# Patient Record
Sex: FEMALE | Race: WHITE | NOT HISPANIC OR LATINO | Employment: OTHER | URBAN - NONMETROPOLITAN AREA
[De-identification: names, ages, dates, MRNs, and addresses within clinical notes are randomized per-mention and may not be internally consistent; named-entity substitution may affect disease eponyms.]

---

## 2017-02-13 RX ORDER — OMEPRAZOLE 20 MG/1
CAPSULE, DELAYED RELEASE ORAL
Qty: 90 CAPSULE | Refills: 3 | Status: SHIPPED | OUTPATIENT
Start: 2017-02-13 | End: 2018-03-01 | Stop reason: SDUPTHER

## 2017-02-13 RX ORDER — TRIAMTERENE AND HYDROCHLOROTHIAZIDE 75; 50 MG/1; MG/1
TABLET ORAL
Qty: 135 TABLET | Refills: 3 | Status: SHIPPED | OUTPATIENT
Start: 2017-02-13 | End: 2018-05-08 | Stop reason: SDUPTHER

## 2017-03-02 ENCOUNTER — OFFICE VISIT (OUTPATIENT)
Dept: INTERNAL MEDICINE | Facility: CLINIC | Age: 72
End: 2017-03-02

## 2017-03-02 VITALS
WEIGHT: 180.13 LBS | DIASTOLIC BLOOD PRESSURE: 75 MMHG | SYSTOLIC BLOOD PRESSURE: 120 MMHG | TEMPERATURE: 98.2 F | HEIGHT: 63 IN | OXYGEN SATURATION: 91 % | BODY MASS INDEX: 31.92 KG/M2 | HEART RATE: 58 BPM

## 2017-03-02 DIAGNOSIS — I10 ESSENTIAL HYPERTENSION: ICD-10-CM

## 2017-03-02 DIAGNOSIS — K21.9 GASTROESOPHAGEAL REFLUX DISEASE WITHOUT ESOPHAGITIS: ICD-10-CM

## 2017-03-02 DIAGNOSIS — E78.00 PURE HYPERCHOLESTEROLEMIA: Primary | ICD-10-CM

## 2017-03-02 PROCEDURE — 99214 OFFICE O/P EST MOD 30 MIN: CPT | Performed by: INTERNAL MEDICINE

## 2017-03-02 RX ORDER — TRAZODONE HYDROCHLORIDE 50 MG/1
50 TABLET ORAL NIGHTLY
Qty: 30 TABLET | Refills: 5 | Status: SHIPPED | OUTPATIENT
Start: 2017-03-02 | End: 2017-08-07

## 2017-03-02 RX ORDER — DIAZEPAM 5 MG/1
TABLET ORAL
Refills: 0 | COMMUNITY
Start: 2017-02-13 | End: 2017-03-02

## 2017-03-02 NOTE — PROGRESS NOTES
Subjective  Vesta Landry is a 71 y.o. female    HPI coming in for follow-up patient with a history of hypertension and hyperlipidemia history of reflux esophagitis has had bilateral knee pain ongoing for a few months now no new trauma pain especially with excessive standing or walking    The following portions of the patient's history were reviewed and updated as appropriate: allergies, current medications, past family history, past medical history, past social history, past surgical history, and problem list.     Review of Systems   Constitutional: Negative for appetite change, chills, diaphoresis, fatigue, fever and unexpected weight change.   HENT: Positive for congestion and postnasal drip. Negative for ear pain, rhinorrhea, sinus pressure, sore throat and tinnitus.    Eyes: Negative for pain, discharge and visual disturbance.   Respiratory: Negative for apnea, cough, choking, chest tightness, shortness of breath, wheezing and stridor.    Cardiovascular: Positive for leg swelling. Negative for chest pain and palpitations.   Gastrointestinal: Negative for abdominal distention, abdominal pain, anal bleeding, blood in stool, constipation, diarrhea, nausea, rectal pain and vomiting.   Endocrine: Negative for polyuria.   Genitourinary: Negative for dysuria, frequency, hematuria and urgency.   Musculoskeletal: Positive for arthralgias and gait problem.        Bilateral knee pain, left worse than right   Skin: Negative for color change and rash.   Allergic/Immunologic: Negative for environmental allergies.   Neurological: Negative for dizziness, tremors, syncope, weakness, light-headedness, numbness and headaches.   Psychiatric/Behavioral: Negative for dysphoric mood and suicidal ideas.       Vitals:    03/02/17 1112   BP: 120/75   Pulse: 58   Temp: 98.2 °F (36.8 °C)   SpO2: 91%       Objective  Physical Exam   Constitutional: She appears well-developed and well-nourished.   HENT:   Head: Normocephalic and  atraumatic.   Right Ear: External ear normal.   Left Ear: External ear normal.   Nose: Nose normal.   Eyes: Conjunctivae and EOM are normal. Right eye exhibits no discharge. Left eye exhibits no discharge. No scleral icterus.   Neck: Normal range of motion. Neck supple. No JVD present. No tracheal deviation present. No thyromegaly present.   Cardiovascular: Normal rate, regular rhythm, S1 normal, S2 normal and intact distal pulses.  Exam reveals no gallop and no friction rub.    Grade 2 holosystolic murmur, loudest over aortic valve   Pulmonary/Chest: Effort normal and breath sounds normal. No stridor. No respiratory distress. She has no wheezes. She has no rales. She exhibits no tenderness.   Abdominal: Soft. Bowel sounds are normal. She exhibits no distension and no mass. There is no tenderness. There is no rebound and no guarding. No hernia.   Musculoskeletal: She exhibits edema and deformity.   Lymphadenopathy:     She has no cervical adenopathy.   Neurological: She is alert.   Skin: Skin is warm and dry. No rash noted.   Psychiatric: She has a normal mood and affect. Her behavior is normal. Judgment and thought content normal.   Vitals reviewed.      Diagnoses and all orders for this visit:    Pure hypercholesterolemia stable with current meds discuss dietary restrictions and weight loss    Essential hypertension continue with low-salt diet cardiovascular exercise    Gastroesophageal reflux disease without esophagitis continue with reflux precautions and proton pump inhibitors  Continue with weight loss home exercises NSAIDs when necessary for bilateral knee pain

## 2017-03-07 RX ORDER — ATORVASTATIN CALCIUM 10 MG/1
TABLET, FILM COATED ORAL
Qty: 90 TABLET | Refills: 3 | Status: SHIPPED | OUTPATIENT
Start: 2017-03-07 | End: 2018-04-02 | Stop reason: SDUPTHER

## 2017-03-20 RX ORDER — METOPROLOL TARTRATE 50 MG/1
TABLET, FILM COATED ORAL
Qty: 90 TABLET | Refills: 3 | Status: SHIPPED | OUTPATIENT
Start: 2017-03-20 | End: 2018-03-22 | Stop reason: SDUPTHER

## 2017-07-24 ENCOUNTER — HOSPITAL ENCOUNTER (OUTPATIENT)
Dept: GENERAL RADIOLOGY | Facility: HOSPITAL | Age: 72
Discharge: HOME | End: 2017-07-24
Attending: INTERNAL MEDICINE | Admitting: INTERNAL MEDICINE

## 2017-07-24 ENCOUNTER — OFFICE VISIT (OUTPATIENT)
Dept: INTERNAL MEDICINE | Facility: CLINIC | Age: 72
End: 2017-07-24

## 2017-07-24 VITALS
SYSTOLIC BLOOD PRESSURE: 130 MMHG | WEIGHT: 178 LBS | HEIGHT: 63 IN | BODY MASS INDEX: 31.54 KG/M2 | OXYGEN SATURATION: 97 % | DIASTOLIC BLOOD PRESSURE: 90 MMHG | TEMPERATURE: 98.6 F | HEART RATE: 58 BPM

## 2017-07-24 DIAGNOSIS — R07.2 PRECORDIAL PAIN: ICD-10-CM

## 2017-07-24 DIAGNOSIS — R60.0 BILATERAL EDEMA OF LOWER EXTREMITY: ICD-10-CM

## 2017-07-24 DIAGNOSIS — E78.00 PURE HYPERCHOLESTEROLEMIA: ICD-10-CM

## 2017-07-24 DIAGNOSIS — R06.09 DYSPNEA ON EXERTION: ICD-10-CM

## 2017-07-24 DIAGNOSIS — R41.3 MEMORY LOSS: ICD-10-CM

## 2017-07-24 DIAGNOSIS — I10 ESSENTIAL HYPERTENSION: ICD-10-CM

## 2017-07-24 DIAGNOSIS — R06.09 DYSPNEA ON EXERTION: Primary | ICD-10-CM

## 2017-07-24 PROCEDURE — 99214 OFFICE O/P EST MOD 30 MIN: CPT | Performed by: INTERNAL MEDICINE

## 2017-07-24 PROCEDURE — 93000 ELECTROCARDIOGRAM COMPLETE: CPT | Performed by: INTERNAL MEDICINE

## 2017-07-24 PROCEDURE — 71020 HC CHEST PA AND LATERAL: CPT

## 2017-07-24 NOTE — PROGRESS NOTES
Subjective  Vesta Landry is a 72 y.o. female    HPI coming in for evaluation of her shortness of breath and chronic lower extremity swelling. She is accompanied by her daughter was giving us some of the history complains of dyspnea on exertion with walking about 1 block especially if it is a pill. Has not been very active lately she denies fever or cough or hemoptysis. Has a chronic lower extremity edema for which she underwent some treatment by her cardiologist    Has occasional chest discomfort which resolved spontaneously. Daughter also complains of patient being more forgetful lately and repeating herself. Denies localized weakness or numbness no headache or visual disturbances    The following portions of the patient's history were reviewed and updated as appropriate: allergies, current medications, past family history, past medical history, past social history, past surgical history, and problem list.     Review of Systems   Constitutional: Negative.  Negative for activity change, appetite change, fatigue and fever.   HENT: Negative for congestion, ear discharge, ear pain and trouble swallowing.    Eyes: Negative for photophobia and visual disturbance.   Respiratory: Positive for chest tightness and shortness of breath. Negative for cough.    Cardiovascular: Negative for chest pain and palpitations.   Gastrointestinal: Negative for abdominal distention, abdominal pain, constipation, diarrhea, nausea and vomiting.   Endocrine: Negative.    Genitourinary: Negative for dysuria, hematuria and urgency.   Musculoskeletal: Negative for arthralgias, back pain, joint swelling and myalgias.   Skin: Negative for color change and rash.   Allergic/Immunologic: Negative.    Neurological: Negative for dizziness, weakness, light-headedness and headaches.   Hematological: Negative for adenopathy. Does not bruise/bleed easily.   Psychiatric/Behavioral: Positive for confusion. Negative for agitation and dysphoric mood.  The patient is not nervous/anxious.        Vitals:    07/24/17 1513   BP: 130/90   Pulse: 58   Temp: 98.6 °F (37 °C)   SpO2: 97%       Objective  Physical Exam   Constitutional: She is oriented to person, place, and time. She appears well-developed and well-nourished. No distress.   HENT:   Nose: Nose normal.   Mouth/Throat: Oropharynx is clear and moist.   Eyes: Conjunctivae and EOM are normal. No scleral icterus.   Neck: No tracheal deviation present. No thyromegaly present.   Cardiovascular: Normal rate and regular rhythm.  Exam reveals no friction rub.    No murmur heard.  Pulmonary/Chest: No respiratory distress. She has no wheezes. She has no rales.   Abdominal: Soft. She exhibits no distension and no mass. There is no tenderness. There is no guarding.   Musculoskeletal: Normal range of motion. She exhibits no deformity.   Lymphadenopathy:     She has no cervical adenopathy.   Neurological: She is alert and oriented to person, place, and time. She has normal reflexes. No cranial nerve deficit. Coordination normal.   Skin: Skin is warm and dry. No rash noted. No erythema.   Psychiatric: She has a normal mood and affect. Her behavior is normal. Judgment and thought content normal.       Diagnoses and all orders for this visit:    Dyspnea on exertion check x-ray along with routine lab work will need referral to cardiology for possible stress test. EKG today is unremarkable  -     CBC (No Diff)  -     Comprehensive Metabolic Panel  -     XR Chest 2 View; Future  -     Ambulatory Referral to Cardiology    Precordial pain  -     Ambulatory Referral to Cardiology    Essential hypertension stable with current medications continue with low-salt diet  -     Comprehensive Metabolic Panel    Pure hypercholesterolemia stable continue statins she is tolerating this well    Bilateral edema of lower extremity rule out sleep apnea or right-sided failure. Echocardiogram with evidence of some diastolic dysfunction. Check labs as  mentioned above  -     TSH    Memory loss MRI done about a year ago was unremarkable check routine lab work  -     TSH  -     Vitamin B12        ECG 12 Lead  Date/Time: 7/24/2017 6:16 PM  Performed by: ZOË DOE  Authorized by: ZOË DOE   Comparison: not compared with previous ECG   Rhythm: sinus bradycardia  Rate: bradycardic  ST Segments: ST segments normal  T Waves: T waves normal  QRS axis: normal  Clinical impression: non-specific ECG

## 2017-07-25 LAB
ALBUMIN SERPL-MCNC: 4.4 G/DL (ref 3.5–5)
ALBUMIN/GLOB SERPL: 1.6 G/DL (ref 1–2)
ALP SERPL-CCNC: 99 U/L (ref 38–126)
ALT SERPL-CCNC: 40 U/L (ref 13–69)
AST SERPL-CCNC: 34 U/L (ref 15–46)
BILIRUB SERPL-MCNC: 0.8 MG/DL (ref 0.2–1.3)
BUN SERPL-MCNC: 21 MG/DL (ref 7–20)
BUN/CREAT SERPL: 17.5 (ref 7.1–23.5)
CALCIUM SERPL-MCNC: 9.5 MG/DL (ref 8.4–10.2)
CHLORIDE SERPL-SCNC: 99 MMOL/L (ref 98–107)
CO2 SERPL-SCNC: 27 MMOL/L (ref 26–30)
CREAT SERPL-MCNC: 1.2 MG/DL (ref 0.6–1.3)
ERYTHROCYTE [DISTWIDTH] IN BLOOD BY AUTOMATED COUNT: 13.4 % (ref 11.5–14.5)
GLOBULIN SER CALC-MCNC: 2.7 GM/DL
GLUCOSE SERPL-MCNC: 94 MG/DL (ref 74–98)
HCT VFR BLD AUTO: 39.7 % (ref 37–47)
HGB BLD-MCNC: 13.7 G/DL (ref 12–16)
MCH RBC QN AUTO: 30.9 PG (ref 27–31)
MCHC RBC AUTO-ENTMCNC: 34.5 G/DL (ref 30–37)
MCV RBC AUTO: 89.6 FL (ref 81–99)
PLATELET # BLD AUTO: 163 10*3/MM3 (ref 130–400)
POTASSIUM SERPL-SCNC: 4.9 MMOL/L (ref 3.5–5.1)
PROT SERPL-MCNC: 7.1 G/DL (ref 6.3–8.2)
RBC # BLD AUTO: 4.43 10*6/MM3 (ref 4.2–5.4)
SODIUM SERPL-SCNC: 138 MMOL/L (ref 137–145)
TSH SERPL DL<=0.005 MIU/L-ACNC: 3.48 MIU/ML (ref 0.47–4.68)
VIT B12 SERPL-MCNC: 951 PG/ML (ref 239–931)
WBC # BLD AUTO: 7.78 10*3/MM3 (ref 4.8–10.8)

## 2017-08-07 ENCOUNTER — CONSULT (OUTPATIENT)
Dept: CARDIOLOGY | Facility: CLINIC | Age: 72
End: 2017-08-07

## 2017-08-07 VITALS
WEIGHT: 178.2 LBS | DIASTOLIC BLOOD PRESSURE: 92 MMHG | SYSTOLIC BLOOD PRESSURE: 156 MMHG | RESPIRATION RATE: 16 BRPM | BODY MASS INDEX: 31.57 KG/M2 | HEART RATE: 58 BPM | HEIGHT: 63 IN | OXYGEN SATURATION: 97 %

## 2017-08-07 DIAGNOSIS — R60.0 BILATERAL EDEMA OF LOWER EXTREMITY: ICD-10-CM

## 2017-08-07 DIAGNOSIS — R06.02 SOB (SHORTNESS OF BREATH) ON EXERTION: ICD-10-CM

## 2017-08-07 DIAGNOSIS — R07.2 PRECORDIAL PAIN: ICD-10-CM

## 2017-08-07 DIAGNOSIS — R01.1 MURMUR, CARDIAC: ICD-10-CM

## 2017-08-07 DIAGNOSIS — I10 ESSENTIAL HYPERTENSION: Primary | ICD-10-CM

## 2017-08-07 DIAGNOSIS — R42 DIZZINESS: ICD-10-CM

## 2017-08-07 PROCEDURE — 99204 OFFICE O/P NEW MOD 45 MIN: CPT | Performed by: INTERNAL MEDICINE

## 2017-08-07 RX ORDER — TRAZODONE HYDROCHLORIDE 50 MG/1
50 TABLET ORAL NIGHTLY PRN
COMMUNITY
End: 2018-04-02

## 2017-08-07 RX ORDER — LOSARTAN POTASSIUM 25 MG/1
25 TABLET ORAL DAILY
Qty: 30 TABLET | Refills: 11 | Status: SHIPPED | OUTPATIENT
Start: 2017-08-07 | End: 2019-03-22 | Stop reason: SDUPTHER

## 2017-08-07 RX ORDER — ISOSORBIDE MONONITRATE 30 MG/1
30 TABLET, EXTENDED RELEASE ORAL DAILY
Qty: 30 TABLET | Refills: 11 | Status: SHIPPED | OUTPATIENT
Start: 2017-08-07 | End: 2017-09-08 | Stop reason: ALTCHOICE

## 2017-08-07 NOTE — PROGRESS NOTES
Subjective:     Encounter Date:08/07/2017      Patient ID: Vesta Landry is a 72 y.o. female.    Chief Complaint:Chest pain, shortness of breath, edema and dizziness  HPI  This is a 72-year-old female patient with no prior history of documented heart disease who presents to cardiology clinic with symptoms thought to be congestive heart failure.  The patient reports that for the last 2 months she has been having progressively worsening swelling of her feet and ankles.  She had underwent an attempt at peripheral lower extremity venous ablation which ultimately ended up making her lower extremity edema and discomfort worse.  The patient has had bilateral lower extremity venous compression ultrasound with duplex Doppler which showed no evidence of deep vein thrombosis.  The patient also reports shortness of breath with minimal activity such as walking to a restaurant from the parking lot or doing short walks at the grocery store or other shopping activities.  She reports that the shortness of breath typically is relieved with rest.  There is no orthopnea or PND.  The patient also reports increasing blood pressure with previous well-controlled blood pressure that is now more difficult to control.  The patient also has been experiencing chest discomfort for the last 2 months.  She describes it as a diffuse bilateral parasternal discomfort which generally has a pressure type sensation but also has an aching quality.  The discomfort is a proximally 6/10 in intensity.  The discomfort primarily occurs with exertional activities but has also occurred at rest.  She indicates that the discomfort is more frequent and has a greater intensity over the last few weeks.  In addition she indicates that the amount of physical activity necessary to precipitate her shortness of breath and chest discomfort has gotten progressively less.  Her overall effort tolerance is poor.  The patient reports having generalized weakness and  easy fatigue.  The patient indicates that her discomfort is improved with rest.  The discomfort has a variable frequency based on her overall physical activities.  She indicates that she is getting the discomfort approximately 3-4 times per week and it will generally last anywhere from 5-10 minutes per episode.  There is some associated shortness of breath but no nausea vomiting or diaphoresis.  She cannot identify any other precipitating aggravating or alleviating features.  The patient reports dizziness which has also worsened in frequency duration and intensity.  She has no palpitations per se and has had no syncopal episodes.  She has a history of hypertension and hyper cholesterolemia but no history of diabetes.  She is a nonsmoker.  Her family history is negative for premature coronary disease.  The patient has had a 12-lead electrocardiogram which showed sinus bradycardia with no ischemic ST-T wave changes or injury current.  There was no pathologic Q waves.  She has no personal history of myocardial infarction or coronary revascularization.  She has never had any form of stress testing.  She indicates that she would be unable to do treadmill exercise stress testing due to her severe lower extremity discomfort particularly related to her feet and ankles and the progressively worsening swelling.  In addition she reports severe shortness of breath with activity and poor effort tolerance.  The following portions of the patient's history were reviewed and updated as appropriate: allergies, current medications, past family history, past medical history, past social history, past surgical history and problem  Review of Systems   Constitution: Negative for chills, diaphoresis, fever, weakness, malaise/fatigue, night sweats, weight gain and weight loss.   HENT: Negative for ear discharge, hearing loss, hoarse voice and nosebleeds.    Eyes: Negative for discharge, double vision, pain and photophobia.   Cardiovascular:  Positive for chest pain, dyspnea on exertion and leg swelling. Negative for claudication, cyanosis, irregular heartbeat, near-syncope, orthopnea, palpitations, paroxysmal nocturnal dyspnea and syncope.   Respiratory: Positive for shortness of breath. Negative for cough, hemoptysis, sputum production and wheezing.    Endocrine: Negative for cold intolerance, heat intolerance, polydipsia, polyphagia and polyuria.   Hematologic/Lymphatic: Negative for adenopathy and bleeding problem. Does not bruise/bleed easily.   Skin: Negative for color change, flushing, itching and rash.   Musculoskeletal: Negative for muscle cramps, muscle weakness, myalgias and stiffness.   Gastrointestinal: Negative for abdominal pain, diarrhea, hematemesis, hematochezia, nausea and vomiting.   Genitourinary: Negative for dysuria, frequency and nocturia.   Neurological: Positive for dizziness. Negative for focal weakness, loss of balance, numbness, paresthesias and seizures.   Psychiatric/Behavioral: Negative for altered mental status, hallucinations and suicidal ideas.   Allergic/Immunologic: Negative for HIV exposure, hives and persistent infections.       Current Outpatient Prescriptions:   •  atorvastatin (LIPITOR) 10 MG tablet, take 1 tablet by mouth once daily, Disp: 90 tablet, Rfl: 3  •  B Complex Vitamins (VITAMIN B COMPLEX PO), Take  by mouth Daily., Disp: , Rfl:   •  metoprolol tartrate (LOPRESSOR) 50 MG tablet, take 1 tablet by mouth once daily, Disp: 90 tablet, Rfl: 3  •  omeprazole (priLOSEC) 20 MG capsule, take 1 capsule by mouth once daily, Disp: 90 capsule, Rfl: 3  •  traZODone (DESYREL) 50 MG tablet, Take 50 mg by mouth At Night As Needed for Sleep., Disp: , Rfl:   •  triamterene-hydrochlorothiazide (MAXZIDE) 75-50 MG per tablet, take 1/2 tablet by mouth once daily, Disp: 135 tablet, Rfl: 3  •  aspirin 81 MG tablet, Take 1 tablet by mouth Daily., Disp: 30 tablet, Rfl: 11  •  isosorbide mononitrate (IMDUR) 30 MG 24 hr tablet,  "Take 1 tablet by mouth Daily., Disp: 30 tablet, Rfl: 11  •  losartan (COZAAR) 25 MG tablet, Take 1 tablet by mouth Daily., Disp: 30 tablet, Rfl: 11     Objective:     Physical Exam   Constitutional: She is oriented to person, place, and time. She appears well-developed and well-nourished.   HENT:   Head: Normocephalic and atraumatic.   Mouth/Throat: Oropharynx is clear and moist.   Eyes: Conjunctivae and EOM are normal. Pupils are equal, round, and reactive to light. No scleral icterus.   Neck: Normal range of motion. Neck supple. No JVD present. No tracheal deviation present. No thyromegaly present.   Cardiovascular: Normal rate, regular rhythm, S1 normal, S2 normal, normal heart sounds, intact distal pulses and normal pulses.  PMI is not displaced.  Exam reveals no gallop and no friction rub.    No murmur heard.  Pulmonary/Chest: Effort normal and breath sounds normal. No respiratory distress. She has no wheezes. She has no rales.   Abdominal: Soft. Bowel sounds are normal. She exhibits no distension and no mass. There is no tenderness. There is no rebound and no guarding.   Musculoskeletal: Normal range of motion. She exhibits edema. She exhibits no deformity.   Neurological: She is alert and oriented to person, place, and time. She displays normal reflexes. No cranial nerve deficit. Coordination normal.   Skin: Skin is warm and dry. No rash noted. No erythema.   Psychiatric: She has a normal mood and affect. Her behavior is normal. Thought content normal.     Blood pressure 156/92, pulse 58, resp. rate 16, height 63\" (160 cm), weight 178 lb 3.2 oz (80.8 kg), SpO2 97 %, not currently breastfeeding.   Lab Review:       Assessment:         1. Essential hypertension  Blood pressure control is less than ideal.  - Adult Transthoracic Echo Complete    2. Murmur, cardiac  I did not appreciate a murmur at auscultation of the heart today.  - Adult Transthoracic Echo Complete    3. Precordial pain  The patient's chest " discomfort has features quite typical for coronary insufficiency and worsening angina pectoris.  - Stress Test With Myocardial Perfusion Two Day  - Adult Transthoracic Echo Complete    4. SOB (shortness of breath) on exertion  I suspect this is multifactorial in etiology.  Some could be related to aerobic deconditioning but it is more likely the patient has unrecognized congestive heart failure.  I suspect there is an element of diastolic heart failure.  This could also represent an angina equivalent.  - Stress Test With Myocardial Perfusion Two Day  - Adult Transthoracic Echo Complete    5. Dizziness  I am concerned that her dizziness could be related to her blood pressure and/or her blood pressure medications.  I am concerned that this could be a manifestation of ischemic heart disease and/or arrhythmia.  - Holter Monitor - 48 Hour    6. Bilateral edema of lower extremity  Her peripheral edema may well be a manifestation of heart failure.  Procedures     Plan:       I have recommended a vasodilator nuclear stress test and echocardiogram and a 48 hour Holter monitor.  I have recommended that the patient begin taking an enteric-coated baby aspirin once per day.  I have recommended starting long-acting Imdur 30 mg once per day and have recommended starting losartan 25 mg once per day.  These medications can be uptitrated as needed.  Due to her resting bradycardia I have not recommended increasing the dose of her beta blocker.  She is encouraged to continue using her statin based cholesterol-lowering medication.  The patient has been counseled regarding the essential need to eliminate dietary sodium.  She has been given specific instructions on what foods to avoid and what foods and beverages are high in sodium content.  She has been counseled to eat foods rich in potassium.  We may need to increase her diuretic to a more potent loop diuretic.  The patient has been counseled to limit her overall fluid intake to no  more than 1.5 L per day.  She has been counseled to take her blood pressure daily a proximally 30-45 minutes after taking all of her normal morning blood pressure medications and to keep a diary of her blood pressures.  She has been counseled to weigh herself daily and to keep a diary of her daily weights.  Further recommendations will be predicated on the results of her outpatient testing.

## 2017-08-10 ENCOUNTER — TELEPHONE (OUTPATIENT)
Dept: CARDIOLOGY | Facility: CLINIC | Age: 72
End: 2017-08-10

## 2017-08-10 NOTE — TELEPHONE ENCOUNTER
Patient has had an intolerable HA with 1 Dose of the Imdur that you had prescribed the other day.  Could the patient try Ranexa? If so what dose?    Please review and advise.  Darleen Sanders MA

## 2017-08-17 LAB
BH CV ECHO MEAS - % IVS THICK: 40 %
BH CV ECHO MEAS - % LVPW THICK: 146.2 %
BH CV ECHO MEAS - AO MAX PG (FULL): 1 MMHG
BH CV ECHO MEAS - AO MAX PG: 4.8 MMHG
BH CV ECHO MEAS - AO MEAN PG (FULL): 2 MMHG
BH CV ECHO MEAS - AO MEAN PG: 3 MMHG
BH CV ECHO MEAS - AO ROOT AREA (BSA CORRECTED): 1.5
BH CV ECHO MEAS - AO ROOT AREA: 5.7 CM^2
BH CV ECHO MEAS - AO ROOT DIAM: 2.7 CM
BH CV ECHO MEAS - AO V2 MAX: 109.5 CM/SEC
BH CV ECHO MEAS - AO V2 MEAN: 76.5 CM/SEC
BH CV ECHO MEAS - AO V2 VTI: 23.6 CM
BH CV ECHO MEAS - AVA(I,A): 3 CM^2
BH CV ECHO MEAS - AVA(I,D): 3 CM^2
BH CV ECHO MEAS - AVA(V,A): 2.8 CM^2
BH CV ECHO MEAS - AVA(V,D): 2.8 CM^2
BH CV ECHO MEAS - BSA(HAYCOCK): 1.9 M^2
BH CV ECHO MEAS - BSA: 1.8 M^2
BH CV ECHO MEAS - BZI_BMI: 31.5 KILOGRAMS/M^2
BH CV ECHO MEAS - BZI_METRIC_HEIGHT: 160 CM
BH CV ECHO MEAS - BZI_METRIC_WEIGHT: 80.7 KG
BH CV ECHO MEAS - CONTRAST EF 4CH: 67.7 ML/M^2
BH CV ECHO MEAS - EDV(CUBED): 185.2 ML
BH CV ECHO MEAS - EDV(MOD-SP4): 96 ML
BH CV ECHO MEAS - EDV(TEICH): 160 ML
BH CV ECHO MEAS - EF(CUBED): 77.8 %
BH CV ECHO MEAS - EF(MOD-SP4): 67.7 %
BH CV ECHO MEAS - EF(TEICH): 69.3 %
BH CV ECHO MEAS - ESV(CUBED): 41.1 ML
BH CV ECHO MEAS - ESV(MOD-SP4): 31 ML
BH CV ECHO MEAS - ESV(TEICH): 49.1 ML
BH CV ECHO MEAS - FS: 39.5 %
BH CV ECHO MEAS - IVS/LVPW: 1.5
BH CV ECHO MEAS - IVSD: 1 CM
BH CV ECHO MEAS - IVSS: 1.4 CM
BH CV ECHO MEAS - LA DIMENSION: 3.4 CM
BH CV ECHO MEAS - LA/AO: 1.3
BH CV ECHO MEAS - LV DIASTOLIC VOL/BSA (35-75): 52.2 ML/M^2
BH CV ECHO MEAS - LV MASS(C)D: 176.9 GRAMS
BH CV ECHO MEAS - LV MASS(C)DI: 96.1 GRAMS/M^2
BH CV ECHO MEAS - LV MASS(C)S: 189.7 GRAMS
BH CV ECHO MEAS - LV MASS(C)SI: 103.1 GRAMS/M^2
BH CV ECHO MEAS - LV MAX PG: 3.8 MMHG
BH CV ECHO MEAS - LV MEAN PG: 1 MMHG
BH CV ECHO MEAS - LV SYSTOLIC VOL/BSA (12-30): 16.8 ML/M^2
BH CV ECHO MEAS - LV V1 MAX: 97.4 CM/SEC
BH CV ECHO MEAS - LV V1 MEAN: 55.6 CM/SEC
BH CV ECHO MEAS - LV V1 VTI: 22.7 CM
BH CV ECHO MEAS - LVIDD: 5.7 CM
BH CV ECHO MEAS - LVIDS: 3.5 CM
BH CV ECHO MEAS - LVLD AP4: 7.4 CM
BH CV ECHO MEAS - LVLS AP4: 6.3 CM
BH CV ECHO MEAS - LVOT AREA (M): 3.1 CM^2
BH CV ECHO MEAS - LVOT AREA: 3.1 CM^2
BH CV ECHO MEAS - LVOT DIAM: 2 CM
BH CV ECHO MEAS - LVPWD: 1 CM
BH CV ECHO MEAS - LVPWS: 1.6 CM
BH CV ECHO MEAS - MV A MAX VEL: 85.9 CM/SEC
BH CV ECHO MEAS - MV DEC SLOPE: 372.5 CM/SEC^2
BH CV ECHO MEAS - MV DEC TIME: 0.17 SEC
BH CV ECHO MEAS - MV E MAX VEL: 74.3 CM/SEC
BH CV ECHO MEAS - MV E/A: 0.86
BH CV ECHO MEAS - MV P1/2T MAX VEL: 70.1 CM/SEC
BH CV ECHO MEAS - MV P1/2T: 55.1 MSEC
BH CV ECHO MEAS - MVA P1/2T LCG: 3.1 CM^2
BH CV ECHO MEAS - MVA(P1/2T): 4 CM^2
BH CV ECHO MEAS - PA MAX PG: 3.6 MMHG
BH CV ECHO MEAS - PA V2 MAX: 94.9 CM/SEC
BH CV ECHO MEAS - RAP SYSTOLE: 10 MMHG
BH CV ECHO MEAS - RVSP: 40 MMHG
BH CV ECHO MEAS - SI(AO): 73.4 ML/M^2
BH CV ECHO MEAS - SI(CUBED): 78.3 ML/M^2
BH CV ECHO MEAS - SI(LVOT): 38.7 ML/M^2
BH CV ECHO MEAS - SI(MOD-SP4): 35.3 ML/M^2
BH CV ECHO MEAS - SI(TEICH): 60.3 ML/M^2
BH CV ECHO MEAS - SV(AO): 135.1 ML
BH CV ECHO MEAS - SV(CUBED): 144.1 ML
BH CV ECHO MEAS - SV(LVOT): 71.3 ML
BH CV ECHO MEAS - SV(MOD-SP4): 65 ML
BH CV ECHO MEAS - SV(TEICH): 110.9 ML
BH CV ECHO MEAS - TR MAX VEL: 250.3 CM/SEC
BH CV ECHO MEAS - TV MAX PG: 1.1 MMHG
BH CV ECHO MEAS - TV V2 MAX: 52.3 CM/SEC
LEFT ATRIUM VOLUME INDEX: 22 ML/M2
LV EF 2D ECHO EST: 68 %

## 2017-08-21 ENCOUNTER — HOSPITAL ENCOUNTER (OUTPATIENT)
Dept: NUCLEAR MEDICINE | Facility: HOSPITAL | Age: 72
Discharge: HOME | End: 2017-08-21
Attending: INTERNAL MEDICINE

## 2017-08-21 PROCEDURE — A9500 TC99M SESTAMIBI: HCPCS | Performed by: INTERNAL MEDICINE

## 2017-08-21 PROCEDURE — 78452 HT MUSCLE IMAGE SPECT MULT: CPT

## 2017-08-21 PROCEDURE — 25010000002 REGADENOSON 0.4 MG/5ML SOLUTION: Performed by: INTERNAL MEDICINE

## 2017-08-21 PROCEDURE — 93017 CV STRESS TEST TRACING ONLY: CPT

## 2017-08-21 PROCEDURE — 0 TECHNETIUM SESTAMIBI: Performed by: INTERNAL MEDICINE

## 2017-08-21 PROCEDURE — 78452 HT MUSCLE IMAGE SPECT MULT: CPT | Performed by: INTERNAL MEDICINE

## 2017-08-21 PROCEDURE — 93018 CV STRESS TEST I&R ONLY: CPT | Performed by: INTERNAL MEDICINE

## 2017-08-21 RX ADMIN — REGADENOSON 0.4 MG: 0.08 INJECTION, SOLUTION INTRAVENOUS at 08:25

## 2017-08-21 RX ADMIN — TECHNETIUM TC-99M SESTAMIBI 1 DOSE: 1 INJECTION INTRAVENOUS at 08:25

## 2017-08-22 ENCOUNTER — HOSPITAL ENCOUNTER (OUTPATIENT)
Dept: GENERAL RADIOLOGY | Facility: HOSPITAL | Age: 72
Discharge: HOME | End: 2017-08-22
Attending: INTERNAL MEDICINE

## 2017-08-22 ENCOUNTER — HOSPITAL ENCOUNTER (OUTPATIENT)
Dept: NUCLEAR MEDICINE | Facility: HOSPITAL | Age: 72
Discharge: HOME | End: 2017-08-22
Attending: INTERNAL MEDICINE

## 2017-08-22 PROCEDURE — A9500 TC99M SESTAMIBI: HCPCS | Performed by: INTERNAL MEDICINE

## 2017-08-22 PROCEDURE — 0 TECHNETIUM SESTAMIBI: Performed by: INTERNAL MEDICINE

## 2017-08-22 RX ADMIN — TECHNETIUM TC-99M SESTAMIBI 1 DOSE: 1 INJECTION INTRAVENOUS at 07:40

## 2017-08-23 LAB
BH CV NUCLEAR PRIOR STUDY: 3
BH CV STRESS COMMENTS STAGE 1: NORMAL
BH CV STRESS DOSE REGADENOSON STAGE 1: 0.4
BH CV STRESS DURATION MIN STAGE 1: 0
BH CV STRESS DURATION SEC STAGE 1: 15
BH CV STRESS PROTOCOL 1: NORMAL
BH CV STRESS RECOVERY BP: NORMAL MMHG
BH CV STRESS RECOVERY HR: 97 BPM
BH CV STRESS STAGE 1: 1
LV EF NUC BP: 74 %
MAXIMAL PREDICTED HEART RATE: 146 BPM
PERCENT MAX PREDICTED HR: 76.71 %
STRESS BASELINE BP: NORMAL MMHG
STRESS BASELINE HR: 67 BPM
STRESS PERCENT HR: 90 %
STRESS POST PEAK BP: NORMAL MMHG
STRESS POST PEAK HR: 112 BPM
STRESS TARGET HR: 124 BPM

## 2017-08-30 ENCOUNTER — TELEPHONE (OUTPATIENT)
Dept: CARDIOLOGY | Facility: CLINIC | Age: 72
End: 2017-08-30

## 2017-08-30 RX ORDER — RANOLAZINE 500 MG/1
500 TABLET, EXTENDED RELEASE ORAL 2 TIMES DAILY
Qty: 60 TABLET | Refills: 5 | Status: SHIPPED | OUTPATIENT
Start: 2017-08-30 | End: 2017-09-08

## 2017-08-30 NOTE — TELEPHONE ENCOUNTER
Patient daughter states that patient took her last sample of ranexa and would like an rx sent to pharmacy.  Medication sent to pharmacy.

## 2017-09-01 ENCOUNTER — OFFICE VISIT (OUTPATIENT)
Dept: INTERNAL MEDICINE | Facility: CLINIC | Age: 72
End: 2017-09-01

## 2017-09-01 VITALS
DIASTOLIC BLOOD PRESSURE: 80 MMHG | WEIGHT: 176 LBS | OXYGEN SATURATION: 98 % | BODY MASS INDEX: 31.18 KG/M2 | HEIGHT: 63 IN | HEART RATE: 74 BPM | SYSTOLIC BLOOD PRESSURE: 124 MMHG | RESPIRATION RATE: 12 BRPM | TEMPERATURE: 97.6 F

## 2017-09-01 DIAGNOSIS — I10 ESSENTIAL HYPERTENSION: Primary | ICD-10-CM

## 2017-09-01 DIAGNOSIS — K21.9 GASTROESOPHAGEAL REFLUX DISEASE WITHOUT ESOPHAGITIS: ICD-10-CM

## 2017-09-01 DIAGNOSIS — E78.00 PURE HYPERCHOLESTEROLEMIA: ICD-10-CM

## 2017-09-01 PROBLEM — R42 DIZZINESS: Status: RESOLVED | Noted: 2017-08-07 | Resolved: 2017-09-01

## 2017-09-01 PROBLEM — R07.2 PRECORDIAL PAIN: Status: RESOLVED | Noted: 2017-08-07 | Resolved: 2017-09-01

## 2017-09-01 PROCEDURE — G0439 PPPS, SUBSEQ VISIT: HCPCS | Performed by: INTERNAL MEDICINE

## 2017-09-01 PROCEDURE — 96160 PT-FOCUSED HLTH RISK ASSMT: CPT | Performed by: INTERNAL MEDICINE

## 2017-09-01 PROCEDURE — 99397 PER PM REEVAL EST PAT 65+ YR: CPT | Performed by: INTERNAL MEDICINE

## 2017-09-01 NOTE — PROGRESS NOTES
QUICK REFERENCE INFORMATION:  The ABCs of the Annual Wellness Visit    Subsequent Medicare Wellness Visit    HEALTH RISK ASSESSMENT    1945    Recent Hospitalizations:  No hospitalization(s) within the last year..        Current Medical Providers:  Patient Care Team:  Bossman Pedraza MD as PCP - General  Bossman Pedraza MD as PCP - Family Medicine        Smoking Status:  History   Smoking Status   • Former Smoker   • Types: Cigarettes   • Quit date: 1/1/1987   Smokeless Tobacco   • Never Used       Alcohol Consumption:  History   Alcohol Use No       Depression Screen:   PHQ-2/PHQ-9 Depression Screening 9/1/2017   Little interest or pleasure in doing things 0   Feeling down, depressed, or hopeless 0   Trouble falling or staying asleep, or sleeping too much -   Feeling tired or having little energy -   Poor appetite or overeating -   Feeling bad about yourself - or that you are a failure or have let yourself or your family down -   Trouble concentrating on things, such as reading the newspaper or watching television -   Moving or speaking so slowly that other people could have noticed. Or the opposite - being so fidgety or restless that you have been moving around a lot more than usual -   Thoughts that you would be better off dead, or of hurting yourself in some way -   Total Score 0   If you checked off any problems, how difficult have these problems made it for you to do your work, take care of things at home, or get along with other people? -       Health Habits and Functional and Cognitive Screening:  Functional & Cognitive Status 9/1/2017   Do you have difficulty preparing food and eating? No   Do you have difficulty bathing yourself? No   Do you have difficulty getting dressed? No   Do you have difficulty using the toilet? No   Do you have difficulty moving around from place to place? No   In the past year have you fallen or experienced a near fall? No   Do you need help using the phone?  No   Are you  deaf or do you have serious difficulty hearing?  No   Do you need help with transportation? No   Do you need help shopping? No   Do you need help preparing meals?  No   Do you need help with housework?  No   Do you need help with laundry? No   Do you need help taking your medications? No   Do you need help managing money? No   Do you have difficulty concentrating, remembering or making decisions? -       Health Habits  Current Diet: Well Balanced Diet  Dental Exam: Up to date  Eye Exam: Up to date  Exercise (times per week):  (unable due to back problems)      Does the patient have evidence of cognitive impairment? No    Aspirin use counseling: Taking ASA appropriately as indicated      Recent Lab Results:  CMP:  Lab Results   Component Value Date    GLU 94 07/24/2017    BUN 21 (H) 07/24/2017    CREATININE 1.20 07/24/2017    EGFRIFNONA 44 (L) 07/24/2017    EGFRIFAFRI 53 (L) 07/24/2017    BCR 17.5 07/24/2017     07/24/2017    K 4.9 07/24/2017    CO2 27.0 07/24/2017    CALCIUM 9.5 07/24/2017    PROTENTOTREF 7.1 07/24/2017    ALBUMIN 4.40 07/24/2017    LABGLOBREF 2.7 07/24/2017    LABIL2 1.6 07/24/2017    BILITOT 0.8 07/24/2017    ALKPHOS 99 07/24/2017    AST 34 07/24/2017    ALT 40 07/24/2017     Lipid Panel:  Lab Results   Component Value Date    TRIG 341 (H) 08/28/2014    HDL 42 08/28/2014    LDLDIRECT 96 08/28/2014     HbA1c:       Visual Acuity:  No exam data present    Age-appropriate Screening Schedule:  Refer to the list below for future screening recommendations based on patient's age, sex and/or medical conditions. Orders for these recommended tests are listed in the plan section. The patient has been provided with a written plan.    Health Maintenance   Topic Date Due   • TDAP/TD VACCINES (1 - Tdap) 05/28/1962   • LIPID PANEL  07/29/2016   • MAMMOGRAM  03/19/2017   • PNEUMOCOCCAL VACCINES (65+ LOW/MEDIUM RISK) (2 of 2 - PPSV23) 07/29/2017   • INFLUENZA VACCINE  09/01/2017   • COLONOSCOPY  10/19/2025    • ZOSTER VACCINE  Completed        Subjective   History of Present Illness    Vesta Landry is a 72 y.o. female who presents for an Subsequent Wellness Visit.    The following portions of the patient's history were reviewed and updated as appropriate: allergies, current medications, past family history, past medical history, past social history, past surgical history and problem list.    Outpatient Medications Prior to Visit   Medication Sig Dispense Refill   • aspirin 81 MG tablet Take 1 tablet by mouth Daily. 30 tablet 11   • atorvastatin (LIPITOR) 10 MG tablet take 1 tablet by mouth once daily 90 tablet 3   • B Complex Vitamins (VITAMIN B COMPLEX PO) Take  by mouth Daily.     • isosorbide mononitrate (IMDUR) 30 MG 24 hr tablet Take 1 tablet by mouth Daily. 30 tablet 11   • losartan (COZAAR) 25 MG tablet Take 1 tablet by mouth Daily. 30 tablet 11   • metoprolol tartrate (LOPRESSOR) 50 MG tablet take 1 tablet by mouth once daily 90 tablet 3   • omeprazole (priLOSEC) 20 MG capsule take 1 capsule by mouth once daily 90 capsule 3   • ranolazine (RANEXA) 500 MG 12 hr tablet Take 1 tablet by mouth 2 (Two) Times a Day. 60 tablet 5   • traZODone (DESYREL) 50 MG tablet Take 50 mg by mouth At Night As Needed for Sleep.     • triamterene-hydrochlorothiazide (MAXZIDE) 75-50 MG per tablet take 1/2 tablet by mouth once daily 135 tablet 3     No facility-administered medications prior to visit.        Patient Active Problem List   Diagnosis   • Gastroesophageal reflux disease without esophagitis   • Pure hypercholesterolemia   • Essential hypertension   • Vitamin D deficiency   • Menopause present   • SOB (shortness of breath) on exertion       Advance Care Planning:  has NO advance directive - information provided to the patient today    Identification of Risk Factors:  Risk factors include: weight .    Review of Systems   Constitutional: Negative.  Negative for activity change, appetite change, fatigue and fever.    HENT: Negative for congestion, ear discharge, ear pain and trouble swallowing.    Eyes: Negative for photophobia and visual disturbance.   Respiratory: Positive for chest tightness and shortness of breath. Negative for cough.    Cardiovascular: Positive for leg swelling. Negative for chest pain and palpitations.   Gastrointestinal: Negative for abdominal distention, abdominal pain, constipation, diarrhea, nausea and vomiting.   Endocrine: Negative.    Genitourinary: Negative for dysuria, hematuria and urgency.   Musculoskeletal: Positive for arthralgias. Negative for back pain, joint swelling and myalgias.   Skin: Negative for color change and rash.   Allergic/Immunologic: Negative.    Neurological: Negative for dizziness, weakness, light-headedness and headaches.   Hematological: Negative for adenopathy. Does not bruise/bleed easily.   Psychiatric/Behavioral: Positive for sleep disturbance. Negative for agitation, confusion and dysphoric mood. The patient is not nervous/anxious.        Compared to one year ago, the patient feels her physical health is the same.  Compared to one year ago, the patient feels her mental health is the same.    Objective     Physical Exam   Constitutional: She is oriented to person, place, and time. She appears well-developed and well-nourished. No distress.   HENT:   Nose: Nose normal.   Mouth/Throat: Oropharynx is clear and moist.   Eyes: Conjunctivae and EOM are normal. No scleral icterus.   Neck: No tracheal deviation present. No thyromegaly present.   Cardiovascular: Normal rate and regular rhythm.  Exam reveals no friction rub.    No murmur heard.  Pulmonary/Chest: No respiratory distress. She has no wheezes. She has no rales.   Abdominal: Soft. She exhibits no distension and no mass. There is no tenderness. There is no guarding.   Musculoskeletal: Normal range of motion. She exhibits edema. She exhibits no deformity.   Lymphadenopathy:     She has no cervical adenopathy.  "  Neurological: She is alert and oriented to person, place, and time. She has normal reflexes. No cranial nerve deficit. Coordination normal.   Skin: Skin is warm and dry. No rash noted. No erythema.   Psychiatric: She has a normal mood and affect. Her behavior is normal. Judgment and thought content normal.       Vitals:    09/01/17 1021   BP: 124/80   Pulse: 74   Resp: 12   Temp: 97.6 °F (36.4 °C)   SpO2: 98%   Weight: 176 lb (79.8 kg)   Height: 63\" (160 cm)       Body mass index is 31.18 kg/(m^2).  Discussed the patient's BMI with her. The BMI is above average; BMI management plan is completed.    Assessment/Plan   Patient Self-Management and Personalized Health Advice  The patient has been provided with information about: diet, exercise, weight management and designing advance directives and preventive services including:   · Advance directive, Fall Risk assessment done, Fall Risk plan of care done, Nutrition counseling provided.    Visit Diagnoses:    ICD-10-CM ICD-9-CM   1. Essential hypertension. Better with addition of losartan I10 401.9   2. Pure hypercholesterolemia. Stable cont. statins E78.00 272.0   3. Gastroesophageal reflux disease without esophagitis. Cont reflux precautions, ppi K21.9 530.81       No orders of the defined types were placed in this encounter.      Outpatient Encounter Prescriptions as of 9/1/2017   Medication Sig Dispense Refill   • aspirin 81 MG tablet Take 1 tablet by mouth Daily. 30 tablet 11   • atorvastatin (LIPITOR) 10 MG tablet take 1 tablet by mouth once daily 90 tablet 3   • B Complex Vitamins (VITAMIN B COMPLEX PO) Take  by mouth Daily.     • isosorbide mononitrate (IMDUR) 30 MG 24 hr tablet Take 1 tablet by mouth Daily. 30 tablet 11   • losartan (COZAAR) 25 MG tablet Take 1 tablet by mouth Daily. 30 tablet 11   • metoprolol tartrate (LOPRESSOR) 50 MG tablet take 1 tablet by mouth once daily 90 tablet 3   • omeprazole (priLOSEC) 20 MG capsule take 1 capsule by mouth once " daily 90 capsule 3   • ranolazine (RANEXA) 500 MG 12 hr tablet Take 1 tablet by mouth 2 (Two) Times a Day. 60 tablet 5   • traZODone (DESYREL) 50 MG tablet Take 50 mg by mouth At Night As Needed for Sleep.     • triamterene-hydrochlorothiazide (MAXZIDE) 75-50 MG per tablet take 1/2 tablet by mouth once daily 135 tablet 3     No facility-administered encounter medications on file as of 9/1/2017.        Reviewed use of high risk medication in the elderly: yes  Reviewed for potential of harmful drug interactions in the elderly: yes    Follow Up:  No Follow-up on file.     An After Visit Summary and PPPS with all of these plans were given to the patient.

## 2017-09-08 ENCOUNTER — OFFICE VISIT (OUTPATIENT)
Dept: CARDIOLOGY | Facility: CLINIC | Age: 72
End: 2017-09-08

## 2017-09-08 VITALS
HEART RATE: 60 BPM | SYSTOLIC BLOOD PRESSURE: 122 MMHG | BODY MASS INDEX: 30.65 KG/M2 | DIASTOLIC BLOOD PRESSURE: 70 MMHG | RESPIRATION RATE: 16 BRPM | HEIGHT: 63 IN | WEIGHT: 173 LBS

## 2017-09-08 DIAGNOSIS — I10 ESSENTIAL HYPERTENSION: Primary | ICD-10-CM

## 2017-09-08 PROCEDURE — 99213 OFFICE O/P EST LOW 20 MIN: CPT | Performed by: INTERNAL MEDICINE

## 2017-09-08 NOTE — PROGRESS NOTES
Subjective:     Encounter Date:09/08/2017      Patient ID: Vesta Landry is a 72 y.o. female.    Chief Complaint:Hypertension  HPI  This is a 72-year-old female patient who underwent an evaluation for chest discomfort palpitations and shortness of breath.  The patient's vasodilator nuclear stress test showed no evidence of ischemia or infarction.  Her chest discomfort has resolved spontaneously.  The patient indicates that the Ranexa did not lead to any significant improvement.  In addition the patient no longer has shortness of breath at rest or with activity.  An echocardiogram was performed which showed a normal ejection fraction with no regional wall motion abnormalities.  There was concentric left ventricular hypertrophy with evidence of diastolic dysfunction.  This was consistent with hypertensive cardiac disease.  Her home blood pressures have been very well controlled.  The patient has no orthopnea PND or lower extremity edema.  There is no dizziness palpitations or syncope.  She remains a nonsmoker.  The following portions of the patient's history were reviewed and updated as appropriate: allergies, current medications, past family history, past medical history, past social history, past surgical history and problem  Review of Systems   Constitution: Negative for chills, diaphoresis, fever, weakness, malaise/fatigue, night sweats, weight gain and weight loss.   HENT: Negative for ear discharge, hearing loss, hoarse voice and nosebleeds.    Eyes: Negative for discharge, double vision, pain and photophobia.   Cardiovascular: Negative for chest pain, claudication, cyanosis, dyspnea on exertion, irregular heartbeat, leg swelling, near-syncope, orthopnea, palpitations, paroxysmal nocturnal dyspnea and syncope.   Respiratory: Negative for cough, hemoptysis, shortness of breath, sputum production and wheezing.    Endocrine: Negative for cold intolerance, heat intolerance, polydipsia, polyphagia and  polyuria.   Hematologic/Lymphatic: Negative for adenopathy and bleeding problem. Does not bruise/bleed easily.   Skin: Negative for color change, flushing, itching and rash.   Musculoskeletal: Negative for muscle cramps, muscle weakness, myalgias and stiffness.   Gastrointestinal: Negative for abdominal pain, diarrhea, hematemesis, hematochezia, nausea and vomiting.   Genitourinary: Negative for dysuria, frequency and nocturia.   Neurological: Negative for focal weakness, loss of balance, numbness, paresthesias and seizures.   Psychiatric/Behavioral: Negative for altered mental status, hallucinations and suicidal ideas.   Allergic/Immunologic: Negative for HIV exposure, hives and persistent infections.       Current Outpatient Prescriptions:   •  aspirin 81 MG tablet, Take 1 tablet by mouth Daily., Disp: 30 tablet, Rfl: 11  •  atorvastatin (LIPITOR) 10 MG tablet, take 1 tablet by mouth once daily, Disp: 90 tablet, Rfl: 3  •  B Complex Vitamins (VITAMIN B COMPLEX PO), Take  by mouth Daily., Disp: , Rfl:   •  losartan (COZAAR) 25 MG tablet, Take 1 tablet by mouth Daily., Disp: 30 tablet, Rfl: 11  •  metoprolol tartrate (LOPRESSOR) 50 MG tablet, take 1 tablet by mouth once daily, Disp: 90 tablet, Rfl: 3  •  omeprazole (priLOSEC) 20 MG capsule, take 1 capsule by mouth once daily, Disp: 90 capsule, Rfl: 3  •  traZODone (DESYREL) 50 MG tablet, Take 50 mg by mouth At Night As Needed for Sleep., Disp: , Rfl:   •  triamterene-hydrochlorothiazide (MAXZIDE) 75-50 MG per tablet, take 1/2 tablet by mouth once daily, Disp: 135 tablet, Rfl: 3     Objective:     Physical Exam   Constitutional: She is oriented to person, place, and time. She appears well-developed and well-nourished.   HENT:   Head: Normocephalic and atraumatic.   Eyes: Conjunctivae are normal. No scleral icterus.   Cardiovascular: Normal rate, regular rhythm, normal heart sounds and intact distal pulses.  Exam reveals no gallop and no friction rub.    No murmur  "heard.  Pulmonary/Chest: Effort normal and breath sounds normal. No respiratory distress.   Abdominal: Soft. Bowel sounds are normal. There is no tenderness.   Musculoskeletal: She exhibits no edema.   Neurological: She is alert and oriented to person, place, and time.   Skin: Skin is warm and dry.   Psychiatric: She has a normal mood and affect. Her behavior is normal.     Blood pressure 122/70, pulse 60, resp. rate 16, height 63\" (160 cm), weight 173 lb (78.5 kg), not currently breastfeeding.   Lab Review:       Assessment:         1. Essential hypertension  Excellent blood pressure control.  2.  Cardiac murmur-no evidence of significant valvular pathology by echocardiogram.  I did not appreciate a heart murmur auscultation of the heart during today's visit.  3.  Chest pain-noncardiac.  Nuclear stress testing shows no evidence of ischemic heart disease.  This symptom has resolved spontaneously without specific intervention.  Procedures     Plan:       No additional cardiovascular testing is indicated.  No changes in her medication profile as warranted at this time.  The patient indicates that Ranexa did not improve her symptoms.  I have given her clearance to go ahead and discontinue the Ranexa.         "

## 2018-03-01 ENCOUNTER — OFFICE VISIT (OUTPATIENT)
Dept: INTERNAL MEDICINE | Facility: CLINIC | Age: 73
End: 2018-03-01

## 2018-03-01 VITALS
HEART RATE: 67 BPM | SYSTOLIC BLOOD PRESSURE: 120 MMHG | WEIGHT: 174 LBS | OXYGEN SATURATION: 98 % | BODY MASS INDEX: 30.83 KG/M2 | DIASTOLIC BLOOD PRESSURE: 70 MMHG | HEIGHT: 63 IN | TEMPERATURE: 98.3 F

## 2018-03-01 DIAGNOSIS — E78.00 PURE HYPERCHOLESTEROLEMIA: ICD-10-CM

## 2018-03-01 DIAGNOSIS — I10 ESSENTIAL HYPERTENSION: Primary | ICD-10-CM

## 2018-03-01 DIAGNOSIS — F34.1 DYSTHYMIA: ICD-10-CM

## 2018-03-01 DIAGNOSIS — K21.9 GASTROESOPHAGEAL REFLUX DISEASE WITHOUT ESOPHAGITIS: ICD-10-CM

## 2018-03-01 PROCEDURE — 99214 OFFICE O/P EST MOD 30 MIN: CPT | Performed by: INTERNAL MEDICINE

## 2018-03-01 RX ORDER — OMEPRAZOLE 20 MG/1
20 CAPSULE, DELAYED RELEASE ORAL DAILY
Qty: 90 CAPSULE | Refills: 3 | Status: SHIPPED | OUTPATIENT
Start: 2018-03-01 | End: 2019-03-05 | Stop reason: SDUPTHER

## 2018-03-01 RX ORDER — ESCITALOPRAM OXALATE 10 MG/1
10 TABLET ORAL DAILY
Qty: 30 TABLET | Refills: 5 | Status: SHIPPED | OUTPATIENT
Start: 2018-03-01 | End: 2018-09-16 | Stop reason: SDUPTHER

## 2018-03-01 NOTE — PROGRESS NOTES
"Subjective  Vesta Landry is a 72 y.o. female    HPI coming in for follow-up patient with hypertension and reflux esophagitis has anxiety with dysthymia. He is on trazodone at nighttime to help her with her sleep complains of significant stress from family members. No alcohol or tobacco use    The following portions of the patient's history were reviewed and updated as appropriate: allergies, current medications, past family history, past medical history, past social history, past surgical history, and problem list.     Review of Systems   Constitutional: Negative.  Negative for activity change, appetite change, fatigue and fever.   HENT: Negative for congestion, ear discharge, ear pain and trouble swallowing.    Eyes: Negative for photophobia and visual disturbance.   Respiratory: Negative for cough, chest tightness and shortness of breath.    Cardiovascular: Positive for leg swelling. Negative for chest pain and palpitations.   Gastrointestinal: Negative for abdominal distention, abdominal pain, constipation, diarrhea, nausea and vomiting.   Endocrine: Negative.    Genitourinary: Negative for dysuria, hematuria and urgency.   Musculoskeletal: Positive for arthralgias. Negative for back pain, joint swelling and myalgias.   Skin: Negative for color change and rash.   Allergic/Immunologic: Negative.    Neurological: Negative for dizziness, weakness, light-headedness and headaches.   Hematological: Negative for adenopathy. Does not bruise/bleed easily.   Psychiatric/Behavioral: Positive for sleep disturbance. Negative for agitation, confusion and dysphoric mood. The patient is not nervous/anxious.        Visit Vitals   • /70   • Pulse 67   • Temp 98.3 °F (36.8 °C)   • Ht 160 cm (63\")   • Wt 78.9 kg (174 lb)   • SpO2 98%   • BMI 30.82 kg/m2       Objective  Physical Exam   Constitutional: She is oriented to person, place, and time. She appears well-developed and well-nourished. No distress.   HENT:   Nose: " Nose normal.   Mouth/Throat: Oropharynx is clear and moist.   Eyes: Conjunctivae and EOM are normal. No scleral icterus.   Neck: No tracheal deviation present. No thyromegaly present.   Cardiovascular: Normal rate and regular rhythm.  Exam reveals no friction rub.    No murmur heard.  Pulmonary/Chest: No respiratory distress. She has no wheezes. She has no rales.   Abdominal: Soft. She exhibits no distension and no mass. There is no tenderness. There is no guarding.   Musculoskeletal: Normal range of motion. She exhibits deformity.   Lymphadenopathy:     She has no cervical adenopathy.   Neurological: She is alert and oriented to person, place, and time. She has normal reflexes. No cranial nerve deficit. Coordination normal.   Skin: Skin is warm and dry. No rash noted. No erythema.   Psychiatric: She has a normal mood and affect. Her behavior is normal. Judgment and thought content normal.       Diagnoses and all orders for this visit:    Essential hypertension stable continue with current meds and low-salt diet    Pure hypercholesterolemia continue with the statins follow lipid profile    Gastroesophageal reflux disease without esophagitis continue with reflux precautions and proton pump inhibitors    Dysthymia discussed sleep hygiene trial of Lexapro follow-up again in a month    Other orders  -     omeprazole (priLOSEC) 20 MG capsule; Take 1 capsule by mouth Daily.  -     escitalopram (LEXAPRO) 10 MG tablet; Take 1 tablet by mouth Daily.

## 2018-03-22 RX ORDER — METOPROLOL TARTRATE 50 MG/1
TABLET, FILM COATED ORAL
Qty: 90 TABLET | Refills: 3 | Status: SHIPPED | OUTPATIENT
Start: 2018-03-22 | End: 2019-03-05 | Stop reason: SDUPTHER

## 2018-04-02 ENCOUNTER — OFFICE VISIT (OUTPATIENT)
Dept: INTERNAL MEDICINE | Facility: CLINIC | Age: 73
End: 2018-04-02

## 2018-04-02 VITALS
WEIGHT: 175 LBS | OXYGEN SATURATION: 96 % | BODY MASS INDEX: 31.01 KG/M2 | HEIGHT: 63 IN | TEMPERATURE: 98.4 F | DIASTOLIC BLOOD PRESSURE: 90 MMHG | SYSTOLIC BLOOD PRESSURE: 140 MMHG | HEART RATE: 59 BPM

## 2018-04-02 DIAGNOSIS — F34.1 DYSTHYMIA: Primary | ICD-10-CM

## 2018-04-02 PROCEDURE — 99213 OFFICE O/P EST LOW 20 MIN: CPT | Performed by: INTERNAL MEDICINE

## 2018-04-02 RX ORDER — ATORVASTATIN CALCIUM 10 MG/1
TABLET, FILM COATED ORAL
Qty: 90 TABLET | Refills: 3 | Status: SHIPPED | OUTPATIENT
Start: 2018-04-02 | End: 2019-04-04 | Stop reason: SDUPTHER

## 2018-04-02 NOTE — PROGRESS NOTES
"Subjective  Vesta Landry is a 72 y.o. female    HPI coming in for follow-up patient with recent complaints of anxiety with depression for which we had placed her on low-dose Lexapro. She appears to be tolerating with this with improvement in her mood. Sleep appears to have improved 2.    The following portions of the patient's history were reviewed and updated as appropriate: allergies, current medications, past family history, past medical history, past social history, past surgical history, and problem list.     Review of Systems   Constitutional: Negative.  Negative for activity change, appetite change, fatigue and fever.   HENT: Negative for congestion, ear discharge, ear pain and trouble swallowing.    Eyes: Negative for photophobia and visual disturbance.   Respiratory: Negative for cough and shortness of breath.    Cardiovascular: Negative for chest pain and palpitations.   Gastrointestinal: Negative for abdominal distention, abdominal pain, constipation, diarrhea, nausea and vomiting.   Endocrine: Negative.    Genitourinary: Negative for dysuria, hematuria and urgency.   Musculoskeletal: Negative for arthralgias, back pain, joint swelling and myalgias.   Skin: Negative for color change and rash.   Allergic/Immunologic: Negative.    Neurological: Negative for dizziness, weakness, light-headedness and headaches.   Hematological: Negative for adenopathy. Does not bruise/bleed easily.   Psychiatric/Behavioral: Positive for dysphoric mood and sleep disturbance. Negative for agitation and confusion. The patient is not nervous/anxious.        Visit Vitals  /90   Pulse 59   Temp 98.4 °F (36.9 °C)   Ht 160 cm (63\")   Wt 79.4 kg (175 lb)   SpO2 96%   BMI 31.00 kg/m²       Objective  Physical Exam   Constitutional: She is oriented to person, place, and time. She appears well-developed and well-nourished. No distress.   HENT:   Nose: Nose normal.   Mouth/Throat: Oropharynx is clear and moist.   Eyes: " Conjunctivae and EOM are normal. No scleral icterus.   Neck: No tracheal deviation present. No thyromegaly present.   Cardiovascular: Normal rate and regular rhythm.  Exam reveals no friction rub.    No murmur heard.  Pulmonary/Chest: No respiratory distress. She has no wheezes. She has no rales.   Abdominal: Soft. She exhibits no distension and no mass. There is no tenderness. There is no guarding.   Musculoskeletal: Normal range of motion. She exhibits no deformity.   Lymphadenopathy:     She has no cervical adenopathy.   Neurological: She is alert and oriented to person, place, and time. She has normal reflexes. No cranial nerve deficit. Coordination normal.   Skin: Skin is warm and dry. No rash noted. No erythema.   Psychiatric: She has a normal mood and affect. Her behavior is normal. Judgment and thought content normal.       Diagnoses and all orders for this visit:    Dysthymia continue with current Lexapro dose discussed sleep hygiene, exercise program.

## 2018-04-09 ENCOUNTER — OFFICE VISIT (OUTPATIENT)
Dept: INTERNAL MEDICINE | Facility: CLINIC | Age: 73
End: 2018-04-09

## 2018-04-09 ENCOUNTER — HOSPITAL ENCOUNTER (OUTPATIENT)
Dept: GENERAL RADIOLOGY | Facility: HOSPITAL | Age: 73
Discharge: HOME | End: 2018-04-09
Attending: INTERNAL MEDICINE | Admitting: INTERNAL MEDICINE

## 2018-04-09 VITALS
OXYGEN SATURATION: 98 % | HEART RATE: 56 BPM | HEIGHT: 63 IN | DIASTOLIC BLOOD PRESSURE: 78 MMHG | SYSTOLIC BLOOD PRESSURE: 132 MMHG | TEMPERATURE: 97.9 F | RESPIRATION RATE: 14 BRPM | BODY MASS INDEX: 31.36 KG/M2 | WEIGHT: 177 LBS

## 2018-04-09 DIAGNOSIS — G89.29 CHRONIC PAIN OF LEFT KNEE: ICD-10-CM

## 2018-04-09 DIAGNOSIS — G89.29 CHRONIC PAIN OF LEFT KNEE: Primary | ICD-10-CM

## 2018-04-09 DIAGNOSIS — M25.562 CHRONIC PAIN OF LEFT KNEE: ICD-10-CM

## 2018-04-09 DIAGNOSIS — M25.552 PAIN OF LEFT HIP JOINT: ICD-10-CM

## 2018-04-09 DIAGNOSIS — M25.562 CHRONIC PAIN OF LEFT KNEE: Primary | ICD-10-CM

## 2018-04-09 PROCEDURE — 99213 OFFICE O/P EST LOW 20 MIN: CPT | Performed by: INTERNAL MEDICINE

## 2018-04-09 PROCEDURE — 73560 X-RAY EXAM OF KNEE 1 OR 2: CPT

## 2018-04-09 PROCEDURE — 73502 X-RAY EXAM HIP UNI 2-3 VIEWS: CPT

## 2018-04-09 NOTE — PROGRESS NOTES
"Subjective  Vesta Landry is a 72 y.o. female    HPI coming in for evaluation she is accompanied by her daughter. Had an accidental fall with some discomfort over her left hip and knee region. Even prior to the fall has been complaining off left knee pain with occasional swelling. She denies lightheadedness or dizziness. Has had a superficial abrasion on her forehead. She did not lose consciousness there was no seizure activity    The following portions of the patient's history were reviewed and updated as appropriate: allergies, current medications, past family history, past medical history, past social history, past surgical history, and problem list.     Review of Systems   Constitutional: Negative.  Negative for activity change, appetite change, fatigue and fever.   HENT: Negative for congestion, ear discharge, ear pain and trouble swallowing.    Eyes: Negative for photophobia and visual disturbance.   Respiratory: Negative for cough and shortness of breath.    Cardiovascular: Negative for chest pain and palpitations.   Gastrointestinal: Negative for abdominal distention, abdominal pain, constipation, diarrhea, nausea and vomiting.   Endocrine: Negative.    Genitourinary: Negative for dysuria, hematuria and urgency.   Musculoskeletal: Positive for arthralgias, back pain and gait problem. Negative for joint swelling and myalgias.   Skin: Negative for color change and rash.   Allergic/Immunologic: Negative.    Neurological: Negative for dizziness, weakness, light-headedness and headaches.   Hematological: Negative for adenopathy. Does not bruise/bleed easily.   Psychiatric/Behavioral: Positive for sleep disturbance. Negative for agitation, confusion and dysphoric mood. The patient is not nervous/anxious.        Visit Vitals  /78   Pulse 56   Temp 97.9 °F (36.6 °C)   Resp 14   Ht 160 cm (63\")   Wt 80.3 kg (177 lb)   SpO2 98%   BMI 31.35 kg/m²       Objective  Physical Exam   Constitutional: She is " oriented to person, place, and time. She appears well-developed and well-nourished. No distress.   HENT:   Nose: Nose normal.   Mouth/Throat: Oropharynx is clear and moist.   Eyes: Conjunctivae and EOM are normal. No scleral icterus.   Neck: No tracheal deviation present. No thyromegaly present.   Cardiovascular: Normal rate and regular rhythm.  Exam reveals no friction rub.    No murmur heard.  Pulmonary/Chest: No respiratory distress. She has no wheezes. She has no rales.   Abdominal: Soft. She exhibits no distension and no mass. There is no tenderness. There is no guarding.   Musculoskeletal: Normal range of motion. She exhibits deformity.   Lt knee tender. No significant swelling   Lymphadenopathy:     She has no cervical adenopathy.   Neurological: She is alert and oriented to person, place, and time. She has normal reflexes. No cranial nerve deficit. Coordination normal.   Skin: Skin is warm and dry. No rash noted. No erythema.   Psychiatric: She has a normal mood and affect. Her behavior is normal. Judgment and thought content normal.       Diagnoses and all orders for this visit:    Chronic pain of left knee. Check x-ray to rule out DJD NSAIDs or Tylenol when necessary    Pain of left hip joint. Check x-ray NSAIDs when necessary.  Physical therapy referral for chronic pain and for strengthening exercises, gait training

## 2018-04-13 ENCOUNTER — TREATMENT (OUTPATIENT)
Dept: PHYSICAL THERAPY | Facility: CLINIC | Age: 73
End: 2018-04-13

## 2018-04-13 DIAGNOSIS — G89.29 CHRONIC PAIN OF LEFT KNEE: Primary | ICD-10-CM

## 2018-04-13 DIAGNOSIS — M25.562 CHRONIC PAIN OF LEFT KNEE: Primary | ICD-10-CM

## 2018-04-13 DIAGNOSIS — M54.5 CHRONIC BILATERAL LOW BACK PAIN, WITH SCIATICA PRESENCE UNSPECIFIED: ICD-10-CM

## 2018-04-13 DIAGNOSIS — G89.29 CHRONIC BILATERAL LOW BACK PAIN, WITH SCIATICA PRESENCE UNSPECIFIED: ICD-10-CM

## 2018-04-13 PROCEDURE — 97162 PT EVAL MOD COMPLEX 30 MIN: CPT | Performed by: PHYSICAL THERAPIST

## 2018-04-13 PROCEDURE — G8978 MOBILITY CURRENT STATUS: HCPCS | Performed by: PHYSICAL THERAPIST

## 2018-04-13 PROCEDURE — G0283 ELEC STIM OTHER THAN WOUND: HCPCS | Performed by: PHYSICAL THERAPIST

## 2018-04-13 PROCEDURE — G8979 MOBILITY GOAL STATUS: HCPCS | Performed by: PHYSICAL THERAPIST

## 2018-04-13 PROCEDURE — 97110 THERAPEUTIC EXERCISES: CPT | Performed by: PHYSICAL THERAPIST

## 2018-04-13 NOTE — PROGRESS NOTES
Physical Therapy Initial Evaluation and Plan of Care      Patient: Vesta Landry   : 1945  Diagnosis/ICD-10 Code:  Chronic pain of left knee [M25.562, G89.29]  Referring practitioner: Bossman Pedraza MD    Subjective Evaluation    History of Present Illness  Mechanism of injury: Patient states she fell onto concrete while walking up steps on  onto both knees and her face. States she usually uses the rail but she wasn't at that time. States before the fall she had trouble with the left leg for many years. States her Dr said about a year ago she had a problem with blood pumping.     States she can walk about half a block before her hip and knee really start hurting.       Quality of life: good    Pain  Current pain ratin  At best pain ratin  At worst pain ratin  Location: Medial and lateral inferior patella and distal hamstring. Occasional pain anterior thigh to lateral hip. Bilateral hip pain, occasional NT from feet up.   Quality: dull ache  Aggravating factors: squatting, ambulation, standing, stairs, lifting and sleeping    Diagnostic Tests  X-ray: normal    Patient Goals  Patient goals for therapy: decreased pain, improved balance, increased motion, increased strength, independence with ADLs/IADLs and return to sport/leisure activities  Patient goal: Housework, like mopping;            Objective       Palpation   Left   Hypertonic in the erector spinae and lumbar paraspinals.   Tenderness of the erector spinae, distal biceps femoris, distal semimembranosus, distal semitendinosus, lumbar paraspinals, vastus lateralis and vastus medialis.     Right   Hypertonic in the erector spinae and lumbar paraspinals. Tenderness of the erector spinae and lumbar paraspinals.     Tenderness     Lumbar Spine  Tenderness in the spinous process.   Left Knee   Tenderness in the inferior patella, lateral joint line, lateral patella, medial joint line, medial patella, patellar tendon, quadriceps  tendon and superior patella.     Neurological Testing     Sensation     Lumbar   Left   Intact: light touch    Right   Intact: light touch    Active Range of Motion     Lumbar   Flexion: 30 degrees   Extension: 15 degrees with pain  Left Knee   Flexion: 83 degrees   Extension: WFL    Right Knee   Flexion: 113 degrees   Extension: WFL    Passive Range of Motion   Left Knee   Flexion: 87 degrees   Extension: WFL    Right Knee   Flexion: 113 degrees   Extension: WFL    Patellar Mobility   Left Knee Hypomobile in the left medial, left lateral, left superior and left inferior patellar tendon(s).     Strength/Myotome Testing     Left Hip   Planes of Motion   Flexion: 2+  Extension: 3-  Abduction: 3  Adduction: 3    Right Hip   Planes of Motion   Flexion: 3  Extension: 3  Abduction: 3+  Adduction: 3+    Left Knee   Flexion: 3  Extension: 3-    Right Knee   Flexion: 4-  Extension: 4-    Left Ankle/Foot   Dorsiflexion: 4  Plantar flexion: 4    Right Ankle/Foot   Dorsiflexion: 4  Plantar flexion: 4    Tests     Left Knee   Positive patellar compression.     Additional Tests Details  Stress on medial ligaments painful but not lax.     Swelling     Left Knee Girth Measurement (cm)   Joint line: 44 cm    Right Knee Girth Measurement (cm)   Joint line: 42 cm    Ambulation     Observational Gait   Decreased walking speed and stride length.   Right arm swing: decreased  Base of support: increased    Quality of Movement During Gait     Knee    Knee (Left): Positive stiff knee.          Assessment & Plan     Assessment  Impairments: abnormal coordination, abnormal gait, abnormal muscle firing, abnormal muscle tone, abnormal or restricted ROM, activity intolerance, impaired balance, impaired physical strength, lacks appropriate home exercise program and pain with function  Assessment details: Patient is a 72 year old female presenting with increased low back, lateral hip, and left knee pain following a fall onto knees on concrete  steps. Patient has history of low back pain with distal symptoms and bilateral knee arthritis with left worse than right. Patient presents with decreased left knee mobility, decreased left knee and hip strength, decreased ability to roll in bed, occasional numbness and tingling from left foot up to hip, and decreased ability to perform all essential functions. Patient is appropriate for physical therapy at this time to address the above issues.   Prognosis: good  Prognosis details: Short term goals (3 weeks):  1. Patient will be independent with home exercise program.  2. Patient will demonstrate improved knee flexion by 50%  3. Patient will report cessation of distal paresthesia.     Long term goals (8 weeks):  1. Patient will be able to walk for at least 20 minutes with knee pain not exceeding 2/10.  2. Patient will be able to roll to either side in bed with back pain no greater than 2/10.  3. Patient will be able to perform sit to stand with good form at least 10 times.   Functional Limitations: lifting, sleeping, walking, pulling, pushing, uncomfortable because of pain, sitting, standing and stooping  Plan  Therapy options: will be seen for skilled physical therapy services  Planned modality interventions: cryotherapy, high voltage pulsed current (pain management), high voltage pulsed current (spasm management), electrical stimulation/Russian stimulation, iontophoresis, TENS, thermotherapy (hydrocollator packs), traction and ultrasound  Planned therapy interventions: abdominal trunk stabilization, ADL retraining, balance/weight-bearing training, body mechanics training, fine motor coordination training, flexibility, functional ROM exercises, gait training, home exercise program, joint mobilization, IADL retraining, manual therapy, motor coordination training, neuromuscular re-education, postural training, soft tissue mobilization, spinal/joint mobilization, strengthening, stretching and therapeutic  activities  Frequency: 2-3x/week.  Duration in weeks: 8  Treatment plan discussed with: patient        Manual Therapy:         mins  24211;  Therapeutic Exercise:    24     mins  81653;     Neuromuscular Lenard:        mins  65775;    Therapeutic Activity:          mins  49526;     Gait Training:           mins  43520;     Ultrasound:          mins  60715;    Electrical Stimulation:    20     mins  03162 ( );  Dry Needling          mins self-pay    Timed Treatment:   24   mins   Total Treatment:     67   mins    PT SIGNATURE: Chloe Singh, PT   DATE TREATMENT INITIATED: 4/13/2018    Medicare Initial Certification  Certification Period: 7/12/2018  I certify that the therapy services are furnished while this patient is under my care.  The services outlined above are required by this patient, and will be reviewed every 90 days.     PHYSICIAN: Bossman Pedraza MD      DATE:     Please sign and return via fax to [phone].. Thank you, [facility].

## 2018-04-16 ENCOUNTER — TREATMENT (OUTPATIENT)
Dept: PHYSICAL THERAPY | Facility: CLINIC | Age: 73
End: 2018-04-16

## 2018-04-16 DIAGNOSIS — G89.29 CHRONIC PAIN OF LEFT KNEE: Primary | ICD-10-CM

## 2018-04-16 DIAGNOSIS — M54.5 CHRONIC BILATERAL LOW BACK PAIN, WITH SCIATICA PRESENCE UNSPECIFIED: ICD-10-CM

## 2018-04-16 DIAGNOSIS — M25.562 CHRONIC PAIN OF LEFT KNEE: Primary | ICD-10-CM

## 2018-04-16 DIAGNOSIS — G89.29 CHRONIC BILATERAL LOW BACK PAIN, WITH SCIATICA PRESENCE UNSPECIFIED: ICD-10-CM

## 2018-04-16 PROCEDURE — 97110 THERAPEUTIC EXERCISES: CPT | Performed by: PHYSICAL THERAPIST

## 2018-04-16 NOTE — PROGRESS NOTES
Subjective   Vesta Landry reports: 1-2/10 left knee pain during walking, no pain at rest. States she has been doing her exercises at home as well as taking walks and using her recumbent bike. States her knees feel much better and she just has some pain when she     Objective   Palpation: Tenderness in left proximal quad belly and medial left knee.     Strength: left hip flexion easily fatigued     See Exercise, Manual, and Modality Logs for complete treatment.       Assessment/Plan  Patient progressing with exercises focused on hip and knee strengthening in open and closed chain, will be working on balance and fall prevention as well.   Progress per Plan of Care           Manual Therapy:         mins  33421;  Therapeutic Exercise:    45     mins  85897;     Neuromuscular Lenard:        mins  33470;    Therapeutic Activity:          mins  35512;     Gait Training:           mins  54607;     Ultrasound:          mins  59382;   Iontophoresis          mins  11222   Electrical Stimulation:         mins  77933 ( );  Dry Needling          mins self-pay  Fluidotherapy          mins 80237    Timed Treatment:   45   mins   Total Treatment:     55   mins    Chloe Singh PT  Physical Therapist

## 2018-04-18 ENCOUNTER — TREATMENT (OUTPATIENT)
Dept: PHYSICAL THERAPY | Facility: CLINIC | Age: 73
End: 2018-04-18

## 2018-04-18 DIAGNOSIS — M25.562 CHRONIC PAIN OF LEFT KNEE: Primary | ICD-10-CM

## 2018-04-18 DIAGNOSIS — M54.5 CHRONIC BILATERAL LOW BACK PAIN, WITH SCIATICA PRESENCE UNSPECIFIED: ICD-10-CM

## 2018-04-18 DIAGNOSIS — G89.29 CHRONIC BILATERAL LOW BACK PAIN, WITH SCIATICA PRESENCE UNSPECIFIED: ICD-10-CM

## 2018-04-18 DIAGNOSIS — G89.29 CHRONIC PAIN OF LEFT KNEE: Primary | ICD-10-CM

## 2018-04-18 PROCEDURE — 97116 GAIT TRAINING THERAPY: CPT | Performed by: PHYSICAL THERAPIST

## 2018-04-18 PROCEDURE — 97110 THERAPEUTIC EXERCISES: CPT | Performed by: PHYSICAL THERAPIST

## 2018-04-18 NOTE — PROGRESS NOTES
Subjective   Vesta Landry reports: 3-4/10 knee pain at rest. States knee has been doing pretty well.     Objective   Gait: Cone walking improved, patient required less cues to pick feet up and over cones    See Exercise, Manual, and Modality Logs for complete treatment.       Assessment/Plan  Patient improving with fatigability and picking feet up.   Progress per Plan of Care           Manual Therapy:        mins  44997;  Therapeutic Exercise:    46     mins  68800;     Neuromuscular Lenard:        mins  80382;    Therapeutic Activity:          mins  02759;     Gait Training:      10     mins  58627;     Ultrasound:          mins  41517;   Iontophoresis          mins  44840   Electrical Stimulation:         mins  49049 ( );  Dry Needling          mins self-pay  Fluidotherapy          mins 78746    Timed Treatment:   56   mins   Total Treatment:     58   mins    Chloe Singh PT  Physical Therapist

## 2018-04-20 ENCOUNTER — TREATMENT (OUTPATIENT)
Dept: PHYSICAL THERAPY | Facility: CLINIC | Age: 73
End: 2018-04-20

## 2018-04-20 DIAGNOSIS — M25.562 CHRONIC PAIN OF LEFT KNEE: Primary | ICD-10-CM

## 2018-04-20 DIAGNOSIS — G89.29 CHRONIC PAIN OF LEFT KNEE: Primary | ICD-10-CM

## 2018-04-20 DIAGNOSIS — M54.5 CHRONIC BILATERAL LOW BACK PAIN, WITH SCIATICA PRESENCE UNSPECIFIED: ICD-10-CM

## 2018-04-20 DIAGNOSIS — G89.29 CHRONIC BILATERAL LOW BACK PAIN, WITH SCIATICA PRESENCE UNSPECIFIED: ICD-10-CM

## 2018-04-20 PROCEDURE — 97116 GAIT TRAINING THERAPY: CPT | Performed by: PHYSICAL THERAPIST

## 2018-04-20 PROCEDURE — 97110 THERAPEUTIC EXERCISES: CPT | Performed by: PHYSICAL THERAPIST

## 2018-04-20 NOTE — PROGRESS NOTES
Subjective   Vesta Landry reports: 0-1/10 knee pain, no back pain at rest. States her left knee was a bit sore yesterday evening so she put ice on it which made it feel much better.     Objective     SLS: Right up to 10 sec x1, left up to 5 sec x1, patient quickly fatigues.       See Exercise, Manual, and Modality Logs for complete treatment.       Assessment/Plan  Patient progressing with exercises with no increase in back or knee.   Progress per Plan of Care           Manual Therapy:         mins  27100;  Therapeutic Exercise:    43     mins  50671;     Neuromuscular Lenard:        mins  75639;    Therapeutic Activity:          mins  06755;     Gait Trainin     mins  00282;     Ultrasound:          mins  36551;   Iontophoresis          mins  46497   Electrical Stimulation:         mins  97528 ( );  Dry Needling          mins self-pay  Fluidotherapy          mins 06484    Timed Treatment:   56   mins   Total Treatment:     66   mins    Chloe Singh, PT  Physical Therapist

## 2018-04-23 ENCOUNTER — TREATMENT (OUTPATIENT)
Dept: PHYSICAL THERAPY | Facility: CLINIC | Age: 73
End: 2018-04-23

## 2018-04-23 DIAGNOSIS — M54.5 CHRONIC BILATERAL LOW BACK PAIN, WITH SCIATICA PRESENCE UNSPECIFIED: ICD-10-CM

## 2018-04-23 DIAGNOSIS — G89.29 CHRONIC PAIN OF LEFT KNEE: Primary | ICD-10-CM

## 2018-04-23 DIAGNOSIS — M25.562 CHRONIC PAIN OF LEFT KNEE: Primary | ICD-10-CM

## 2018-04-23 DIAGNOSIS — G89.29 CHRONIC BILATERAL LOW BACK PAIN, WITH SCIATICA PRESENCE UNSPECIFIED: ICD-10-CM

## 2018-04-23 PROCEDURE — 97530 THERAPEUTIC ACTIVITIES: CPT | Performed by: PHYSICAL THERAPIST

## 2018-04-23 PROCEDURE — 97110 THERAPEUTIC EXERCISES: CPT | Performed by: PHYSICAL THERAPIST

## 2018-04-23 NOTE — PROGRESS NOTES
Subjective   Vesta Landry reports: 0/10 pain at rest. Mild back soreness today. States her toes on both feet have been sore since last visit. Patient states she is about 90% better. States she knows she will always have some arthritis pain but she has been able to walk a lot better.     Objective   See Exercise, Manual, and Modality Logs for complete treatment.       Assessment/Plan  Patient able to perform more balance exercises without dizziness or fatigue.   Progress per Plan of Care           Manual Therapy:         mins  31188;  Therapeutic Exercise:    38     mins  93452;     Neuromuscular Lenard:        mins  60957;    Therapeutic Activity:     16     mins  98337;     Gait Training:           mins  43426;     Ultrasound:          mins  40358;   Iontophoresis          mins  56696   Electrical Stimulation:         mins  33925 ( );  Dry Needling          mins self-pay  Fluidotherapy          mins 69184    Timed Treatment:   54   mins   Total Treatment:     58   mins    Chloe Singh, PT  Physical Therapist

## 2018-04-25 ENCOUNTER — TREATMENT (OUTPATIENT)
Dept: PHYSICAL THERAPY | Facility: CLINIC | Age: 73
End: 2018-04-25

## 2018-04-25 DIAGNOSIS — G89.29 CHRONIC BILATERAL LOW BACK PAIN, WITH SCIATICA PRESENCE UNSPECIFIED: ICD-10-CM

## 2018-04-25 DIAGNOSIS — M25.562 CHRONIC PAIN OF LEFT KNEE: Primary | ICD-10-CM

## 2018-04-25 DIAGNOSIS — G89.29 CHRONIC PAIN OF LEFT KNEE: Primary | ICD-10-CM

## 2018-04-25 DIAGNOSIS — M54.5 CHRONIC BILATERAL LOW BACK PAIN, WITH SCIATICA PRESENCE UNSPECIFIED: ICD-10-CM

## 2018-04-25 PROCEDURE — 97110 THERAPEUTIC EXERCISES: CPT | Performed by: PHYSICAL THERAPIST

## 2018-04-25 PROCEDURE — 97530 THERAPEUTIC ACTIVITIES: CPT | Performed by: PHYSICAL THERAPIST

## 2018-05-08 RX ORDER — TRIAMTERENE AND HYDROCHLOROTHIAZIDE 75; 50 MG/1; MG/1
0.5 TABLET ORAL DAILY
Qty: 135 TABLET | Refills: 3 | Status: SHIPPED | OUTPATIENT
Start: 2018-05-08 | End: 2018-09-14 | Stop reason: ALTCHOICE

## 2018-07-13 ENCOUNTER — OFFICE VISIT (OUTPATIENT)
Dept: INTERNAL MEDICINE | Facility: CLINIC | Age: 73
End: 2018-07-13

## 2018-07-13 VITALS
TEMPERATURE: 97.6 F | WEIGHT: 172 LBS | HEIGHT: 63 IN | HEART RATE: 53 BPM | SYSTOLIC BLOOD PRESSURE: 140 MMHG | BODY MASS INDEX: 30.48 KG/M2 | OXYGEN SATURATION: 97 % | DIASTOLIC BLOOD PRESSURE: 80 MMHG

## 2018-07-13 DIAGNOSIS — K21.9 GASTROESOPHAGEAL REFLUX DISEASE WITHOUT ESOPHAGITIS: ICD-10-CM

## 2018-07-13 DIAGNOSIS — M79.89 LEG SWELLING: ICD-10-CM

## 2018-07-13 DIAGNOSIS — E78.00 PURE HYPERCHOLESTEROLEMIA: ICD-10-CM

## 2018-07-13 DIAGNOSIS — G47.33 OBSTRUCTIVE SLEEP APNEA SYNDROME: ICD-10-CM

## 2018-07-13 DIAGNOSIS — I10 ESSENTIAL HYPERTENSION: ICD-10-CM

## 2018-07-13 DIAGNOSIS — Z12.31 ENCOUNTER FOR SCREENING MAMMOGRAM FOR MALIGNANT NEOPLASM OF BREAST: Primary | ICD-10-CM

## 2018-07-13 PROCEDURE — 99214 OFFICE O/P EST MOD 30 MIN: CPT | Performed by: INTERNAL MEDICINE

## 2018-07-13 NOTE — PROGRESS NOTES
"Subjective  Vesta Landry is a 73 y.o. female    HPI coming in for follow-up patient with hypertension and hyperlipidemia has reflux esophagitis has continued to have lower extremity swelling which is slightly better with the use of a diuretic agent. She has been compliant with a low-sodium diet. Complains of poor sleep denies daytime somnolence.    The following portions of the patient's history were reviewed and updated as appropriate: allergies, current medications, past family history, past medical history, past social history, past surgical history, and problem list.     Review of Systems   Constitutional: Negative.  Negative for activity change, appetite change, fatigue and fever.   HENT: Negative for congestion, ear discharge, ear pain and trouble swallowing.    Eyes: Negative for photophobia and visual disturbance.   Respiratory: Negative for cough and shortness of breath.    Cardiovascular: Positive for leg swelling. Negative for chest pain and palpitations.   Gastrointestinal: Negative for abdominal distention, abdominal pain, constipation, diarrhea, nausea and vomiting.   Endocrine: Negative.    Genitourinary: Negative for dysuria, hematuria and urgency.   Musculoskeletal: Positive for arthralgias. Negative for back pain, joint swelling and myalgias.   Skin: Negative for color change and rash.   Allergic/Immunologic: Negative.    Neurological: Negative for dizziness, weakness, light-headedness and headaches.   Hematological: Negative for adenopathy. Does not bruise/bleed easily.   Psychiatric/Behavioral: Positive for sleep disturbance. Negative for agitation, confusion and dysphoric mood. The patient is not nervous/anxious.        Visit Vitals  /80   Pulse 53   Temp 97.6 °F (36.4 °C)   Ht 160 cm (63\")   Wt 78 kg (172 lb)   SpO2 97%   BMI 30.47 kg/m²       Objective  Physical Exam   Constitutional: She is oriented to person, place, and time. She appears well-developed and well-nourished. No " distress.   HENT:   Nose: Nose normal.   Mouth/Throat: Oropharynx is clear and moist.   Eyes: Conjunctivae and EOM are normal. No scleral icterus.   Neck: No tracheal deviation present. No thyromegaly present.   Cardiovascular: Normal rate and regular rhythm.  Exam reveals no friction rub.    No murmur heard.  Pulmonary/Chest: No respiratory distress. She has no wheezes. She has no rales.   Abdominal: Soft. She exhibits no distension and no mass. There is no tenderness. There is no guarding.   Musculoskeletal: Normal range of motion. She exhibits edema and deformity.   Lymphadenopathy:     She has no cervical adenopathy.   Neurological: She is alert and oriented to person, place, and time. She has normal reflexes. No cranial nerve deficit. Coordination normal.   Skin: Skin is warm and dry. No rash noted. No erythema.   Psychiatric: She has a normal mood and affect. Her behavior is normal. Judgment and thought content normal.       Diagnoses and all orders for this visit:    Encounter for screening mammogram for malignant neoplasm of breast  -     Mammo Screening Digital Tomosynthesis Bilateral With CAD    Essential hypertension. Stable with current meds and low-salt diet    Pure hypercholesterolemia. Continue with the dietary restrictions follow lipid profile    Gastroesophageal reflux disease without esophagitis. Continue with reflux precautions and proton pump inhibitors    Leg swelling workup has included cardiac imaging studies echo kyle. Has been evaluated by cardiology. Rule out sleep apnea with right ventricular failure will set up sleep study in the meantime continue with compression stockings leg elevation

## 2018-09-04 ENCOUNTER — APPOINTMENT (OUTPATIENT)
Dept: OTHER | Facility: HOSPITAL | Age: 73
End: 2018-09-04
Attending: INTERNAL MEDICINE

## 2018-09-04 ENCOUNTER — HOSPITAL ENCOUNTER (OUTPATIENT)
Dept: MAMMOGRAPHY | Facility: HOSPITAL | Age: 73
Discharge: HOME | End: 2018-09-04
Attending: INTERNAL MEDICINE | Admitting: INTERNAL MEDICINE

## 2018-09-04 DIAGNOSIS — Z92.89 HX OF MAMMOGRAM: ICD-10-CM

## 2018-09-04 PROCEDURE — 77063 BREAST TOMOSYNTHESIS BI: CPT

## 2018-09-04 PROCEDURE — 77067 SCR MAMMO BI INCL CAD: CPT

## 2018-09-04 PROCEDURE — 77063 BREAST TOMOSYNTHESIS BI: CPT | Performed by: RADIOLOGY

## 2018-09-04 PROCEDURE — 77067 SCR MAMMO BI INCL CAD: CPT | Performed by: RADIOLOGY

## 2018-09-13 ENCOUNTER — OFFICE VISIT (OUTPATIENT)
Dept: INTERNAL MEDICINE | Facility: CLINIC | Age: 73
End: 2018-09-13

## 2018-09-13 VITALS
OXYGEN SATURATION: 98 % | WEIGHT: 173 LBS | SYSTOLIC BLOOD PRESSURE: 130 MMHG | BODY MASS INDEX: 30.65 KG/M2 | TEMPERATURE: 98.1 F | DIASTOLIC BLOOD PRESSURE: 70 MMHG | HEIGHT: 63 IN | HEART RATE: 61 BPM

## 2018-09-13 DIAGNOSIS — Z23 NEED FOR VACCINATION: ICD-10-CM

## 2018-09-13 DIAGNOSIS — M79.89 LEG SWELLING: ICD-10-CM

## 2018-09-13 DIAGNOSIS — I10 ESSENTIAL HYPERTENSION: Primary | ICD-10-CM

## 2018-09-13 DIAGNOSIS — E78.00 PURE HYPERCHOLESTEROLEMIA: ICD-10-CM

## 2018-09-13 PROCEDURE — 90662 IIV NO PRSV INCREASED AG IM: CPT | Performed by: INTERNAL MEDICINE

## 2018-09-13 PROCEDURE — G0008 ADMIN INFLUENZA VIRUS VAC: HCPCS | Performed by: INTERNAL MEDICINE

## 2018-09-13 PROCEDURE — 99397 PER PM REEVAL EST PAT 65+ YR: CPT | Performed by: INTERNAL MEDICINE

## 2018-09-13 PROCEDURE — 96160 PT-FOCUSED HLTH RISK ASSMT: CPT | Performed by: INTERNAL MEDICINE

## 2018-09-13 PROCEDURE — G0439 PPPS, SUBSEQ VISIT: HCPCS | Performed by: INTERNAL MEDICINE

## 2018-09-13 NOTE — PROGRESS NOTES
QUICK REFERENCE INFORMATION:  The ABCs of the Annual Wellness Visit    Subsequent Medicare Wellness Visit    HEALTH RISK ASSESSMENT  73 of fever with reflux esophagitis hypertension hyperlipidemia chronic lower extremity edema coming in for wellness visit  1945    Recent Hospitalizations:  No hospitalization(s) within the last year..        Current Medical Providers:  Patient Care Team:  Bossman Pedraza MD as PCP - General  Bossman Pedraza MD as PCP - Family Medicine        Smoking Status:  History   Smoking Status   • Former Smoker   • Types: Cigarettes   • Quit date: 1/1/1987   Smokeless Tobacco   • Never Used       Alcohol Consumption:  History   Alcohol Use No       Depression Screen:   PHQ-2/PHQ-9 Depression Screening 9/13/2018   Little interest or pleasure in doing things 0   Feeling down, depressed, or hopeless 0   Trouble falling or staying asleep, or sleeping too much -   Feeling tired or having little energy -   Poor appetite or overeating -   Feeling bad about yourself - or that you are a failure or have let yourself or your family down -   Trouble concentrating on things, such as reading the newspaper or watching television -   Moving or speaking so slowly that other people could have noticed. Or the opposite - being so fidgety or restless that you have been moving around a lot more than usual -   Thoughts that you would be better off dead, or of hurting yourself in some way -   Total Score 0   If you checked off any problems, how difficult have these problems made it for you to do your work, take care of things at home, or get along with other people? -       Health Habits and Functional and Cognitive Screening:  Functional & Cognitive Status 9/13/2018   Do you have difficulty preparing food and eating? No   Do you have difficulty bathing yourself, getting dressed or grooming yourself? No   Do you have difficulty using the toilet? No   Do you have difficulty moving around from place to place? No    Do you have trouble with steps or getting out of a bed or a chair? No   In the past year have you fallen or experienced a near fall? No   Current Diet Well Balanced Diet   Dental Exam Not up to date   Eye Exam Up to date   Exercise (times per week) 7 times per week   Current Exercise Activities Include Stationary Bicycling/Spin Class   Do you need help using the phone?  No   Are you deaf or do you have serious difficulty hearing?  No   Do you need help with transportation? No   Do you need help shopping? No   Do you need help preparing meals?  No   Do you need help with housework?  No   Do you need help with laundry? No   Do you need help taking your medications? No   Do you need help managing money? No   Do you ever drive or ride in a car without wearing a seat belt? No   Have you felt unusual stress, anger or loneliness in the last month? No   Who do you live with? Spouse   If you need help, do you have trouble finding someone available to you? No   Have you been bothered in the last four weeks by sexual problems? No   Do you have difficulty concentrating, remembering or making decisions? No           Does the patient have evidence of cognitive impairment? No    Aspirin use counseling: Taking ASA appropriately as indicated      Recent Lab Results:  CMP:  Lab Results   Component Value Date    GLU 94 07/24/2017    BUN 21 (H) 07/24/2017    CREATININE 1.20 07/24/2017    EGFRIFNONA 44 (L) 07/24/2017    EGFRIFAFRI 53 (L) 07/24/2017    BCR 17.5 07/24/2017     07/24/2017    K 4.9 07/24/2017    CO2 27.0 07/24/2017    CALCIUM 9.5 07/24/2017    PROTENTOTREF 7.1 07/24/2017    ALBUMIN 4.40 07/24/2017    LABGLOBREF 2.7 07/24/2017    LABIL2 1.6 07/24/2017    BILITOT 0.8 07/24/2017    ALKPHOS 99 07/24/2017    AST 34 07/24/2017    ALT 40 07/24/2017     Lipid Panel:  Lab Results   Component Value Date    TRIG 341 (H) 08/28/2014    HDL 42 08/28/2014     HbA1c:       Visual Acuity:  No exam data present    Age-appropriate  Screening Schedule:  Refer to the list below for future screening recommendations based on patient's age, sex and/or medical conditions. Orders for these recommended tests are listed in the plan section. The patient has been provided with a written plan.    Health Maintenance   Topic Date Due   • TDAP/TD VACCINES (1 - Tdap) 05/28/1962   • ZOSTER VACCINE (2 of 2) 12/03/2012   • LIPID PANEL  07/29/2016   • PNEUMOCOCCAL VACCINES (65+ LOW/MEDIUM RISK) (2 of 2 - PPSV23) 07/29/2017   • INFLUENZA VACCINE  08/01/2018   • MAMMOGRAM  09/04/2020   • COLONOSCOPY  10/19/2025        Subjective   History of Present Illness    Vesta Landry is a 73 y.o. female who presents for an Subsequent Wellness Visit.    The following portions of the patient's history were reviewed and updated as appropriate: allergies, current medications, past family history, past medical history, past social history, past surgical history and problem list.    Outpatient Medications Prior to Visit   Medication Sig Dispense Refill   • aspirin 81 MG tablet Take 1 tablet by mouth Daily. 30 tablet 11   • atorvastatin (LIPITOR) 10 MG tablet take 1 tablet by mouth once daily 90 tablet 3   • B Complex Vitamins (VITAMIN B COMPLEX PO) Take  by mouth Daily.     • escitalopram (LEXAPRO) 10 MG tablet Take 1 tablet by mouth Daily. 30 tablet 5   • losartan (COZAAR) 25 MG tablet Take 1 tablet by mouth Daily. 30 tablet 11   • metoprolol tartrate (LOPRESSOR) 50 MG tablet take 1 tablet by mouth once daily 90 tablet 3   • omeprazole (priLOSEC) 20 MG capsule Take 1 capsule by mouth Daily. 90 capsule 3   • triamterene-hydrochlorothiazide (MAXZIDE) 75-50 MG per tablet Take 0.5 tablets by mouth Daily. 135 tablet 3     No facility-administered medications prior to visit.        Patient Active Problem List   Diagnosis   • Gastroesophageal reflux disease without esophagitis   • Pure hypercholesterolemia   • Essential hypertension   • Vitamin D deficiency   • Menopause present    • SOB (shortness of breath) on exertion   • Dysthymia   • Leg swelling       Advance Care Planning:  has NO advance directive - information provided to the patient today    Identification of Risk Factors:  Risk factors include: weight , increased fall risk and polypharmacy.    Review of Systems   Constitutional: Negative.  Negative for activity change, appetite change, fatigue and fever.   HENT: Negative for congestion, ear discharge, ear pain and trouble swallowing.    Eyes: Negative for photophobia and visual disturbance.   Respiratory: Negative for cough and shortness of breath.    Cardiovascular: Negative for chest pain and palpitations.   Gastrointestinal: Negative for abdominal distention, abdominal pain, constipation, diarrhea, nausea and vomiting.   Endocrine: Negative.    Genitourinary: Negative for dysuria, hematuria and urgency.   Musculoskeletal: Positive for arthralgias. Negative for back pain, joint swelling and myalgias.   Skin: Negative for color change and rash.   Allergic/Immunologic: Negative.    Neurological: Negative for dizziness, weakness, light-headedness and headaches.   Hematological: Negative for adenopathy. Does not bruise/bleed easily.   Psychiatric/Behavioral: Positive for sleep disturbance. Negative for agitation, confusion and dysphoric mood. The patient is not nervous/anxious.        Compared to one year ago, the patient feels her physical health is the same.  Compared to one year ago, the patient feels her mental health is the same.    Objective     Physical Exam   Constitutional: She is oriented to person, place, and time. She appears well-developed and well-nourished. No distress.   HENT:   Nose: Nose normal.   Mouth/Throat: Oropharynx is clear and moist.   Eyes: Conjunctivae and EOM are normal. No scleral icterus.   Neck: No tracheal deviation present. No thyromegaly present.   Cardiovascular: Normal rate and regular rhythm.  Exam reveals no friction rub.    No murmur  "heard.  Pulmonary/Chest: No respiratory distress. She has no wheezes. She has no rales.   Abdominal: Soft. She exhibits no distension and no mass. There is no tenderness. There is no guarding.   Musculoskeletal: Normal range of motion. She exhibits edema and deformity.   Lymphadenopathy:     She has no cervical adenopathy.   Neurological: She is alert and oriented to person, place, and time. She has normal reflexes. No cranial nerve deficit. Coordination normal.   Skin: Skin is warm and dry. No rash noted. No erythema.   Psychiatric: She has a normal mood and affect. Her behavior is normal. Judgment and thought content normal.       Vitals:    09/13/18 1440   BP: 130/70   Pulse: 61   Temp: 98.1 °F (36.7 °C)   SpO2: 98%   Weight: 78.5 kg (173 lb)   Height: 160 cm (63\")   PainSc: 0-No pain       Patient's Body mass index is 30.65 kg/m². BMI is above normal parameters. Recommendations include: exercise counseling and nutrition counseling.      Assessment/Plan   Patient Self-Management and Personalized Health Advice  The patient has been provided with information about: diet, exercise and designing advance directives and preventive services including:   · Advance directive, Fall Risk assessment done, Fall Risk plan of care done, Urinary Incontinence assessment done.    Visit Diagnoses:    ICD-10-CM ICD-9-CM   1. Essential hypertension. Stable with current meds and low-salt diet  I10 401.9   2. Pure hypercholesterolemia continue with the dietary restrictions weight loss  E78.00 272.0   3. Leg swelling. Discussed leg elevation compression stockings low-salt diet. Advised to keep her appointment for sleep apnea evaluation  M79.89 729.81   4. Need for vaccination Z23 V05.9       Orders Placed This Encounter   Procedures   • Flu Vaccine High Dose PF 65YR+ ([id number])       Outpatient Encounter Prescriptions as of 9/13/2018   Medication Sig Dispense Refill   • aspirin 81 MG tablet Take 1 tablet by mouth Daily. 30 tablet 11 "   • atorvastatin (LIPITOR) 10 MG tablet take 1 tablet by mouth once daily 90 tablet 3   • B Complex Vitamins (VITAMIN B COMPLEX PO) Take  by mouth Daily.     • escitalopram (LEXAPRO) 10 MG tablet Take 1 tablet by mouth Daily. 30 tablet 5   • losartan (COZAAR) 25 MG tablet Take 1 tablet by mouth Daily. 30 tablet 11   • metoprolol tartrate (LOPRESSOR) 50 MG tablet take 1 tablet by mouth once daily 90 tablet 3   • omeprazole (priLOSEC) 20 MG capsule Take 1 capsule by mouth Daily. 90 capsule 3   • triamterene-hydrochlorothiazide (MAXZIDE) 75-50 MG per tablet Take 0.5 tablets by mouth Daily. 135 tablet 3     No facility-administered encounter medications on file as of 9/13/2018.        Reviewed use of high risk medication in the elderly: yes  Reviewed for potential of harmful drug interactions in the elderly: yes    Follow Up:  No Follow-up on file.     An After Visit Summary and PPPS with all of these plans were given to the patient.

## 2018-09-13 NOTE — PATIENT INSTRUCTIONS
Medicare Wellness  Personal Prevention Plan of Service     Date of Office Visit:  2018  Encounter Provider:  Bossman Pedraza MD  Place of Service:  [facility]  Patient Name: Vesta Landry  :  1945    As part of the Medicare Wellness portion of your visit today, we are providing you with this personalized preventive plan of services (PPPS). This plan is based upon recommendations of the [place] Preventive Services Task Force (USPSTF) and the Advisory Committee on Immunization Practices (ACIP).    This lists the preventive care services that should be considered, and provides dates of when you are due. Items listed as completed are up-to-date and do not require any further intervention.    Health Maintenance   Topic Date Due   • TDAP/TD VACCINES (1 - Tdap) 1962   • ZOSTER VACCINE (2 of 2) 2012   • LIPID PANEL  2016   • PNEUMOCOCCAL VACCINES (65+ LOW/MEDIUM RISK) (2 of 2 - PPSV23) 2017   • INFLUENZA VACCINE  2018   • MEDICARE ANNUAL WELLNESS  2019   • MAMMOGRAM  2020   • COLONOSCOPY  10/19/2025       Orders Placed This Encounter   Procedures   • Flu Vaccine High Dose PF 65YR+ ([id number])   • LDL Cholesterol, Direct   • Comprehensive Metabolic Panel       No Follow-up on file.

## 2018-09-14 ENCOUNTER — OFFICE VISIT (OUTPATIENT)
Dept: CARDIOLOGY | Facility: CLINIC | Age: 73
End: 2018-09-14

## 2018-09-14 VITALS
OXYGEN SATURATION: 98 % | SYSTOLIC BLOOD PRESSURE: 132 MMHG | HEART RATE: 55 BPM | BODY MASS INDEX: 30.3 KG/M2 | HEIGHT: 63 IN | DIASTOLIC BLOOD PRESSURE: 70 MMHG | WEIGHT: 171 LBS | RESPIRATION RATE: 18 BRPM

## 2018-09-14 DIAGNOSIS — R00.2 PALPITATIONS: ICD-10-CM

## 2018-09-14 DIAGNOSIS — R60.0 BILATERAL EDEMA OF LOWER EXTREMITY: ICD-10-CM

## 2018-09-14 DIAGNOSIS — R01.1 MURMUR, CARDIAC: ICD-10-CM

## 2018-09-14 DIAGNOSIS — R06.02 SOB (SHORTNESS OF BREATH) ON EXERTION: ICD-10-CM

## 2018-09-14 DIAGNOSIS — I10 ESSENTIAL HYPERTENSION: Primary | ICD-10-CM

## 2018-09-14 DIAGNOSIS — R07.2 PRECORDIAL PAIN: ICD-10-CM

## 2018-09-14 DIAGNOSIS — I50.32 CHRONIC DIASTOLIC CONGESTIVE HEART FAILURE (HCC): ICD-10-CM

## 2018-09-14 LAB
ALBUMIN SERPL-MCNC: 4.3 G/DL (ref 3.5–5)
ALBUMIN/GLOB SERPL: 1.7 G/DL (ref 1–2)
ALP SERPL-CCNC: 89 U/L (ref 38–126)
ALT SERPL-CCNC: 28 U/L (ref 13–69)
AST SERPL-CCNC: 35 U/L (ref 15–46)
BILIRUB SERPL-MCNC: 0.5 MG/DL (ref 0.2–1.3)
BUN SERPL-MCNC: 20 MG/DL (ref 7–20)
BUN/CREAT SERPL: 18.2 (ref 7.1–23.5)
CALCIUM SERPL-MCNC: 9.1 MG/DL (ref 8.4–10.2)
CHLORIDE SERPL-SCNC: 96 MMOL/L (ref 98–107)
CO2 SERPL-SCNC: 25 MMOL/L (ref 26–30)
CREAT SERPL-MCNC: 1.1 MG/DL (ref 0.6–1.3)
GLOBULIN SER CALC-MCNC: 2.6 GM/DL
GLUCOSE SERPL-MCNC: 101 MG/DL (ref 74–98)
LDLC SERPL DIRECT ASSAY-MCNC: 51 MG/DL (ref 0–99)
POTASSIUM SERPL-SCNC: 4.7 MMOL/L (ref 3.5–5.1)
PROT SERPL-MCNC: 6.9 G/DL (ref 6.3–8.2)
SODIUM SERPL-SCNC: 133 MMOL/L (ref 137–145)

## 2018-09-14 PROCEDURE — 99214 OFFICE O/P EST MOD 30 MIN: CPT | Performed by: INTERNAL MEDICINE

## 2018-09-14 RX ORDER — POTASSIUM CHLORIDE 20 MEQ/1
20 TABLET, EXTENDED RELEASE ORAL 2 TIMES DAILY
Qty: 60 TABLET | Refills: 11 | Status: SHIPPED | OUTPATIENT
Start: 2018-09-14 | End: 2019-08-31 | Stop reason: SDUPTHER

## 2018-09-14 RX ORDER — FUROSEMIDE 40 MG/1
40 TABLET ORAL DAILY
Qty: 30 TABLET | Refills: 11 | Status: SHIPPED | OUTPATIENT
Start: 2018-09-14 | End: 2019-06-04 | Stop reason: SDUPTHER

## 2018-09-14 NOTE — PROGRESS NOTES
Subjective:     Encounter Date:09/14/2018      Patient ID: Vesta Landry is a 73 y.o. female.    Chief Complaint: Swelling of feet and ankles  HPI  This is a 73-year-old female patient who presents to cardiology clinic for routine follow-up.  The patient continues to have atypical dull chest discomfort along both sides for sternum when she uses her upper extremities but not with other forms of physical exertion such as walking.  There is no shortness of breath at rest or with activity.  She has no orthopnea or PND.  She has intermittent swelling of her feet and ankles particularly during humid weather and when she was sent for prolonged periods of time.  There is no dizziness palpitations or syncope.  She is a nonsmoker.  The following portions of the patient's history were reviewed and updated as appropriate: allergies, current medications, past family history, past medical history, past social history, past surgical history and problem  Review of Systems   Constitution: Negative for chills, diaphoresis, fever, weakness, malaise/fatigue, weight gain and weight loss.   HENT: Negative for ear discharge, hearing loss, hoarse voice and nosebleeds.    Eyes: Negative for discharge, double vision, pain and photophobia.   Cardiovascular: Positive for dyspnea on exertion and leg swelling. Negative for chest pain, claudication, cyanosis, irregular heartbeat, near-syncope, orthopnea, palpitations, paroxysmal nocturnal dyspnea and syncope.   Respiratory: Negative for cough, hemoptysis, shortness of breath, sputum production and wheezing.    Endocrine: Negative for cold intolerance, heat intolerance, polydipsia, polyphagia and polyuria.   Hematologic/Lymphatic: Negative for adenopathy and bleeding problem. Does not bruise/bleed easily.   Skin: Negative for color change, flushing, itching and rash.   Musculoskeletal: Negative for muscle cramps, muscle weakness, myalgias and stiffness.   Gastrointestinal: Negative for  abdominal pain, diarrhea, hematemesis, hematochezia, nausea and vomiting.   Genitourinary: Negative for dysuria, frequency and nocturia.   Neurological: Negative for focal weakness, loss of balance, numbness, paresthesias and seizures.   Psychiatric/Behavioral: Negative for altered mental status, hallucinations and suicidal ideas.   Allergic/Immunologic: Negative for HIV exposure, hives and persistent infections.           Current Outpatient Prescriptions:   •  aspirin 81 MG tablet, Take 1 tablet by mouth Daily., Disp: 30 tablet, Rfl: 11  •  atorvastatin (LIPITOR) 10 MG tablet, take 1 tablet by mouth once daily, Disp: 90 tablet, Rfl: 3  •  B Complex Vitamins (VITAMIN B COMPLEX PO), Take  by mouth Daily., Disp: , Rfl:   •  escitalopram (LEXAPRO) 10 MG tablet, Take 1 tablet by mouth Daily., Disp: 30 tablet, Rfl: 5  •  furosemide (LASIX) 40 MG tablet, Take 1 tablet by mouth Daily., Disp: 30 tablet, Rfl: 11  •  losartan (COZAAR) 25 MG tablet, Take 1 tablet by mouth Daily., Disp: 30 tablet, Rfl: 11  •  metoprolol tartrate (LOPRESSOR) 50 MG tablet, take 1 tablet by mouth once daily, Disp: 90 tablet, Rfl: 3  •  omeprazole (priLOSEC) 20 MG capsule, Take 1 capsule by mouth Daily., Disp: 90 capsule, Rfl: 3  •  potassium chloride (K-DUR,KLOR-CON) 20 MEQ CR tablet, Take 1 tablet by mouth 2 (Two) Times a Day., Disp: 60 tablet, Rfl: 11     Objective:     Physical Exam   Constitutional: She is oriented to person, place, and time. She appears well-developed and well-nourished.   HENT:   Head: Normocephalic and atraumatic.   Mouth/Throat: Oropharynx is clear and moist.   Eyes: Pupils are equal, round, and reactive to light. Conjunctivae and EOM are normal. No scleral icterus.   Neck: Normal range of motion. Neck supple. No JVD present. No tracheal deviation present. No thyromegaly present.   Cardiovascular: Normal rate, regular rhythm, S1 normal, S2 normal, intact distal pulses and normal pulses.  PMI is not displaced.  Exam reveals  "no gallop and no friction rub.    Murmur heard.   Medium-pitched harsh crescendo-decrescendo early systolic murmur is present with a grade of 2/6  at the upper right sternal border  Pulmonary/Chest: Effort normal and breath sounds normal. No respiratory distress. She has no wheezes. She has no rales.   Abdominal: Soft. Bowel sounds are normal. She exhibits no distension and no mass. There is no tenderness. There is no rebound and no guarding.   Musculoskeletal: Normal range of motion. She exhibits edema. She exhibits no deformity.   Neurological: She is alert and oriented to person, place, and time. She displays normal reflexes. No cranial nerve deficit. Coordination normal.   Skin: Skin is warm and dry. No rash noted. No erythema.   Psychiatric: She has a normal mood and affect. Her behavior is normal. Thought content normal.     Blood pressure 132/70, pulse 55, resp. rate 18, height 160 cm (62.99\"), weight 77.6 kg (171 lb), SpO2 98 %, not currently breastfeeding.   Lab Review:       Assessment:         1. Essential hypertension  Acceptable blood pressure control.    2. Precordial pain  Musculoskeletal chest pain.  Recent vasodilator nuclear stress testing showed no inducible ischemia.    3. SOB (shortness of breath) on exertion  Her shortness of breath has largely resolved spontaneously.  She does have an element of underlying hypertensive cardiac disease and mild diastolic dysfunction.    4. Bilateral edema of lower extremity  This is a manifestation of hypertensive cardiac disease, diastolic dysfunction, untreated obstructive sleep apnea and varicose veins.    5. Chronic diastolic congestive heart failure (CMS/HCC)  The patient is currently well compensated and New York Heart Association functional class 1-2 symptoms.    6. Murmur, cardiac  The patient has a typical murmur of aortic valve sclerosis.  Her recent echocardiogram showed no aortic stenosis.    7. Palpitations  Recent cardiac monitoring showed no " evidence of arrhythmia or frequent\complex atrial or ventricular ectopy.  Her palpitations have improved significantly without specific intervention.    Procedures     Plan:       I have recommended local measures such as avoiding prolonged sitting and standing.  She is instructed to keep her legs elevated throughout the course of the day.  I have recommended changing her Maxide diuretic to Lasix 40 mg orally once in the morning with potassium supplementation.  I have recommended that she limit her fluid intake to 1.5 L per day.  She has been counseled again regarding the importance of dietary sodium restriction.  No additional cardiovascular testing is necessary.

## 2018-09-17 RX ORDER — ESCITALOPRAM OXALATE 10 MG/1
TABLET ORAL
Qty: 30 TABLET | Refills: 5 | Status: SHIPPED | OUTPATIENT
Start: 2018-09-17 | End: 2019-02-14 | Stop reason: SDUPTHER

## 2018-10-02 ENCOUNTER — OFFICE VISIT (OUTPATIENT)
Dept: PULMONOLOGY | Facility: CLINIC | Age: 73
End: 2018-10-02

## 2018-10-02 VITALS
SYSTOLIC BLOOD PRESSURE: 110 MMHG | BODY MASS INDEX: 30.48 KG/M2 | RESPIRATION RATE: 18 BRPM | HEART RATE: 61 BPM | WEIGHT: 172 LBS | DIASTOLIC BLOOD PRESSURE: 80 MMHG | HEIGHT: 63 IN | OXYGEN SATURATION: 97 %

## 2018-10-02 DIAGNOSIS — G47.33 OBSTRUCTIVE SLEEP APNEA: ICD-10-CM

## 2018-10-02 DIAGNOSIS — R06.83 SNORING: Primary | ICD-10-CM

## 2018-10-02 PROCEDURE — 99203 OFFICE O/P NEW LOW 30 MIN: CPT | Performed by: INTERNAL MEDICINE

## 2018-10-02 RX ORDER — TRIAMTERENE AND HYDROCHLOROTHIAZIDE 75; 50 MG/1; MG/1
1 TABLET ORAL DAILY
COMMUNITY
End: 2019-03-22

## 2018-10-02 NOTE — PROGRESS NOTES
"CONSULT NOTE    Requested by:   Bossman Pedraza MD      Chief Complaint   Patient presents with   • Consult   • Sleeping Problem       Subjective   Vesta Landry is a 73 y.o. female.     History of Present Illness   Patient comes in today for consultation because of loud snoring.    The patient says that apart from occasional headaches in the morning, she has no symptoms of daytime sleepiness, tiredness or fatigue.    She also denies any episodes of talking in her sleep or teeth grinding etc.    She does occasionally drink 2 cups of coffee but overall drinks one cup throughout the day.    She denies any known family history of sleep apnea.    The following portions of the patient's history were reviewed and updated as appropriate: allergies, current medications, past family history, past medical history, past social history and past surgical history.    Review of Systems   Constitutional: Positive for fatigue. Negative for chills and fever.   HENT: Positive for ear pain, rhinorrhea and sinus pressure. Negative for sneezing and sore throat.    Respiratory: Positive for shortness of breath. Negative for cough, chest tightness and wheezing.    Cardiovascular: Positive for leg swelling. Negative for chest pain and palpitations.   Psychiatric/Behavioral: Positive for sleep disturbance.   All other systems reviewed and are negative.      Past Medical History:   Diagnosis Date   • Arthritis    • Deep vein thrombosis (CMS/HCC)     bilateral   • Dysphagia    • Hyperlipidemia    • Hypertension    • Vitamin D deficiency        Social History   Substance Use Topics   • Smoking status: Former Smoker     Types: Cigarettes     Quit date: 1/1/1987   • Smokeless tobacco: Never Used   • Alcohol use No         Objective   Visit Vitals  /80   Pulse 61   Resp 18   Ht 160 cm (62.99\")   Wt 78 kg (172 lb)   SpO2 97%   BMI 30.48 kg/m²       Physical Exam   Constitutional: She is oriented to person, place, and time. She appears " well-developed and well-nourished.   HENT:   Head: Atraumatic.   Dentures noted.   Overcrowded.   Eyes: EOM are normal.   Neck: Neck supple. No JVD present. No thyromegaly present.   Cardiovascular: Normal rate and regular rhythm.    No murmur heard.  Pulmonary/Chest: Effort normal. No respiratory distress. She has no wheezes. She has no rales.   Musculoskeletal:   Gait was normal.   Neurological: She is alert and oriented to person, place, and time.   Skin: Skin is warm and dry.   Psychiatric: She has a normal mood and affect. Her behavior is normal.   Vitals reviewed.        Assessment/Plan   Vesta was seen today for consult and sleeping problem.    Diagnoses and all orders for this visit:    Snoring  -     Overnight Sleep Oximetry Study; Future    Obstructive sleep apnea  -     Overnight Sleep Oximetry Study; Future           Return in about 10 weeks (around 12/11/2018) for Recheck, Overnight P Ox, For Erendira.    DISCUSSION(if any):  Laboratory workup was also reviewed which showed a carbon dioxide level of 25.    ===========================  ===========================    Based on her history and physical exam findings, I do not believe that she has high risk for obstructive sleep apnea    Due to significant snoring and crowded oropharynx, overnight pulse oximetry has been requested.    If the overnight pulse oximetry data suggests nocturnal hypoxemia, then I would strongly consider a sleep study otherwise she can be discharged back to her primary care provider.    Dictated utilizing Dragon dictation.    This document was electronically signed by Milana Phillips MD October 2, 2018  1:56 PM

## 2018-10-19 DIAGNOSIS — R06.83 SNORING: ICD-10-CM

## 2018-10-19 DIAGNOSIS — G47.33 OBSTRUCTIVE SLEEP APNEA: ICD-10-CM

## 2019-02-02 ENCOUNTER — APPOINTMENT (OUTPATIENT)
Dept: GENERAL RADIOLOGY | Facility: HOSPITAL | Age: 74
End: 2019-02-02

## 2019-02-02 ENCOUNTER — HOSPITAL ENCOUNTER (EMERGENCY)
Facility: HOSPITAL | Age: 74
Discharge: HOME | End: 2019-02-02
Attending: EMERGENCY MEDICINE | Admitting: EMERGENCY MEDICINE

## 2019-02-02 VITALS
TEMPERATURE: 97.9 F | BODY MASS INDEX: 32.21 KG/M2 | WEIGHT: 170.6 LBS | SYSTOLIC BLOOD PRESSURE: 141 MMHG | HEIGHT: 61 IN | HEART RATE: 67 BPM | RESPIRATION RATE: 18 BRPM | DIASTOLIC BLOOD PRESSURE: 64 MMHG | OXYGEN SATURATION: 98 %

## 2019-02-02 DIAGNOSIS — L03.115 CELLULITIS OF RIGHT FOOT: Primary | ICD-10-CM

## 2019-02-02 PROCEDURE — 73630 X-RAY EXAM OF FOOT: CPT

## 2019-02-02 PROCEDURE — 96372 THER/PROPH/DIAG INJ SC/IM: CPT

## 2019-02-02 PROCEDURE — 99283 EMERGENCY DEPT VISIT LOW MDM: CPT

## 2019-02-02 RX ORDER — HYDROCODONE BITARTRATE AND ACETAMINOPHEN 5; 325 MG/1; MG/1
1 TABLET ORAL EVERY 8 HOURS PRN
Qty: 10 TABLET | Refills: 0 | Status: SHIPPED | OUTPATIENT
Start: 2019-02-02 | End: 2019-03-22

## 2019-02-02 RX ORDER — CLINDAMYCIN HYDROCHLORIDE 300 MG/1
300 CAPSULE ORAL 3 TIMES DAILY
Qty: 30 CAPSULE | Refills: 0 | Status: SHIPPED | OUTPATIENT
Start: 2019-02-02 | End: 2019-03-22

## 2019-02-02 RX ORDER — CLINDAMYCIN PHOSPHATE 150 MG/ML
600 INJECTION, SOLUTION INTRAVENOUS ONCE
Status: COMPLETED | OUTPATIENT
Start: 2019-02-02 | End: 2019-02-02

## 2019-02-02 RX ADMIN — CLINDAMYCIN PHOSPHATE 600 MG: 150 INJECTION, SOLUTION INTRAMUSCULAR; INTRAVENOUS at 13:01

## 2019-02-02 NOTE — DISCHARGE INSTRUCTIONS
Keep foot elevated.  Antibiotics as directed.  See Dr. Pedraza Monday to monitor your recovery.  Thank you

## 2019-02-02 NOTE — ED PROVIDER NOTES
"Subjective   Patient presents with area of redness and tenderness to the interdigital space on the right foot between digits 2 and 3 that began 5 days ago; redness has been intermittent; pain began last evening.  Recalls no injury.  She has no history of diabetes or gout.   \"I go barefoot a lot\".          History provided by:  Patient   used: No    Lower Extremity Issue   Location:  Foot  Time since incident:  5 days  Injury: no    Foot location:  R foot  Pain details:     Quality:  Aching    Radiates to:  Does not radiate    Severity:  Mild    Onset quality:  Gradual    Duration:  5 days    Timing:  Constant    Progression:  Worsening  Chronicity:  New  Dislocation: no    Foreign body present:  No foreign bodies  Prior injury to area:  No  Relieved by:  Nothing  Worsened by:  Nothing  Ineffective treatments:  None tried  Associated symptoms: no back pain, no fever, no itching and no numbness    Risk factors: no recent illness        Review of Systems   Constitutional: Negative for fever.   Endocrine: Negative.         No hx of DM     Musculoskeletal: Negative for back pain.   Skin: Negative for itching.        Cellulitis on the right foot      Neurological: Negative for weakness and numbness.   Hematological: Negative.    Psychiatric/Behavioral: Negative.        Past Medical History:   Diagnosis Date   • Arthritis    • Deep vein thrombosis (CMS/HCC)     bilateral   • Dysphagia    • Hyperlipidemia    • Hypertension    • Vitamin D deficiency        No Known Allergies    Past Surgical History:   Procedure Laterality Date   • APPENDECTOMY     • CATARACT EXTRACTION     • CHOLECYSTECTOMY     • COLONOSCOPY      Approx 2009   • HYSTERECTOMY  1991   • OOPHORECTOMY Bilateral 1991   • TUBAL ABDOMINAL LIGATION         Family History   Problem Relation Age of Onset   • Heart failure Mother    • Hyperlipidemia Mother    • Hypertension Mother    • Cancer Father    • Cancer Sister    • Stroke Sister    • Breast " cancer Maternal Aunt    • Breast cancer Paternal Aunt    • Ovarian cancer Neg Hx        Social History     Socioeconomic History   • Marital status:      Spouse name: Not on file   • Number of children: Not on file   • Years of education: Not on file   • Highest education level: Not on file   Tobacco Use   • Smoking status: Former Smoker     Types: Cigarettes     Last attempt to quit: 1987     Years since quittin.1   • Smokeless tobacco: Never Used   Substance and Sexual Activity   • Alcohol use: No   • Drug use: No           Objective   Physical Exam   Constitutional: She appears well-developed and well-nourished. No distress.   HENT:   Head: Normocephalic.   Eyes: Conjunctivae are normal. No scleral icterus.   Neck: Normal range of motion.   Cardiovascular: Normal rate and regular rhythm.   Pulmonary/Chest: Effort normal and breath sounds normal.   Abdominal: Soft. Bowel sounds are normal. She exhibits no distension. There is no tenderness.   Musculoskeletal:   RIGHT FOOT:  There is erythema, warmth and swelling at the interdigital space between digits 2 and 3 with local sts that extends to the plantar region.  No great toe/MTP joint involvement.  Proximal and distal joints are negative.  Normal motor function.  There is no gross skin disruption with exception of mild tinea pedia on the plantar periphery.  NV exam is negative    Skin: Skin is warm. Capillary refill takes less than 2 seconds. No rash noted. She is not diaphoretic.   Psychiatric: She has a normal mood and affect. Her behavior is normal. Judgment and thought content normal.   Vitals reviewed.      Procedures           ED Course  ED Course as of  1325   Sat 2019   1323 RIGHT FOOT: negative for  FB or fx    [ML]      ED Course User Index  [ML] Kellie Johnson APRN                  [facility]      Final diagnoses:   Cellulitis of right foot            Kellie Johnson APRN  [id number]

## 2019-02-04 ENCOUNTER — OFFICE VISIT (OUTPATIENT)
Dept: INTERNAL MEDICINE | Facility: CLINIC | Age: 74
End: 2019-02-04

## 2019-02-04 ENCOUNTER — TELEPHONE (OUTPATIENT)
Dept: INTERNAL MEDICINE | Facility: CLINIC | Age: 74
End: 2019-02-04

## 2019-02-04 VITALS
HEART RATE: 81 BPM | DIASTOLIC BLOOD PRESSURE: 70 MMHG | BODY MASS INDEX: 32.1 KG/M2 | TEMPERATURE: 97.6 F | HEIGHT: 61 IN | SYSTOLIC BLOOD PRESSURE: 132 MMHG | OXYGEN SATURATION: 98 % | WEIGHT: 170 LBS

## 2019-02-04 DIAGNOSIS — M79.671 RIGHT FOOT PAIN: Primary | ICD-10-CM

## 2019-02-04 LAB
ERYTHROCYTE [DISTWIDTH] IN BLOOD BY AUTOMATED COUNT: 14.6 % (ref 11.5–14.5)
HCT VFR BLD AUTO: 36.7 % (ref 37–47)
HGB BLD-MCNC: 12.2 G/DL (ref 12–16)
MCH RBC QN AUTO: 30.3 PG (ref 27–31)
MCHC RBC AUTO-ENTMCNC: 33.2 G/DL (ref 30–37)
MCV RBC AUTO: 91.1 FL (ref 81–99)
PLATELET # BLD AUTO: 156 10*3/MM3 (ref 130–400)
RBC # BLD AUTO: 4.03 10*6/MM3 (ref 4.2–5.4)
URATE SERPL-MCNC: 10.5 MG/DL (ref 2.5–8.5)
WBC # BLD AUTO: 9.07 10*3/MM3 (ref 4.8–10.8)

## 2019-02-04 PROCEDURE — 99213 OFFICE O/P EST LOW 20 MIN: CPT | Performed by: INTERNAL MEDICINE

## 2019-02-04 RX ORDER — LEVOFLOXACIN 500 MG/1
500 TABLET, FILM COATED ORAL DAILY
Qty: 7 TABLET | Refills: 0 | Status: SHIPPED | OUTPATIENT
Start: 2019-02-04 | End: 2019-03-22

## 2019-02-04 RX ORDER — INDOMETHACIN 50 MG/1
50 CAPSULE ORAL 3 TIMES DAILY PRN
Qty: 30 CAPSULE | Refills: 0 | Status: SHIPPED | OUTPATIENT
Start: 2019-02-04 | End: 2019-03-22

## 2019-02-04 NOTE — TELEPHONE ENCOUNTER
Same conversation over and over - short term memory loss, on edge during social situations, uneasy about leaving the house.

## 2019-02-04 NOTE — TELEPHONE ENCOUNTER
Patient's daughter is requesting a return call to discuss her mental faculties prior to today's appointment at 2:15 p.m.as she doesn't want to discuss in front of mother.

## 2019-02-04 NOTE — PROGRESS NOTES
Subjective  Vesta Landry is a 73 y.o. female    HPI coming in accompanied by her daughter was giving us some of the history. Five-day history of right foot pain with redness and swelling noted went to the emergency room for this underwent imaging studies which were unremarkable placed on IM and subsequently oral clindamycin for suspected cellulitis. She denies trauma she denies fever or chills has not had similar complaints in the past. She is on diuretic therapy for chronic lower extremity edema bilaterally. Has not had much improvement while on clindamycin for about 24 hours she is on narcotic analgesics when necessary for this pain    The following portions of the patient's history were reviewed and updated as appropriate: allergies, current medications, past family history, past medical history, past social history, past surgical history, and problem list.     Review of Systems   Constitutional: Negative.  Negative for activity change, appetite change, fatigue and fever.   HENT: Negative for congestion, ear discharge, ear pain and trouble swallowing.    Eyes: Negative for photophobia and visual disturbance.   Respiratory: Negative for cough and shortness of breath.    Cardiovascular: Negative for chest pain and palpitations.   Gastrointestinal: Negative for abdominal distention, abdominal pain, constipation, diarrhea, nausea and vomiting.   Endocrine: Negative.    Genitourinary: Negative for dysuria, hematuria and urgency.   Musculoskeletal: Positive for arthralgias, gait problem and joint swelling. Negative for back pain and myalgias.   Skin: Negative for color change and rash.   Allergic/Immunologic: Negative.    Neurological: Negative for dizziness, weakness, light-headedness and headaches.   Hematological: Negative for adenopathy. Does not bruise/bleed easily.   Psychiatric/Behavioral: Negative for agitation, confusion and dysphoric mood. The patient is not nervous/anxious.        Visit Vitals  BP  "132/70 Comment: AUTOMATIC   Pulse 81   Temp 97.6 °F (36.4 °C) (Temporal)   Ht 154.9 cm (61\")   Wt 77.1 kg (170 lb)   SpO2 98%   BMI 32.12 kg/m²       Objective  Physical Exam   Constitutional: She is oriented to person, place, and time. She appears well-developed and well-nourished. No distress.   HENT:   Nose: Nose normal.   Mouth/Throat: Oropharynx is clear and moist.   Eyes: Conjunctivae and EOM are normal. No scleral icterus.   Neck: No tracheal deviation present. No thyromegaly present.   Cardiovascular: Normal rate and regular rhythm. Exam reveals no friction rub.   No murmur heard.  Pulmonary/Chest: No respiratory distress. She has no wheezes. She has no rales.   Abdominal: Soft. She exhibits no distension and no mass. There is no tenderness. There is no guarding.   Musculoskeletal: Normal range of motion. She exhibits tenderness and deformity.     Vascular Status -  Her right foot exhibits abnormal foot edema.     Lymphadenopathy:     She has no cervical adenopathy.   Neurological: She is alert and oriented to person, place, and time. She has normal reflexes. No cranial nerve deficit. Coordination normal.   Skin: Skin is warm and dry. No rash noted. No erythema.   Psychiatric: She has a normal mood and affect. Her behavior is normal. Judgment and thought content normal.       Diagnoses and all orders for this visit:    Right foot pain acute gout versus cellulitis. Will change antibiotic to levofloxacin. Check uric acid level Indocin 3 times a day advised leg elevation and Tylenol when necessary encouraged to drink plenty of fluids. Discussed plan with patient and daughter        "

## 2019-02-07 ENCOUNTER — TELEPHONE (OUTPATIENT)
Dept: INTERNAL MEDICINE | Facility: CLINIC | Age: 74
End: 2019-02-07

## 2019-02-07 NOTE — TELEPHONE ENCOUNTER
Patient daughter called to let us know patient swelling in her foot has went down and pain has decreased.

## 2019-02-14 RX ORDER — ESCITALOPRAM OXALATE 10 MG/1
10 TABLET ORAL DAILY
Qty: 90 TABLET | Refills: 3 | Status: SHIPPED | OUTPATIENT
Start: 2019-02-14 | End: 2019-03-22

## 2019-03-05 RX ORDER — METOPROLOL TARTRATE 50 MG/1
TABLET, FILM COATED ORAL
Qty: 90 TABLET | Refills: 3 | Status: SHIPPED | OUTPATIENT
Start: 2019-03-05 | End: 2020-02-24

## 2019-03-05 RX ORDER — OMEPRAZOLE 20 MG/1
CAPSULE, DELAYED RELEASE ORAL
Qty: 90 CAPSULE | Refills: 3 | Status: SHIPPED | OUTPATIENT
Start: 2019-03-05 | End: 2019-03-22

## 2019-03-22 ENCOUNTER — OFFICE VISIT (OUTPATIENT)
Dept: INTERNAL MEDICINE | Facility: CLINIC | Age: 74
End: 2019-03-22

## 2019-03-22 VITALS
OXYGEN SATURATION: 98 % | BODY MASS INDEX: 32.47 KG/M2 | SYSTOLIC BLOOD PRESSURE: 166 MMHG | TEMPERATURE: 97.7 F | WEIGHT: 172 LBS | HEIGHT: 61 IN | DIASTOLIC BLOOD PRESSURE: 79 MMHG | RESPIRATION RATE: 16 BRPM | HEART RATE: 58 BPM

## 2019-03-22 DIAGNOSIS — R41.3 MEMORY LOSS: ICD-10-CM

## 2019-03-22 DIAGNOSIS — I10 ESSENTIAL HYPERTENSION: ICD-10-CM

## 2019-03-22 DIAGNOSIS — M1A.0710 IDIOPATHIC CHRONIC GOUT OF RIGHT FOOT WITHOUT TOPHUS: Primary | ICD-10-CM

## 2019-03-22 DIAGNOSIS — I50.32 CHRONIC DIASTOLIC CONGESTIVE HEART FAILURE (HCC): ICD-10-CM

## 2019-03-22 PROBLEM — R06.02 SOB (SHORTNESS OF BREATH) ON EXERTION: Status: RESOLVED | Noted: 2017-08-07 | Resolved: 2019-03-22

## 2019-03-22 PROBLEM — R07.2 PRECORDIAL PAIN: Status: RESOLVED | Noted: 2018-09-14 | Resolved: 2019-03-22

## 2019-03-22 PROBLEM — M79.89 LEG SWELLING: Status: RESOLVED | Noted: 2018-07-13 | Resolved: 2019-03-22

## 2019-03-22 PROBLEM — R00.2 PALPITATIONS: Status: RESOLVED | Noted: 2018-09-14 | Resolved: 2019-03-22

## 2019-03-22 PROCEDURE — 99214 OFFICE O/P EST MOD 30 MIN: CPT | Performed by: INTERNAL MEDICINE

## 2019-03-22 RX ORDER — LOSARTAN POTASSIUM 50 MG/1
50 TABLET ORAL DAILY
Qty: 90 TABLET | Refills: 3 | Status: SHIPPED | OUTPATIENT
Start: 2019-03-22 | End: 2020-03-16

## 2019-03-22 NOTE — PROGRESS NOTES
"Subjective  Vesta Landry is a 73 y.o. female    HPI coming in accompanied by her daughter has had significant short-term memory deficit noted lately.  No localized weakness or numbness no alcohol use no recent change in meds has had chronic lower extremity edema, hypertension  Recent history of right foot pain with lab work and clinical findings suggestive of gout since then she has been taken off HCTZ    The following portions of the patient's history were reviewed and updated as appropriate: allergies, current medications, past family history, past medical history, past social history, past surgical history, and problem list.     Review of Systems   Constitutional: Negative.  Negative for activity change, appetite change, fatigue and fever.   HENT: Negative for congestion, ear discharge, ear pain and trouble swallowing.    Eyes: Negative for photophobia and visual disturbance.   Respiratory: Negative for cough and shortness of breath.    Cardiovascular: Positive for leg swelling. Negative for chest pain and palpitations.   Gastrointestinal: Negative for abdominal distention, abdominal pain, constipation, diarrhea, nausea and vomiting.   Endocrine: Negative.    Genitourinary: Negative for dysuria, hematuria and urgency.   Musculoskeletal: Positive for arthralgias. Negative for back pain, joint swelling and myalgias.   Skin: Negative for color change and rash.   Allergic/Immunologic: Negative.    Neurological: Negative for dizziness, weakness, light-headedness and headaches.   Hematological: Negative for adenopathy. Does not bruise/bleed easily.   Psychiatric/Behavioral: Positive for confusion. Negative for agitation and dysphoric mood. The patient is nervous/anxious.        Visit Vitals  /79 Comment: AUTOMATIC   Pulse 58   Temp 97.7 °F (36.5 °C) (Temporal)   Resp 16   Ht 154.9 cm (61\")   Wt 78 kg (172 lb)   SpO2 98%   BMI 32.50 kg/m²       Objective  Physical Exam   Constitutional: She is oriented to " person, place, and time. She appears well-developed and well-nourished. No distress.   HENT:   Nose: Nose normal.   Mouth/Throat: Oropharynx is clear and moist.   Eyes: Conjunctivae and EOM are normal. No scleral icterus.   Neck: No tracheal deviation present. No thyromegaly present.   Cardiovascular: Normal rate and regular rhythm. Exam reveals no friction rub.   No murmur heard.  Pulmonary/Chest: No respiratory distress. She has no wheezes. She has no rales.   Abdominal: Soft. She exhibits no distension and no mass. There is no tenderness. There is no guarding.   Musculoskeletal: Normal range of motion. She exhibits edema and deformity.   Lymphadenopathy:     She has no cervical adenopathy.   Neurological: She is alert and oriented to person, place, and time. She has normal reflexes. No cranial nerve deficit. Coordination normal.   Skin: Skin is warm and dry. No rash noted. No erythema.   Psychiatric: She has a normal mood and affect. Her behavior is normal. Judgment normal.       Diagnoses and all orders for this visit:    Idiopathic chronic gout of right foot without tophus currently asymptomatic will use NSAIDs as needed only.  This was her first episode will hold off on prophylactic treatment with allopurinol.  Especially because we have discontinued HCTZ    Essential hypertension stable on metoprolol and losartan dose increased to 50 mg    Chronic diastolic congestive heart failure (CMS/HCC) without evidence of fluid overload Lasix as needed    Memory loss B12 level okay check CT of the head without contrast suspect Alzheimer type dementia    Other orders  -     Misc Natural Products (BLACK CHERRY CONCENTRATE PO); Take  by mouth.

## 2019-03-29 ENCOUNTER — HOSPITAL ENCOUNTER (OUTPATIENT)
Dept: CT IMAGING | Facility: HOSPITAL | Age: 74
Discharge: HOME | End: 2019-03-29
Admitting: INTERNAL MEDICINE

## 2019-03-29 DIAGNOSIS — R41.3 MEMORY LOSS: ICD-10-CM

## 2019-03-29 PROCEDURE — 70450 CT HEAD/BRAIN W/O DYE: CPT

## 2019-04-02 RX ORDER — DONEPEZIL HYDROCHLORIDE 5 MG/1
5 TABLET, ORALLY DISINTEGRATING ORAL NIGHTLY
Qty: 30 TABLET | Refills: 5 | Status: SHIPPED | OUTPATIENT
Start: 2019-04-02 | End: 2019-12-06

## 2019-04-04 RX ORDER — ATORVASTATIN CALCIUM 10 MG/1
TABLET, FILM COATED ORAL
Qty: 90 TABLET | Refills: 1 | Status: SHIPPED | OUTPATIENT
Start: 2019-04-04 | End: 2019-10-22 | Stop reason: SDUPTHER

## 2019-04-09 ENCOUNTER — TELEPHONE (OUTPATIENT)
Dept: INTERNAL MEDICINE | Facility: CLINIC | Age: 74
End: 2019-04-09

## 2019-04-09 NOTE — TELEPHONE ENCOUNTER
Patient daughter called asking if the benefits outway the side effects of the aricept. She states her mother is concerned about taking this.

## 2019-04-11 ENCOUNTER — TELEPHONE (OUTPATIENT)
Dept: INTERNAL MEDICINE | Facility: CLINIC | Age: 74
End: 2019-04-11

## 2019-05-17 ENCOUNTER — OFFICE VISIT (OUTPATIENT)
Dept: INTERNAL MEDICINE | Facility: CLINIC | Age: 74
End: 2019-05-17

## 2019-05-17 VITALS
OXYGEN SATURATION: 98 % | WEIGHT: 167 LBS | TEMPERATURE: 98.4 F | DIASTOLIC BLOOD PRESSURE: 76 MMHG | SYSTOLIC BLOOD PRESSURE: 134 MMHG | HEIGHT: 61 IN | BODY MASS INDEX: 31.53 KG/M2 | HEART RATE: 82 BPM

## 2019-05-17 DIAGNOSIS — J06.9 ACUTE URI: Primary | ICD-10-CM

## 2019-05-17 PROCEDURE — 99213 OFFICE O/P EST LOW 20 MIN: CPT | Performed by: PHYSICIAN ASSISTANT

## 2019-05-17 RX ORDER — ESCITALOPRAM OXALATE 10 MG/1
TABLET ORAL
Refills: 0 | COMMUNITY
Start: 2019-05-16 | End: 2020-12-14

## 2019-05-17 RX ORDER — AMOXICILLIN 875 MG/1
875 TABLET, COATED ORAL 2 TIMES DAILY
Qty: 14 TABLET | Refills: 0 | Status: SHIPPED | OUTPATIENT
Start: 2019-05-17 | End: 2019-05-24

## 2019-05-17 RX ORDER — TRIAMTERENE AND HYDROCHLOROTHIAZIDE 75; 50 MG/1; MG/1
TABLET ORAL
Refills: 0 | COMMUNITY
Start: 2019-04-30 | End: 2019-06-24

## 2019-05-17 RX ORDER — DEXTROMETHORPHAN HYDROBROMIDE AND PROMETHAZINE HYDROCHLORIDE 15; 6.25 MG/5ML; MG/5ML
5 SYRUP ORAL NIGHTLY PRN
Qty: 118 ML | Refills: 0 | Status: SHIPPED | OUTPATIENT
Start: 2019-05-17 | End: 2019-05-22

## 2019-05-28 RX ORDER — FUROSEMIDE 40 MG/1
TABLET ORAL
Qty: 90 TABLET | Refills: 2 | OUTPATIENT
Start: 2019-05-28

## 2019-06-04 RX ORDER — FUROSEMIDE 40 MG/1
TABLET ORAL
Qty: 90 TABLET | Refills: 1 | Status: SHIPPED | OUTPATIENT
Start: 2019-06-04 | End: 2019-11-26 | Stop reason: SDUPTHER

## 2019-06-24 ENCOUNTER — OFFICE VISIT (OUTPATIENT)
Dept: INTERNAL MEDICINE | Facility: CLINIC | Age: 74
End: 2019-06-24

## 2019-06-24 VITALS
WEIGHT: 170.8 LBS | HEIGHT: 61 IN | RESPIRATION RATE: 18 BRPM | OXYGEN SATURATION: 96 % | HEART RATE: 52 BPM | DIASTOLIC BLOOD PRESSURE: 69 MMHG | SYSTOLIC BLOOD PRESSURE: 129 MMHG | TEMPERATURE: 96.2 F | BODY MASS INDEX: 32.25 KG/M2

## 2019-06-24 DIAGNOSIS — F02.80 LATE ONSET ALZHEIMER'S DISEASE WITHOUT BEHAVIORAL DISTURBANCE (HCC): ICD-10-CM

## 2019-06-24 DIAGNOSIS — R60.0 BILATERAL EDEMA OF LOWER EXTREMITY: ICD-10-CM

## 2019-06-24 DIAGNOSIS — I10 ESSENTIAL HYPERTENSION: Primary | ICD-10-CM

## 2019-06-24 DIAGNOSIS — G30.1 LATE ONSET ALZHEIMER'S DISEASE WITHOUT BEHAVIORAL DISTURBANCE (HCC): ICD-10-CM

## 2019-06-24 LAB
BUN SERPL-MCNC: 24 MG/DL (ref 7–20)
BUN/CREAT SERPL: 21.8 (ref 7.1–23.5)
CALCIUM SERPL-MCNC: 8.7 MG/DL (ref 8.4–10.2)
CHLORIDE SERPL-SCNC: 98 MMOL/L (ref 98–107)
CO2 SERPL-SCNC: 29 MMOL/L (ref 26–30)
CREAT SERPL-MCNC: 1.1 MG/DL (ref 0.6–1.3)
GLUCOSE SERPL-MCNC: 87 MG/DL (ref 74–98)
POTASSIUM SERPL-SCNC: 4.3 MMOL/L (ref 3.5–5.1)
SODIUM SERPL-SCNC: 137 MMOL/L (ref 137–145)

## 2019-06-24 PROCEDURE — 99214 OFFICE O/P EST MOD 30 MIN: CPT | Performed by: INTERNAL MEDICINE

## 2019-06-24 NOTE — PROGRESS NOTES
"Subjective  Vesta Landry is a 74 y.o. female    HPI coming in for follow-up patient with hypertension recent diagnosis of dementia Alzheimer type has some anxiety she is behaviorally well controlled has complains of lower extremity edema for which she is on Lasix along with potassium supplement    The following portions of the patient's history were reviewed and updated as appropriate: allergies, current medications, past family history, past medical history, past social history, past surgical history, and problem list.     Review of Systems   Constitutional: Positive for fatigue. Negative for activity change, appetite change and fever.   HENT: Negative for congestion, ear discharge, ear pain and trouble swallowing.    Eyes: Negative for photophobia and visual disturbance.   Respiratory: Negative for cough and shortness of breath.    Cardiovascular: Negative for chest pain and palpitations.   Gastrointestinal: Negative for abdominal distention, abdominal pain, constipation, diarrhea, nausea and vomiting.   Endocrine: Negative.    Genitourinary: Negative for dysuria, hematuria and urgency.   Musculoskeletal: Positive for arthralgias. Negative for back pain, joint swelling and myalgias.   Skin: Negative for color change and rash.   Allergic/Immunologic: Negative.    Neurological: Negative for dizziness, weakness, light-headedness and headaches.   Hematological: Negative for adenopathy. Does not bruise/bleed easily.   Psychiatric/Behavioral: Positive for confusion. Negative for agitation and dysphoric mood. The patient is not nervous/anxious.        Visit Vitals  /69   Pulse 52   Temp 96.2 °F (35.7 °C)   Resp 18   Ht 154.9 cm (60.98\")   Wt 77.5 kg (170 lb 12.8 oz)   SpO2 96%   BMI 32.29 kg/m²       Objective  Physical Exam   Constitutional: She is oriented to person, place, and time. She appears well-developed and well-nourished. No distress.   HENT:   Nose: Nose normal.   Mouth/Throat: Oropharynx is " clear and moist.   Eyes: Conjunctivae and EOM are normal. No scleral icterus.   Neck: No tracheal deviation present. No thyromegaly present.   Cardiovascular: Normal rate and regular rhythm. Exam reveals no friction rub.   No murmur heard.  Pulmonary/Chest: No respiratory distress. She has no wheezes. She has no rales.   Abdominal: Soft. She exhibits no distension and no mass. There is no tenderness. There is no guarding.   Musculoskeletal: Normal range of motion. She exhibits edema. She exhibits no deformity.   Lymphadenopathy:     She has no cervical adenopathy.   Neurological: She is alert and oriented to person, place, and time. She has normal reflexes. No cranial nerve deficit. Coordination normal.   Skin: Skin is warm and dry. No rash noted. No erythema.   Psychiatric: She has a normal mood and affect. Her behavior is normal. Judgment and thought content normal.       Diagnoses and all orders for this visit:    Essential hypertension stable with current meds and low-salt diet    Bilateral edema of lower extremity secondary to venous stasis continue with compression stockings leg elevation Lasix as needed check BMP today    Late onset Alzheimer's disease without behavioral disturbance stable tolerating low-dose donepezil

## 2019-09-03 RX ORDER — POTASSIUM CHLORIDE 20 MEQ/1
TABLET, EXTENDED RELEASE ORAL
Qty: 180 TABLET | Refills: 0 | Status: SHIPPED | OUTPATIENT
Start: 2019-09-03 | End: 2019-11-30 | Stop reason: SDUPTHER

## 2019-09-13 ENCOUNTER — OFFICE VISIT (OUTPATIENT)
Dept: CARDIOLOGY | Facility: CLINIC | Age: 74
End: 2019-09-13

## 2019-09-13 VITALS
SYSTOLIC BLOOD PRESSURE: 130 MMHG | BODY MASS INDEX: 31.38 KG/M2 | HEIGHT: 61 IN | WEIGHT: 166.2 LBS | DIASTOLIC BLOOD PRESSURE: 82 MMHG | HEART RATE: 45 BPM | OXYGEN SATURATION: 97 %

## 2019-09-13 DIAGNOSIS — E78.00 PURE HYPERCHOLESTEROLEMIA: Primary | ICD-10-CM

## 2019-09-13 DIAGNOSIS — I10 ESSENTIAL HYPERTENSION: ICD-10-CM

## 2019-09-13 DIAGNOSIS — I50.32 CHRONIC DIASTOLIC CONGESTIVE HEART FAILURE (HCC): ICD-10-CM

## 2019-09-13 PROCEDURE — 99213 OFFICE O/P EST LOW 20 MIN: CPT | Performed by: NURSE PRACTITIONER

## 2019-09-13 RX ORDER — OMEPRAZOLE 20 MG/1
20 CAPSULE, DELAYED RELEASE ORAL DAILY
Refills: 2 | COMMUNITY
Start: 2019-08-31 | End: 2020-02-24

## 2019-09-13 NOTE — PROGRESS NOTES
Vesta Landry is a 74 y.o. female.  MRN #: [id number]    Referring Provider: Bossman Pedraza MD    Chief Complaint:   Chief Complaint   Patient presents with   • Follow-up   • Hypertension        History of Present Illness:  74-year-old female presents for her routine cardiac follow-up.  Patient has history of essential hypertension.  Patient underwent a myocardial perfusion stress test in August 2017 which did not indicate any EKG or myocardial perfusion findings suggestive of ischemia.  With today's visit patient denies any episodes of chest pain, pressure with radiation to her neck jaw or arms, unusual shortness of breath, orthopnea, weight gain or fluid retention.    The patient presents today with their self who contributes to the history of their care.     The following portions of the patient's history were reviewed and updated as appropriate: allergies, current medications, past family history, past medical history, past social history, past surgical history and problem list.     Review of Systems:     Review of Systems   Constitutional: Positive for fatigue. Negative for activity change, appetite change, diaphoresis, unexpected weight gain and unexpected weight loss.   HENT: Negative.    Eyes: Negative.  Negative for blurred vision, double vision and visual disturbance.   Respiratory: Positive for shortness of breath. Negative for apnea, cough, chest tightness and wheezing.    Cardiovascular: Positive for leg swelling. Negative for chest pain and palpitations.   Gastrointestinal: Negative.  Negative for abdominal distention, abdominal pain, nausea, vomiting, GERD and indigestion.   Endocrine: Negative.    Genitourinary: Negative.  Negative for decreased libido and hematuria.   Musculoskeletal: Positive for arthralgias and myalgias. Negative for back pain, joint swelling, neck pain and neck stiffness.   Skin: Negative.  Negative for pallor and bruise.   Neurological: Negative.  Negative for dizziness,  "tremors, syncope, facial asymmetry, speech difficulty, weakness, light-headedness and numbness.   Hematological: Negative.  Does not bruise/bleed easily.   Psychiatric/Behavioral: Negative.  Negative for agitation and stress. The patient is not nervous/anxious.    All other systems reviewed and are negative.         Current Outpatient Medications:   •  aspirin 81 MG tablet, Take 1 tablet by mouth Daily., Disp: 30 tablet, Rfl: 11  •  atorvastatin (LIPITOR) 10 MG tablet, take 1 tablet by mouth once daily, Disp: 90 tablet, Rfl: 1  •  furosemide (LASIX) 40 MG tablet, TAKE 1 TABLET BY MOUTH ONCE DAILY, Disp: 90 tablet, Rfl: 1  •  losartan (COZAAR) 50 MG tablet, Take 1 tablet by mouth Daily., Disp: 90 tablet, Rfl: 3  •  metoprolol tartrate (LOPRESSOR) 50 MG tablet, take 1 tablet by mouth once daily, Disp: 90 tablet, Rfl: 3  •  omeprazole (priLOSEC) 20 MG capsule, Take 20 mg by mouth Daily., Disp: , Rfl: 2  •  potassium chloride (K-DUR,KLOR-CON) 20 MEQ CR tablet, TAKE 1 TABLET BY MOUTH TWICE A DAY, Disp: 180 tablet, Rfl: 0  •  donepezil ODT (ARICEPT ODT) 5 MG disintegrating tablet, Take 1 tablet by mouth Every Night., Disp: 30 tablet, Rfl: 5  •  escitalopram (LEXAPRO) 10 MG tablet, , Disp: , Rfl: 0  •  Misc Natural Products (BLACK CHERRY CONCENTRATE PO), Take  by mouth., Disp: , Rfl:     Vitals:    09/13/19 0937   BP: 130/82   BP Location: Right arm   Patient Position: Sitting   Cuff Size: Small Adult   Pulse: (!) 45   SpO2: 97%   Weight: 75.4 kg (166 lb 3.2 oz)   Height: 154.9 cm (61\")       Physical Exam:     Physical Exam   Constitutional: She is oriented to person, place, and time. She appears well-developed and well-nourished. No distress.   HENT:   Head: Normocephalic and atraumatic.   Eyes: Conjunctivae and EOM are normal. Pupils are equal, round, and reactive to light. No scleral icterus.   Neck: Trachea normal, normal range of motion and full passive range of motion without pain. Neck supple. No JVD present. " Carotid bruit is not present. No thyromegaly present.   Cardiovascular: Normal rate, regular rhythm, S1 normal, S2 normal, intact distal pulses and normal pulses. PMI is not displaced. Exam reveals no gallop and no friction rub.   Murmur heard.   Crescendo decrescendo systolic murmur is present with a grade of 3/6.  Pulmonary/Chest: Effort normal and breath sounds normal. No respiratory distress. She has no wheezes. She has no rales. She exhibits no tenderness.   Abdominal: Soft. Bowel sounds are normal. She exhibits no mass. There is no tenderness.   Musculoskeletal: Normal range of motion. She exhibits no edema.   Distal neurovascular checks are intact   Neurological: She is alert and oriented to person, place, and time.   Skin: Skin is warm and dry. Capillary refill takes less than 2 seconds. No rash noted. She is not diaphoretic.   Psychiatric: She has a normal mood and affect. Her behavior is normal. Judgment and thought content normal.   Nursing note and vitals reviewed.      Procedures    Results:       Assessment/Plan:   Instructed patient on continuation of fluid restriction to not exceed 1500 mL per 24 hours and continued salt/sodium restriction not to exceed 1500 mg per 24 hours.  No medication changes will be made.  No cardiac testing is indicated with this visit.  Her primary care physician monitors and manages her hypercholesterolemia.  Vesta was seen today for follow-up and hypertension.    Diagnoses and all orders for this visit:    Pure hypercholesterolemia    Essential hypertension    Chronic diastolic congestive heart failure (CMS/HCC)        Return in about 1 year (around 9/13/2020).    LADARIUS Hinds

## 2019-10-21 ENCOUNTER — TELEPHONE (OUTPATIENT)
Dept: INTERNAL MEDICINE | Facility: CLINIC | Age: 74
End: 2019-10-21

## 2019-10-21 NOTE — TELEPHONE ENCOUNTER
I spoke with Meliza and let her know that most insurances will not require a referral I gave her the number to Dr. De León in [place]       (Vesta is wanting the referral for spot on side of little toe - right foot)

## 2019-10-21 NOTE — TELEPHONE ENCOUNTER
Patient's daughter (Meliza on YUE) called to see if it would be possible to get a Podiatry referral going for herself and the patient. Patient was last seen on 06/24/19 by Dr. Pedraza.    Please call and advise.

## 2019-10-22 RX ORDER — ATORVASTATIN CALCIUM 10 MG/1
TABLET, FILM COATED ORAL
Qty: 90 TABLET | Refills: 3 | Status: SHIPPED | OUTPATIENT
Start: 2019-10-22 | End: 2020-10-09

## 2019-11-26 RX ORDER — FUROSEMIDE 40 MG/1
TABLET ORAL
Qty: 90 TABLET | Refills: 3 | Status: SHIPPED | OUTPATIENT
Start: 2019-11-26 | End: 2021-01-07 | Stop reason: SDUPTHER

## 2019-12-02 RX ORDER — POTASSIUM CHLORIDE 20 MEQ/1
TABLET, EXTENDED RELEASE ORAL
Qty: 180 TABLET | Refills: 3 | Status: SHIPPED | OUTPATIENT
Start: 2019-12-02 | End: 2023-02-22

## 2019-12-06 ENCOUNTER — OFFICE VISIT (OUTPATIENT)
Dept: INTERNAL MEDICINE | Facility: CLINIC | Age: 74
End: 2019-12-06

## 2019-12-06 VITALS
WEIGHT: 167 LBS | OXYGEN SATURATION: 97 % | HEIGHT: 61 IN | BODY MASS INDEX: 31.53 KG/M2 | HEART RATE: 63 BPM | DIASTOLIC BLOOD PRESSURE: 69 MMHG | SYSTOLIC BLOOD PRESSURE: 130 MMHG | TEMPERATURE: 98.4 F

## 2019-12-06 DIAGNOSIS — I10 ESSENTIAL HYPERTENSION: ICD-10-CM

## 2019-12-06 DIAGNOSIS — E78.00 PURE HYPERCHOLESTEROLEMIA: ICD-10-CM

## 2019-12-06 DIAGNOSIS — R41.3 MEMORY LOSS: Primary | ICD-10-CM

## 2019-12-06 DIAGNOSIS — I50.32 CHRONIC DIASTOLIC CONGESTIVE HEART FAILURE (HCC): ICD-10-CM

## 2019-12-06 PROCEDURE — 96160 PT-FOCUSED HLTH RISK ASSMT: CPT | Performed by: INTERNAL MEDICINE

## 2019-12-06 PROCEDURE — 99397 PER PM REEVAL EST PAT 65+ YR: CPT | Performed by: INTERNAL MEDICINE

## 2019-12-06 PROCEDURE — G0439 PPPS, SUBSEQ VISIT: HCPCS | Performed by: INTERNAL MEDICINE

## 2019-12-06 NOTE — PROGRESS NOTES
The ABCs of the Annual Wellness Visit  Subsequent Medicare Wellness Visit    Chief Complaint   Patient presents with   • Medicare Wellness-subsequent       Subjective   History of Present Illness:  Vesta Landry is a 74 y.o. female who presents for a Subsequent Medicare Wellness Visit.    HEALTH RISK ASSESSMENT    Recent Hospitalizations:  No hospitalization(s) within the last year.    Current Medical Providers:  Patient Care Team:  Bossman Pedraza MD as PCP - General  Bossman Pedraza MD as PCP - Family Medicine    Smoking Status:  Social History     Tobacco Use   Smoking Status Former Smoker   • Types: Cigarettes   • Last attempt to quit: 1987   • Years since quittin.9   Smokeless Tobacco Never Used       Alcohol Consumption:  Social History     Substance and Sexual Activity   Alcohol Use No       Depression Screen:   PHQ-2/PHQ-9 Depression Screening 2019   Little interest or pleasure in doing things 0   Feeling down, depressed, or hopeless 0   Trouble falling or staying asleep, or sleeping too much -   Feeling tired or having little energy -   Poor appetite or overeating -   Feeling bad about yourself - or that you are a failure or have let yourself or your family down -   Trouble concentrating on things, such as reading the newspaper or watching television -   Moving or speaking so slowly that other people could have noticed. Or the opposite - being so fidgety or restless that you have been moving around a lot more than usual -   Thoughts that you would be better off dead, or of hurting yourself in some way -   Total Score 0   If you checked off any problems, how difficult have these problems made it for you to do your work, take care of things at home, or get along with other people? -       Fall Risk Screen:  STEADI Fall Risk Assessment was completed, and patient is at HIGH risk for falls. Assessment completed on:2019    Health Habits and Functional and Cognitive  Screening:  Functional & Cognitive Status 12/6/2019   Do you have difficulty preparing food and eating? No   Do you have difficulty bathing yourself, getting dressed or grooming yourself? No   Do you have difficulty using the toilet? No   Do you have difficulty moving around from place to place? No   Do you have trouble with steps or getting out of a bed or a chair? No   Current Diet Well Balanced Diet   Dental Exam Not up to date   Eye Exam Up to date   Exercise (times per week) 0 times per week   Current Exercise Activities Include No Regular Exercise   Do you need help using the phone?  No   Are you deaf or do you have serious difficulty hearing?  No   Do you need help with transportation? No   Do you need help shopping? No   Do you need help preparing meals?  No   Do you need help with housework?  No   Do you need help with laundry? No   Do you need help taking your medications? No   Do you need help managing money? No   Do you ever drive or ride in a car without wearing a seat belt? No   Have you felt unusual stress, anger or loneliness in the last month? Yes   Who do you live with? Spouse   If you need help, do you have trouble finding someone available to you? No   Have you been bothered in the last four weeks by sexual problems? No   Do you have difficulty concentrating, remembering or making decisions? Yes         Does the patient have evidence of cognitive impairment? Yes    Asprin use counseling:Does not need ASA but is currently taking (advised patient that ASA is not indicated and patient chooses to stop it)    Age-appropriate Screening Schedule:  Refer to the list below for future screening recommendations based on patient's age, sex and/or medical conditions. Orders for these recommended tests are listed in the plan section. The patient has been provided with a written plan.    Health Maintenance   Topic Date Due   • LIPID PANEL  09/13/2019   • PNEUMOCOCCAL VACCINES (65+ LOW/MEDIUM RISK) (2 of 2 -  PPSV23) 06/24/2029 (Originally 7/29/2017)   • TDAP/TD VACCINES (1 - Tdap) 06/24/2029 (Originally 5/28/1964)   • ZOSTER VACCINE (2 of 2) 06/24/2029 (Originally 12/3/2012)   • MAMMOGRAM  09/04/2020   • COLONOSCOPY  10/19/2025   • INFLUENZA VACCINE  Completed          The following portions of the patient's history were reviewed and updated as appropriate: allergies, current medications, past family history, past medical history, past social history, past surgical history and problem list.    Outpatient Medications Prior to Visit   Medication Sig Dispense Refill   • aspirin 81 MG tablet Take 1 tablet by mouth Daily. 30 tablet 11   • atorvastatin (LIPITOR) 10 MG tablet TAKE 1 TABLET BY MOUTH ONCE DAILY 90 tablet 3   • escitalopram (LEXAPRO) 10 MG tablet   0   • furosemide (LASIX) 40 MG tablet TAKE 1 TABLET BY MOUTH ONCE DAILY 90 tablet 3   • losartan (COZAAR) 50 MG tablet Take 1 tablet by mouth Daily. 90 tablet 3   • metoprolol tartrate (LOPRESSOR) 50 MG tablet take 1 tablet by mouth once daily 90 tablet 3   • Misc Natural Products (BLACK CHERRY CONCENTRATE PO) Take  by mouth.     • omeprazole (priLOSEC) 20 MG capsule Take 20 mg by mouth Daily.  2   • potassium chloride (K-DUR,KLOR-CON) 20 MEQ CR tablet TAKE 1 TABLET BY MOUTH TWICE A  tablet 3   • donepezil ODT (ARICEPT ODT) 5 MG disintegrating tablet Take 1 tablet by mouth Every Night. 30 tablet 5     No facility-administered medications prior to visit.        Patient Active Problem List   Diagnosis   • Gastroesophageal reflux disease without esophagitis   • Pure hypercholesterolemia   • Essential hypertension   • Vitamin D deficiency   • Bilateral edema of lower extremity   • Dysthymia   • Chronic diastolic congestive heart failure (CMS/HCC)   • Idiopathic chronic gout of right foot without tophus       Advanced Care Planning:  Patient has an advance directive - a copy has not been provided. Have asked the patient to send this to us to add to record    Review  "of Systems   Constitutional: Negative.  Negative for activity change, appetite change, fatigue and fever.   HENT: Negative for congestion, ear discharge, ear pain and trouble swallowing.    Eyes: Negative for photophobia and visual disturbance.   Respiratory: Negative for cough and shortness of breath.    Cardiovascular: Negative for chest pain and palpitations.   Gastrointestinal: Negative for abdominal distention, abdominal pain, constipation, diarrhea, nausea and vomiting.   Endocrine: Negative.    Genitourinary: Negative for dysuria, hematuria and urgency.   Musculoskeletal: Positive for arthralgias and gait problem. Negative for back pain, joint swelling and myalgias.   Skin: Negative for color change and rash.   Allergic/Immunologic: Negative.    Neurological: Negative for dizziness, weakness, light-headedness and headaches.   Hematological: Negative for adenopathy. Does not bruise/bleed easily.   Psychiatric/Behavioral: Positive for confusion. Negative for agitation and dysphoric mood. The patient is not nervous/anxious.        Compared to one year ago, the patient feels her physical health is the same.  Compared to one year ago, the patient feels her mental health is the same.    Reviewed chart for potential of high risk medication in the elderly: yes  Reviewed chart for potential of harmful drug interactions in the elderly:yes    Objective         Vitals:    12/06/19 1302   BP: 130/69  Comment: Automatic   Pulse: 63   Temp: 98.4 °F (36.9 °C)   TempSrc: Temporal   SpO2: 97%   Weight: 75.8 kg (167 lb)   Height: 154.9 cm (61\")   PainSc: 0-No pain       Body mass index is 31.55 kg/m².  Discussed the patient's BMI with her. The BMI is above average; BMI management plan is completed.    Physical Exam   Constitutional: She is oriented to person, place, and time. She appears well-developed and well-nourished. No distress.   HENT:   Nose: Nose normal.   Mouth/Throat: Oropharynx is clear and moist.   Eyes: " Conjunctivae and EOM are normal. No scleral icterus.   Neck: No tracheal deviation present. No thyromegaly present.   Cardiovascular: Normal rate and regular rhythm. Exam reveals no friction rub.   No murmur heard.  Pulmonary/Chest: No respiratory distress. She has no wheezes. She has no rales.   Abdominal: Soft. She exhibits no distension and no mass. There is no tenderness. There is no guarding.   Musculoskeletal: Normal range of motion. She exhibits edema, tenderness and deformity.   Lymphadenopathy:     She has no cervical adenopathy.   Neurological: She is alert and oriented to person, place, and time. She has normal reflexes. No cranial nerve deficit. Coordination normal.   Skin: Skin is warm and dry. No rash noted. No erythema.   Psychiatric: She has a normal mood and affect. Her behavior is normal. Judgment normal.             Assessment/Plan   Medicare Risks and Personalized Health Plan  CMS Preventative Services Quick Reference  Advance Directive Discussion  Polypharmacy  Urinary Incontinence    The above risks/problems have been discussed with the patient.  Pertinent information has been shared with the patient in the After Visit Summary.  Follow up plans and orders are seen below in the Assessment/Plan Section.    Diagnoses and all orders for this visit:    1. Memory loss (Primary) stable was given a trial of Aricept which she did not tolerate well.  Sleep good behaviorally stable now    2. Essential hypertension stable with current meds    3. Pure hypercholesterolemia continue with the dietary restrictions and statins    4. Chronic diastolic congestive heart failure (CMS/HCC) stable on low-dose diuretic daily.  Discussed salt restriction      Follow Up:  No Follow-up on file.     An After Visit Summary and PPPS were given to the patient.

## 2019-12-06 NOTE — PATIENT INSTRUCTIONS
Medicare Wellness  Personal Prevention Plan of Service     Date of Office Visit:  2019  Encounter Provider:  Bossman Pedraza MD  Place of Service:  [facility]  Patient Name: Vesta Landry  :  1945    As part of the Medicare Wellness portion of your visit today, we are providing you with this personalized preventive plan of services (PPPS). This plan is based upon recommendations of the [place] Preventive Services Task Force (USPSTF) and the Advisory Committee on Immunization Practices (ACIP).    This lists the preventive care services that should be considered, and provides dates of when you are due. Items listed as completed are up-to-date and do not require any further intervention.    Health Maintenance   Topic Date Due   • LIPID PANEL  2019   • PNEUMOCOCCAL VACCINES (65+ LOW/MEDIUM RISK) (2 of 2 - PPSV23) 2029 (Originally 2017)   • TDAP/TD VACCINES (1 - Tdap) 2029 (Originally 1962)   • ZOSTER VACCINE (2 of 2) 2029 (Originally 12/3/2012)   • MAMMOGRAM  2020   • MEDICARE ANNUAL WELLNESS  2020   • COLONOSCOPY  10/19/2025   • INFLUENZA VACCINE  Completed       No orders of the defined types were placed in this encounter.      No Follow-up on file.

## 2020-01-03 ENCOUNTER — OFFICE VISIT (OUTPATIENT)
Dept: INTERNAL MEDICINE | Facility: CLINIC | Age: 75
End: 2020-01-03

## 2020-01-03 VITALS
DIASTOLIC BLOOD PRESSURE: 50 MMHG | WEIGHT: 167 LBS | BODY MASS INDEX: 31.53 KG/M2 | TEMPERATURE: 98.6 F | HEIGHT: 61 IN | RESPIRATION RATE: 15 BRPM | OXYGEN SATURATION: 97 % | SYSTOLIC BLOOD PRESSURE: 126 MMHG | HEART RATE: 72 BPM

## 2020-01-03 DIAGNOSIS — R94.4 DECREASED GFR: ICD-10-CM

## 2020-01-03 DIAGNOSIS — M10.9 ACUTE GOUT INVOLVING TOE OF RIGHT FOOT, UNSPECIFIED CAUSE: ICD-10-CM

## 2020-01-03 DIAGNOSIS — I10 ESSENTIAL HYPERTENSION: Primary | ICD-10-CM

## 2020-01-03 PROCEDURE — 99214 OFFICE O/P EST MOD 30 MIN: CPT | Performed by: NURSE PRACTITIONER

## 2020-01-03 RX ORDER — COLCHICINE 0.6 MG/1
TABLET ORAL
Qty: 3 TABLET | Refills: 0 | Status: SHIPPED | OUTPATIENT
Start: 2020-01-03 | End: 2020-12-14

## 2020-01-03 NOTE — PROGRESS NOTES
Chief Complaint / Reason:      Chief Complaint   Patient presents with   • Gout     right foot pain       Subjective     HPI  Patient presents today with complaints of right foot pain and states that she had previously had count and was prescribed NSAIDs.  Patient did have uric acid level back in 2019 which was elevated.  She states that she has tried to avoid certain foods which has provided minimal relief.  She denies numbness tingling injury or trauma.  She states that symptoms have been going on for several days and has progressively worsened over the past 24 hours.  She states that the toe is hot swollen and hurts to move or even walk on.  Patient's last GFR was 48 and it was checked 2019 and BUN was 24.  Patient is on Lasix.  Patient states that she has not been drinking a lot of water recently.  History taken from: patient    PMH/FH/Social History were reviewed and updated appropriately in the electronic medical record.   Past Medical History:   Diagnosis Date   • Arthritis    • Deep vein thrombosis (CMS/HCC)     bilateral   • Dysphagia    • Hyperlipidemia    • Hypertension    • Vitamin D deficiency      Past Surgical History:   Procedure Laterality Date   • APPENDECTOMY     • CATARACT EXTRACTION     • CHOLECYSTECTOMY     • COLONOSCOPY      Approx    • HYSTERECTOMY     • OOPHORECTOMY Bilateral    • TUBAL ABDOMINAL LIGATION       Social History     Socioeconomic History   • Marital status:      Spouse name: Not on file   • Number of children: Not on file   • Years of education: Not on file   • Highest education level: Not on file   Tobacco Use   • Smoking status: Former Smoker     Types: Cigarettes     Last attempt to quit: 1987     Years since quittin.0   • Smokeless tobacco: Never Used   Substance and Sexual Activity   • Alcohol use: No   • Drug use: No     Family History   Problem Relation Age of Onset   • Heart failure Mother    • Hyperlipidemia Mother    •  Hypertension Mother    • Cancer Father    • Cancer Sister    • Stroke Sister    • Breast cancer Maternal Aunt    • Breast cancer Paternal Aunt    • Ovarian cancer Neg Hx        Review of Systems:   Review of Systems   Constitutional: Positive for activity change and fatigue.   Respiratory: Negative.    Cardiovascular: Negative.    Musculoskeletal: Positive for arthralgias, gait problem and joint swelling.   Skin: Positive for color change.   Psychiatric/Behavioral: Positive for sleep disturbance and stress.         All other systems were reviewed and are negative.  Exceptions are noted in the subjective or above.      Objective     Vital Signs  Vitals:    01/03/20 1147   BP: 126/50   Pulse: 72   Resp: 15   Temp: 98.6 °F (37 °C)   SpO2: 97%       Body mass index is 31.55 kg/m².    Physical Exam   Constitutional: She is oriented to person, place, and time. She appears well-developed and well-nourished.   Cardiovascular: Normal rate, regular rhythm and intact distal pulses.   Murmur heard.  Pulmonary/Chest: Effort normal and breath sounds normal.   Abdominal: Soft. Bowel sounds are normal.   Musculoskeletal: She exhibits edema and tenderness.        Right foot: There is bony tenderness and swelling.        Neurological: She is alert and oriented to person, place, and time. Coordination normal.   Skin: Skin is warm and dry. Capillary refill takes less than 2 seconds. There is erythema.   Psychiatric: She has a normal mood and affect. Her behavior is normal. Judgment and thought content normal.   Nursing note and vitals reviewed.           Results Review:    I reviewed the patient's previous clinical results.       Medication Review:     Current Outpatient Medications:   •  atorvastatin (LIPITOR) 10 MG tablet, TAKE 1 TABLET BY MOUTH ONCE DAILY, Disp: 90 tablet, Rfl: 3  •  escitalopram (LEXAPRO) 10 MG tablet, , Disp: , Rfl: 0  •  furosemide (LASIX) 40 MG tablet, TAKE 1 TABLET BY MOUTH ONCE DAILY, Disp: 90 tablet, Rfl:  3  •  losartan (COZAAR) 50 MG tablet, Take 1 tablet by mouth Daily., Disp: 90 tablet, Rfl: 3  •  metoprolol tartrate (LOPRESSOR) 50 MG tablet, take 1 tablet by mouth once daily, Disp: 90 tablet, Rfl: 3  •  Misc Natural Products (BLACK CHERRY CONCENTRATE PO), Take  by mouth., Disp: , Rfl:   •  omeprazole (priLOSEC) 20 MG capsule, Take 20 mg by mouth Daily., Disp: , Rfl: 2  •  potassium chloride (K-DUR,KLOR-CON) 20 MEQ CR tablet, TAKE 1 TABLET BY MOUTH TWICE A DAY, Disp: 180 tablet, Rfl: 3  •  colchicine 0.6 MG tablet, Loading dose take two tablets then wait one hour and take one tablet for a total of 1.8mg, Disp: 3 tablet, Rfl: 0  •  diclofenac (VOLTAREN) 1 % gel gel, Apply 4 g topically to the appropriate area as directed 4 (Four) Times a Day As Needed (joint pain)., Disp: 100 g, Rfl: 0    Assessment/Plan   Vesta was seen today for gout.    Diagnoses and all orders for this visit:    Essential hypertension  stAble without worsening signs and symptoms  Acute gout involving toe of right foot, unspecified cause  -     colchicine 0.6 MG tablet; Loading dose take two tablets then wait one hour and take one tablet for a total of 1.8mg  -     diclofenac (VOLTAREN) 1 % gel gel; Apply 4 g topically to the appropriate area as directed 4 (Four) Times a Day As Needed (joint pain).  Discussed nonpharmacological interventions and recommend dietary modifications hydration and advised patient to apply ice to decrease swelling and help relieve pain.  Decreased GFR  Advised patient to maintain hydration and discussed medications that decrease GFR.  Recommend repeating labs    Return if symptoms worsen or fail to improve.    Susan Amezquita, APRN  01/03/2020

## 2020-02-24 RX ORDER — OMEPRAZOLE 20 MG/1
CAPSULE, DELAYED RELEASE ORAL
Qty: 90 CAPSULE | Refills: 3 | Status: SHIPPED | OUTPATIENT
Start: 2020-02-24 | End: 2021-03-15 | Stop reason: SDUPTHER

## 2020-02-24 RX ORDER — METOPROLOL TARTRATE 50 MG/1
TABLET, FILM COATED ORAL
Qty: 90 TABLET | Refills: 3 | Status: SHIPPED | OUTPATIENT
Start: 2020-02-24 | End: 2021-03-15 | Stop reason: SDUPTHER

## 2020-03-16 RX ORDER — LOSARTAN POTASSIUM 50 MG/1
TABLET ORAL
Qty: 90 TABLET | Refills: 3 | Status: SHIPPED | OUTPATIENT
Start: 2020-03-16 | End: 2021-03-15 | Stop reason: SDUPTHER

## 2020-09-09 ENCOUNTER — TELEPHONE (OUTPATIENT)
Dept: INTERNAL MEDICINE | Facility: CLINIC | Age: 75
End: 2020-09-09

## 2020-09-09 NOTE — TELEPHONE ENCOUNTER
PATIENT'S DAUGHTER (MATTI) WAS CALLING TO SEE ABOUT GETTING A REFERRAL FOR HER MOTHER TO HAVE A MAMMOGRAM. IF ONE CAN BE CONDUCTED IN OR NEAR FACILITY THAT IS PREFERRED. PLEASE ADVISE.     CONTACT: [phone]

## 2020-09-10 NOTE — TELEPHONE ENCOUNTER
Patient requesting mammogram for breast concerns - I put her on the schedule for evaluation - at her request I scheduled her with [facility]

## 2020-09-11 ENCOUNTER — OFFICE VISIT (OUTPATIENT)
Dept: INTERNAL MEDICINE | Facility: CLINIC | Age: 75
End: 2020-09-11

## 2020-09-11 VITALS
WEIGHT: 158 LBS | SYSTOLIC BLOOD PRESSURE: 147 MMHG | OXYGEN SATURATION: 99 % | DIASTOLIC BLOOD PRESSURE: 54 MMHG | RESPIRATION RATE: 15 BRPM | BODY MASS INDEX: 29.83 KG/M2 | HEIGHT: 61 IN | HEART RATE: 56 BPM | TEMPERATURE: 97.1 F

## 2020-09-11 DIAGNOSIS — Z23 NEED FOR INFLUENZA VACCINATION: ICD-10-CM

## 2020-09-11 DIAGNOSIS — N63.11 BREAST LUMP ON RIGHT SIDE AT 10 O'CLOCK POSITION: ICD-10-CM

## 2020-09-11 DIAGNOSIS — N64.4 BREAST PAIN IN FEMALE: ICD-10-CM

## 2020-09-11 DIAGNOSIS — I10 ESSENTIAL HYPERTENSION: ICD-10-CM

## 2020-09-11 DIAGNOSIS — N63.25 BREAST LUMP ON LEFT SIDE AT 6 O'CLOCK POSITION: Primary | ICD-10-CM

## 2020-09-11 DIAGNOSIS — N63.25 BREAST LUMP ON LEFT SIDE AT 3 O'CLOCK POSITION: ICD-10-CM

## 2020-09-11 PROCEDURE — 90694 VACC AIIV4 NO PRSRV 0.5ML IM: CPT | Performed by: NURSE PRACTITIONER

## 2020-09-11 PROCEDURE — G0008 ADMIN INFLUENZA VIRUS VAC: HCPCS | Performed by: NURSE PRACTITIONER

## 2020-09-11 PROCEDURE — 99214 OFFICE O/P EST MOD 30 MIN: CPT | Performed by: NURSE PRACTITIONER

## 2020-09-11 NOTE — PROGRESS NOTES
Chief Complaint / Reason:      Chief Complaint   Patient presents with   • Breast Pain     left- wants mammogram       Subjective     HPI  Patient presents today with complaints of left breast pain and states that she did have a red area about a week ago underneath her nipple but denies fever or chills denies any abnormal dimpling or discharge from either breast.  She does have a family history of breast cancer on both sides.  Patient denies chest pain, shortness of breath or heart palpitations.  Patient is not up-to-date on mammogram and does have a history of fibrocystic breast noted on previous mammograms.  Last mammogram was in 2018.  Blood pressure is elevated.  History taken from: patient    PMH/FH/Social History were reviewed and updated appropriately in the electronic medical record.   Past Medical History:   Diagnosis Date   • Arthritis    • Deep vein thrombosis (CMS/HCC)     bilateral   • Dysphagia    • Hyperlipidemia    • Hypertension    • Vitamin D deficiency      Past Surgical History:   Procedure Laterality Date   • APPENDECTOMY     • CATARACT EXTRACTION     • CHOLECYSTECTOMY     • COLONOSCOPY      Approx    • HYSTERECTOMY     • OOPHORECTOMY Bilateral    • TUBAL ABDOMINAL LIGATION       Social History     Socioeconomic History   • Marital status:      Spouse name: Not on file   • Number of children: Not on file   • Years of education: Not on file   • Highest education level: Not on file   Tobacco Use   • Smoking status: Former Smoker     Types: Cigarettes     Quit date: 1987     Years since quittin.7   • Smokeless tobacco: Never Used   Substance and Sexual Activity   • Alcohol use: No   • Drug use: No     Family History   Problem Relation Age of Onset   • Heart failure Mother    • Hyperlipidemia Mother    • Hypertension Mother    • Cancer Father    • Cancer Sister    • Stroke Sister    • Breast cancer Maternal Aunt    • Breast cancer Paternal Aunt    • Ovarian cancer Neg  Hx        Review of Systems:   Review of Systems   Constitutional: Positive for fatigue.   Respiratory: Negative.    Cardiovascular: Negative.    Genitourinary: Positive for breast lump and breast pain.   Musculoskeletal: Positive for arthralgias and myalgias.   Allergic/Immunologic: Positive for environmental allergies.   Neurological: Negative.    Psychiatric/Behavioral: Negative.  Positive for stress.         All other systems were reviewed and are negative.  Exceptions are noted in the subjective or above.      Objective     Vital Signs  Vitals:    09/11/20 0916   BP: 147/54   Pulse: 56   Resp: 15   Temp: 97.1 °F (36.2 °C)   SpO2: 99%       Body mass index is 29.85 kg/m².    Physical Exam   Constitutional: She is oriented to person, place, and time. She appears well-developed and well-nourished.   Cardiovascular: Normal rate, regular rhythm, normal heart sounds and intact distal pulses.   Pulmonary/Chest: Effort normal and breath sounds normal. She exhibits tenderness, bony tenderness and edema. Right breast exhibits tenderness. Right breast exhibits no mass (Lump). Left breast exhibits tenderness. Left breast exhibits no mass (Lump noted at 6:00 and 3 on left breast and at 10:00 on right breast no dimpling or discharge).   Multiple lumps noted and patient does have fibrocystic breast and breast are not tender with palpation along with left axillary region being tender.  Redness noted under left nipple       Abdominal: Soft. Bowel sounds are normal.   Musculoskeletal: Normal range of motion.   Neurological: She is alert and oriented to person, place, and time. Coordination normal.   Skin: Skin is warm and dry. Capillary refill takes less than 2 seconds.   Psychiatric: She has a normal mood and affect. Her behavior is normal. Judgment and thought content normal.   Nursing note and vitals reviewed.           Results Review:    I reviewed the patient's previous clinical results.       Medication Review:     Current  Outpatient Medications:   •  atorvastatin (LIPITOR) 10 MG tablet, TAKE 1 TABLET BY MOUTH ONCE DAILY, Disp: 90 tablet, Rfl: 3  •  colchicine 0.6 MG tablet, Loading dose take two tablets then wait one hour and take one tablet for a total of 1.8mg, Disp: 3 tablet, Rfl: 0  •  diclofenac (VOLTAREN) 1 % gel gel, Apply 4 g topically to the appropriate area as directed 4 (Four) Times a Day As Needed (joint pain)., Disp: 100 g, Rfl: 0  •  escitalopram (LEXAPRO) 10 MG tablet, , Disp: , Rfl: 0  •  furosemide (LASIX) 40 MG tablet, TAKE 1 TABLET BY MOUTH ONCE DAILY, Disp: 90 tablet, Rfl: 3  •  losartan (COZAAR) 50 MG tablet, TAKE 1 TABLET BY MOUTH ONCE DAILY, Disp: 90 tablet, Rfl: 3  •  metoprolol tartrate (LOPRESSOR) 50 MG tablet, TAKE 1 TABLET BY MOUTH ONCE DAILY, Disp: 90 tablet, Rfl: 3  •  omeprazole (priLOSEC) 20 MG capsule, TAKE 1 CAPSULE BY MOUTH ONCE DAILY, Disp: 90 capsule, Rfl: 3  •  potassium chloride (K-DUR,KLOR-CON) 20 MEQ CR tablet, TAKE 1 TABLET BY MOUTH TWICE A DAY, Disp: 180 tablet, Rfl: 3  •  Misc Natural Products (BLACK CHERRY CONCENTRATE PO), Take  by mouth., Disp: , Rfl:     Assessment/Plan   Vesta was seen today for breast pain.    Diagnoses and all orders for this visit:    Breast lump on left side at 6 o'clock position  -     Mammo Diagnostic Digital Tomosynthesis Bilateral With CAD  -     US breast left complete    Need for influenza vaccination  -     Fluad Quad 65+ yrs ([id number])    Breast pain in female  -     Mammo Diagnostic Digital Tomosynthesis Bilateral With CAD  Discussed nonpharmacological interventions with patient and recommend having mammogram yearly  Breast lump on left side at 3 o'clock position  -     Mammo Diagnostic Digital Tomosynthesis Bilateral With CAD  -     US breast left complete    Breast lump on right side at 10 o'clock position  -     Mammo Diagnostic Digital Tomosynthesis Bilateral With CAD  -     US breast left complete    Essential hypertension    Initiate lifestyle  modifications.   DASH Diet and exercise.   Compliance with medication regimen and discussed ways to prevent of long-term complications from high blood pressure.  Discussed when to seek medical attention.  Encouraged patient to take blood pressure daily and keep a log.        Return if symptoms worsen or fail to improve.    Susan Amezquita, APRN  09/11/2020

## 2020-09-21 ENCOUNTER — TELEPHONE (OUTPATIENT)
Dept: INTERNAL MEDICINE | Facility: CLINIC | Age: 75
End: 2020-09-21

## 2020-09-21 NOTE — TELEPHONE ENCOUNTER
PATIENTS DAUGHTER MATTI TELEPHONED STATING THAT PATIENT WAS SCHEDULED FOR A MAMMOGRAM AT THE [facility] IN [place]. MATTI STATED THAT PATIENT WILL NOT GO TO THE ONE IN [place] AND ASKED FOR IT TO BE CANCELED AND SCHEDULED IN [place]. MATTI TELEPHONE NUMBER VERIFIED [phone]    PATIENT TELEPHONE NUMBER [phone]    PLEASE ADVISE

## 2020-09-21 NOTE — TELEPHONE ENCOUNTER
I gave the number for mammography advised patient to call them and request the location at [address] for her mammogram

## 2020-10-07 ENCOUNTER — HOSPITAL ENCOUNTER (OUTPATIENT)
Dept: ULTRASOUND IMAGING | Facility: HOSPITAL | Age: 75
Discharge: HOME | End: 2020-10-07

## 2020-10-07 ENCOUNTER — HOSPITAL ENCOUNTER (OUTPATIENT)
Dept: MAMMOGRAPHY | Facility: HOSPITAL | Age: 75
Discharge: HOME | End: 2020-10-07

## 2020-10-07 DIAGNOSIS — N63.25 BREAST LUMP ON LEFT SIDE AT 6 O'CLOCK POSITION: ICD-10-CM

## 2020-10-07 DIAGNOSIS — N63.25 BREAST LUMP ON LEFT SIDE AT 3 O'CLOCK POSITION: ICD-10-CM

## 2020-10-07 DIAGNOSIS — N64.4 BREAST PAIN IN FEMALE: ICD-10-CM

## 2020-10-07 DIAGNOSIS — N63.11 BREAST LUMP ON RIGHT SIDE AT 10 O'CLOCK POSITION: ICD-10-CM

## 2020-10-07 PROCEDURE — G0279 TOMOSYNTHESIS, MAMMO: HCPCS

## 2020-10-07 PROCEDURE — 77066 DX MAMMO INCL CAD BI: CPT

## 2020-10-07 PROCEDURE — 76642 ULTRASOUND BREAST LIMITED: CPT

## 2020-10-09 RX ORDER — ATORVASTATIN CALCIUM 10 MG/1
TABLET, FILM COATED ORAL
Qty: 90 TABLET | Refills: 3 | Status: SHIPPED | OUTPATIENT
Start: 2020-10-09 | End: 2021-10-01

## 2020-10-28 ENCOUNTER — APPOINTMENT (OUTPATIENT)
Dept: MAMMOGRAPHY | Facility: HOSPITAL | Age: 75
End: 2020-10-28

## 2020-11-18 RX ORDER — FUROSEMIDE 40 MG/1
TABLET ORAL
Qty: 90 TABLET | Refills: 3 | OUTPATIENT
Start: 2020-11-18

## 2020-11-20 RX ORDER — NAPROXEN 500 MG/1
500 TABLET ORAL 2 TIMES DAILY WITH MEALS
Qty: 30 TABLET | Refills: 0 | Status: SHIPPED | OUTPATIENT
Start: 2020-11-20 | End: 2020-12-14 | Stop reason: SDUPTHER

## 2020-12-14 ENCOUNTER — OFFICE VISIT (OUTPATIENT)
Dept: INTERNAL MEDICINE | Facility: CLINIC | Age: 75
End: 2020-12-14

## 2020-12-14 VITALS
WEIGHT: 157.12 LBS | SYSTOLIC BLOOD PRESSURE: 140 MMHG | HEIGHT: 61 IN | BODY MASS INDEX: 29.66 KG/M2 | HEART RATE: 58 BPM | OXYGEN SATURATION: 98 % | TEMPERATURE: 97.6 F | DIASTOLIC BLOOD PRESSURE: 70 MMHG

## 2020-12-14 DIAGNOSIS — I50.32 CHRONIC DIASTOLIC CONGESTIVE HEART FAILURE (HCC): ICD-10-CM

## 2020-12-14 DIAGNOSIS — E78.00 PURE HYPERCHOLESTEROLEMIA: ICD-10-CM

## 2020-12-14 DIAGNOSIS — M1A.0710 IDIOPATHIC CHRONIC GOUT OF RIGHT FOOT WITHOUT TOPHUS: ICD-10-CM

## 2020-12-14 DIAGNOSIS — I10 ESSENTIAL HYPERTENSION: Primary | ICD-10-CM

## 2020-12-14 PROCEDURE — 1125F AMNT PAIN NOTED PAIN PRSNT: CPT | Performed by: INTERNAL MEDICINE

## 2020-12-14 PROCEDURE — G0439 PPPS, SUBSEQ VISIT: HCPCS | Performed by: INTERNAL MEDICINE

## 2020-12-14 PROCEDURE — 99397 PER PM REEVAL EST PAT 65+ YR: CPT | Performed by: INTERNAL MEDICINE

## 2020-12-14 PROCEDURE — 1170F FXNL STATUS ASSESSED: CPT | Performed by: INTERNAL MEDICINE

## 2020-12-14 PROCEDURE — 1159F MED LIST DOCD IN RCRD: CPT | Performed by: INTERNAL MEDICINE

## 2020-12-14 PROCEDURE — 96160 PT-FOCUSED HLTH RISK ASSMT: CPT | Performed by: INTERNAL MEDICINE

## 2020-12-14 RX ORDER — ALLOPURINOL 100 MG/1
100 TABLET ORAL DAILY
Qty: 90 TABLET | Refills: 3 | Status: SHIPPED | OUTPATIENT
Start: 2020-12-14 | End: 2021-03-15 | Stop reason: SDUPTHER

## 2020-12-14 RX ORDER — NAPROXEN 500 MG/1
500 TABLET ORAL 2 TIMES DAILY WITH MEALS
Qty: 30 TABLET | Refills: 0 | Status: SHIPPED | OUTPATIENT
Start: 2020-12-14 | End: 2021-03-15

## 2020-12-14 NOTE — PROGRESS NOTES
The ABCs of the Annual Wellness Visit  Subsequent Medicare Wellness Visit    Chief Complaint   Patient presents with   • Medicare Wellness-subsequent     discus Uloric for gout, weight loss over the past year    Coming in for a wellness visit she is accompanied by her daughter was giving us some of the history    Subjective   History of Present Illness:  Vesta Landry is a 75 y.o. female who presents for a Subsequent Medicare Wellness Visit.    HEALTH RISK ASSESSMENT    Recent Hospitalizations:  No hospitalization(s) within the last year.    Current Medical Providers:  Patient Care Team:  Bossman Pedraza MD as PCP - General  Bossman Pedraza MD as PCP - Family Medicine    Smoking Status:  Social History     Tobacco Use   Smoking Status Former Smoker   • Types: Cigarettes   • Quit date: 1987   • Years since quittin.9   Smokeless Tobacco Never Used       Alcohol Consumption:  Social History     Substance and Sexual Activity   Alcohol Use No       Depression Screen:   PHQ-2/PHQ-9 Depression Screening 2020   Little interest or pleasure in doing things 0   Feeling down, depressed, or hopeless 1   Trouble falling or staying asleep, or sleeping too much -   Feeling tired or having little energy -   Poor appetite or overeating -   Feeling bad about yourself - or that you are a failure or have let yourself or your family down -   Trouble concentrating on things, such as reading the newspaper or watching television -   Moving or speaking so slowly that other people could have noticed. Or the opposite - being so fidgety or restless that you have been moving around a lot more than usual -   Thoughts that you would be better off dead, or of hurting yourself in some way -   Total Score 1   If you checked off any problems, how difficult have these problems made it for you to do your work, take care of things at home, or get along with other people? -       Fall Risk Screen:  [place] Fall Risk Assessment was  completed, and patient is at HIGH risk for falls. Assessment completed on:12/14/2020    Health Habits and Functional and Cognitive Screening:  Functional & Cognitive Status 12/14/2020   Do you have difficulty preparing food and eating? No   Do you have difficulty bathing yourself, getting dressed or grooming yourself? No   Do you have difficulty using the toilet? No   Do you have difficulty moving around from place to place? No   Do you have trouble with steps or getting out of a bed or a chair? No   Current Diet Well Balanced Diet   Dental Exam Up to date        Dental Exam Comment -   Eye Exam Up to date        Eye Exam Comment -   Exercise (times per week) 0 times per week        Exercise Frequency Comment -   Current Exercise Activities Include No Regular Exercise   Do you need help using the phone?  No   Are you deaf or do you have serious difficulty hearing?  No   Do you need help with transportation? No   Do you need help shopping? No   Do you need help preparing meals?  No   Do you need help with housework?  No   Do you need help with laundry? No   Do you need help taking your medications? No   Do you need help managing money? No   Do you ever drive or ride in a car without wearing a seat belt? No   Have you felt unusual stress, anger or loneliness in the last month? No   Who do you live with? Spouse   If you need help, do you have trouble finding someone available to you? No   Have you been bothered in the last four weeks by sexual problems? No   Do you have difficulty concentrating, remembering or making decisions? Yes         Does the patient have evidence of cognitive impairment? Yes    Asprin use counseling:Does not need ASA (and currently is not on it)    Age-appropriate Screening Schedule:  Refer to the list below for future screening recommendations based on patient's age, sex and/or medical conditions. Orders for these recommended tests are listed in the plan section. The patient has been provided  with a written plan.    Health Maintenance   Topic Date Due   • LIPID PANEL  09/13/2019   • TDAP/TD VACCINES (1 - Tdap) 06/24/2029 (Originally 5/28/1964)   • ZOSTER VACCINE (2 of 2) 06/24/2029 (Originally 12/3/2012)   • MAMMOGRAM  10/07/2021   • COLONOSCOPY  10/19/2025   • INFLUENZA VACCINE  Completed          The following portions of the patient's history were reviewed and updated as appropriate: allergies, current medications, past family history, past medical history, past social history, past surgical history and problem list.    Outpatient Medications Prior to Visit   Medication Sig Dispense Refill   • atorvastatin (LIPITOR) 10 MG tablet TAKE 1 TABLET BY MOUTH ONCE DAILY 90 tablet 3   • furosemide (LASIX) 40 MG tablet TAKE 1 TABLET BY MOUTH ONCE DAILY 90 tablet 3   • metoprolol tartrate (LOPRESSOR) 50 MG tablet TAKE 1 TABLET BY MOUTH ONCE DAILY 90 tablet 3   • Misc Natural Products (BLACK CHERRY CONCENTRATE PO) Take  by mouth.     • omeprazole (priLOSEC) 20 MG capsule TAKE 1 CAPSULE BY MOUTH ONCE DAILY 90 capsule 3   • potassium chloride (K-DUR,KLOR-CON) 20 MEQ CR tablet TAKE 1 TABLET BY MOUTH TWICE A  tablet 3   • naproxen (Naprosyn) 500 MG tablet Take 1 tablet by mouth 2 (Two) Times a Day With Meals. 30 tablet 0   • diclofenac (VOLTAREN) 1 % gel gel Apply 4 g topically to the appropriate area as directed 4 (Four) Times a Day As Needed (joint pain). 100 g 0   • losartan (COZAAR) 50 MG tablet TAKE 1 TABLET BY MOUTH ONCE DAILY 90 tablet 3   • colchicine 0.6 MG tablet Loading dose take two tablets then wait one hour and take one tablet for a total of 1.8mg 3 tablet 0   • escitalopram (LEXAPRO) 10 MG tablet   0     No facility-administered medications prior to visit.        Patient Active Problem List   Diagnosis   • Gastroesophageal reflux disease without esophagitis   • Pure hypercholesterolemia   • Essential hypertension   • Vitamin D deficiency   • Bilateral edema of lower extremity   • Dysthymia   •  "Chronic diastolic congestive heart failure (CMS/HCC)   • Idiopathic chronic gout of right foot without tophus       Advanced Care Planning:  ACP discussion was held with the patient during this visit. Patient has an advance directive (not in EMR), copy requested.    Review of Systems   Constitutional: Negative.  Negative for activity change, appetite change, fatigue and fever.   HENT: Negative for congestion, ear discharge, ear pain and trouble swallowing.    Eyes: Negative for photophobia and visual disturbance.   Respiratory: Negative for cough and shortness of breath.    Cardiovascular: Negative for chest pain and palpitations.   Gastrointestinal: Negative for abdominal distention, abdominal pain, constipation, diarrhea, nausea and vomiting.   Endocrine: Negative.    Genitourinary: Negative for dysuria, hematuria and urgency.   Musculoskeletal: Positive for arthralgias. Negative for back pain, joint swelling and myalgias.   Skin: Negative for color change and rash.   Allergic/Immunologic: Negative.    Neurological: Negative for dizziness, weakness, light-headedness and headaches.   Hematological: Negative for adenopathy. Does not bruise/bleed easily.   Psychiatric/Behavioral: Positive for sleep disturbance. Negative for agitation, confusion and dysphoric mood. The patient is not nervous/anxious.        Compared to one year ago, the patient feels her physical health is the same.  Compared to one year ago, the patient feels her mental health is the same.    Reviewed chart for potential of high risk medication in the elderly: yes  Reviewed chart for potential of harmful drug interactions in the elderly:yes    Objective         Vitals:    12/14/20 1441   BP: 140/70   Pulse: 58   Temp: 97.6 °F (36.4 °C)   TempSrc: Temporal   SpO2: 98%   Weight: 71.3 kg (157 lb 1.9 oz)   Height: 154.9 cm (61\")   PainSc:   7   PainLoc: Foot       Body mass index is 29.69 kg/m².  Discussed the patient's BMI with her. The BMI is above " average; BMI management plan is completed.    Physical Exam  Constitutional:       General: She is not in acute distress.     Appearance: She is well-developed.   HENT:      Nose: Nose normal.   Eyes:      General: No scleral icterus.     Conjunctiva/sclera: Conjunctivae normal.   Neck:      Thyroid: No thyromegaly.      Trachea: No tracheal deviation.   Cardiovascular:      Rate and Rhythm: Normal rate and regular rhythm.      Heart sounds: No murmur. No friction rub.   Pulmonary:      Effort: No respiratory distress.      Breath sounds: No wheezing or rales.   Abdominal:      General: There is no distension.      Palpations: Abdomen is soft. There is no mass.      Tenderness: There is no abdominal tenderness. There is no guarding.   Musculoskeletal: Normal range of motion.         General: Swelling, tenderness and deformity present.   Lymphadenopathy:      Cervical: No cervical adenopathy.   Skin:     General: Skin is warm and dry.      Findings: No erythema or rash.   Neurological:      Mental Status: She is alert and oriented to person, place, and time.      Cranial Nerves: No cranial nerve deficit.      Coordination: Coordination normal.      Deep Tendon Reflexes: Reflexes are normal and symmetric.   Psychiatric:         Behavior: Behavior normal.         Thought Content: Thought content normal.         Judgment: Judgment normal.               Assessment/Plan   Medicare Risks and Personalized Health Plan  CMS Preventative Services Quick Reference  Advance Directive Discussion  Chronic Pain   Dementia/Memory   Obesity/Overweight   Polypharmacy  Urinary Incontinence    The above risks/problems have been discussed with the patient.  Pertinent information has been shared with the patient in the After Visit Summary.  Follow up plans and orders are seen below in the Assessment/Plan Section.    Diagnoses and all orders for this visit:    1. Essential hypertension (Primary) stable with current meds and low-salt  diet    2. Pure hypercholesterolemia continue with the dietary restrictions and the statins follow lipid profile    3. Idiopathic chronic gout of right foot without tophus recurring.  Discussed weight loss NSAIDs for now once better initiate allopurinol since she is having frequent attacks has had elevated uric acid level noted    4. Chronic diastolic congestive heart failure (CMS/HCC) without evidence of fluid overload will reduce Lasix dosing to as needed only.  Discussed salt restriction    Other orders  -     naproxen (Naprosyn) 500 MG tablet; Take 1 tablet by mouth 2 (Two) Times a Day With Meals.  Dispense: 30 tablet; Refill: 0  -     allopurinol (Zyloprim) 100 MG tablet; Take 1 tablet by mouth Daily.  Dispense: 90 tablet; Refill: 3      Follow Up:  No follow-ups on file.     An After Visit Summary and PPPS were given to the patient.

## 2020-12-15 LAB
ALBUMIN SERPL-MCNC: 4.4 G/DL (ref 3.7–4.7)
ALBUMIN/GLOB SERPL: 1.5 {RATIO} (ref 1.2–2.2)
ALP SERPL-CCNC: 114 IU/L (ref 39–117)
ALT SERPL-CCNC: 10 IU/L (ref 0–32)
AST SERPL-CCNC: 18 IU/L (ref 0–40)
BILIRUB SERPL-MCNC: 0.5 MG/DL (ref 0–1.2)
BUN SERPL-MCNC: 30 MG/DL (ref 8–27)
BUN/CREAT SERPL: 21 (ref 12–28)
CALCIUM SERPL-MCNC: 9 MG/DL (ref 8.7–10.3)
CHLORIDE SERPL-SCNC: 102 MMOL/L (ref 96–106)
CO2 SERPL-SCNC: 19 MMOL/L (ref 20–29)
CREAT SERPL-MCNC: 1.41 MG/DL (ref 0.57–1)
ERYTHROCYTE [DISTWIDTH] IN BLOOD BY AUTOMATED COUNT: 14 % (ref 11.7–15.4)
GLOBULIN SER CALC-MCNC: 2.9 G/DL (ref 1.5–4.5)
GLUCOSE SERPL-MCNC: 99 MG/DL (ref 65–99)
HCT VFR BLD AUTO: 34.8 % (ref 34–46.6)
HCV AB S/CO SERPL IA: <0.1 S/CO RATIO (ref 0–0.9)
HGB BLD-MCNC: 12.2 G/DL (ref 11.1–15.9)
LDLC SERPL DIRECT ASSAY-MCNC: 46 MG/DL (ref 0–99)
MCH RBC QN AUTO: 30.1 PG (ref 26.6–33)
MCHC RBC AUTO-ENTMCNC: 35.1 G/DL (ref 31.5–35.7)
MCV RBC AUTO: 86 FL (ref 79–97)
PLATELET # BLD AUTO: 182 X10E3/UL (ref 150–450)
POTASSIUM SERPL-SCNC: 4.6 MMOL/L (ref 3.5–5.2)
PROT SERPL-MCNC: 7.3 G/DL (ref 6–8.5)
RBC # BLD AUTO: 4.05 X10E6/UL (ref 3.77–5.28)
SODIUM SERPL-SCNC: 137 MMOL/L (ref 134–144)
WBC # BLD AUTO: 6.3 X10E3/UL (ref 3.4–10.8)

## 2021-01-07 RX ORDER — FUROSEMIDE 20 MG/1
40 TABLET ORAL DAILY PRN
Qty: 30 TABLET | Refills: 2 | Status: SHIPPED | OUTPATIENT
Start: 2021-01-07 | End: 2021-03-10 | Stop reason: SDUPTHER

## 2021-01-11 RX ORDER — POTASSIUM CHLORIDE 20 MEQ/1
TABLET, EXTENDED RELEASE ORAL
Qty: 180 TABLET | Refills: 3 | OUTPATIENT
Start: 2021-01-11

## 2021-02-17 RX ORDER — FUROSEMIDE 20 MG/1
TABLET ORAL
Qty: 30 TABLET | Refills: 2 | OUTPATIENT
Start: 2021-02-17

## 2021-03-10 RX ORDER — FUROSEMIDE 20 MG/1
40 TABLET ORAL DAILY PRN
Qty: 180 TABLET | Refills: 2 | Status: SHIPPED | OUTPATIENT
Start: 2021-03-10 | End: 2021-03-15 | Stop reason: SDUPTHER

## 2021-03-10 NOTE — TELEPHONE ENCOUNTER
Caller: Meliza Back    Relationship: Emergency Contact    Best call back [phone]     Medication needed:   Requested Prescriptions      No prescriptions requested or ordered in this encounter       When do you need the refill by: TODAY     What details did tell patient provide when requesting the medication: PATIENT'S DAUGHTER IS REQUESTING THE WATER PILL FOR THE PATIENT   SHE DID NOT KNOW THE NAME OF THEM  SHE HAS BEEN OUT 3 DAYS   PATIENT HAS AN APPT ON Monday     Does the patient have less than a 3 day supply:  [x] Yes  [] No    What is the patient's preferred pharmacy: [id number] [place], [place] - [facility] [address] AT [facility] [address] - [phone]  - [phone] FX

## 2021-03-15 ENCOUNTER — OFFICE VISIT (OUTPATIENT)
Dept: INTERNAL MEDICINE | Facility: CLINIC | Age: 76
End: 2021-03-15

## 2021-03-15 VITALS
BODY MASS INDEX: 30.42 KG/M2 | TEMPERATURE: 98 F | HEIGHT: 61 IN | SYSTOLIC BLOOD PRESSURE: 140 MMHG | WEIGHT: 161.12 LBS | OXYGEN SATURATION: 98 % | DIASTOLIC BLOOD PRESSURE: 79 MMHG | HEART RATE: 60 BPM

## 2021-03-15 DIAGNOSIS — I50.32 CHRONIC DIASTOLIC CONGESTIVE HEART FAILURE (HCC): ICD-10-CM

## 2021-03-15 DIAGNOSIS — I10 ESSENTIAL HYPERTENSION: ICD-10-CM

## 2021-03-15 DIAGNOSIS — R60.0 BILATERAL EDEMA OF LOWER EXTREMITY: Primary | ICD-10-CM

## 2021-03-15 DIAGNOSIS — M21.961 DEFORMITY OF RIGHT FOOT: ICD-10-CM

## 2021-03-15 PROCEDURE — 99214 OFFICE O/P EST MOD 30 MIN: CPT | Performed by: INTERNAL MEDICINE

## 2021-03-15 RX ORDER — ALLOPURINOL 100 MG/1
100 TABLET ORAL DAILY
Qty: 90 TABLET | Refills: 3 | Status: SHIPPED | OUTPATIENT
Start: 2021-03-15 | End: 2021-08-28

## 2021-03-15 RX ORDER — FUROSEMIDE 20 MG/1
20 TABLET ORAL DAILY PRN
Qty: 90 TABLET | Refills: 2 | Status: SHIPPED | OUTPATIENT
Start: 2021-03-15 | End: 2022-05-23

## 2021-03-15 RX ORDER — OMEPRAZOLE 20 MG/1
20 CAPSULE, DELAYED RELEASE ORAL DAILY
Qty: 90 CAPSULE | Refills: 3 | Status: SHIPPED | OUTPATIENT
Start: 2021-03-15 | End: 2021-12-09

## 2021-03-15 RX ORDER — LOSARTAN POTASSIUM 50 MG/1
50 TABLET ORAL DAILY
Qty: 90 TABLET | Refills: 3 | Status: SHIPPED | OUTPATIENT
Start: 2021-03-15 | End: 2021-12-09

## 2021-03-15 RX ORDER — METOPROLOL TARTRATE 50 MG/1
50 TABLET, FILM COATED ORAL DAILY
Qty: 90 TABLET | Refills: 3 | Status: SHIPPED | OUTPATIENT
Start: 2021-03-15 | End: 2021-12-09

## 2021-03-15 RX ORDER — LOSARTAN POTASSIUM 50 MG/1
TABLET ORAL
Qty: 90 TABLET | Refills: 3 | OUTPATIENT
Start: 2021-03-15

## 2021-03-15 RX ORDER — METOPROLOL TARTRATE 50 MG/1
TABLET, FILM COATED ORAL
Qty: 90 TABLET | Refills: 3 | OUTPATIENT
Start: 2021-03-15

## 2021-03-15 RX ORDER — POTASSIUM CHLORIDE 20 MEQ/1
20 TABLET, EXTENDED RELEASE ORAL 2 TIMES DAILY
Qty: 180 TABLET | Refills: 3 | Status: CANCELLED | OUTPATIENT
Start: 2021-03-15

## 2021-03-15 RX ORDER — OMEPRAZOLE 20 MG/1
CAPSULE, DELAYED RELEASE ORAL
Qty: 90 CAPSULE | Refills: 3 | OUTPATIENT
Start: 2021-03-15

## 2021-03-15 NOTE — PROGRESS NOTES
Subjective  Vesta Landry is a 75 y.o. female    HPI   Vesta reports swelling in her lower extremities is terrible and painful. She reports she ran out of her diuretic pills for 3 days and the swelling in her lower extremities happened in the time of being out of her medication. She adds she normally takes the diuretic pill regularly. The patient is still limits her sodium intake.  She adds her toes swell making it difficult to get shoes on intermittently. She states she wears flip flops so her toes are not restricted and uncomfortable.    The patient has received the first dose of COVID vaccine. She is due to get the second dose tomorrow.     The following portions of the patient's history were reviewed and updated as appropriate: allergies, current medications, past family history, past medical history, past social history, past surgical history, and problem list.     Review of Systems   Constitutional: Negative.  Negative for activity change, appetite change, fatigue and fever.   HENT: Negative for congestion, ear discharge, ear pain and trouble swallowing.    Eyes: Negative for photophobia and visual disturbance.   Respiratory: Negative for cough and shortness of breath.    Cardiovascular: Negative for chest pain and palpitations.   Gastrointestinal: Negative for abdominal distention, abdominal pain, constipation, diarrhea, nausea and vomiting.   Endocrine: Negative.    Genitourinary: Negative for dysuria, hematuria and urgency.   Musculoskeletal: Positive for arthralgias and gait problem. Negative for back pain, joint swelling and myalgias.   Skin: Negative for color change and rash.   Allergic/Immunologic: Negative.    Neurological: Negative for dizziness, weakness, light-headedness and headaches.   Hematological: Negative for adenopathy. Does not bruise/bleed easily.   Psychiatric/Behavioral: Negative for agitation, confusion and dysphoric mood. The patient is not nervous/anxious.        Visit  "Vitals  /79   Pulse 60   Temp 98 °F (36.7 °C) (Infrared)   Ht 154.9 cm (61\")   Wt 73.1 kg (161 lb 1.9 oz)   SpO2 98%   BMI 30.44 kg/m²       Objective  Physical Exam  Constitutional:       General: She is not in acute distress.     Appearance: She is well-developed.   HENT:      Nose: Nose normal.   Eyes:      General: No scleral icterus.     Conjunctiva/sclera: Conjunctivae normal.   Neck:      Thyroid: No thyromegaly.      Trachea: No tracheal deviation.   Cardiovascular:      Rate and Rhythm: Normal rate and regular rhythm.      Heart sounds: No murmur. No friction rub.   Pulmonary:      Effort: No respiratory distress.      Breath sounds: No wheezing or rales.   Abdominal:      General: There is no distension.      Palpations: Abdomen is soft. There is no mass.      Tenderness: There is no abdominal tenderness. There is no guarding.   Musculoskeletal:         General: Deformity present. Normal range of motion.      Left lower leg: Edema present.   Lymphadenopathy:      Cervical: No cervical adenopathy.   Skin:     General: Skin is warm and dry.      Findings: No erythema or rash.   Neurological:      Mental Status: She is alert and oriented to person, place, and time.      Cranial Nerves: No cranial nerve deficit.      Coordination: Coordination normal.      Deep Tendon Reflexes: Reflexes are normal and symmetric.   Psychiatric:         Behavior: Behavior normal.         Thought Content: Thought content normal.         Judgment: Judgment normal.         Diagnoses and all orders for this visit:    Bilateral edema of lower extremity discussed salt restriction continue with diuretic as needed only    Essential hypertension stable with current meds and low-salt diet    Chronic diastolic congestive heart failure (CMS/HCC) continue with losartan    Deformity of right foot stable continue with home exercises    Other orders  -     metoprolol tartrate (LOPRESSOR) 50 MG tablet; Take 1 tablet by mouth Daily.  -     " losartan (COZAAR) 50 MG tablet; Take 1 tablet by mouth Daily.  -     allopurinol (Zyloprim) 100 MG tablet; Take 1 tablet by mouth Daily.  -     omeprazole (priLOSEC) 20 MG capsule; Take 1 capsule by mouth Daily.  -     potassium chloride (K-DUR,KLOR-CON) 20 MEQ CR tablet; Take 1 tablet by mouth 2 (Two) Times a Day.      Scribed for Bossman Pedraza MD by RO LEE  03/15/21   19:05 EDT    I have personally performed the services described in this document as scribed by the above individual, and it is both accurate and complete.  Bossman Pedraza MD  3/15/2021  19:10 EDT

## 2021-03-16 LAB
BUN SERPL-MCNC: 20 MG/DL (ref 8–23)
BUN/CREAT SERPL: 19 (ref 7–25)
CALCIUM SERPL-MCNC: 8.6 MG/DL (ref 8.6–10.5)
CHLORIDE SERPL-SCNC: 102 MMOL/L (ref 98–107)
CO2 SERPL-SCNC: 27.3 MMOL/L (ref 22–29)
CREAT SERPL-MCNC: 1.05 MG/DL (ref 0.57–1)
GLUCOSE SERPL-MCNC: 84 MG/DL (ref 65–99)
POTASSIUM SERPL-SCNC: 4.1 MMOL/L (ref 3.5–5.2)
SODIUM SERPL-SCNC: 139 MMOL/L (ref 136–145)

## 2021-04-26 ENCOUNTER — TELEPHONE (OUTPATIENT)
Dept: INTERNAL MEDICINE | Facility: CLINIC | Age: 76
End: 2021-04-26

## 2021-04-26 NOTE — TELEPHONE ENCOUNTER
Meliza informed that Urgent Care placed a referral to ortho. They will contact her / Vesta with an appointment.

## 2021-04-26 NOTE — TELEPHONE ENCOUNTER
PATIENT FELL OVER THE WEEKEND AND HER DAUGHTER TOOK HER TO URGENT CARE. THEY ARE CURRENTLY WAITING ON X RAY RESULTS. MATTI STATES THAT SHE IS ASSUMING THE RESULTS ARE BEING SENT TO THIS OFFICE. SHE WOULD LIKE TO KNOW WHAT ARE THE STEPS THEY SHOULD TAKE NEXT.    [phone]

## 2021-06-25 ENCOUNTER — TELEPHONE (OUTPATIENT)
Dept: INTERNAL MEDICINE | Facility: CLINIC | Age: 76
End: 2021-06-25

## 2021-07-08 ENCOUNTER — OFFICE VISIT (OUTPATIENT)
Dept: INTERNAL MEDICINE | Facility: CLINIC | Age: 76
End: 2021-07-08

## 2021-07-08 VITALS
OXYGEN SATURATION: 98 % | WEIGHT: 158.8 LBS | TEMPERATURE: 96.6 F | DIASTOLIC BLOOD PRESSURE: 70 MMHG | BODY MASS INDEX: 28.14 KG/M2 | HEART RATE: 54 BPM | SYSTOLIC BLOOD PRESSURE: 140 MMHG | HEIGHT: 63 IN

## 2021-07-08 DIAGNOSIS — I10 ESSENTIAL HYPERTENSION: Primary | ICD-10-CM

## 2021-07-08 DIAGNOSIS — M20.61 DEFORMITY OF TOE OF RIGHT FOOT: ICD-10-CM

## 2021-07-08 DIAGNOSIS — R60.0 BILATERAL EDEMA OF LOWER EXTREMITY: ICD-10-CM

## 2021-07-08 PROBLEM — I50.32 CHRONIC DIASTOLIC CONGESTIVE HEART FAILURE (HCC): Status: RESOLVED | Noted: 2018-09-14 | Resolved: 2021-07-08

## 2021-07-08 PROCEDURE — 99214 OFFICE O/P EST MOD 30 MIN: CPT | Performed by: INTERNAL MEDICINE

## 2021-07-08 NOTE — PROGRESS NOTES
"Subjective  Vesta Landry is a 76 y.o. female    HPI coming in for follow-up patient with a history of short-term memory deficit she has hypertension and reflux esophagitis history of chronic lower extremity edema.  No new complaints reasonably compliant with her diet    The following portions of the patient's history were reviewed and updated as appropriate: allergies, current medications, past family history, past medical history, past social history, past surgical history, and problem list.     Review of Systems   Constitutional: Negative.  Negative for activity change, appetite change, fatigue and fever.   HENT: Negative for congestion, ear discharge, ear pain and trouble swallowing.    Eyes: Negative for photophobia and visual disturbance.   Respiratory: Negative for cough and shortness of breath.    Cardiovascular: Negative for chest pain and palpitations.   Gastrointestinal: Negative for abdominal distention, abdominal pain, constipation, diarrhea, nausea and vomiting.   Endocrine: Negative.    Genitourinary: Negative for dysuria, hematuria and urgency.   Musculoskeletal: Positive for arthralgias and back pain. Negative for joint swelling and myalgias.   Skin: Negative for color change and rash.   Allergic/Immunologic: Negative.    Neurological: Negative for dizziness, weakness, light-headedness and headaches.   Hematological: Negative for adenopathy. Does not bruise/bleed easily.   Psychiatric/Behavioral: Positive for sleep disturbance. Negative for agitation, confusion and dysphoric mood. The patient is not nervous/anxious.        Visit Vitals  /70   Pulse 54   Temp 96.6 °F (35.9 °C) (Infrared)   Ht 160 cm (63\")   Wt 72 kg (158 lb 12.8 oz)   SpO2 98%   BMI 28.13 kg/m²       Objective  Physical Exam  Constitutional:       General: She is not in acute distress.     Appearance: She is well-developed.   HENT:      Nose: Nose normal.   Eyes:      General: No scleral icterus.     Conjunctiva/sclera: " Conjunctivae normal.   Neck:      Thyroid: No thyromegaly.      Trachea: No tracheal deviation.   Cardiovascular:      Rate and Rhythm: Normal rate and regular rhythm.      Heart sounds: No murmur heard.   No friction rub.   Pulmonary:      Effort: No respiratory distress.      Breath sounds: No wheezing or rales.   Abdominal:      General: There is no distension.      Palpations: Abdomen is soft. There is no mass.      Tenderness: There is no abdominal tenderness. There is no guarding.   Musculoskeletal:         General: Deformity present. Normal range of motion.   Lymphadenopathy:      Cervical: No cervical adenopathy.   Skin:     General: Skin is warm and dry.      Findings: No erythema or rash.   Neurological:      Mental Status: She is alert and oriented to person, place, and time.      Cranial Nerves: No cranial nerve deficit.      Coordination: Coordination normal.      Deep Tendon Reflexes: Reflexes are normal and symmetric.   Psychiatric:         Behavior: Behavior normal.         Thought Content: Thought content normal.         Judgment: Judgment normal.         Diagnoses and all orders for this visit:    Essential hypertension stable with current meds and low-salt diet    Bilateral edema of lower extremity continue with the sodium restriction and leg elevation Lasix as needed    Deformity of toe of right foot with deviation of her second toe.  Referral to podiatry

## 2021-07-09 LAB
BUN SERPL-MCNC: 28 MG/DL (ref 8–23)
BUN/CREAT SERPL: 21.1 (ref 7–25)
CALCIUM SERPL-MCNC: 9.3 MG/DL (ref 8.6–10.5)
CHLORIDE SERPL-SCNC: 100 MMOL/L (ref 98–107)
CO2 SERPL-SCNC: 23.6 MMOL/L (ref 22–29)
CREAT SERPL-MCNC: 1.33 MG/DL (ref 0.57–1)
GLUCOSE SERPL-MCNC: 110 MG/DL (ref 65–99)
POTASSIUM SERPL-SCNC: 4.9 MMOL/L (ref 3.5–5.2)
SODIUM SERPL-SCNC: 135 MMOL/L (ref 136–145)

## 2021-08-28 RX ORDER — ALLOPURINOL 100 MG/1
100 TABLET ORAL DAILY
Qty: 90 TABLET | Refills: 3 | Status: SHIPPED | OUTPATIENT
Start: 2021-08-28 | End: 2022-07-18 | Stop reason: SDUPTHER

## 2021-10-01 RX ORDER — ATORVASTATIN CALCIUM 10 MG/1
TABLET, FILM COATED ORAL
Qty: 90 TABLET | Refills: 3 | Status: SHIPPED | OUTPATIENT
Start: 2021-10-01 | End: 2022-07-18 | Stop reason: SDUPTHER

## 2021-10-11 ENCOUNTER — LAB (OUTPATIENT)
Dept: LAB | Facility: HOSPITAL | Age: 76
End: 2021-10-11

## 2021-10-11 ENCOUNTER — TELEPHONE (OUTPATIENT)
Dept: INTERNAL MEDICINE | Facility: CLINIC | Age: 76
End: 2021-10-11

## 2021-10-11 DIAGNOSIS — R05.9 COUGH: ICD-10-CM

## 2021-10-11 DIAGNOSIS — R05.9 COUGH: Primary | ICD-10-CM

## 2021-10-11 PROCEDURE — U0004 COV-19 TEST NON-CDC HGH THRU: HCPCS

## 2021-10-11 PROCEDURE — C9803 HOPD COVID-19 SPEC COLLECT: HCPCS

## 2021-10-11 NOTE — TELEPHONE ENCOUNTER
Caller: Meliza Back    Relationship: Emergency Contact    Best call back number: [phone]    What is the best time to reach you: ANY TIME    Who are you requesting to speak with (clinical staff, provider,  specific staff member): NURSE    Do you know the name of the person who called: MELIZA    What was the call regarding: DAUGHTER IS OUT OF HER CONFERENCE CALL SO PLEASE CALL MELIZA IF PATIENT CAN GET IN TODAY.    Do you require a callback: YES

## 2021-10-11 NOTE — TELEPHONE ENCOUNTER
Vesta has a fever of 100, headache, fatigue, not sleeping - symptoms started 2 days ago. She has not had an exposure that she knows of. She was at her sisters house with a small crowd - no mask but she has been vaccinated     Please advise     - the daughter has a conference call and is unavailable

## 2021-10-12 LAB — SARS-COV-2 RNA NOSE QL NAA+PROBE: DETECTED

## 2021-10-13 ENCOUNTER — TELEPHONE (OUTPATIENT)
Dept: INTERNAL MEDICINE | Facility: CLINIC | Age: 76
End: 2021-10-13

## 2021-10-13 NOTE — TELEPHONE ENCOUNTER
The lab called with a critical report, pt tested positive for covid, called pt, pt was informed and pt daughter stated that she had an appointment for antibody infusion today around noon, needed positive result faxed over to [facility] in [place]. Result was faxed.

## 2021-12-09 DIAGNOSIS — I10 ESSENTIAL HYPERTENSION: Primary | ICD-10-CM

## 2021-12-09 RX ORDER — LOSARTAN POTASSIUM 50 MG/1
50 TABLET ORAL DAILY
Qty: 90 TABLET | Refills: 3 | Status: SHIPPED | OUTPATIENT
Start: 2021-12-09 | End: 2022-07-18 | Stop reason: SDUPTHER

## 2021-12-09 RX ORDER — OMEPRAZOLE 20 MG/1
20 CAPSULE, DELAYED RELEASE ORAL DAILY
Qty: 90 CAPSULE | Refills: 3 | Status: SHIPPED | OUTPATIENT
Start: 2021-12-09 | End: 2022-07-18 | Stop reason: SDUPTHER

## 2021-12-09 RX ORDER — METOPROLOL TARTRATE 50 MG/1
50 TABLET, FILM COATED ORAL DAILY
Qty: 90 TABLET | Refills: 3 | Status: SHIPPED | OUTPATIENT
Start: 2021-12-09 | End: 2022-07-18 | Stop reason: SDUPTHER

## 2021-12-09 NOTE — TELEPHONE ENCOUNTER
Rx Refill Note  Requested Prescriptions     Pending Prescriptions Disp Refills   • metoprolol tartrate (LOPRESSOR) 50 MG tablet [Pharmacy Med Name: METOPROLOL TARTRATE 50MG TABLETS] 90 tablet 3     Sig: TAKE 1 TABLET BY MOUTH DAILY   • losartan (COZAAR) 50 MG tablet [Pharmacy Med Name: LOSARTAN 50MG TABLETS] 90 tablet 3     Sig: TAKE 1 TABLET BY MOUTH DAILY   • omeprazole (priLOSEC) 20 MG capsule [Pharmacy Med Name: OMEPRAZOLE 20MG CAPSULES] 90 capsule 3     Sig: TAKE 1 CAPSULE BY MOUTH DAILY      Last office visit with prescribing clinician: 7/8/2021      Next office visit with prescribing clinician: Visit date not found            BEATRICE BRIAN MA  12/09/21, 12:24 EST

## 2022-01-13 ENCOUNTER — OFFICE VISIT (OUTPATIENT)
Dept: INTERNAL MEDICINE | Facility: CLINIC | Age: 77
End: 2022-01-13

## 2022-01-13 VITALS
SYSTOLIC BLOOD PRESSURE: 120 MMHG | DIASTOLIC BLOOD PRESSURE: 70 MMHG | OXYGEN SATURATION: 99 % | TEMPERATURE: 97.1 F | HEIGHT: 63 IN | WEIGHT: 158.12 LBS | HEART RATE: 55 BPM | BODY MASS INDEX: 28.02 KG/M2

## 2022-01-13 DIAGNOSIS — R60.0 BILATERAL EDEMA OF LOWER EXTREMITY: ICD-10-CM

## 2022-01-13 DIAGNOSIS — E78.00 PURE HYPERCHOLESTEROLEMIA: ICD-10-CM

## 2022-01-13 DIAGNOSIS — G89.29 CHRONIC MIDLINE THORACIC BACK PAIN: ICD-10-CM

## 2022-01-13 DIAGNOSIS — I10 ESSENTIAL HYPERTENSION: Primary | ICD-10-CM

## 2022-01-13 DIAGNOSIS — M54.6 CHRONIC MIDLINE THORACIC BACK PAIN: ICD-10-CM

## 2022-01-13 PROCEDURE — 96160 PT-FOCUSED HLTH RISK ASSMT: CPT | Performed by: INTERNAL MEDICINE

## 2022-01-13 PROCEDURE — 1159F MED LIST DOCD IN RCRD: CPT | Performed by: INTERNAL MEDICINE

## 2022-01-13 PROCEDURE — 1125F AMNT PAIN NOTED PAIN PRSNT: CPT | Performed by: INTERNAL MEDICINE

## 2022-01-13 PROCEDURE — 1170F FXNL STATUS ASSESSED: CPT | Performed by: INTERNAL MEDICINE

## 2022-01-13 PROCEDURE — G0439 PPPS, SUBSEQ VISIT: HCPCS | Performed by: INTERNAL MEDICINE

## 2022-01-13 NOTE — PROGRESS NOTES
The ABCs of the Annual Wellness Visit  Subsequent Medicare Wellness Visit    Chief Complaint   Patient presents with   • Medicare Wellness-subsequent      Subjective    History of Present Illness:  Vesta Landry is a 76 y.o. female who presents for a Subsequent Medicare Wellness Visit.    The following portions of the patient's history were reviewed and   updated as appropriate: allergies, current medications, past family history, past medical history, past social history, past surgical history and problem list.    Compared to one year ago, the patient feels her physical   health is the same.    Compared to one year ago, the patient feels her mental   health is the same.    Recent Hospitalizations:  She was not admitted to the hospital during the last year.       Current Medical Providers:  Patient Care Team:  Bossman Pedraza MD as PCP - General (Internal Medicine)    Outpatient Medications Prior to Visit   Medication Sig Dispense Refill   • allopurinol (ZYLOPRIM) 100 MG tablet TAKE 1 TABLET BY MOUTH DAILY 90 tablet 3   • atorvastatin (LIPITOR) 10 MG tablet TAKE 1 TABLET BY MOUTH ONCE DAILY 90 tablet 3   • furosemide (LASIX) 20 MG tablet Take 1 tablet by mouth Daily As Needed (swelling). 90 tablet 2   • losartan (COZAAR) 50 MG tablet TAKE 1 TABLET BY MOUTH DAILY 90 tablet 3   • metoprolol tartrate (LOPRESSOR) 50 MG tablet TAKE 1 TABLET BY MOUTH DAILY 90 tablet 3   • Misc Natural Products (BLACK CHERRY CONCENTRATE PO) Take  by mouth.     • omeprazole (priLOSEC) 20 MG capsule TAKE 1 CAPSULE BY MOUTH DAILY 90 capsule 3   • potassium chloride (K-DUR,KLOR-CON) 20 MEQ CR tablet TAKE 1 TABLET BY MOUTH TWICE A  tablet 3     No facility-administered medications prior to visit.       No opioid medication identified on active medication list. I have reviewed chart for other potential  high risk medication/s and harmful drug interactions in the elderly.          Aspirin is not on active medication list.  Aspirin  "use is not indicated based on review of current medical condition/s. Risk of harm outweighs potential benefits.  .    Patient Active Problem List   Diagnosis   • Gastroesophageal reflux disease without esophagitis   • Pure hypercholesterolemia   • Essential hypertension   • Vitamin D deficiency   • Bilateral edema of lower extremity   • Dysthymia   • Idiopathic chronic gout of right foot without tophus     Advance Care Planning  Advance Directive is not on file.  ACP discussion was held with the patient during this visit. Patient has an advance directive (not in EMR), copy requested.    Review of Systems   Constitutional: Negative for activity change, appetite change, fatigue and fever.   HENT: Negative for congestion, ear discharge, ear pain and trouble swallowing.    Eyes: Negative for photophobia and visual disturbance.   Respiratory: Negative for cough and shortness of breath.    Cardiovascular: Negative for chest pain and palpitations.   Gastrointestinal: Negative for abdominal distention, constipation, diarrhea, nausea and vomiting.   Genitourinary: Negative for dysuria, hematuria and urgency.   Musculoskeletal: Positive for arthralgias and back pain. Negative for joint swelling and myalgias.   Skin: Negative for color change and rash.   Neurological: Negative for dizziness, weakness, light-headedness and confusion.   Hematological: Negative for adenopathy. Does not bruise/bleed easily.   Psychiatric/Behavioral: Positive for sleep disturbance. Negative for agitation and dysphoric mood. The patient is not nervous/anxious.         Objective    Vitals:    01/13/22 1604   BP: 120/70   Pulse: 55   Temp: 97.1 °F (36.2 °C)   TempSrc: Infrared   SpO2: 99%   Weight: 71.7 kg (158 lb 1.9 oz)   Height: 160 cm (63\")   PainSc:   6   PainLoc: Back     BMI Readings from Last 1 Encounters:   01/13/22 28.01 kg/m²   BMI is above normal parameters. Recommendations include: exercise counseling and nutrition counseling    Does the " patient have evidence of cognitive impairment? Yes    Physical Exam  Constitutional:       General: She is not in acute distress.     Appearance: She is well-developed.   HENT:      Nose: Nose normal.   Eyes:      General: No scleral icterus.     Conjunctiva/sclera: Conjunctivae normal.   Neck:      Thyroid: No thyromegaly.      Trachea: No tracheal deviation.   Cardiovascular:      Rate and Rhythm: Normal rate and regular rhythm.      Heart sounds: No murmur heard.  No friction rub.   Pulmonary:      Effort: No respiratory distress.      Breath sounds: No wheezing or rales.   Abdominal:      General: There is no distension.      Palpations: Abdomen is soft. There is no mass.      Tenderness: There is no abdominal tenderness. There is no guarding.   Musculoskeletal:         General: Deformity present. Normal range of motion.   Lymphadenopathy:      Cervical: No cervical adenopathy.   Skin:     General: Skin is warm and dry.      Findings: No erythema or rash.   Neurological:      Mental Status: She is alert and oriented to person, place, and time.      Cranial Nerves: No cranial nerve deficit.      Coordination: Coordination normal.      Deep Tendon Reflexes: Reflexes are normal and symmetric.   Psychiatric:         Behavior: Behavior normal.         Thought Content: Thought content normal.         Judgment: Judgment normal.                 HEALTH RISK ASSESSMENT    Smoking Status:  Social History     Tobacco Use   Smoking Status Former Smoker   • Packs/day: 0.15   • Years: 10.00   • Pack years: 1.50   • Types: Cigarettes   • Quit date: 1987   • Years since quittin.0   Smokeless Tobacco Never Used     Alcohol Consumption:  Social History     Substance and Sexual Activity   Alcohol Use No     Fall Risk Screen:    [place] Fall Risk Assessment was completed, and patient is at LOW risk for falls.Assessment completed on:2022    Depression Screening:  PHQ-2/PHQ-9 Depression Screening 2022   Leena  interest or pleasure in doing things 0   Feeling down, depressed, or hopeless 0   Trouble falling or staying asleep, or sleeping too much -   Feeling tired or having little energy -   Poor appetite or overeating -   Feeling bad about yourself - or that you are a failure or have let yourself or your family down -   Trouble concentrating on things, such as reading the newspaper or watching television -   Moving or speaking so slowly that other people could have noticed. Or the opposite - being so fidgety or restless that you have been moving around a lot more than usual -   Thoughts that you would be better off dead, or of hurting yourself in some way -   Total Score 0   If you checked off any problems, how difficult have these problems made it for you to do your work, take care of things at home, or get along with other people? -       Health Habits and Functional and Cognitive Screening:  Functional & Cognitive Status 1/13/2022   Do you have difficulty preparing food and eating? No   Do you have difficulty bathing yourself, getting dressed or grooming yourself? No   Do you have difficulty using the toilet? No   Do you have difficulty moving around from place to place? No   Do you have trouble with steps or getting out of a bed or a chair? No   Current Diet Well Balanced Diet   Dental Exam Up to date        Dental Exam Comment -   Eye Exam Up to date        Eye Exam Comment -   Exercise (times per week) -        Exercise Frequency Comment -   Current Exercises Include No Regular Exercise   Current Exercise Activities Include -   Do you need help using the phone?  No   Are you deaf or do you have serious difficulty hearing?  No   Do you need help with transportation? No   Do you need help shopping? No   Do you need help preparing meals?  No   Do you need help with housework?  No   Do you need help with laundry? No   Do you need help taking your medications? No   Do you need help managing money? No   Do you ever drive  or ride in a car without wearing a seat belt? No   Have you felt unusual stress, anger or loneliness in the last month? No   Who do you live with? Spouse   If you need help, do you have trouble finding someone available to you? No   Have you been bothered in the last four weeks by sexual problems? No   Do you have difficulty concentrating, remembering or making decisions? Yes       Age-appropriate Screening Schedule:  Refer to the list below for future screening recommendations based on patient's age, sex and/or medical conditions. Orders for these recommended tests are listed in the plan section. The patient has been provided with a written plan.    Health Maintenance   Topic Date Due   • DXA SCAN  08/12/2017   • MAMMOGRAM  10/07/2021   • LIPID PANEL  12/14/2021   • TDAP/TD VACCINES (1 - Tdap) 06/24/2029 (Originally 5/28/1964)   • ZOSTER VACCINE (2 of 2) 06/24/2029 (Originally 12/3/2012)   • INFLUENZA VACCINE  Completed              Assessment/Plan   CMS Preventative Services Quick Reference  Risk Factors Identified During Encounter  Chronic Pain   Dementia/Memory   Hearing Problem  Polypharmacy  Urinary Incontinence  The above risks/problems have been discussed with the patient.  Follow up actions/plans if indicated are seen below in the Assessment/Plan Section.  Pertinent information has been shared with the patient in the After Visit Summary.    Diagnoses and all orders for this visit:    1. Essential hypertension (Primary) stable with current meds and low-salt diet    2. Pure hypercholesterolemia continue with the dietary restrictions and the statins    3. Bilateral edema of lower extremity currently stable continue with salt restriction Lasix as needed only    4. Chronic midline thoracic back pain Tylenol.  Discussed home exercises.  Imaging studies if symptoms do not improve        Follow Up:   No follow-ups on file.     An After Visit Summary and PPPS were made available to the patient.

## 2022-01-14 ENCOUNTER — TELEPHONE (OUTPATIENT)
Dept: INTERNAL MEDICINE | Facility: CLINIC | Age: 77
End: 2022-01-14

## 2022-01-14 DIAGNOSIS — G89.29 CHRONIC MIDLINE THORACIC BACK PAIN: Primary | ICD-10-CM

## 2022-01-14 DIAGNOSIS — M54.6 CHRONIC MIDLINE THORACIC BACK PAIN: Primary | ICD-10-CM

## 2022-01-14 LAB
ALBUMIN SERPL-MCNC: 4.3 G/DL (ref 3.5–5.2)
ALBUMIN/GLOB SERPL: 1.8 G/DL
ALP SERPL-CCNC: 114 U/L (ref 39–117)
ALT SERPL-CCNC: 13 U/L (ref 1–33)
AST SERPL-CCNC: 20 U/L (ref 1–32)
BILIRUB SERPL-MCNC: 0.4 MG/DL (ref 0–1.2)
BUN SERPL-MCNC: 35 MG/DL (ref 8–23)
BUN/CREAT SERPL: 26.3 (ref 7–25)
CALCIUM SERPL-MCNC: 9 MG/DL (ref 8.6–10.5)
CHLORIDE SERPL-SCNC: 101 MMOL/L (ref 98–107)
CO2 SERPL-SCNC: 25.6 MMOL/L (ref 22–29)
CREAT SERPL-MCNC: 1.33 MG/DL (ref 0.57–1)
GLOBULIN SER CALC-MCNC: 2.4 GM/DL
GLUCOSE SERPL-MCNC: 110 MG/DL (ref 65–99)
LDLC SERPL DIRECT ASSAY-MCNC: 45 MG/DL (ref 0–99)
POTASSIUM SERPL-SCNC: 4.3 MMOL/L (ref 3.5–5.2)
PROT SERPL-MCNC: 6.7 G/DL (ref 6–8.5)
SODIUM SERPL-SCNC: 138 MMOL/L (ref 136–145)

## 2022-05-23 RX ORDER — FUROSEMIDE 20 MG/1
TABLET ORAL
Qty: 90 TABLET | Refills: 3 | Status: SHIPPED | OUTPATIENT
Start: 2022-05-23 | End: 2022-07-18 | Stop reason: SDUPTHER

## 2022-05-23 NOTE — TELEPHONE ENCOUNTER
Rx Refill Note  Requested Prescriptions     Pending Prescriptions Disp Refills   • furosemide (LASIX) 20 MG tablet [Pharmacy Med Name: FUROSEMIDE 20MG TABLETS] 90 tablet 2     Sig: TAKE 1 TABLET BY MOUTH DAILY AS NEEDED FOR SWELLING      Last office visit with prescribing clinician: 1/13/2022      Next office visit with prescribing clinician: 7/18/2022            Galilea Leahy LPN  05/23/22, 12:30 EDT

## 2022-07-18 ENCOUNTER — OFFICE VISIT (OUTPATIENT)
Dept: INTERNAL MEDICINE | Facility: CLINIC | Age: 77
End: 2022-07-18

## 2022-07-18 VITALS
HEIGHT: 63 IN | BODY MASS INDEX: 27.82 KG/M2 | HEART RATE: 60 BPM | SYSTOLIC BLOOD PRESSURE: 120 MMHG | WEIGHT: 157 LBS | OXYGEN SATURATION: 95 % | TEMPERATURE: 97.1 F | DIASTOLIC BLOOD PRESSURE: 68 MMHG

## 2022-07-18 DIAGNOSIS — M1A.0720 IDIOPATHIC CHRONIC GOUT OF LEFT FOOT WITHOUT TOPHUS: ICD-10-CM

## 2022-07-18 DIAGNOSIS — R60.0 BILATERAL EDEMA OF LOWER EXTREMITY: Primary | ICD-10-CM

## 2022-07-18 DIAGNOSIS — I10 ESSENTIAL HYPERTENSION: ICD-10-CM

## 2022-07-18 PROCEDURE — 99214 OFFICE O/P EST MOD 30 MIN: CPT | Performed by: INTERNAL MEDICINE

## 2022-07-18 RX ORDER — ALLOPURINOL 100 MG/1
100 TABLET ORAL DAILY
Qty: 90 TABLET | Refills: 3 | Status: SHIPPED | OUTPATIENT
Start: 2022-07-18

## 2022-07-18 RX ORDER — FUROSEMIDE 20 MG/1
20 TABLET ORAL DAILY PRN
Qty: 90 TABLET | Refills: 3 | Status: SHIPPED | OUTPATIENT
Start: 2022-07-18 | End: 2022-10-24 | Stop reason: SDUPTHER

## 2022-07-18 RX ORDER — METOPROLOL TARTRATE 50 MG/1
50 TABLET, FILM COATED ORAL DAILY
Qty: 90 TABLET | Refills: 3 | Status: SHIPPED | OUTPATIENT
Start: 2022-07-18 | End: 2022-11-28 | Stop reason: SDUPTHER

## 2022-07-18 RX ORDER — OMEPRAZOLE 20 MG/1
20 CAPSULE, DELAYED RELEASE ORAL DAILY
Qty: 90 CAPSULE | Refills: 3 | Status: SHIPPED | OUTPATIENT
Start: 2022-07-18 | End: 2023-01-22 | Stop reason: HOSPADM

## 2022-07-18 RX ORDER — ATORVASTATIN CALCIUM 10 MG/1
10 TABLET, FILM COATED ORAL DAILY
Qty: 90 TABLET | Refills: 3 | Status: SHIPPED | OUTPATIENT
Start: 2022-07-18 | End: 2023-01-26

## 2022-07-18 RX ORDER — LOSARTAN POTASSIUM 50 MG/1
50 TABLET ORAL DAILY
Qty: 90 TABLET | Refills: 3 | Status: SHIPPED | OUTPATIENT
Start: 2022-07-18 | End: 2022-11-28 | Stop reason: SDUPTHER

## 2022-07-18 RX ORDER — TRAZODONE HYDROCHLORIDE 50 MG/1
50 TABLET ORAL NIGHTLY
Qty: 30 TABLET | Refills: 5 | Status: SHIPPED | OUTPATIENT
Start: 2022-07-18 | End: 2022-12-27

## 2022-07-18 NOTE — PROGRESS NOTES
"Subjective  Vesta Landry is a 77 y.o. female    HPI coming in for follow-up .  Has developed left big toe pain with swelling has a fluctuant mass on the most medial aspect of the big toe history of gout has swelling and deformity there is no new trauma denies fever or chills has been compliant with allopurinol use complains of occasional lower extremity edema    The following portions of the patient's history were reviewed and updated as appropriate: allergies, current medications, past family history, past medical history, past social history, past surgical history, and problem list.     Review of Systems   Constitutional: Positive for fatigue. Negative for activity change, appetite change and fever.   HENT: Negative for congestion, ear discharge, ear pain and trouble swallowing.    Eyes: Negative for photophobia and visual disturbance.   Respiratory: Negative for cough and shortness of breath.    Cardiovascular: Negative for chest pain and palpitations.   Gastrointestinal: Negative for abdominal distention, abdominal pain, constipation, diarrhea, nausea and vomiting.   Endocrine: Negative.    Genitourinary: Negative for dysuria, hematuria and urgency.   Musculoskeletal: Positive for arthralgias. Negative for back pain, joint swelling and myalgias.   Skin: Negative for color change and rash.   Allergic/Immunologic: Negative.    Neurological: Negative for dizziness, weakness, light-headedness and headaches.   Hematological: Negative for adenopathy. Does not bruise/bleed easily.   Psychiatric/Behavioral: Negative for agitation, confusion and dysphoric mood. The patient is not nervous/anxious.        Visit Vitals  /68   Pulse 60   Temp 97.1 °F (36.2 °C) (Infrared)   Ht 160 cm (63\")   Wt 71.2 kg (157 lb)   SpO2 95%   BMI 27.81 kg/m²       Objective  Physical Exam  Constitutional:       General: She is not in acute distress.     Appearance: She is well-developed.   HENT:      Nose: Nose normal.   Eyes:     "  General: No scleral icterus.     Conjunctiva/sclera: Conjunctivae normal.   Neck:      Thyroid: No thyromegaly.      Trachea: No tracheal deviation.   Cardiovascular:      Rate and Rhythm: Normal rate and regular rhythm.      Heart sounds: No murmur heard.    No friction rub.   Pulmonary:      Effort: No respiratory distress.      Breath sounds: No wheezing or rales.   Abdominal:      General: There is no distension.      Palpations: Abdomen is soft. There is no mass.      Tenderness: There is no abdominal tenderness. There is no guarding.   Musculoskeletal:         General: Tenderness and deformity present. Normal range of motion.   Lymphadenopathy:      Cervical: No cervical adenopathy.   Skin:     General: Skin is warm and dry.      Findings: No erythema or rash.   Neurological:      Mental Status: She is alert and oriented to person, place, and time.      Cranial Nerves: No cranial nerve deficit.      Coordination: Coordination normal.      Deep Tendon Reflexes: Reflexes are normal and symmetric.   Psychiatric:         Behavior: Behavior normal.         Thought Content: Thought content normal.         Judgment: Judgment normal.         Diagnoses and all orders for this visit:    Bilateral edema of lower extremity continue with salt restriction leg elevation Lasix as needed only    Essential hypertension stable with current meds and low-salt diet  -     metoprolol tartrate (LOPRESSOR) 50 MG tablet; Take 1 tablet by mouth Daily.  -     omeprazole (priLOSEC) 20 MG capsule; Take 1 capsule by mouth Daily.    Idiopathic chronic gout of right foot without tophus lesion drained sent off for studies will check gram stain and CNS.  NSAIDs as needed discussed local wound care with Band-Aid dressing advised to keep this covered and clean    Other orders  -     allopurinol (ZYLOPRIM) 100 MG tablet; Take 1 tablet by mouth Daily.  -     atorvastatin (LIPITOR) 10 MG tablet; Take 1 tablet by mouth Daily.  -     furosemide  (LASIX) 20 MG tablet; Take 1 tablet by mouth Daily As Needed (swelling). swelling  -     losartan (COZAAR) 50 MG tablet; Take 1 tablet by mouth Daily.  -     traZODone (DESYREL) 50 MG tablet; Take 1 tablet by mouth Every Night.

## 2022-07-19 DIAGNOSIS — M1A.0720 IDIOPATHIC CHRONIC GOUT OF LEFT FOOT WITHOUT TOPHUS: Primary | ICD-10-CM

## 2022-07-25 LAB
BACTERIA SPEC CULT: NORMAL
BACTERIA SPEC CULT: NORMAL
MICROORGANISM/AGENT SPEC: NORMAL
MICROORGANISM/AGENT SPEC: NORMAL

## 2022-07-26 ENCOUNTER — OFFICE VISIT (OUTPATIENT)
Dept: INTERNAL MEDICINE | Facility: CLINIC | Age: 77
End: 2022-07-26

## 2022-07-26 VITALS
WEIGHT: 158 LBS | HEIGHT: 63 IN | OXYGEN SATURATION: 94 % | HEART RATE: 82 BPM | TEMPERATURE: 97.8 F | SYSTOLIC BLOOD PRESSURE: 158 MMHG | BODY MASS INDEX: 28 KG/M2 | DIASTOLIC BLOOD PRESSURE: 86 MMHG

## 2022-07-26 DIAGNOSIS — M1A.0720 IDIOPATHIC CHRONIC GOUT OF LEFT FOOT WITHOUT TOPHUS: ICD-10-CM

## 2022-07-26 DIAGNOSIS — I10 ESSENTIAL HYPERTENSION: ICD-10-CM

## 2022-07-26 DIAGNOSIS — R60.0 BILATERAL EDEMA OF LOWER EXTREMITY: Primary | ICD-10-CM

## 2022-07-26 LAB
CRYSTALS FLD MICRO: NORMAL
SPECIMEN STATUS: NORMAL

## 2022-07-26 PROCEDURE — 99214 OFFICE O/P EST MOD 30 MIN: CPT | Performed by: NURSE PRACTITIONER

## 2022-07-26 RX ORDER — COLCHICINE 0.6 MG/1
0.6 TABLET ORAL DAILY
Qty: 10 TABLET | Refills: 0 | Status: SHIPPED | OUTPATIENT
Start: 2022-07-26 | End: 2022-08-05

## 2022-10-24 ENCOUNTER — PATIENT MESSAGE (OUTPATIENT)
Dept: INTERNAL MEDICINE | Facility: CLINIC | Age: 77
End: 2022-10-24

## 2022-10-24 RX ORDER — FUROSEMIDE 20 MG/1
20 TABLET ORAL DAILY PRN
Qty: 90 TABLET | Refills: 3 | Status: CANCELLED | OUTPATIENT
Start: 2022-10-24

## 2022-10-24 RX ORDER — FUROSEMIDE 20 MG/1
20 TABLET ORAL DAILY PRN
Qty: 90 TABLET | Refills: 3 | Status: SHIPPED | OUTPATIENT
Start: 2022-10-24 | End: 2022-12-05 | Stop reason: SDUPTHER

## 2022-10-24 NOTE — TELEPHONE ENCOUNTER
I spoke with the pharmacy and with Meliza. Insurance will not cover until 11/2 or 11/4. Vesta will have to pay out of pocket until then. The pharmacy will have a Good Rx card to help bring the cost down

## 2022-11-16 ENCOUNTER — NURSE TRIAGE (OUTPATIENT)
Dept: CALL CENTER | Facility: HOSPITAL | Age: 77
End: 2022-11-16

## 2022-11-16 ENCOUNTER — OFFICE VISIT (OUTPATIENT)
Dept: INTERNAL MEDICINE | Facility: CLINIC | Age: 77
End: 2022-11-16

## 2022-11-16 VITALS
HEIGHT: 63 IN | SYSTOLIC BLOOD PRESSURE: 182 MMHG | OXYGEN SATURATION: 100 % | TEMPERATURE: 97.1 F | HEART RATE: 73 BPM | DIASTOLIC BLOOD PRESSURE: 65 MMHG | WEIGHT: 153 LBS | RESPIRATION RATE: 15 BRPM | BODY MASS INDEX: 27.11 KG/M2

## 2022-11-16 DIAGNOSIS — I10 ESSENTIAL HYPERTENSION: Primary | ICD-10-CM

## 2022-11-16 DIAGNOSIS — E79.0 ELEVATED URIC ACID IN BLOOD: ICD-10-CM

## 2022-11-16 DIAGNOSIS — R06.09 DYSPNEA ON EXERTION: ICD-10-CM

## 2022-11-16 DIAGNOSIS — M79.89 SWELLING OF BOTH LOWER EXTREMITIES: ICD-10-CM

## 2022-11-16 PROCEDURE — 99214 OFFICE O/P EST MOD 30 MIN: CPT | Performed by: NURSE PRACTITIONER

## 2022-11-16 NOTE — TELEPHONE ENCOUNTER
"Was seen today in office, was having fluid retension and her BP has been running high, had blood drawn today. Was told to keep bp record, tonight /78, unknown HR. No symptoms, Now /66, HR 56 told to keep record as told and call  In AM    Reason for Disposition  • Systolic BP  >= 180 OR Diastolic >= 110    Additional Information  • Negative: Difficult to awaken or acting confused (e.g., disoriented, slurred speech)  • Negative: Severe difficulty breathing (e.g., struggling for each breath, speaks in single words)  • Negative: [1] Weakness of the face, arm or leg on one side of the body AND [2] new onset  • Negative: [1] Numbness (i.e., loss of sensation) of the face, arm or leg on one side of the body AND [2] new onset  • Negative: [1] Chest pain lasts > 5 minutes AND [2] history of heart disease  (i.e., heart attack, bypass surgery, angina, angioplasty, CHF)  • Negative: [1] Chest pain AND [2] took nitrogylcerin AND [3] pain was not relieved  • Negative: Sounds like a life-threatening emergency to the triager  • Negative: Symptom is main concern  (e.g., headache, chest pain)  • Negative: Low blood pressure is main concern  • Negative: [1] Systolic BP  >= 160 OR Diastolic >= 100 AND [2] cardiac or neurologic symptoms (e.g., chest pain, difficulty breathing, unsteady gait, blurred vision)  • Negative: [1] Pregnant > 20 weeks (or postpartum < 6 weeks) AND [2] new hand or face swelling  • Negative: [1] Pregnant > 20 weeks AND [2] BP Systolic BP  >= 140 OR Diastolic >= 90  • Negative: [1] Systolic BP  >= 200 OR Diastolic >= 120  AND [2] having NO cardiac or neurologic symptoms  • Negative: [1] Postpartum < 6 weeks AND [2] BP Systolic BP  >= 140 OR Diastolic >= 90  • Negative: [1] Systolic BP  >= 180 OR Diastolic >= 110 AND [2] missed most recent dose of blood pressure medication    Answer Assessment - Initial Assessment Questions  1. BLOOD PRESSURE: \"What is the blood pressure?\" \"Did you take at least two " "measurements 5 minutes apart?\"      198/78  2. ONSET: \"When did you take your blood pressure?\"      Just now  3. HOW: \"How did you obtain the blood pressure?\" (e.g., visiting nurse, automatic home BP monitor)      Automatic cuff  4. HISTORY: \"Do you have a history of high blood pressure?\"      yes  5. MEDICATIONS: \"Are you taking any medications for blood pressure?\" \"Have you missed any doses recently?\"      Taking meds  6. OTHER SYMPTOMS: \"Do you have any symptoms?\" (e.g., headache, chest pain, blurred vision, difficulty breathing, weakness)      No symptoms  7. PREGNANCY: \"Is there any chance you are pregnant?\" \"When was your last menstrual period?\"      no    Protocols used: HIGH BLOOD PRESSURE-ADULT-AH      "

## 2022-11-17 ENCOUNTER — CLINICAL SUPPORT (OUTPATIENT)
Dept: INTERNAL MEDICINE | Facility: CLINIC | Age: 77
End: 2022-11-17

## 2022-11-17 DIAGNOSIS — R39.9 UTI SYMPTOMS: Primary | ICD-10-CM

## 2022-11-17 DIAGNOSIS — R30.9 PAINFUL URINATION: Primary | ICD-10-CM

## 2022-11-17 LAB
ALBUMIN SERPL-MCNC: 4.6 G/DL (ref 3.5–5.2)
ALBUMIN/GLOB SERPL: 2 G/DL
ALP SERPL-CCNC: 104 U/L (ref 39–117)
ALT SERPL-CCNC: 13 U/L (ref 1–33)
AST SERPL-CCNC: 21 U/L (ref 1–32)
BASOPHILS # BLD AUTO: 0.05 10*3/MM3 (ref 0–0.2)
BASOPHILS NFR BLD AUTO: 0.5 % (ref 0–1.5)
BILIRUB BLD-MCNC: NEGATIVE MG/DL
BILIRUB SERPL-MCNC: 0.4 MG/DL (ref 0–1.2)
BUN SERPL-MCNC: 18 MG/DL (ref 8–23)
BUN/CREAT SERPL: 17.6 (ref 7–25)
CALCIUM SERPL-MCNC: 9.3 MG/DL (ref 8.6–10.5)
CHLORIDE SERPL-SCNC: 99 MMOL/L (ref 98–107)
CLARITY, POC: ABNORMAL
CO2 SERPL-SCNC: 21 MMOL/L (ref 22–29)
COLOR UR: YELLOW
CREAT SERPL-MCNC: 1.02 MG/DL (ref 0.57–1)
EGFRCR SERPLBLD CKD-EPI 2021: 56.1 ML/MIN/1.73
EOSINOPHIL # BLD AUTO: 0.13 10*3/MM3 (ref 0–0.4)
EOSINOPHIL NFR BLD AUTO: 1.3 % (ref 0.3–6.2)
ERYTHROCYTE [DISTWIDTH] IN BLOOD BY AUTOMATED COUNT: 14.2 % (ref 12.3–15.4)
EXPIRATION DATE: ABNORMAL
GLOBULIN SER CALC-MCNC: 2.3 GM/DL
GLUCOSE SERPL-MCNC: 78 MG/DL (ref 65–99)
GLUCOSE UR STRIP-MCNC: NEGATIVE MG/DL
HCT VFR BLD AUTO: 37.7 % (ref 34–46.6)
HGB BLD-MCNC: 12.5 G/DL (ref 12–15.9)
IMM GRANULOCYTES # BLD AUTO: 0.03 10*3/MM3 (ref 0–0.05)
IMM GRANULOCYTES NFR BLD AUTO: 0.3 % (ref 0–0.5)
KETONES UR QL: NEGATIVE
LEUKOCYTE EST, POC: ABNORMAL
LYMPHOCYTES # BLD AUTO: 1.84 10*3/MM3 (ref 0.7–3.1)
LYMPHOCYTES NFR BLD AUTO: 18.9 % (ref 19.6–45.3)
Lab: ABNORMAL
MCH RBC QN AUTO: 30.2 PG (ref 26.6–33)
MCHC RBC AUTO-ENTMCNC: 33.2 G/DL (ref 31.5–35.7)
MCV RBC AUTO: 91.1 FL (ref 79–97)
MONOCYTES # BLD AUTO: 1.18 10*3/MM3 (ref 0.1–0.9)
MONOCYTES NFR BLD AUTO: 12.1 % (ref 5–12)
NEUTROPHILS # BLD AUTO: 6.53 10*3/MM3 (ref 1.7–7)
NEUTROPHILS NFR BLD AUTO: 66.9 % (ref 42.7–76)
NITRITE UR-MCNC: POSITIVE MG/ML
NRBC BLD AUTO-RTO: 0 /100 WBC (ref 0–0.2)
NT-PROBNP SERPL-MCNC: 1924 PG/ML (ref 0–738)
PH UR: 6 [PH] (ref 5–8)
PLATELET # BLD AUTO: 202 10*3/MM3 (ref 140–450)
POC CREATININE URINE: NORMAL
POC MICROALBUMIN URINE: NORMAL
POTASSIUM SERPL-SCNC: 4.7 MMOL/L (ref 3.5–5.2)
PROT SERPL-MCNC: 6.9 G/DL (ref 6–8.5)
PROT UR STRIP-MCNC: NEGATIVE MG/DL
RBC # BLD AUTO: 4.14 10*6/MM3 (ref 3.77–5.28)
RBC # UR STRIP: NEGATIVE /UL
SODIUM SERPL-SCNC: 138 MMOL/L (ref 136–145)
SP GR UR: 1.01 (ref 1–1.03)
URATE SERPL-MCNC: 6.8 MG/DL (ref 2.4–5.7)
UROBILINOGEN UR QL: NORMAL
WBC # BLD AUTO: 9.76 10*3/MM3 (ref 3.4–10.8)

## 2022-11-17 PROCEDURE — 81003 URINALYSIS AUTO W/O SCOPE: CPT | Performed by: NURSE PRACTITIONER

## 2022-11-17 PROCEDURE — 82044 UR ALBUMIN SEMIQUANTITATIVE: CPT | Performed by: NURSE PRACTITIONER

## 2022-11-20 LAB
BACTERIA UR CULT: NORMAL
BACTERIA UR CULT: NORMAL

## 2022-11-21 DIAGNOSIS — E78.00 PURE HYPERCHOLESTEROLEMIA: ICD-10-CM

## 2022-11-21 DIAGNOSIS — R06.09 DYSPNEA ON EXERTION: ICD-10-CM

## 2022-11-21 DIAGNOSIS — I10 ESSENTIAL HYPERTENSION: Primary | ICD-10-CM

## 2022-11-21 DIAGNOSIS — M79.89 SWELLING OF BOTH LOWER EXTREMITIES: ICD-10-CM

## 2022-11-22 ENCOUNTER — CLINICAL SUPPORT (OUTPATIENT)
Dept: INTERNAL MEDICINE | Facility: CLINIC | Age: 77
End: 2022-11-22

## 2022-11-22 DIAGNOSIS — R39.9 UTI SYMPTOMS: Primary | ICD-10-CM

## 2022-11-22 LAB
BILIRUB BLD-MCNC: NEGATIVE MG/DL
CLARITY, POC: ABNORMAL
COLOR UR: YELLOW
EXPIRATION DATE: ABNORMAL
GLUCOSE UR STRIP-MCNC: NEGATIVE MG/DL
KETONES UR QL: NEGATIVE
LEUKOCYTE EST, POC: ABNORMAL
Lab: ABNORMAL
NITRITE UR-MCNC: POSITIVE MG/ML
PH UR: 6 [PH] (ref 5–8)
PROT UR STRIP-MCNC: ABNORMAL MG/DL
RBC # UR STRIP: NEGATIVE /UL
SP GR UR: 1.02 (ref 1–1.03)
UROBILINOGEN UR QL: NORMAL

## 2022-11-22 PROCEDURE — 81003 URINALYSIS AUTO W/O SCOPE: CPT | Performed by: NURSE PRACTITIONER

## 2022-11-22 RX ORDER — CEFUROXIME AXETIL 500 MG/1
500 TABLET ORAL 2 TIMES DAILY
Qty: 14 TABLET | Refills: 0 | Status: SHIPPED | OUTPATIENT
Start: 2022-11-22 | End: 2022-11-29

## 2022-11-26 LAB
BACTERIA UR CULT: ABNORMAL
BACTERIA UR CULT: ABNORMAL
OTHER ANTIBIOTIC SUSC ISLT: ABNORMAL

## 2022-11-28 ENCOUNTER — OFFICE VISIT (OUTPATIENT)
Dept: INTERNAL MEDICINE | Facility: CLINIC | Age: 77
End: 2022-11-28

## 2022-11-28 ENCOUNTER — TELEPHONE (OUTPATIENT)
Dept: INTERNAL MEDICINE | Facility: CLINIC | Age: 77
End: 2022-11-28

## 2022-11-28 ENCOUNTER — PATIENT MESSAGE (OUTPATIENT)
Dept: INTERNAL MEDICINE | Facility: CLINIC | Age: 77
End: 2022-11-28

## 2022-11-28 VITALS
DIASTOLIC BLOOD PRESSURE: 92 MMHG | HEART RATE: 72 BPM | WEIGHT: 155 LBS | TEMPERATURE: 96.9 F | OXYGEN SATURATION: 99 % | BODY MASS INDEX: 27.46 KG/M2 | SYSTOLIC BLOOD PRESSURE: 192 MMHG | HEIGHT: 63 IN

## 2022-11-28 DIAGNOSIS — L65.9 HAIR LOSS: ICD-10-CM

## 2022-11-28 DIAGNOSIS — R49.9 CHANGE IN VOICE: ICD-10-CM

## 2022-11-28 DIAGNOSIS — I10 ESSENTIAL HYPERTENSION: ICD-10-CM

## 2022-11-28 DIAGNOSIS — R59.0 ENLARGED LYMPH NODE IN NECK: Primary | ICD-10-CM

## 2022-11-28 PROCEDURE — 99214 OFFICE O/P EST MOD 30 MIN: CPT | Performed by: NURSE PRACTITIONER

## 2022-11-28 RX ORDER — METOPROLOL TARTRATE 50 MG/1
50 TABLET, FILM COATED ORAL 2 TIMES DAILY
Qty: 60 TABLET | Refills: 0 | Status: SHIPPED | OUTPATIENT
Start: 2022-11-28 | End: 2022-11-29

## 2022-11-28 RX ORDER — LOSARTAN POTASSIUM 50 MG/1
100 TABLET ORAL DAILY
Qty: 60 TABLET | Refills: 0 | Status: SHIPPED | OUTPATIENT
Start: 2022-11-28 | End: 2022-11-29

## 2022-11-28 NOTE — TELEPHONE ENCOUNTER
----- Message from LADARIUS Hopkins sent at 11/28/2022  3:11 PM EST -----  Regarding: BP and biopsy  Please advise patient/her daughter Meliza I've spoke to Dr Pedraza. He recommends increasing losartan to 100 mg daily (can be taken at one or in 2 separate doses), increase metoprolol to 50 mg BID.  Continue to monitor blood pressure and return in a week.      He advises to go ahead and biopsy the lymph node. Will refer to gen surgeon - please verify if she has a preferred provider & let me know so I can place the referral.

## 2022-11-28 NOTE — TELEPHONE ENCOUNTER
[facility] INFORMED, APPT SCHEDULED, MY CHART MESSAGE SENT AT [facility], THEY [facility]

## 2022-11-29 RX ORDER — LOSARTAN POTASSIUM 100 MG/1
100 TABLET ORAL DAILY
Qty: 90 TABLET | Refills: 1 | Status: SHIPPED | OUTPATIENT
Start: 2022-11-29

## 2022-11-29 RX ORDER — METOPROLOL TARTRATE 50 MG/1
TABLET, FILM COATED ORAL
Qty: 180 TABLET | Refills: 0 | Status: SHIPPED | OUTPATIENT
Start: 2022-11-29 | End: 2023-03-01 | Stop reason: HOSPADM

## 2022-11-29 NOTE — TELEPHONE ENCOUNTER
From: DICK NEVILLE  To: Vesta Venturanes  Sent: 11/28/2022 4:14 PM EST  Subject: DR PEDRAZA MESSAGE    Please advise patient/her daughter Meliza I've spoke to Dr Pedraza. He recommends increasing losartan to 100 mg daily (can be taken at one or in 2 separate doses), increase metoprolol to 50 mg BID. Continue to monitor blood pressure and return in a week.     He advises to go ahead and biopsy the lymph node. Will refer to gen surgeon - please verify if she has a preferred provider & let me know so I can place the referral.

## 2022-11-30 LAB
BASOPHILS # BLD AUTO: 0.1 X10E3/UL (ref 0–0.2)
BASOPHILS NFR BLD AUTO: 1 %
EOSINOPHIL # BLD AUTO: 0.2 X10E3/UL (ref 0–0.4)
EOSINOPHIL NFR BLD AUTO: 2 %
ERYTHROCYTE [DISTWIDTH] IN BLOOD BY AUTOMATED COUNT: 14.4 % (ref 11.7–15.4)
HCT VFR BLD AUTO: 38.4 % (ref 34–46.6)
HGB BLD-MCNC: 12.8 G/DL (ref 11.1–15.9)
IMM GRANULOCYTES # BLD AUTO: 0.1 X10E3/UL (ref 0–0.1)
IMM GRANULOCYTES NFR BLD AUTO: 1 %
LYMPHOCYTES # BLD AUTO: 1.7 X10E3/UL (ref 0.7–3.1)
LYMPHOCYTES NFR BLD AUTO: 21 %
MCH RBC QN AUTO: 30.6 PG (ref 26.6–33)
MCHC RBC AUTO-ENTMCNC: 33.3 G/DL (ref 31.5–35.7)
MCV RBC AUTO: 92 FL (ref 79–97)
MONOCYTES # BLD AUTO: 0.8 X10E3/UL (ref 0.1–0.9)
MONOCYTES NFR BLD AUTO: 10 %
NEUTROPHILS # BLD AUTO: 5.5 X10E3/UL (ref 1.4–7)
NEUTROPHILS NFR BLD AUTO: 65 %
PATH INTERP BLD-IMP: NORMAL
PATH REV BLD -IMP: NORMAL
PATHOLOGIST NAME: NORMAL
PLATELET # BLD AUTO: 164 X10E3/UL (ref 150–450)
RBC # BLD AUTO: 4.18 X10E6/UL (ref 3.77–5.28)
T4 FREE SERPL-MCNC: 1.09 NG/DL (ref 0.93–1.7)
THYROPEROXIDASE AB SERPL-ACNC: <9 IU/ML (ref 0–34)
TSH SERPL DL<=0.005 MIU/L-ACNC: 3.96 UIU/ML (ref 0.27–4.2)
WBC # BLD AUTO: 8.3 X10E3/UL (ref 3.4–10.8)

## 2022-11-30 RX ORDER — AMLODIPINE BESYLATE 2.5 MG/1
2.5 TABLET ORAL DAILY
Qty: 30 TABLET | Refills: 5 | Status: SHIPPED | OUTPATIENT
Start: 2022-11-30 | End: 2022-12-05

## 2022-12-02 ENCOUNTER — APPOINTMENT (OUTPATIENT)
Dept: CT IMAGING | Facility: HOSPITAL | Age: 77
End: 2022-12-02

## 2022-12-02 ENCOUNTER — TELEPHONE (OUTPATIENT)
Dept: INTERNAL MEDICINE | Facility: CLINIC | Age: 77
End: 2022-12-02

## 2022-12-02 ENCOUNTER — HOSPITAL ENCOUNTER (EMERGENCY)
Facility: HOSPITAL | Age: 77
Discharge: HOME | End: 2022-12-02
Attending: EMERGENCY MEDICINE | Admitting: EMERGENCY MEDICINE

## 2022-12-02 VITALS
BODY MASS INDEX: 29.27 KG/M2 | HEIGHT: 61 IN | TEMPERATURE: 97.9 F | DIASTOLIC BLOOD PRESSURE: 64 MMHG | RESPIRATION RATE: 16 BRPM | HEART RATE: 64 BPM | SYSTOLIC BLOOD PRESSURE: 163 MMHG | WEIGHT: 155 LBS | OXYGEN SATURATION: 98 %

## 2022-12-02 DIAGNOSIS — I10 RESISTANT HYPERTENSION: Primary | ICD-10-CM

## 2022-12-02 LAB
ALBUMIN SERPL-MCNC: 4.4 G/DL (ref 3.5–5.2)
ALBUMIN/GLOB SERPL: 1.3 G/DL
ALP SERPL-CCNC: 109 U/L (ref 39–117)
ALT SERPL W P-5'-P-CCNC: 14 U/L (ref 1–33)
ANION GAP SERPL CALCULATED.3IONS-SCNC: 12 MMOL/L (ref 5–15)
AST SERPL-CCNC: 25 U/L (ref 1–32)
BASOPHILS # BLD AUTO: 0.04 10*3/MM3 (ref 0–0.2)
BASOPHILS NFR BLD AUTO: 0.5 % (ref 0–1.5)
BILIRUB SERPL-MCNC: 0.5 MG/DL (ref 0–1.2)
BUN SERPL-MCNC: 17 MG/DL (ref 8–23)
BUN/CREAT SERPL: 19.5 (ref 7–25)
CALCIUM SPEC-SCNC: 9.4 MG/DL (ref 8.6–10.5)
CHLORIDE SERPL-SCNC: 100 MMOL/L (ref 98–107)
CO2 SERPL-SCNC: 25 MMOL/L (ref 22–29)
CREAT SERPL-MCNC: 0.87 MG/DL (ref 0.57–1)
DEPRECATED RDW RBC AUTO: 49.2 FL (ref 37–54)
EGFRCR SERPLBLD CKD-EPI 2021: 67.9 ML/MIN/1.73
EOSINOPHIL # BLD AUTO: 0.11 10*3/MM3 (ref 0–0.4)
EOSINOPHIL NFR BLD AUTO: 1.4 % (ref 0.3–6.2)
ERYTHROCYTE [DISTWIDTH] IN BLOOD BY AUTOMATED COUNT: 14.6 % (ref 12.3–15.4)
GLOBULIN UR ELPH-MCNC: 3.3 GM/DL
GLUCOSE SERPL-MCNC: 100 MG/DL (ref 65–99)
HCT VFR BLD AUTO: 38.6 % (ref 34–46.6)
HGB BLD-MCNC: 12.7 G/DL (ref 12–15.9)
HOLD SPECIMEN: NORMAL
IMM GRANULOCYTES # BLD AUTO: 0.04 10*3/MM3 (ref 0–0.05)
IMM GRANULOCYTES NFR BLD AUTO: 0.5 % (ref 0–0.5)
LYMPHOCYTES # BLD AUTO: 1.56 10*3/MM3 (ref 0.7–3.1)
LYMPHOCYTES NFR BLD AUTO: 19.2 % (ref 19.6–45.3)
MCH RBC QN AUTO: 30.2 PG (ref 26.6–33)
MCHC RBC AUTO-ENTMCNC: 32.9 G/DL (ref 31.5–35.7)
MCV RBC AUTO: 91.9 FL (ref 79–97)
MONOCYTES # BLD AUTO: 0.88 10*3/MM3 (ref 0.1–0.9)
MONOCYTES NFR BLD AUTO: 10.8 % (ref 5–12)
NEUTROPHILS NFR BLD AUTO: 5.49 10*3/MM3 (ref 1.7–7)
NEUTROPHILS NFR BLD AUTO: 67.6 % (ref 42.7–76)
NRBC BLD AUTO-RTO: 0 /100 WBC (ref 0–0.2)
PLATELET # BLD AUTO: 186 10*3/MM3 (ref 140–450)
PMV BLD AUTO: 11.5 FL (ref 6–12)
POTASSIUM SERPL-SCNC: 4.2 MMOL/L (ref 3.5–5.2)
PROT SERPL-MCNC: 7.7 G/DL (ref 6–8.5)
RBC # BLD AUTO: 4.2 10*6/MM3 (ref 3.77–5.28)
SODIUM SERPL-SCNC: 137 MMOL/L (ref 136–145)
T4 FREE SERPL-MCNC: 1.1 NG/DL (ref 0.93–1.7)
TSH SERPL DL<=0.05 MIU/L-ACNC: 4.29 UIU/ML (ref 0.27–4.2)
WBC NRBC COR # BLD: 8.12 10*3/MM3 (ref 3.4–10.8)
WHOLE BLOOD HOLD COAG: NORMAL
WHOLE BLOOD HOLD SPECIMEN: NORMAL

## 2022-12-02 PROCEDURE — 93005 ELECTROCARDIOGRAM TRACING: CPT | Performed by: EMERGENCY MEDICINE

## 2022-12-02 PROCEDURE — 84443 ASSAY THYROID STIM HORMONE: CPT | Performed by: EMERGENCY MEDICINE

## 2022-12-02 PROCEDURE — 25010000002 HYDRALAZINE PER 20 MG: Performed by: EMERGENCY MEDICINE

## 2022-12-02 PROCEDURE — 99284 EMERGENCY DEPT VISIT MOD MDM: CPT

## 2022-12-02 PROCEDURE — 96374 THER/PROPH/DIAG INJ IV PUSH: CPT

## 2022-12-02 PROCEDURE — 80053 COMPREHEN METABOLIC PANEL: CPT | Performed by: EMERGENCY MEDICINE

## 2022-12-02 PROCEDURE — 85025 COMPLETE CBC W/AUTO DIFF WBC: CPT | Performed by: EMERGENCY MEDICINE

## 2022-12-02 PROCEDURE — 84439 ASSAY OF FREE THYROXINE: CPT | Performed by: EMERGENCY MEDICINE

## 2022-12-02 PROCEDURE — 70450 CT HEAD/BRAIN W/O DYE: CPT

## 2022-12-02 RX ORDER — HYDRALAZINE HYDROCHLORIDE 25 MG/1
25 TABLET, FILM COATED ORAL 2 TIMES DAILY
Qty: 60 TABLET | Refills: 0 | Status: SHIPPED | OUTPATIENT
Start: 2022-12-02 | End: 2022-12-16 | Stop reason: SDUPTHER

## 2022-12-02 RX ORDER — SODIUM CHLORIDE 0.9 % (FLUSH) 0.9 %
10 SYRINGE (ML) INJECTION AS NEEDED
Status: DISCONTINUED | OUTPATIENT
Start: 2022-12-02 | End: 2022-12-02 | Stop reason: HOSPADM

## 2022-12-02 RX ORDER — HYDRALAZINE HYDROCHLORIDE 20 MG/ML
20 INJECTION INTRAMUSCULAR; INTRAVENOUS ONCE
Status: COMPLETED | OUTPATIENT
Start: 2022-12-02 | End: 2022-12-02

## 2022-12-02 RX ADMIN — HYDRALAZINE HYDROCHLORIDE 20 MG: 20 INJECTION INTRAMUSCULAR; INTRAVENOUS at 13:53

## 2022-12-02 NOTE — TELEPHONE ENCOUNTER
I spoke with [facility]. The insurance prefers a 90 day supply. I requested they fill it for the amount prescribed.     Meliza informed via detailed voice mail - the pharmacy is getting the script ready.     Requested a call back with any questions

## 2022-12-02 NOTE — TELEPHONE ENCOUNTER
Caller: Meliza Back    Relationship: Emergency Contact    Best call back number: [phone]    What medication are you requesting:     HYDRALAZINE       If a prescription is needed, what is your preferred pharmacy and phone number:      Additional notes:    PATIENT WAS AT [facility] 12/2/22 AND THEY GAVE HER A DOSE OF HYDRALAZINE BUT PHARMACY WILL NOT FILL PRESCRIPTION WITHOUT DR DOE'S APPROVAL

## 2022-12-02 NOTE — DISCHARGE INSTRUCTIONS
Check your blood pressure twice daily (morning and evening) and keep a log.  Take this log to your follow up visits to discuss whether medication changes are necessary.

## 2022-12-03 LAB
QT INTERVAL: 476 MS
QTC INTERVAL: 429 MS

## 2022-12-03 NOTE — ED PROVIDER NOTES
Subjective   History of Present Illness    Pt presents with elevated blood pressure.  A few weeks ago she had PCP visit and was found to have severe HTN.  She had not been aware, hadn't checked BP in months before that.  Since then it's stayed up.  Her meds have been adjusted, PCP increased all of her BP meds without improvement.  This week they started amlodipine at low dose because pt has history of edema problems but she's only had a couple of doses and no improvement has been seen.  She has been having some headaches and so PCP recommended she come to the ED for eval.    She denies speech change, vision change, numbness, tingling, weakness, chest pain, dyspnea.      History provided by:  Patient and relative      Review of Systems   Eyes: Negative for visual disturbance.   Respiratory: Negative for shortness of breath.    Cardiovascular: Negative for chest pain.   Gastrointestinal: Negative for vomiting.   Neurological: Positive for headaches. Negative for dizziness and speech difficulty.   All other systems reviewed and are negative.      Past Medical History:   Diagnosis Date   • Arthritis    • Deep vein thrombosis (HCC)     bilateral   • Dysphagia    • Hyperlipidemia    • Hypertension    • Vitamin D deficiency        No Known Allergies    Past Surgical History:   Procedure Laterality Date   • APPENDECTOMY     • CATARACT EXTRACTION     • CHOLECYSTECTOMY     • COLONOSCOPY      Approx 2009   • HYSTERECTOMY  1991   • OOPHORECTOMY Bilateral 1991   • TUBAL ABDOMINAL LIGATION         Family History   Problem Relation Age of Onset   • Heart failure Mother    • Hyperlipidemia Mother    • Hypertension Mother    • Cancer Father    • Cancer Sister    • Stroke Sister    • Breast cancer Maternal Aunt    • Breast cancer Paternal Aunt    • Ovarian cancer Neg Hx        Social History     Socioeconomic History   • Marital status:    Tobacco Use   • Smoking status: Former     Packs/day: 0.15     Years: 10.00     Pack  years: 1.50     Types: Cigarettes     Quit date: 1987     Years since quittin.9   • Smokeless tobacco: Never   Substance and Sexual Activity   • Alcohol use: No   • Drug use: No   • Sexual activity: Defer           Objective   Physical Exam  Vitals and nursing note reviewed.   Constitutional:       General: She is not in acute distress.     Appearance: Normal appearance. She is not ill-appearing.   HENT:      Head: Normocephalic and atraumatic.      Mouth/Throat:      Mouth: Mucous membranes are moist.   Eyes:      General: No scleral icterus.        Right eye: No discharge.         Left eye: No discharge.      Conjunctiva/sclera: Conjunctivae normal.   Cardiovascular:      Rate and Rhythm: Normal rate and regular rhythm.      Heart sounds: No murmur heard.  Pulmonary:      Effort: Pulmonary effort is normal. No respiratory distress.      Breath sounds: Normal breath sounds. No wheezing.   Abdominal:      General: Bowel sounds are normal. There is no distension.      Palpations: Abdomen is soft.      Tenderness: There is no abdominal tenderness. There is no guarding or rebound.   Musculoskeletal:         General: No swelling. Normal range of motion.      Cervical back: Normal range of motion and neck supple.   Skin:     General: Skin is warm and dry.      Findings: No rash.   Neurological:      General: No focal deficit present.      Mental Status: She is alert. Mental status is at baseline.      Comments: Speech fluent and articulate.  No facial droop.  5/5 strength in arms and legs.  Normal .  Mentation normal.   Psychiatric:         Mood and Affect: Mood normal.         Behavior: Behavior normal.         Thought Content: Thought content normal.         Procedures           ED Course    Discussed severe HTN in the absence of end-organ damage with pt and family.  They are very anxious about BP and reuested eval for underlying problems.     EKG SB, LVH.  Labs benign.  CT head negative.  BP improved  with hydralazine.    Long discussion with pt and family.  Discussed lack of evidence of end-organ damage, need to improve BP gradually after longstanding elevation.  Discussed various medical options for that, diuretics (already on Lasix but not a thiazide) or aldactone (problematic with her ARB).  She did well with hydralazine here, will add that to her BB, ARB and loop diuretic.  I don't think she needs the low dose amlodipine.  Discussed expected course, need to give meds time to work, check BP, hope to normalize over a week or so.  Discussed reasons to return for eval.                                  MDM  Number of Diagnoses or Management Options     Amount and/or Complexity of Data Reviewed  Clinical lab tests: reviewed and ordered  Tests in the radiology section of CPT®: ordered and reviewed  Independent visualization of images, tracings, or specimens: yes        Final diagnoses:   Resistant hypertension       ED Disposition  ED Disposition     ED Disposition   Discharge    Condition   Stable    Comment   --             Bossman Pedraza MD  [address]  [address]  [facility] [id number] [phone]    In 1 week      Roderick Hamm IV, MD  [address] [facility] [address]  [place] [id number] [phone]               Medication List      New Prescriptions    hydrALAZINE 25 MG tablet  Commonly known as: APRESOLINE  Take 1 tablet by mouth 2 (Two) Times a Day.           Where to Get Your Medications      These medications were sent to [id number] - [place], [place] - [address] AT [facility] [address] - [phone] PH - [phone] FX  [address], [facility] [address]    Phone: [phone]   · hydrALAZINE 25 MG tablet          Rohan Snyder MD  12/02/22 9655

## 2022-12-05 ENCOUNTER — OFFICE VISIT (OUTPATIENT)
Dept: INTERNAL MEDICINE | Facility: CLINIC | Age: 77
End: 2022-12-05

## 2022-12-05 VITALS
WEIGHT: 155 LBS | DIASTOLIC BLOOD PRESSURE: 70 MMHG | BODY MASS INDEX: 29.27 KG/M2 | SYSTOLIC BLOOD PRESSURE: 180 MMHG | TEMPERATURE: 96.9 F | OXYGEN SATURATION: 98 % | HEART RATE: 61 BPM | HEIGHT: 61 IN

## 2022-12-05 DIAGNOSIS — R41.3 MEMORY LOSS: ICD-10-CM

## 2022-12-05 DIAGNOSIS — I10 ESSENTIAL HYPERTENSION: ICD-10-CM

## 2022-12-05 DIAGNOSIS — R59.0 CERVICAL ADENOPATHY: Primary | ICD-10-CM

## 2022-12-05 PROCEDURE — 99214 OFFICE O/P EST MOD 30 MIN: CPT | Performed by: INTERNAL MEDICINE

## 2022-12-05 RX ORDER — FUROSEMIDE 40 MG/1
40 TABLET ORAL DAILY
Qty: 90 TABLET | Refills: 3 | Status: SHIPPED | OUTPATIENT
Start: 2022-12-05 | End: 2023-03-07 | Stop reason: HOSPADM

## 2022-12-05 NOTE — PROGRESS NOTES
Chief Complaint / Reason:      Chief Complaint   Patient presents with   • Edema     Legs. Get to point where not mobile. Only when moving around. Check toes.        Subjective     HPI  Patient presents today with complaints of hypertension and states that there has been some variation in blood pressure in addition to her lower extremity swelling.  She states that it gets to the point where she feels like she cannot walk as her legs feel so heavy.  She denies chest pain, shortness of breath or heart palpitations.  Patient reports only time that she gets short of Jacinto's is with exertion.  Patient does report fatigue. blood pressure is significantly elevated but she states she gets nervous when coming to the doctor unless she is seeing Dr. Pedraza.  Patient does not see cardiology.  History taken from: patient    PMH/FH/Social History were reviewed and updated appropriately in the electronic medical record.   Past Medical History:   Diagnosis Date   • Arthritis    • Deep vein thrombosis (HCC)     bilateral   • Dysphagia    • Hyperlipidemia    • Hypertension    • Vitamin D deficiency      Past Surgical History:   Procedure Laterality Date   • APPENDECTOMY     • CATARACT EXTRACTION     • CHOLECYSTECTOMY     • COLONOSCOPY      Approx    • HYSTERECTOMY     • OOPHORECTOMY Bilateral    • TUBAL ABDOMINAL LIGATION       Social History     Socioeconomic History   • Marital status:    Tobacco Use   • Smoking status: Former     Packs/day: 0.15     Years: 10.00     Pack years: 1.50     Types: Cigarettes     Quit date: 1987     Years since quittin.9   • Smokeless tobacco: Never   Substance and Sexual Activity   • Alcohol use: No   • Drug use: No   • Sexual activity: Defer     Family History   Problem Relation Age of Onset   • Heart failure Mother    • Hyperlipidemia Mother    • Hypertension Mother    • Cancer Father    • Cancer Sister    • Stroke Sister    • Breast cancer Maternal Aunt    • Breast  cancer Paternal Aunt    • Ovarian cancer Neg Hx        Review of Systems:   Review of Systems      All other systems were reviewed and are negative.  Exceptions are noted in the subjective or above.      Objective     Vital Signs  Vitals:    11/16/22 1145   BP: (!) 182/65   Pulse: 73   Resp: 15   Temp: 97.1 °F (36.2 °C)   SpO2: 100%       Body mass index is 27.1 kg/m².    Physical Exam  Vitals and nursing note reviewed.   Constitutional:       General: She is not in acute distress.     Appearance: She is well-developed.   Cardiovascular:      Rate and Rhythm: Normal rate and regular rhythm.      Pulses: Normal pulses.      Heart sounds: Normal heart sounds.   Pulmonary:      Effort: Pulmonary effort is normal.      Breath sounds: Normal breath sounds. No wheezing.   Chest:      Chest wall: No tenderness.   Musculoskeletal:         General: Tenderness and deformity present.      Right lower leg: Edema present.      Left lower leg: Edema present.   Skin:     General: Skin is warm and dry.      Capillary Refill: Capillary refill takes less than 2 seconds.      Coloration: Skin is not pale.      Findings: No erythema or rash.   Neurological:      Mental Status: She is alert and oriented to person, place, and time.   Psychiatric:         Behavior: Behavior normal.         Thought Content: Thought content normal.         Judgment: Judgment normal.              Results Review:    I reviewed the patient's previous clinical results.       Medication Review:     Current Outpatient Medications:   •  allopurinol (ZYLOPRIM) 100 MG tablet, Take 1 tablet by mouth Daily., Disp: 90 tablet, Rfl: 3  •  atorvastatin (LIPITOR) 10 MG tablet, Take 1 tablet by mouth Daily., Disp: 90 tablet, Rfl: 3  •  furosemide (LASIX) 20 MG tablet, Take 1 tablet by mouth Daily As Needed (swelling). swelling, Disp: 90 tablet, Rfl: 3  •  omeprazole (priLOSEC) 20 MG capsule, Take 1 capsule by mouth Daily., Disp: 90 capsule, Rfl: 3  •  potassium chloride  (K-DUR,KLOR-CON) 20 MEQ CR tablet, TAKE 1 TABLET BY MOUTH TWICE A DAY, Disp: 180 tablet, Rfl: 3  •  traZODone (DESYREL) 50 MG tablet, Take 1 tablet by mouth Every Night., Disp: 30 tablet, Rfl: 5  •  amLODIPine (NORVASC) 2.5 MG tablet, Take 1 tablet by mouth Daily., Disp: 30 tablet, Rfl: 5  •  hydrALAZINE (APRESOLINE) 25 MG tablet, Take 1 tablet by mouth 2 (Two) Times a Day., Disp: 60 tablet, Rfl: 0  •  losartan (COZAAR) 100 MG tablet, Take 1 tablet by mouth Daily., Disp: 90 tablet, Rfl: 1  •  metoprolol tartrate (LOPRESSOR) 50 MG tablet, TAKE 1 TABLET BY MOUTH TWICE DAILY, Disp: 180 tablet, Rfl: 0  •  Misc Natural Products (BLACK CHERRY CONCENTRATE PO), Take  by mouth., Disp: , Rfl:     Diagnoses and all orders for this visit:    Essential hypertension  -     CBC w AUTO Differential  -     proBNP  Initiate lifestyle modifications.   DASH Diet and exercise.   Compliance with medication regimen and discussed ways to prevent of long-term complications from high blood pressure.  Discussed when to seek medical attention.  Encouraged patient to take blood pressure daily and keep a log.      Elevated uric acid in blood  -     Comprehensive metabolic panel  -     Uric acid    Swelling of both lower extremities  -     proBNP  Advised patient to wear compression hose and elevate lower extremities when sitting.  Dyspnea on exertion  -     proBNP    Discussed worsening signs and symptoms.      Return in about 4 weeks (around 12/14/2022), or if symptoms worsen or fail to improve, for Follow up with Dr. Pedraza.    Susan Amezquita, APRN  11/16/2022

## 2022-12-05 NOTE — PROGRESS NOTES
Subjective  Vesta Landry is a 77 y.o. female    HPI patient with a longstanding history of hypertension has had medication dose increased recently with minimal success.  On Friday she was seen at the emergency room where she was placed on hydralazine.  She has brought in her BP readings with her.  Her daughter who is accompanying her is providing some of the history since the patient has a history of short-term memory deficit.  She currently denies any headaches or visual disturbances has minimal lower extremity edema.  Has a 2 cm nodular lesion on the left side of her neck for which she is scheduled to be evaluated by surgery.  Denies dysphagia there is no fever or chills    The following portions of the patient's history were reviewed and updated as appropriate: allergies, current medications, past family history, past medical history, past social history, past surgical history, and problem list.     Review of Systems   Constitutional: Negative.  Negative for activity change, appetite change, fatigue and fever.   HENT: Negative for congestion, ear discharge, ear pain and trouble swallowing.    Eyes: Negative for photophobia and visual disturbance.   Respiratory: Negative for cough and shortness of breath.    Cardiovascular: Negative for chest pain and palpitations.   Gastrointestinal: Negative for abdominal distention, abdominal pain, constipation, diarrhea, nausea and vomiting.   Endocrine: Negative.    Genitourinary: Negative for dysuria, hematuria and urgency.   Musculoskeletal: Positive for arthralgias. Negative for back pain, joint swelling and myalgias.   Skin: Negative for color change and rash.   Allergic/Immunologic: Negative.    Neurological: Negative for dizziness, weakness, light-headedness and headaches.   Hematological: Negative for adenopathy. Does not bruise/bleed easily.   Psychiatric/Behavioral: Negative for agitation, confusion and dysphoric mood. The patient is not nervous/anxious.   "      Visit Vitals  /70   Pulse 61   Temp 96.9 °F (36.1 °C) (Infrared)   Ht 154.9 cm (61\")   Wt 70.3 kg (155 lb)   SpO2 98%   BMI 29.29 kg/m²       Objective  Physical Exam  Constitutional:       General: She is not in acute distress.     Appearance: She is well-developed.   HENT:      Nose: Nose normal.   Eyes:      General: No scleral icterus.     Conjunctiva/sclera: Conjunctivae normal.   Neck:      Thyroid: No thyromegaly.      Trachea: No tracheal deviation.   Cardiovascular:      Rate and Rhythm: Normal rate and regular rhythm.      Heart sounds: No murmur heard.    No friction rub.   Pulmonary:      Effort: No respiratory distress.      Breath sounds: No wheezing or rales.   Abdominal:      General: There is no distension.      Palpations: Abdomen is soft. There is no mass.      Tenderness: There is no abdominal tenderness. There is no guarding.   Musculoskeletal:         General: Swelling and deformity present. Normal range of motion.   Lymphadenopathy:      Cervical: No cervical adenopathy.   Skin:     General: Skin is warm and dry.      Findings: No erythema or rash.   Neurological:      Mental Status: She is alert and oriented to person, place, and time.      Cranial Nerves: No cranial nerve deficit.      Coordination: Coordination normal.      Deep Tendon Reflexes: Reflexes are normal and symmetric.   Psychiatric:         Behavior: Behavior normal.         Thought Content: Thought content normal.         Judgment: Judgment normal.         Diagnoses and all orders for this visit:    Cervical adenopathy smooth subcutaneous nodule moves freely.  Had an appointment with surgery a month from now we have requested this to be moved up her new appointment now is within a week.  Holding off on antibiotic therapy for now    Essential hypertension continue with hydralazine Lasix dose has been increased to 40 mg daily    Memory loss recent work-up unremarkable.  Suspect Alzheimer type dementia.  Behaviorally " stable    Other orders  -     furosemide (LASIX) 40 MG tablet; Take 1 tablet by mouth Daily. swelling

## 2022-12-08 NOTE — PROGRESS NOTES
Patient: Vesta Landry    YOB: 1945    Date: 12/12/2022    Primary Care Provider: Bossman Pedraza MD    Chief Complaint   Patient presents with   • Initial Evaluation     Left lymph node       SUBJECTIVE:    History of present illness:  Patient is here for evaluation of cervical lymphadenopathy, apparently this is been present over the past several weeks, located in the left neck under the jaw, tender to touch, dull in nature, worse with pressure, nonradiating, associated with a palpable mass.  Nothing seems to make it better.  She is a non-smoker and nondrinker.    The following portions of the patient's history were reviewed and updated as appropriate: allergies, current medications, past family history, past medical history, past social history, past surgical history and problem list.      Review of Systems   Constitutional: Negative for chills, fever and unexpected weight change.   HENT: Negative for hearing loss, trouble swallowing and voice change.    Eyes: Negative for visual disturbance.   Respiratory: Negative for apnea, cough, chest tightness, shortness of breath and wheezing.    Cardiovascular: Negative for chest pain, palpitations and leg swelling.   Gastrointestinal: Negative for abdominal distention, abdominal pain, anal bleeding, blood in stool, constipation, diarrhea, nausea, rectal pain and vomiting.   Endocrine: Negative for cold intolerance and heat intolerance.   Genitourinary: Negative for difficulty urinating, dysuria and flank pain.   Musculoskeletal: Negative for back pain and gait problem.   Skin: Negative for color change, rash and wound.   Neurological: Negative for dizziness, syncope, speech difficulty, weakness, light-headedness, numbness and headaches.   Hematological: Negative for adenopathy. Does not bruise/bleed easily.   Psychiatric/Behavioral: Negative for confusion. The patient is not nervous/anxious.        Allergies:  No Known  Allergies    Medications:    Current Outpatient Medications:   •  allopurinol (ZYLOPRIM) 100 MG tablet, Take 1 tablet by mouth Daily., Disp: 90 tablet, Rfl: 3  •  atorvastatin (LIPITOR) 10 MG tablet, Take 1 tablet by mouth Daily., Disp: 90 tablet, Rfl: 3  •  furosemide (LASIX) 40 MG tablet, Take 1 tablet by mouth Daily. swelling, Disp: 90 tablet, Rfl: 3  •  hydrALAZINE (APRESOLINE) 25 MG tablet, Take 1 tablet by mouth 2 (Two) Times a Day., Disp: 60 tablet, Rfl: 0  •  losartan (COZAAR) 100 MG tablet, Take 1 tablet by mouth Daily., Disp: 90 tablet, Rfl: 1  •  metoprolol tartrate (LOPRESSOR) 50 MG tablet, TAKE 1 TABLET BY MOUTH TWICE DAILY, Disp: 180 tablet, Rfl: 0  •  omeprazole (priLOSEC) 20 MG capsule, Take 1 capsule by mouth Daily., Disp: 90 capsule, Rfl: 3  •  potassium chloride (K-DUR,KLOR-CON) 20 MEQ CR tablet, TAKE 1 TABLET BY MOUTH TWICE A DAY, Disp: 180 tablet, Rfl: 3  •  traZODone (DESYREL) 50 MG tablet, Take 1 tablet by mouth Every Night., Disp: 30 tablet, Rfl: 5    History:  Past Medical History:   Diagnosis Date   • Arthritis    • Deep vein thrombosis (HCC)     bilateral   • Dysphagia    • Hyperlipidemia    • Hypertension    • Vitamin D deficiency        Past Surgical History:   Procedure Laterality Date   • APPENDECTOMY     • CATARACT EXTRACTION     • CHOLECYSTECTOMY     • COLONOSCOPY      Approx    • HYSTERECTOMY     • OOPHORECTOMY Bilateral    • TUBAL ABDOMINAL LIGATION         Family History   Problem Relation Age of Onset   • Heart failure Mother    • Hyperlipidemia Mother    • Hypertension Mother    • Cancer Father    • Cancer Sister    • Stroke Sister    • Breast cancer Maternal Aunt    • Breast cancer Paternal Aunt    • Ovarian cancer Neg Hx        Social History     Tobacco Use   • Smoking status: Former     Packs/day: 0.15     Years: 10.00     Pack years: 1.50     Types: Cigarettes     Quit date: 1987     Years since quittin.9   • Smokeless tobacco: Never   Substance Use  "Topics   • Alcohol use: No   • Drug use: No        OBJECTIVE:    Vital Signs:   Vitals:    12/12/22 1000   BP: 118/68   Pulse: 51   Temp: 98.7 °F (37.1 °C)   SpO2: 98%   Weight: 70 kg (154 lb 6.4 oz)   Height: 154.9 cm (61\")       Physical Exam:   General Appearance:    Alert, cooperative, in no acute distress   Head:    Normocephalic, without obvious abnormality, atraumatic   Eyes:            Lids and lashes normal, conjunctivae and sclerae normal, no   icterus, no pallor, corneas clear, PERRLA   Ears:    Ears appear intact with no abnormalities noted   Throat:   No oral lesions, no thrush, oral mucosa moist   Neck:   No adenopathy, supple, trachea midline, no thyromegaly, no   carotid bruit, no JVD   Lungs:     Clear to auscultation,respirations regular, even and                  unlabored    Heart:    Regular rhythm and normal rate, normal S1 and S2, no            murmur, no gallop, no rub, no click   Chest Wall:    No abnormalities observed   Abdomen:     Normal bowel sounds, no masses, no organomegaly, soft        non-tender, non-distended, no guarding, no rebound                tenderness   Extremities:   Moves all extremities well, no edema, no cyanosis, no             redness   Pulses:   Pulses palpable and equal bilaterally   Skin:   No bleeding, bruising or rash, there is evidence of a palpable mass in the anterior triangle of the left neck   Lymph nodes:   No palpable adenopathy   Neurologic:   Cranial nerves 2 - 12 grossly intact, sensation intact, DTR       present and equal bilaterally     Results Review:   I reviewed the patient's new clinical results.  I reviewed the patient's new imaging results and agree with the interpretation.  I reviewed the patient's other test results and agree with the interpretation    Review of Systems was reviewed and confirmed as accurate as documented by the MA.    ASSESSMENT/PLAN:    1. Enlarged lymph nodes    2. Neck mass          SOFT TISSUE ULTRASOUND OF THE LEFT " NECK with FINE NEEDLE ASPIRATION      The patient did have a soft tissue ultrasound performed today in the normal manner.  There was good visualization obtained, there was evidence of well-defined nodule in the anterior triangle of the left neck.  A fine needle aspiration was performed under direct ultrasound guidance, cells were obtained and sent for cytopathology.      IMPRESSION:    Left neck mass with successful fine needle aspiration.      I discussed the patients findings and my recommendations with patient and family      I will see the patient back in the office in 1 week for clinical follow-up.    Electronically signed by Brigido Guerra MD  12/13/22

## 2022-12-12 ENCOUNTER — HOSPITAL ENCOUNTER (OUTPATIENT)
Dept: GENERAL RADIOLOGY | Facility: HOSPITAL | Age: 77
Discharge: HOME | End: 2022-12-12
Admitting: SURGERY

## 2022-12-12 ENCOUNTER — OFFICE VISIT (OUTPATIENT)
Dept: SURGERY | Facility: CLINIC | Age: 77
End: 2022-12-12

## 2022-12-12 VITALS
TEMPERATURE: 98.7 F | HEART RATE: 51 BPM | SYSTOLIC BLOOD PRESSURE: 118 MMHG | HEIGHT: 61 IN | WEIGHT: 154.4 LBS | OXYGEN SATURATION: 98 % | BODY MASS INDEX: 29.15 KG/M2 | DIASTOLIC BLOOD PRESSURE: 68 MMHG

## 2022-12-12 DIAGNOSIS — R59.9 ENLARGED LYMPH NODES: Primary | ICD-10-CM

## 2022-12-12 DIAGNOSIS — R59.9 ENLARGED LYMPH NODES: ICD-10-CM

## 2022-12-12 DIAGNOSIS — R22.1 NECK MASS: ICD-10-CM

## 2022-12-12 PROCEDURE — 71046 X-RAY EXAM CHEST 2 VIEWS: CPT

## 2022-12-12 PROCEDURE — 99203 OFFICE O/P NEW LOW 30 MIN: CPT | Performed by: SURGERY

## 2022-12-12 PROCEDURE — 10005 FNA BX W/US GDN 1ST LES: CPT | Performed by: SURGERY

## 2022-12-13 LAB — REF LAB TEST METHOD: NORMAL

## 2022-12-16 RX ORDER — HYDRALAZINE HYDROCHLORIDE 25 MG/1
25 TABLET, FILM COATED ORAL 2 TIMES DAILY
Qty: 180 TABLET | Refills: 3 | Status: SHIPPED | OUTPATIENT
Start: 2022-12-16 | End: 2023-03-01 | Stop reason: HOSPADM

## 2022-12-16 NOTE — PROGRESS NOTES
Patient: Vesta Landry  YOB: 1945    Date: 12/20/2022    Primary Care Provider: Bossman Pedraza MD    Chief Complaint   Patient presents with   • Follow-up       History: Patient is here for follow-up fine needle aspiration of left neck nodule which was performed 12/13/2022.  Pathology report showed malignant, squamous cell carcinoma.    She did have a palpable left neck mass and fine-needle aspiration was performed recently under ultrasound guidance that showed evidence of a squamous cell carcinoma.    The following portions of the patient's history were reviewed and updated as appropriate: allergies, current medications, past family history, past medical history, past social history, past surgical history and problem list.    Review of Systems   Constitutional: Negative for chills, fever and unexpected weight change.   HENT: Negative for hearing loss, trouble swallowing and voice change.    Eyes: Negative for visual disturbance.   Respiratory: Negative for apnea, cough, chest tightness, shortness of breath and wheezing.    Cardiovascular: Negative for chest pain, palpitations and leg swelling.   Gastrointestinal: Negative for abdominal distention, abdominal pain, anal bleeding, blood in stool, constipation, diarrhea, nausea, rectal pain and vomiting.   Endocrine: Negative for cold intolerance and heat intolerance.   Genitourinary: Negative for difficulty urinating, dysuria and flank pain.   Musculoskeletal: Negative for back pain and gait problem.   Skin: Negative for color change, rash and wound.   Neurological: Negative for dizziness, syncope, speech difficulty, weakness, light-headedness, numbness and headaches.   Hematological: Negative for adenopathy. Does not bruise/bleed easily.   Psychiatric/Behavioral: Negative for confusion. The patient is not nervous/anxious.        Vital Signs  Vitals:    12/20/22 1404   BP: 118/78   BP Location: Left arm   Pulse: 60   Resp: 16   Temp: 97.4 °F  "(36.3 °C)   TempSrc: Skin   SpO2: 98%   Weight: 69.9 kg (154 lb)   Height: 154.9 cm (61\")       Allergies:  No Known Allergies    Medications:    Current Outpatient Medications:   •  allopurinol (ZYLOPRIM) 100 MG tablet, Take 1 tablet by mouth Daily., Disp: 90 tablet, Rfl: 3  •  atorvastatin (LIPITOR) 10 MG tablet, Take 1 tablet by mouth Daily., Disp: 90 tablet, Rfl: 3  •  furosemide (LASIX) 40 MG tablet, Take 1 tablet by mouth Daily. swelling, Disp: 90 tablet, Rfl: 3  •  hydrALAZINE (APRESOLINE) 25 MG tablet, Take 1 tablet by mouth 2 (Two) Times a Day., Disp: 180 tablet, Rfl: 3  •  losartan (COZAAR) 100 MG tablet, Take 1 tablet by mouth Daily., Disp: 90 tablet, Rfl: 1  •  metoprolol tartrate (LOPRESSOR) 50 MG tablet, TAKE 1 TABLET BY MOUTH TWICE DAILY, Disp: 180 tablet, Rfl: 0  •  omeprazole (priLOSEC) 20 MG capsule, Take 1 capsule by mouth Daily., Disp: 90 capsule, Rfl: 3  •  potassium chloride (K-DUR,KLOR-CON) 20 MEQ CR tablet, TAKE 1 TABLET BY MOUTH TWICE A DAY, Disp: 180 tablet, Rfl: 3  •  traZODone (DESYREL) 50 MG tablet, Take 1 tablet by mouth Every Night., Disp: 30 tablet, Rfl: 5    Physical Exam:   General Appearance:    Alert, cooperative, in no acute distress   Head:    Normocephalic, without obvious abnormality, atraumatic   Lungs:     Clear to auscultation,respirations regular, even and                  unlabored    Heart:    Regular rhythm and normal rate, normal S1 and S2, no            murmur, no gallop, no rub, no click   Abdomen:     Normal bowel sounds, no masses, no organomegaly, soft        non-tender, non-distended, no guarding, no rebound                tenderness   Extremities:   Moves all extremities well, no edema, no cyanosis, no             redness   Pulses:   Pulses palpable and equal bilaterally   Skin:   No bleeding, bruising or rash, there is a palpable left neck mass in the submandibular position     Results Review:   I reviewed the patient's new clinical results.  I reviewed the " patient's new imaging results and agree with the interpretation.  I reviewed the patient's other test results and agree with the interpretation     Review of Systems was reviewed and confirmed as accurate as documented by the MA.    ASSESSMENT/PLAN:    1. Neck mass      I did have a detailed and extensive discussion with the patient and her family in the office today.  She does have evidence of a squamous cell carcinoma of the left neck and she will need further oncological evaluation and possible PET scan.  It may be that the patient needs future ENT evaluation for the possibility of laryngeal or pharyngeal carcinoma as a primary source.  We have scheduled her to see Dr. Lora of the oncology service, I will be more than happy to be available for further evaluation or possible Port-A-Cath placement if need be.    Electronically signed by Brigido Guerra MD  12/20/22

## 2022-12-20 ENCOUNTER — TELEPHONE (OUTPATIENT)
Dept: SURGERY | Facility: CLINIC | Age: 77
End: 2022-12-20

## 2022-12-20 ENCOUNTER — OFFICE VISIT (OUTPATIENT)
Dept: SURGERY | Facility: CLINIC | Age: 77
End: 2022-12-20

## 2022-12-20 VITALS
BODY MASS INDEX: 29.07 KG/M2 | WEIGHT: 154 LBS | TEMPERATURE: 97.4 F | OXYGEN SATURATION: 98 % | HEIGHT: 61 IN | SYSTOLIC BLOOD PRESSURE: 118 MMHG | RESPIRATION RATE: 16 BRPM | HEART RATE: 60 BPM | DIASTOLIC BLOOD PRESSURE: 78 MMHG

## 2022-12-20 DIAGNOSIS — C76.0 SQUAMOUS CELL CARCINOMA OF HEAD AND NECK: ICD-10-CM

## 2022-12-20 DIAGNOSIS — R22.1 NECK MASS: Primary | ICD-10-CM

## 2022-12-20 DIAGNOSIS — R59.9 ENLARGED LYMPH NODES: Primary | ICD-10-CM

## 2022-12-20 PROCEDURE — 99213 OFFICE O/P EST LOW 20 MIN: CPT | Performed by: SURGERY

## 2022-12-20 NOTE — TELEPHONE ENCOUNTER
Pt was given appt with Dr. Lora 01/03/20213 @ 9:15 am @ [address] , [address], [facility].  I talked with pt's daughter Meliza, she voiced understanding.

## 2022-12-27 RX ORDER — TRAZODONE HYDROCHLORIDE 50 MG/1
50 TABLET ORAL NIGHTLY
Qty: 90 TABLET | Refills: 3 | Status: SHIPPED | OUTPATIENT
Start: 2022-12-27 | End: 2023-01-26

## 2022-12-27 NOTE — TELEPHONE ENCOUNTER
Rx Refill Note  Requested Prescriptions     Pending Prescriptions Disp Refills   • traZODone (DESYREL) 50 MG tablet [Pharmacy Med Name: TRAZODONE 50MG TABLETS] 30 tablet 5     Sig: TAKE 1 TABLET BY MOUTH EVERY NIGHT      Last office visit with prescribing clinician: 12/5/2022   Last telemedicine visit with prescribing clinician: 1/20/2023   Next office visit with prescribing clinician: 1/20/2023                         Would you like a call back once the refill request has been completed: [] Yes [] No    If the office needs to give you a call back, can they leave a voicemail: [] Yes [] No    Galilea Leahy LPN  12/27/22, 09:12 EST

## 2023-01-03 ENCOUNTER — TELEPHONE (OUTPATIENT)
Dept: ONCOLOGY | Facility: CLINIC | Age: 78
End: 2023-01-03

## 2023-01-03 ENCOUNTER — CONSULT (OUTPATIENT)
Dept: ONCOLOGY | Facility: CLINIC | Age: 78
End: 2023-01-03
Payer: MEDICARE

## 2023-01-03 ENCOUNTER — HOSPITAL ENCOUNTER (OUTPATIENT)
Dept: MRI IMAGING | Facility: HOSPITAL | Age: 78
Discharge: HOME | End: 2023-01-03
Admitting: INTERNAL MEDICINE
Payer: MEDICARE

## 2023-01-03 VITALS
BODY MASS INDEX: 28.89 KG/M2 | HEIGHT: 61 IN | HEART RATE: 56 BPM | SYSTOLIC BLOOD PRESSURE: 179 MMHG | WEIGHT: 153 LBS | TEMPERATURE: 97.1 F | RESPIRATION RATE: 16 BRPM | OXYGEN SATURATION: 96 % | DIASTOLIC BLOOD PRESSURE: 76 MMHG

## 2023-01-03 DIAGNOSIS — C79.89 METASTATIC SQUAMOUS CELL CARCINOMA TO HEAD AND NECK: Primary | ICD-10-CM

## 2023-01-03 DIAGNOSIS — C79.89 METASTATIC SQUAMOUS CELL CARCINOMA TO HEAD AND NECK: ICD-10-CM

## 2023-01-03 PROCEDURE — 70553 MRI BRAIN STEM W/O & W/DYE: CPT

## 2023-01-03 PROCEDURE — 3078F DIAST BP <80 MM HG: CPT | Performed by: INTERNAL MEDICINE

## 2023-01-03 PROCEDURE — 3077F SYST BP >= 140 MM HG: CPT | Performed by: INTERNAL MEDICINE

## 2023-01-03 PROCEDURE — 1160F RVW MEDS BY RX/DR IN RCRD: CPT | Performed by: INTERNAL MEDICINE

## 2023-01-03 PROCEDURE — A9577 INJ MULTIHANCE: HCPCS | Performed by: INTERNAL MEDICINE

## 2023-01-03 PROCEDURE — 1126F AMNT PAIN NOTED NONE PRSNT: CPT | Performed by: INTERNAL MEDICINE

## 2023-01-03 PROCEDURE — 1159F MED LIST DOCD IN RCRD: CPT | Performed by: INTERNAL MEDICINE

## 2023-01-03 PROCEDURE — 99204 OFFICE O/P NEW MOD 45 MIN: CPT | Performed by: INTERNAL MEDICINE

## 2023-01-03 PROCEDURE — 0 GADOBENATE DIMEGLUMINE 529 MG/ML SOLUTION: Performed by: INTERNAL MEDICINE

## 2023-01-03 RX ORDER — DIAZEPAM 5 MG/1
TABLET ORAL
Qty: 3 TABLET | Refills: 0 | Status: SHIPPED | OUTPATIENT
Start: 2023-01-03 | End: 2023-01-22 | Stop reason: HOSPADM

## 2023-01-03 RX ADMIN — GADOBENATE DIMEGLUMINE 13 ML: 529 INJECTION, SOLUTION INTRAVENOUS at 18:22

## 2023-01-03 NOTE — TELEPHONE ENCOUNTER
Discussed with Meliza. She said patient has no fever, no chills, etc.  I advised it is fine for her to come to appointment and to just keep mask on.

## 2023-01-03 NOTE — LETTER
January 3, 2023     Brigido Guerra MD  [address]  [address]  [facility] [address]    Patient: Vesta Landry   YOB: 1945   Date of Visit: 1/3/2023       Dear Brigido Guerra MD    Vesta Landry was in my office today. Below is a copy of my note.    If you have questions, please do not hesitate to call me. I look forward to following Vesta along with you.         Sincerely,        Jennifer Lora MD        CC: Theron Palacios MD    DATE OF CONSULTATION: 1/3/2023    REFERRING PHYSICIAN: Bossman Pedraza MD    Dear Bossman Minor MD  Thank you for asking for my medical advice on this patient. I saw her in the  [place] office on 1/3/2023    REASON FOR CONSULTATION: Metastatic squamous cell carcinoma to the neck    PROBLEM LIST:   1.  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  Presented with a growing left cervical lymph node.  B.  Diagnosed after ultrasound-guided biopsy done on December 12, 2022.  2.  Hypertension  3.  Hypercholesteremia  4.  Gout arthritis    HISTORY OF PRESENT ILLNESS: The patient is a very pleasant 77 y.o.  female   who was in her usual state of health until November 2022.  The patient is a noted left mid neck lymph node.  Never noticed this before.  This has been slowly increasing in size.  No associated pain.  Denies any problems swallowing no shortness of breath.  She noticed that she has been little bit hoarse over the last few months.  She does have remote history of smoking 10 cigarettes/day for more than 20 years but quit smoking 1992 she was referred to Dr. Guerra in [place].  The patient had ultrasound-guided biopsy done on December 12, 2022 that confirmed squamous cell carcinoma.  She was referred to me for further recommendations.    SUBJECTIVE: When I saw the patient today she is here with her daughter.  She is anxious at today's visit.  Is having mild neck and back pain.  Never been diagnosed with cancer before.    Review of Systems    Constitutional: Positive for fatigue.   HENT: Positive for voice change.    Musculoskeletal: Positive for back pain and neck pain.   Hematological: Positive for adenopathy.   Psychiatric/Behavioral: The patient is nervous/anxious.        Past Medical History:   Diagnosis Date   • Arthritis    • Deep vein thrombosis (HCC)     bilateral   • Dysphagia    • Hyperlipidemia    • Hypertension    • Vitamin D deficiency        Social History     Socioeconomic History   • Marital status:    Tobacco Use   • Smoking status: Former     Packs/day: 0.15     Years: 10.00     Pack years: 1.50     Types: Cigarettes     Quit date: 1987     Years since quittin.0   • Smokeless tobacco: Never   Substance and Sexual Activity   • Alcohol use: No   • Drug use: No   • Sexual activity: Defer       Family History   Problem Relation Age of Onset   • Heart failure Mother    • Hyperlipidemia Mother    • Hypertension Mother    • Cancer Father    • Cancer Sister    • Stroke Sister    • Breast cancer Maternal Aunt    • Breast cancer Paternal Aunt    • Ovarian cancer Neg Hx        Past Surgical History:   Procedure Laterality Date   • APPENDECTOMY     • CATARACT EXTRACTION     • CHOLECYSTECTOMY     • COLONOSCOPY      Approx    • HYSTERECTOMY     • OOPHORECTOMY Bilateral    • TUBAL ABDOMINAL LIGATION         No Known Allergies       Current Outpatient Medications:   •  allopurinol (ZYLOPRIM) 100 MG tablet, Take 1 tablet by mouth Daily., Disp: 90 tablet, Rfl: 3  •  atorvastatin (LIPITOR) 10 MG tablet, Take 1 tablet by mouth Daily., Disp: 90 tablet, Rfl: 3  •  furosemide (LASIX) 40 MG tablet, Take 1 tablet by mouth Daily. swelling, Disp: 90 tablet, Rfl: 3  •  hydrALAZINE (APRESOLINE) 25 MG tablet, Take 1 tablet by mouth 2 (Two) Times a Day., Disp: 180 tablet, Rfl: 3  •  losartan (COZAAR) 100 MG tablet, Take 1 tablet by mouth Daily., Disp: 90 tablet, Rfl: 1  •  metoprolol tartrate (LOPRESSOR) 50 MG tablet, TAKE 1 TABLET BY  MOUTH TWICE DAILY, Disp: 180 tablet, Rfl: 0  •  omeprazole (priLOSEC) 20 MG capsule, Take 1 capsule by mouth Daily., Disp: 90 capsule, Rfl: 3  •  potassium chloride (K-DUR,KLOR-CON) 20 MEQ CR tablet, TAKE 1 TABLET BY MOUTH TWICE A DAY, Disp: 180 tablet, Rfl: 3  •  traZODone (DESYREL) 50 MG tablet, TAKE 1 TABLET BY MOUTH EVERY NIGHT, Disp: 90 tablet, Rfl: 3    PHYSICAL EXAMINATION:   There were no vitals taken for this visit.  There were no vitals filed for this visit.                ECOG Performance Status: 1 - Symptomatic but completely ambulatory  General Appearance:  alert, cooperative, no apparent distress and appears stated age   Neurologic/Psychiatric: A&O x 3, gait steady, appropriate affect, strength 5/5 in all muscle groups   HEENT:  Normocephalic, without obvious abnormality, mucous membranes moist   Neck: Supple, symmetrical, trachea midline, no adenopathy;  No thyromegaly, masses, or tenderness   Lungs:   Clear to auscultation bilaterally; respirations regular, even, and unlabored bilaterally   Heart:  Regular rate and rhythm, no murmurs appreciated   Abdomen:   Soft, non-tender, non-distended and no organomegaly   Lymph nodes:  Positive left cervical adenopathy noted   Extremities: Normal, atraumatic; no clubbing, cyanosis, or edema    Skin: No rashes, ulcers, or suspicious lesions noted       No visits with results within 2 Week(s) from this visit.   Latest known visit with results is:   Office Visit on 2022   Component Date Value Ref Range Status   • Reference Lab Report 2022    Final                    Value:[facility]  [address] [place] [address]  Phone: [phone] or [phone]  Fax: [phone]  Norris Tiwari M.D., Medical Director    PATIENT NAME                                 LABORATORY NO.  MAGNUS DIAS.                       [id number]  [id number]                                     AGE                SEX     SSN             CLIENT REF #  [facility]                           77       1945   F       [id number]    [id number]  [facility]                 REQUESTING M.D.       ATTENDING M.D..        COPY TO..  [address] [facility] [address]                        [place] [address]                             DATE COLLECTED        DATE RECEIVED          DATE REPORTED  12/12/2022 12/12/2022 12/13/2022    DIAGNOSIS:  NECK, FNA, LEFT:  Malignant; squamous cell carcinoma    MICROSCOPIC DESCRIPTION:  Squamous cell carcinoma is identified on concentrate and                           smears.    Professional interpretation rendered by Jaylin Birch D.O., [facility] at [facility], [address] [place] [address].    CLINICAL HISTORY:  Neck mass  Enlarged lymph nodes    SPECIMENS SUBMITTED:  NECK, FNA, LEFT    GROSS SPECIMEN DESCRIPTION:  30cc of clear dark pink fluid with sediment in fixative  4 premade alcohol smears    REVIEWED, DIAGNOSED AND ELECTRONICALLY  SIGNED BY:    Jaylin Birch D.O., F.C.A.P.  CPT CODES:  64017          XR Chest 2 View    Result Date: 12/12/2022  Narrative: PROCEDURE: XR CHEST 2 VW-   HISTORY: neck mass; R59.9-Enlarged lymph nodes, unspecified  COMPARISON: None.  FINDINGS:  Mild increased markings are seen in the lateral costophrenic angles probably scarring or less likely atelectasis. No focal infiltrate is seen.   There is no evidence of effusion or other pleural disease.  The mediastinum has a normal appearance.  The cardiac silhouette is unremarkable.      Impression: No acute findings  This report was signed and finalized on 12/12/2022 7:38 PM by Linus Nash MD.        DIAGNOSTIC DATA:   1. Radiology: As above  2. Dr. Guerra's note from December 2022 reviewed by me and documented in the  chart.   3. 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 Laboratory data: As above    ASSESSMENT: The patient is  a very pleasant 77 y.o.  female  with metastatic left cervical squamous cell carcinoma    PLAN:     1.  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  I had a long discussion today with the patient and her daughter about her  new diagnosis of squamous cell carcinoma. I did go over the final pathology report in  detail with both of them. I reviewed the patient's documents including refereing provider's notes, lab results, imaging, and path report.   B.  I will go ahead and order MRI brain as well as whole-body PET scan for staging purposes.  C.  I discussed the case with Dr. Palacios from ENT.  After the patient see him for scope and possibly find a primary site.  D.  She will follow with me in 2 weeks to go over the results.  E.  I explained to the patient that if her disease localized to the neck she be treated with concurrent radiation with weekly Erbitux.  F.  I will check PD-L1 status as well as HPV p16 status from the biopsy.    2.  Hypertension:  A.  The patient will continue Lasix, hydralazine, metoprolol and losartan.  B.  I explained to the patient if she requires chemotherapy this could interfere with our management since chemo typically can cause hypotension    3.  Hypercholesteremia:  A.  She will continue Lipitor 10 mg daily    4.  Gout arthritis:  A.  She will continue allopurinol daily      FOLLOW UP: 2 weeks to go over the results.    Jennifer Lora MD  1/3/2023

## 2023-01-03 NOTE — TELEPHONE ENCOUNTER
Caller: Meliza Back    Relationship: Emergency Contact    Best call back number: [phone]      What was the call regarding: PT DAUGHTER CALLED STATED PT HAS A LITTLE COLD, NO FEVER. WANTED TO MAKE SURE THEY CAN STILL COME TODAY    Do you require a callback: YES

## 2023-01-03 NOTE — TELEPHONE ENCOUNTER
Joanne with pathology and cytology labs. Joanne was returning elizabeth's call. You wanted to order additional tests for pt. The cytology specimen they collected was fluid only- not cell block. They cannot do the additional tests.       Call joanne with questions  [phone]

## 2023-01-03 NOTE — PROGRESS NOTES
DATE OF CONSULTATION: 1/3/2023    REFERRING PHYSICIAN: Bossman Pedraza MD    Dear Bossman Minor MD  Thank you for asking for my medical advice on this patient. 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  Presented with a growing left cervical lymph node.  B.  Diagnosed after ultrasound-guided biopsy done on December 12, 2022.  2.  Hypertension  3.  Hypercholesteremia  4.  Gout arthritis    HISTORY OF PRESENT ILLNESS: The patient is a very pleasant 77 y.o.  female   who was in her usual state of health until November 2022.  The patient is a noted left mid neck lymph node.  Never noticed this before.  This has been slowly increasing in size.  No associated pain.  Denies any problems swallowing no shortness of breath.  She noticed that she has been little bit hoarse over the last few months.  She does have remote history of smoking 10 cigarettes/day for more than 20 years but quit smoking 1992 she was referred to Dr. Guerra in [place].  The patient had ultrasound-guided biopsy done on December 12, 2022 that confirmed squamous cell carcinoma.  She was referred to me for further recommendations.    SUBJECTIVE: When I saw the patient today she is here with her daughter.  She is anxious at today's visit.  Is having mild neck and back pain.  Never been diagnosed with cancer before.    Review of Systems   Constitutional: Positive for fatigue.   HENT: Positive for voice change.    Musculoskeletal: Positive for back pain and neck pain.   Hematological: Positive for adenopathy.   Psychiatric/Behavioral: The patient is nervous/anxious.        Past Medical History:   Diagnosis Date   • Arthritis    • Deep vein thrombosis (HCC)     bilateral   • Dysphagia    • Hyperlipidemia    • Hypertension    • Vitamin D deficiency        Social History     Socioeconomic History    • Marital status:    Tobacco Use   • Smoking status: Former     Packs/day: 0.15     Years: 10.00     Pack years: 1.50     Types: Cigarettes     Quit date: 1987     Years since quittin.0   • Smokeless tobacco: Never   Substance and Sexual Activity   • Alcohol use: No   • Drug use: No   • Sexual activity: Defer       Family History   Problem Relation Age of Onset   • Heart failure Mother    • Hyperlipidemia Mother    • Hypertension Mother    • Cancer Father    • Cancer Sister    • Stroke Sister    • Breast cancer Maternal Aunt    • Breast cancer Paternal Aunt    • Ovarian cancer Neg Hx        Past Surgical History:   Procedure Laterality Date   • APPENDECTOMY     • CATARACT EXTRACTION     • CHOLECYSTECTOMY     • COLONOSCOPY      Approx    • HYSTERECTOMY     • OOPHORECTOMY Bilateral    • TUBAL ABDOMINAL LIGATION         No Known Allergies       Current Outpatient Medications:   •  allopurinol (ZYLOPRIM) 100 MG tablet, Take 1 tablet by mouth Daily., Disp: 90 tablet, Rfl: 3  •  atorvastatin (LIPITOR) 10 MG tablet, Take 1 tablet by mouth Daily., Disp: 90 tablet, Rfl: 3  •  furosemide (LASIX) 40 MG tablet, Take 1 tablet by mouth Daily. swelling, Disp: 90 tablet, Rfl: 3  •  hydrALAZINE (APRESOLINE) 25 MG tablet, Take 1 tablet by mouth 2 (Two) Times a Day., Disp: 180 tablet, Rfl: 3  •  losartan (COZAAR) 100 MG tablet, Take 1 tablet by mouth Daily., Disp: 90 tablet, Rfl: 1  •  metoprolol tartrate (LOPRESSOR) 50 MG tablet, TAKE 1 TABLET BY MOUTH TWICE DAILY, Disp: 180 tablet, Rfl: 0  •  omeprazole (priLOSEC) 20 MG capsule, Take 1 capsule by mouth Daily., Disp: 90 capsule, Rfl: 3  •  potassium chloride (K-DUR,KLOR-CON) 20 MEQ CR tablet, TAKE 1 TABLET BY MOUTH TWICE A DAY, Disp: 180 tablet, Rfl: 3  •  traZODone (DESYREL) 50 MG tablet, TAKE 1 TABLET BY MOUTH EVERY NIGHT, Disp: 90 tablet, Rfl: 3    PHYSICAL EXAMINATION:   There were no vitals taken for this visit.  There were no vitals filed for this  visit.                ECOG Performance Status: 1 - Symptomatic but completely ambulatory  General Appearance:  alert, cooperative, no apparent distress and appears stated age   Neurologic/Psychiatric: A&O x 3, gait steady, appropriate affect, strength 5/5 in all muscle groups   HEENT:  Normocephalic, without obvious abnormality, mucous membranes moist   Neck: Supple, symmetrical, trachea midline, no adenopathy;  No thyromegaly, masses, or tenderness   Lungs:   Clear to auscultation bilaterally; respirations regular, even, and unlabored bilaterally   Heart:  Regular rate and rhythm, no murmurs appreciated   Abdomen:   Soft, non-tender, non-distended and no organomegaly   Lymph nodes:  Positive left cervical adenopathy noted   Extremities: Normal, atraumatic; no clubbing, cyanosis, or edema    Skin: No rashes, ulcers, or suspicious lesions noted       No visits with results within 2 Week(s) from this visit.   Latest known visit with results is:   Office Visit on 2022   Component Date Value Ref Range Status   • Reference Lab Report 2022    Final                    Value:[facility]  [address] [place] [address]  Phone: [phone] or [phone]  Fax: [phone]  Norris Tiwari M.D., Medical Director    PATIENT NAME                                 LABORATORY NO.  MAGNUS DIAS.                       [id number]  [id number]                                     AGE                SEX     SSN            CLIENT REF #  [facility]                           77       1945   F       [id number]    [id number]  [facility]                 REQUESTING M.D.       ATTENDING M.D..        COPY TO..  [address] [place] [address]                             DATE COLLECTED        DATE RECEIVED          DATE REPORTED  2022              12/13/2022    DIAGNOSIS:  NECK, FNA, LEFT:  Malignant; squamous cell carcinoma    MICROSCOPIC DESCRIPTION:  Squamous cell carcinoma is identified on concentrate and                           smears.    Professional interpretation rendered by Jaylin Birch D.O., [facility] at [email or web address], [address] [place] [address].    CLINICAL HISTORY:  Neck mass  Enlarged lymph nodes    SPECIMENS SUBMITTED:  NECK, FNA, LEFT    GROSS SPECIMEN DESCRIPTION:  30cc of clear dark pink fluid with sediment in fixative  4 premade alcohol smears    REVIEWED, DIAGNOSED AND ELECTRONICALLY  SIGNED BY:    Jaylin Birch D.O., [facility]ON  CPT CODES:  [id number]          XR Chest 2 View    Result Date: 12/12/2022  Narrative: PROCEDURE: XR CHEST 2 VW-   HISTORY: neck mass; R59.9-Enlarged lymph nodes, unspecified  COMPARISON: None.  FINDINGS:  Mild increased markings are seen in the lateral costophrenic angles probably scarring or less likely atelectasis. No focal infiltrate is seen.   There is no evidence of effusion or other pleural disease.  The mediastinum has a normal appearance.  The cardiac silhouette is unremarkable.      Impression: No acute findings  This report was signed and finalized on 12/12/2022 7:38 PM by Linus Nash MD.        DIAGNOSTIC DATA:   1. Radiology: As above  2. Dr. Guerra's note from December 2022 reviewed by me and documented in the  chart.   3. 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 Laboratory data: As above    ASSESSMENT: The patient is a very pleasant 77 y.o.  female  with metastatic left cervical squamous cell carcinoma    PLAN:     1.  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  I had a long discussion today with the patient and her daughter about her  new diagnosis of squamous cell carcinoma. I did go over the final pathology report in  detail with both of them. I reviewed the patient's documents including refereing provider's notes, lab results, imaging,  and path report.   B.  I will go ahead and order MRI brain as well as whole-body PET scan for staging purposes.  C.  I discussed the case with Dr. Palacios from ENT.  After the patient see him for scope and possibly find a primary site.  D.  She will follow with me in 2 weeks to go over the results.  E.  I explained to the patient that if her disease localized to the neck she be treated with concurrent radiation with weekly Erbitux.  F.  I will check PD-L1 status as well as HPV p16 status from the biopsy.    2.  Hypertension:  A.  The patient will continue Lasix, hydralazine, metoprolol and losartan.  B.  I explained to the patient if she requires chemotherapy this could interfere with our management since chemo typically can cause hypotension    3.  Hypercholesteremia:  A.  She will continue Lipitor 10 mg daily    4.  Gout arthritis:  A.  She will continue allopurinol daily      FOLLOW UP: 2 weeks to go over the results.    Jennifer Lora MD  1/3/2023

## 2023-01-05 ENCOUNTER — TELEPHONE (OUTPATIENT)
Dept: INTERNAL MEDICINE | Facility: CLINIC | Age: 78
End: 2023-01-05
Payer: MEDICARE

## 2023-01-05 ENCOUNTER — OFFICE VISIT (OUTPATIENT)
Dept: INTERNAL MEDICINE | Facility: CLINIC | Age: 78
End: 2023-01-05
Payer: MEDICARE

## 2023-01-05 VITALS
OXYGEN SATURATION: 100 % | HEART RATE: 69 BPM | TEMPERATURE: 97.8 F | WEIGHT: 152 LBS | BODY MASS INDEX: 28.7 KG/M2 | DIASTOLIC BLOOD PRESSURE: 72 MMHG | HEIGHT: 61 IN | SYSTOLIC BLOOD PRESSURE: 172 MMHG

## 2023-01-05 DIAGNOSIS — U07.1 COVID-19: Primary | ICD-10-CM

## 2023-01-05 LAB
EXPIRATION DATE: ABNORMAL
EXPIRATION DATE: NORMAL
FLUAV AG UPPER RESP QL IA.RAPID: NOT DETECTED
FLUBV AG UPPER RESP QL IA.RAPID: NOT DETECTED
INTERNAL CONTROL: ABNORMAL
INTERNAL CONTROL: NORMAL
Lab: ABNORMAL
Lab: NORMAL
S PYO AG THROAT QL: NEGATIVE
SARS-COV-2 AG UPPER RESP QL IA.RAPID: DETECTED

## 2023-01-05 PROCEDURE — 87428 SARSCOV & INF VIR A&B AG IA: CPT | Performed by: NURSE PRACTITIONER

## 2023-01-05 PROCEDURE — 99213 OFFICE O/P EST LOW 20 MIN: CPT | Performed by: NURSE PRACTITIONER

## 2023-01-05 PROCEDURE — 87880 STREP A ASSAY W/OPTIC: CPT | Performed by: NURSE PRACTITIONER

## 2023-01-05 NOTE — PROGRESS NOTES
Office Visit      Patient Name: Vesta Landry  : 1945   MRN: [id number]   Care Team: Patient Care Team:  Bossman Pedraza MD as PCP - General (Internal Medicine)  Hakeem Lang DPM as Consulting Physician (Podiatry)  Jennifer Lora MD as Consulting Physician (Hematology and Oncology)    Chief Complaint  Cough, Sore Throat, and Fever (Low grade)    Subjective     Subjective      Vesta Landry presents to [facility] for URI symptoms.   Symptoms started about 4 days ago.   Endorses headache, congestion, sneezing, low grade fever, cough, and sore throat.   Denies chest pain, shortness of breath, and rash.   She has tried mucinex and tylenol for symptoms.   She has been vaccinated against flu and COVID, she is due for a booster.   She suffers from hypertension and has had increases in blood pressure as of lately.  Medication was recently adjusted and she is tolerating the new dosages well.  She is compliant with her losartan, metoprolol, and furosemide.    Review of Systems   Constitutional: Positive for fatigue and fever. Negative for chills.   HENT: Positive for congestion and sore throat. Negative for postnasal drip, sinus pressure, sneezing, swollen glands and trouble swallowing.    Respiratory: Positive for cough. Negative for shortness of breath and wheezing.    Cardiovascular: Negative for chest pain.   Gastrointestinal: Negative for abdominal pain, diarrhea, nausea and vomiting.   Neurological: Positive for headache.   Psychiatric/Behavioral: Negative for sleep disturbance.       Objective     Objective   Vital Signs:   /72   Pulse 69   Temp 97.8 °F (36.6 °C)   Ht 154.9 cm (60.98\")   Wt 68.9 kg (152 lb)   SpO2 100%   BMI 28.74 kg/m²     Physical Exam  Vitals and nursing note reviewed.   Constitutional:       General: She is not in acute distress.     Appearance: Normal appearance. She is ill-appearing. She is not toxic-appearing.   HENT:       Right Ear: Tympanic membrane normal. No middle ear effusion. Tympanic membrane is not bulging.      Left Ear: Ear canal normal.  No middle ear effusion. Tympanic membrane is not bulging.      Nose: Congestion present. No rhinorrhea.      Right Sinus: No maxillary sinus tenderness or frontal sinus tenderness.      Left Sinus: No maxillary sinus tenderness or frontal sinus tenderness.      Mouth/Throat:      Comments: Deferred exam due to confirmed COVID-19  Eyes:      Pupils: Pupils are equal, round, and reactive to light.   Cardiovascular:      Rate and Rhythm: Normal rate and regular rhythm.      Heart sounds: Normal heart sounds. No murmur heard.  Pulmonary:      Effort: Pulmonary effort is normal. No respiratory distress.      Breath sounds: Normal breath sounds. No wheezing.   Abdominal:      General: Bowel sounds are normal. There is no distension.      Palpations: Abdomen is soft.      Tenderness: There is no abdominal tenderness.   Lymphadenopathy:      Head:      Right side of head: Tonsillar adenopathy present. No submental or submandibular adenopathy.      Left side of head: Tonsillar adenopathy present. No submental or submandibular adenopathy.   Skin:     General: Skin is warm and dry.      Findings: No rash.   Neurological:      Mental Status: She is alert.   Psychiatric:         Mood and Affect: Mood normal.         Behavior: Behavior normal.          Assessment / Plan      Assessment & Plan   Problem List Items Addressed This Visit    None  Visit Diagnoses     COVID-19    -  Primary    Relevant Orders    POCT SARS-CoV-2 Antigen KURT (Completed)    POCT rapid strep A (Completed)    COVID positive.  Discussed viral etiology and antibiotics would not be beneficial at this time.  Recommend rest, push fluids, Mucinex twice daily, and antihistamine.  Discussed CDC guidelines on quarantine, 5 days from symptom onset as long as improving and afebrile for 24 hours without medications may go out in public  with tight fitting mask for an additional 5 days.  Strict return precautions discussed.  Urged to monitor blood pressure at home, elevated in the office today likely due to acute illness.         Follow Up   Return if symptoms worsen or fail to improve.  Patient was given instructions and counseling regarding her condition or for health maintenance advice. Please see specific information pulled into the AVS if appropriate.     LADARIUS Farrar  [facility] - [place]

## 2023-01-05 NOTE — TELEPHONE ENCOUNTER
PHONE CALL FROM DAUGHTER.  PATIENT HAS LOW FEVER AND COUGH.  WHAT TO DO?  HAS EGD TOMORROW.      PLEASE CALL [phone]

## 2023-01-05 NOTE — TELEPHONE ENCOUNTER
Spoke with Meliza and advised to keep appt at 515. Advised to contact provider doing EGD to inform of sx. Meliza stated understanding

## 2023-01-06 ENCOUNTER — PATIENT ROUNDING (BHMG ONLY) (OUTPATIENT)
Dept: ONCOLOGY | Facility: CLINIC | Age: 78
End: 2023-01-06
Payer: MEDICARE

## 2023-01-06 NOTE — PROGRESS NOTES
A My-Chart message has been sent to the patient [place] [facility] [place]

## 2023-01-09 RX ORDER — BENZONATATE 100 MG/1
100 CAPSULE ORAL 3 TIMES DAILY PRN
Qty: 30 CAPSULE | Refills: 0 | Status: SHIPPED | OUTPATIENT
Start: 2023-01-09 | End: 2023-01-26

## 2023-01-12 ENCOUNTER — TRANSCRIBE ORDERS (OUTPATIENT)
Dept: ADMINISTRATIVE | Facility: HOSPITAL | Age: 78
End: 2023-01-12
Payer: MEDICARE

## 2023-01-12 DIAGNOSIS — R05.2 SUBACUTE COUGH: Primary | ICD-10-CM

## 2023-01-12 DIAGNOSIS — C09.9 PRIMARY SQUAMOUS CELL CARCINOMA OF TONSIL: Primary | ICD-10-CM

## 2023-01-12 PROBLEM — R00.1 BRADYCARDIA: Status: ACTIVE | Noted: 2023-01-12

## 2023-01-16 ENCOUNTER — HOSPITAL ENCOUNTER (OUTPATIENT)
Dept: PET IMAGING | Facility: HOSPITAL | Age: 78
Discharge: HOME | End: 2023-01-16
Payer: MEDICARE

## 2023-01-16 DIAGNOSIS — C79.89 METASTATIC SQUAMOUS CELL CARCINOMA TO HEAD AND NECK: ICD-10-CM

## 2023-01-16 LAB — GLUCOSE BLDC GLUCOMTR-MCNC: 109 MG/DL (ref 70–130)

## 2023-01-16 PROCEDURE — 78815 PET IMAGE W/CT SKULL-THIGH: CPT

## 2023-01-16 PROCEDURE — 0 FLUDEOXYGLUCOSE F18 SOLUTION: Performed by: INTERNAL MEDICINE

## 2023-01-16 PROCEDURE — A9552 F18 FDG: HCPCS | Performed by: INTERNAL MEDICINE

## 2023-01-16 PROCEDURE — 82962 GLUCOSE BLOOD TEST: CPT

## 2023-01-16 RX ADMIN — FLUDEOXYGLUCOSE F18 1 DOSE: 300 INJECTION INTRAVENOUS at 14:08

## 2023-01-18 ENCOUNTER — OFFICE VISIT (OUTPATIENT)
Dept: ONCOLOGY | Facility: CLINIC | Age: 78
End: 2023-01-18
Payer: MEDICARE

## 2023-01-18 VITALS
WEIGHT: 148 LBS | HEART RATE: 73 BPM | DIASTOLIC BLOOD PRESSURE: 73 MMHG | SYSTOLIC BLOOD PRESSURE: 162 MMHG | BODY MASS INDEX: 27.94 KG/M2 | RESPIRATION RATE: 12 BRPM | TEMPERATURE: 98 F | HEIGHT: 61 IN | OXYGEN SATURATION: 98 %

## 2023-01-18 DIAGNOSIS — C09.9 SQUAMOUS CELL CARCINOMA OF LEFT TONSIL: ICD-10-CM

## 2023-01-18 DIAGNOSIS — C79.89 METASTATIC SQUAMOUS CELL CARCINOMA TO HEAD AND NECK: Primary | ICD-10-CM

## 2023-01-18 PROCEDURE — 99215 OFFICE O/P EST HI 40 MIN: CPT | Performed by: INTERNAL MEDICINE

## 2023-01-18 RX ORDER — ONDANSETRON HYDROCHLORIDE 8 MG/1
8 TABLET, FILM COATED ORAL EVERY 8 HOURS PRN
Qty: 30 TABLET | Refills: 3 | Status: SHIPPED | OUTPATIENT
Start: 2023-01-18

## 2023-01-18 RX ORDER — ACETAMINOPHEN 325 MG/1
650 TABLET ORAL ONCE
Status: CANCELLED | OUTPATIENT
Start: 2023-02-01

## 2023-01-18 RX ORDER — MEPERIDINE HYDROCHLORIDE 25 MG/ML
25 INJECTION INTRAMUSCULAR; INTRAVENOUS; SUBCUTANEOUS
Status: CANCELLED | OUTPATIENT
Start: 2023-02-08

## 2023-01-18 RX ORDER — SODIUM CHLORIDE 9 MG/ML
250 INJECTION, SOLUTION INTRAVENOUS ONCE
Status: CANCELLED | OUTPATIENT
Start: 2023-02-08

## 2023-01-18 RX ORDER — FAMOTIDINE 10 MG/ML
20 INJECTION, SOLUTION INTRAVENOUS ONCE
Status: CANCELLED | OUTPATIENT
Start: 2023-02-01

## 2023-01-18 RX ORDER — MEPERIDINE HYDROCHLORIDE 25 MG/ML
25 INJECTION INTRAMUSCULAR; INTRAVENOUS; SUBCUTANEOUS
Status: CANCELLED | OUTPATIENT
Start: 2023-02-01

## 2023-01-18 RX ORDER — SODIUM CHLORIDE 9 MG/ML
250 INJECTION, SOLUTION INTRAVENOUS ONCE
Status: CANCELLED | OUTPATIENT
Start: 2023-02-01

## 2023-01-18 RX ORDER — DIPHENHYDRAMINE HYDROCHLORIDE 50 MG/ML
50 INJECTION INTRAMUSCULAR; INTRAVENOUS AS NEEDED
Status: CANCELLED | OUTPATIENT
Start: 2023-02-01

## 2023-01-18 RX ORDER — METHYLPREDNISOLONE SODIUM SUCCINATE 125 MG/2ML
125 INJECTION, POWDER, LYOPHILIZED, FOR SOLUTION INTRAMUSCULAR; INTRAVENOUS ONCE
Status: CANCELLED | OUTPATIENT
Start: 2023-02-08

## 2023-01-18 RX ORDER — ACETAMINOPHEN 325 MG/1
650 TABLET ORAL ONCE
Status: CANCELLED | OUTPATIENT
Start: 2023-02-08

## 2023-01-18 RX ORDER — FAMOTIDINE 10 MG/ML
20 INJECTION, SOLUTION INTRAVENOUS AS NEEDED
Status: CANCELLED | OUTPATIENT
Start: 2023-02-01

## 2023-01-18 RX ORDER — FAMOTIDINE 10 MG/ML
20 INJECTION, SOLUTION INTRAVENOUS AS NEEDED
Status: CANCELLED | OUTPATIENT
Start: 2023-02-08

## 2023-01-18 RX ORDER — FAMOTIDINE 10 MG/ML
20 INJECTION, SOLUTION INTRAVENOUS ONCE
Status: CANCELLED | OUTPATIENT
Start: 2023-02-08

## 2023-01-18 RX ORDER — DIPHENHYDRAMINE HYDROCHLORIDE 50 MG/ML
50 INJECTION INTRAMUSCULAR; INTRAVENOUS AS NEEDED
Status: CANCELLED | OUTPATIENT
Start: 2023-02-08

## 2023-01-18 RX ORDER — METHYLPREDNISOLONE SODIUM SUCCINATE 125 MG/2ML
125 INJECTION, POWDER, LYOPHILIZED, FOR SOLUTION INTRAMUSCULAR; INTRAVENOUS ONCE
Status: CANCELLED | OUTPATIENT
Start: 2023-02-01

## 2023-01-18 NOTE — PROGRESS NOTES
DATE OF VISIT: 1/18/2023    REASON FOR VISIT: Followup for metastatic left tonsillar CA     PROBLEM LIST:  1.  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  Presented with a growing left cervical lymph node.  B.  Diagnosed after ultrasound-guided biopsy done on December 12, 2022.  C.  No enough tissue for molecular analysis  2.  Hypertension  3.  Hypercholesteremia  4.  Gout arthritis      HISTORY OF PRESENT ILLNESS: The patient is a very pleasant 77 y.o. female  with past medical history significant for metastatic left tonsillar cancer diagnosed December 2022. The patient is here today for scheduled follow-up visit.    SUBJECTIVE: The patient is here today with her 2 daughters.  She is complaining of difficulty swallowing.  She has been having some fatigue.  She is tired from different testings and she would like to start on treatment.    Past History:  Medical History: has a past medical history of Arthritis, Deep vein thrombosis (HCC), Dysphagia, Hyperlipidemia, Hypertension, and Vitamin D deficiency.   Surgical History: has a past surgical history that includes Colonoscopy; Appendectomy; Cataract extraction; Cholecystectomy; Tubal ligation; Hysterectomy (1991); and Oophorectomy (Bilateral, 1991).   Family History: family history includes Breast cancer in her maternal aunt and paternal aunt; Cancer in her father and sister; Heart failure in her mother; Hyperlipidemia in her mother; Hypertension in her mother; Stroke in her sister.   Social History: reports that she quit smoking about 36 years ago. Her smoking use included cigarettes. She has a 1.50 pack-year smoking history. She has never used smokeless tobacco. She reports that she does not drink alcohol and does not use drugs.    (Not in a hospital admission)     Allergies: Patient has no known allergies.     Review of Systems   Constitutional: Positive for fatigue.       PHYSICAL EXAMINATION:   /73   Pulse 73    "Temp 98 °F (36.7 °C) (Temporal)   Resp 12   Ht 154.9 cm (61\")   Wt 67.1 kg (148 lb)   SpO2 98%   BMI 27.96 kg/m²    Pain Score    01/18/23 1508   PainSc: 0-No pain                     ECOG Performance Status: 2 - Symptomatic, <50% confined to bed      General Appearance:      alert, cooperative, no apparent distress and appears stated age   Lungs:   Clear to auscultation bilaterally; respirations regular, even, and unlabored bilaterally   Heart:  Regular rate and rhythm, no murmurs appreciated   Abdomen:   Soft, non-tender, non-distended and no organomegaly                 Hospital Outpatient Visit on 01/16/2023   Component Date Value Ref Range Status   • Glucose 01/16/2023 109  70 - 130 mg/dL Final    Meter: [id number] : [id number] [place]   Office Visit on 01/05/2023   Component Date Value Ref Range Status   • SARS Antigen 01/05/2023 Detected (A)  Not Detected, Presumptive Negative Final   • Influenza A Antigen KURT 01/05/2023 Not Detected  Not Detected Final   • Influenza B Antigen KURT 01/05/2023 Not Detected  Not Detected Final   • Internal Control 01/05/2023 Passed  Passed Final   • Lot Number 01/05/2023 [id number]   Final   • Expiration Date 01/05/2023 03/02/2023   Final   • Rapid Strep A Screen 01/05/2023 Negative  Negative, VALID, INVALID, Not Performed Final   • Internal Control 01/05/2023 Passed  Passed Final   • Lot Number 01/05/2023 [id number]   Final   • Expiration Date 01/05/2023 05/31/2024   Final        MRI Brain With & Without Contrast    Result Date: 1/4/2023  Narrative: DATE OF EXAM: 1/3/2023 5:12 PM  PROCEDURE: MRI BRAIN W WO CONTRAST-  INDICATIONS: staging scans, new diagnosis squamous cell carcinoma of head and neck; C79.89-Secondary malignant neoplasm of other specified sites  COMPARISON: CT head 12/2/2022  TECHNIQUE: Multiplanar multisequence images of the brain were performed prior to and after the uneventful intravenous contrast administration of 13 mL MultiHance.  FINDINGS: No " acute infarct is present on diffusion weighted sequences. Midline structures are normal and the craniocervical junction is satisfactory in appearance. Mild age-related changes are present with some generalized volume loss and typical T2 hyperintense periventricular white matter changes favored to reflect chronic small vessel ischemia. There is otherwise no evidence of intracranial hemorrhage, mass or mass effect. There is no abnormal parenchymal enhancement. The ventricles demonstrate some ex vacuo prominence due to surrounding volume loss. The orbits are normal and the paranasal sinuses are grossly clear. Intracranial arterial flow voids appear maintained.      Impression: No evidence of intracranial metastasis. Chronic and age-related changes are present as above. There is otherwise no evidence of acute infarct, hemorrhage, mass or abnormal enhancement.  This report was finalized on 1/4/2023 5:41 AM by Mark Méndez.      NM PET/CT Skull Base to Mid Thigh    Result Date: 1/16/2023  Narrative: NM PET/CT SKULL BASE TO MID THIGH Date of Exam: 1/16/2023 1:50 PM EST Indication: staging scans, new diagnosis squamous cell carcinoma or head and neck. Comparison: No previous PET scan. No recent cross-sectional imaging studies. Technique: With fasting blood glucose level of 109 MG/DL, a total 12.6 mm of FDG was administered via right antecubital vein. Following appropriate delay, PET and CT images were obtained from the level of the skull vertex to the mid thighs and fused multiplanar images reconstructed. The CT scan is an unenhanced low-dose study for reference the PET scan only and does not constitute a standard diagnostic CT scan. Findings: Patient history indicates metastatic squamous cell carcinoma to the left neck. Diagnosis per left neck lymph node biopsy. Unknown primary. Today's study shows hypermetabolic emeka activity in the left neck, dorsal to the left sternocleidomastoid with maximum SUV of 10.8, and also  a hypermetabolic mass approximately 3.0 x 2.5 cm in maximal oblique AP and transverse diameters, encompassing the area of the left palatine and lingual tonsils, with some narrowing of the airway, and with what appears to be mild invasion of the base the tongue.  Maximum SUV of this apparent left tonsillar mass is 14.3. There are a few immediately adjacent small nodes around the margins of the left neck node, which may be involved but cannot be measured separately for SUV. No additional separate hypermetabolic foci are seen in the neck. No significant asymmetry of uptake is seen in the brain. Very weak activity seen in the mediastinum and pulmonary oscar, but no significantly hypermetabolic node or mass is seen to suggest metastatic disease. No hypermetabolic pulmonary parenchymal disease is seen. Below no diaphragm, no hypermetabolic solid organ disease or adenopathy is appreciated. There is expected GI and  tract activity. No pathologic marrow space disease is seen. Review of the CT scan shows extensive paranasal sinus disease with air-fluid levels throughout most of the paranasal sinuses. Only low-level activity is seen with maximum SUV of 3.2 Mastoid air cells are clear. .     Impression: Impression: 1. Strongly hypermetabolic 3 cm left tonsillar mass, and strongly hypermetabolic left level 2B lymph node consistent with primary lesion and local metastasis. 2. No evidence of distant metastatic disease. 3. Incidentally noted pansinus disease, with numerous air-fluid levels, but only mild mucosal activity. Electronically Signed: Robinson Chopra  1/16/2023 4:08 PM EST  Workstation ID: [id number]      ASSESSMENT: The patient is a very pleasant 77 y.o. female  with left tonsillar cancer      PLAN:    1.  Left tonsillar squamous cell carcinoma T2 N1 M0 stage I, HPV unknown:  A.  I did go over the PET scan results with the patient and her 2 daughters.  I reviewed the films myself.  She has 2 isolated areas 1 in the left tonsil  and another 1 and left cervical lymph node.  B.  Unfortunately there was no enough tissue to do HPV testing nor PD-L1 status.  The patient has been scheduled for another FNA by the ENT however the patient and her 2 daughters are not interested in any chemotherapy and hence regardless of the HPV status the patient will be treated with the same way.  C.  I do recommend Erbitux weekly with daily radiation.  The patient is not interested in cisplatin.  D.  She is scheduled to see Dr. Novoa from radiation oncology next week.  E.  I did go over potential side effects of treatment including dermatitis colitis and hypomagnesemia.  F.  I will monitor the patient blood work including blood counts kidney function liver function and electrolytes.  G.  I will repeat her PET scan 6 weeks after completion of her treatment.    2.  Malnutrition:  A.  I will set her up to meet with our oncology dietitian.  B.  We will continue nutritional supplements.    3.  Hypertension:  A.  She will continue Lasix Cozaar and metoprolol.  B.  We will monitor her blood pressure while she is in active cancer treatment.  This could interfere with our management since treatment can cause hypotension.    4.  Hypercholesteremia:  A.  She will continue Lipitor 10 mg daily    5.  Gout arthritis:  A.  She will continue allopurinol.    FOLLOW UP: With day 8 of treatment.    Total time of patient care including preparation of patient chart prior to arrival, face-to-face with patient and following visit spent in reviewing records, lab results, imaging studies, discussion with patient, and documentation/charting was 40 minutes     Jennifer Lora MD  1/18/2023

## 2023-01-19 ENCOUNTER — APPOINTMENT (OUTPATIENT)
Dept: CT IMAGING | Facility: HOSPITAL | Age: 78
End: 2023-01-19
Payer: MEDICARE

## 2023-01-19 ENCOUNTER — TELEPHONE (OUTPATIENT)
Dept: INTERNAL MEDICINE | Facility: CLINIC | Age: 78
End: 2023-01-19

## 2023-01-19 ENCOUNTER — APPOINTMENT (OUTPATIENT)
Dept: GENERAL RADIOLOGY | Facility: HOSPITAL | Age: 78
End: 2023-01-19
Payer: MEDICARE

## 2023-01-19 ENCOUNTER — HOSPITAL ENCOUNTER (OUTPATIENT)
Facility: HOSPITAL | Age: 78
Setting detail: OBSERVATION
LOS: 1 days | Discharge: HOME HEALTH | End: 2023-01-22
Attending: EMERGENCY MEDICINE | Admitting: INTERNAL MEDICINE
Payer: MEDICARE

## 2023-01-19 DIAGNOSIS — R22.1 NECK MASS: ICD-10-CM

## 2023-01-19 DIAGNOSIS — N39.0 ACUTE URINARY TRACT INFECTION: Primary | ICD-10-CM

## 2023-01-19 DIAGNOSIS — A41.9 SEPSIS WITH ENCEPHALOPATHY WITHOUT SEPTIC SHOCK, DUE TO UNSPECIFIED ORGANISM: ICD-10-CM

## 2023-01-19 DIAGNOSIS — R13.10 DYSPHAGIA, UNSPECIFIED TYPE: ICD-10-CM

## 2023-01-19 DIAGNOSIS — R65.20 SEPSIS WITH ENCEPHALOPATHY WITHOUT SEPTIC SHOCK, DUE TO UNSPECIFIED ORGANISM: ICD-10-CM

## 2023-01-19 DIAGNOSIS — G93.40 SEPSIS WITH ENCEPHALOPATHY WITHOUT SEPTIC SHOCK, DUE TO UNSPECIFIED ORGANISM: ICD-10-CM

## 2023-01-19 PROBLEM — E78.5 HLD (HYPERLIPIDEMIA): Status: ACTIVE | Noted: 2023-01-19

## 2023-01-19 PROBLEM — Z86.16 PERSONAL HISTORY OF COVID-19: Status: ACTIVE | Noted: 2023-01-19

## 2023-01-19 PROBLEM — R79.89 ELEVATED TROPONIN: Status: ACTIVE | Noted: 2023-01-19
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
## 2023-01-19 PROBLEM — R06.09 EXERTIONAL DYSPNEA: Status: ACTIVE | Noted: 2023-01-19

## 2023-01-19 PROBLEM — N18.30 CKD (CHRONIC KIDNEY DISEASE) STAGE 3, GFR 30-59 ML/MIN: Status: ACTIVE | Noted: 2023-01-19

## 2023-01-19 PROBLEM — M10.9 GOUT: Status: ACTIVE | Noted: 2023-01-19

## 2023-01-19 PROBLEM — F41.8 ANXIETY ASSOCIATED WITH DEPRESSION: Status: ACTIVE | Noted: 2023-01-19

## 2023-01-19 PROBLEM — R19.7 DIARRHEA: Status: ACTIVE | Noted: 2023-01-19

## 2023-01-19 PROBLEM — D64.9 ANEMIA: Status: ACTIVE | Noted: 2023-01-19

## 2023-01-19 PROBLEM — R77.8 ELEVATED TROPONIN: Status: ACTIVE | Noted: 2023-01-19

## 2023-01-19 LAB
ALBUMIN SERPL-MCNC: 3.4 G/DL (ref 3.5–5.2)
ALBUMIN/GLOB SERPL: 1 G/DL
ALP SERPL-CCNC: 104 U/L (ref 39–117)
ALT SERPL W P-5'-P-CCNC: 8 U/L (ref 1–33)
ANION GAP SERPL CALCULATED.3IONS-SCNC: 13 MMOL/L (ref 5–15)
AST SERPL-CCNC: 21 U/L (ref 1–32)
B PARAPERT DNA SPEC QL NAA+PROBE: NOT DETECTED
B PERT DNA SPEC QL NAA+PROBE: NOT DETECTED
BACTERIA UR QL AUTO: ABNORMAL /HPF
BASOPHILS # BLD AUTO: 0.05 10*3/MM3 (ref 0–0.2)
BASOPHILS NFR BLD AUTO: 0.3 % (ref 0–1.5)
BILIRUB SERPL-MCNC: 0.5 MG/DL (ref 0–1.2)
BILIRUB UR QL STRIP: NEGATIVE
BUN SERPL-MCNC: 21 MG/DL (ref 8–23)
BUN/CREAT SERPL: 16.2 (ref 7–25)
C PNEUM DNA NPH QL NAA+NON-PROBE: NOT DETECTED
CALCIUM SPEC-SCNC: 8.7 MG/DL (ref 8.6–10.5)
CHLORIDE SERPL-SCNC: 101 MMOL/L (ref 98–107)
CLARITY UR: ABNORMAL
CO2 SERPL-SCNC: 23 MMOL/L (ref 22–29)
COLOR UR: YELLOW
CREAT SERPL-MCNC: 1.3 MG/DL (ref 0.57–1)
D DIMER PPP FEU-MCNC: 1.25 MCGFEU/ML (ref 0–0.79)
D-LACTATE SERPL-SCNC: 0.8 MMOL/L (ref 0.5–2)
DEPRECATED RDW RBC AUTO: 54.5 FL (ref 37–54)
EGFRCR SERPLBLD CKD-EPI 2021: 41.9 ML/MIN/1.73
EOSINOPHIL # BLD AUTO: 0.02 10*3/MM3 (ref 0–0.4)
EOSINOPHIL NFR BLD AUTO: 0.1 % (ref 0.3–6.2)
ERYTHROCYTE [DISTWIDTH] IN BLOOD BY AUTOMATED COUNT: 16 % (ref 12.3–15.4)
FERRITIN SERPL-MCNC: 211 NG/ML (ref 13–150)
FLUAV RNA RESP QL NAA+PROBE: NOT DETECTED
FLUAV SUBTYP SPEC NAA+PROBE: NOT DETECTED
FLUBV RNA ISLT QL NAA+PROBE: NOT DETECTED
FLUBV RNA RESP QL NAA+PROBE: NOT DETECTED
GLOBULIN UR ELPH-MCNC: 3.3 GM/DL
GLUCOSE SERPL-MCNC: 115 MG/DL (ref 65–99)
GLUCOSE UR STRIP-MCNC: NEGATIVE MG/DL
HADV DNA SPEC NAA+PROBE: NOT DETECTED
HCOV 229E RNA SPEC QL NAA+PROBE: NOT DETECTED
HCOV HKU1 RNA SPEC QL NAA+PROBE: NOT DETECTED
HCOV NL63 RNA SPEC QL NAA+PROBE: NOT DETECTED
HCOV OC43 RNA SPEC QL NAA+PROBE: NOT DETECTED
HCT VFR BLD AUTO: 31.6 % (ref 34–46.6)
HGB BLD-MCNC: 10.1 G/DL (ref 12–15.9)
HGB UR QL STRIP.AUTO: NEGATIVE
HMPV RNA NPH QL NAA+NON-PROBE: NOT DETECTED
HOLD SPECIMEN: NORMAL
HPIV1 RNA ISLT QL NAA+PROBE: NOT DETECTED
HPIV2 RNA SPEC QL NAA+PROBE: NOT DETECTED
HPIV3 RNA NPH QL NAA+PROBE: NOT DETECTED
HPIV4 P GENE NPH QL NAA+PROBE: NOT DETECTED
HYALINE CASTS UR QL AUTO: ABNORMAL /LPF
IMM GRANULOCYTES # BLD AUTO: 0.12 10*3/MM3 (ref 0–0.05)
IMM GRANULOCYTES NFR BLD AUTO: 0.7 % (ref 0–0.5)
IRON 24H UR-MRATE: 16 MCG/DL (ref 37–145)
IRON 24H UR-MRATE: 16 MCG/DL (ref 37–145)
IRON SATN MFR SERPL: 7 % (ref 20–50)
KETONES UR QL STRIP: NEGATIVE
LEUKOCYTE ESTERASE UR QL STRIP.AUTO: ABNORMAL
LYMPHOCYTES # BLD AUTO: 0.84 10*3/MM3 (ref 0.7–3.1)
LYMPHOCYTES NFR BLD AUTO: 4.7 % (ref 19.6–45.3)
M PNEUMO IGG SER IA-ACNC: NOT DETECTED
MAGNESIUM SERPL-MCNC: 1.8 MG/DL (ref 1.6–2.4)
MCH RBC QN AUTO: 29.9 PG (ref 26.6–33)
MCHC RBC AUTO-ENTMCNC: 32 G/DL (ref 31.5–35.7)
MCV RBC AUTO: 93.5 FL (ref 79–97)
MONOCYTES # BLD AUTO: 1.31 10*3/MM3 (ref 0.1–0.9)
MONOCYTES NFR BLD AUTO: 7.3 % (ref 5–12)
MUCOUS THREADS URNS QL MICRO: ABNORMAL /HPF
NEUTROPHILS NFR BLD AUTO: 15.53 10*3/MM3 (ref 1.7–7)
NEUTROPHILS NFR BLD AUTO: 86.9 % (ref 42.7–76)
NITRITE UR QL STRIP: NEGATIVE
NRBC BLD AUTO-RTO: 0 /100 WBC (ref 0–0.2)
PH UR STRIP.AUTO: <=5 [PH] (ref 5–8)
PLATELET # BLD AUTO: 226 10*3/MM3 (ref 140–450)
PMV BLD AUTO: 10 FL (ref 6–12)
POTASSIUM SERPL-SCNC: 4.1 MMOL/L (ref 3.5–5.2)
PROCALCITONIN SERPL-MCNC: 0.29 NG/ML (ref 0–0.25)
PROT SERPL-MCNC: 6.7 G/DL (ref 6–8.5)
PROT UR QL STRIP: ABNORMAL
RBC # BLD AUTO: 3.38 10*6/MM3 (ref 3.77–5.28)
RBC # UR STRIP: ABNORMAL /HPF
REF LAB TEST METHOD: ABNORMAL
RETICS # AUTO: 0.1 10*6/MM3 (ref 0.02–0.13)
RETICS/RBC NFR AUTO: 2.84 % (ref 0.7–1.9)
RHINOVIRUS RNA SPEC NAA+PROBE: NOT DETECTED
RSV RNA NPH QL NAA+NON-PROBE: NOT DETECTED
SARS-COV-2 RNA NPH QL NAA+NON-PROBE: NOT DETECTED
SARS-COV-2 RNA RESP QL NAA+PROBE: NOT DETECTED
SODIUM SERPL-SCNC: 137 MMOL/L (ref 136–145)
SP GR UR STRIP: 1.02 (ref 1–1.03)
SQUAMOUS #/AREA URNS HPF: ABNORMAL /HPF
TIBC SERPL-MCNC: 229 MCG/DL (ref 298–536)
TRANSFERRIN SERPL-MCNC: 154 MG/DL (ref 200–360)
TROPONIN T SERPL-MCNC: 0.09 NG/ML (ref 0–0.03)
TROPONIN T SERPL-MCNC: 0.09 NG/ML (ref 0–0.03)
UROBILINOGEN UR QL STRIP: ABNORMAL
WBC # UR STRIP: ABNORMAL /HPF
WBC NRBC COR # BLD: 17.87 10*3/MM3 (ref 3.4–10.8)
WHOLE BLOOD HOLD COAG: NORMAL
WHOLE BLOOD HOLD SPECIMEN: NORMAL

## 2023-01-19 PROCEDURE — 96366 THER/PROPH/DIAG IV INF ADDON: CPT

## 2023-01-19 PROCEDURE — P9612 CATHETERIZE FOR URINE SPEC: HCPCS

## 2023-01-19 PROCEDURE — 96367 TX/PROPH/DG ADDL SEQ IV INF: CPT

## 2023-01-19 PROCEDURE — 84484 ASSAY OF TROPONIN QUANT: CPT | Performed by: NURSE PRACTITIONER

## 2023-01-19 PROCEDURE — 70450 CT HEAD/BRAIN W/O DYE: CPT

## 2023-01-19 PROCEDURE — 83735 ASSAY OF MAGNESIUM: CPT | Performed by: EMERGENCY MEDICINE

## 2023-01-19 PROCEDURE — 82746 ASSAY OF FOLIC ACID SERUM: CPT | Performed by: NURSE PRACTITIONER

## 2023-01-19 PROCEDURE — 85045 AUTOMATED RETICULOCYTE COUNT: CPT | Performed by: NURSE PRACTITIONER

## 2023-01-19 PROCEDURE — 25010000002 VANCOMYCIN 10 G RECONSTITUTED SOLUTION: Performed by: EMERGENCY MEDICINE

## 2023-01-19 PROCEDURE — 99223 1ST HOSP IP/OBS HIGH 75: CPT | Performed by: NURSE PRACTITIONER

## 2023-01-19 PROCEDURE — 83605 ASSAY OF LACTIC ACID: CPT | Performed by: EMERGENCY MEDICINE

## 2023-01-19 PROCEDURE — 85379 FIBRIN DEGRADATION QUANT: CPT | Performed by: NURSE PRACTITIONER

## 2023-01-19 PROCEDURE — 71250 CT THORAX DX C-: CPT

## 2023-01-19 PROCEDURE — 71045 X-RAY EXAM CHEST 1 VIEW: CPT

## 2023-01-19 PROCEDURE — 93010 ELECTROCARDIOGRAM REPORT: CPT | Performed by: INTERNAL MEDICINE

## 2023-01-19 PROCEDURE — 36415 COLL VENOUS BLD VENIPUNCTURE: CPT

## 2023-01-19 PROCEDURE — 84145 PROCALCITONIN (PCT): CPT | Performed by: NURSE PRACTITIONER

## 2023-01-19 PROCEDURE — 82607 VITAMIN B-12: CPT | Performed by: NURSE PRACTITIONER

## 2023-01-19 PROCEDURE — 0202U NFCT DS 22 TRGT SARS-COV-2: CPT | Performed by: NURSE PRACTITIONER

## 2023-01-19 PROCEDURE — 93005 ELECTROCARDIOGRAM TRACING: CPT | Performed by: NURSE PRACTITIONER

## 2023-01-19 PROCEDURE — 84484 ASSAY OF TROPONIN QUANT: CPT | Performed by: EMERGENCY MEDICINE

## 2023-01-19 PROCEDURE — 83540 ASSAY OF IRON: CPT | Performed by: NURSE PRACTITIONER

## 2023-01-19 PROCEDURE — 80053 COMPREHEN METABOLIC PANEL: CPT | Performed by: EMERGENCY MEDICINE

## 2023-01-19 PROCEDURE — 93005 ELECTROCARDIOGRAM TRACING: CPT | Performed by: EMERGENCY MEDICINE

## 2023-01-19 PROCEDURE — 96365 THER/PROPH/DIAG IV INF INIT: CPT

## 2023-01-19 PROCEDURE — 81001 URINALYSIS AUTO W/SCOPE: CPT | Performed by: EMERGENCY MEDICINE

## 2023-01-19 PROCEDURE — 87040 BLOOD CULTURE FOR BACTERIA: CPT | Performed by: EMERGENCY MEDICINE

## 2023-01-19 PROCEDURE — G0378 HOSPITAL OBSERVATION PER HR: HCPCS

## 2023-01-19 PROCEDURE — 99285 EMERGENCY DEPT VISIT HI MDM: CPT

## 2023-01-19 PROCEDURE — 87641 MR-STAPH DNA AMP PROBE: CPT

## 2023-01-19 PROCEDURE — 85025 COMPLETE CBC W/AUTO DIFF WBC: CPT | Performed by: EMERGENCY MEDICINE

## 2023-01-19 PROCEDURE — 87636 SARSCOV2 & INF A&B AMP PRB: CPT | Performed by: EMERGENCY MEDICINE

## 2023-01-19 PROCEDURE — C9803 HOPD COVID-19 SPEC COLLECT: HCPCS

## 2023-01-19 PROCEDURE — 82728 ASSAY OF FERRITIN: CPT | Performed by: NURSE PRACTITIONER

## 2023-01-19 PROCEDURE — 84466 ASSAY OF TRANSFERRIN: CPT | Performed by: NURSE PRACTITIONER

## 2023-01-19 PROCEDURE — 25010000002 PIPERACILLIN SOD-TAZOBACTAM PER 1 G: Performed by: EMERGENCY MEDICINE

## 2023-01-19 RX ORDER — IPRATROPIUM BROMIDE AND ALBUTEROL SULFATE 2.5; .5 MG/3ML; MG/3ML
3 SOLUTION RESPIRATORY (INHALATION) EVERY 4 HOURS PRN
Status: DISCONTINUED | OUTPATIENT
Start: 2023-01-19 | End: 2023-01-22 | Stop reason: HOSPADM

## 2023-01-19 RX ORDER — SODIUM CHLORIDE 0.9 % (FLUSH) 0.9 %
10 SYRINGE (ML) INJECTION EVERY 12 HOURS SCHEDULED
Status: DISCONTINUED | OUTPATIENT
Start: 2023-01-19 | End: 2023-01-22 | Stop reason: HOSPADM

## 2023-01-19 RX ORDER — SODIUM CHLORIDE 0.9 % (FLUSH) 0.9 %
10 SYRINGE (ML) INJECTION AS NEEDED
Status: DISCONTINUED | OUTPATIENT
Start: 2023-01-19 | End: 2023-01-22 | Stop reason: HOSPADM

## 2023-01-19 RX ORDER — SODIUM CHLORIDE, SODIUM LACTATE, POTASSIUM CHLORIDE, CALCIUM CHLORIDE 600; 310; 30; 20 MG/100ML; MG/100ML; MG/100ML; MG/100ML
75 INJECTION, SOLUTION INTRAVENOUS CONTINUOUS
Status: ACTIVE | OUTPATIENT
Start: 2023-01-19 | End: 2023-01-20

## 2023-01-19 RX ORDER — TRAZODONE HYDROCHLORIDE 50 MG/1
50 TABLET ORAL NIGHTLY
Status: DISCONTINUED | OUTPATIENT
Start: 2023-01-19 | End: 2023-01-22 | Stop reason: HOSPADM

## 2023-01-19 RX ORDER — PANTOPRAZOLE SODIUM 40 MG/1
40 TABLET, DELAYED RELEASE ORAL
Status: DISCONTINUED | OUTPATIENT
Start: 2023-01-20 | End: 2023-01-20

## 2023-01-19 RX ORDER — ATORVASTATIN CALCIUM 10 MG/1
10 TABLET, FILM COATED ORAL DAILY
Status: DISCONTINUED | OUTPATIENT
Start: 2023-01-20 | End: 2023-01-22 | Stop reason: HOSPADM

## 2023-01-19 RX ORDER — HYDRALAZINE HYDROCHLORIDE 25 MG/1
25 TABLET, FILM COATED ORAL 2 TIMES DAILY
Status: DISCONTINUED | OUTPATIENT
Start: 2023-01-20 | End: 2023-01-21

## 2023-01-19 RX ORDER — ONDANSETRON 2 MG/ML
4 INJECTION INTRAMUSCULAR; INTRAVENOUS EVERY 6 HOURS PRN
Status: DISCONTINUED | OUTPATIENT
Start: 2023-01-19 | End: 2023-01-22 | Stop reason: HOSPADM

## 2023-01-19 RX ORDER — SODIUM CHLORIDE 9 MG/ML
40 INJECTION, SOLUTION INTRAVENOUS AS NEEDED
Status: DISCONTINUED | OUTPATIENT
Start: 2023-01-19 | End: 2023-01-22 | Stop reason: HOSPADM

## 2023-01-19 RX ADMIN — Medication 10 ML: at 21:32

## 2023-01-19 RX ADMIN — SODIUM CHLORIDE, POTASSIUM CHLORIDE, SODIUM LACTATE AND CALCIUM CHLORIDE 75 ML/HR: 600; 310; 30; 20 INJECTION, SOLUTION INTRAVENOUS at 21:25

## 2023-01-19 RX ADMIN — TAZOBACTAM SODIUM AND PIPERACILLIN SODIUM 3.38 G: 375; 3 INJECTION, SOLUTION INTRAVENOUS at 17:34

## 2023-01-19 RX ADMIN — SODIUM CHLORIDE 1000 ML: 9 INJECTION, SOLUTION INTRAVENOUS at 17:33

## 2023-01-19 RX ADMIN — TAZOBACTAM SODIUM AND PIPERACILLIN SODIUM 3.38 G: 375; 3 INJECTION, SOLUTION INTRAVENOUS at 23:20

## 2023-01-19 RX ADMIN — VANCOMYCIN HYDROCHLORIDE 1250 MG: 10 INJECTION, POWDER, LYOPHILIZED, FOR SOLUTION INTRAVENOUS at 18:20

## 2023-01-19 RX ADMIN — TRAZODONE HYDROCHLORIDE 50 MG: 50 TABLET ORAL at 21:33

## 2023-01-19 NOTE — TELEPHONE ENCOUNTER
Caller: Meliza Back    Relationship: Emergency Contact    Best call back number: [phone]     Who are you requesting to speak with (clinical staff, provider,  specific staff member): CLINICAL  What was the call regarding:     HAVING A LOT OF STOMACH PAIN, NOT EATING AND WEAK, PLEASE CALL AND ADVISE    Do you require a callback: YES

## 2023-01-20 ENCOUNTER — APPOINTMENT (OUTPATIENT)
Dept: CARDIOLOGY | Facility: HOSPITAL | Age: 78
End: 2023-01-20
Payer: MEDICARE

## 2023-01-20 ENCOUNTER — APPOINTMENT (OUTPATIENT)
Dept: CT IMAGING | Facility: HOSPITAL | Age: 78
End: 2023-01-20
Payer: MEDICARE

## 2023-01-20 LAB
ADV 40+41 DNA STL QL NAA+NON-PROBE: NOT DETECTED
ANION GAP SERPL CALCULATED.3IONS-SCNC: 13 MMOL/L (ref 5–15)
APTT PPP: 36.9 SECONDS (ref 22–39)
ASTRO TYP 1-8 RNA STL QL NAA+NON-PROBE: NOT DETECTED
BASOPHILS # BLD AUTO: 0.02 10*3/MM3 (ref 0–0.2)
BASOPHILS NFR BLD AUTO: 0.2 % (ref 0–1.5)
BH CV ECHO MEAS - AO MAX PG: 5.8 MMHG
BH CV ECHO MEAS - AO MEAN PG: 3 MMHG
BH CV ECHO MEAS - AO ROOT DIAM: 2.5 CM
BH CV ECHO MEAS - AO V2 MAX: 120 CM/SEC
BH CV ECHO MEAS - AO V2 VTI: 26.9 CM
BH CV ECHO MEAS - AVA(I,D): 3 CM2
BH CV ECHO MEAS - EDV(CUBED): 85.2 ML
BH CV ECHO MEAS - EDV(MOD-SP2): 57.2 ML
BH CV ECHO MEAS - EDV(MOD-SP4): 74.2 ML
BH CV ECHO MEAS - EF(MOD-BP): 55 %
BH CV ECHO MEAS - EF(MOD-SP2): 58 %
BH CV ECHO MEAS - EF(MOD-SP4): 52.2 %
BH CV ECHO MEAS - ESV(CUBED): 13.8 ML
BH CV ECHO MEAS - ESV(MOD-SP2): 24 ML
BH CV ECHO MEAS - ESV(MOD-SP4): 35.5 ML
BH CV ECHO MEAS - FS: 45.5 %
BH CV ECHO MEAS - IVS/LVPW: 1 CM
BH CV ECHO MEAS - IVSD: 1 CM
BH CV ECHO MEAS - LA DIMENSION: 3.5 CM
BH CV ECHO MEAS - LAT PEAK E' VEL: 8.3 CM/SEC
BH CV ECHO MEAS - LV MASS(C)D: 147.8 GRAMS
BH CV ECHO MEAS - LV MAX PG: 4.2 MMHG
BH CV ECHO MEAS - LV MEAN PG: 2 MMHG
BH CV ECHO MEAS - LV V1 MAX: 103 CM/SEC
BH CV ECHO MEAS - LV V1 VTI: 25.8 CM
BH CV ECHO MEAS - LVIDD: 4.4 CM
BH CV ECHO MEAS - LVIDS: 2.4 CM
BH CV ECHO MEAS - LVOT AREA: 3.1 CM2
BH CV ECHO MEAS - LVOT DIAM: 2 CM
BH CV ECHO MEAS - LVPWD: 1 CM
BH CV ECHO MEAS - MED PEAK E' VEL: 8.4 CM/SEC
BH CV ECHO MEAS - MV A MAX VEL: 104 CM/SEC
BH CV ECHO MEAS - MV DEC SLOPE: 411 CM/SEC2
BH CV ECHO MEAS - MV DEC TIME: 0.23 MSEC
BH CV ECHO MEAS - MV E MAX VEL: 112 CM/SEC
BH CV ECHO MEAS - MV E/A: 1.08
BH CV ECHO MEAS - MV MAX PG: 6.2 MMHG
BH CV ECHO MEAS - MV MEAN PG: 3 MMHG
BH CV ECHO MEAS - MV P1/2T: 82 MSEC
BH CV ECHO MEAS - MV V2 VTI: 32.5 CM
BH CV ECHO MEAS - MVA(P1/2T): 2.7 CM2
BH CV ECHO MEAS - MVA(VTI): 2.49 CM2
BH CV ECHO MEAS - PA ACC TIME: 0.12 SEC
BH CV ECHO MEAS - PA PR(ACCEL): 26.8 MMHG
BH CV ECHO MEAS - PI END-D VEL: 103 CM/SEC
BH CV ECHO MEAS - RAP SYSTOLE: 3 MMHG
BH CV ECHO MEAS - RVSP: 20.4 MMHG
BH CV ECHO MEAS - SV(LVOT): 81.1 ML
BH CV ECHO MEAS - SV(MOD-SP2): 33.2 ML
BH CV ECHO MEAS - SV(MOD-SP4): 38.7 ML
BH CV ECHO MEAS - TAPSE (>1.6): 3.2 CM
BH CV ECHO MEAS - TR MAX PG: 17.4 MMHG
BH CV ECHO MEAS - TR MAX VEL: 208 CM/SEC
BH CV ECHO MEASUREMENTS AVERAGE E/E' RATIO: 13.41
BH CV LOWER VASCULAR LEFT COMMON FEMORAL AUGMENT: NORMAL
BH CV LOWER VASCULAR LEFT COMMON FEMORAL COMPRESS: NORMAL
BH CV LOWER VASCULAR LEFT COMMON FEMORAL PHASIC: NORMAL
BH CV LOWER VASCULAR LEFT COMMON FEMORAL SPONT: NORMAL
BH CV LOWER VASCULAR LEFT DISTAL FEMORAL COMPRESS: NORMAL
BH CV LOWER VASCULAR LEFT GASTRONEMIUS COMPRESS: NORMAL
BH CV LOWER VASCULAR LEFT GREATER SAPH AK COMPRESS: NORMAL
BH CV LOWER VASCULAR LEFT GREATER SAPH BK COMPRESS: NORMAL
BH CV LOWER VASCULAR LEFT LESSER SAPH COMPRESS: NORMAL
BH CV LOWER VASCULAR LEFT MID FEMORAL AUGMENT: NORMAL
BH CV LOWER VASCULAR LEFT MID FEMORAL COMPRESS: NORMAL
BH CV LOWER VASCULAR LEFT MID FEMORAL PHASIC: NORMAL
BH CV LOWER VASCULAR LEFT MID FEMORAL SPONT: NORMAL
BH CV LOWER VASCULAR LEFT PERONEAL COMPRESS: NORMAL
BH CV LOWER VASCULAR LEFT POPLITEAL AUGMENT: NORMAL
BH CV LOWER VASCULAR LEFT POPLITEAL COMPRESS: NORMAL
BH CV LOWER VASCULAR LEFT POPLITEAL PHASIC: NORMAL
BH CV LOWER VASCULAR LEFT POPLITEAL SPONT: NORMAL
BH CV LOWER VASCULAR LEFT POSTERIOR TIBIAL COMPRESS: NORMAL
BH CV LOWER VASCULAR LEFT PROFUNDA FEMORAL COMPRESS: NORMAL
BH CV LOWER VASCULAR LEFT PROXIMAL FEMORAL COMPRESS: NORMAL
BH CV LOWER VASCULAR LEFT SAPHENOFEMORAL JUNCTION COMPRESS: NORMAL
BH CV LOWER VASCULAR RIGHT COMMON FEMORAL AUGMENT: NORMAL
BH CV LOWER VASCULAR RIGHT COMMON FEMORAL COMPRESS: NORMAL
BH CV LOWER VASCULAR RIGHT COMMON FEMORAL PHASIC: NORMAL
BH CV LOWER VASCULAR RIGHT COMMON FEMORAL SPONT: NORMAL
BH CV LOWER VASCULAR RIGHT DISTAL FEMORAL COMPRESS: NORMAL
BH CV LOWER VASCULAR RIGHT GASTRONEMIUS COMPRESS: NORMAL
BH CV LOWER VASCULAR RIGHT GREATER SAPH AK COMPRESS: NORMAL
BH CV LOWER VASCULAR RIGHT GREATER SAPH BK COMPRESS: NORMAL
BH CV LOWER VASCULAR RIGHT LESSER SAPH COMPRESS: NORMAL
BH CV LOWER VASCULAR RIGHT MID FEMORAL AUGMENT: NORMAL
BH CV LOWER VASCULAR RIGHT MID FEMORAL COMPRESS: NORMAL
BH CV LOWER VASCULAR RIGHT MID FEMORAL PHASIC: NORMAL
BH CV LOWER VASCULAR RIGHT MID FEMORAL SPONT: NORMAL
BH CV LOWER VASCULAR RIGHT PERONEAL COMPRESS: NORMAL
BH CV LOWER VASCULAR RIGHT POPLITEAL AUGMENT: NORMAL
BH CV LOWER VASCULAR RIGHT POPLITEAL COMPRESS: NORMAL
BH CV LOWER VASCULAR RIGHT POPLITEAL PHASIC: NORMAL
BH CV LOWER VASCULAR RIGHT POPLITEAL SPONT: NORMAL
BH CV LOWER VASCULAR RIGHT POSTERIOR TIBIAL COMPRESS: NORMAL
BH CV LOWER VASCULAR RIGHT PROFUNDA FEMORAL COMPRESS: NORMAL
BH CV LOWER VASCULAR RIGHT PROXIMAL FEMORAL COMPRESS: NORMAL
BH CV LOWER VASCULAR RIGHT SAPHENOFEMORAL JUNCTION COMPRESS: NORMAL
BH CV XLRA - RV BASE: 4.1 CM
BH CV XLRA - RV LENGTH: 5.7 CM
BH CV XLRA - RV MID: 3.4 CM
BH CV XLRA - TDI S': 14.7 CM/SEC
BUN SERPL-MCNC: 23 MG/DL (ref 8–23)
BUN/CREAT SERPL: 20.4 (ref 7–25)
C CAYETANENSIS DNA STL QL NAA+NON-PROBE: NOT DETECTED
C COLI+JEJ+UPSA DNA STL QL NAA+NON-PROBE: NOT DETECTED
C DIFF GDH + TOXINS A+B STL QL IA.RAPID: POSITIVE
C DIFF TOX GENS STL QL NAA+PROBE: DETECTED
CALCIUM SPEC-SCNC: 8 MG/DL (ref 8.6–10.5)
CHLORIDE SERPL-SCNC: 108 MMOL/L (ref 98–107)
CO2 SERPL-SCNC: 21 MMOL/L (ref 22–29)
CREAT SERPL-MCNC: 1.13 MG/DL (ref 0.57–1)
CRYPTOSP DNA STL QL NAA+NON-PROBE: NOT DETECTED
DEPRECATED RDW RBC AUTO: 55.9 FL (ref 37–54)
E HISTOLYT DNA STL QL NAA+NON-PROBE: NOT DETECTED
EAEC PAA PLAS AGGR+AATA ST NAA+NON-PRB: NOT DETECTED
EC STX1+STX2 GENES STL QL NAA+NON-PROBE: NOT DETECTED
EGFRCR SERPLBLD CKD-EPI 2021: 49.6 ML/MIN/1.73
EOSINOPHIL # BLD AUTO: 0.04 10*3/MM3 (ref 0–0.4)
EOSINOPHIL NFR BLD AUTO: 0.4 % (ref 0.3–6.2)
EPEC EAE GENE STL QL NAA+NON-PROBE: NOT DETECTED
ERYTHROCYTE [DISTWIDTH] IN BLOOD BY AUTOMATED COUNT: 16.2 % (ref 12.3–15.4)
ETEC LTA+ST1A+ST1B TOX ST NAA+NON-PROBE: NOT DETECTED
FOLATE SERPL-MCNC: >20 NG/ML (ref 4.78–24.2)
G LAMBLIA DNA STL QL NAA+NON-PROBE: NOT DETECTED
GLUCOSE SERPL-MCNC: 97 MG/DL (ref 65–99)
HBA1C MFR BLD: 5.3 % (ref 4.8–5.6)
HCT VFR BLD AUTO: 26.9 % (ref 34–46.6)
HGB BLD-MCNC: 8.5 G/DL (ref 12–15.9)
IMM GRANULOCYTES # BLD AUTO: 0.06 10*3/MM3 (ref 0–0.05)
IMM GRANULOCYTES NFR BLD AUTO: 0.6 % (ref 0–0.5)
INR PPP: 1.26 (ref 0.84–1.13)
LEFT ATRIUM VOLUME INDEX: 30.2 ML/M2
LYMPHOCYTES # BLD AUTO: 0.65 10*3/MM3 (ref 0.7–3.1)
LYMPHOCYTES NFR BLD AUTO: 6.6 % (ref 19.6–45.3)
MAGNESIUM SERPL-MCNC: 1.6 MG/DL (ref 1.6–2.4)
MAXIMAL PREDICTED HEART RATE: 141 BPM
MAXIMAL PREDICTED HEART RATE: 141 BPM
MCH RBC QN AUTO: 29.9 PG (ref 26.6–33)
MCHC RBC AUTO-ENTMCNC: 31.6 G/DL (ref 31.5–35.7)
MCV RBC AUTO: 94.7 FL (ref 79–97)
MONOCYTES # BLD AUTO: 0.84 10*3/MM3 (ref 0.1–0.9)
MONOCYTES NFR BLD AUTO: 8.6 % (ref 5–12)
MRSA DNA SPEC QL NAA+PROBE: NEGATIVE
NEUTROPHILS NFR BLD AUTO: 8.18 10*3/MM3 (ref 1.7–7)
NEUTROPHILS NFR BLD AUTO: 83.6 % (ref 42.7–76)
NOROVIRUS GI+II RNA STL QL NAA+NON-PROBE: NOT DETECTED
NRBC BLD AUTO-RTO: 0 /100 WBC (ref 0–0.2)
NT-PROBNP SERPL-MCNC: 4141 PG/ML (ref 0–1800)
P SHIGELLOIDES DNA STL QL NAA+NON-PROBE: NOT DETECTED
PLATELET # BLD AUTO: 170 10*3/MM3 (ref 140–450)
PMV BLD AUTO: 10.4 FL (ref 6–12)
POTASSIUM SERPL-SCNC: 3.6 MMOL/L (ref 3.5–5.2)
PROTHROMBIN TIME: 15.8 SECONDS (ref 11.4–14.4)
RBC # BLD AUTO: 2.84 10*6/MM3 (ref 3.77–5.28)
RVA RNA STL QL NAA+NON-PROBE: NOT DETECTED
S ENT+BONG DNA STL QL NAA+NON-PROBE: NOT DETECTED
SAPO I+II+IV+V RNA STL QL NAA+NON-PROBE: NOT DETECTED
SHIGELLA SP+EIEC IPAH ST NAA+NON-PROBE: NOT DETECTED
SODIUM SERPL-SCNC: 142 MMOL/L (ref 136–145)
STRESS TARGET HR: 120 BPM
STRESS TARGET HR: 120 BPM
TSH SERPL DL<=0.05 MIU/L-ACNC: 2.71 UIU/ML (ref 0.27–4.2)
V CHOL+PARA+VUL DNA STL QL NAA+NON-PROBE: NOT DETECTED
V CHOLERAE DNA STL QL NAA+NON-PROBE: NOT DETECTED
VANCOMYCIN SERPL-MCNC: 10.8 MCG/ML (ref 5–40)
VIT B12 BLD-MCNC: 521 PG/ML (ref 211–946)
WBC NRBC COR # BLD: 9.79 10*3/MM3 (ref 3.4–10.8)
Y ENTEROCOL DNA STL QL NAA+NON-PROBE: NOT DETECTED

## 2023-01-20 PROCEDURE — 97166 OT EVAL MOD COMPLEX 45 MIN: CPT

## 2023-01-20 PROCEDURE — 93306 TTE W/DOPPLER COMPLETE: CPT

## 2023-01-20 PROCEDURE — G0378 HOSPITAL OBSERVATION PER HR: HCPCS

## 2023-01-20 PROCEDURE — 25010000002 NA FERRIC GLUC CPLX PER 12.5 MG: Performed by: INTERNAL MEDICINE

## 2023-01-20 PROCEDURE — 25010000002 MAGNESIUM SULFATE 2 GM/50ML SOLUTION: Performed by: INTERNAL MEDICINE

## 2023-01-20 PROCEDURE — 87449 NOS EACH ORGANISM AG IA: CPT | Performed by: NURSE PRACTITIONER

## 2023-01-20 PROCEDURE — 83036 HEMOGLOBIN GLYCOSYLATED A1C: CPT | Performed by: NURSE PRACTITIONER

## 2023-01-20 PROCEDURE — 96366 THER/PROPH/DIAG IV INF ADDON: CPT

## 2023-01-20 PROCEDURE — 85014 HEMATOCRIT: CPT | Performed by: NURSE PRACTITIONER

## 2023-01-20 PROCEDURE — 87493 C DIFF AMPLIFIED PROBE: CPT | Performed by: NURSE PRACTITIONER

## 2023-01-20 PROCEDURE — 99215 OFFICE O/P EST HI 40 MIN: CPT | Performed by: INTERNAL MEDICINE

## 2023-01-20 PROCEDURE — 85025 COMPLETE CBC W/AUTO DIFF WBC: CPT | Performed by: NURSE PRACTITIONER

## 2023-01-20 PROCEDURE — 25010000002 PIPERACILLIN SOD-TAZOBACTAM PER 1 G: Performed by: NURSE PRACTITIONER

## 2023-01-20 PROCEDURE — 85730 THROMBOPLASTIN TIME PARTIAL: CPT | Performed by: NURSE PRACTITIONER

## 2023-01-20 PROCEDURE — 80048 BASIC METABOLIC PNL TOTAL CA: CPT | Performed by: NURSE PRACTITIONER

## 2023-01-20 PROCEDURE — 82747 ASSAY OF FOLIC ACID RBC: CPT | Performed by: NURSE PRACTITIONER

## 2023-01-20 PROCEDURE — 97161 PT EVAL LOW COMPLEX 20 MIN: CPT

## 2023-01-20 PROCEDURE — 85610 PROTHROMBIN TIME: CPT | Performed by: NURSE PRACTITIONER

## 2023-01-20 PROCEDURE — 99233 SBSQ HOSP IP/OBS HIGH 50: CPT | Performed by: INTERNAL MEDICINE

## 2023-01-20 PROCEDURE — 87507 IADNA-DNA/RNA PROBE TQ 12-25: CPT | Performed by: NURSE PRACTITIONER

## 2023-01-20 PROCEDURE — 93970 EXTREMITY STUDY: CPT | Performed by: INTERNAL MEDICINE

## 2023-01-20 PROCEDURE — 80202 ASSAY OF VANCOMYCIN: CPT

## 2023-01-20 PROCEDURE — 83880 ASSAY OF NATRIURETIC PEPTIDE: CPT | Performed by: NURSE PRACTITIONER

## 2023-01-20 PROCEDURE — 84443 ASSAY THYROID STIM HORMONE: CPT | Performed by: NURSE PRACTITIONER

## 2023-01-20 PROCEDURE — 96367 TX/PROPH/DG ADDL SEQ IV INF: CPT

## 2023-01-20 PROCEDURE — 83735 ASSAY OF MAGNESIUM: CPT | Performed by: NURSE PRACTITIONER

## 2023-01-20 PROCEDURE — 93970 EXTREMITY STUDY: CPT

## 2023-01-20 PROCEDURE — 74176 CT ABD & PELVIS W/O CONTRAST: CPT

## 2023-01-20 PROCEDURE — 97535 SELF CARE MNGMENT TRAINING: CPT

## 2023-01-20 PROCEDURE — 92610 EVALUATE SWALLOWING FUNCTION: CPT

## 2023-01-20 PROCEDURE — 93306 TTE W/DOPPLER COMPLETE: CPT | Performed by: INTERNAL MEDICINE

## 2023-01-20 RX ORDER — L.ACID,PARA/B.BIFIDUM/S.THERM 8B CELL
1 CAPSULE ORAL
Status: DISCONTINUED | OUTPATIENT
Start: 2023-01-20 | End: 2023-01-22 | Stop reason: HOSPADM

## 2023-01-20 RX ORDER — VANCOMYCIN HYDROCHLORIDE 250 MG/1
250 CAPSULE ORAL EVERY 6 HOURS SCHEDULED
Status: DISCONTINUED | OUTPATIENT
Start: 2023-01-20 | End: 2023-01-22 | Stop reason: HOSPADM

## 2023-01-20 RX ORDER — SODIUM CHLORIDE, SODIUM LACTATE, POTASSIUM CHLORIDE, CALCIUM CHLORIDE 600; 310; 30; 20 MG/100ML; MG/100ML; MG/100ML; MG/100ML
75 INJECTION, SOLUTION INTRAVENOUS CONTINUOUS
Status: DISCONTINUED | OUTPATIENT
Start: 2023-01-20 | End: 2023-01-22 | Stop reason: HOSPADM

## 2023-01-20 RX ORDER — METRONIDAZOLE 500 MG/100ML
500 INJECTION, SOLUTION INTRAVENOUS EVERY 8 HOURS SCHEDULED
Status: DISCONTINUED | OUTPATIENT
Start: 2023-01-20 | End: 2023-01-22

## 2023-01-20 RX ORDER — VANCOMYCIN HYDROCHLORIDE 1 G/200ML
1000 INJECTION, SOLUTION INTRAVENOUS ONCE
Status: DISCONTINUED | OUTPATIENT
Start: 2023-01-20 | End: 2023-01-20

## 2023-01-20 RX ORDER — POTASSIUM CHLORIDE 750 MG/1
40 CAPSULE, EXTENDED RELEASE ORAL EVERY 4 HOURS
Status: COMPLETED | OUTPATIENT
Start: 2023-01-20 | End: 2023-01-20

## 2023-01-20 RX ORDER — MAGNESIUM SULFATE HEPTAHYDRATE 40 MG/ML
2 INJECTION, SOLUTION INTRAVENOUS ONCE
Status: COMPLETED | OUTPATIENT
Start: 2023-01-20 | End: 2023-01-20

## 2023-01-20 RX ORDER — VANCOMYCIN HYDROCHLORIDE 125 MG/1
125 CAPSULE ORAL EVERY 6 HOURS SCHEDULED
Status: DISCONTINUED | OUTPATIENT
Start: 2023-01-20 | End: 2023-01-20

## 2023-01-20 RX ORDER — FAMOTIDINE 20 MG/1
10 TABLET, FILM COATED ORAL
Status: DISCONTINUED | OUTPATIENT
Start: 2023-01-20 | End: 2023-01-22 | Stop reason: HOSPADM

## 2023-01-20 RX ADMIN — TAZOBACTAM SODIUM AND PIPERACILLIN SODIUM 3.38 G: 375; 3 INJECTION, SOLUTION INTRAVENOUS at 08:42

## 2023-01-20 RX ADMIN — VANCOMYCIN HYDROCHLORIDE 125 MG: 125 CAPSULE ORAL at 15:17

## 2023-01-20 RX ADMIN — SODIUM CHLORIDE, POTASSIUM CHLORIDE, SODIUM LACTATE AND CALCIUM CHLORIDE 75 ML/HR: 600; 310; 30; 20 INJECTION, SOLUTION INTRAVENOUS at 12:30

## 2023-01-20 RX ADMIN — VANCOMYCIN HYDROCHLORIDE 250 MG: 250 CAPSULE ORAL at 23:28

## 2023-01-20 RX ADMIN — HYDRALAZINE HYDROCHLORIDE 25 MG: 25 TABLET, FILM COATED ORAL at 08:42

## 2023-01-20 RX ADMIN — PANTOPRAZOLE SODIUM 40 MG: 40 TABLET, DELAYED RELEASE ORAL at 05:39

## 2023-01-20 RX ADMIN — ATORVASTATIN CALCIUM 10 MG: 10 TABLET, FILM COATED ORAL at 08:41

## 2023-01-20 RX ADMIN — METOPROLOL TARTRATE 50 MG: 25 TABLET, FILM COATED ORAL at 08:42

## 2023-01-20 RX ADMIN — POTASSIUM CHLORIDE 40 MEQ: 750 CAPSULE, EXTENDED RELEASE ORAL at 12:30

## 2023-01-20 RX ADMIN — METRONIDAZOLE 500 MG: 500 INJECTION, SOLUTION INTRAVENOUS at 21:11

## 2023-01-20 RX ADMIN — TAZOBACTAM SODIUM AND PIPERACILLIN SODIUM 3.38 G: 375; 3 INJECTION, SOLUTION INTRAVENOUS at 15:17

## 2023-01-20 RX ADMIN — FAMOTIDINE 10 MG: 20 TABLET, FILM COATED ORAL at 17:49

## 2023-01-20 RX ADMIN — MAGNESIUM SULFATE 2 G: 2 INJECTION INTRAVENOUS at 12:30

## 2023-01-20 RX ADMIN — Medication 10 ML: at 21:12

## 2023-01-20 RX ADMIN — METOPROLOL TARTRATE 50 MG: 25 TABLET, FILM COATED ORAL at 21:11

## 2023-01-20 RX ADMIN — POTASSIUM CHLORIDE 40 MEQ: 750 CAPSULE, EXTENDED RELEASE ORAL at 15:17

## 2023-01-20 RX ADMIN — TRAZODONE HYDROCHLORIDE 50 MG: 50 TABLET ORAL at 21:11

## 2023-01-20 RX ADMIN — HYDRALAZINE HYDROCHLORIDE 25 MG: 25 TABLET, FILM COATED ORAL at 21:11

## 2023-01-20 RX ADMIN — Medication 1 CAPSULE: at 17:49

## 2023-01-20 RX ADMIN — SODIUM CHLORIDE 125 MG: 9 INJECTION, SOLUTION INTRAVENOUS at 12:30

## 2023-01-20 NOTE — PROGRESS NOTES
[facility] Medicine Services  PROGRESS NOTE    Patient Name: Vesta Landry  : 1945  MRN: [id number]    Date of Admission: 2023  Primary Care Physician: Bossman Pedraza MD    Subjective   Subjective     CC:  gen weakness, fall, sepsis, tonsillar cancer    HPI:  gen weakness, loose stool, crampy abd pain. Stable dysphagia. No dyspnea. No chest pain    ROS:  Gen- No fevers, chills  CV- No chest pain, palpitations  Resp- No cough, dyspnea  GI- diarrhea, crampy abd pain        Objective   Objective     Vital Signs:   Temp:  [97.8 °F (36.6 °C)-100.1 °F (37.8 °C)] 98.2 °F (36.8 °C)  Heart Rate:  [69-94] 78  Resp:  [18] 18  BP: (124-191)/() 133/57     Physical Exam:  Constitutional:Alert, oriented x 3, nontoxic appearing, mild dysarthria, elderly and frail appearing but nontoxic  Psych:Normal/appropriate affect  HEENT:NCAT, oropharynx clear, left tonsillar mass noted  Neck: neck supple, full range of motion  Neuro: Face symmetric, speech clear, equal , moves all extremities  Cardiac: RRR; No pretibial pitting edema  Resp: CTAB, normal effort  GI: abd soft, nontender to palpation  Skin: No extremity rash  Musculoskeletal/extremities: no cyanosis of extremities; no significant ankle edema      Results Reviewed:  LAB RESULTS:      Lab 23  0643 23  1642   WBC 9.79 17.87*   HEMOGLOBIN 8.5* 10.1*   HEMATOCRIT 26.9* 31.6*   PLATELETS 170 226   NEUTROS ABS 8.18* 15.53*   IMMATURE GRANS (ABS) 0.06* 0.12*   LYMPHS ABS 0.65* 0.84   MONOS ABS 0.84 1.31*   EOS ABS 0.04 0.02   MCV 94.7 93.5   PROCALCITONIN  --  0.29*   LACTATE  --  0.8   PROTIME 15.8*  --    APTT 36.9  --    D DIMER QUANT  --  1.25*         Lab 23  0643 23  1642   SODIUM 142 137   POTASSIUM 3.6 4.1   CHLORIDE 108* 101   CO2 21.0* 23.0   ANION GAP 13.0 13.0   BUN 23 21   CREATININE 1.13* 1.30*   EGFR 49.6* 41.9*   GLUCOSE 97 115*   CALCIUM 8.0* 8.7   MAGNESIUM 1.6 1.8   HEMOGLOBIN A1C 5.30   --    TSH 2.710  --          Lab 01/19/23  1642   TOTAL PROTEIN 6.7   ALBUMIN 3.4*   GLOBULIN 3.3   ALT (SGPT) 8   AST (SGOT) 21   BILIRUBIN 0.5   ALK PHOS 104         Lab 01/20/23  0643 01/19/23  2100 01/19/23  1642   PROBNP 4,141.0*  --   --    TROPONIN T  --  0.091* 0.089*   PROTIME 15.8*  --   --    INR 1.26*  --   --              Lab 01/19/23  1642   IRON 16*  16*   IRON SATURATION 7*   TIBC 229*   TRANSFERRIN 154*   FERRITIN 211.00*   FOLATE >20.00   VITAMIN B 12 521         Brief Urine Lab Results  (Last result in the past 365 days)      Color   Clarity   Blood   Leuk Est   Nitrite   Protein   CREAT   Urine HCG        01/19/23 1751 Yellow   Cloudy   Negative   Moderate (2+)   Negative   Trace                 Microbiology Results Abnormal     Procedure Component Value - Date/Time    MRSA Screen, PCR (Inpatient) - Swab, Nares [[id number]]  (Normal) Collected: 01/19/23 2144    Lab Status: Final result Specimen: Swab from Nares Updated: 01/20/23 0903     MRSA PCR Negative    Narrative:      The negative predictive value of this diagnostic test is high and should only be used to consider de-escalating anti-MRSA therapy. A positive result may indicate colonization with MRSA and must be correlated clinically.  MRSA Negative    Respiratory Panel PCR w/COVID-19(SARS-CoV-2) ELIAN/LISA/LISSETTE/PAD/COR/MAD/KIARRA In-House, NP Swab in UTM/VTM, 3-4 HR TAT - Swab, Nasopharynx [[id number]]  (Normal) Collected: 01/19/23 2144    Lab Status: Final result Specimen: Swab from Nasopharynx Updated: 01/19/23 2259     ADENOVIRUS, PCR Not Detected     Coronavirus 229E Not Detected     Coronavirus HKU1 Not Detected     Coronavirus NL63 Not Detected     Coronavirus OC43 Not Detected     COVID19 Not Detected     Human Metapneumovirus Not Detected     Human Rhinovirus/Enterovirus Not Detected     Influenza A PCR Not Detected     Influenza B PCR Not Detected     Parainfluenza Virus 1 Not Detected     Parainfluenza Virus 2 Not Detected      Parainfluenza Virus 3 Not Detected     Parainfluenza Virus 4 Not Detected     RSV, PCR Not Detected     Bordetella pertussis pcr Not Detected     Bordetella parapertussis PCR Not Detected     Chlamydophila pneumoniae PCR Not Detected     Mycoplasma pneumo by PCR Not Detected    Narrative:      In the setting of a positive respiratory panel with a viral infection PLUS a negative procalcitonin without other underlying concern for bacterial infection, consider observing off antibiotics or discontinuation of antibiotics and continue supportive care. If the respiratory panel is positive for atypical bacterial infection (Bordetella pertussis, Chlamydophila pneumoniae, or Mycoplasma pneumoniae), consider antibiotic de-escalation to target atypical bacterial infection.    COVID PRE-OP / PRE-PROCEDURE SCREENING ORDER (NO ISOLATION) - Swab, Nasopharynx [[id number]]  (Normal) Collected: 01/19/23 1728    Lab Status: Final result Specimen: Swab from Nasopharynx Updated: 01/19/23 1817    Narrative:      The following orders were created for panel order COVID PRE-OP / PRE-PROCEDURE SCREENING ORDER (NO ISOLATION) - Swab, Nasopharynx.  Procedure                               Abnormality         Status                     ---------                               -----------         ------                     COVID-19 and FLU A/B PCR...[[id number]]  Normal              Final result                 Please view results for these tests on the individual orders.    COVID-19 and FLU A/B PCR - Swab, Nasopharynx [[id number]]  (Normal) Collected: 01/19/23 1728    Lab Status: Final result Specimen: Swab from Nasopharynx Updated: 01/19/23 1817     COVID19 Not Detected     Influenza A PCR Not Detected     Influenza B PCR Not Detected    Narrative:      Fact sheet for providers: https://www.fda.gov/media/473233/download    Fact sheet for patients: https://www.fda.gov/media/082894/download    Test performed by PCR.          CT Head Without  Contrast    Result Date: 1/19/2023  CT HEAD WO CONTRAST Date of Exam: 1/19/2023 10:14 PM EST Indication: fall, hit head, AMS. Comparison: MRI 1/3/2023, CT 12/2/2022 Technique: Axial CT images were obtained of the head without contrast administration.  Reconstructed coronal and sagittal images were also obtained. Automated exposure control and iterative construction methods were used. Findings: There is no evidence of hemorrhage. There is no mass effect or midline shift. There is no extracerebral collection. Ventricles are normal in size and configuration for patient's stated age.  Stable moderate periventricular white matter disease likely related to chronic intravascular ischemic change. No significant soft tissue hematoma. Posterior fossa is within normal limits. Calvarium and skull base appear intact.  An air-fluid levels present within the left maxillary sinus. There is complete opacification of the right maxillary sinus. Air-fluid levels are present within the frontal sinuses, the sphenoid sinuses and the ethmoid air cells. Visualized orbits are unremarkable.     Impression: Impression: No acute intracranial process. Significant paranasal sinus disease with air-fluid levels consistent with acute sinusitis. Electronically Signed: Lisbeth Salinas  1/19/2023 10:52 PM EST  Workstation ID: [id number]    CT Chest Without Contrast Diagnostic    Result Date: 1/20/2023  EXAMINATION: CT SCAN OF THE CHEST WITHOUT INTRAVENOUS CONTRAST DATE OF EXAM: 1/19/2023 10:20 PM HISTORY: recent COVID, evaluate for aspiration, exertional dyspnea COMPARISON: Chest radiograph 7/24/2017. TECHNIQUE: CT examination of the chest was performed without intravenous contrast. CT dose lowering techniques were used, to include: automated exposure control, adjustment for patient size, and/or use of iterative reconstruction. 3D MIP reconstruction was performed under concurrent supervision not requiring an independent workstation, in order to increase the  sensitivity for detection of pulmonary nodules. Note: The exam is limited because some types of pathology may not be adequately demonstrated due to lack of contrast enhancement. FINDINGS: CHEST: Lungs:  No focal consolidation. Bilateral lower lobe dependent reticular opacities. Patchy bandlike scarring in the right lower lobe. Airways appear to be clear.. Pleura: No pneumothorax or pleural effusion. Mediastinum And Scarlett: No evidence of acute abnormality. Cardiovascular: No evidence of acute abnormality on this nonenhanced exam. Patchy moderate calcified atherosclerosis of the aorta. At least mild patchy coronary artery calcification of the left coronary system. No pericardial effusion. Chest Wall:  No evidence of acute abnormality. Upper Abdomen: No evidence of acute abnormality. Status post cholecystectomy. Tiny hiatal hernia. Musculoskeletal: No evidence of acute abnormality. Generalized osteopenia. Degenerative changes of the spine, age-appropriate.     Impression: 1.  No focal consolidation, pneumothorax, or pleural effusion. 2.  Minimal bibasal atelectasis/scarring. Superimposed small airways infection cannot be entirely excluded. Electronically signed by:  Claudio Edwards D.O.  1/19/2023 10:12 PM Mountain Time    XR Chest 1 View    Result Date: 1/19/2023  XR CHEST 1 VW Date of Exam: 1/19/2023 5:01 PM EST Indication: Weak/Dizzy/AMS triage protocol. Comparison: 7/24/2017 Findings: There are no airspace consolidations. No pleural fluid. No pneumothorax. The pulmonary vasculature appears within normal limits. The cardiac and mediastinal silhouette appear unremarkable. No acute osseous abnormality identified.     Impression: Impression: No acute cardiopulmonary process. Electronically Signed: Lisbeth Salinas  1/19/2023 5:29 PM EST  Workstation ID: [id number]      Results for orders placed in visit on 08/07/17    Adult Transthoracic Echo Complete    Interpretation Summary  · Left ventricular diastolic dysfunction (grade  I) consistent with impaired relaxation.  · Left ventricular wall thickness is consistent with hypertrophy. Sigmoid-shaped ventricular septum is present.  · Left ventricular systolic function is normal. Estimated EF = 68%.      I have reviewed the medications:  Scheduled Meds:atorvastatin, 10 mg, Oral, Daily  ferric gluconate, 125 mg, Intravenous, Daily  hydrALAZINE, 25 mg, Oral, BID  metoprolol tartrate, 50 mg, Oral, BID  pantoprazole, 40 mg, Oral, Q AM  piperacillin-tazobactam, 3.375 g, Intravenous, Q8H  sodium chloride, 10 mL, Intravenous, Q12H  traZODone, 50 mg, Oral, Nightly  vancomycin (dosing per levels), , Does not apply, Daily  vancomycin, 1,000 mg, Intravenous, Once      Continuous Infusions:lactated ringers, 75 mL/hr  Pharmacy to dose vancomycin,       PRN Meds:.•  ipratropium-albuterol  •  Magnesium Low Dose Replacement - Initiate Nurse / BPA Driven Protocol  •  ondansetron  •  Pharmacy to dose vancomycin  •  Phosphorus Replacement - Initiate Nurse / BPA Driven Protocol  •  Potassium Replacement - Initiate Nurse / BPA Driven Protocol  •  sodium chloride  •  sodium chloride  •  sodium chloride    Assessment & Plan   Assessment & Plan     Active Hospital Problems    Diagnosis  POA   • **Sepsis (HCC) [A41.9]  Yes   • Acute urinary tract infection [N39.0]  Yes   • Elevated troponin [R77.8]  Yes   • Gout [M10.9]  Yes   • HLD (hyperlipidemia) [E78.5]  Yes   • Anxiety associated with depression [F41.8]  Yes   • CKD (chronic kidney disease) stage 3, GFR 30-59 ml/min ([facility]) [N18.30]  Yes   • Diarrhea [R19.7]  Yes   • Fall [W19.XXXA]  Yes   • Difficulty swallowing [R13.10]  Yes   • Personal history of COVID-19 [Z86.16]  Yes   • Exertional dyspnea [R06.09]  Yes   • Anemia [D64.9]  Yes   • 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        Brief Hospital Course to date:  Vesta Landry is a 77 y.o. female w/ hx left tonsillar squamous cell cancer, htn, hl, chronic HFpEF, ckd 3, gout, gerd, previous covid 19 infection (did not require hospitalization) 1/5/23. Presented w/ increased generalized weakness, fall and hit head. Recent crampy abd pain and diarrhea. Workup revealed ct head with no intracranial dz but showed sinusitis, wbc was 17,000, mildly elevated procal. Initiated on iv fluids and empiric zosyn and vancomycin and admitted to hospitalist service. Oncology consulted. Gi pcr panel and cdiff pcr panel sent    *Sepsis, poa  *Paranasal sinusitis  *Colitis  *C.diff pcr +  *Diarrhea  *Leukocytosis  *Recent Covid infection (dx'd 1/5/23), did not require hospitalization  -ct chest : no overt pneumonia, bibasilar atelectasis  -ct head: no intracranial acute process, paranasal sinus dz w/ air fluid levels w/ acute sinusitis  -rsp pcr panel: negative  -mrsa pcr nares: negative  -u/a cathed: 7-12 squams, 3-5 wbc, 2+ bacteria  -procal minimally elevated 0.29  -initial wbc 17,870, improved to 9,790  -gi pcr negative  -c.diff pcr +  -CT A/P: diffuse thickening of the colon suggesting infectious or inflammatory colitis, moderate air-fluid levels are seen throughout the colon, rectum, and within multiple small bowel loops suggesting a diarrhea state (no evidence of metastatic dz in abdomen or pelvis)  -stop iv vanc (mrsa nasal pcr negative)  -continue zosyn; add po vanc  -ask ID to see to assist in appropriate treatment/deescalation of antibiotics in face of + c.diff pcr  -continue iv fluids    *Tonsillar cancer  - apparently has future appointment w/ Rad onc (yet to see) and Dr. Lora (courtesy consult to Dr. Lora)  -SLP    *mildly elevated & flat troponins  *elevated probnp  -likely type 2 nstemi due to systemic illness  -no chest pain, ekg no dynamic ischemic changes; troponins  minimally elevated 0.089-0.09  -ct chest without overt pulm edema  -echo pending    *s/p fall and head trauma  -ct head negative    *iron def anemia  -iv iron day #1/3  -trend/monitor hgb    *mildly elevated d-dimer  1.25  -no hypoxia  -check b/l venous duplex    *gen weakness  -pt/ot evals    Am labs: cbc,cmp,mag (f/u echo, venous duplex, ct a/p, gi pcr and c.diff pcr)    Expected Discharge Location and Transportation: home vs rehab (pt/ot pending)  Expected Discharge ? 1/23/23       DVT prophylaxis:  Mechanical DVT prophylaxis orders are present.     AM-PAC 6 Clicks Score (PT): 18 (01/19/23 1917)    CODE STATUS:   Code Status and Medical Interventions:   Ordered at: 01/19/23 2008     Level Of Support Discussed With:    Patient    Next of Kin (If No Surrogate)     Code Status (Patient has no pulse and is not breathing):    CPR (Attempt to Resuscitate)     Medical Interventions (Patient has pulse or is breathing):    Full Support       Hakeem Jimeenz MD  01/20/23

## 2023-01-20 NOTE — PROGRESS NOTES
"Pharmacy Consult-Vancomycin Dosing  Vesta Landry is a  77 y.o. female receiving vancomycin therapy.     Indication: Sepsis    Consulting Provider: Hospitalist   ID Consult: No    Goal AUC: 400 - 600 mg/L*hr    Current Antimicrobial Therapy  Anti-Infectives (From admission, onward)      Ordered     Dose/Rate Route Frequency Start Stop    01/19/23 2110  vancomycin (dosing per levels)        Ordering Provider: Lisa Don RPH     Does not apply Daily 01/20/23 0900 01/24/23 0859    01/19/23 2039  piperacillin-tazobactam (ZOSYN) 3.375 g in iso-osmotic dextrose 50 ml (premix)        Ordering Provider: Roslyn Pisano APRN    3.375 g  over 4 Hours Intravenous Every 8 Hours 01/20/23 0000 01/26/23 2359 01/19/23 2039  Pharmacy to dose vancomycin        Ordering Provider: Roslyn Pisano APRN     Does not apply Continuous PRN 01/19/23 2038 01/24/23 2037 01/19/23 1705  piperacillin-tazobactam (ZOSYN) 3.375 g in iso-osmotic dextrose 50 ml (premix)        Ordering Provider: Aguila Ibrahim MD    3.375 g  over 30 Minutes Intravenous Once 01/19/23 1707 01/19/23 1843    01/19/23 1705  vancomycin 1250 mg/250 mL 0.9% NS IVPB ([facility])        Ordering Provider: Aguila Ibrahim MD    20 mg/kg × 67.1 kg Intravenous Once 01/19/23 1707 01/19/23 2033            Allergies  Allergies as of 01/19/2023    (No Known Allergies)       Labs    Results from last 7 days   Lab Units 01/19/23  1642   BUN mg/dL 21   CREATININE mg/dL 1.30*       Results from last 7 days   Lab Units 01/19/23  1642   WBC 10*3/mm3 17.87*       Evaluation of Dosing     Last Dose Received in the ED/Outside Facility: Vancomycin 1250mg IV given 1/19 @ 1820 in ED   Is Patient on Dialysis or Renal Replacement: No    Ht - 154.9 cm (60.98\")  Wt - 67.1 kg (148 lb)    Estimated Creatinine Clearance: 30.7 mL/min (A) (by C-G formula based on SCr of 1.3 mg/dL (H)).    Intake & Output (last 3 days)       None            Microbiology and " Radiology  Microbiology Results (last 10 days)       Procedure Component Value - Date/Time    COVID PRE-OP / PRE-PROCEDURE SCREENING ORDER (NO ISOLATION) - Swab, Nasopharynx [[id number]]  (Normal) Collected: 01/19/23 1728    Lab Status: Final result Specimen: Swab from Nasopharynx Updated: 01/19/23 1817    Narrative:      The following orders were created for panel order COVID PRE-OP / PRE-PROCEDURE SCREENING ORDER (NO ISOLATION) - Swab, Nasopharynx.  Procedure                               Abnormality         Status                     ---------                               -----------         ------                     COVID-19 and FLU A/B PCR...[[id number]]  Normal              Final result                 Please view results for these tests on the individual orders.    COVID-19 and FLU A/B PCR - Swab, Nasopharynx [[id number]]  (Normal) Collected: 01/19/23 1728    Lab Status: Final result Specimen: Swab from Nasopharynx Updated: 01/19/23 1817     COVID19 Not Detected     Influenza A PCR Not Detected     Influenza B PCR Not Detected    Narrative:      Fact sheet for providers: https://www.fda.gov/media/974501/download    Fact sheet for patients: https://www.fda.gov/media/775767/download    Test performed by PCR.            Reported Vancomycin Levels                         [employer] AUC Calculation:    Current AUC: -- mg/L*hr    Predicted Steady State AUC on Current Dose: -- mg/L*hr  _________________________________    Predicted Steady State AUC on New Dose: -- mg/L*hr    Assessment/Plan:    1. Pharmacy to dose vancomycin for sepsis. Goal -600 mg/L*hr.  2. Patient received a loading dose of vancomycin 1250 mg (~19 mg/kg) IV on 1/19 @ 1800. Will defer initiating a vancomycin maintenance dose at this time due to patient having an THELMA  3. Assess clearance by vancomycin level on 1/20 @ 0600 to determine appropriateness of scheduled dosing.   4. Pharmacy will continue to monitor renal function, cultures and  sensitivities, and clinical status to adjust regimen as necessary.    Lisa Don, PharmD  1/19/2023   21:29 EST

## 2023-01-20 NOTE — PLAN OF CARE
Goal Outcome Evaluation:  Plan of Care Reviewed With: patient        Progress: no change  Outcome Evaluation: Patient arrived on the unit tonight with signs and symptoms related to an acute UTI and a fall at home. Patient has had a decent shift, sleeping on and off tonight. VSS, and patient has been alert and oriented to person and place. No complaints of pain tonight. Nursing staff will continue to monitor and assess the patient.

## 2023-01-20 NOTE — PLAN OF CARE
Goal Outcome Evaluation:  Plan of Care Reviewed With: patient, daughter        Progress: no change  Outcome Evaluation: Pt presents below her baseline with all ADLs, IADLs, and mobility due to generalized weakness, impaired balance, confusion, and decreased activity tolerance. Pt warrants skilled OT services in order to return to PLOF and due to high fall risk. [facility]

## 2023-01-20 NOTE — PLAN OF CARE
Goal Outcome Evaluation:  Plan of Care Reviewed With: patient           Outcome Evaluation: PT initial evaluation completed for pt presenting with generalized weakness, impaired balance, confusion, and decreased functional mobility. Pt ambulated 50ft with Vijaya 1+1 and UE support. Pt's decreased independence warrants PT skilled care. Recommend D/C to IP rehab facility.

## 2023-01-20 NOTE — CASE MANAGEMENT/SOCIAL WORK
Continued Stay Note   Jenkins     Patient Name: Vesta Landry  MRN: [id number]  Today's Date: 1/20/2023    Admit Date: 1/19/2023    Plan: Home with Home health   Discharge Plan     Row Name 01/20/23 1503       Plan    Plan Home with Home health    Plan Comments Spoke with patient and family at bedside - they are not interested in inpatient rehab has patient is expected to begin chemo soon but would prefer home health for PT and OT. Also considering possible DME for home - BSC and wheelchair - family to discuss and CM to f/u on Monday for home health and DME orders- edgardo [phone]    Final Discharge Disposition Code 06 - home with home health care    Row Name 01/20/23 144       Plan    Plan CM update    Plan Comments Patient positive for c-diff - PT and OT recommend rehab -               Discharge Codes    No documentation.                     Edgardo Carr RN

## 2023-01-20 NOTE — CONSULTS
Subjective     CHIEF COMPLAINT: Fever confusion and fatigue    HISTORY OF PRESENT ILLNESS:  The patient is a 77 y.o. female, referred by Hakeem Jimenez MD for left tonsil cancer.  The patient was in her history of health until 3 weeks ago.  She was diagnosed with COVID.  She was treated as an outpatient.  Since then she has been complaining of increased fatigue and weakness.  Over the last 2 days she has been more confused.  She has been having very poor oral intake.  The patient was brought by her daughter to the emergency room.  She had elevated white blood cells.  Urinalysis revealed UTI.  She was admitted to the hospitalist service and was consulted to further assist in her care.      REVIEW OF SYSTEMS:  A 14 point review of systems was performed and is negative except as noted above.    Past Medical History:   Diagnosis Date   • Arthritis    • Cancer (HCC)    • Deep vein thrombosis (HCC)     bilateral   • Dementia (HCC)    • Dysphagia    • GERD (gastroesophageal reflux disease)    • Hyperlipidemia    • Hypertension    • Vitamin D deficiency        No current facility-administered medications on file prior to encounter.     Current Outpatient Medications on File Prior to Encounter   Medication Sig Dispense Refill   • allopurinol (ZYLOPRIM) 100 MG tablet Take 1 tablet by mouth Daily. 90 tablet 3   • atorvastatin (LIPITOR) 10 MG tablet Take 1 tablet by mouth Daily. 90 tablet 3   • benzonatate (Tessalon Perles) 100 MG capsule Take 1 capsule by mouth 3 (Three) Times a Day As Needed for Cough. 30 capsule 0   • furosemide (LASIX) 40 MG tablet Take 1 tablet by mouth Daily. swelling 90 tablet 3   • hydrALAZINE (APRESOLINE) 25 MG tablet Take 1 tablet by mouth 2 (Two) Times a Day. 180 tablet 3   • losartan (COZAAR) 100 MG tablet Take 1 tablet by mouth Daily. 90 tablet 1   • metoprolol tartrate (LOPRESSOR) 50 MG tablet TAKE 1 TABLET BY MOUTH TWICE DAILY 180 tablet 0   • omeprazole (priLOSEC) 20 MG capsule Take 1  capsule by mouth Daily. 90 capsule 3   • ondansetron (Zofran) 8 MG tablet Take 1 tablet by mouth Every 8 (Eight) Hours As Needed for Nausea or Vomiting. 30 tablet 3   • potassium chloride (K-DUR,KLOR-CON) 20 MEQ CR tablet TAKE 1 TABLET BY MOUTH TWICE A  tablet 3   • traZODone (DESYREL) 50 MG tablet TAKE 1 TABLET BY MOUTH EVERY NIGHT 90 tablet 3   • diazePAM (Valium) 5 MG tablet Take as directed 45-60 minutes prior to scan. 3 tablet 0       No Known Allergies    Past Surgical History:   Procedure Laterality Date   • APPENDECTOMY     • CATARACT EXTRACTION     • CHOLECYSTECTOMY     • COLONOSCOPY      Approx    • HYSTERECTOMY     • OOPHORECTOMY Bilateral    • TUBAL ABDOMINAL LIGATION         OB History    Para Term  AB Living   4 4 4         SAB IAB Ectopic Molar Multiple Live Births                    # Outcome Date GA Lbr Christopher/2nd Weight Sex Delivery Anes PTL Lv   4 Term            3 Term            2 Term            1 Term                Social History     Socioeconomic History   • Marital status:    Tobacco Use   • Smoking status: Former     Packs/day: 0.15     Years: 10.00     Pack years: 1.50     Types: Cigarettes     Quit date: 1987     Years since quittin.0   • Smokeless tobacco: Never   Vaping Use   • Vaping Use: Never used   Substance and Sexual Activity   • Alcohol use: No   • Drug use: No   • Sexual activity: Defer       Family History   Problem Relation Age of Onset   • Heart failure Mother    • Hyperlipidemia Mother    • Hypertension Mother    • Cancer Father    • Cancer Sister    • Stroke Sister    • Breast cancer Maternal Aunt    • Breast cancer Paternal Aunt    • Ovarian cancer Neg Hx        Objective     Vitals:    23 0350 23 0520 23 0709 23 0842   BP: 124/52  133/57 133/57   BP Location: Left arm      Patient Position: Lying      Pulse: 72 69 78    Resp: 18  18    Temp: 97.8 °F (36.6 °C)  98.2 °F (36.8 °C)    TempSrc: Oral  Oral     SpO2: 95% 94% 94%    Weight:       Height:                            ECOG Performance Status: 2 - Symptomatic, <50% confined to bed  General: well appearing female in no acute distress  Neuro/Psych: A&O x 3, gait steady, appropriate affect, strength 5/5 in all muscle groups  HEENT: sclerae anicteric, oropharynx clear  Lymphatics: no cervical, supraclavicular, or axillary adenopathy  Cardiovascular: regular rate and rhythm, no murmurs  Lungs: clear to auscultation bilaterally  Abdomen: soft, nontender, nondistended.  No palpable organomegaly  Extremities: no lower extremity edema  Skin: no rashes, lesions, bruising, or petechiae      Admission on 01/19/2023   Component Date Value Ref Range Status   • QT Interval 01/19/2023 372  ms Preliminary   • QTC Interval 01/19/2023 445  ms Preliminary   • Glucose 01/19/2023 115 (H)  65 - 99 mg/dL Final   • BUN 01/19/2023 21  8 - 23 mg/dL Final   • Creatinine 01/19/2023 1.30 (H)  0.57 - 1.00 mg/dL Final   • Sodium 01/19/2023 137  136 - 145 mmol/L Final   • Potassium 01/19/2023 4.1  3.5 - 5.2 mmol/L Final    Slight hemolysis detected by analyzer. Results may be affected.   • Chloride 01/19/2023 101  98 - 107 mmol/L Final   • CO2 01/19/2023 23.0  22.0 - 29.0 mmol/L Final   • Calcium 01/19/2023 8.7  8.6 - 10.5 mg/dL Final   • Total Protein 01/19/2023 6.7  6.0 - 8.5 g/dL Final   • Albumin 01/19/2023 3.4 (L)  3.5 - 5.2 g/dL Final   • ALT (SGPT) 01/19/2023 8  1 - 33 U/L Final   • AST (SGOT) 01/19/2023 21  1 - 32 U/L Final   • Alkaline Phosphatase 01/19/2023 104  39 - 117 U/L Final   • Total Bilirubin 01/19/2023 0.5  0.0 - 1.2 mg/dL Final   • Globulin 01/19/2023 3.3  gm/dL Final    Calculated Result   • A/G Ratio 01/19/2023 1.0  g/dL Final   • BUN/Creatinine Ratio 01/19/2023 16.2  7.0 - 25.0 Final   • Anion Gap 01/19/2023 13.0  5.0 - 15.0 mmol/L Final   • eGFR 01/19/2023 41.9 (L)  >60.0 mL/min/1.73 Final   • Troponin T 01/19/2023 0.089 (C)  0.000 - 0.030 ng/mL Final   • Magnesium  01/19/2023 1.8  1.6 - 2.4 mg/dL Final   • Extra Tube 01/19/2023 Hold for add-ons.   Final    Auto resulted.   • Extra Tube 01/19/2023 hold for add-on   Final    Auto resulted   • Extra Tube 01/19/2023 Hold for add-ons.   Final    Auto resulted.   • Extra Tube 01/19/2023 Hold for add-ons.   Final    Auto resulted.   • Extra Tube 01/19/2023 Hold for add-ons.   Final    Auto resulted   • WBC 01/19/2023 17.87 (H)  3.40 - 10.80 10*3/mm3 Final   • RBC 01/19/2023 3.38 (L)  3.77 - 5.28 10*6/mm3 Final   • Hemoglobin 01/19/2023 10.1 (L)  12.0 - 15.9 g/dL Final   • Hematocrit 01/19/2023 31.6 (L)  34.0 - 46.6 % Final   • MCV 01/19/2023 93.5  79.0 - 97.0 fL Final   • MCH 01/19/2023 29.9  26.6 - 33.0 pg Final   • MCHC 01/19/2023 32.0  31.5 - 35.7 g/dL Final   • RDW 01/19/2023 16.0 (H)  12.3 - 15.4 % Final   • RDW-SD 01/19/2023 54.5 (H)  37.0 - 54.0 fl Final   • MPV 01/19/2023 10.0  6.0 - 12.0 fL Final   • Platelets 01/19/2023 226  140 - 450 10*3/mm3 Final   • Neutrophil % 01/19/2023 86.9 (H)  42.7 - 76.0 % Final   • Lymphocyte % 01/19/2023 4.7 (L)  19.6 - 45.3 % Final   • Monocyte % 01/19/2023 7.3  5.0 - 12.0 % Final   • Eosinophil % 01/19/2023 0.1 (L)  0.3 - 6.2 % Final   • Basophil % 01/19/2023 0.3  0.0 - 1.5 % Final   • Immature Grans % 01/19/2023 0.7 (H)  0.0 - 0.5 % Final   • Neutrophils, Absolute 01/19/2023 15.53 (H)  1.70 - 7.00 10*3/mm3 Final   • Lymphocytes, Absolute 01/19/2023 0.84  0.70 - 3.10 10*3/mm3 Final   • Monocytes, Absolute 01/19/2023 1.31 (H)  0.10 - 0.90 10*3/mm3 Final   • Eosinophils, Absolute 01/19/2023 0.02  0.00 - 0.40 10*3/mm3 Final   • Basophils, Absolute 01/19/2023 0.05  0.00 - 0.20 10*3/mm3 Final   • Immature Grans, Absolute 01/19/2023 0.12 (H)  0.00 - 0.05 10*3/mm3 Final   • nRBC 01/19/2023 0.0  0.0 - 0.2 /100 WBC Final   • Color, UA 01/19/2023 Yellow  Yellow, Straw Final   • Appearance, UA 01/19/2023 Cloudy (A)  Clear Final   • pH, UA 01/19/2023 <=5.0  5.0 - 8.0 Final   • Specific Cotter, UA  01/19/2023 1.016  1.001 - 1.030 Final   • Glucose, UA 01/19/2023 Negative  Negative Final   • Ketones, UA 01/19/2023 Negative  Negative Final   • Bilirubin, UA 01/19/2023 Negative  Negative Final   • Blood, UA 01/19/2023 Negative  Negative Final   • Protein, UA 01/19/2023 Trace (A)  Negative Final   • Leuk Esterase, UA 01/19/2023 Moderate (2+) (A)  Negative Final   • Nitrite, UA 01/19/2023 Negative  Negative Final   • Urobilinogen, UA 01/19/2023 0.2 E.U./dL  0.2 - 1.0 E.U./dL Final   • Lactate 01/19/2023 0.8  0.5 - 2.0 mmol/L Final    Falsely depressed results may occur on samples drawn from patients receiving N-Acetylcysteine (NAC) or Metamizole.   • COVID19 01/19/2023 Not Detected  Not Detected - Ref. Range Final   • Influenza A PCR 01/19/2023 Not Detected  Not Detected Final   • Influenza B PCR 01/19/2023 Not Detected  Not Detected Final   • RBC, UA 01/19/2023 0-2  None Seen, 0-2 /HPF Final   • WBC, UA 01/19/2023 3-5 (A)  None Seen, 0-2 /HPF Final    Urine culture not indicated.   • Bacteria, UA 01/19/2023 2+ (A)  None Seen, Trace /HPF Final   • Squamous Epithelial Cells, UA 01/19/2023 7-12 (A)  None Seen, 0-2 /HPF Final   • Hyaline Casts, UA 01/19/2023 0-6  0 - 6 /LPF Final   • Mucus, UA 01/19/2023 Trace  None Seen, Trace /HPF Final   • Methodology 01/19/2023 Manual Light Microscopy   Final   • Procalcitonin 01/19/2023 0.29 (H)  0.00 - 0.25 ng/mL Final   • Troponin T 01/19/2023 0.091 (C)  0.000 - 0.030 ng/mL Final   • QT Interval 01/19/2023 388  ms Preliminary   • QTC Interval 01/19/2023 447  ms Preliminary   • D-Dimer, Quantitative 01/19/2023 1.25 (H)  0.00 - 0.79 MCGFEU/mL Final   • ADENOVIRUS, PCR 01/19/2023 Not Detected  Not Detected Final   • Coronavirus 229E 01/19/2023 Not Detected  Not Detected Final   • Coronavirus HKU1 01/19/2023 Not Detected  Not Detected Final   • Coronavirus NL63 01/19/2023 Not Detected  Not Detected Final   • Coronavirus OC43 01/19/2023 Not Detected  Not Detected Final   • COVID19  01/19/2023 Not Detected  Not Detected - Ref. Range Final   • Human Metapneumovirus 01/19/2023 Not Detected  Not Detected Final   • Human Rhinovirus/Enterovirus 01/19/2023 Not Detected  Not Detected Final   • Influenza A PCR 01/19/2023 Not Detected  Not Detected Final   • Influenza B PCR 01/19/2023 Not Detected  Not Detected Final   • Parainfluenza Virus 1 01/19/2023 Not Detected  Not Detected Final   • Parainfluenza Virus 2 01/19/2023 Not Detected  Not Detected Final   • Parainfluenza Virus 3 01/19/2023 Not Detected  Not Detected Final   • Parainfluenza Virus 4 01/19/2023 Not Detected  Not Detected Final   • RSV, PCR 01/19/2023 Not Detected  Not Detected Final   • Bordetella pertussis pcr 01/19/2023 Not Detected  Not Detected Final   • Bordetella parapertussis PCR 01/19/2023 Not Detected  Not Detected Final   • Chlamydophila pneumoniae PCR 01/19/2023 Not Detected  Not Detected Final   • Mycoplasma pneumo by PCR 01/19/2023 Not Detected  Not Detected Final   • Folate 01/19/2023 >20.00  4.78 - 24.20 ng/mL Final   • Vitamin B-12 01/19/2023 521  211 - 946 pg/mL Final   • Reticulocyte % 01/19/2023 2.84 (H)  0.70 - 1.90 % Final   • Reticulocyte Absolute 01/19/2023 0.0957  0.0200 - 0.1300 10*6/mm3 Final   • Iron 01/19/2023 16 (L)  37 - 145 mcg/dL Final   • Iron Saturation 01/19/2023 7 (L)  20 - 50 % Final   • Transferrin 01/19/2023 154 (L)  200 - 360 mg/dL Final   • TIBC 01/19/2023 229 (L)  298 - 536 mcg/dL Final   • Iron 01/19/2023 16 (L)  37 - 145 mcg/dL Final   • Ferritin 01/19/2023 211.00 (H)  13.00 - 150.00 ng/mL Final   • MRSA PCR 01/19/2023 Negative  Negative Final   • Glucose 01/20/2023 97  65 - 99 mg/dL Final   • BUN 01/20/2023 23  8 - 23 mg/dL Final   • Creatinine 01/20/2023 1.13 (H)  0.57 - 1.00 mg/dL Final   • Sodium 01/20/2023 142  136 - 145 mmol/L Final   • Potassium 01/20/2023 3.6  3.5 - 5.2 mmol/L Final   • Chloride 01/20/2023 108 (H)  98 - 107 mmol/L Final   • CO2 01/20/2023 21.0 (L)  22.0 - 29.0 mmol/L  Final   • Calcium 01/20/2023 8.0 (L)  8.6 - 10.5 mg/dL Final   • BUN/Creatinine Ratio 01/20/2023 20.4  7.0 - 25.0 Final   • Anion Gap 01/20/2023 13.0  5.0 - 15.0 mmol/L Final   • eGFR 01/20/2023 49.6 (L)  >60.0 mL/min/1.73 Final   • Magnesium 01/20/2023 1.6  1.6 - 2.4 mg/dL Final   • TSH 01/20/2023 2.710  0.270 - 4.200 uIU/mL Final   • Hemoglobin A1C 01/20/2023 5.30  4.80 - 5.60 % Final   • Protime 01/20/2023 15.8 (H)  11.4 - 14.4 Seconds Final   • INR 01/20/2023 1.26 (H)  0.84 - 1.13 Final   • PTT 01/20/2023 36.9  22.0 - 39.0 seconds Final   • proBNP 01/20/2023 4,141.0 (H)  0.0 - 1,800.0 pg/mL Final   • Vancomycin Random 01/20/2023 10.80  5.00 - 40.00 mcg/mL Final   • WBC 01/20/2023 9.79  3.40 - 10.80 10*3/mm3 Final   • RBC 01/20/2023 2.84 (L)  3.77 - 5.28 10*6/mm3 Final   • Hemoglobin 01/20/2023 8.5 (L)  12.0 - 15.9 g/dL Final   • Hematocrit 01/20/2023 26.9 (L)  34.0 - 46.6 % Final   • MCV 01/20/2023 94.7  79.0 - 97.0 fL Final   • MCH 01/20/2023 29.9  26.6 - 33.0 pg Final   • MCHC 01/20/2023 31.6  31.5 - 35.7 g/dL Final   • RDW 01/20/2023 16.2 (H)  12.3 - 15.4 % Final   • RDW-SD 01/20/2023 55.9 (H)  37.0 - 54.0 fl Final   • MPV 01/20/2023 10.4  6.0 - 12.0 fL Final   • Platelets 01/20/2023 170  140 - 450 10*3/mm3 Final   • Neutrophil % 01/20/2023 83.6 (H)  42.7 - 76.0 % Final   • Lymphocyte % 01/20/2023 6.6 (L)  19.6 - 45.3 % Final   • Monocyte % 01/20/2023 8.6  5.0 - 12.0 % Final   • Eosinophil % 01/20/2023 0.4  0.3 - 6.2 % Final   • Basophil % 01/20/2023 0.2  0.0 - 1.5 % Final   • Immature Grans % 01/20/2023 0.6 (H)  0.0 - 0.5 % Final   • Neutrophils, Absolute 01/20/2023 8.18 (H)  1.70 - 7.00 10*3/mm3 Final   • Lymphocytes, Absolute 01/20/2023 0.65 (L)  0.70 - 3.10 10*3/mm3 Final   • Monocytes, Absolute 01/20/2023 0.84  0.10 - 0.90 10*3/mm3 Final   • Eosinophils, Absolute 01/20/2023 0.04  0.00 - 0.40 10*3/mm3 Final   • Basophils, Absolute 01/20/2023 0.02  0.00 - 0.20 10*3/mm3 Final   • Immature Grans, Absolute  01/20/2023 0.06 (H)  0.00 - 0.05 10*3/mm3 Final   • nRBC 01/20/2023 0.0  0.0 - 0.2 /100 WBC Final        No results found.    ASSESSMENT 77 years old female with urosepsis    PLAN  1.  Urosepsis:  A.  I reviewed the patient's chart x-ray admission note blood work results and imaging report per  B.  The patient meet the criteria for urosepsis given her elevated white cells positive urine for infection hypotension and high procalcitonin.  C.  Clinically she is doing better with IV antibiotics.  D.  I will follow-up with the cultures.    2.  Dehydration:  A.  Start the patient on IV fluid    3.  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  The plan is to proceed with concurrent Erbitux with radiation as an outpatient.    4.  Acute kidney injury:  A.  Most likely prerenal.  B.  Creatinine improved from 1.3-1.1 with hydration.      Jennifer Lora MD    1/20/2023

## 2023-01-20 NOTE — H&P
"    [facility] Medicine Services  HISTORY AND PHYSICAL    Patient Name: Vesta Landry  : 1945  MRN: [id number]  Primary Care Physician: Bossman Pedraza MD  Date of admission: 2023    Subjective   Subjective     Chief Complaint:  Generalized weakness, fall     HPI:  Vesta Landry is a 77 y.o. female w/ a hx of Left tonsillar squamous cell carcinoma, HTN, HLD, chronic diastolic CHF (grade I, EF 68% in 2017), CKD Stage III, hx of gout, anxiety/depression, GERD who presented to the ED w/ c/o generalized weakness and fall.   Pt diagnosed w/ COVID on 23. She did not require hospitalization. Pt's symptoms briefly improved but then pt w/ overall decline in the past 2-3 days. Pt was treated w/ an antibiotic (name not known by family) for a URI and finished the antibiotic 1-2 days ago. Pt c/o generalized weakness and had a fall at home today. She describes that she did hit her head but denies LOC. Pt also c/o fatigue, low grade temp of 99.9 today, exertional dyspnea (x several weeks), intermittent dysuria and diarrhea (x 2-3 days). She describes that her stool is \"all liquid\". Pt estimates ~3-4 episodes of watery diarrhea today.   Per her daughter at bedside, pt has had intermittent confusion for 2 days also.   Pt reports that her cough has improved.   Pt has had frequent episodes of choking recently and does describe difficulty swallowing at times.   Pt denies chest pain, vomiting, abdominal pain, worsening pedal edema, syncope.   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 Follows w/ Dr. Lora. Pt seen in f/u w/ Dr. Lora yesterday. Scheduled to begin Erbitux weekly w/ daily radiation. Scheduled to see Dr. Sommer guerra/ radiation oncology next week.   Pt evaluated in the ED. Procal, WBC both elevated. " UA concerning for UTI. CXR unremarkable. Troponin +. Pt admitted to the hospital medicine service for further evaluation.     Review of Systems   Constitutional: Positive for chills, fatigue and fever. Negative for diaphoresis.   HENT: Positive for sore throat and trouble swallowing. Negative for congestion, postnasal drip, rhinorrhea, sinus pressure, sinus pain and sneezing.    Eyes: Negative.  Negative for visual disturbance.   Respiratory: Positive for cough and shortness of breath. Negative for wheezing.         Exertional dyspnea.    Cardiovascular: Negative.  Negative for chest pain and leg swelling.   Gastrointestinal: Positive for diarrhea and nausea. Negative for abdominal distention, abdominal pain, blood in stool, constipation and vomiting.   Endocrine: Negative.    Genitourinary: Positive for dysuria. Negative for difficulty urinating, hematuria, pelvic pain and urgency.   Musculoskeletal: Negative for arthralgias, myalgias, neck pain and neck stiffness.        Generalized weakness. Fall earlier today.    Skin: Negative.  Negative for wound.   Allergic/Immunologic: Negative.  Negative for immunocompromised state.   Neurological: Negative.  Negative for dizziness, tremors, seizures, syncope, facial asymmetry, speech difficulty, weakness, light-headedness, numbness and headaches.   Hematological: Negative.  Does not bruise/bleed easily.   Psychiatric/Behavioral: Positive for confusion.   All other systems reviewed and are negative.     Personal History     Past Medical History:   Diagnosis Date   • Arthritis    • Cancer (HCC)    • Deep vein thrombosis (HCC)     bilateral   • Dementia (HCC)    • Dysphagia    • GERD (gastroesophageal reflux disease)    • Hyperlipidemia    • Hypertension    • Vitamin D deficiency        Oncology Problem List:  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) (01/03/2023; Status:   Active)    Oncology/Hematology History   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)   1/3/2023  Initial Diagnosis    Metastatic 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 (HCC)     1/18/2023 Cancer Staged    Staging form: Pharynx - HPV-Mediated Oropharynx, AJCC 8th Edition  - Clinical stage from 1/18/2023: Stage I (cT2, cN1, cM0, p16: Unknown, HPV: Unknown) - Signed by Jennifer Lora MD on 1/18/2023 2/1/2023 -  Chemotherapy    OP HEAD & NECK Cetuximab + XRT       Past Surgical History:   Procedure Laterality Date   • APPENDECTOMY     • CATARACT EXTRACTION     • CHOLECYSTECTOMY     • COLONOSCOPY      Approx 2009   • HYSTERECTOMY  1991   • OOPHORECTOMY Bilateral 1991   • TUBAL ABDOMINAL LIGATION       Family History:  family history includes Breast cancer in her maternal aunt and paternal aunt; Cancer in her father and sister; Heart failure in her mother; Hyperlipidemia in her mother; Hypertension in her mother; Stroke in her sister. Otherwise pertinent FHx was reviewed and unremarkable.     Social History:  reports that she quit smoking about 36 years ago. Her smoking use included cigarettes. She has a 1.50 pack-year smoking history. She has never used smokeless tobacco. She reports that she does not drink alcohol and does not use drugs.  Social History     Social History Narrative   • Not on file     Medications:  allopurinol, atorvastatin, benzonatate, diazePAM, furosemide, hydrALAZINE, losartan, metoprolol tartrate, omeprazole, ondansetron, potassium chloride, and traZODone    No Known Allergies    Objective   Objective     Vital Signs:   Temp:  [100.1 °F (37.8 °C)] 100.1 °F (37.8 °C)  Heart Rate:  [77-94] 84  Resp:  [18] 18  BP: (155-191)/() 158/60    Physical Exam     Constitutional: Awake, alert; non-toxic appearing   Eyes: PERRLA, sclerae anicteric, no conjunctival injection  HENT: NCAT, mucous membranes moist  Neck: Supple, no thyromegaly, no lymphadenopathy, trachea midline  Respiratory: diminished lower lobes; no rhonchi or wheeze, nonlabored respirations   Cardiovascular: RRR, no murmurs,  rubs, or gallops, no pitting edema BLE   Gastrointestinal: Positive bowel sounds, soft, nontender, nondistended  Musculoskeletal: Normal ROM bilaterally   Psychiatric: Appropriate affect, cooperative  Neurologic: Oriented x 4 at time of exam (unable to provide some details regarding HPI), strength symmetric in all extremities, Cranial Nerves grossly intact to confrontation, speech clear  Skin: No rashes, lesions or wounds     Result Review:  I have personally reviewed the results from the time of this admission to 1/19/2023 20:16 EST and agree with these findings:  [x]  Laboratory list / accordion  []  Microbiology  [x]  Radiology  [x]  EKG/Telemetry   []  Cardiology/Vascular   []  Pathology  [x]  Old records    LAB RESULTS:      Lab 01/19/23  1642   WBC 17.87*   HEMOGLOBIN 10.1*   HEMATOCRIT 31.6*   PLATELETS 226   NEUTROS ABS 15.53*   IMMATURE GRANS (ABS) 0.12*   LYMPHS ABS 0.84   MONOS ABS 1.31*   EOS ABS 0.02   MCV 93.5   PROCALCITONIN 0.29*   LACTATE 0.8   D DIMER QUANT 1.25*         Lab 01/19/23  1642   SODIUM 137   POTASSIUM 4.1   CHLORIDE 101   CO2 23.0   ANION GAP 13.0   BUN 21   CREATININE 1.30*   EGFR 41.9*   GLUCOSE 115*   CALCIUM 8.7   MAGNESIUM 1.8         Lab 01/19/23  1642   TOTAL PROTEIN 6.7   ALBUMIN 3.4*   GLOBULIN 3.3   ALT (SGPT) 8   AST (SGOT) 21   BILIRUBIN 0.5   ALK PHOS 104         Lab 01/19/23  1642   TROPONIN T 0.089*                 Brief Urine Lab Results  (Last result in the past 365 days)      Color   Clarity   Blood   Leuk Est   Nitrite   Protein   CREAT   Urine HCG        01/19/23 1751 Yellow   Cloudy   Negative   Moderate (2+)   Negative   Trace               Microbiology Results (last 10 days)     Procedure Component Value - Date/Time    COVID PRE-OP / PRE-PROCEDURE SCREENING ORDER (NO ISOLATION) - Swab, Nasopharynx [[id number]]  (Normal) Collected: 01/19/23 1728    Lab Status: Final result Specimen: Swab from Nasopharynx Updated: 01/19/23 1817    Narrative:      The following  orders were created for panel order COVID PRE-OP / PRE-PROCEDURE SCREENING ORDER (NO ISOLATION) - Swab, Nasopharynx.  Procedure                               Abnormality         Status                     ---------                               -----------         ------                     COVID-19 and FLU A/B PCR...[[id number]]  Normal              Final result                 Please view results for these tests on the individual orders.    COVID-19 and FLU A/B PCR - Swab, Nasopharynx [[id number]]  (Normal) Collected: 01/19/23 1728    Lab Status: Final result Specimen: Swab from Nasopharynx Updated: 01/19/23 1817     COVID19 Not Detected     Influenza A PCR Not Detected     Influenza B PCR Not Detected    Narrative:      Fact sheet for providers: https://www.fda.gov/media/594774/download    Fact sheet for patients: https://www.fda.gov/media/935598/download    Test performed by PCR.        XR Chest 1 View    Result Date: 1/19/2023  XR CHEST 1 VW Date of Exam: 1/19/2023 5:01 PM EST Indication: Weak/Dizzy/AMS triage protocol. Comparison: 7/24/2017 Findings: There are no airspace consolidations. No pleural fluid. No pneumothorax. The pulmonary vasculature appears within normal limits. The cardiac and mediastinal silhouette appear unremarkable. No acute osseous abnormality identified.     Impression: Impression: No acute cardiopulmonary process. Electronically Signed: Lisbeth Salinas  1/19/2023 5:29 PM EST  Workstation ID: [id number]    Results for orders placed in visit on 08/07/17    Adult Transthoracic Echo Complete    Interpretation Summary  · Left ventricular diastolic dysfunction (grade I) consistent with impaired relaxation.  · Left ventricular wall thickness is consistent with hypertrophy. Sigmoid-shaped ventricular septum is present.  · Left ventricular systolic function is normal. Estimated EF = 68%.    Assessment & Plan   Assessment & Plan       Sepsis (HCC)    Gastroesophageal reflux disease without esophagitis     Essential hypertension    Chronic diastolic heart failure (HCC)    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)    Acute urinary tract infection    Elevated troponin    Gout    HLD (hyperlipidemia)    Anxiety associated with depression    CKD (chronic kidney disease) stage 3, GFR 30-59 ml/min (HCC)    Diarrhea    Fall    Difficulty swallowing    Personal history of COVID-19    Exertional dyspnea    Anemia    Vesta Landry is a 77 y.o. female w/ a hx of Left tonsillar squamous cell carcinoma, HTN, HLD, chronic diastolic CHF (grade I, EF 68% in 2017), CKD Stage III, hx of gout, anxiety/depression, GERD who presented to the ED w/ c/o generalized weakness and fall.     **Sepsis   **UTI   -meets sepsis criteria based on HR >90bpm, WBC 17.87, procal 0.29, + source per UA  -UA c/w UTI; urine cx pending   -previous urine cx + for E. Coli (pan-sensitive) 11/22  -CXR unremarkable; CT Chest pending   -blood cx pending   -lactic acid WNL   -reported low grade temp of 99.9 at home   -recent COVID dx on 1/5; respiratory panel PCR pending   -GI panel PCR pending   -s/p IV Vanc and Zosyn started in ED; continue   -s/p 1 liter NS bolus given in ED'-continue LR @ 75ml/hour x 10 hrs   -symptom mgt  -consider ID consult    **Elevated troponin   -denies chest pain; no clear hx of CAD   -troponin 0.089; repeat pending now; trend overnight   -EKG stable; repeat in am   -ECHO pending   -previously seen by Dr. Hamm; consult PRN     **Diarrhea   -pt finished oral antibiotic 1-2 days ago (for URI, unclear which antibiotic)   -diarrhea x 2-3 days   -abdominal exam benign; consider CT abd/pel if pain/tenderness develops   -lactic acid WNL   -IVF per above   -GI panel PCR pending, C.Diff pending     **Exertional dyspnea   **Recent COVID-19  -dx w/ COVID on 1/5/23  -CXR unremarkable   -D.Dimer pending   -CT Chest without contrast pending (unable to get CTA 2/2 CrCl)   -proBNP in am   -ECHO pending     **Metastatic squamous cell  carcinoma of the left tonsil   **Difficulty swallowing   -diagnosed w/ ultrasound guided biopsy on 12/12/22, currently per scans- 2 isolated areas 1 in the left tonsil and another 1 and left cervical lymph node  -follows w/ Dr. Lora. Pt seen in f/u w/ Dr. Lora yesterday. Scheduled to begin Erbitux weekly w/ daily radiation. Scheduled to see Dr. Novoa w/ radiation oncology next week.   -consult Dr. Lora to see in am   -concern for aspiration (reports recent episodes of choking)   -IV Zosyn  -NPO pending speech evaluation   -bedside swallow evaluation (sips w/ meds if passes)   -speech consult in am     **Generalized weakness  **Fall   -fell at home today and hit head, no LOC   -CT head pending   -pt,ot and case mgt consult     **CKD Stage III  -previous creatinine 0.87 on 12/2 and prior ~1.0-1.33  -currently 1.30 w/ eGFR 41.9  -IVF per above   -holding routine lasix, allopurinol, losartan   -repeat labs in am     **Anemia   -last H/H 12.7/38.6 in 12/22  -current H/H 10.1/31.6  -anemia panel pending   -occult stool pending   -monitor    **HTN   **HLD   **Diastolic CHF   -ECHO 2017 w/ grade I diastolic dysfunction and EF 68%  -ECHO in am   -holding routine lasix, losartan 2/2 creatinine   -continue statin   -continue routine hydralazine and lopressor w/ hold parameters   -daily wts    **GERD  -continue PPI     **Anxiety   **Depression   -continue Trazodone     DVT prophylaxis:  Mechanical (pending CT head results)     CODE STATUS:    Level Of Support Discussed With: Patient; Next of Kin (If No Surrogate)  Code Status (Patient has no pulse and is not breathing): CPR (Attempt to Resuscitate)  Medical Interventions (Patient has pulse or is breathing): Full Support    Expected Discharge   2-3 days    This note has been completed as part of a split-shared workflow.     Signature: Electronically signed by LADARIUS Morrison, 01/19/23, 8:32 PM EST.    Time spent: 60 minutes   Patient seen and examined at the  bedside.  Patient is a 77-year-old  female with past medical history significant for hypertension, GERD, chronic bilateral lower extremity edema, Alzheimer's dementia, recent diagnosis of left tonsillar cancer.  Patient was brought in by family because of severe weakness and fall.  Patient also has had exertional shortness of breath, diarrhea for 2 days, episodes of chills.  Patient also reports about 10 pounds weight loss since November 2022.  Patient also has history of COVID.  Physical exam:    Constitutional: No acute distress  HENT: NCAT, mucous membranes moist  Respiratory: Clear to auscultation bilaterally, respiratory effort normal   Cardiovascular: RRR, no murmurs, rubs, or gallops  Gastrointestinal: Positive bowel sounds, soft, nontender, nondistended  Musculoskeletal:  bilateral ankle edema  Psychiatric: Appropriate affect, cooperative  Neurologic: Awake, alert, follows commands, no focality appreciated, speech clear  Skin: No rashes  Assessment and plan  Patient is a pleasant 77-year-old  female patient brought today hospital for severe weakness and fall.  Patient also has history of chills and at least low-grade fever.  Labs here at the emergency room were significant for leukocytosis of 17.9, elevated troponin but no chest pain, elevated creatinine at 1.30.  UA is cloudy and is positive for leukocyte Estrace but negative for nitrite.  Patient will be admitted to telemetry for urosepsis as inpatient.  Please see the above for more details.  Total time spent 25 minutes.

## 2023-01-20 NOTE — ED PROVIDER NOTES
Subjective   History of Present Illness  77-year-old female brought in for evaluation of altered mental status.  The patient reported was diagnosed with COVID on January 5th.  The patient did not receive admission at that given time.  Since that given time the patient has developed increasing confusion, increasing weakness, and actually suffered a fall earlier today in the shower.  Patient has had a follow-up repeat COVID test that was negative since that given time.  Reportedly the patient has had some pain over the hip and head.  The patient has had recent memory issues and reportedly has even had difficulty remembering her daughters name.  The patient was identified to have a fever up to 100.4 and over the last 3 days has had urinary and fecal incontinence.  The patient denies chest pain.  No increasing cough or shortness of breath.  No new medications.  No other acute complaints.        Review of Systems   Constitutional: Positive for activity change, appetite change, fatigue and fever. Negative for chills.   HENT: Negative for congestion, ear pain, postnasal drip, sinus pressure and sore throat.    Eyes: Negative for pain, redness and visual disturbance.   Respiratory: Negative for cough, chest tightness and shortness of breath.    Cardiovascular: Negative for chest pain, palpitations and leg swelling.   Gastrointestinal: Negative for abdominal pain, anal bleeding, blood in stool, diarrhea, nausea and vomiting.   Endocrine: Negative for polydipsia and polyuria.   Genitourinary: Negative for difficulty urinating, dysuria, frequency and urgency.        Urinary incontinence   Musculoskeletal: Negative for arthralgias, back pain and neck pain.   Skin: Negative for pallor and rash.   Allergic/Immunologic: Negative for environmental allergies and immunocompromised state.   Neurological: Negative for dizziness, weakness and headaches.   Hematological: Negative for adenopathy.   Psychiatric/Behavioral: Positive for  confusion and decreased concentration. Negative for self-injury and suicidal ideas. The patient is not nervous/anxious.    All other systems reviewed and are negative.      Past Medical History:   Diagnosis Date   • Arthritis    • Cancer (HCC)    • Deep vein thrombosis (HCC)     bilateral   • Dementia (HCC)    • Dysphagia    • GERD (gastroesophageal reflux disease)    • Hyperlipidemia    • Hypertension    • Vitamin D deficiency        No Known Allergies    Past Surgical History:   Procedure Laterality Date   • APPENDECTOMY     • CATARACT EXTRACTION     • CHOLECYSTECTOMY     • COLONOSCOPY      Approx    • HYSTERECTOMY     • OOPHORECTOMY Bilateral    • TUBAL ABDOMINAL LIGATION         Family History   Problem Relation Age of Onset   • Heart failure Mother    • Hyperlipidemia Mother    • Hypertension Mother    • Cancer Father    • Cancer Sister    • Stroke Sister    • Breast cancer Maternal Aunt    • Breast cancer Paternal Aunt    • Ovarian cancer Neg Hx        Social History     Socioeconomic History   • Marital status:    Tobacco Use   • Smoking status: Former     Packs/day: 0.15     Years: 10.00     Pack years: 1.50     Types: Cigarettes     Quit date: 1987     Years since quittin.0   • Smokeless tobacco: Never   Substance and Sexual Activity   • Alcohol use: No   • Drug use: No   • Sexual activity: Defer           Objective   Physical Exam  Vitals and nursing note reviewed.   Constitutional:       General: She is not in acute distress.     Appearance: Normal appearance. She is well-developed. She is ill-appearing. She is not toxic-appearing or diaphoretic.      Comments: Warm to touch   HENT:      Head: Normocephalic and atraumatic.      Right Ear: External ear normal.      Left Ear: External ear normal.      Nose: Nose normal.   Eyes:      General: Lids are normal.      Pupils: Pupils are equal, round, and reactive to light.   Neck:      Trachea: No tracheal deviation.   Cardiovascular:       Rate and Rhythm: Normal rate and regular rhythm.      Pulses: No decreased pulses.      Heart sounds: Normal heart sounds. No murmur heard.    No friction rub. No gallop.   Pulmonary:      Effort: Pulmonary effort is normal. No respiratory distress.      Breath sounds: Normal breath sounds. No decreased breath sounds, wheezing, rhonchi or rales.   Abdominal:      General: Bowel sounds are normal.      Palpations: Abdomen is soft.      Tenderness: There is abdominal tenderness in the suprapubic area. There is no guarding or rebound.   Musculoskeletal:         General: No deformity. Normal range of motion.      Cervical back: Normal range of motion and neck supple.   Lymphadenopathy:      Cervical: No cervical adenopathy.   Skin:     General: Skin is warm and dry.      Findings: No rash.   Neurological:      Mental Status: She is alert and oriented to person, place, and time.      Cranial Nerves: No cranial nerve deficit.      Sensory: No sensory deficit.   Psychiatric:         Speech: Speech normal.         Behavior: Behavior normal.         Thought Content: Thought content normal.         Judgment: Judgment normal.         Procedures           ED Course                                           Medical Decision Making  Differential diagnosis includes pneumonia, sepsis, urinary tract infection, concussion.    Patient's urine has 2+ bacteria concerning for urinary tract infection.  Given the patient's urinary incontinence this is felt to be contributing to the patient's current encephalopathy.    I have performed blood cultures, lactic acid, and will discussed the patient with hospitalist for admission.    I discussed the patient with hospitalist, Dr. Mittal who will admit.    Acute urinary tract infection: acute illness or injury     Details: Urine appears consistent with acute urinary tract infection.  Sepsis with encephalopathy without septic shock, due to unspecified organism (HCC): complicated acute  illness or injury     Details: I have obtained blood cultures, lactic acid, and initiated broad-spectrum antibiotics.  Amount and/or Complexity of Data Reviewed  Independent Historian: guardian  External Data Reviewed: labs, radiology and notes.  Labs: ordered.     Details: Elevated white blood cell count.  Radiology: ordered.     Details: No acute abnormality on chest x-ray.  ECG/medicine tests: ordered.      Risk  Prescription drug management.  Decision regarding hospitalization.          Final diagnoses:   Acute urinary tract infection   Sepsis with encephalopathy without septic shock, due to unspecified organism (HCC)       ED Disposition  ED Disposition     ED Disposition   Decision to Admit    Condition   --    Comment   Level of Care: Telemetry [5]   Diagnosis: Acute urinary tract infection [[id number]]   Admitting Physician: MARCIN PINEDA [[id number]]   Certification: I Certify That Inpatient Hospital Services Are Medically Necessary For Greater Than 2 Midnights               No follow-up provider specified.       Medication List      No changes were made to your prescriptions during this visit.          Aguila Ibrahim MD  01/19/23 2001

## 2023-01-20 NOTE — CONSULTS
Patient Name: Vesta Landry  : 1945  MRN: [id number]    Date of Consult: 2023  Admission Date: 2023    Requesting Provider: Dr Jimenez  Evaluating Physician: Daren Palacios MD    Chief Complaint: diarrhea    Reason for Consultation: C diff    History of present illness:   Patient is a 77 y.o. 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  Presented to [facility] with generalized weakness and fall.  Recent diagnosis of COVID-19 on  but did not require hospitalization.  Recent outpatient treatment with unknown antibiotic for respiratory infection as an outpatient.  On arrival to [facility] on  patient endorsed low-grade fever, dysuria, diarrhea.  Reported confusion, cough. WBC 17. UA with minimal pyuria and evidence of squamous contamination. Cest x-ray clear.  Admitted and started on IV Zosyn.  COVID PCR positive. PO vancomycin added.  CT abdomen with evidence of colitis    2023: Patient is confused but currently comfortable and eating dinner.  Patient's daughters are at bedside help clarify history.  She does not reliably call events leading up to hospitalization.  Patient says she is only had 2 bowel movements today but may have had some blood in it.  No current abdominal pain.  Denies dysuria, urinary frequency.  No prior history of C. difficile in patient but one of her daughters has had C. difficile several years ago.  No other known sick contacts..  Patient denies having any current symptoms of sinus congestion, pressure.     Review of Systems  See HPI      Past Medical History:   Diagnosis Date   • Arthritis    • Cancer (HCC)    • Deep vein thrombosis (HCC)     bilateral   • Dementia (HCC)    • Dysphagia    • GERD (gastroesophageal reflux disease)    • Hyperlipidemia    • Hypertension    • Vitamin D deficiency        Past Surgical  History:   Procedure Laterality Date   • APPENDECTOMY     • CATARACT EXTRACTION     • CHOLECYSTECTOMY     • COLONOSCOPY      Approx    • HYSTERECTOMY     • OOPHORECTOMY Bilateral    • TUBAL ABDOMINAL LIGATION         Social History     Socioeconomic History   • Marital status:    Tobacco Use   • Smoking status: Former     Packs/day: 0.15     Years: 10.00     Pack years: 1.50     Types: Cigarettes     Quit date: 1987     Years since quittin.0   • Smokeless tobacco: Never   Vaping Use   • Vaping Use: Never used   Substance and Sexual Activity   • Alcohol use: No   • Drug use: No   • Sexual activity: Defer        Family History   Problem Relation Age of Onset   • Heart failure Mother    • Hyperlipidemia Mother    • Hypertension Mother    • Cancer Father    • Cancer Sister    • Stroke Sister    • Breast cancer Maternal Aunt    • Breast cancer Paternal Aunt    • Ovarian cancer Neg Hx        No Known Allergies    Antibiotics:  Anti-Infectives (From admission, onward)    Ordered     Dose/Rate Route Frequency Start Stop    23 1255  vancomycin (VANCOCIN) capsule 125 mg        Ordering Provider: Hakeem Jimenez MD    125 mg Oral Every 6 Hours Scheduled 23 1345 23 1159    23 203  piperacillin-tazobactam (ZOSYN) 3.375 g in iso-osmotic dextrose 50 ml (premix)        Ordering Provider: Roslyn Pisano APRN    3.375 g  over 4 Hours Intravenous Every 8 Hours 23 0000 23 2359    23 1705  piperacillin-tazobactam (ZOSYN) 3.375 g in iso-osmotic dextrose 50 ml (premix)        Ordering Provider: Aguila Ibrahim MD    3.375 g  over 30 Minutes Intravenous Once 23 1707 23 1843    23 1705  vancomycin 1250 mg/250 mL 0.9% NS IVPB ([facility])        Ordering Provider: Aguila Ibrahim MD    20 mg/kg × 67.1 kg Intravenous Once 23 1707 23          Other Medications:  Current Facility-Administered Medications   Medication Dose Route  Frequency Provider Last Rate Last Admin   • atorvastatin (LIPITOR) tablet 10 mg  10 mg Oral Daily Roslyn Pisano APRN   10 mg at 01/20/23 0841   • famotidine (PEPCID) tablet 10 mg  10 mg Oral BID  Husam Rosales, [facility]       • ferric gluconate (FERRLECIT)125 MG in sodium chloride 0.9 % 100 mL IVPB  125 mg Intravenous Daily Hakeem Jimenez  mL/hr at 01/20/23 1230 125 mg at 01/20/23 1230   • hydrALAZINE (APRESOLINE) tablet 25 mg  25 mg Oral BID Roslyn Pisano APRN   25 mg at 01/20/23 0842   • ipratropium-albuterol (DUO-NEB) nebulizer solution 3 mL  3 mL Nebulization Q4H PRN Roslyn Pisano APRN       • lactated ringers infusion  75 mL/hr Intravenous Continuous Hakeem Jimenez MD 75 mL/hr at 01/20/23 1230 75 mL/hr at 01/20/23 1230   • Magnesium Low Dose Replacement - Initiate Nurse / BPA Driven Protocol   Does not apply PRN Hakeem Jimenez MD       • metoprolol tartrate (LOPRESSOR) tablet 50 mg  50 mg Oral BID Roslyn Pisano APRN   50 mg at 01/20/23 0842   • ondansetron (ZOFRAN) injection 4 mg  4 mg Intravenous Q6H PRN Carla Lang APRN       • Phosphorus Replacement - Initiate Nurse / BPA Driven Protocol   Does not apply PRN Hakeem Jimenez MD       • piperacillin-tazobactam (ZOSYN) 3.375 g in iso-osmotic dextrose 50 ml (premix)  3.375 g Intravenous Q8H Roslyn Pisano APRN   3.375 g at 01/20/23 1517   • Potassium Replacement - Initiate Nurse / BPA Driven Protocol   Does not apply PRN Hakeem Jimenez MD       • sodium chloride 0.9 % flush 10 mL  10 mL Intravenous PRN Aguila Ibrahim MD       • sodium chloride 0.9 % flush 10 mL  10 mL Intravenous Q12H Roslyn Pisano APRN   10 mL at 01/19/23 2132   • sodium chloride 0.9 % flush 10 mL  10 mL Intravenous PRN Norris, Roslyn, APRN       • sodium chloride 0.9 % infusion 40 mL  40 mL Intravenous PRN Roslyn Pisano APRN       • traZODone (DESYREL) tablet 50 mg  50 mg Oral Nightly Rolsyn Pisano APRN   50 mg at 01/19/23 2133   •  "vancomycin (VANCOCIN) capsule 125 mg  125 mg Oral Q6H Hakeem Jimenez MD   125 mg at 01/20/23 1517       Physical Exam:   Vital Signs   /54   Pulse 74   Temp 99.1 °F (37.3 °C) (Oral)   Resp 18   Ht 154.9 cm (60.98\")   Wt 67.1 kg (148 lb)   SpO2 94%   BMI 27.98 kg/m²     GENERAL: Awake and alert, nontoxic at rest  HEENT: Normocephalic, atraumatic.  EOMI. No conjunctival injection.  No tenderness to palpation of maxillary sinuses, ethmoid region, mastoid  NECK: Supple without nuchal rigidity. No meningismus  LYMPH: No cervical, lymphadenopathy.  HEART: RRR; No murmur.  LUNGS: Clear to auscultation bilaterally without wheezing, rales, rhonchi. Normal respiratory effort. Nonlabored.  ABDOMEN: Soft, nontender, nondistended.  No guarding or rebound  EXT:  No edema.  : external urinary catheter.  SKIN: Warm and dry without cutaneous eruptions on Inspection/palpation.    NEURO: Oriented to PPT.   PSYCHIATRIC: easily confused    Laboratory Data  Lab Results   Component Value Date    WBC 9.79 01/20/2023    HGB 8.5 (L) 01/20/2023    HCT 26.9 (L) 01/20/2023    MCV 94.7 01/20/2023     01/20/2023     Lab Results   Component Value Date    GLUCOSE 97 01/20/2023    CALCIUM 8.0 (L) 01/20/2023     01/20/2023    K 3.6 01/20/2023    CO2 21.0 (L) 01/20/2023     (H) 01/20/2023    BUN 23 01/20/2023    CREATININE 1.13 (H) 01/20/2023     Estimated Creatinine Clearance: 35.4 mL/min (A) (by C-G formula based on SCr of 1.13 mg/dL (H)).  Lab Results   Component Value Date    ALT 8 01/19/2023    AST 21 01/19/2023    ALKPHOS 104 01/19/2023    BILITOT 0.5 01/19/2023     Lab Results   Component Value Date    CRP 2.2 07/02/2015     Lab Results   Component Value Date    SEDRATE 8 07/02/2015       The above labs were reviewed.     Microbiology:  Microbiology Results (last 10 days)     Procedure Component Value - Date/Time    Gastrointestinal Panel, PCR - Stool, Per Rectum [[id number]]  (Normal) Collected: 01/20/23 " 1058    Lab Status: Final result Specimen: Stool from Per Rectum Updated: 01/20/23 1245     Campylobacter Not Detected     Plesiomonas shigelloides Not Detected     Salmonella Not Detected     Vibrio Not Detected     Vibrio cholerae Not Detected     Yersinia enterocolitica Not Detected     Enteroaggregative E. coli (EAEC) Not Detected     Enteropathogenic E. coli (EPEC) Not Detected     Enterotoxigenic E. coli (ETEC) lt/st Not Detected     Shiga-like toxin-producing E. coli (STEC) stx1/stx2 Not Detected     Shigella/Enteroinvasive E. coli (EIEC) Not Detected     Cryptosporidium Not Detected     Cyclospora cayetanensis Not Detected     Entamoeba histolytica Not Detected     Giardia lamblia Not Detected     Adenovirus F40/41 Not Detected     Astrovirus Not Detected     Norovirus GI/GII Not Detected     Rotavirus A Not Detected     Sapovirus (I, II, IV or V) Not Detected    Clostridioides difficile Toxin - Stool, Per Rectum [[id number]]  (Abnormal) Collected: 01/20/23 1058    Lab Status: Final result Specimen: Stool from Per Rectum Updated: 01/20/23 1247    Narrative:      The following orders were created for panel order Clostridioides difficile Toxin - Stool, Per Rectum.  Procedure                               Abnormality         Status                     ---------                               -----------         ------                     Clostridioides difficile...[[id number]]  Abnormal            Final result                 Please view results for these tests on the individual orders.    Clostridioides difficile Toxin, PCR - Stool, Per Rectum [[id number]]  (Abnormal) Collected: 01/20/23 1058    Lab Status: Final result Specimen: Stool from Per Rectum Updated: 01/20/23 1247     C. Difficile Toxins by PCR Detected    Narrative:      DNA from a toxigenic strain of C.difficile has been detected. Antigen testing for the presence of free C.difficile toxin is currently in progress, to help determine the clinical  significance of this PCR result.     Clostridioides difficile toxin Ag, Reflex - Stool, Per Rectum [[id number]]  (Abnormal) Collected: 01/20/23 1058    Lab Status: Final result Specimen: Stool from Per Rectum Updated: 01/20/23 1354     C.diff Toxin Ag Positive    Narrative:      DNA from a toxigenic strain of C.difficile was detected, along with the presence of free toxin. These results are suggestive of C.difficile infection.    Respiratory Panel PCR w/COVID-19(SARS-CoV-2) ELIAN/LISA/LISSETTE/PAD/COR/MAD/KIARRA In-House, NP Swab in UTM/VTM, 3-4 HR TAT - Swab, Nasopharynx [[id number]]  (Normal) Collected: 01/19/23 2144    Lab Status: Final result Specimen: Swab from Nasopharynx Updated: 01/19/23 2254     ADENOVIRUS, PCR Not Detected     Coronavirus 229E Not Detected     Coronavirus HKU1 Not Detected     Coronavirus NL63 Not Detected     Coronavirus OC43 Not Detected     COVID19 Not Detected     Human Metapneumovirus Not Detected     Human Rhinovirus/Enterovirus Not Detected     Influenza A PCR Not Detected     Influenza B PCR Not Detected     Parainfluenza Virus 1 Not Detected     Parainfluenza Virus 2 Not Detected     Parainfluenza Virus 3 Not Detected     Parainfluenza Virus 4 Not Detected     RSV, PCR Not Detected     Bordetella pertussis pcr Not Detected     Bordetella parapertussis PCR Not Detected     Chlamydophila pneumoniae PCR Not Detected     Mycoplasma pneumo by PCR Not Detected    Narrative:      In the setting of a positive respiratory panel with a viral infection PLUS a negative procalcitonin without other underlying concern for bacterial infection, consider observing off antibiotics or discontinuation of antibiotics and continue supportive care. If the respiratory panel is positive for atypical bacterial infection (Bordetella pertussis, Chlamydophila pneumoniae, or Mycoplasma pneumoniae), consider antibiotic de-escalation to target atypical bacterial infection.    MRSA Screen, PCR (Inpatient) - Swab, Nares  [[id number]]  (Normal) Collected: 01/19/23 2144    Lab Status: Final result Specimen: Swab from Nares Updated: 01/20/23 0903     MRSA PCR Negative    Narrative:      The negative predictive value of this diagnostic test is high and should only be used to consider de-escalating anti-MRSA therapy. A positive result may indicate colonization with MRSA and must be correlated clinically.  MRSA Negative    COVID PRE-OP / PRE-PROCEDURE SCREENING ORDER (NO ISOLATION) - Swab, Nasopharynx [[id number]]  (Normal) Collected: 01/19/23 1728    Lab Status: Final result Specimen: Swab from Nasopharynx Updated: 01/19/23 1817    Narrative:      The following orders were created for panel order COVID PRE-OP / PRE-PROCEDURE SCREENING ORDER (NO ISOLATION) - Swab, Nasopharynx.  Procedure                               Abnormality         Status                     ---------                               -----------         ------                     COVID-19 and FLU A/B PCR...[[id number]]  Normal              Final result                 Please view results for these tests on the individual orders.    COVID-19 and FLU A/B PCR - Swab, Nasopharynx [[id number]]  (Normal) Collected: 01/19/23 1728    Lab Status: Final result Specimen: Swab from Nasopharynx Updated: 01/19/23 1817     COVID19 Not Detected     Influenza A PCR Not Detected     Influenza B PCR Not Detected    Narrative:      Fact sheet for providers: https://www.fda.gov/media/380475/download    Fact sheet for patients: https://www.fda.gov/media/745589/download    Test performed by PCR.          Radiology:  CT Abdomen Pelvis Without Contrast    Result Date: 1/20/2023  1. Diffuse thickening of the colon suggesting infectious or inflammatory colitis. Moderate to. Air-fluid levels are seen throughout the colon, rectum, and within multiple small bowel loops, suggesting a diarrheal state. Report 2. No evidence of a metastatic disease in the abdomen or pelvis in this patient with history of  tonsillar cancer. 3. Mild left basilar atelectasis. 4. Cholecystectomy, hysterectomy. Electronically Signed: Kenya Dayzora  1/20/2023 11:49 AM EST  Workstation ID: [id number]    CT Head Without Contrast    Result Date: 1/19/2023  Impression: No acute intracranial process. Significant paranasal sinus disease with air-fluid levels consistent with acute sinusitis. Electronically Signed: Lisbeth Salinas  1/19/2023 10:52 PM EST  Workstation ID: [id number]    CT Chest Without Contrast Diagnostic    Result Date: 1/20/2023  1.  No focal consolidation, pneumothorax, or pleural effusion. 2.  Minimal bibasal atelectasis/scarring. Superimposed small airways infection cannot be entirely excluded. Electronically signed by:  Claudio Edwards D.O.  1/19/2023 10:12 PM Mountain Time    XR Chest 1 View    Result Date: 1/19/2023  Impression: No acute cardiopulmonary process. Electronically Signed: Lisbeth Salinas  1/19/2023 5:29 PM EST  Workstation ID: [id number]    NM PET/CT Skull Base to Mid Thigh    Result Date: 1/16/2023  Impression: 1. Strongly hypermetabolic 3 cm left tonsillar mass, and strongly hypermetabolic left level 2B lymph node consistent with primary lesion and local metastasis. 2. No evidence of distant metastatic disease. 3. Incidentally noted pansinus disease, with numerous air-fluid levels, but only mild mucosal activity. Electronically Signed: Robinson Chopra  1/16/2023 4:08 PM EST  Workstation ID: [id number]    I personally reviewed the above CT abdomen and head     Impression:   1. C difficile colitis: Acute problem with threat to bodily function. PCR and antigen test positive.  Diffuse colitis noted on CT, but not copious stool output per report. first episode  2. Sepsis due to above: Acute problem with threat to bodily function.  Fluid in sinuses noted on CT head on admission but no symptoms of active sinusitis at this time.  Mild pyuria noted on admission UA but also evidence of contamination with squamous cells.  No dysuria at this  time although history somewhat confounded by confusion I do not suspect she has an active UTI at this time  3. Leukocytosis: improved quickly  4. Fall at home prior to admission  5. Recent COVID-19: dx Jan 5.  Respiratory symptoms resolved and repeat PCR test negative on admission  6. Recent possible URI: Treated as an outpatient with unknown oral antibiotic.  Fluid in sinuses noted on CT head on admission but no symptoms of active sinusitis at this time.  7. Recently diagnosed left tonsillar squamous cell cancer: Newly diagnosed and has not had any treatment yet.  Radiation was planned soon  8. CKD stage IIIB  9. Hx of gout    PLAN:   - Microbiology data reviewed.  Follow-up pending blood cultures  - Labs reviewed Follow CBC, BMP  - CT abdomen and head reviewed    - Stop Zosyn since no clear evidence of active sinusitis or UTI at this time.  Discussed with family need to be judicious with antibiotic use especially in setting of active C. difficile colitis  - Continue oral vancomycin but increase to 250 mg 4x daily  - Due to severe colitis noted on CT scan we will add IV Flagyl pending clinical improvement  - probiotic BID  - Enteric isolation.  Discussed importance of hand hygiene with patient and family    Complex MDM. I will follow       Daren Palacios MD  1/20/2023

## 2023-01-20 NOTE — CASE MANAGEMENT/SOCIAL WORK
Discharge Planning Assessment  [facility]     Patient Name: Vesta Landry  MRN: [id number]  Today's Date: 1/19/2023    Admit Date: 1/19/2023    Plan: Initial   Discharge Needs Assessment     Row Name 01/19/23 1913       Living Environment    People in Home spouse;child(lois), adult    Name(s) of People in Home Karrie Landry Spouse [phone]    Current Living Arrangements home    Primary Care Provided by self    Provides Primary Care For no one    Family Caregiver if Needed child(lois), adult;spouse    Family Caregiver Names Karrie Landry Spouse [phone]    Quality of Family Relationships helpful;involved;supportive    Able to Return to Prior Arrangements yes       Resource/Environmental Concerns    Resource/Environmental Concerns none    Transportation Concerns none       Food Insecurity    Within the past 12 months, you worried that your food would run out before you got the money to buy more. Never true    Within the past 12 months, the food you bought just didn't last and you didn't have money to get more. Never true       Transition Planning    Patient/Family Anticipates Transition to home with family    Patient/Family Anticipated Services at Transition ;rehabilitation services    Transportation Anticipated family or friend will provide       Discharge Needs Assessment    Readmission Within the Last 30 Days no previous admission in last 30 days    Equipment Currently Used at Home none    Concerns to be Addressed discharge planning    Anticipated Changes Related to Illness none    Equipment Needed After Discharge none               Discharge Plan     Row Name 01/19/23 1917       Plan    Plan Initial    Plan Comments CM spoke with patient and daughter at bedside regarding DC planning. Patient resides in [facility] with her spouse. Patient's daughter has been staying with her parents in their home to assist. Patient is independent with ADL’s, denies any DME. Patient denies any current home  health or outpatient services. Patient has medical insurance, prescription coverage and is able to afford/obtain medications without difficulty. Patient denies any discharge planning needs. Goal is home. CM will continue to follow    Final Discharge Disposition Code 30 - still a patient              Continued Care and Services - Admitted Since 1/19/2023    Coordination has not been started for this encounter.          Demographic Summary     Row Name 01/19/23 1906       General Information    Arrived From home    Referral Source emergency department    Reason for Consult discharge planning    Preferred Language English       Contact Information    Contact Information Comments Karrie Landry Spouse [phone]               Functional Status     Row Name 01/19/23 1907       Functional Status    Usual Activity Tolerance moderate    Current Activity Tolerance moderate       Physical Activity    On average, how many days per week do you engage in moderate to strenuous exercise (like a brisk walk)? 0 days    On average, how many minutes do you engage in exercise at this level? 0 min    Number of minutes of exercise per week 0       Assessment of Health Literacy    How often do you have someone help you read hospital materials? Always    How often do you have problems learning about your medical condition because of difficulty understanding written information? Always    How often do you have a problem understanding what is told to you about your medical condition? Always    How confident are you filling out medical forms by yourself? Not at all    Health Literacy Good       Functional Status, IADL    Medications independent    Meal Preparation independent    Housekeeping independent    Laundry independent    Shopping independent       Mental Status Summary    Recent Changes in Mental Status/Cognitive Functioning no changes       Employment/    Employment Status retired               Psychosocial    No  documentation.                Abuse/Neglect    No documentation.                Legal    No documentation.                Substance Abuse    No documentation.                Patient Forms    No documentation.                   Arcelia Penn RN

## 2023-01-20 NOTE — THERAPY EVALUATION
Patient Name: Vesta Landry  : 1945    MRN: [id number]                              Today's Date: 2023       Admit Date: 2023    Visit Dx:     ICD-10-CM ICD-9-CM   1. Acute urinary tract infection  N39.0 599.0   2. Sepsis with encephalopathy without septic shock, due to unspecified organism (HCC)  A41.9 038.9    R65.20 995.91    G93.40 348.30   3. 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)   • Bradycardia   • Acute urinary tract infection   • Sepsis (HCC)   • Elevated troponin   • Gout   • HLD (hyperlipidemia)   • Anxiety associated with depression   • CKD (chronic kidney disease) stage 3, GFR 30-59 ml/min (HCC)   • Diarrhea   • Fall   • Difficulty swallowing   • Personal history of COVID-19   • Exertional dyspnea   • Anemia     Past Medical History:   Diagnosis Date   • Arthritis    • Cancer (HCC)    • Deep vein thrombosis (HCC)     bilateral   • Dementia (HCC)    • Dysphagia    • GERD (gastroesophageal reflux disease)    • Hyperlipidemia    • Hypertension    • Vitamin D deficiency      Past Surgical History:   Procedure Laterality Date   • APPENDECTOMY     • CATARACT EXTRACTION     • CHOLECYSTECTOMY     • COLONOSCOPY      Approx    • HYSTERECTOMY     • OOPHORECTOMY Bilateral    • TUBAL ABDOMINAL LIGATION        General Information     Row Name 23 1110          Physical Therapy Time and Intention    Document Type evaluation  -KG     Mode of Treatment physical therapy  -KG     Row Name 23 1110          General Information    Patient Profile Reviewed yes  -KG     Prior Level of Function independent:;all household  mobility;gait;transfer;ADL's;dressing;bathing  -KG     Existing Precautions/Restrictions fall;other (see comments)  confusion  -KG     Barriers to Rehab medically complex;cognitive status  -KG     Row Name 01/20/23 1110          Living Environment    People in Home spouse  -KG     Row Name 01/20/23 1110          Home Main Entrance    Number of Stairs, Main Entrance two  -KG     Stair Railings, Main Entrance none  -KG     Row Name 01/20/23 1110          Stairs Within Home, Primary    Number of Stairs, Within Home, Primary none  -KG     Row Name 01/20/23 1110          Cognition    Orientation Status (Cognition) oriented to;person;place  -KG     Row Name 01/20/23 1110          Safety Issues, Functional Mobility    Safety Issues Affecting Function (Mobility) awareness of need for assistance;insight into deficits/self-awareness;safety precaution awareness;safety precautions follow-through/compliance;sequencing abilities  -KG     Impairments Affecting Function (Mobility) balance;cognition;coordination;endurance/activity tolerance;postural/trunk control;strength  -KG     Cognitive Impairments, Mobility Safety/Performance awareness, need for assistance;insight into deficits/self-awareness;safety precaution awareness;safety precaution follow-through;sequencing abilities  -KG           User Key  (r) = Recorded By, (t) = Taken By, (c) = Cosigned By    Initials Name Provider Type    KG Sara Bhatia, PT Physical Therapist               Mobility     Row Name 01/20/23 1111          Bed Mobility    Comment, (Bed Mobility) UIC  -KG     Row Name 01/20/23 1111          Transfers    Comment, (Transfers) VC's for sequencing and safety awareness.  -KG     Row Name 01/20/23 1111          Sit-Stand Transfer    Sit-Stand [place] (Transfers) contact guard;verbal cues  -KG     Row Name 01/20/23 1111          Gait/Stairs (Locomotion)    [place] Level (Gait) minimum assist (75% patient effort);1 person assist;1 person to  manage equipment;verbal cues  -KG     Assistive Device (Gait) other (see comments)  UE support  -KG     Distance in Feet (Gait) 50  -KG     Deviations/Abnormal Patterns (Gait) base of support, narrow;valentín decreased;festinating/shuffling;stride length decreased  -KG     Bilateral Gait Deviations forward flexed posture;heel strike decreased  -KG     Comment, (Gait/Stairs) Pt demonstrated step to gait pattern with slow valentín and short, shuffled steps. Pt with mild balance and coordination deficits; UE support for increased stability. Pt required frequent cues for upright posture with forward gaze. Limited by fatigue.  -KG           User Key  (r) = Recorded By, (t) = Taken By, (c) = Cosigned By    Initials Name Provider Type    Sara Lin PT Physical Therapist               Obj/Interventions     Row Name 01/20/23 1116          Range of Motion Comprehensive    General Range of Motion no range of motion deficits identified  -KG     Comment, General Range of Motion B LE WFL  -KG     Row Name 01/20/23 1116          Strength Comprehensive (MMT)    Comment, General Manual Muscle Testing (MMT) Assessment B LE grossly 3+/5  -KG     Row Name 01/20/23 1116          Balance    Balance Assessment sitting static balance;standing static balance;standing dynamic balance  -KG     Static Sitting Balance supervision  -KG     Position, Sitting Balance unsupported;sitting edge of bed  -KG     Static Standing Balance contact guard  -KG     Dynamic Standing Balance minimal assist  -KG     Position/Device Used, Standing Balance supported  -KG     Row Name 01/20/23 1116          Sensory Assessment (Somatosensory)    Sensory Assessment (Somatosensory) LE sensation intact  -KG           User Key  (r) = Recorded By, (t) = Taken By, (c) = Cosigned By    Initials Name Provider Type    Sara Lin PT Physical Therapist               Goals/Plan     Row Name 01/20/23 1123          Bed Mobility Goal 1 (PT)     Activity/Assistive Device (Bed Mobility Goal 1, PT) sit to supine;supine to sit  -KG     [place] Level/Cues Needed (Bed Mobility Goal 1, PT) supervision required  -KG     Time Frame (Bed Mobility Goal 1, PT) 2 weeks  -KG     Progress/Outcomes (Bed Mobility Goal 1, PT) goal ongoing  -KG     Row Name 01/20/23 1123          Transfer Goal 1 (PT)    Activity/Assistive Device (Transfer Goal 1, PT) sit-to-stand/stand-to-sit;bed-to-chair/chair-to-bed;walker, rolling  -KG     [place] Level/Cues Needed (Transfer Goal 1, PT) supervision required  -KG     Time Frame (Transfer Goal 1, PT) 2 weeks  -KG     Progress/Outcome (Transfer Goal 1, PT) goal ongoing  -KG     Row Name 01/20/23 1123          Gait Training Goal 1 (PT)    Activity/Assistive Device (Gait Training Goal 1, PT) gait (walking locomotion);assistive device use;walker, rolling  -KG     [place] Level (Gait Training Goal 1, PT) standby assist  -KG     Distance (Gait Training Goal 1, PT) 150 feet  -KG     Time Frame (Gait Training Goal 1, PT) 2 weeks  -KG     Progress/Outcome (Gait Training Goal 1, PT) goal ongoing  -KG     Row Name 01/20/23 1123          Therapy Assessment/Plan (PT)    Planned Therapy Interventions (PT) balance training;bed mobility training;gait training;strengthening;transfer training  -KG           User Key  (r) = Recorded By, (t) = Taken By, (c) = Cosigned By    Initials Name Provider Type    KG Sara Bhatia, PT Physical Therapist               Clinical Impression     Row Name 01/20/23 1117          Pain    Pretreatment Pain Rating 0/10 - no pain  -KG     Posttreatment Pain Rating 0/10 - no pain  -KG     Row Name 01/20/23 1117          Plan of Care Review    Plan of Care Reviewed With patient  -KG     Outcome Evaluation PT initial evaluation completed for pt presenting with generalized weakness, impaired balance, confusion, and decreased functional mobility. Pt ambulated 50ft with Vijaya 1+1 and UE support. Pt's decreased  independence warrants PT skilled care. Recommend D/C to IP rehab facility.  -KG     Row Name 01/20/23 1117          Therapy Assessment/Plan (PT)    Patient/Family Therapy Goals Statement (PT) return to PLOF  -KG     Rehab Potential (PT) good, to achieve stated therapy goals  -KG     Criteria for Skilled Interventions Met (PT) yes;skilled treatment is necessary  -KG     Therapy Frequency (PT) daily  -KG     Row Name 01/20/23 1117          Vital Signs    Pre Systolic BP Rehab 133  -KG     Pre Treatment Diastolic BP 57  -KG     Pretreatment Heart Rate (beats/min) 83  -KG     Posttreatment Heart Rate (beats/min) 85  -KG     Pre SpO2 (%) 94  -KG     O2 Delivery Pre Treatment room air  -KG     Post SpO2 (%) 93  -KG     O2 Delivery Post Treatment room air  -KG     Pre Patient Position Supine  -KG     Intra Patient Position Standing  -KG     Post Patient Position Sitting  -KG     Row Name 01/20/23 1117          Positioning and Restraints    Pre-Treatment Position in bed  -KG     Post Treatment Position chair  -KG     In Chair notified nsg;reclined;call light within reach;encouraged to call for assist;exit alarm on;with family/caregiver;legs elevated  -KG           User Key  (r) = Recorded By, (t) = Taken By, (c) = Cosigned By    Initials Name Provider Type    KG Sara Bhatia, PT Physical Therapist               Outcome Measures     Row Name 01/20/23 1124          How much help from another person do you currently need...    Turning from your back to your side while in flat bed without using bedrails? 3  -KG     Moving from lying on back to sitting on the side of a flat bed without bedrails? 3  -KG     Moving to and from a bed to a chair (including a wheelchair)? 3  -KG     Standing up from a chair using your arms (e.g., wheelchair, bedside chair)? 3  -KG     Climbing 3-5 steps with a railing? 2  -KG     To walk in hospital room? 3  -KG     AM-PAC 6 Clicks Score (PT) 17  -KG     Highest level of mobility 5 -->  Static standing  -KG     Row Name 01/20/23 1124 01/20/23 0953       Functional Assessment    Outcome Measure Options AM-PAC 6 Clicks Basic Mobility (PT)  -KG AM-PAC 6 Clicks Daily Activity (OT)  -AN          User Key  (r) = Recorded By, (t) = Taken By, (c) = Cosigned By    Initials Name Provider Type    KG Sara Bhatia, PT Physical Therapist    Asiya Mendez OT Occupational Therapist                             Physical Therapy Education     Title: PT OT SLP Therapies (In Progress)     Topic: Physical Therapy (In Progress)     Point: Mobility training (In Progress)     Learning Progress Summary           Patient Acceptance, E, NR by KG at 1/20/2023 0901                   Point: Home exercise program (Not Started)     Learner Progress:  Not documented in this visit.          Point: Body mechanics (In Progress)     Learning Progress Summary           Patient Acceptance, E, NR by KG at 1/20/2023 0901                   Point: Precautions (In Progress)     Learning Progress Summary           Patient Acceptance, E, NR by KG at 1/20/2023 0901                               User Key     Initials Effective Dates Name Provider Type Discipline    KG 05/22/20 -  Sara Bhatia, PT Physical Therapist PT              PT Recommendation and Plan  Planned Therapy Interventions (PT): balance training, bed mobility training, gait training, strengthening, transfer training  Plan of Care Reviewed With: patient  Outcome Evaluation: PT initial evaluation completed for pt presenting with generalized weakness, impaired balance, confusion, and decreased functional mobility. Pt ambulated 50ft with Vijaya 1+1 and UE support. Pt's decreased independence warrants PT skilled care. Recommend D/C to IP rehab facility.     Time Calculation:    PT Charges     Row Name 01/20/23 0901             Time Calculation    Start Time 0901  -KG      PT Received On 01/20/23  -KG      PT Goal Re-Cert Due Date 01/30/23  -KG         Untimed  Charges    PT Eval/Re-eval Minutes 46  -KG         Total Minutes    Untimed Charges Total Minutes 46  -KG       Total Minutes 46  -KG            User Key  (r) = Recorded By, (t) = Taken By, (c) = Cosigned By    Initials Name Provider Type    Sara Lin, PT Physical Therapist              Therapy Charges for Today     Code Description Service Date Service Provider Modifiers Qty    [id number] HC PT EVAL LOW COMPLEXITY 4 1/20/2023 Sara Bhatia, PT GP 1          PT G-Codes  Outcome Measure Options: AM-PAC 6 Clicks Basic Mobility (PT)  AM-PAC 6 Clicks Score (PT): 17  AM-PAC 6 Clicks Score (OT): 15  PT Discharge Summary  Anticipated Discharge Disposition (PT): inpatient rehabilitation facility    Ashlee Bhatia, PT  1/20/2023

## 2023-01-20 NOTE — THERAPY EVALUATION
Patient Name: Vesta Landry  : 1945    MRN: [id number]                              Today's Date: 2023       Admit Date: 2023    Visit Dx:     ICD-10-CM ICD-9-CM   1. Acute urinary tract infection  N39.0 599.0   2. Sepsis with encephalopathy without septic shock, due to unspecified organism (HCC)  A41.9 038.9    R65.20 995.91    G93.40 348.30   3. 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)   • Bradycardia   • Acute urinary tract infection   • Sepsis (HCC)   • Elevated troponin   • Gout   • HLD (hyperlipidemia)   • Anxiety associated with depression   • CKD (chronic kidney disease) stage 3, GFR 30-59 ml/min (HCC)   • Diarrhea   • Fall   • Difficulty swallowing   • Personal history of COVID-19   • Exertional dyspnea   • Anemia     Past Medical History:   Diagnosis Date   • Arthritis    • Cancer (HCC)    • Deep vein thrombosis (HCC)     bilateral   • Dementia (HCC)    • Dysphagia    • GERD (gastroesophageal reflux disease)    • Hyperlipidemia    • Hypertension    • Vitamin D deficiency      Past Surgical History:   Procedure Laterality Date   • APPENDECTOMY     • CATARACT EXTRACTION     • CHOLECYSTECTOMY     • COLONOSCOPY      Approx    • HYSTERECTOMY     • OOPHORECTOMY Bilateral    • TUBAL ABDOMINAL LIGATION        General Information     Row Name 23 0946          OT Time and Intention    Document Type evaluation  -AN     Mode of Treatment occupational therapy  -AN     Row Name 23 0946          General Information    Patient Profile Reviewed yes  -AN     Prior Level of Function independent:;all household  mobility;community mobility;gait;ADL's;using stairs  pt presents with recent weakness and increased falls  -AN     Existing Precautions/Restrictions fall  -AN     Barriers to Rehab medically complex;cognitive status  -AN     Row Name 01/20/23 0946          Living Environment    People in Home spouse  -AN     Row Name 01/20/23 0946          Home Main Entrance    Number of Stairs, Main Entrance two  -AN     Stair Railings, Main Entrance none  -AN     Row Name 01/20/23 0946          Stairs Within Home, Primary    Number of Stairs, Within Home, Primary none  -AN     Row Name 01/20/23 0946          Cognition    Orientation Status (Cognition) oriented to;person;place;situation;disoriented to;time;verbal cues/prompts needed for orientation  -AN     Row Name 01/20/23 0946          Safety Issues, Functional Mobility    Safety Issues Affecting Function (Mobility) awareness of need for assistance;insight into deficits/self-awareness;judgment;problem-solving;safety precaution awareness;sequencing abilities;safety precautions follow-through/compliance  -AN     Impairments Affecting Function (Mobility) balance;cognition;endurance/activity tolerance;strength  -AN     Cognitive Impairments, Mobility Safety/Performance awareness, need for assistance;insight into deficits/self-awareness;judgment;problem-solving/reasoning;sequencing abilities;safety precaution follow-through;safety precaution awareness  -AN           User Key  (r) = Recorded By, (t) = Taken By, (c) = Cosigned By    Initials Name Provider Type    Asiya Mendez OT Occupational Therapist                 Mobility/ADL's     Row Name 01/20/23 0948          Bed Mobility    Bed Mobility supine-sit  -AN     Supine-Sit [place] (Bed Mobility) supervision  -AN     Bed Mobility, Safety Issues impaired trunk control for bed mobility;decreased use of arms for pushing/pulling;decreased use of legs for bridging/pushing  -AN     Assistive Device (Bed Mobility) head of bed  elevated  -AN     Comment, (Bed Mobility) increased time due to generalized weakness  -AN     Row Name 01/20/23 0948          Transfers    Transfers sit-stand transfer;stand-sit transfer;bed-chair transfer  -AN     Row Name 01/20/23 0948          Bed-Chair Transfer    Bed-Chair [place] (Transfers) minimum assist (75% patient effort);1 person assist  -AN     Row Name 01/20/23 0948          Sit-Stand Transfer    Sit-Stand [place] (Transfers) contact guard;verbal cues  -AN     Row Name 01/20/23 0948          Stand-Sit Transfer    Stand-Sit [place] (Transfers) contact guard;verbal cues  -AN     Row Name 01/20/23 0948          Functional Mobility    Functional Mobility- Ind. Level minimum assist (75% patient effort);1 person  -AN     Functional Mobility- Safety Issues step length decreased  -AN     Functional Mobility- Comment pt ambulated short distance at a slow pace with Min HHA  -AN     Row Name 01/20/23 0948          Activities of Daily Living    BADL Assessment/Intervention lower body dressing  -AN     Row Name 01/20/23 0948          Lower Body Dressing Assessment/Training    [place] Level (Lower Body Dressing) don;socks;maximum assist (25% patient effort)  -AN     Position (Lower Body Dressing) edge of bed sitting  -AN           User Key  (r) = Recorded By, (t) = Taken By, (c) = Cosigned By    Initials Name Provider Type    AN Asiya Walsh OT Occupational Therapist               Obj/Interventions     Row Name 01/20/23 0949          Sensory Assessment (Somatosensory)    Sensory Assessment (Somatosensory) UE sensation intact  -AN     Row Name 01/20/23 0949          Vision Assessment/Intervention    Visual Impairment/Limitations WFL  -AN     Row Name 01/20/23 0949          Range of Motion Comprehensive    General Range of Motion no range of motion deficits identified  -AN     [facility] Name 01/20/23 0949          Strength Comprehensive (MMT)    General Manual Muscle Testing (MMT) Assessment upper  extremity strength deficits identified;lower extremity strength deficits identified  -AN     Comment, General Manual Muscle Testing (MMT) Assessment BUE grossly 3+/5, BLE weakness observed with mobility  -AN     Row Name 01/20/23 0949          Motor Skills    Motor Skills functional endurance  -AN     Row Name 01/20/23 0949          Balance    Balance Assessment sitting static balance;sit to stand dynamic balance;sitting dynamic balance;standing static balance;standing dynamic balance  -AN     Static Sitting Balance supervision  -AN     Dynamic Sitting Balance supervision  -AN     Position, Sitting Balance sitting edge of bed  -AN     Sit to Stand Dynamic Balance contact guard  -AN     Static Standing Balance contact guard;verbal cues  -AN     Dynamic Standing Balance minimal assist;1-person assist;verbal cues  -AN     Position/Device Used, Standing Balance supported  -AN     Balance Interventions standing;sit to stand;supported;static;dynamic;minimal challenge;occupation based/functional task  -AN           User Key  (r) = Recorded By, (t) = Taken By, (c) = Cosigned By    Initials Name Provider Type    AN Asiya Walsh OT Occupational Therapist               Goals/Plan     Row Name 01/20/23 0953          Transfer Goal 1 (OT)    Activity/Assistive Device (Transfer Goal 1, OT) sit-to-stand/stand-to-sit;toilet;walker, rolling  -AN     [place] Level/Cues Needed (Transfer Goal 1, OT) contact guard required  -AN     Time Frame (Transfer Goal 1, OT) long term goal (LTG);10 days  -AN     Row Name 01/20/23 0953          Dressing Goal 1 (OT)    Activity/Device (Dressing Goal 1, OT) lower body dressing  -AN     [place]/Cues Needed (Dressing Goal 1, OT) contact guard required  -AN     Time Frame (Dressing Goal 1, OT) long term goal (LTG);10 days  -AN     Row Name 01/20/23 0953          Toileting Goal 1 (OT)    Activity/Device (Toileting Goal 1, OT) adjust/manage clothing;perform perineal hygiene;commode  -AN      [place] Level/Cues Needed (Toileting Goal 1, OT) contact guard required  -AN     Time Frame (Toileting Goal 1, OT) long term goal (LTG);10 days  -AN     Row Name 01/20/23 0953          Grooming Goal 1 (OT)    Activity/Device (Grooming Goal 1, OT) oral care;wash face, hands  -AN     [place] (Grooming Goal 1, OT) contact guard required  -AN     Time Frame (Grooming Goal 1, OT) long term goal (LTG);10 days  -AN     Strategies/Barriers (Grooming Goal 1, OT) sink side  -AN     Row Name 01/20/23 0953          Therapy Assessment/Plan (OT)    Planned Therapy Interventions (OT) activity tolerance training;adaptive equipment training;BADL retraining;cognitive/visual perception retraining;functional balance retraining;occupation/activity based interventions;patient/caregiver education/training;transfer/mobility retraining  -AN           User Key  (r) = Recorded By, (t) = Taken By, (c) = Cosigned By    Initials Name Provider Type    AN Asiya Walsh, [place] Occupational Therapist               Clinical Impression     Row Name 01/20/23 0993          Pain Assessment    Pretreatment Pain Rating 0/10 - no pain  -AN     Posttreatment Pain Rating 0/10 - no pain  -AN     Pre/Posttreatment Pain Comment asymptomatic  -AN     Pain Intervention(s) Repositioned;Ambulation/increased activity  -AN     Row Name 01/20/23 0946          Plan of Care Review    Plan of Care Reviewed With patient;daughter  -AN     Progress no change  -AN     Outcome Evaluation Pt presents below her baseline with all ADLs, IADLs, and mobility due to generalized weakness, impaired balance, confusion, and decreased activity tolerance. Pt warrants skilled OT services in order to return to PLOF and due to high fall risk. Rec IPR at [place].  -AN     Row Name 01/20/23 0952          Therapy Assessment/Plan (OT)    Patient/Family Therapy Goal Statement (OT) Return to PLOF  -AN     Rehab Potential (OT) good, to achieve stated therapy goals  -AN     Criteria for Skilled  Therapeutic Interventions Met (OT) yes;skilled treatment is necessary  -AN     Therapy Frequency (OT) daily  -AN     Row Name 01/20/23 0950          Therapy Plan Review/Discharge Plan (OT)    Anticipated Discharge Disposition (OT) inpatient rehabilitation facility  -AN     Row Name 01/20/23 0950          Vital Signs    Pre Systolic BP Rehab --  VSS  -AN     O2 Delivery Pre Treatment room air  -AN     O2 Delivery Intra Treatment room air  -AN     O2 Delivery Post Treatment room air  -AN     Pre Patient Position Supine  -AN     Intra Patient Position Standing  -AN     Post Patient Position Sitting  -AN     Row Name 01/20/23 0950          Positioning and Restraints    Pre-Treatment Position in bed  -AN     Post Treatment Position chair  -AN     In Chair notified nsg;reclined;encouraged to call for assist;exit alarm on;call light within reach;with family/caregiver;waffle cushion;legs elevated  -AN           User Key  (r) = Recorded By, (t) = Taken By, (c) = Cosigned By    Initials Name Provider Type    Asiya Mendez OT Occupational Therapist               Outcome Measures     Row Name 01/20/23 0953          How much help from another is currently needed...    Putting on and taking off regular lower body clothing? 2  -AN     Bathing (including washing, rinsing, and drying) 2  -AN     Toileting (which includes using toilet bed pan or urinal) 2  -AN     Putting on and taking off regular upper body clothing 3  -AN     Taking care of personal grooming (such as brushing teeth) 3  -AN     Eating meals 3  -AN     AM-PAC 6 Clicks Score (OT) 15  -AN     [facility] Name 01/20/23 0953          Functional Assessment    Outcome Measure Options AM-PAC 6 Clicks Daily Activity (OT)  -AN           User Key  (r) = Recorded By, (t) = Taken By, (c) = Cosigned By    Initials Name Provider Type    Asiya Mendez OT Occupational Therapist                Occupational Therapy Education     Title: PT OT SLP Therapies (In Progress)      Topic: Occupational Therapy (In Progress)     Point: ADL training (Done)     Description:   Instruct learner(s) on proper safety adaptation and remediation techniques during self care or transfers.   Instruct in proper use of assistive devices.              Learning Progress Summary           Patient Acceptance, E, VU,NR by AN at 1/20/2023 0954   Family Acceptance, E, VU,NR by AN at 1/20/2023 0954                   Point: Home exercise program (Not Started)     Description:   Instruct learner(s) on appropriate technique for monitoring, assisting and/or progressing therapeutic exercises/activities.              Learner Progress:  Not documented in this visit.          Point: Precautions (Done)     Description:   Instruct learner(s) on prescribed precautions during self-care and functional transfers.              Learning Progress Summary           Patient Acceptance, E, VU,NR by AN at 1/20/2023 0954   Family Acceptance, E, VU,NR by AN at 1/20/2023 0954                   Point: Body mechanics (Done)     Description:   Instruct learner(s) on proper positioning and spine alignment during self-care, functional mobility activities and/or exercises.              Learning Progress Summary           Patient Acceptance, E, VU,NR by AN at 1/20/2023 0954   Family Acceptance, E, VU,NR by AN at 1/20/2023 0954                               User Key     Initials Effective Dates Name Provider Type Discipline     09/21/21 -  Asiya Walsh OT Occupational Therapist OT              OT Recommendation and Plan  Planned Therapy Interventions (OT): activity tolerance training, adaptive equipment training, BADL retraining, cognitive/visual perception retraining, functional balance retraining, occupation/activity based interventions, patient/caregiver education/training, transfer/mobility retraining  Therapy Frequency (OT): daily  Plan of Care Review  Plan of Care Reviewed With: patient, daughter  Progress: no change  Outcome  Evaluation: Pt presents below her baseline with all ADLs, IADLs, and mobility due to generalized weakness, impaired balance, confusion, and decreased activity tolerance. Pt warrants skilled OT services in order to return to OF and due to high fall risk. Rec IPR at [place].     Time Calculation:    Time Calculation- OT     Row Name 01/20/23 0954             Time Calculation- OT    OT Start Time 0845  -AN      OT Received On 01/20/23  -AN      OT Goal Re-Cert Due Date 01/30/23  -AN         Untimed Charges    OT Eval/Re-eval Minutes 46  -AN         Total Minutes    Untimed Charges Total Minutes 46  -AN       Total Minutes 46  -AN            User Key  (r) = Recorded By, (t) = Taken By, (c) = Cosigned By    Initials Name Provider Type    AN Asiya Walsh OT Occupational Therapist              Therapy Charges for Today     Code Description Service Date Service Provider Modifiers Qty    [id number] HC OT EVAL MOD COMPLEXITY 4 1/20/2023 Asiya Walsh OT GO 1               Asiya Walsh OT  1/20/2023

## 2023-01-20 NOTE — THERAPY EVALUATION
Acute Care - Speech Language Pathology   Swallow Initial Evaluation [facility]   Clinical Swallow Evaluation  +Education     Patient Name: Vesta Landry  : 1945  MRN: [id number]  Today's Date: 2023               Admit Date: 2023    Visit Dx:     ICD-10-CM ICD-9-CM   1. Acute urinary tract infection  N39.0 599.0   2. Sepsis with encephalopathy without septic shock, due to unspecified organism (HCC)  A41.9 038.9    R65.20 995.91    G93.40 348.30   3. Neck mass  R22.1 784.2   4. 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)   • Bradycardia   • Acute urinary tract infection   • Sepsis (HCC)   • Elevated troponin   • Gout   • HLD (hyperlipidemia)   • Anxiety associated with depression   • CKD (chronic kidney disease) stage 3, GFR 30-59 ml/min (HCC)   • Diarrhea   • Fall   • Difficulty swallowing   • Personal history of COVID-19   • Exertional dyspnea   • Anemia     Past Medical History:   Diagnosis Date   • Arthritis    • Cancer (HCC)    • Deep vein thrombosis (HCC)     bilateral   • Dementia (HCC)    • Dysphagia    • GERD (gastroesophageal reflux disease)    • Hyperlipidemia    • Hypertension    • Vitamin D deficiency      Past Surgical History:   Procedure Laterality Date   • APPENDECTOMY     • CATARACT EXTRACTION     • CHOLECYSTECTOMY     • COLONOSCOPY      Approx    • HYSTERECTOMY     • OOPHORECTOMY Bilateral    • TUBAL ABDOMINAL LIGATION         SLP Recommendation and Plan  SLP Swallowing Diagnosis: mild, oral dysphagia, R/O pharyngeal dysphagia (23 0915)  SLP Diet Recommendation: soft to chew  textures, whole, thin liquids (01/20/23 0915)  Recommended Precautions and Strategies: small bites of food and sips of liquid, general aspiration precautions (01/20/23 0915)  SLP Rec. for Method of Medication Administration: meds whole, with thin liquids, with puree, as tolerated (01/20/23 0915)     Monitor for Signs of Aspiration: notify SLP if any concerns (01/20/23 0915)  Recommended Diagnostics: other (see comments) (diet tolerance) (01/20/23 0915)  Swallow Criteria for Skilled Therapeutic Interventions Met: demonstrates skilled criteria (01/20/23 0915)  Anticipated Discharge Disposition (SLP): home with assist (01/20/23 0915)  Rehab Potential/Prognosis, Swallowing: good, to achieve stated therapy goals (01/20/23 0915)  Therapy Frequency (Swallow): PRN (01/20/23 0915)  Predicted Duration Therapy Intervention (Days): 3 days (01/20/23 0915)        Daily Summary of Progress (SLP): progress toward functional goals as expected (01/20/23 0915)               Treatment Assessment (SLP): continued, oral dysphagia (01/20/23 0915)  Treatment Assessment Comments (SLP): Patient w recent tonsilar cancer dx. Exercises provided and education regarding avoidance of non-cohesive textures and completion of prophylactic exercises to preserve swallow function. SLP to f/u for diet tolerance. (01/20/23 0915)  Plan for Continued Treatment (SLP): continue treatment per plan of care (01/20/23 0915)                SWALLOW EVALUATION (last 72 hours)     SLP Adult Swallow Evaluation     Row Name 01/20/23 0915                   General Information    Prior Level of Function-Swallowing no diet consistency restrictions  -CH        Plans/Goals Discussed with patient and family;agreed upon  -        Barriers to Rehab medically complex  -CH        Patient's Goals for Discharge eat/drink without coughing/choking  -CH        Family Goals for Discharge patient able to eat/drink without coughing/choking  -CH           Pain    Additional  Documentation Pain Scale: FACES Pre/Post-Treatment (Group)  -           Pain Scale: FACES Pre/Post-Treatment    Pain: FACES Scale, Pretreatment 0-->no hurt  -CH        Posttreatment Pain Rating 0-->no hurt  -CH           Oral Motor Structure and Function    Dentition Assessment upper dentures/partial in place;lower dentures/partial in place  -        Secretion Management WNL/WFL  -CH        Mucosal Quality moist, healthy  -CH        Volitional Swallow WFL  -CH           Oral Musculature and Cranial Nerve Assessment    Oral Motor General Assessment WFL  -           General Eating/Swallowing Observations    Respiratory Support Currently in Use room air  -        Eating/Swallowing Skills self-fed;appropriate self-feeding skills observed  -        Positioning During Eating upright 90 degree;upright in chair  -        Utensils Used spoon;cup;straw  -        Consistencies Trialed ice chips;thin liquids;pureed;regular textures  -CH        Pre SpO2 (%) 96  -CH        Post SpO2 (%) 96  -CH           Respiratory    Respiratory Status WFL;room air  -           Clinical Swallow Eval    Oral Prep Phase impaired  -CH        Oral Transit WFL  -CH        Oral Residue WFL  -CH        Pharyngeal Phase no overt signs/symptoms of pharyngeal impairment  -CH        Esophageal Phase unremarkable  -CH        Clinical Swallow Evaluation Summary Mildly prolonged mastication with regular solids. No overt s/s of aspiration or difficulty with any consistency or presentation style. Patient and dtr reported noted difficulty with crumbly textures. Recommend soft, whole diet (with added sauce/gravy for cohesion) and thin liquids. Patient instructed on avoidance of non-cohesive textures.  -CH           Oral Prep Concerns    Oral Prep Concerns prolonged mastication;increased prep time  -        Prolonged Mastication regular consistencies  -CH        Increased Prep Time regular consistencies  -           Swallowing Quality of Life  Assessment    Education and counseling provided Signs of aspiration;Risks of aspiration;Oral care recommendations and rationale  -           SLP Evaluation Clinical Impression    SLP Swallowing Diagnosis mild;oral dysphagia;R/O pharyngeal dysphagia  -        Functional Impact risk of aspiration/pneumonia  -        Rehab Potential/Prognosis, Swallowing good, to achieve stated therapy goals  -        Swallow Criteria for Skilled Therapeutic Interventions Met demonstrates skilled criteria  -           SLP Treatment Clinical Impressions    Treatment Assessment (SLP) continued;oral dysphagia  -        Treatment Assessment Comments (SLP) Patient w recent tonsilar cancer dx. Exercises provided and education regarding avoidance of non-cohesive textures and completion of prophylactic exercises to preserve swallow function. SLP to f/u for diet tolerance.  -        Daily Summary of Progress (SLP) progress toward functional goals as expected  -        Barriers to Overall Progress (SLP) Medically complex  -        Plan for Continued Treatment (SLP) continue treatment per plan of care  -        Care Plan Review care plan/treatment goals reviewed;risks/benefits reviewed  -        Care Plan Review, Other Participant(s) daughter  -           Recommendations    Therapy Frequency (Swallow) PRN  -        Predicted Duration Therapy Intervention (Days) 3 days  -        SLP Diet Recommendation soft to chew textures;whole;thin liquids  -        Recommended Diagnostics other (see comments)  diet tolerance  -        Recommended Precautions and Strategies small bites of food and sips of liquid;general aspiration precautions  -        Oral Care Recommendations Oral Care BID/PRN  -        SLP Rec. for Method of Medication Administration meds whole;with thin liquids;with puree;as tolerated  -        Monitor for Signs of Aspiration notify SLP if any concerns  -        Anticipated Discharge Disposition (SLP)  home with assist  -CH              User Key  (r) = Recorded By, (t) = Taken By, (c) = Cosigned By    Initials Name Effective Dates    Heena Thomas MS CCC-SLP 06/16/21 -                 EDUCATION  The patient has been educated in the following areas:   Home Exercise Program (HEP) Dysphagia (Swallowing Impairment) Oral Care/Hydration Modified Diet Instruction.        SLP GOALS     Row Name 01/20/23 0915             (LTG) Patient will demonstrate functional swallow for    Diet Texture (Demonstrate functional swallow) soft to chew (whole) textures  -CH      Liquid viscosity (Demonstrate functional swallow) thin liquids  -CH      Garrett (Demonstrate functional swallow) independently (over 90% accuracy)  -CH         (STG) Patient will tolerate trials of    Consistencies Trialed (Tolerate trials) soft to chew (whole) textures;thin liquids  -CH      Desired Outcome (Tolerate trials) without signs/symptoms of aspiration;with adequate oral prep/transit/clearance  -CH      Garrett (Tolerate trials) independently (over 90% accuracy)  -CH      Time Frame (Tolerate trials) by discharge  -CH            User Key  (r) = Recorded By, (t) = Taken By, (c) = Cosigned By    Initials Name Provider Type    Heena Thomas MS CCC-SLP Speech and Language Pathologist                   Time Calculation:    Time Calculation- SLP     Row Name 01/20/23 1130             Time Calculation- SLP    SLP Start Time 0915  -CH      SLP Received On 01/20/23  -CH         Timed Charges    25207-XV Selfcare Mgmt Minutes 45  -CH         Untimed Charges    SLP Eval/Re-eval  ST Eval Oral Pharyng Swallow - 62436  -CH      89180-HW Eval Oral Pharyng Swallow Minutes 40  -CH         Total Minutes    Timed Charges Total Minutes 45  -CH      Untimed Charges Total Minutes 40  -CH       Total Minutes 85  -CH            User Key  (r) = Recorded By, (t) = Taken By, (c) = Cosigned By    Initials Name Provider Type    Heena Thomas MS  CCC-SLP Speech and Language Pathologist                Therapy Charges for Today     Code Description Service Date Service Provider Modifiers Qty    [id number]  ST EVAL ORAL PHARYNG SWALLOW 3 1/20/2023 Heena Anderson, MS CCC-SLP GN 1    [id number]  ST SELF CARE/MGMT/TRAIN EA 15 MIN 1/20/2023 Heena Anderson, MS CCC-SLP GN 3               Heena Anderson MS CCC-SLP  1/20/2023

## 2023-01-20 NOTE — PLAN OF CARE
Goal Outcome Evaluation:  Plan of Care Reviewed With: patient, daughter    SLP evaluation and caregiver instruction completed. Will address diet tolerance. Please see note for further details and recommendations.

## 2023-01-21 LAB
ALBUMIN SERPL-MCNC: 2.8 G/DL (ref 3.5–5.2)
ALBUMIN/GLOB SERPL: 1.1 G/DL
ALP SERPL-CCNC: 91 U/L (ref 39–117)
ALT SERPL W P-5'-P-CCNC: 8 U/L (ref 1–33)
ANION GAP SERPL CALCULATED.3IONS-SCNC: 8 MMOL/L (ref 5–15)
AST SERPL-CCNC: 17 U/L (ref 1–32)
BASOPHILS # BLD AUTO: 0.05 10*3/MM3 (ref 0–0.2)
BASOPHILS NFR BLD AUTO: 0.5 % (ref 0–1.5)
BILIRUB SERPL-MCNC: 0.2 MG/DL (ref 0–1.2)
BUN SERPL-MCNC: 17 MG/DL (ref 8–23)
BUN/CREAT SERPL: 17 (ref 7–25)
CALCIUM SPEC-SCNC: 8.1 MG/DL (ref 8.6–10.5)
CHLORIDE SERPL-SCNC: 111 MMOL/L (ref 98–107)
CO2 SERPL-SCNC: 20 MMOL/L (ref 22–29)
CREAT SERPL-MCNC: 1 MG/DL (ref 0.57–1)
DEPRECATED RDW RBC AUTO: 55 FL (ref 37–54)
EGFRCR SERPLBLD CKD-EPI 2021: 57.4 ML/MIN/1.73
EOSINOPHIL # BLD AUTO: 0.12 10*3/MM3 (ref 0–0.4)
EOSINOPHIL NFR BLD AUTO: 1.3 % (ref 0.3–6.2)
ERYTHROCYTE [DISTWIDTH] IN BLOOD BY AUTOMATED COUNT: 16.2 % (ref 12.3–15.4)
GLOBULIN UR ELPH-MCNC: 2.6 GM/DL
GLUCOSE SERPL-MCNC: 103 MG/DL (ref 65–99)
HCT VFR BLD AUTO: 26.3 % (ref 34–46.6)
HGB BLD-MCNC: 8.4 G/DL (ref 12–15.9)
IMM GRANULOCYTES # BLD AUTO: 0.05 10*3/MM3 (ref 0–0.05)
IMM GRANULOCYTES NFR BLD AUTO: 0.5 % (ref 0–0.5)
LYMPHOCYTES # BLD AUTO: 0.89 10*3/MM3 (ref 0.7–3.1)
LYMPHOCYTES NFR BLD AUTO: 9.8 % (ref 19.6–45.3)
MAGNESIUM SERPL-MCNC: 2.3 MG/DL (ref 1.6–2.4)
MCH RBC QN AUTO: 30.3 PG (ref 26.6–33)
MCHC RBC AUTO-ENTMCNC: 31.9 G/DL (ref 31.5–35.7)
MCV RBC AUTO: 94.9 FL (ref 79–97)
MONOCYTES # BLD AUTO: 0.94 10*3/MM3 (ref 0.1–0.9)
MONOCYTES NFR BLD AUTO: 10.3 % (ref 5–12)
NEUTROPHILS NFR BLD AUTO: 7.05 10*3/MM3 (ref 1.7–7)
NEUTROPHILS NFR BLD AUTO: 77.6 % (ref 42.7–76)
NRBC BLD AUTO-RTO: 0 /100 WBC (ref 0–0.2)
PLATELET # BLD AUTO: 182 10*3/MM3 (ref 140–450)
PMV BLD AUTO: 10.4 FL (ref 6–12)
POTASSIUM SERPL-SCNC: 4.2 MMOL/L (ref 3.5–5.2)
PROT SERPL-MCNC: 5.4 G/DL (ref 6–8.5)
RBC # BLD AUTO: 2.77 10*6/MM3 (ref 3.77–5.28)
SODIUM SERPL-SCNC: 139 MMOL/L (ref 136–145)
WBC NRBC COR # BLD: 9.1 10*3/MM3 (ref 3.4–10.8)

## 2023-01-21 PROCEDURE — G0378 HOSPITAL OBSERVATION PER HR: HCPCS

## 2023-01-21 PROCEDURE — 85025 COMPLETE CBC W/AUTO DIFF WBC: CPT | Performed by: INTERNAL MEDICINE

## 2023-01-21 PROCEDURE — 96366 THER/PROPH/DIAG IV INF ADDON: CPT

## 2023-01-21 PROCEDURE — 80053 COMPREHEN METABOLIC PANEL: CPT | Performed by: INTERNAL MEDICINE

## 2023-01-21 PROCEDURE — 83735 ASSAY OF MAGNESIUM: CPT | Performed by: INTERNAL MEDICINE

## 2023-01-21 PROCEDURE — 99232 SBSQ HOSP IP/OBS MODERATE 35: CPT | Performed by: INTERNAL MEDICINE

## 2023-01-21 PROCEDURE — 25010000002 NA FERRIC GLUC CPLX PER 12.5 MG: Performed by: INTERNAL MEDICINE

## 2023-01-21 RX ORDER — HYDRALAZINE HYDROCHLORIDE 25 MG/1
25 TABLET, FILM COATED ORAL EVERY 8 HOURS SCHEDULED
Status: DISCONTINUED | OUTPATIENT
Start: 2023-01-21 | End: 2023-01-21

## 2023-01-21 RX ORDER — HYDRALAZINE HYDROCHLORIDE 50 MG/1
50 TABLET, FILM COATED ORAL EVERY 8 HOURS SCHEDULED
Status: DISCONTINUED | OUTPATIENT
Start: 2023-01-21 | End: 2023-01-22 | Stop reason: HOSPADM

## 2023-01-21 RX ADMIN — HYDRALAZINE HYDROCHLORIDE 25 MG: 25 TABLET, FILM COATED ORAL at 16:22

## 2023-01-21 RX ADMIN — FAMOTIDINE 10 MG: 20 TABLET, FILM COATED ORAL at 18:02

## 2023-01-21 RX ADMIN — Medication 10 ML: at 09:46

## 2023-01-21 RX ADMIN — METRONIDAZOLE 500 MG: 500 INJECTION, SOLUTION INTRAVENOUS at 15:00

## 2023-01-21 RX ADMIN — HYDRALAZINE HYDROCHLORIDE 25 MG: 25 TABLET, FILM COATED ORAL at 09:45

## 2023-01-21 RX ADMIN — SODIUM CHLORIDE, POTASSIUM CHLORIDE, SODIUM LACTATE AND CALCIUM CHLORIDE 75 ML/HR: 600; 310; 30; 20 INJECTION, SOLUTION INTRAVENOUS at 18:02

## 2023-01-21 RX ADMIN — Medication 10 ML: at 20:41

## 2023-01-21 RX ADMIN — VANCOMYCIN HYDROCHLORIDE 250 MG: 250 CAPSULE ORAL at 06:53

## 2023-01-21 RX ADMIN — ATORVASTATIN CALCIUM 10 MG: 10 TABLET, FILM COATED ORAL at 09:45

## 2023-01-21 RX ADMIN — Medication 1 CAPSULE: at 18:02

## 2023-01-21 RX ADMIN — METRONIDAZOLE 500 MG: 500 INJECTION, SOLUTION INTRAVENOUS at 20:40

## 2023-01-21 RX ADMIN — FAMOTIDINE 10 MG: 20 TABLET, FILM COATED ORAL at 06:53

## 2023-01-21 RX ADMIN — METOPROLOL TARTRATE 50 MG: 25 TABLET, FILM COATED ORAL at 20:40

## 2023-01-21 RX ADMIN — METRONIDAZOLE 500 MG: 500 INJECTION, SOLUTION INTRAVENOUS at 06:53

## 2023-01-21 RX ADMIN — METOPROLOL TARTRATE 50 MG: 25 TABLET, FILM COATED ORAL at 09:45

## 2023-01-21 RX ADMIN — Medication 1 CAPSULE: at 09:45

## 2023-01-21 RX ADMIN — SODIUM CHLORIDE, POTASSIUM CHLORIDE, SODIUM LACTATE AND CALCIUM CHLORIDE 75 ML/HR: 600; 310; 30; 20 INJECTION, SOLUTION INTRAVENOUS at 03:09

## 2023-01-21 RX ADMIN — SODIUM CHLORIDE 125 MG: 9 INJECTION, SOLUTION INTRAVENOUS at 09:46

## 2023-01-21 RX ADMIN — VANCOMYCIN HYDROCHLORIDE 250 MG: 250 CAPSULE ORAL at 12:29

## 2023-01-21 RX ADMIN — TRAZODONE HYDROCHLORIDE 50 MG: 50 TABLET ORAL at 20:40

## 2023-01-21 RX ADMIN — VANCOMYCIN HYDROCHLORIDE 250 MG: 250 CAPSULE ORAL at 18:02

## 2023-01-21 NOTE — CONSULTS
Patient Name: Vesta Landry  : 1945  MRN: [id number]    Date of Consult: 2023  Admission Date: 2023    Requesting Provider: Dr Jimenez  Evaluating Physician: Daren Palacios MD    Chief Complaint: diarrhea    Reason for Consultation: C diff    History of present illness:   Patient is a 77 y.o. 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  Presented to [facility] with generalized weakness and fall.  Recent diagnosis of COVID-19 on  but did not require hospitalization.  Recent outpatient treatment with unknown antibiotic for respiratory infection as an outpatient.  On arrival to [facility] on  patient endorsed low-grade fever, dysuria, diarrhea.  Reported confusion, cough. WBC 17. UA with minimal pyuria and evidence of squamous contamination. Cest x-ray clear.  Admitted and started on IV Zosyn.  COVID PCR positive. PO vancomycin added.  CT abdomen with evidence of colitis    2023: Patient is confused but currently comfortable and eating dinner.  Patient's daughters are at bedside help clarify history.  She does not reliably call events leading up to hospitalization.  Patient says she is only had 2 bowel movements today but may have had some blood in it.  No current abdominal pain.  Denies dysuria, urinary frequency.  No prior history of C. difficile in patient but one of her daughters has had C. difficile several years ago.  No other known sick contacts..  Patient denies having any current symptoms of sinus congestion, pressure.     23: Sitting up, comfortable this morning but still a bit confused.  Had 1 soft green bowel movement overnight per daughter at bedside. No abd pain. Still not eating great but patient says she does not like the food.  Her daughter does not think she has enough oral intake to do well at home yet.       No Known  Allergies    Antibiotics:  Anti-Infectives (From admission, onward)    Ordered     Dose/Rate Route Frequency Start Stop    01/20/23 1605  metroNIDAZOLE (FLAGYL) IVPB 500 mg        Ordering Provider: Daren Palacios MD    500 mg  100 mL/hr over 60 Minutes Intravenous Every 8 Hours Scheduled 01/20/23 2200 01/27/23 2159    01/20/23 1605  vancomycin (VANCOCIN) capsule 250 mg        Ordering Provider: Daren Palacios MD    250 mg Oral Every 6 Hours Scheduled 01/20/23 1800 01/30/23 1159    01/19/23 1705  piperacillin-tazobactam (ZOSYN) 3.375 g in iso-osmotic dextrose 50 ml (premix)        Ordering Provider: Aguila Ibrahim MD    3.375 g  over 30 Minutes Intravenous Once 01/19/23 1707 01/19/23 1843    01/19/23 1705  vancomycin 1250 mg/250 mL 0.9% NS IVPB ([facility])        Ordering Provider: Aguila Ibrahim MD    20 mg/kg × 67.1 kg Intravenous Once 01/19/23 1707 01/19/23 2033          Other Medications:  Current Facility-Administered Medications   Medication Dose Route Frequency Provider Last Rate Last Admin   • atorvastatin (LIPITOR) tablet 10 mg  10 mg Oral Daily Roslyn Pisano APRN   10 mg at 01/20/23 0841   • famotidine (PEPCID) tablet 10 mg  10 mg Oral BID AC Husam Rosales [facility]   10 mg at 01/21/23 0653   • ferric gluconate (FERRLECIT)125 MG in sodium chloride 0.9 % 100 mL IVPB  125 mg Intravenous Daily Hakeem Jimenez MD   Stopped at 01/20/23 1900   • hydrALAZINE (APRESOLINE) tablet 25 mg  25 mg Oral BID Roslyn Pisano APRN   25 mg at 01/20/23 2111   • ipratropium-albuterol (DUO-NEB) nebulizer solution 3 mL  3 mL Nebulization Q4H PRN Roslyn Pisano APRN       • lactated ringers infusion  75 mL/hr Intravenous Continuous Hakeem Jimenez MD 75 mL/hr at 01/21/23 0500 75 mL/hr at 01/21/23 0500   • lactobacillus acidophilus (RISAQUAD) capsule 1 capsule  1 capsule Oral Daily With Breakfast & Dinner Daren Palacios MD   1 capsule at 01/20/23 1749   • Magnesium Low Dose Replacement - Initiate  "Nurse / BPA Driven Protocol   Does not apply PRN Hakeem Jimenez MD       • metoprolol tartrate (LOPRESSOR) tablet 50 mg  50 mg Oral BID Roslyn Pisano APRN   50 mg at 01/20/23 2111   • metroNIDAZOLE (FLAGYL) IVPB 500 mg  500 mg Intravenous Q8H Daren Palacios  mL/hr at 01/21/23 0653 500 mg at 01/21/23 0653   • ondansetron (ZOFRAN) injection 4 mg  4 mg Intravenous Q6H PRN Carla Lang APRN       • Phosphorus Replacement - Initiate Nurse / BPA Driven Protocol   Does not apply PRN Hakeem Jimenez MD       • Potassium Replacement - Initiate Nurse / BPA Driven Protocol   Does not apply PRN Hakeem Jimenez MD       • sodium chloride 0.9 % flush 10 mL  10 mL Intravenous PRN Aguila Ibrahim MD       • sodium chloride 0.9 % flush 10 mL  10 mL Intravenous Q12H Roslyn Pisano APRN   10 mL at 01/20/23 2112   • sodium chloride 0.9 % flush 10 mL  10 mL Intravenous PRN Roslyn Pisano APRN       • sodium chloride 0.9 % infusion 40 mL  40 mL Intravenous PRN Roslyn Pisano APRN       • traZODone (DESYREL) tablet 50 mg  50 mg Oral Nightly Roslyn Pisano APRN   50 mg at 01/20/23 2111   • vancomycin (VANCOCIN) capsule 250 mg  250 mg Oral Q6H Daren Palacios MD   250 mg at 01/21/23 0653       Physical Exam:   Vital Signs   /54 (BP Location: Left arm, Patient Position: Lying)   Pulse 66   Temp 97.7 °F (36.5 °C) (Oral)   Resp 18   Ht 154.9 cm (60.98\")   Wt 67.1 kg (148 lb)   SpO2 94%   BMI 27.98 kg/m²     GENERAL: Awake and alert, nontoxic at rest  HEENT: Normocephalic, atraumatic.  EOMI. No conjunctival injection.  No tenderness to palpation of sinuses  NECK: Supple without nuchal rigidity. No meningismus  HEART: RRR; No murmur.  LUNGS: Clear to auscultation bilaterally without wheezing, rales, rhonchi. Normal respiratory effort. Nonlabored.  ABDOMEN: Soft, nontender, nondistended.  No guarding or rebound  EXT:  No edema.  : external urinary catheter.  SKIN: Warm and dry " without cutaneous eruptions on Inspection/palpation.    NEURO: Oriented to PPT.   PSYCHIATRIC: easily confused    Laboratory Data  Lab Results   Component Value Date    WBC 9.10 01/21/2023    HGB 8.4 (L) 01/21/2023    HCT 26.3 (L) 01/21/2023    MCV 94.9 01/21/2023     01/21/2023     Lab Results   Component Value Date    GLUCOSE 103 (H) 01/21/2023    CALCIUM 8.1 (L) 01/21/2023     01/21/2023    K 4.2 01/21/2023    CO2 20.0 (L) 01/21/2023     (H) 01/21/2023    BUN 17 01/21/2023    CREATININE 1.00 01/21/2023     Estimated Creatinine Clearance: 40 mL/min (by C-G formula based on SCr of 1 mg/dL).  Lab Results   Component Value Date    ALT 8 01/21/2023    AST 17 01/21/2023    ALKPHOS 91 01/21/2023    BILITOT 0.2 01/21/2023     Lab Results   Component Value Date    CRP 2.2 07/02/2015     Lab Results   Component Value Date    SEDRATE 8 07/02/2015       The above labs were reviewed.     Microbiology:  Microbiology Results (last 10 days)     Procedure Component Value - Date/Time    Gastrointestinal Panel, PCR - Stool, Per Rectum [[id number]]  (Normal) Collected: 01/20/23 1058    Lab Status: Final result Specimen: Stool from Per Rectum Updated: 01/20/23 1245     Campylobacter Not Detected     Plesiomonas shigelloides Not Detected     Salmonella Not Detected     Vibrio Not Detected     Vibrio cholerae Not Detected     Yersinia enterocolitica Not Detected     Enteroaggregative E. coli (EAEC) Not Detected     Enteropathogenic E. coli (EPEC) Not Detected     Enterotoxigenic E. coli (ETEC) lt/st Not Detected     Shiga-like toxin-producing E. coli (STEC) stx1/stx2 Not Detected     Shigella/Enteroinvasive E. coli (EIEC) Not Detected     Cryptosporidium Not Detected     Cyclospora cayetanensis Not Detected     Entamoeba histolytica Not Detected     Giardia lamblia Not Detected     Adenovirus F40/41 Not Detected     Astrovirus Not Detected     Norovirus GI/GII Not Detected     Rotavirus A Not Detected     Sapovirus  (I, II, IV or V) Not Detected    Clostridioides difficile Toxin - Stool, Per Rectum [[id number]]  (Abnormal) Collected: 01/20/23 1058    Lab Status: Final result Specimen: Stool from Per Rectum Updated: 01/20/23 1247    Narrative:      The following orders were created for panel order Clostridioides difficile Toxin - Stool, Per Rectum.  Procedure                               Abnormality         Status                     ---------                               -----------         ------                     Clostridioides difficile...[[id number]]  Abnormal            Final result                 Please view results for these tests on the individual orders.    Clostridioides difficile Toxin, PCR - Stool, Per Rectum [[id number]]  (Abnormal) Collected: 01/20/23 1058    Lab Status: Final result Specimen: Stool from Per Rectum Updated: 01/20/23 1247     C. Difficile Toxins by PCR Detected    Narrative:      DNA from a toxigenic strain of C.difficile has been detected. Antigen testing for the presence of free C.difficile toxin is currently in progress, to help determine the clinical significance of this PCR result.     Clostridioides difficile toxin Ag, Reflex - Stool, Per Rectum [[id number]]  (Abnormal) Collected: 01/20/23 1058    Lab Status: Final result Specimen: Stool from Per Rectum Updated: 01/20/23 1354     C.diff Toxin Ag Positive    Narrative:      DNA from a toxigenic strain of C.difficile was detected, along with the presence of free toxin. These results are suggestive of C.difficile infection.    Respiratory Panel PCR w/COVID-19(SARS-CoV-2) ELIAN/LISA/LISSETTE/PAD/COR/MAD/KIARRA In-House, NP Swab in [facility], 3-4 HR TAT - Swab, Nasopharynx [[id number]]  (Normal) Collected: 01/19/23 7417    Lab Status: Final result Specimen: Swab from Nasopharynx Updated: 01/19/23 3445     ADENOVIRUS, PCR Not Detected     Coronavirus 229E Not Detected     Coronavirus HKU1 Not Detected     Coronavirus NL63 Not Detected     Coronavirus OC43 Not  Detected     COVID19 Not Detected     Human Metapneumovirus Not Detected     Human Rhinovirus/Enterovirus Not Detected     Influenza A PCR Not Detected     Influenza B PCR Not Detected     Parainfluenza Virus 1 Not Detected     Parainfluenza Virus 2 Not Detected     Parainfluenza Virus 3 Not Detected     Parainfluenza Virus 4 Not Detected     RSV, PCR Not Detected     Bordetella pertussis pcr Not Detected     Bordetella parapertussis PCR Not Detected     Chlamydophila pneumoniae PCR Not Detected     Mycoplasma pneumo by PCR Not Detected    Narrative:      In the setting of a positive respiratory panel with a viral infection PLUS a negative procalcitonin without other underlying concern for bacterial infection, consider observing off antibiotics or discontinuation of antibiotics and continue supportive care. If the respiratory panel is positive for atypical bacterial infection (Bordetella pertussis, Chlamydophila pneumoniae, or Mycoplasma pneumoniae), consider antibiotic de-escalation to target atypical bacterial infection.    MRSA Screen, PCR (Inpatient) - Swab, Nares [[id number]]  (Normal) Collected: 01/19/23 2144    Lab Status: Final result Specimen: Swab from Nares Updated: 01/20/23 0903     MRSA PCR Negative    Narrative:      The negative predictive value of this diagnostic test is high and should only be used to consider de-escalating anti-MRSA therapy. A positive result may indicate colonization with MRSA and must be correlated clinically.  MRSA Negative    COVID PRE-OP / PRE-PROCEDURE SCREENING ORDER (NO ISOLATION) - Swab, Nasopharynx [[id number]]  (Normal) Collected: 01/19/23 1728    Lab Status: Final result Specimen: Swab from Nasopharynx Updated: 01/19/23 1817    Narrative:      The following orders were created for panel order COVID PRE-OP / PRE-PROCEDURE SCREENING ORDER (NO ISOLATION) - Swab, Nasopharynx.  Procedure                               Abnormality         Status                     ---------                                -----------         ------                     COVID-19 and FLU A/B PCR...[[id number]]  Normal              Final result                 Please view results for these tests on the individual orders.    COVID-19 and FLU A/B PCR - Swab, Nasopharynx [[id number]]  (Normal) Collected: 01/19/23 1728    Lab Status: Final result Specimen: Swab from Nasopharynx Updated: 01/19/23 1817     COVID19 Not Detected     Influenza A PCR Not Detected     Influenza B PCR Not Detected    Narrative:      Fact sheet for providers: https://www.fda.gov/media/625103/download    Fact sheet for patients: https://www.fda.gov/media/441254/download    Test performed by PCR.    Blood Culture - Blood, Arm, Right [[id number]]  (Normal) Collected: 01/19/23 1725    Lab Status: Preliminary result Specimen: Blood from Arm, Right Updated: 01/20/23 1800     Blood Culture No growth at 24 hours    Blood Culture - Blood, Arm, Right [[id number]]  (Normal) Collected: 01/19/23 1650    Lab Status: Preliminary result Specimen: Blood from Arm, Right Updated: 01/20/23 1800     Blood Culture No growth at 24 hours          Radiology:  CT Abdomen Pelvis Without Contrast    Result Date: 1/20/2023  1. Diffuse thickening of the colon suggesting infectious or inflammatory colitis. Moderate to. Air-fluid levels are seen throughout the colon, rectum, and within multiple small bowel loops, suggesting a diarrheal state. Report 2. No evidence of a metastatic disease in the abdomen or pelvis in this patient with history of tonsillar cancer. 3. Mild left basilar atelectasis. 4. Cholecystectomy, hysterectomy. Electronically Signed: Kenya Isaacs  1/20/2023 11:49 AM EST  Workstation ID: [id number]    CT Head Without Contrast    Result Date: 1/19/2023  Impression: No acute intracranial process. Significant paranasal sinus disease with air-fluid levels consistent with acute sinusitis. Electronically Signed: Lisbeth Salinas  1/19/2023 10:52 PM EST  Workstation ID:  [id number]    CT Chest Without Contrast Diagnostic    Result Date: 1/20/2023  1.  No focal consolidation, pneumothorax, or pleural effusion. 2.  Minimal bibasal atelectasis/scarring. Superimposed small airways infection cannot be entirely excluded. Electronically signed by:  Claudio Edwards D.O.  1/19/2023 10:12 PM Mountain Time    XR Chest 1 View    Result Date: 1/19/2023  Impression: No acute cardiopulmonary process. Electronically Signed: Lisbeth Salinas  1/19/2023 5:29 PM EST  Workstation ID: [id number]    NM PET/CT Skull Base to Mid Thigh    Result Date: 1/16/2023  Impression: 1. Strongly hypermetabolic 3 cm left tonsillar mass, and strongly hypermetabolic left level 2B lymph node consistent with primary lesion and local metastasis. 2. No evidence of distant metastatic disease. 3. Incidentally noted pansinus disease, with numerous air-fluid levels, but only mild mucosal activity. Electronically Signed: Robinson Chopra  1/16/2023 4:08 PM EST  Workstation ID: [id number]    I personally reviewed the above CT abdomen and head       Impression:   1. Severe Clostridium difficile colitis: Acute problem with threat to bodily function. PCR and antigen test positive.  Diffuse colitis noted on CT, but not copious stool output per report. first episode.  Improving clinically although oral intake still is not great  2. Sepsis due to above: Acute problem with threat to bodily function.  Fluid in sinuses noted on CT head on admission but no symptoms of active sinusitis at this time.  Mild pyuria noted on admission UA but also evidence of contamination with squamous cells.  No dysuria at this time although history somewhat confounded by confusion I do not suspect she has an active UTI at this time  3. Leukocytosis: improved quickly  4. Fall at home prior to admission  5. Recent COVID-19: dx Jan 5.  Respiratory symptoms resolved and repeat PCR test negative on admission  6. Recent possible URI: Treated as an outpatient with unknown oral  antibiotic.  Fluid in sinuses noted on CT head on admission but no symptoms of active sinusitis at this time.  7. Recently diagnosed left tonsillar squamous cell cancer: Newly diagnosed and has not had any treatment yet.  Radiation was planned soon  8. CKD stage IIIB  9. Hx of gout    PLAN:   - Microbiology data reviewed.  Follow-up pending blood cultures, so far negative  - Labs reviewed Follow CBC, BMP     - Continue oral vancomycin 250 mg 4x daily  - IV Flagyl pending clinical improvement  - probiotic BID  - Enteric isolation.  Discussed importance of hand hygiene with patient and family    Improved today.  Stool output is still not copious but oral intake not great.  Patient's daughter at bedside does not think she is well enough to go home yet and I agree she would benefit from at least 1 more day of IV antibiotics, IV fluids and observation.  Will continue to follow     Daren Palacios MD  1/21/2023

## 2023-01-21 NOTE — PROGRESS NOTES
[facility] Medicine Services  PROGRESS NOTE    Patient Name: Vesta Landry  : 1945  MRN: [id number]    Date of Admission: 2023  Primary Care Physician: Bossman Pedraza MD    Subjective   Subjective     CC:  gen weakness, fall, sepsis, tonsillar cancer    HPI:  Diarrhea improved. Tolerating po. Still not eating much but overall improved. No dyspnea    ROS:  Gen- No fevers, chills  CV- No chest pain, palpitations  Resp- No cough, dyspnea  GI- diarrhea, crampy abd pain        Objective   Objective     Vital Signs:   Temp:  [97.7 °F (36.5 °C)-98.7 °F (37.1 °C)] 97.9 °F (36.6 °C)  Heart Rate:  [66-83] 67  Resp:  [16-18] 18  BP: (117-170)/(54-94) 170/72     Physical Exam:  Constitutional:Alert, oriented x 3, nontoxic appearing, mild dysarthria, elderly and frail appearing but nontoxic  Psych:Normal/appropriate affect  HEENT:NCAT, oropharynx clear, left tonsillar mass noted  Neck: neck supple, full range of motion  Neuro: Face symmetric, speech clear, equal , moves all extremities  Cardiac: rrr; No pretibial pitting edema  Resp: CTAB, normal effort  GI: abd soft,nontender  Skin: No extremity rash  Musculoskeletal/extremities: no cyanosis of extremities; no significant ankle edema      Results Reviewed:  LAB RESULTS:      Lab 23  0724 23  0643 23  1642   WBC 9.10 9.79 17.87*   HEMOGLOBIN 8.4* 8.5* 10.1*   HEMATOCRIT 26.3* 26.9* 31.6*   PLATELETS 182 170 226   NEUTROS ABS 7.05* 8.18* 15.53*   IMMATURE GRANS (ABS) 0.05 0.06* 0.12*   LYMPHS ABS 0.89 0.65* 0.84   MONOS ABS 0.94* 0.84 1.31*   EOS ABS 0.12 0.04 0.02   MCV 94.9 94.7 93.5   PROCALCITONIN  --   --  0.29*   LACTATE  --   --  0.8   PROTIME  --  15.8*  --    APTT  --  36.9  --    D DIMER QUANT  --   --  1.25*         Lab 23  0724 23  0643 23  1642   SODIUM 139 142 137   POTASSIUM 4.2 3.6 4.1   CHLORIDE 111* 108* 101   CO2 20.0* 21.0* 23.0   ANION GAP 8.0 13.0 13.0   BUN 17 23 21    CREATININE 1.00 1.13* 1.30*   EGFR 57.4* 49.6* 41.9*   GLUCOSE 103* 97 115*   CALCIUM 8.1* 8.0* 8.7   MAGNESIUM 2.3 1.6 1.8   HEMOGLOBIN A1C  --  5.30  --    TSH  --  2.710  --          Lab 01/21/23  0724 01/19/23  1642   TOTAL PROTEIN 5.4* 6.7   ALBUMIN 2.8* 3.4*   GLOBULIN 2.6 3.3   ALT (SGPT) 8 8   AST (SGOT) 17 21   BILIRUBIN 0.2 0.5   ALK PHOS 91 104         Lab 01/20/23  0643 01/19/23  2100 01/19/23  1642   PROBNP 4,141.0*  --   --    TROPONIN T  --  0.091* 0.089*   PROTIME 15.8*  --   --    INR 1.26*  --   --              Lab 01/19/23  1642   IRON 16*  16*   IRON SATURATION 7*   TIBC 229*   TRANSFERRIN 154*   FERRITIN 211.00*   FOLATE >20.00   VITAMIN B 12 521         Brief Urine Lab Results  (Last result in the past 365 days)      Color   Clarity   Blood   Leuk Est   Nitrite   Protein   CREAT   Urine HCG        01/19/23 1751 Yellow   Cloudy   Negative   Moderate (2+)   Negative   Trace                 Microbiology Results Abnormal     Procedure Component Value - Date/Time    Blood Culture - Blood, Arm, Right [[id number]]  (Normal) Collected: 01/19/23 1725    Lab Status: Preliminary result Specimen: Blood from Arm, Right Updated: 01/20/23 1800     Blood Culture No growth at 24 hours    Blood Culture - Blood, Arm, Right [[id number]]  (Normal) Collected: 01/19/23 1650    Lab Status: Preliminary result Specimen: Blood from Arm, Right Updated: 01/20/23 1800     Blood Culture No growth at 24 hours    Gastrointestinal Panel, PCR - Stool, Per Rectum [[id number]]  (Normal) Collected: 01/20/23 1058    Lab Status: Final result Specimen: Stool from Per Rectum Updated: 01/20/23 1245     Campylobacter Not Detected     Plesiomonas shigelloides Not Detected     Salmonella Not Detected     Vibrio Not Detected     Vibrio cholerae Not Detected     Yersinia enterocolitica Not Detected     Enteroaggregative E. coli (EAEC) Not Detected     Enteropathogenic E. coli (EPEC) Not Detected     Enterotoxigenic E. coli (ETEC) lt/st Not  Detected     Shiga-like toxin-producing E. coli (STEC) stx1/stx2 Not Detected     Shigella/Enteroinvasive E. coli (EIEC) Not Detected     Cryptosporidium Not Detected     Cyclospora cayetanensis Not Detected     Entamoeba histolytica Not Detected     Giardia lamblia Not Detected     Adenovirus F40/41 Not Detected     Astrovirus Not Detected     Norovirus GI/GII Not Detected     Rotavirus A Not Detected     Sapovirus (I, II, IV or V) Not Detected    MRSA Screen, PCR (Inpatient) - Swab, Nares [[id number]]  (Normal) Collected: 01/19/23 2144    Lab Status: Final result Specimen: Swab from Nares Updated: 01/20/23 0903     MRSA PCR Negative    Narrative:      The negative predictive value of this diagnostic test is high and should only be used to consider de-escalating anti-MRSA therapy. A positive result may indicate colonization with MRSA and must be correlated clinically.  MRSA Negative    Respiratory Panel PCR w/COVID-19(SARS-CoV-2) ELIAN/LISA/LISSETTE/PAD/COR/MAD/KIARRA In-House, NP Swab in UTM/VTM, 3-4 HR TAT - Swab, Nasopharynx [[id number]]  (Normal) Collected: 01/19/23 2144    Lab Status: Final result Specimen: Swab from Nasopharynx Updated: 01/19/23 2254     ADENOVIRUS, PCR Not Detected     Coronavirus 229E Not Detected     Coronavirus HKU1 Not Detected     Coronavirus NL63 Not Detected     Coronavirus OC43 Not Detected     COVID19 Not Detected     Human Metapneumovirus Not Detected     Human Rhinovirus/Enterovirus Not Detected     Influenza A PCR Not Detected     Influenza B PCR Not Detected     Parainfluenza Virus 1 Not Detected     Parainfluenza Virus 2 Not Detected     Parainfluenza Virus 3 Not Detected     Parainfluenza Virus 4 Not Detected     RSV, PCR Not Detected     Bordetella pertussis pcr Not Detected     Bordetella parapertussis PCR Not Detected     Chlamydophila pneumoniae PCR Not Detected     Mycoplasma pneumo by PCR Not Detected    Narrative:      In the setting of a positive respiratory panel with a viral  infection PLUS a negative procalcitonin without other underlying concern for bacterial infection, consider observing off antibiotics or discontinuation of antibiotics and continue supportive care. If the respiratory panel is positive for atypical bacterial infection (Bordetella pertussis, Chlamydophila pneumoniae, or Mycoplasma pneumoniae), consider antibiotic de-escalation to target atypical bacterial infection.    COVID PRE-OP / PRE-PROCEDURE SCREENING ORDER (NO ISOLATION) - Swab, Nasopharynx [[id number]]  (Normal) Collected: 01/19/23 1728    Lab Status: Final result Specimen: Swab from Nasopharynx Updated: 01/19/23 1817    Narrative:      The following orders were created for panel order COVID PRE-OP / PRE-PROCEDURE SCREENING ORDER (NO ISOLATION) - Swab, Nasopharynx.  Procedure                               Abnormality         Status                     ---------                               -----------         ------                     COVID-19 and FLU A/B PCR...[[id number]]  Normal              Final result                 Please view results for these tests on the individual orders.    COVID-19 and FLU A/B PCR - Swab, Nasopharynx [[id number]]  (Normal) Collected: 01/19/23 1728    Lab Status: Final result Specimen: Swab from Nasopharynx Updated: 01/19/23 1817     COVID19 Not Detected     Influenza A PCR Not Detected     Influenza B PCR Not Detected    Narrative:      Fact sheet for providers: https://www.fda.gov/media/773113/download    Fact sheet for patients: https://www.fda.gov/media/972756/download    Test performed by PCR.          Adult Transthoracic Echo Complete W/ Cont if Necessary Per Protocol    Result Date: 1/20/2023  •  Left ventricular systolic function is normal. Calculated left ventricular EF = 55% Left ventricular ejection fraction appears to be 56 - 60%. •  Left ventricular diastolic function is consistent with (grade II w/high LAP) pseudonormalization. •  Estimated right ventricular systolic  pressure from tricuspid regurgitation is normal (<35 mmHg).     CT Abdomen Pelvis Without Contrast    Result Date: 1/20/2023  CT ABDOMEN PELVIS WO CONTRAST Date of Exam: 1/20/2023 11:24 AM EST Indication: diarrhea, crampy abd pain. history of metastatic tonsillar cancer. Comparison: None Technique: Axial CT images were obtained of the abdomen and pelvis without the administration of contrast. Reconstructed coronal and sagittal images were also obtained. Automated exposure control and iterative construction methods were used. Findings: There is mild left basilar atelectasis. Minimal subpleural fibrotic changes are present within the lung bases. Trace left basilar pleural fluid is present. Heart size is normal with mild coronary artery calcifications. There is diffuse wall thickening within the colon, suggesting infectious or inflammatory colitis. Small air-fluid levels are seen within both large bowel and small bowel. The stomach appears unremarkable. No indication of high-grade bowel obstruction. Cholecystectomy. Noncontrast appearance of the liver, spleen, pancreas, adrenals, and kidneys is within normal limits. Tiny esophageal hiatal hernia. Mild to moderate abdominal aortic calcific atherosclerosis. No pathologically enlarged lymph nodes are identified. Soft tissue mass is seen. Urinary bladder is normal. Small air-fluid level is seen within the rectum. Hysterectomy changes are present. Moderate L4-5 and L5-S1 facet arthropathy. Lumbar levoscoliosis with degenerative loss of disc height eccentrically on the right at L2-3 and L3-4. No acute osseous abnormality.     Impression: 1. Diffuse thickening of the colon suggesting infectious or inflammatory colitis. Moderate to. Air-fluid levels are seen throughout the colon, rectum, and within multiple small bowel loops, suggesting a diarrheal state. Report 2. No evidence of a metastatic disease in the abdomen or pelvis in this patient with history of tonsillar cancer. 3.  Mild left basilar atelectasis. 4. Cholecystectomy, hysterectomy. Electronically Signed: Kenya Isaacs  1/20/2023 11:49 AM EST  Workstation ID: [id number]    CT Head Without Contrast    Result Date: 1/19/2023  CT HEAD WO CONTRAST Date of Exam: 1/19/2023 10:14 PM EST Indication: fall, hit head, AMS. Comparison: MRI 1/3/2023, CT 12/2/2022 Technique: Axial CT images were obtained of the head without contrast administration.  Reconstructed coronal and sagittal images were also obtained. Automated exposure control and iterative construction methods were used. Findings: There is no evidence of hemorrhage. There is no mass effect or midline shift. There is no extracerebral collection. Ventricles are normal in size and configuration for patient's stated age.  Stable moderate periventricular white matter disease likely related to chronic intravascular ischemic change. No significant soft tissue hematoma. Posterior fossa is within normal limits. Calvarium and skull base appear intact.  An air-fluid levels present within the left maxillary sinus. There is complete opacification of the right maxillary sinus. Air-fluid levels are present within the frontal sinuses, the sphenoid sinuses and the ethmoid air cells. Visualized orbits are unremarkable.     Impression: Impression: No acute intracranial process. Significant paranasal sinus disease with air-fluid levels consistent with acute sinusitis. Electronically Signed: Lisbeth Salinas  1/19/2023 10:52 PM EST  Workstation ID: [id number]    CT Chest Without Contrast Diagnostic    Result Date: 1/20/2023  EXAMINATION: CT SCAN OF THE CHEST WITHOUT INTRAVENOUS CONTRAST DATE OF EXAM: 1/19/2023 10:20 PM HISTORY: recent COVID, evaluate for aspiration, exertional dyspnea COMPARISON: Chest radiograph 7/24/2017. TECHNIQUE: CT examination of the chest was performed without intravenous contrast. CT dose lowering techniques were used, to include: automated exposure control, adjustment for patient size,  and/or use of iterative reconstruction. 3D MIP reconstruction was performed under concurrent supervision not requiring an independent workstation, in order to increase the sensitivity for detection of pulmonary nodules. Note: The exam is limited because some types of pathology may not be adequately demonstrated due to lack of contrast enhancement. FINDINGS: CHEST: Lungs:  No focal consolidation. Bilateral lower lobe dependent reticular opacities. Patchy bandlike scarring in the right lower lobe. Airways appear to be clear.. Pleura: No pneumothorax or pleural effusion. Mediastinum And Scarlett: No evidence of acute abnormality. Cardiovascular: No evidence of acute abnormality on this nonenhanced exam. Patchy moderate calcified atherosclerosis of the aorta. At least mild patchy coronary artery calcification of the left coronary system. No pericardial effusion. Chest Wall:  No evidence of acute abnormality. Upper Abdomen: No evidence of acute abnormality. Status post cholecystectomy. Tiny hiatal hernia. Musculoskeletal: No evidence of acute abnormality. Generalized osteopenia. Degenerative changes of the spine, age-appropriate.     Impression: 1.  No focal consolidation, pneumothorax, or pleural effusion. 2.  Minimal bibasal atelectasis/scarring. Superimposed small airways infection cannot be entirely excluded. Electronically signed by:  Claudio Edwards D.O.  1/19/2023 10:12 PM Mountain Time    XR Chest 1 View    Result Date: 1/19/2023  XR CHEST 1 VW Date of Exam: 1/19/2023 5:01 PM EST Indication: Weak/Dizzy/AMS triage protocol. Comparison: 7/24/2017 Findings: There are no airspace consolidations. No pleural fluid. No pneumothorax. The pulmonary vasculature appears within normal limits. The cardiac and mediastinal silhouette appear unremarkable. No acute osseous abnormality identified.     Impression: Impression: No acute cardiopulmonary process. Electronically Signed: Lisbeth Salinas  1/19/2023 5:29 PM EST  Workstation ID:  [id number]    Duplex Venous Lower Extremity - Bilateral CAR    Result Date: 1/20/2023  •  The bilateral lower extremity venous duplex scan is negative for DVT and SVT in the areas visualized. •  The tibial level veins were not well visualized, but appeared negative for DVT in the images obtained.       Results for orders placed during the hospital encounter of 01/19/23    Adult Transthoracic Echo Complete W/ Cont if Necessary Per Protocol    Interpretation Summary  •  Left ventricular systolic function is normal. Calculated left ventricular EF = 55% Left ventricular ejection fraction appears to be 56 - 60%.  •  Left ventricular diastolic function is consistent with (grade II w/high LAP) pseudonormalization.  •  Estimated right ventricular systolic pressure from tricuspid regurgitation is normal (<35 mmHg).      I have reviewed the medications:  Scheduled Meds:atorvastatin, 10 mg, Oral, Daily  famotidine, 10 mg, Oral, BID AC  ferric gluconate, 125 mg, Intravenous, Daily  hydrALAZINE, 25 mg, Oral, BID  lactobacillus acidophilus, 1 capsule, Oral, Daily With Breakfast & Dinner  metoprolol tartrate, 50 mg, Oral, BID  metroNIDAZOLE, 500 mg, Intravenous, Q8H  sodium chloride, 10 mL, Intravenous, Q12H  traZODone, 50 mg, Oral, Nightly  vancomycin, 250 mg, Oral, Q6H      Continuous Infusions:lactated ringers, 75 mL/hr, Last Rate: 75 mL/hr (01/21/23 0946)      PRN Meds:.•  ipratropium-albuterol  •  Magnesium Low Dose Replacement - Initiate Nurse / BPA Driven Protocol  •  ondansetron  •  Phosphorus Replacement - Initiate Nurse / BPA Driven Protocol  •  Potassium Replacement - Initiate Nurse / BPA Driven Protocol  •  sodium chloride  •  sodium chloride  •  sodium chloride    Assessment & Plan   Assessment & Plan     Active Hospital Problems    Diagnosis  POA   • **Sepsis (HCC) [A41.9]  Yes   • Acute urinary tract infection [N39.0]  Yes   • Elevated troponin [R77.8]  Yes   • Gout [M10.9]  Yes   • HLD (hyperlipidemia) [E78.5]  Yes    • Anxiety associated with depression [F41.8]  Yes   • CKD (chronic kidney disease) stage 3, GFR 30-59 ml/min ([facility]) [N18.30]  Yes   • Diarrhea [R19.7]  Yes   • Fall [W19.XXXA]  Yes   • Difficulty swallowing [R13.10]  Yes   • Personal history of COVID-19 [Z86.16]  Yes   • Exertional dyspnea [R06.09]  Yes   • Anemia [D64.9]  Yes   • 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        Brief Hospital Course to date:  Vesta Landry is a 77 y.o. female w/ hx left tonsillar squamous cell cancer, htn, hl, chronic HFpEF, ckd 3, gout, gerd, previous covid 19 infection (did not require hospitalization) 1/5/23. Presented w/ increased generalized weakness, fall and hit head. Recent crampy abd pain and diarrhea. Workup revealed ct head with no intracranial dz but showed sinusitis, wbc was 17,000, mildly elevated procal. Initiated on iv fluids and empiric zosyn and vancomycin and admitted to hospitalist service. Oncology consulted. Gi pcr panel and cdiff pcr panel sent    *Sepsis, poa due to c.diff colitis  *Diarrhea, improved  *Leukocytosis, resolved  *Recent Covid infection (dx'd 1/5/23), did not require hospitalization  -ct chest : no overt pneumonia, bibasilar atelectasis  -ct head: no intracranial acute process, paranasal sinus dz w/ air fluid levels w/ acute sinusitis  -rsp pcr panel: negative  -mrsa pcr nares: negative  -u/a cathed: 7-12 squams, 3-5 wbc, 2+ bacteria  -procal minimally elevated 0.29  -initial wbc 17,870, improved to 9,790  -gi pcr negative  -c.diff pcr +  -CT A/P: diffuse thickening of the colon suggesting infectious or inflammatory colitis, moderate air-fluid levels are seen throughout the colon, rectum, and within multiple small bowel loops suggesting a  diarrhea state (no evidence of metastatic dz in abdomen or pelvis)  -ID following: po vanc, iv flagyl. Overall improved. Diarrhea improved. If continues improving possibly home tomorrow    *Tonsillar cancer  - apparently has future appointment  - seen by Dr. Lora this admission, aware of admission: to keep follow up as outpatient ; future xrt    *mildly elevated & flat troponins  *elevated probnp  -likely type 2 nstemi due to systemic illness  -no chest pain, ekg no dynamic ischemic changes; troponins minimally elevated 0.089-0.09  -ct chest without overt pulm edema  -echo: LV normal function, valves ok    *s/p fall and head trauma  -ct head negative    *iron def anemia  -iv iron day #2/3  -trend/monitor hgb    *mildly elevated d-dimer, due to infection  -no hypoxia  -bilateral venous duplex negative    *gen weakness  -pt/ot evals    Am labs: cbc,bmp,mag    Expected Discharge Location and Transportation: home w/ home health    Expected Discharge  1/22/23 if continues to improve       DVT prophylaxis:  Mechanical DVT prophylaxis orders are present.     AM-PAC 6 Clicks Score (PT): 18 (01/21/23 0800)    CODE STATUS:   Code Status and Medical Interventions:   Ordered at: 01/19/23 2008     Level Of Support Discussed With:    Patient    Next of Kin (If No Surrogate)     Code Status (Patient has no pulse and is not breathing):    CPR (Attempt to Resuscitate)     Medical Interventions (Patient has pulse or is breathing):    Full Support       Hakeem Jimenez MD  01/21/23

## 2023-01-21 NOTE — PLAN OF CARE
Goal Outcome Evaluation:  Plan of Care Reviewed With: patient, daughter        Progress: improving  Outcome Evaluation: Patient has had a decent shift, sleeping on and off tonight. VSS, and patient has been alert and oriented to self. No complaints of pain tonight. Nursing staff will continue to monitor and assess the patient.

## 2023-01-22 ENCOUNTER — READMISSION MANAGEMENT (OUTPATIENT)
Dept: CALL CENTER | Facility: HOSPITAL | Age: 78
End: 2023-01-22
Payer: MEDICARE

## 2023-01-22 VITALS
SYSTOLIC BLOOD PRESSURE: 152 MMHG | HEIGHT: 61 IN | DIASTOLIC BLOOD PRESSURE: 62 MMHG | HEART RATE: 62 BPM | WEIGHT: 148 LBS | TEMPERATURE: 97.6 F | RESPIRATION RATE: 18 BRPM | OXYGEN SATURATION: 99 % | BODY MASS INDEX: 27.94 KG/M2

## 2023-01-22 LAB
ANION GAP SERPL CALCULATED.3IONS-SCNC: 9 MMOL/L (ref 5–15)
BASOPHILS # BLD AUTO: 0.03 10*3/MM3 (ref 0–0.2)
BASOPHILS NFR BLD AUTO: 0.5 % (ref 0–1.5)
BUN SERPL-MCNC: 13 MG/DL (ref 8–23)
BUN/CREAT SERPL: 14 (ref 7–25)
CALCIUM SPEC-SCNC: 8.1 MG/DL (ref 8.6–10.5)
CHLORIDE SERPL-SCNC: 112 MMOL/L (ref 98–107)
CO2 SERPL-SCNC: 20 MMOL/L (ref 22–29)
CREAT SERPL-MCNC: 0.93 MG/DL (ref 0.57–1)
DEPRECATED RDW RBC AUTO: 54.6 FL (ref 37–54)
EGFRCR SERPLBLD CKD-EPI 2021: 62.6 ML/MIN/1.73
EOSINOPHIL # BLD AUTO: 0.28 10*3/MM3 (ref 0–0.4)
EOSINOPHIL NFR BLD AUTO: 4.4 % (ref 0.3–6.2)
ERYTHROCYTE [DISTWIDTH] IN BLOOD BY AUTOMATED COUNT: 16 % (ref 12.3–15.4)
FOLATE BLD-MCNC: 599 NG/ML
FOLATE RBC-MCNC: 2415 NG/ML
GLUCOSE SERPL-MCNC: 92 MG/DL (ref 65–99)
HCT VFR BLD AUTO: 24.8 % (ref 34–46.6)
HCT VFR BLD AUTO: 26.8 % (ref 34–46.6)
HGB BLD-MCNC: 8.5 G/DL (ref 12–15.9)
IMM GRANULOCYTES # BLD AUTO: 0.03 10*3/MM3 (ref 0–0.05)
IMM GRANULOCYTES NFR BLD AUTO: 0.5 % (ref 0–0.5)
LYMPHOCYTES # BLD AUTO: 1.04 10*3/MM3 (ref 0.7–3.1)
LYMPHOCYTES NFR BLD AUTO: 16.3 % (ref 19.6–45.3)
MAGNESIUM SERPL-MCNC: 2 MG/DL (ref 1.6–2.4)
MCH RBC QN AUTO: 30 PG (ref 26.6–33)
MCHC RBC AUTO-ENTMCNC: 31.7 G/DL (ref 31.5–35.7)
MCV RBC AUTO: 94.7 FL (ref 79–97)
MONOCYTES # BLD AUTO: 0.69 10*3/MM3 (ref 0.1–0.9)
MONOCYTES NFR BLD AUTO: 10.8 % (ref 5–12)
NEUTROPHILS NFR BLD AUTO: 4.33 10*3/MM3 (ref 1.7–7)
NEUTROPHILS NFR BLD AUTO: 67.5 % (ref 42.7–76)
NRBC BLD AUTO-RTO: 0 /100 WBC (ref 0–0.2)
PLATELET # BLD AUTO: 195 10*3/MM3 (ref 140–450)
PMV BLD AUTO: 10.6 FL (ref 6–12)
POTASSIUM SERPL-SCNC: 4.4 MMOL/L (ref 3.5–5.2)
QT INTERVAL: 372 MS
QTC INTERVAL: 445 MS
RBC # BLD AUTO: 2.83 10*6/MM3 (ref 3.77–5.28)
SODIUM SERPL-SCNC: 141 MMOL/L (ref 136–145)
WBC NRBC COR # BLD: 6.4 10*3/MM3 (ref 3.4–10.8)

## 2023-01-22 PROCEDURE — 83735 ASSAY OF MAGNESIUM: CPT | Performed by: INTERNAL MEDICINE

## 2023-01-22 PROCEDURE — 80048 BASIC METABOLIC PNL TOTAL CA: CPT | Performed by: INTERNAL MEDICINE

## 2023-01-22 PROCEDURE — G0378 HOSPITAL OBSERVATION PER HR: HCPCS

## 2023-01-22 PROCEDURE — 85025 COMPLETE CBC W/AUTO DIFF WBC: CPT | Performed by: INTERNAL MEDICINE

## 2023-01-22 PROCEDURE — 96366 THER/PROPH/DIAG IV INF ADDON: CPT

## 2023-01-22 PROCEDURE — 99239 HOSP IP/OBS DSCHRG MGMT >30: CPT | Performed by: INTERNAL MEDICINE

## 2023-01-22 RX ORDER — VANCOMYCIN HYDROCHLORIDE 250 MG/1
250 CAPSULE ORAL EVERY 6 HOURS SCHEDULED
Qty: 44 CAPSULE | Refills: 0 | Status: SHIPPED | OUTPATIENT
Start: 2023-01-22 | End: 2023-02-02

## 2023-01-22 RX ORDER — FAMOTIDINE 20 MG/1
20 TABLET, FILM COATED ORAL DAILY
Qty: 30 TABLET | Refills: 0 | Status: SHIPPED | OUTPATIENT
Start: 2023-01-22 | End: 2023-01-26

## 2023-01-22 RX ORDER — L.ACID,PARA/B.BIFIDUM/S.THERM 8B CELL
1 CAPSULE ORAL
Qty: 11 CAPSULE | Refills: 0 | Status: SHIPPED | OUTPATIENT
Start: 2023-01-22

## 2023-01-22 RX ADMIN — Medication 1 CAPSULE: at 08:32

## 2023-01-22 RX ADMIN — METRONIDAZOLE 500 MG: 500 INJECTION, SOLUTION INTRAVENOUS at 06:25

## 2023-01-22 RX ADMIN — METOPROLOL TARTRATE 50 MG: 25 TABLET, FILM COATED ORAL at 08:32

## 2023-01-22 RX ADMIN — HYDRALAZINE HYDROCHLORIDE 50 MG: 50 TABLET, FILM COATED ORAL at 06:24

## 2023-01-22 RX ADMIN — VANCOMYCIN HYDROCHLORIDE 250 MG: 250 CAPSULE ORAL at 06:24

## 2023-01-22 RX ADMIN — ATORVASTATIN CALCIUM 10 MG: 10 TABLET, FILM COATED ORAL at 08:32

## 2023-01-22 RX ADMIN — HYDRALAZINE HYDROCHLORIDE 50 MG: 50 TABLET, FILM COATED ORAL at 00:03

## 2023-01-22 RX ADMIN — FAMOTIDINE 10 MG: 20 TABLET, FILM COATED ORAL at 06:24

## 2023-01-22 RX ADMIN — VANCOMYCIN HYDROCHLORIDE 250 MG: 250 CAPSULE ORAL at 00:03

## 2023-01-22 NOTE — PROGRESS NOTES
Patient Name: Vesta Landry  : 1945  MRN: [id number]    Date of Consult: 2023  Admission Date: 2023    Requesting Provider: Dr Jimenez  Evaluating Physician: Daren Palacios MD    Chief Complaint: diarrhea    Reason for Consultation: C diff    History of present illness:   Patient is a 77 y.o. 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  Presented to [facility] with generalized weakness and fall.  Recent diagnosis of COVID-19 on  but did not require hospitalization.  Recent outpatient treatment with unknown antibiotic for respiratory infection as an outpatient.  On arrival to [facility] on  patient endorsed low-grade fever, dysuria, diarrhea.  Reported confusion, cough. WBC 17. UA with minimal pyuria and evidence of squamous contamination. Cest x-ray clear.  Admitted and started on IV Zosyn.  COVID PCR positive. PO vancomycin added.  CT abdomen with evidence of colitis    2023: Patient is confused but currently comfortable and eating dinner.  Patient's daughters are at bedside help clarify history.  She does not reliably call events leading up to hospitalization.  Patient says she is only had 2 bowel movements today but may have had some blood in it.  No current abdominal pain.  Denies dysuria, urinary frequency.  No prior history of C. difficile in patient but one of her daughters has had C. difficile several years ago.  No other known sick contacts..  Patient denies having any current symptoms of sinus congestion, pressure.     23: Sitting up, comfortable this morning but still a bit confused.  Had 1 soft green bowel movement overnight per daughter at bedside. No abd pain. Still not eating great but patient says she does not like the food.  Her daughter does not think she has enough oral intake to do well at home yet.    23:  Feeling much better.  No abdominal pain.  Stools forming up.  Appetite and oral intake improved.  IV blew.       No Known Allergies    Antibiotics:  Anti-Infectives (From admission, onward)    Ordered     Dose/Rate Route Frequency Start Stop    01/22/23 0846  vancomycin (VANCOCIN) 250 MG capsule        Ordering Provider: Hakeem Jimenez MD    250 mg Oral Every 6 Hours Scheduled 01/22/23 0000 02/02/23 2359    01/20/23 1605  vancomycin (VANCOCIN) capsule 250 mg        Ordering Provider: Daren Palacios MD    250 mg Oral Every 6 Hours Scheduled 01/20/23 1800 01/30/23 1159    01/19/23 1705  piperacillin-tazobactam (ZOSYN) 3.375 g in iso-osmotic dextrose 50 ml (premix)        Ordering Provider: Aguila Ibrahim MD    3.375 g  over 30 Minutes Intravenous Once 01/19/23 1707 01/19/23 1843    01/19/23 1705  vancomycin 1250 mg/250 mL 0.9% NS IVPB ([facility])        Ordering Provider: Aguila Ibrahim MD    20 mg/kg × 67.1 kg Intravenous Once 01/19/23 1707 01/19/23 2033          Other Medications:  Current Facility-Administered Medications   Medication Dose Route Frequency Provider Last Rate Last Admin   • atorvastatin (LIPITOR) tablet 10 mg  10 mg Oral Daily Roslyn Pisano APRN   10 mg at 01/22/23 0832   • famotidine (PEPCID) tablet 10 mg  10 mg Oral BID AC Husam Rosales, [facility]   10 mg at 01/22/23 0624   • ferric gluconate (FERRLECIT)125 MG in sodium chloride 0.9 % 100 mL IVPB  125 mg Intravenous Daily Hakeem Jimenez  mL/hr at 01/21/23 0946 125 mg at 01/21/23 0946   • hydrALAZINE (APRESOLINE) tablet 50 mg  50 mg Oral Q8H Hakeem Jimenez MD   50 mg at 01/22/23 0624   • ipratropium-albuterol (DUO-NEB) nebulizer solution 3 mL  3 mL Nebulization Q4H PRN Roslyn Pisano APRN       • lactated ringers infusion  75 mL/hr Intravenous Continuous Hakeem Jimenez MD   Stopped at 01/22/23 0742   • lactobacillus acidophilus (RISAQUAD) capsule 1 capsule  1 capsule Oral Daily With Breakfast & Dinner  "Daren Palacios MD   1 capsule at 01/22/23 0832   • Magnesium Low Dose Replacement - Initiate Nurse / BPA Driven Protocol   Does not apply PRN Hakeem Jimenez MD       • metoprolol tartrate (LOPRESSOR) tablet 50 mg  50 mg Oral BID Roslyn Pisano APRN   50 mg at 01/22/23 0832   • ondansetron (ZOFRAN) injection 4 mg  4 mg Intravenous Q6H PRN Carla Lang APRCHRIS       • Pharmacy Meds to Bed Consult   Does not apply Daily Hakeem Jimenez MD       • Phosphorus Replacement - Initiate Nurse / BPA Driven Protocol   Does not apply PRN Hakeem Jimenez MD       • Potassium Replacement - Initiate Nurse / BPA Driven Protocol   Does not apply PRN Hakeem Jimenez MD       • sodium chloride 0.9 % flush 10 mL  10 mL Intravenous PRN Aguila Ibrahim MD       • sodium chloride 0.9 % flush 10 mL  10 mL Intravenous Q12H Roslyn Pisano APRN   10 mL at 01/21/23 2041   • sodium chloride 0.9 % flush 10 mL  10 mL Intravenous PRN Roslyn Pisano APRN       • sodium chloride 0.9 % infusion 40 mL  40 mL Intravenous PRN Roslyn Pisano APRN       • traZODone (DESYREL) tablet 50 mg  50 mg Oral Nightly Roslyn Pisano APRN   50 mg at 01/21/23 2040   • vancomycin (VANCOCIN) capsule 250 mg  250 mg Oral Q6H Daren Palacios MD   250 mg at 01/22/23 0624       Physical Exam:   Vital Signs   /62 (BP Location: Right arm, Patient Position: Lying)   Pulse 84   Temp 97.6 °F (36.4 °C) (Oral)   Resp 18   Ht 154.9 cm (60.98\")   Wt 67.1 kg (148 lb)   SpO2 99%   BMI 27.98 kg/m²     GENERAL: Awake and alert, nontoxic at rest  HEENT: Normocephalic, atraumatic.  EOMI. No conjunctival injection.  No tenderness to palpation of sinuses  NECK: Supple without nuchal rigidity. No meningismus  HEART: RRR; No murmur.  LUNGS: Clear to auscultation bilaterally without wheezing, rales, rhonchi. Normal respiratory effort. Nonlabored.  ABDOMEN: Soft, nontender, nondistended.  No guarding or rebound  EXT:  No edema.  : " external urinary catheter.  SKIN: Warm and dry without cutaneous eruptions on Inspection/palpation.    NEURO: Oriented to PPT.   PSYCHIATRIC: less confused    Laboratory Data  Lab Results   Component Value Date    WBC 6.40 01/22/2023    HGB 8.5 (L) 01/22/2023    HCT 26.8 (L) 01/22/2023    MCV 94.7 01/22/2023     01/22/2023     Lab Results   Component Value Date    GLUCOSE 92 01/22/2023    CALCIUM 8.1 (L) 01/22/2023     01/22/2023    K 4.4 01/22/2023    CO2 20.0 (L) 01/22/2023     (H) 01/22/2023    BUN 13 01/22/2023    CREATININE 0.93 01/22/2023     Estimated Creatinine Clearance: 43 mL/min (by C-G formula based on SCr of 0.93 mg/dL).  Lab Results   Component Value Date    ALT 8 01/21/2023    AST 17 01/21/2023    ALKPHOS 91 01/21/2023    BILITOT 0.2 01/21/2023     Lab Results   Component Value Date    CRP 2.2 07/02/2015     Lab Results   Component Value Date    SEDRATE 8 07/02/2015       The above labs were reviewed.     Microbiology:  Microbiology Results (last 10 days)     Procedure Component Value - Date/Time    Gastrointestinal Panel, PCR - Stool, Per Rectum [[id number]]  (Normal) Collected: 01/20/23 1058    Lab Status: Final result Specimen: Stool from Per Rectum Updated: 01/20/23 1245     Campylobacter Not Detected     Plesiomonas shigelloides Not Detected     Salmonella Not Detected     Vibrio Not Detected     Vibrio cholerae Not Detected     Yersinia enterocolitica Not Detected     Enteroaggregative E. coli (EAEC) Not Detected     Enteropathogenic E. coli (EPEC) Not Detected     Enterotoxigenic E. coli (ETEC) lt/st Not Detected     Shiga-like toxin-producing E. coli (STEC) stx1/stx2 Not Detected     Shigella/Enteroinvasive E. coli (EIEC) Not Detected     Cryptosporidium Not Detected     Cyclospora cayetanensis Not Detected     Entamoeba histolytica Not Detected     Giardia lamblia Not Detected     Adenovirus F40/41 Not Detected     Astrovirus Not Detected     Norovirus GI/GII Not Detected      Rotavirus A Not Detected     Sapovirus (I, II, IV or V) Not Detected    Clostridioides difficile Toxin - Stool, Per Rectum [[id number]]  (Abnormal) Collected: 01/20/23 1058    Lab Status: Final result Specimen: Stool from Per Rectum Updated: 01/20/23 1247    Narrative:      The following orders were created for panel order Clostridioides difficile Toxin - Stool, Per Rectum.  Procedure                               Abnormality         Status                     ---------                               -----------         ------                     Clostridioides difficile...[[id number]]  Abnormal            Final result                 Please view results for these tests on the individual orders.    Clostridioides difficile Toxin, PCR - Stool, Per Rectum [[id number]]  (Abnormal) Collected: 01/20/23 1058    Lab Status: Final result Specimen: Stool from Per Rectum Updated: 01/20/23 1247     C. Difficile Toxins by PCR Detected    Narrative:      DNA from a toxigenic strain of C.difficile has been detected. Antigen testing for the presence of free C.difficile toxin is currently in progress, to help determine the clinical significance of this PCR result.     Clostridioides difficile toxin Ag, Reflex - Stool, Per Rectum [[id number]]  (Abnormal) Collected: 01/20/23 1058    Lab Status: Final result Specimen: Stool from Per Rectum Updated: 01/20/23 1354     C.diff Toxin Ag Positive    Narrative:      DNA from a toxigenic strain of C.difficile was detected, along with the presence of free toxin. These results are suggestive of C.difficile infection.    Respiratory Panel PCR w/COVID-19(SARS-CoV-2) ELIAN/LISA/LISSETTE/PAD/COR/MAD/KIARRA In-House, NP Swab in UTM/VTM, 3-4 HR TAT - Swab, Nasopharynx [[id number]]  (Normal) Collected: 01/19/23 2146    Lab Status: Final result Specimen: Swab from Nasopharynx Updated: 01/19/23 9700     ADENOVIRUS, PCR Not Detected     Coronavirus 229E Not Detected     Coronavirus HKU1 Not Detected     Coronavirus NL63  Not Detected     Coronavirus OC43 Not Detected     COVID19 Not Detected     Human Metapneumovirus Not Detected     Human Rhinovirus/Enterovirus Not Detected     Influenza A PCR Not Detected     Influenza B PCR Not Detected     Parainfluenza Virus 1 Not Detected     Parainfluenza Virus 2 Not Detected     Parainfluenza Virus 3 Not Detected     Parainfluenza Virus 4 Not Detected     RSV, PCR Not Detected     Bordetella pertussis pcr Not Detected     Bordetella parapertussis PCR Not Detected     Chlamydophila pneumoniae PCR Not Detected     Mycoplasma pneumo by PCR Not Detected    Narrative:      In the setting of a positive respiratory panel with a viral infection PLUS a negative procalcitonin without other underlying concern for bacterial infection, consider observing off antibiotics or discontinuation of antibiotics and continue supportive care. If the respiratory panel is positive for atypical bacterial infection (Bordetella pertussis, Chlamydophila pneumoniae, or Mycoplasma pneumoniae), consider antibiotic de-escalation to target atypical bacterial infection.    MRSA Screen, PCR (Inpatient) - Swab, Nares [[id number]]  (Normal) Collected: 01/19/23 2144    Lab Status: Final result Specimen: Swab from Nares Updated: 01/20/23 0903     MRSA PCR Negative    Narrative:      The negative predictive value of this diagnostic test is high and should only be used to consider de-escalating anti-MRSA therapy. A positive result may indicate colonization with MRSA and must be correlated clinically.  MRSA Negative    COVID PRE-OP / PRE-PROCEDURE SCREENING ORDER (NO ISOLATION) - Swab, Nasopharynx [[id number]]  (Normal) Collected: 01/19/23 1728    Lab Status: Final result Specimen: Swab from Nasopharynx Updated: 01/19/23 1817    Narrative:      The following orders were created for panel order COVID PRE-OP / PRE-PROCEDURE SCREENING ORDER (NO ISOLATION) - Swab, Nasopharynx.  Procedure                               Abnormality          Status                     ---------                               -----------         ------                     COVID-19 and FLU A/B PCR...[[id number]]  Normal              Final result                 Please view results for these tests on the individual orders.    COVID-19 and FLU A/B PCR - Swab, Nasopharynx [[id number]]  (Normal) Collected: 01/19/23 1728    Lab Status: Final result Specimen: Swab from Nasopharynx Updated: 01/19/23 1817     COVID19 Not Detected     Influenza A PCR Not Detected     Influenza B PCR Not Detected    Narrative:      Fact sheet for providers: https://www.fda.gov/media/786254/download    Fact sheet for patients: https://www.fda.gov/media/900478/download    Test performed by PCR.    Blood Culture - Blood, Arm, Right [[id number]]  (Normal) Collected: 01/19/23 1725    Lab Status: Preliminary result Specimen: Blood from Arm, Right Updated: 01/21/23 1801     Blood Culture No growth at 2 days    Blood Culture - Blood, Arm, Right [[id number]]  (Normal) Collected: 01/19/23 1650    Lab Status: Preliminary result Specimen: Blood from Arm, Right Updated: 01/21/23 1801     Blood Culture No growth at 2 days          Radiology:  CT Abdomen Pelvis Without Contrast    Result Date: 1/20/2023  1. Diffuse thickening of the colon suggesting infectious or inflammatory colitis. Moderate to. Air-fluid levels are seen throughout the colon, rectum, and within multiple small bowel loops, suggesting a diarrheal state. Report 2. No evidence of a metastatic disease in the abdomen or pelvis in this patient with history of tonsillar cancer. 3. Mild left basilar atelectasis. 4. Cholecystectomy, hysterectomy. Electronically Signed: Kenya Isaacs  1/20/2023 11:49 AM EST  Workstation ID: [id number]    CT Head Without Contrast    Result Date: 1/19/2023  Impression: No acute intracranial process. Significant paranasal sinus disease with air-fluid levels consistent with acute sinusitis. Electronically Signed: Lisbeth Salinas   1/19/2023 10:52 PM EST  Workstation ID: [id number]    CT Chest Without Contrast Diagnostic    Result Date: 1/20/2023  1.  No focal consolidation, pneumothorax, or pleural effusion. 2.  Minimal bibasal atelectasis/scarring. Superimposed small airways infection cannot be entirely excluded. Electronically signed by:  Claudio Edwards D.O.  1/19/2023 10:12 PM Mountain Time    XR Chest 1 View    Result Date: 1/19/2023  Impression: No acute cardiopulmonary process. Electronically Signed: Lisbeth Salinas  1/19/2023 5:29 PM EST  Workstation ID: [id number]    NM PET/CT Skull Base to Mid Thigh    Result Date: 1/16/2023  Impression: 1. Strongly hypermetabolic 3 cm left tonsillar mass, and strongly hypermetabolic left level 2B lymph node consistent with primary lesion and local metastasis. 2. No evidence of distant metastatic disease. 3. Incidentally noted pansinus disease, with numerous air-fluid levels, but only mild mucosal activity. Electronically Signed: Robinson Chopra  1/16/2023 4:08 PM EST  Workstation ID: [id number]    I personally reviewed the above CT abdomen and head       Impression:   1. Severe Clostridium difficile colitis: Acute problem with threat to bodily function. PCR and antigen test positive.  Diffuse colitis noted on CT, but not copious stool output per report. first episode.  Improving clinically. Oral intake improved  2. Sepsis due to above: Acute problem with threat to bodily function.  Fluid in sinuses noted on CT head on admission but no symptoms of active sinusitis at this time.  Mild pyuria noted on admission UA but also evidence of contamination with squamous cells.  No dysuria at this time although history somewhat confounded by confusion I do not suspect she has an active UTI at this time  3. Fall at home prior to admission  4. Recent COVID-19: dx Jan 5.  Respiratory symptoms resolved and repeat PCR test negative on admission  5. Recent possible URI: Treated as an outpatient with unknown oral antibiotic.  Fluid  in sinuses noted on CT head on admission but no symptoms of active sinusitis at this time.  6. Recently diagnosed left tonsillar squamous cell cancer: Newly diagnosed and has not had any treatment yet.  Radiation was planned soon  7. CKD stage IIIB  8. Hx of gout    PLAN:   - Microbiology data reviewed.  Follow-up pending blood cultures, so far negative  - Labs reviewed      - Continue oral vancomycin 250 mg 4x daily for 14 total days  - stop IV flgayl  - probiotic BID  - Enteric isolation.  Discussed importance of hand hygiene with patient and family    Improved today.  Oral intake improved.  Patient and family feel comfortable with discharge home which I think is reasonable.  Discussed with Dr. Jimenez.  Follow-up in clinic with me on January 31 to reassess antibiotic duration     Daren Palacios MD  1/22/2023

## 2023-01-22 NOTE — PLAN OF CARE
Goal Outcome Evaluation:  Plan of Care Reviewed With: patient        Progress: improving  Outcome Evaluation: BP continues to be hypertensive otherwise VSS on room air, A&O x4, confused at times during conversations, no complaints, ambulating to bath room with assistance, unsteady when walking, daughter at bedside, very helpful, pt hopeful to go home soon

## 2023-01-22 NOTE — DISCHARGE SUMMARY
[facility] Medicine Services  DISCHARGE SUMMARY    Patient Name: Vesta Landry  : 1945  MRN: [id number]    Date of Admission: 2023  4:38 PM  Date of Discharge:  2023  Primary Care Physician: Bossman Pedraza MD    Consults     Date and Time Order Name Status Description    2023 12:57 PM Inpatient Infectious Diseases Consult Completed     2023 12:34 AM Inpatient Hematology & Oncology Consult Completed           Hospital Course     Presenting Problem:   Acute urinary tract infection [N39.0]    Active Hospital Problems    Diagnosis  POA   • **Sepsis (HCC) [A41.9]  Yes   • Acute urinary tract infection [N39.0]  Yes   • Elevated troponin [R77.8]  Yes   • Gout [M10.9]  Yes   • HLD (hyperlipidemia) [E78.5]  Yes   • Anxiety associated with depression [F41.8]  Yes   • CKD (chronic kidney disease) stage 3, GFR 30-59 ml/min (HCC) [N18.30]  Yes   • Diarrhea [R19.7]  Yes   • Fall [W19.XXXA]  Yes   • Difficulty swallowing [R13.10]  Yes   • Personal history of COVID-19 [Z86.16]  Yes   • Exertional dyspnea [R06.09]  Yes   • Anemia [D64.9]  Yes   • 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      -----------final diagnoses-----  Sepsis due to clostridium difficile colitis  Recent covid 19 infection (diagnosed 23, no current resp symptoms)  Tonsillar cancer (yet to receive treatment, to follow up w/ Dr. Lora outpatient)  Type 2 nstemi (due to systemic illness/infection)  Iron def anemia (hgb stable >8, received iv iron)  Mildly elevated d-dimer due to sepsis (b/l venous duplex negative, no hypoxia)  -------------------------------      Hospital Course:  Vesta Landry is a 77 y.o. female w/ hx left tonsillar squamous cell  cancer (to follow up w/ Dr. Lora of oncology in near future to initiate therapy), HFpEF, ckd 3, gout, prior covid 19 infection 1/5/23 (did not require hospitalization) who presented w/ generalized weakness, diarrhea, and falls. Ct head was negative, wbc was elevated at 17,000. C.diff pcr and antigen were +. ID consulted. Tapered antibiotics to po vancomycin and iv flagyl w/ improvement in symptoms, now diarrhea nearly resolved, feeling much improved and ready to go home. Will d/c on po vancomycin 250mg 4 x daily x 11 more days (14 day total) w/ follow up w/ Dr. Daren Palacios 1/31/23. To keep f/u w/ oncology as scheduled. Follow up pcp 2 weeks.      Discharge Follow Up Recommendations for outpatient labs/diagnostics:   follow up w/ Dr. Daren Palacios (ID) 1/31/23  Keep follow up w/ Dr. Lora (oncology) as scheduled  Follow up pcp 2 weeks    Day of Discharge     HPI:   Feels well, no diarrhea overnight. No abd pain. Wants to go home. Tolerating po    Review of Systems  No f/c  No dyspnea    Vital Signs:   Temp:  [97.6 °F (36.4 °C)-98.5 °F (36.9 °C)] 97.6 °F (36.4 °C)  Heart Rate:  [58-84] 84  Resp:  [16-18] 18  BP: (151-175)/(61-93) 152/62      Physical Exam:  Constitutional:Alert, oriented x 3, nontoxic appearing  Psych:Normal/appropriate affect  HEENT:NCAT, oropharynx clear  Neck: neck supple, full range of motion  Neuro: Face symmetric, speech clear, equal , moves all extremities  Cardiac: RRR; No pretibial pitting edema  Resp: CTAB, normal effort  GI: abd soft, nontender  Skin: No extremity rash  Musculoskeletal/extremities: no cyanosis of extremities; no significant ankle edema      Pertinent  and/or Most Recent Results     LAB RESULTS:      Lab 01/22/23  0656 01/21/23  0724 01/20/23  0643 01/19/23  1642   WBC 6.40 9.10 9.79 17.87*   HEMOGLOBIN 8.5* 8.4* 8.5* 10.1*   HEMATOCRIT 26.8* 26.3* 26.9* 31.6*   PLATELETS 195 182 170 226   NEUTROS ABS 4.33 7.05* 8.18* 15.53*   IMMATURE GRANS (ABS) 0.03 0.05  0.06* 0.12*   LYMPHS ABS 1.04 0.89 0.65* 0.84   MONOS ABS 0.69 0.94* 0.84 1.31*   EOS ABS 0.28 0.12 0.04 0.02   MCV 94.7 94.9 94.7 93.5   PROCALCITONIN  --   --   --  0.29*   LACTATE  --   --   --  0.8   PROTIME  --   --  15.8*  --    APTT  --   --  36.9  --    D DIMER QUANT  --   --   --  1.25*         Lab 01/22/23  0656 01/21/23  0724 01/20/23  0643 01/19/23  1642   SODIUM 141 139 142 137   POTASSIUM 4.4 4.2 3.6 4.1   CHLORIDE 112* 111* 108* 101   CO2 20.0* 20.0* 21.0* 23.0   ANION GAP 9.0 8.0 13.0 13.0   BUN 13 17 23 21   CREATININE 0.93 1.00 1.13* 1.30*   EGFR 62.6 57.4* 49.6* 41.9*   GLUCOSE 92 103* 97 115*   CALCIUM 8.1* 8.1* 8.0* 8.7   MAGNESIUM 2.0 2.3 1.6 1.8   HEMOGLOBIN A1C  --   --  5.30  --    TSH  --   --  2.710  --          Lab 01/21/23  0724 01/19/23  1642   TOTAL PROTEIN 5.4* 6.7   ALBUMIN 2.8* 3.4*   GLOBULIN 2.6 3.3   ALT (SGPT) 8 8   AST (SGOT) 17 21   BILIRUBIN 0.2 0.5   ALK PHOS 91 104         Lab 01/20/23  0643 01/19/23  2100 01/19/23  1642   PROBNP 4,141.0*  --   --    TROPONIN T  --  0.091* 0.089*   PROTIME 15.8*  --   --    INR 1.26*  --   --              Lab 01/19/23  1642   IRON 16*  16*   IRON SATURATION 7*   TIBC 229*   TRANSFERRIN 154*   FERRITIN 211.00*   FOLATE >20.00   VITAMIN B 12 521         Brief Urine Lab Results  (Last result in the past 365 days)      Color   Clarity   Blood   Leuk Est   Nitrite   Protein   CREAT   Urine HCG        01/19/23 1751 Yellow   Cloudy   Negative   Moderate (2+)   Negative   Trace               Microbiology Results (last 10 days)     Procedure Component Value - Date/Time    Gastrointestinal Panel, PCR - Stool, Per Rectum [[id number]]  (Normal) Collected: 01/20/23 1058    Lab Status: Final result Specimen: Stool from Per Rectum Updated: 01/20/23 1245     Campylobacter Not Detected     Plesiomonas shigelloides Not Detected     Salmonella Not Detected     Vibrio Not Detected     Vibrio cholerae Not Detected     Yersinia enterocolitica Not Detected      Enteroaggregative E. coli (EAEC) Not Detected     Enteropathogenic E. coli (EPEC) Not Detected     Enterotoxigenic E. coli (ETEC) lt/st Not Detected     Shiga-like toxin-producing E. coli (STEC) stx1/stx2 Not Detected     Shigella/Enteroinvasive E. coli (EIEC) Not Detected     Cryptosporidium Not Detected     Cyclospora cayetanensis Not Detected     Entamoeba histolytica Not Detected     Giardia lamblia Not Detected     Adenovirus F40/41 Not Detected     Astrovirus Not Detected     Norovirus GI/GII Not Detected     Rotavirus A Not Detected     Sapovirus (I, II, IV or V) Not Detected    Clostridioides difficile Toxin - Stool, Per Rectum [[id number]]  (Abnormal) Collected: 01/20/23 1058    Lab Status: Final result Specimen: Stool from Per Rectum Updated: 01/20/23 1247    Narrative:      The following orders were created for panel order Clostridioides difficile Toxin - Stool, Per Rectum.  Procedure                               Abnormality         Status                     ---------                               -----------         ------                     Clostridioides difficile...[[id number]]  Abnormal            Final result                 Please view results for these tests on the individual orders.    Clostridioides difficile Toxin, PCR - Stool, Per Rectum [[id number]]  (Abnormal) Collected: 01/20/23 1058    Lab Status: Final result Specimen: Stool from Per Rectum Updated: 01/20/23 1247     C. Difficile Toxins by PCR Detected    Narrative:      DNA from a toxigenic strain of C.difficile has been detected. Antigen testing for the presence of free C.difficile toxin is currently in progress, to help determine the clinical significance of this PCR result.     Clostridioides difficile toxin Ag, Reflex - Stool, Per Rectum [[id number]]  (Abnormal) Collected: 01/20/23 1058    Lab Status: Final result Specimen: Stool from Per Rectum Updated: 01/20/23 1354     C.diff Toxin Ag Positive    Narrative:      DNA from a  toxigenic strain of C.difficile was detected, along with the presence of free toxin. These results are suggestive of C.difficile infection.    Respiratory Panel PCR w/COVID-19(SARS-CoV-2) ELIAN/LISA/LISSETTE/PAD/COR/MAD/KIARRA In-House, NP Swab in UTM/VTM, 3-4 HR TAT - Swab, Nasopharynx [[id number]]  (Normal) Collected: 01/19/23 2144    Lab Status: Final result Specimen: Swab from Nasopharynx Updated: 01/19/23 2254     ADENOVIRUS, PCR Not Detected     Coronavirus 229E Not Detected     Coronavirus HKU1 Not Detected     Coronavirus NL63 Not Detected     Coronavirus OC43 Not Detected     COVID19 Not Detected     Human Metapneumovirus Not Detected     Human Rhinovirus/Enterovirus Not Detected     Influenza A PCR Not Detected     Influenza B PCR Not Detected     Parainfluenza Virus 1 Not Detected     Parainfluenza Virus 2 Not Detected     Parainfluenza Virus 3 Not Detected     Parainfluenza Virus 4 Not Detected     RSV, PCR Not Detected     Bordetella pertussis pcr Not Detected     Bordetella parapertussis PCR Not Detected     Chlamydophila pneumoniae PCR Not Detected     Mycoplasma pneumo by PCR Not Detected    Narrative:      In the setting of a positive respiratory panel with a viral infection PLUS a negative procalcitonin without other underlying concern for bacterial infection, consider observing off antibiotics or discontinuation of antibiotics and continue supportive care. If the respiratory panel is positive for atypical bacterial infection (Bordetella pertussis, Chlamydophila pneumoniae, or Mycoplasma pneumoniae), consider antibiotic de-escalation to target atypical bacterial infection.    MRSA Screen, PCR (Inpatient) - Swab, Nares [[id number]]  (Normal) Collected: 01/19/23 2144    Lab Status: Final result Specimen: Swab from Nares Updated: 01/20/23 0903     MRSA PCR Negative    Narrative:      The negative predictive value of this diagnostic test is high and should only be used to consider de-escalating anti-MRSA therapy. A  positive result may indicate colonization with MRSA and must be correlated clinically.  MRSA Negative    COVID PRE-OP / PRE-PROCEDURE SCREENING ORDER (NO ISOLATION) - Swab, Nasopharynx [[id number]]  (Normal) Collected: 01/19/23 1728    Lab Status: Final result Specimen: Swab from Nasopharynx Updated: 01/19/23 1817    Narrative:      The following orders were created for panel order COVID PRE-OP / PRE-PROCEDURE SCREENING ORDER (NO ISOLATION) - Swab, Nasopharynx.  Procedure                               Abnormality         Status                     ---------                               -----------         ------                     COVID-19 and FLU A/B PCR...[[id number]]  Normal              Final result                 Please view results for these tests on the individual orders.    COVID-19 and FLU A/B PCR - Swab, Nasopharynx [[id number]]  (Normal) Collected: 01/19/23 1728    Lab Status: Final result Specimen: Swab from Nasopharynx Updated: 01/19/23 1817     COVID19 Not Detected     Influenza A PCR Not Detected     Influenza B PCR Not Detected    Narrative:      Fact sheet for providers: https://www.fda.gov/media/786557/download    Fact sheet for patients: https://www.fda.gov/media/958757/download    Test performed by PCR.    Blood Culture - Blood, Arm, Right [[id number]]  (Normal) Collected: 01/19/23 1725    Lab Status: Preliminary result Specimen: Blood from Arm, Right Updated: 01/21/23 1801     Blood Culture No growth at 2 days    Blood Culture - Blood, Arm, Right [[id number]]  (Normal) Collected: 01/19/23 1650    Lab Status: Preliminary result Specimen: Blood from Arm, Right Updated: 01/21/23 1801     Blood Culture No growth at 2 days          Adult Transthoracic Echo Complete W/ Cont if Necessary Per Protocol    Result Date: 1/20/2023  •  Left ventricular systolic function is normal. Calculated left ventricular EF = 55% Left ventricular ejection fraction appears to be 56 - 60%. •  Left ventricular diastolic  function is consistent with (grade II w/high LAP) pseudonormalization. •  Estimated right ventricular systolic pressure from tricuspid regurgitation is normal (<35 mmHg).     CT Abdomen Pelvis Without Contrast    Result Date: 1/20/2023  CT ABDOMEN PELVIS WO CONTRAST Date of Exam: 1/20/2023 11:24 AM EST Indication: diarrhea, crampy abd pain. history of metastatic tonsillar cancer. Comparison: None Technique: Axial CT images were obtained of the abdomen and pelvis without the administration of contrast. Reconstructed coronal and sagittal images were also obtained. Automated exposure control and iterative construction methods were used. Findings: There is mild left basilar atelectasis. Minimal subpleural fibrotic changes are present within the lung bases. Trace left basilar pleural fluid is present. Heart size is normal with mild coronary artery calcifications. There is diffuse wall thickening within the colon, suggesting infectious or inflammatory colitis. Small air-fluid levels are seen within both large bowel and small bowel. The stomach appears unremarkable. No indication of high-grade bowel obstruction. Cholecystectomy. Noncontrast appearance of the liver, spleen, pancreas, adrenals, and kidneys is within normal limits. Tiny esophageal hiatal hernia. Mild to moderate abdominal aortic calcific atherosclerosis. No pathologically enlarged lymph nodes are identified. Soft tissue mass is seen. Urinary bladder is normal. Small air-fluid level is seen within the rectum. Hysterectomy changes are present. Moderate L4-5 and L5-S1 facet arthropathy. Lumbar levoscoliosis with degenerative loss of disc height eccentrically on the right at L2-3 and L3-4. No acute osseous abnormality.     1. Diffuse thickening of the colon suggesting infectious or inflammatory colitis. Moderate to. Air-fluid levels are seen throughout the colon, rectum, and within multiple small bowel loops, suggesting a diarrheal state. Report 2. No evidence of  a metastatic disease in the abdomen or pelvis in this patient with history of tonsillar cancer. 3. Mild left basilar atelectasis. 4. Cholecystectomy, hysterectomy. Electronically Signed: Kenya Dayzora  1/20/2023 11:49 AM EST  Workstation ID: [id number]    CT Head Without Contrast    Result Date: 1/19/2023  CT HEAD WO CONTRAST Date of Exam: 1/19/2023 10:14 PM EST Indication: fall, hit head, AMS. Comparison: MRI 1/3/2023, CT 12/2/2022 Technique: Axial CT images were obtained of the head without contrast administration.  Reconstructed coronal and sagittal images were also obtained. Automated exposure control and iterative construction methods were used. Findings: There is no evidence of hemorrhage. There is no mass effect or midline shift. There is no extracerebral collection. Ventricles are normal in size and configuration for patient's stated age.  Stable moderate periventricular white matter disease likely related to chronic intravascular ischemic change. No significant soft tissue hematoma. Posterior fossa is within normal limits. Calvarium and skull base appear intact.  An air-fluid levels present within the left maxillary sinus. There is complete opacification of the right maxillary sinus. Air-fluid levels are present within the frontal sinuses, the sphenoid sinuses and the ethmoid air cells. Visualized orbits are unremarkable.     Impression: No acute intracranial process. Significant paranasal sinus disease with air-fluid levels consistent with acute sinusitis. Electronically Signed: Lisbeth Salinas  1/19/2023 10:52 PM EST  Workstation ID: [id number]    CT Chest Without Contrast Diagnostic    Result Date: 1/20/2023  EXAMINATION: CT SCAN OF THE CHEST WITHOUT INTRAVENOUS CONTRAST DATE OF EXAM: 1/19/2023 10:20 PM HISTORY: recent COVID, evaluate for aspiration, exertional dyspnea COMPARISON: Chest radiograph 7/24/2017. TECHNIQUE: CT examination of the chest was performed without intravenous contrast. CT dose lowering  techniques were used, to include: automated exposure control, adjustment for patient size, and/or use of iterative reconstruction. 3D MIP reconstruction was performed under concurrent supervision not requiring an independent workstation, in order to increase the sensitivity for detection of pulmonary nodules. Note: The exam is limited because some types of pathology may not be adequately demonstrated due to lack of contrast enhancement. FINDINGS: CHEST: Lungs:  No focal consolidation. Bilateral lower lobe dependent reticular opacities. Patchy bandlike scarring in the right lower lobe. Airways appear to be clear.. Pleura: No pneumothorax or pleural effusion. Mediastinum And Scarlett: No evidence of acute abnormality. Cardiovascular: No evidence of acute abnormality on this nonenhanced exam. Patchy moderate calcified atherosclerosis of the aorta. At least mild patchy coronary artery calcification of the left coronary system. No pericardial effusion. Chest Wall:  No evidence of acute abnormality. Upper Abdomen: No evidence of acute abnormality. Status post cholecystectomy. Tiny hiatal hernia. Musculoskeletal: No evidence of acute abnormality. Generalized osteopenia. Degenerative changes of the spine, age-appropriate.     1.  No focal consolidation, pneumothorax, or pleural effusion. 2.  Minimal bibasal atelectasis/scarring. Superimposed small airways infection cannot be entirely excluded. Electronically signed by:  Claudio Edwards D.O.  1/19/2023 10:12 PM Mountain Time    XR Chest 1 View    Result Date: 1/19/2023  XR CHEST 1 VW Date of Exam: 1/19/2023 5:01 PM EST Indication: Weak/Dizzy/AMS triage protocol. Comparison: 7/24/2017 Findings: There are no airspace consolidations. No pleural fluid. No pneumothorax. The pulmonary vasculature appears within normal limits. The cardiac and mediastinal silhouette appear unremarkable. No acute osseous abnormality identified.     Impression: No acute cardiopulmonary process.  Electronically Signed: Lisbeth Salinas  1/19/2023 5:29 PM EST  Workstation ID: [id number]    Duplex Venous Lower Extremity - Bilateral CAR    Result Date: 1/20/2023  •  The bilateral lower extremity venous duplex scan is negative for DVT and SVT in the areas visualized. •  The tibial level veins were not well visualized, but appeared negative for DVT in the images obtained.     NM PET/CT Skull Base to Mid Thigh    Result Date: 1/16/2023  NM PET/CT SKULL BASE TO MID THIGH Date of Exam: 1/16/2023 1:50 PM EST Indication: staging scans, new diagnosis squamous cell carcinoma or head and neck. Comparison: No previous PET scan. No recent cross-sectional imaging studies. Technique: With fasting blood glucose level of 109 MG/DL, a total 12.6 mm of FDG was administered via right antecubital vein. Following appropriate delay, PET and CT images were obtained from the level of the skull vertex to the mid thighs and fused multiplanar images reconstructed. The CT scan is an unenhanced low-dose study for reference the PET scan only and does not constitute a standard diagnostic CT scan. Findings: Patient history indicates metastatic squamous cell carcinoma to the left neck. Diagnosis per left neck lymph node biopsy. Unknown primary. Today's study shows hypermetabolic emeka activity in the left neck, dorsal to the left sternocleidomastoid with maximum SUV of 10.8, and also a hypermetabolic mass approximately 3.0 x 2.5 cm in maximal oblique AP and transverse diameters, encompassing the area of the left palatine and lingual tonsils, with some narrowing of the airway, and with what appears to be mild invasion of the base the tongue.  Maximum SUV of this apparent left tonsillar mass is 14.3. There are a few immediately adjacent small nodes around the margins of the left neck node, which may be involved but cannot be measured separately for SUV. No additional separate hypermetabolic foci are seen in the neck. No significant asymmetry of uptake  is seen in the brain. Very weak activity seen in the mediastinum and pulmonary oscar, but no significantly hypermetabolic node or mass is seen to suggest metastatic disease. No hypermetabolic pulmonary parenchymal disease is seen. Below no diaphragm, no hypermetabolic solid organ disease or adenopathy is appreciated. There is expected GI and  tract activity. No pathologic marrow space disease is seen. Review of the CT scan shows extensive paranasal sinus disease with air-fluid levels throughout most of the paranasal sinuses. Only low-level activity is seen with maximum SUV of 3.2 Mastoid air cells are clear. .     Impression: 1. Strongly hypermetabolic 3 cm left tonsillar mass, and strongly hypermetabolic left level 2B lymph node consistent with primary lesion and local metastasis. 2. No evidence of distant metastatic disease. 3. Incidentally noted pansinus disease, with numerous air-fluid levels, but only mild mucosal activity. Electronically Signed: Robinson Chopra  1/16/2023 4:08 PM EST  Workstation ID: [id number]      Results for orders placed during the hospital encounter of 01/19/23    Duplex Venous Lower Extremity - Bilateral CAR    Interpretation Summary  •  The bilateral lower extremity venous duplex scan is negative for DVT and SVT in the areas visualized.  •  The tibial level veins were not well visualized, but appeared negative for DVT in the images obtained.      Results for orders placed during the hospital encounter of 01/19/23    Duplex Venous Lower Extremity - Bilateral CAR    Interpretation Summary  •  The bilateral lower extremity venous duplex scan is negative for DVT and SVT in the areas visualized.  •  The tibial level veins were not well visualized, but appeared negative for DVT in the images obtained.      Results for orders placed during the hospital encounter of 01/19/23    Adult Transthoracic Echo Complete W/ Cont if Necessary Per Protocol    Interpretation Summary  •  Left ventricular systolic  function is normal. Calculated left ventricular EF = 55% Left ventricular ejection fraction appears to be 56 - 60%.  •  Left ventricular diastolic function is consistent with (grade II w/high LAP) pseudonormalization.  •  Estimated right ventricular systolic pressure from tricuspid regurgitation is normal (<35 mmHg).      Plan for Follow-up of Pending Labs/Results: Dr. Palacios (ID)  Pending Labs     Order Current Status    Folate RBC In process    Blood Culture - Blood, Arm, Right Preliminary result    Blood Culture - Blood, Arm, Right Preliminary result        Discharge Details        Discharge Medications      New Medications      Instructions Start Date   famotidine 20 MG tablet  Commonly known as: PEPCID   20 mg, Oral, Daily      lactobacillus acidophilus capsule capsule   1 capsule, Oral, Daily With Breakfast & Dinner      PHARMACY MEDS TO BED CONSULT   Does not apply, Daily      vancomycin 250 MG capsule  Commonly known as: VANCOCIN   250 mg, Oral, Every 6 Hours Scheduled         Continue These Medications      Instructions Start Date   allopurinol 100 MG tablet  Commonly known as: ZYLOPRIM   100 mg, Oral, Daily      atorvastatin 10 MG tablet  Commonly known as: LIPITOR   10 mg, Oral, Daily      benzonatate 100 MG capsule  Commonly known as: Tessalon Perles   100 mg, Oral, 3 Times Daily PRN      furosemide 40 MG tablet  Commonly known as: LASIX   40 mg, Oral, Daily, swelling      hydrALAZINE 25 MG tablet  Commonly known as: APRESOLINE   25 mg, Oral, 2 Times Daily      losartan 100 MG tablet  Commonly known as: COZAAR   100 mg, Oral, Daily      metoprolol tartrate 50 MG tablet  Commonly known as: LOPRESSOR   TAKE 1 TABLET BY MOUTH TWICE DAILY      ondansetron 8 MG tablet  Commonly known as: Zofran   8 mg, Oral, Every 8 Hours PRN      potassium chloride 20 MEQ CR tablet  Commonly known as: K-DUR,KLOR-CON   TAKE 1 TABLET BY MOUTH TWICE A DAY      traZODone 50 MG tablet  Commonly known as: DESYREL   50 mg, Oral,  Nightly         Stop These Medications    diazePAM 5 MG tablet  Commonly known as: Valium     omeprazole 20 MG capsule  Commonly known as: priLOSEC            No Known Allergies      Discharge Disposition:  Home-Health Care Svc    Diet:  Hospital:  Diet Order   Procedures   • Diet: Regular/House Diet; Texture: Soft to Chew (NDD 3); Soft to Chew: Whole Meat; Fluid Consistency: Thin (IDDSI 0)       Activity:           CODE STATUS:    Code Status and Medical Interventions:   Ordered at: 01/19/23 2008     Level Of Support Discussed With:    Patient    Next of Kin (If No Surrogate)     Code Status (Patient has no pulse and is not breathing):    CPR (Attempt to Resuscitate)     Medical Interventions (Patient has pulse or is breathing):    Full Support       Future Appointments   Date Time Provider Department Center   1/25/2023  2:00 PM Roderick Hamm IV, MD [facility]   1/27/2023 11:00 AM Jaylin Rocha APRN [facility]   1/27/2023  1:30 PM [facility] 1 [facility]   1/31/2023 10:30 AM CHAIR 2 - [facility]F [facility]   2/8/2023 10:00 AM Jennifer Lora MD [facility]   2/8/2023 10:30 AM CHAIR 4 - [facility]   4/19/2023  9:00 AM [facility] NUTR WALK IN SCHEDULE [facility]       Additional Instructions for the Follow-ups that You Need to Schedule     Discharge Follow-up with PCP   As directed       Currently Documented PCP:    Bossman Pedraza MD    PCP Phone Number:    [phone]     Follow Up Details: 2 weeks         Discharge Follow-up with Specialty: Dr. Daren Palacios (ID) january 31, 20223   As directed      Specialty: Dr. Daren Palacios (ID) january 31, 20223                     Hakeem Jimenez MD  01/22/23      Time Spent on Discharge:  I spent  35  minutes on this discharge activity which included: face-to-face encounter with the patient, reviewing the data in the system, coordination of the care with the nursing staff as well as consultants,  documentation, and entering orders.

## 2023-01-22 NOTE — PLAN OF CARE
Goal Outcome Evaluation:  Plan of Care Reviewed With: patient        Progress: no change   VSS, RA, A/Ox4. Provider notified for Htn. No complaints at this time. Will continue plan of care.   Problem: Adult Inpatient Plan of Care  Goal: Plan of Care Review  Outcome: Ongoing, Progressing  Flowsheets (Taken 1/21/2023 1900)  Progress: no change  Plan of Care Reviewed With: patient  Goal: Patient-Specific Goal (Individualized)  Outcome: Ongoing, Progressing  Goal: Absence of Hospital-Acquired Illness or Injury  Outcome: Ongoing, Progressing  Intervention: Identify and Manage Fall Risk  Recent Flowsheet Documentation  Taken 1/21/2023 1800 by Ivy Zelaya, RN  Safety Promotion/Fall Prevention:   clutter free environment maintained   activity supervised   assistive device/personal items within reach   fall prevention program maintained   toileting scheduled   safety round/check completed   room organization consistent   nonskid shoes/slippers when out of bed   lighting adjusted  Taken 1/21/2023 1600 by Ivy Zelaya, RN  Safety Promotion/Fall Prevention:   activity supervised   assistive device/personal items within reach   clutter free environment maintained   fall prevention program maintained   toileting scheduled   room organization consistent   safety round/check completed   nonskid shoes/slippers when out of bed   lighting adjusted  Taken 1/21/2023 1400 by Ivy Zelaya, RN  Safety Promotion/Fall Prevention:   clutter free environment maintained   assistive device/personal items within reach   activity supervised   fall prevention program maintained   toileting scheduled   safety round/check completed   room organization consistent   nonskid shoes/slippers when out of bed   lighting adjusted  Taken 1/21/2023 1200 by Ivy Zelaya, RN  Safety Promotion/Fall Prevention:   clutter free environment maintained   fall prevention program maintained   activity supervised   assistive device/personal items within reach    safety round/check completed   toileting scheduled   room organization consistent   nonskid shoes/slippers when out of bed   lighting adjusted  Taken 1/21/2023 1000 by Ivy Zelaya RN  Safety Promotion/Fall Prevention:   activity supervised   clutter free environment maintained   assistive device/personal items within reach   fall prevention program maintained   toileting scheduled   safety round/check completed   room organization consistent   nonskid shoes/slippers when out of bed   lighting adjusted  Taken 1/21/2023 0800 by Ivy Zelaya RN  Safety Promotion/Fall Prevention:   clutter free environment maintained   assistive device/personal items within reach   activity supervised   fall prevention program maintained   room organization consistent   safety round/check completed   toileting scheduled   nonskid shoes/slippers when out of bed   lighting adjusted  Intervention: Prevent Skin Injury  Recent Flowsheet Documentation  Taken 1/21/2023 1800 by Ivy Zelaya RN  Body Position: position changed independently  Taken 1/21/2023 1600 by Ivy Zelaya RN  Body Position: position changed independently  Taken 1/21/2023 1400 by Ivy Zelaya RN  Body Position: position changed independently  Taken 1/21/2023 1200 by Ivy Zelaya RN  Body Position: position changed independently  Taken 1/21/2023 1000 by Ivy Zelaya RN  Body Position: position changed independently  Taken 1/21/2023 0800 by Ivy Zelaya RN  Body Position: position changed independently  Intervention: Prevent and Manage VTE (Venous Thromboembolism) Risk  Recent Flowsheet Documentation  Taken 1/21/2023 1800 by Ivy Zelaya RN  Activity Management: activity adjusted per tolerance  Taken 1/21/2023 1600 by Ivy Zelaya RN  Activity Management: activity adjusted per tolerance  Taken 1/21/2023 1400 by Ivy Zelaya RN  Activity Management: activity adjusted per tolerance  Taken 1/21/2023 1200 by Ivy Zelaya RN  Activity Management: activity  adjusted per tolerance  Taken 1/21/2023 1000 by Ivy Zelaya RN  Activity Management: activity adjusted per tolerance  Taken 1/21/2023 0800 by Ivy Zelaya RN  Activity Management: activity adjusted per tolerance  VTE Prevention/Management:   bilateral   sequential compression devices off  Intervention: Prevent Infection  Recent Flowsheet Documentation  Taken 1/21/2023 1800 by Ivy Zelaya RN  Infection Prevention:   rest/sleep promoted   single patient room provided   visitors restricted/screened   hand hygiene promoted  Taken 1/21/2023 1600 by Ivy Zelaya RN  Infection Prevention:   visitors restricted/screened   single patient room provided   rest/sleep promoted   hand hygiene promoted  Taken 1/21/2023 1400 by Ivy Zelaya RN  Infection Prevention:   single patient room provided   visitors restricted/screened   rest/sleep promoted   hand hygiene promoted  Taken 1/21/2023 1200 by Ivy Zelaya RN  Infection Prevention:   single patient room provided   visitors restricted/screened   rest/sleep promoted   hand hygiene promoted  Taken 1/21/2023 1000 by Ivy Zelaya RN  Infection Prevention:   visitors restricted/screened   single patient room provided   rest/sleep promoted   hand hygiene promoted  Taken 1/21/2023 0800 by Ivy Zelaya RN  Infection Prevention:   visitors restricted/screened   single patient room provided   rest/sleep promoted   hand hygiene promoted  Goal: Optimal Comfort and Wellbeing  Outcome: Ongoing, Progressing  Intervention: Provide Person-Centered Care  Recent Flowsheet Documentation  Taken 1/21/2023 0800 by Ivy Zelaya RN  Trust Relationship/Rapport:   choices provided   care explained  Goal: Readiness for Transition of Care  Outcome: Ongoing, Progressing     Problem: Fall Injury Risk  Goal: Absence of Fall and Fall-Related Injury  Outcome: Ongoing, Progressing  Intervention: Identify and Manage Contributors  Recent Flowsheet Documentation  Taken 1/21/2023 1800 by Garcia  NICK Haynes  Medication Review/Management: medications reviewed  Taken 1/21/2023 1600 by Ivy Zelaya RN  Medication Review/Management: medications reviewed  Taken 1/21/2023 1400 by Ivy Zelaya RN  Medication Review/Management: medications reviewed  Taken 1/21/2023 1200 by Ivy Zelaya RN  Medication Review/Management: medications reviewed  Taken 1/21/2023 1000 by Ivy Zelaya RN  Medication Review/Management: medications reviewed  Taken 1/21/2023 0800 by Ivy Zelaya RN  Medication Review/Management: medications reviewed  Intervention: Promote Injury-Free Environment  Recent Flowsheet Documentation  Taken 1/21/2023 1800 by Ivy Zelaya RN  Safety Promotion/Fall Prevention:   clutter free environment maintained   activity supervised   assistive device/personal items within reach   fall prevention program maintained   toileting scheduled   safety round/check completed   room organization consistent   nonskid shoes/slippers when out of bed   lighting adjusted  Taken 1/21/2023 1600 by Ivy Zelaya RN  Safety Promotion/Fall Prevention:   activity supervised   assistive device/personal items within reach   clutter free environment maintained   fall prevention program maintained   toileting scheduled   room organization consistent   safety round/check completed   nonskid shoes/slippers when out of bed   lighting adjusted  Taken 1/21/2023 1400 by Ivy Zelaya RN  Safety Promotion/Fall Prevention:   clutter free environment maintained   assistive device/personal items within reach   activity supervised   fall prevention program maintained   toileting scheduled   safety round/check completed   room organization consistent   nonskid shoes/slippers when out of bed   lighting adjusted  Taken 1/21/2023 1200 by Ivy Zelaya RN  Safety Promotion/Fall Prevention:   clutter free environment maintained   fall prevention program maintained   activity supervised   assistive device/personal items within reach   safety  round/check completed   toileting scheduled   room organization consistent   nonskid shoes/slippers when out of bed   lighting adjusted  Taken 1/21/2023 1000 by Ivy Zelaya RN  Safety Promotion/Fall Prevention:   activity supervised   clutter free environment maintained   assistive device/personal items within reach   fall prevention program maintained   toileting scheduled   safety round/check completed   room organization consistent   nonskid shoes/slippers when out of bed   lighting adjusted  Taken 1/21/2023 0800 by Ivy Zelaya RN  Safety Promotion/Fall Prevention:   clutter free environment maintained   assistive device/personal items within reach   activity supervised   fall prevention program maintained   room organization consistent   safety round/check completed   toileting scheduled   nonskid shoes/slippers when out of bed   lighting adjusted     Problem: UTI (Urinary Tract Infection)  Goal: Improved Infection Symptoms  Outcome: Ongoing, Progressing     Problem: Fatigue  Goal: Improved Activity Tolerance  Outcome: Ongoing, Progressing  Intervention: Promote Improved Energy  Recent Flowsheet Documentation  Taken 1/21/2023 1800 by Ivy Zelaya, RN  Activity Management: activity adjusted per tolerance  Taken 1/21/2023 1600 by Ivy Zelaya RN  Activity Management: activity adjusted per tolerance  Taken 1/21/2023 1400 by Ivy Zelaya RN  Activity Management: activity adjusted per tolerance  Taken 1/21/2023 1200 by Ivy Zelaya RN  Activity Management: activity adjusted per tolerance  Taken 1/21/2023 1000 by Ivy Zelaya, RN  Activity Management: activity adjusted per tolerance  Taken 1/21/2023 0800 by Ivy Zelaya, NICK  Activity Management: activity adjusted per tolerance

## 2023-01-22 NOTE — OUTREACH NOTE
Prep Survey    Flowsheet Row Responses   [facility] patient discharged from? [facility]   Is LACE score < 7 ? No   Eligibility [facility]   Date of Admission 01/19/23   Date of Discharge 01/22/23   Discharge Disposition Home or Self Care   Discharge diagnosis Sepsis due to clostridium difficile colitis   Does the patient have one of the following disease processes/diagnoses(primary or secondary)? Sepsis   Does the patient have Home health ordered? No   Is there a DME ordered? No   Prep survey completed? Yes          JACKIE TOMLINSON - Registered Nurse

## 2023-01-23 ENCOUNTER — TRANSITIONAL CARE MANAGEMENT TELEPHONE ENCOUNTER (OUTPATIENT)
Dept: CALL CENTER | Facility: HOSPITAL | Age: 78
End: 2023-01-23
Payer: MEDICARE

## 2023-01-23 NOTE — OUTREACH NOTE
Call Center TCM Note    Flowsheet Row Responses   [facility] patient discharged from? [place]   Does the patient have one of the following disease processes/diagnoses(primary or secondary)? Sepsis   TCM attempt successful? Yes   Call start time 0842   Call end time 0845   Discharge diagnosis Sepsis due to clostridium difficile colitis   Is patient permission given to speak with other caregiver? Yes   List who call center can speak with  Karrie Pop reviewed with patient/caregiver? Yes   Is the patient taking all medications as directed (includes completed medication regime)? Yes   Comments Daughter states her sister had called to set up follow up appt.   Does the patient have an appointment with their PCP within 7 days of discharge? No   Nursing Interventions Patient declined scheduling/rescheduling appointment at this time   Has home health visited the patient within 72 hours of discharge? N/A   Psychosocial issues? No   Did the patient receive a copy of their discharge instructions? Yes   Nursing interventions Reviewed instructions with patient   What is the patient's perception of their health status since discharge? Improving   Nursing interventions Nurse provided patient education   Is the patient/caregiver able to teach back TIME? T emperature - higher or lower than normal, I nfection - may have signs and symptoms of an infection, M ental Decline - confused, sleepy, difficult to arouse, E xtremely Ill - severe pain, discomfort, shortness of breath   Nursing interventions Nurse provided patient education   Is patient/caregiver able to teach back steps to recovery at home? Set small, achievable goals for return to baseline health, Rest and regain strength   Is the patient/caregiver able to teach back signs and symptoms of worsening condition: Fever, Rapid heart rate (>90), Shortness of breath/rapid respiratory rate, Altered mental status(confusion/coma)   Is the patient/caregiver able to teach  back the hierarchy of who to call/visit for symptoms/problems? PCP, Specialist, Home health nurse, Urgent Care, ED, 911 Yes   TCM call completed? Yes   Wrap up additional comments Spouse and daughter state patient is doing well. Stools are becoming less frequent and starting to form. No concerns of worsening infecion/sepsis at this time.    Call end time 0845   Would this patient benefit from a Referral to [facility] Social Work? No   Is the patient interested in additional calls from an ambulatory ?  NOTE:  applies to high risk patients requiring additional follow-up. No          Kaylie Cota RN    1/23/2023, 08:45 EST

## 2023-01-24 LAB
BACTERIA SPEC AEROBE CULT: NORMAL
BACTERIA SPEC AEROBE CULT: NORMAL

## 2023-01-25 LAB
QT INTERVAL: 388 MS
QTC INTERVAL: 447 MS

## 2023-01-26 ENCOUNTER — OFFICE VISIT (OUTPATIENT)
Dept: INTERNAL MEDICINE | Facility: CLINIC | Age: 78
End: 2023-01-26
Payer: MEDICARE

## 2023-01-26 VITALS
DIASTOLIC BLOOD PRESSURE: 81 MMHG | SYSTOLIC BLOOD PRESSURE: 205 MMHG | TEMPERATURE: 97.3 F | HEART RATE: 59 BPM | HEIGHT: 61 IN | WEIGHT: 153 LBS | OXYGEN SATURATION: 100 % | BODY MASS INDEX: 28.89 KG/M2

## 2023-01-26 DIAGNOSIS — C09.9 SQUAMOUS CELL CARCINOMA OF LEFT TONSIL: Primary | ICD-10-CM

## 2023-01-26 DIAGNOSIS — A49.8 CLOSTRIDIUM DIFFICILE INFECTION: ICD-10-CM

## 2023-01-26 DIAGNOSIS — I10 HTN (HYPERTENSION), BENIGN: ICD-10-CM

## 2023-01-26 PROBLEM — N39.0 ACUTE URINARY TRACT INFECTION: Status: RESOLVED | Noted: 2023-01-19 | Resolved: 2023-01-26
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
## 2023-01-26 PROBLEM — A41.9 SEPSIS: Status: RESOLVED | Noted: 2023-01-19 | Resolved: 2023-01-26

## 2023-01-26 PROBLEM — M1A.0710 IDIOPATHIC CHRONIC GOUT OF RIGHT FOOT WITHOUT TOPHUS: Status: RESOLVED | Noted: 2019-03-22 | Resolved: 2023-01-26

## 2023-01-26 PROBLEM — N18.30 CKD (CHRONIC KIDNEY DISEASE) STAGE 3, GFR 30-59 ML/MIN: Status: RESOLVED | Noted: 2023-01-19 | Resolved: 2023-01-26

## 2023-01-26 PROBLEM — M10.9 GOUT: Status: RESOLVED | Noted: 2023-01-19 | Resolved: 2023-01-26

## 2023-01-26 PROCEDURE — 1111F DSCHRG MED/CURRENT MED MERGE: CPT | Performed by: INTERNAL MEDICINE

## 2023-01-26 PROCEDURE — 99495 TRANSJ CARE MGMT MOD F2F 14D: CPT | Performed by: INTERNAL MEDICINE

## 2023-01-26 RX ORDER — ASCORBIC ACID 100 MG
TABLET,CHEWABLE ORAL
COMMUNITY
End: 2023-01-26

## 2023-01-26 RX ORDER — MOMETASONE FUROATE 1 MG/G
CREAM TOPICAL
COMMUNITY

## 2023-01-26 RX ORDER — CHOLESTYRAMINE LIGHT 4 G/5.7G
4 POWDER, FOR SUSPENSION ORAL DAILY
Qty: 20 EACH | Refills: 1 | Status: SHIPPED | OUTPATIENT
Start: 2023-01-26 | End: 2023-03-01 | Stop reason: HOSPADM

## 2023-01-26 RX ORDER — MIRTAZAPINE 15 MG/1
15 TABLET, FILM COATED ORAL NIGHTLY
COMMUNITY
Start: 2023-01-25

## 2023-01-26 NOTE — PROGRESS NOTES
Transitional Care Follow Up Visit  Subjective     Vesta Landry is a 77 y.o. female who presents for a transitional care management visit.    Within 48 business hours after discharge our office contacted her via telephone to coordinate her care and needs.      I reviewed and discussed the details of that call along with the discharge summary, hospital problems, inpatient lab results, inpatient diagnostic studies, and consultation reports with Vesta.     Current outpatient and discharge medications have been reconciled for the patient.  Reviewed by: Bossman Pedraza MD      Date of TCM Phone Call 1/22/2023   [facility]   Date of Admission 1/19/2023   Date of Discharge 1/22/2023   Discharge Disposition Home or Self Care     Risk for Readmission (LACE) Score: 10 (1/22/2023  6:00 AM)      History of Present Illness   Course During Hospital Stay: Patient admitted to the hospital with complaints of generalized weakness confusion and a few falls.  In the emergency room noted to have a CT head without any acute abnormalities.  Elevated white count was noted she was positive for C. difficile she was treated with oral vancomycin with gradual improvement noted.  Of note the patient has had a recent diagnosis of left tonsillar squamous cell carcinoma for which she was treated initially with antibiotics and is now seeing oncology.  Her daughter is accompanying the patient who is a poor historian.  Has poor oral intake but has some improved mobility.  Has 3-4 bowel movements a day denies nausea vomiting     The following portions of the patient's history were reviewed and updated as appropriate: allergies, current medications, past family history, past medical history, past social history, past surgical history and problem list.    Review of Systems   Constitutional: Positive for fatigue. Negative for activity change, appetite change and fever.   HENT: Negative for congestion, ear discharge, ear pain  and trouble swallowing.    Eyes: Negative for photophobia and visual disturbance.   Respiratory: Negative for cough and shortness of breath.    Cardiovascular: Negative for chest pain and palpitations.   Gastrointestinal: Positive for diarrhea. Negative for abdominal distention, abdominal pain, constipation, nausea and vomiting.   Endocrine: Negative.    Genitourinary: Negative for dysuria, hematuria and urgency.   Musculoskeletal: Positive for arthralgias. Negative for back pain, joint swelling and myalgias.   Skin: Negative for color change and rash.   Allergic/Immunologic: Negative.    Neurological: Negative for dizziness, weakness, light-headedness and headaches.   Hematological: Negative for adenopathy. Does not bruise/bleed easily.   Psychiatric/Behavioral: Negative for agitation, confusion and dysphoric mood. The patient is not nervous/anxious.        Objective   Physical Exam  Constitutional:       General: She is not in acute distress.     Appearance: She is well-developed.   HENT:      Nose: Nose normal.   Eyes:      General: No scleral icterus.     Conjunctiva/sclera: Conjunctivae normal.   Neck:      Thyroid: No thyromegaly.      Trachea: No tracheal deviation.   Cardiovascular:      Rate and Rhythm: Normal rate and regular rhythm.      Heart sounds: No murmur heard.    No friction rub.   Pulmonary:      Effort: No respiratory distress.      Breath sounds: No wheezing or rales.   Abdominal:      General: There is no distension.      Palpations: Abdomen is soft. There is no mass.      Tenderness: There is no abdominal tenderness. There is no guarding.   Musculoskeletal:         General: Deformity present. Normal range of motion.   Lymphadenopathy:      Cervical: No cervical adenopathy.   Skin:     General: Skin is warm and dry.      Findings: No erythema or rash.   Neurological:      Mental Status: She is alert and oriented to person, place, and time.      Cranial Nerves: No cranial nerve deficit.       Coordination: Coordination normal.      Deep Tendon Reflexes: Reflexes are normal and symmetric.   Psychiatric:         Behavior: Behavior normal.         Thought Content: Thought content normal.         Judgment: Judgment normal.         Assessment & Plan   Diagnoses and all orders for this visit:    1. 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  Denies dysphagia has some hoarseness of voice    2. Clostridium difficile infection appears somewhat better encouraged oral hydration and food intake.  Repeat labs today will add on cholestyramine for short period    3. HTN (hypertension), benign stable with current meds and low-salt diet    Anemia noted without microcytic indices.  Check CBC further work-up as needed  Rule out dehydration check BMP

## 2023-01-27 ENCOUNTER — OFFICE VISIT (OUTPATIENT)
Dept: ONCOLOGY | Facility: CLINIC | Age: 78
End: 2023-01-27
Payer: MEDICARE

## 2023-01-27 ENCOUNTER — INFUSION (OUTPATIENT)
Dept: ONCOLOGY | Facility: HOSPITAL | Age: 78
End: 2023-01-27
Payer: MEDICARE

## 2023-01-27 ENCOUNTER — HOSPITAL ENCOUNTER (OUTPATIENT)
Dept: CT IMAGING | Facility: HOSPITAL | Age: 78
Discharge: HOME | End: 2023-01-27
Admitting: OTOLARYNGOLOGY
Payer: MEDICARE

## 2023-01-27 VITALS
SYSTOLIC BLOOD PRESSURE: 202 MMHG | BODY MASS INDEX: 28.32 KG/M2 | RESPIRATION RATE: 16 BRPM | HEIGHT: 61 IN | WEIGHT: 150 LBS | OXYGEN SATURATION: 99 % | HEART RATE: 64 BPM | DIASTOLIC BLOOD PRESSURE: 79 MMHG | TEMPERATURE: 96.9 F

## 2023-01-27 DIAGNOSIS — R46.89 COGNITIVE AND BEHAVIORAL CHANGES: ICD-10-CM

## 2023-01-27 DIAGNOSIS — C09.9 SQUAMOUS CELL CARCINOMA OF LEFT TONSIL: Primary | ICD-10-CM

## 2023-01-27 DIAGNOSIS — C09.9 PRIMARY SQUAMOUS CELL CARCINOMA OF TONSIL: ICD-10-CM

## 2023-01-27 DIAGNOSIS — R41.89 COGNITIVE AND BEHAVIORAL CHANGES: ICD-10-CM

## 2023-01-27 LAB
ALBUMIN SERPL-MCNC: 3.1 G/DL (ref 3.5–5.2)
ALBUMIN/GLOB SERPL: 1.1 G/DL
ALP SERPL-CCNC: 85 U/L (ref 39–117)
ALT SERPL W P-5'-P-CCNC: 11 U/L (ref 1–33)
ANION GAP SERPL CALCULATED.3IONS-SCNC: 10.8 MMOL/L (ref 5–15)
AST SERPL-CCNC: 15 U/L (ref 1–32)
BASOPHILS # BLD AUTO: 0.02 10*3/MM3 (ref 0–0.2)
BASOPHILS NFR BLD AUTO: 0.2 % (ref 0–1.5)
BILIRUB SERPL-MCNC: 0.3 MG/DL (ref 0–1.2)
BUN SERPL-MCNC: 10 MG/DL (ref 8–23)
BUN/CREAT SERPL: 12.5 (ref 7–25)
CALCIUM SPEC-SCNC: 7.9 MG/DL (ref 8.6–10.5)
CHLORIDE SERPL-SCNC: 106 MMOL/L (ref 98–107)
CO2 SERPL-SCNC: 21.2 MMOL/L (ref 22–29)
CREAT SERPL-MCNC: 0.8 MG/DL (ref 0.57–1)
DEPRECATED RDW RBC AUTO: 55.7 FL (ref 37–54)
EGFRCR SERPLBLD CKD-EPI 2021: 75.1 ML/MIN/1.73
EOSINOPHIL # BLD AUTO: 0.1 10*3/MM3 (ref 0–0.4)
EOSINOPHIL NFR BLD AUTO: 1.2 % (ref 0.3–6.2)
ERYTHROCYTE [DISTWIDTH] IN BLOOD BY AUTOMATED COUNT: 16.9 % (ref 12.3–15.4)
GLOBULIN UR ELPH-MCNC: 2.7 GM/DL
GLUCOSE SERPL-MCNC: 107 MG/DL (ref 65–99)
HCT VFR BLD AUTO: 28.1 % (ref 34–46.6)
HGB BLD-MCNC: 9 G/DL (ref 12–15.9)
IMM GRANULOCYTES # BLD AUTO: 0.06 10*3/MM3 (ref 0–0.05)
IMM GRANULOCYTES NFR BLD AUTO: 0.7 % (ref 0–0.5)
LYMPHOCYTES # BLD AUTO: 1.15 10*3/MM3 (ref 0.7–3.1)
LYMPHOCYTES NFR BLD AUTO: 13.7 % (ref 19.6–45.3)
MAGNESIUM SERPL-MCNC: 1.6 MG/DL (ref 1.6–2.4)
MCH RBC QN AUTO: 29.6 PG (ref 26.6–33)
MCHC RBC AUTO-ENTMCNC: 32 G/DL (ref 31.5–35.7)
MCV RBC AUTO: 92.4 FL (ref 79–97)
MONOCYTES # BLD AUTO: 0.9 10*3/MM3 (ref 0.1–0.9)
MONOCYTES NFR BLD AUTO: 10.7 % (ref 5–12)
NEUTROPHILS NFR BLD AUTO: 6.17 10*3/MM3 (ref 1.7–7)
NEUTROPHILS NFR BLD AUTO: 73.5 % (ref 42.7–76)
NRBC BLD AUTO-RTO: 0 /100 WBC (ref 0–0.2)
PLATELET # BLD AUTO: 221 10*3/MM3 (ref 140–450)
PMV BLD AUTO: 10.7 FL (ref 6–12)
POTASSIUM SERPL-SCNC: 3.1 MMOL/L (ref 3.5–5.2)
PROT SERPL-MCNC: 5.8 G/DL (ref 6–8.5)
RBC # BLD AUTO: 3.04 10*6/MM3 (ref 3.77–5.28)
SODIUM SERPL-SCNC: 138 MMOL/L (ref 136–145)
WBC NRBC COR # BLD: 8.4 10*3/MM3 (ref 3.4–10.8)

## 2023-01-27 PROCEDURE — 96360 HYDRATION IV INFUSION INIT: CPT

## 2023-01-27 PROCEDURE — 83735 ASSAY OF MAGNESIUM: CPT

## 2023-01-27 PROCEDURE — 99215 OFFICE O/P EST HI 40 MIN: CPT | Performed by: NURSE PRACTITIONER

## 2023-01-27 PROCEDURE — 85025 COMPLETE CBC W/AUTO DIFF WBC: CPT

## 2023-01-27 PROCEDURE — 80053 COMPREHEN METABOLIC PANEL: CPT

## 2023-01-27 PROCEDURE — 96361 HYDRATE IV INFUSION ADD-ON: CPT

## 2023-01-27 PROCEDURE — 96365 THER/PROPH/DIAG IV INF INIT: CPT

## 2023-01-27 PROCEDURE — 25010000002 IOPAMIDOL 61 % SOLUTION: Performed by: OTOLARYNGOLOGY

## 2023-01-27 PROCEDURE — 36415 COLL VENOUS BLD VENIPUNCTURE: CPT

## 2023-01-27 PROCEDURE — G2212 PROLONG OUTPT/OFFICE VIS: HCPCS | Performed by: NURSE PRACTITIONER

## 2023-01-27 PROCEDURE — 70491 CT SOFT TISSUE NECK W/DYE: CPT

## 2023-01-27 RX ORDER — LIDOCAINE AND PRILOCAINE 25; 25 MG/G; MG/G
1 CREAM TOPICAL AS NEEDED
Qty: 30 G | Refills: 3 | Status: SHIPPED | OUTPATIENT
Start: 2023-01-27

## 2023-01-27 RX ADMIN — IOPAMIDOL 100 ML: 612 INJECTION, SOLUTION INTRAVENOUS at 14:01

## 2023-01-27 RX ADMIN — SODIUM CHLORIDE 1000 ML: 9 INJECTION, SOLUTION INTRAVENOUS at 14:21

## 2023-01-27 NOTE — PROGRESS NOTES
"CHEMOTHERAPY PREPARATION    Vesta Landry  [id number]  1945    Subjective   Chief Complaint: Treatment Preparation and Needs Assessment    History of present illness:  Vesta Landry is a 77 y.o. year old female who is here today for chemotherapy preparation and needs assessment. 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     Daughters report that she has had a cognitive decline in the past 6 months.  They have noted that she is eating and drinking less.    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     Past Medical History, Past Surgical History, Social History, Family History have been reviewed and are without significant changes except as mentioned.      Review of Systems   Constitutional: Positive for fatigue.   HENT: Positive for trouble swallowing and voice change.    Psychiatric/Behavioral: The patient is nervous/anxious.    All other systems reviewed and are negative.      Objective   Physical Exam  Vital Signs: BP (!) 202/79   Pulse 64   Temp 96.9 °F (36.1 °C) (Temporal)   Resp 16   Ht 154.9 cm (61\")   Wt 68 kg (150 lb)   SpO2 99%   BMI 28.34 kg/m²    General Appearance:  alert, cooperative, no apparent distress and appears stated age   Neurologic/Psychiatric: A&O x 3, gait steady, appropriate affect   HEENT:  Normocephalic, without obvious " abnormality, mucous membranes moist   Lungs:   Clear to auscultation bilaterally; respirations regular, even, and unlabored bilaterally   Heart:  Regular rate and rhythm, no murmurs appreciated   Extremities: Normal, atraumatic; no clubbing, cyanosis, or edema    Skin: No rashes, lesions, or abnormal coloration noted     ECOG Performance Status: 1 - Symptomatic but completely ambulatory            NEEDS ASSESSMENTS    Genetics  The patient's new diagnosis and family history have been reviewed for genetic counseling needs. A genetic referral is not recommended.     Psychosocial  The patient has completed a PHQ-9 Depression Screening and the Distress Thermometer (DT) today.   PHQ-9 results show 20-27 (Severe Depression). The patient scored their distress today as 10 on a scale of 0-10 with 0 being no distress and 10 being extreme distress.   Problems marked as being an issue for her within the last week include practical problems, emotional problems and physical problems.   Results were reviewed along with psychosocial resources offered by our cancer center. Our oncology social worker will be flagged for a DT score of 4 or above, and a same day call will be made for a score of 9 or 10. A mental health referral is recommended at this time. The patient is not accepting of a referral to [facility].   Copies of patient's questionnaires will be scanned into EMR for details and further reference.    Barriers to care  A barriers form was also completed by the patient today. We discussed services offered by our facility to help her have adequate access to care. The patient was given the name and card for our Oncology Social Worker. Based upon barriers assessment today, the patient will require a follow-up call from the  to further discuss needs.   A copy of the barriers form will also be scanned into EMR for details and further reference.     VAD Assessment  The patient and I discussed planned intervenous  "chemotherapy as well as other IV treatments that are often needed throughout the course of treatment. These may include, but are not limited to blood transfusions, antibiotics, and IV hydration. The patient's vasculature does not appear to be adequate for multiple peripheral IVs throughout their treatment course. Discussed risks and benefits of VADs. The patient would like to pursue Port-A-Cath insertion prior to initiation of treatment.       Advance Care Planning   The patient and I discussed advanced care planning, \"Conversations that Matter\".   This service was offered, free of charge, for development of advance directives with a certified ACP facilitator.  The patient does have an up-to-date advanced directive. This document is on file with our office. The patient is not interested in an appointment with one of our facilitators to create or update their advanced directives.         Palliative Care  The patient and I discussed palliative care services. Palliative care is not the same as Hospice care. This is specialized medical care for people living with serious illness with the goal of improving quality of life for the patient and their family. [facility] has partnered with [facility] to offer our patients outpatient palliative care early along with their treatment to assist in coordination of care, symptom management, pain management, and medical decision making.  Oncology criteria for palliative care referral is not met at this time. The patient is not interested in a palliative care consultation.     Additional Referral needs  Nutrition      IV CHEMOTHERAPY EDUCATION    Booklets Given: Chemotherapy and You [x]  Eating Hints [x]    Sexuality/Fertility Books []      Chemotherapy/Biotherapy Education Sheets: (list all that apply)  nausea management, acid reflux management, diarrhea management, Cancer resourse contacts information, skin and mouth care and vaccination information                      "                                                                                                                                            Chemotherapy Regimen:   Treatment Plans     Name Type Hold Status Plan Dates Plan Provider       Active    OP HEAD & NECK Cetuximab + XRT ONCOLOGY TREATMENT On Automatic Hold  2/1/2023 - Present Jennifer Lora MD                   TOPICS COMMENTS   Anemia: role of RBC, cause, s/s, ways to manage, role of transfusion Reviewed the role of RBC and the use of transfusions if hemoglobin decreases too much.  Patient to notify us if they experience shortness of breath, dizziness, or palpitations.  Also let patient know they could feel more tired than usual and to try to stay active, but rest if they need to.    Thrombocytopenia: role of platelet, cause, s/s, ways to prevent bleeding, things to avoid, when to seek help Reviewed the role of platelets in blood clotting and when to call clinic (bloody nose that bleeds for 5 mins despite pressure, a cut that won't stop bleeding despite pressure, gums that bleed excessively with brushing or flossing). Recommended using an electric razor, soft bristle toothbrush, and blowing your nose gently.    Neutropenia: role of WBC, cause, infection precautions, s/s of infection, when to call MD Reviewed the role of WBC, good infection prevention practices, and when to call the clinic (temperature 100.4F, sore throat, burning urination, etc)  · COVID Vaccines: Unknown  · Flu Vaccine: Unknown   Nutrition and Appetite Changes:  importance of maintaining healthy diet & weight, ways to manage to improve intake, dietary consult, exercise regimen Discussed risk of decreased appetite, reviewed plan for small more frequent meals.    Diarrhea: causes, s/s of dehydration, ways to manage, dietary changes, when to call MD Chemotherapy - Discussed risk of diarrhea. Instructed patient that they can use OTC loperamide at first presentation of diarrhea, but call MD if  4-6 episodes in 24 hours not relieved by OTC loperamide.   Constipation: causes, ways to manage, dietary changes, when to call MD Provided supplementary handout with instructions for use of docusate and other OTC therapies to manage constipation.  Instructed to call us if medications aren't working.   Nausea/Vomiting: cause, use of antiemetics, dietary changes, when to call MD · Emetic risk: Moderate  · Scheduled meds: none  · PRN home meds: Ondansetron  · Pharmacy home meds sent to: Patient has at home.     Instructed the patient to take a dose of the PRN medication at the first onset of nausea and if it's not working to call us for additional medications.  Also provided non-drug measures to mitigate nausea.   Mouth Sores: causes, oral care, ways to manage Mouth sores can be prevented by making a mouth wash mixture of salt, baking soda, and water. The patient was instructed to swish and spit four times daily after meals and before bedtime.  Use of a soft bristle toothbrush was recommended.  The patient was instructed to avoid alcohol-containing OTC mouthwashes.    Alopecia: cause, ways to manage, resources Discussed the possibility of hair loss with the patient. Informed patient that they could request a prescription for a wig if desired and most of the cost is usually covered by insurance.   Infertility and Sexuality:  causes, fertility preservation options, sexuality changes, ways to manage, importance of birth control Discussed safe sex practices.   Nervous System Changes: causes, s/s, neuropathies, cognitive changes, ways to manage discussed the adverse effect of peripheral neuropathy and signs/symptoms associated with this adverse effect. Instructed patient to call if symptoms become more frequent or worsen.     Pain: causes, ways to manage Chemo - Discussed muscle and joint aches/pains with chemotherapy, and recommended the use of OTC pain relief with ibuprofen or acetaminophen if needed.         Skin/Nail  Changes: cause, s/s, ways to manage  s/s, prevention measures, and treatment measures. A supplementary handout with this information was also given to the patient.          Organ Toxicities: cause, s/s, need for diagnostic tests, labs, when to notify MD Discussed potential effects on organ systems, monitoring, diagnostic tests, labs, and when to notify their MD. Discussed the signs/symptoms of the following: hepatotoxicity, nephrotoxicity, cardiotoxicity, eye changes and skin changes   Survivorship: distress, distress assessment, secondary malignancies, early/late effects, follow-up, social issues, social support     Home Care: how to manage bodily fluids Counseled on management of soiled linens and proper flush technique.  Discussed how to manage all the side effects at home and advised when to contact the MD office   Infusion-Related Reactions: Cause, s/s, anaphylaxis, vesicant, etc. Discussed risks of infusion related reactions, s/s, management plan   Miscellaneous · Lab Draws: Patient will have labs with Day 1 of each cycle                    Chemotherapy education comprehension reviewed. Questions answered and additional information discussed on topics including:  Anemia, Thrombocytopenia, Neutropenia, Nutrition and appetite changes, Constipation, Diarrhea, Nausea & vomiting, Mouth sores, Alopecia, Infertility & sexuality, Nervous system changes, Pain, Skin & nail changes, Organ toxicities, Home care and Vaccinations        Assessment and Plan:    Diagnoses and all orders for this visit:    1. 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) (Primary)    2. Cognitive and behavioral changes  -     Ambulatory Referral to Neurology        This was a 90 minute face-to-face visit with 85 minutes spent in  counseling and coordination of care as documented above.   The patient and I have reviewed their new cancer diagnosis and scheduled treatment plan. Needs assessment was completed including genetics, psychosocial needs,  barriers to care, VAD evaluation, advanced care planning, and palliative care services. Referrals have been ordered as appropriate based upon our evaluation and patient desires.     We discussed Zofran was sent to her pharmacy.  She can take 1 tablet by mouth every 8 hours as needed for nausea and vomiting.    We discussed nutrition will see patient on day 1 of treatment.  We also discussed that we will refer her to social work for distress score of 10.  Declines referral to financial counseling.  Patient is okay for finances.  Daughter she reports she just stresses about money.  I recommended to the patient and her family that she see our nurse practitioner for counseling.  Declines pastoral care.    IV chemotherapy teaching was also completed today as documented above. Adequate time was given to answer all questions to her satisfaction. Patient and family are aware of their care team members and contact information if they have questions or problems throughout the treatment course. Needs assessments and education has been completed. The patient is adequately prepared to begin treatment as scheduled.     C. difficile.  A.  Patient currently undergoing care through [facility] for C. difficile.  Continue to follow-up for management.    Cognitive decline.  A.  Patient has had ongoing cognitive decline for the past 6 months.  I will refer her to neurology.    Inadequate IV access  A.  I will place a Port-A-Cath order.  Patient and daughter is concerned that during the night when her confusion increases she may pull out PICC line.  B.  Patient has seen Dr. Guerra in the past.  If he is unable to place port next week we will refer to [facility] to interventional radiology.    Inadequate nutrition  A.  I encouraged the patient to increase her nutritional intake.  Dietitian will see her on day 1 of treatment.  B.  We discussed that this could continue to worsen as treatment and radiation is started.  We discussed there  are options like a feeding tube.  They do have some experience with this as her father had 1.  She would like to avoid if at all possible.  C.  I will give 1 L normal saline due to dehydration today.    Electronically signed by LADARIUS Hinton on 01/27/23 at 13:20 EST.

## 2023-01-30 ENCOUNTER — TELEPHONE (OUTPATIENT)
Dept: INTERNAL MEDICINE | Facility: CLINIC | Age: 78
End: 2023-01-30

## 2023-01-30 ENCOUNTER — TELEPHONE (OUTPATIENT)
Dept: ONCOLOGY | Facility: CLINIC | Age: 78
End: 2023-01-30
Payer: MEDICARE

## 2023-01-30 ENCOUNTER — OFFICE VISIT (OUTPATIENT)
Dept: SURGERY | Facility: CLINIC | Age: 78
End: 2023-01-30
Payer: MEDICARE

## 2023-01-30 VITALS
WEIGHT: 152 LBS | DIASTOLIC BLOOD PRESSURE: 74 MMHG | OXYGEN SATURATION: 98 % | TEMPERATURE: 96.2 F | HEIGHT: 61 IN | BODY MASS INDEX: 28.7 KG/M2 | SYSTOLIC BLOOD PRESSURE: 120 MMHG | HEART RATE: 64 BPM

## 2023-01-30 DIAGNOSIS — C76.0 SQUAMOUS CELL CARCINOMA OF HEAD AND NECK: ICD-10-CM

## 2023-01-30 DIAGNOSIS — C09.9 SQUAMOUS CELL CARCINOMA OF LEFT TONSIL: Primary | ICD-10-CM

## 2023-01-30 DIAGNOSIS — R59.9 ENLARGED LYMPH NODES: Primary | ICD-10-CM

## 2023-01-30 PROBLEM — C44.42 SQUAMOUS CELL CARCINOMA OF HEAD AND NECK: Status: ACTIVE | Noted: 2023-01-30

## 2023-01-30 PROCEDURE — 99213 OFFICE O/P EST LOW 20 MIN: CPT | Performed by: SURGERY

## 2023-01-30 RX ORDER — FAMOTIDINE 20 MG/1
TABLET, FILM COATED ORAL
COMMUNITY
Start: 2023-01-22 | End: 2023-01-30 | Stop reason: SDUPTHER

## 2023-01-30 RX ORDER — FUROSEMIDE 20 MG/1
20 TABLET ORAL DAILY PRN
COMMUNITY
Start: 2023-01-26 | End: 2023-02-20 | Stop reason: HOSPADM

## 2023-01-30 NOTE — TELEPHONE ENCOUNTER
----- Message from [facility]AD[facility] Hinton sent at 1/30/2023  2:13 PM EST -----  Regarding: FW: Martha  Contact: [phone]  I spoke with Dr. Lora last week and he is ok with starting treatment once port was in. I am not sure if infusion will be able to put PIV in this week. We can attempt PIV, but not sure if it will be successful. Thanks.       ----- Message -----  From: Corinna Erwin RN  Sent: 1/30/2023   1:23 PM EST  To: Jaylin Kent APRN  Subject: FW: Martha Faye - can her port placement be any earlier - can you help with this?  Jaylin, she starts Erbitux tomorrow, what do you think about the timing with a port?  ----- Message -----  From: Vesta Landry  Sent: 1/30/2023   1:15 PM EST  To: e [facility]  Subject: Martha                                        Ok, sounds good. The problem with the port appointment is she starts her radiation next week and they are not giving us a time for the procedure. Is there anything you can do to help move this procedure to this week?     Thanks,

## 2023-01-30 NOTE — TELEPHONE ENCOUNTER
I called the pt just to check in on her, I had assumed from the previous incoming call she was on her way from the dentist office with a high bp reading of 212/95. I had [facility] nurse add her to the schedule and now that it is time for her to be here I wanted to make sure she was ok.   Pt was still at the dentist office getting the bottom of her dentures fixed/ Her BP was 183/87 and that was better than it had been in the previous office visit.

## 2023-01-30 NOTE — PROGRESS NOTES
Patient: Vesta Landry    YOB: 1945    Date: 2023    Primary Care Provider: Bossman Pedraza MD    Chief Complaint   Patient presents with   • Port-a-Cath placement       SUBJECTIVE:    History of present illness:  Patient has a history of tonsillar cancer. I saw the patient in the office today as a consultation for a port-a-cath placement.     I did diagnose the patient last year with squamous cell carcinoma of the left neck, she subsequently has had ENT evaluation and was noted to have tonsillar carcinoma.  She was recently in the hospital last week at [facility] for a C. difficile infection.    The following portions of the patient's history were reviewed and updated as appropriate: allergies, current medications, past family history, past medical history, past social history, past surgical history and problem list.    Review of Systems    History:  Past Medical History:   Diagnosis Date   • Arthritis    • Cancer (HCC)    • Deep vein thrombosis (HCC)     bilateral   • Dementia (HCC)    • Dysphagia    • GERD (gastroesophageal reflux disease)    • Hyperlipidemia    • Hypertension    • Vitamin D deficiency        Past Surgical History:   Procedure Laterality Date   • APPENDECTOMY     • CATARACT EXTRACTION     • CHOLECYSTECTOMY     • COLONOSCOPY      Approx    • HYSTERECTOMY     • OOPHORECTOMY Bilateral    • TUBAL ABDOMINAL LIGATION         Family History   Problem Relation Age of Onset   • Heart failure Mother    • Hyperlipidemia Mother    • Hypertension Mother    • Cancer Father    • Cancer Sister    • Stroke Sister    • Breast cancer Maternal Aunt    • Breast cancer Paternal Aunt    • Ovarian cancer Neg Hx        Social History     Tobacco Use   • Smoking status: Former     Packs/day: 0.15     Years: 10.00     Pack years: 1.50     Types: Cigarettes     Quit date: 1987     Years since quittin.1   • Smokeless tobacco: Never   Vaping Use   • Vaping  Use: Never used   Substance Use Topics   • Alcohol use: No   • Drug use: No       Medications:     Current Outpatient Medications:   •  furosemide (LASIX) 20 MG tablet, Take 20 mg by mouth Daily As Needed. swelling, Disp: , Rfl:   •  furosemide (LASIX) 40 MG tablet, Take 1 tablet by mouth Daily. swelling, Disp: 90 tablet, Rfl: 3  •  hydrALAZINE (APRESOLINE) 25 MG tablet, Take 1 tablet by mouth 2 (Two) Times a Day., Disp: 180 tablet, Rfl: 3  •  lactobacillus acidophilus (RISAQUAD) capsule capsule, Take 1 capsule by mouth twice daily. Take with Breakfast & Dinner., Disp: 11 capsule, Rfl: 0  •  lidocaine-prilocaine (EMLA) 2.5-2.5 % cream, Apply 1 application topically to the appropriate area as directed As Needed (45-60 minutes prior to port access.  Cover with saran/plastic wrap.)., Disp: 30 g, Rfl: 3  •  losartan (COZAAR) 100 MG tablet, Take 1 tablet by mouth Daily., Disp: 90 tablet, Rfl: 1  •  metoprolol tartrate (LOPRESSOR) 50 MG tablet, TAKE 1 TABLET BY MOUTH TWICE DAILY, Disp: 180 tablet, Rfl: 0  •  mirtazapine (REMERON) 15 MG tablet, , Disp: , Rfl:   •  mometasone (ELOCON) 0.1 % cream, mometasone 0.1 % topical cream  APPLY A THIN LAYER TO THE AFFECTED AREA(S) BY TOPICAL ROUTE ONCE DAILY after radiation treatment, Disp: , Rfl:   •  ondansetron (Zofran) 8 MG tablet, Take 1 tablet by mouth Every 8 (Eight) Hours As Needed for Nausea or Vomiting., Disp: 30 tablet, Rfl: 3  •  PHARMACY MEDS TO BED CONSULT, Daily., Disp: , Rfl:   •  vancomycin (VANCOCIN) 250 MG capsule, Take 1 capsule by mouth Every 6 (Six) Hours for 11 days. Indications: Colon Inflammation due to Clostridium Bacteria Overgrowth, Disp: 44 capsule, Rfl: 0  •  allopurinol (ZYLOPRIM) 100 MG tablet, Take 1 tablet by mouth Daily., Disp: 90 tablet, Rfl: 3  •  cholestyramine light (Prevalite) 4 g packet, Take 1 packet by mouth Daily., Disp: 20 each, Rfl: 1  •  potassium chloride (K-DUR,KLOR-CON) 20 MEQ CR tablet, TAKE 1 TABLET BY MOUTH TWICE A DAY, Disp: 180  "tablet, Rfl: 3     Allergies:  Allergies   Allergen Reactions   • Trazodone Confusion       OBJECTIVE:    Vital Signs:   Vitals:    01/30/23 0857   BP: 120/74   BP Location: Left arm   Patient Position: Sitting   Cuff Size: Adult   Pulse: 64   Temp: 96.2 °F (35.7 °C)   TempSrc: Temporal   SpO2: 98%   Weight: 68.9 kg (152 lb)   Height: 154.9 cm (61\")       Physical Exam:   General Appearance:    Alert, cooperative, in no acute distress   Head:    Normocephalic, without obvious abnormality, atraumatic   Eyes:            Lids and lashes normal, conjunctivae and sclerae normal, no   icterus, no pallor, corneas clear,   Ears:    Ears appear intact with no abnormalities noted   Throat:   No oral lesions, no thrush, oral mucosa moist   Lungs:     Clear to auscultation,respirations regular, even and                  Unlabored    Heart:    Regular rhythm and normal rate, no murmur, no gallop.   Chest Wall:    No abnormalities observed   Abdomen:     Normal bowel sounds, no masses, no organomegaly, soft        non-tender, non-distended, no guarding.  No peritoneal signs   Extremities:   Moves all extremities well, no edema, no cyanosis, no             redness   Pulses:   Pulses palpable and equal bilaterally   Skin:   No bleeding, bruising or rash   Lymph nodes:   No palpable adenopathy   Neurologic:   Cranial nerves 2 - 12 grossly intact.     Results Review:   I reviewed the patient's new clinical results.  I reviewed the patient's new imaging results and agree with the interpretation.  I reviewed the patient's other test results and agree with the interpretation    Review of Systems was reviewed and confirmed as accurate as documented by the MA.    ASSESSMENT/PLAN:    1. Enlarged lymph nodes    2. 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)        I offered the patient a Tomasz-cath. I explained the procedure in detail and the patient understood the procedure. The patient also understands the risks which include but are " not limited to bleeding, infection, pneumothorax, DVT etc. I have answered their questions and they wish to proceed with Tomasz-cath placement.    I have asked the patient to start on probiotics, she has 3 more days of oral vancomycin to treat her C. difficile infection.     Electronically signed by Brigido Guerra MD  01/30/23  09:20 EST

## 2023-01-31 ENCOUNTER — READMISSION MANAGEMENT (OUTPATIENT)
Dept: CALL CENTER | Facility: HOSPITAL | Age: 78
End: 2023-01-31
Payer: MEDICARE

## 2023-01-31 ENCOUNTER — DOCUMENTATION (OUTPATIENT)
Dept: NUTRITION | Facility: HOSPITAL | Age: 78
End: 2023-01-31
Payer: MEDICARE

## 2023-01-31 ENCOUNTER — INFUSION (OUTPATIENT)
Dept: ONCOLOGY | Facility: HOSPITAL | Age: 78
End: 2023-01-31
Payer: MEDICARE

## 2023-01-31 VITALS
DIASTOLIC BLOOD PRESSURE: 88 MMHG | SYSTOLIC BLOOD PRESSURE: 147 MMHG | HEART RATE: 59 BPM | OXYGEN SATURATION: 97 % | WEIGHT: 149.6 LBS | RESPIRATION RATE: 18 BRPM | TEMPERATURE: 98.7 F | BODY MASS INDEX: 28.27 KG/M2

## 2023-01-31 DIAGNOSIS — C09.9 SQUAMOUS CELL CARCINOMA OF LEFT TONSIL: Primary | ICD-10-CM

## 2023-01-31 LAB
ALBUMIN SERPL-MCNC: 3.5 G/DL (ref 3.5–5.2)
ALBUMIN/GLOB SERPL: 1.2 G/DL
ALP SERPL-CCNC: 100 U/L (ref 39–117)
ALT SERPL W P-5'-P-CCNC: 12 U/L (ref 1–33)
ANION GAP SERPL CALCULATED.3IONS-SCNC: 9.4 MMOL/L (ref 5–15)
AST SERPL-CCNC: 20 U/L (ref 1–32)
BASOPHILS # BLD AUTO: 0.05 10*3/MM3 (ref 0–0.2)
BASOPHILS NFR BLD AUTO: 0.5 % (ref 0–1.5)
BILIRUB SERPL-MCNC: 0.3 MG/DL (ref 0–1.2)
BUN SERPL-MCNC: 10 MG/DL (ref 8–23)
BUN/CREAT SERPL: 11.4 (ref 7–25)
CALCIUM SPEC-SCNC: 9.2 MG/DL (ref 8.6–10.5)
CHLORIDE SERPL-SCNC: 105 MMOL/L (ref 98–107)
CO2 SERPL-SCNC: 22.6 MMOL/L (ref 22–29)
CREAT SERPL-MCNC: 0.88 MG/DL (ref 0.57–1)
DEPRECATED RDW RBC AUTO: 56 FL (ref 37–54)
EGFRCR SERPLBLD CKD-EPI 2021: 66.9 ML/MIN/1.73
EOSINOPHIL # BLD AUTO: 0.25 10*3/MM3 (ref 0–0.4)
EOSINOPHIL NFR BLD AUTO: 2.5 % (ref 0.3–6.2)
ERYTHROCYTE [DISTWIDTH] IN BLOOD BY AUTOMATED COUNT: 16.9 % (ref 12.3–15.4)
GLOBULIN UR ELPH-MCNC: 3 GM/DL
GLUCOSE SERPL-MCNC: 87 MG/DL (ref 65–99)
HCT VFR BLD AUTO: 29.9 % (ref 34–46.6)
HGB BLD-MCNC: 9.6 G/DL (ref 12–15.9)
IMM GRANULOCYTES # BLD AUTO: 0.05 10*3/MM3 (ref 0–0.05)
IMM GRANULOCYTES NFR BLD AUTO: 0.5 % (ref 0–0.5)
LYMPHOCYTES # BLD AUTO: 1.54 10*3/MM3 (ref 0.7–3.1)
LYMPHOCYTES NFR BLD AUTO: 15.3 % (ref 19.6–45.3)
MAGNESIUM SERPL-MCNC: 2 MG/DL (ref 1.6–2.4)
MCH RBC QN AUTO: 29.1 PG (ref 26.6–33)
MCHC RBC AUTO-ENTMCNC: 32.1 G/DL (ref 31.5–35.7)
MCV RBC AUTO: 90.6 FL (ref 79–97)
MONOCYTES # BLD AUTO: 0.73 10*3/MM3 (ref 0.1–0.9)
MONOCYTES NFR BLD AUTO: 7.3 % (ref 5–12)
NEUTROPHILS NFR BLD AUTO: 7.43 10*3/MM3 (ref 1.7–7)
NEUTROPHILS NFR BLD AUTO: 73.9 % (ref 42.7–76)
NRBC BLD AUTO-RTO: 0 /100 WBC (ref 0–0.2)
PLATELET # BLD AUTO: 239 10*3/MM3 (ref 140–450)
PMV BLD AUTO: 10.7 FL (ref 6–12)
POTASSIUM SERPL-SCNC: 4.6 MMOL/L (ref 3.5–5.2)
PROT SERPL-MCNC: 6.5 G/DL (ref 6–8.5)
RBC # BLD AUTO: 3.3 10*6/MM3 (ref 3.77–5.28)
SODIUM SERPL-SCNC: 137 MMOL/L (ref 136–145)
WBC NRBC COR # BLD: 10.05 10*3/MM3 (ref 3.4–10.8)

## 2023-01-31 PROCEDURE — 96376 TX/PRO/DX INJ SAME DRUG ADON: CPT

## 2023-01-31 PROCEDURE — 83735 ASSAY OF MAGNESIUM: CPT | Performed by: NURSE PRACTITIONER

## 2023-01-31 PROCEDURE — 25010000002 METHYLPREDNISOLONE PER 125 MG: Performed by: INTERNAL MEDICINE

## 2023-01-31 PROCEDURE — 96375 TX/PRO/DX INJ NEW DRUG ADDON: CPT

## 2023-01-31 PROCEDURE — 25010000002 DIPHENHYDRAMINE PER 50 MG: Performed by: INTERNAL MEDICINE

## 2023-01-31 PROCEDURE — 36415 COLL VENOUS BLD VENIPUNCTURE: CPT

## 2023-01-31 PROCEDURE — 96367 TX/PROPH/DG ADDL SEQ IV INF: CPT

## 2023-01-31 PROCEDURE — 85025 COMPLETE CBC W/AUTO DIFF WBC: CPT

## 2023-01-31 PROCEDURE — 96413 CHEMO IV INFUSION 1 HR: CPT

## 2023-01-31 PROCEDURE — 80053 COMPREHEN METABOLIC PANEL: CPT

## 2023-01-31 PROCEDURE — 25010000002 CETUXIMAB PER 10 MG: Performed by: INTERNAL MEDICINE

## 2023-01-31 PROCEDURE — 96415 CHEMO IV INFUSION ADDL HR: CPT

## 2023-01-31 RX ORDER — FAMOTIDINE 10 MG/ML
20 INJECTION, SOLUTION INTRAVENOUS ONCE
Status: COMPLETED | OUTPATIENT
Start: 2023-01-31 | End: 2023-01-31

## 2023-01-31 RX ORDER — FAMOTIDINE 10 MG/ML
20 INJECTION, SOLUTION INTRAVENOUS AS NEEDED
Status: DISCONTINUED | OUTPATIENT
Start: 2023-01-31 | End: 2023-01-31 | Stop reason: HOSPADM

## 2023-01-31 RX ORDER — SODIUM CHLORIDE 9 MG/ML
250 INJECTION, SOLUTION INTRAVENOUS ONCE
Status: DISCONTINUED | OUTPATIENT
Start: 2023-01-31 | End: 2023-01-31 | Stop reason: HOSPADM

## 2023-01-31 RX ORDER — METHYLPREDNISOLONE SODIUM SUCCINATE 125 MG/2ML
125 INJECTION, POWDER, LYOPHILIZED, FOR SOLUTION INTRAMUSCULAR; INTRAVENOUS ONCE
Status: COMPLETED | OUTPATIENT
Start: 2023-01-31 | End: 2023-01-31

## 2023-01-31 RX ORDER — ACETAMINOPHEN 325 MG/1
650 TABLET ORAL ONCE
Status: COMPLETED | OUTPATIENT
Start: 2023-01-31 | End: 2023-01-31

## 2023-01-31 RX ORDER — DIPHENHYDRAMINE HYDROCHLORIDE 50 MG/ML
50 INJECTION INTRAMUSCULAR; INTRAVENOUS AS NEEDED
Status: DISCONTINUED | OUTPATIENT
Start: 2023-01-31 | End: 2023-01-31 | Stop reason: HOSPADM

## 2023-01-31 RX ADMIN — METHYLPREDNISOLONE SODIUM SUCCINATE 125 MG: 125 INJECTION, POWDER, FOR SOLUTION INTRAMUSCULAR; INTRAVENOUS at 11:17

## 2023-01-31 RX ADMIN — ACETAMINOPHEN 650 MG: 325 TABLET, FILM COATED ORAL at 11:17

## 2023-01-31 RX ADMIN — FAMOTIDINE 20 MG: 10 INJECTION INTRAVENOUS at 11:19

## 2023-01-31 RX ADMIN — CETUXIMAB 660 MG: 2 SOLUTION INTRAVENOUS at 11:50

## 2023-01-31 RX ADMIN — DIPHENHYDRAMINE HYDROCHLORIDE 50 MG: 50 INJECTION, SOLUTION INTRAMUSCULAR; INTRAVENOUS at 11:22

## 2023-01-31 NOTE — OUTREACH NOTE
Sepsis Week 2 Survey    Flowsheet Row Responses   [facility] facility patient discharged from? [facility]   Does the patient have one of the following disease processes/diagnoses(primary or secondary)? Sepsis   Week 2 attempt successful? No   Unsuccessful attempts Attempt 1          CAMRYN JIMENES - Registered Nurse

## 2023-02-03 ENCOUNTER — TELEPHONE (OUTPATIENT)
Dept: ONCOLOGY | Facility: CLINIC | Age: 78
End: 2023-02-03

## 2023-02-03 ENCOUNTER — TELEPHONE (OUTPATIENT)
Dept: SURGERY | Facility: CLINIC | Age: 78
End: 2023-02-03
Payer: MEDICARE

## 2023-02-03 NOTE — TELEPHONE ENCOUNTER
Caller: MATTI ROBBINS    Relationship to patient: SELF    Best call back number: [phone]    Patient is needing: TO R/S 2-8-23 F/U APPT. PT IS GETTING PORT PLACED THAT MORNING.

## 2023-02-03 NOTE — TELEPHONE ENCOUNTER
Called to confirm port-a-cath , 02/08/23, Dr Guerra, @ [facility] no answer left voice messagr

## 2023-02-07 ENCOUNTER — TELEPHONE (OUTPATIENT)
Dept: ONCOLOGY | Facility: CLINIC | Age: 78
End: 2023-02-07
Payer: MEDICARE

## 2023-02-07 ENCOUNTER — TELEPHONE (OUTPATIENT)
Dept: SURGERY | Facility: CLINIC | Age: 78
End: 2023-02-07
Payer: MEDICARE

## 2023-02-07 DIAGNOSIS — C09.9 SQUAMOUS CELL CARCINOMA OF LEFT TONSIL: Primary | ICD-10-CM

## 2023-02-07 RX ORDER — MEPERIDINE HYDROCHLORIDE 25 MG/ML
25 INJECTION INTRAMUSCULAR; INTRAVENOUS; SUBCUTANEOUS
Status: CANCELLED | OUTPATIENT
Start: 2023-02-15

## 2023-02-07 RX ORDER — DIPHENHYDRAMINE HYDROCHLORIDE 50 MG/ML
50 INJECTION INTRAMUSCULAR; INTRAVENOUS AS NEEDED
Status: CANCELLED | OUTPATIENT
Start: 2023-02-22

## 2023-02-07 RX ORDER — MEPERIDINE HYDROCHLORIDE 25 MG/ML
25 INJECTION INTRAMUSCULAR; INTRAVENOUS; SUBCUTANEOUS
Status: CANCELLED | OUTPATIENT
Start: 2023-02-22

## 2023-02-07 RX ORDER — DIPHENHYDRAMINE HYDROCHLORIDE 50 MG/ML
50 INJECTION INTRAMUSCULAR; INTRAVENOUS AS NEEDED
Status: CANCELLED | OUTPATIENT
Start: 2023-02-15

## 2023-02-07 RX ORDER — FAMOTIDINE 10 MG/ML
20 INJECTION, SOLUTION INTRAVENOUS AS NEEDED
Status: CANCELLED | OUTPATIENT
Start: 2023-02-22

## 2023-02-07 RX ORDER — MEPERIDINE HYDROCHLORIDE 25 MG/ML
25 INJECTION INTRAMUSCULAR; INTRAVENOUS; SUBCUTANEOUS
Status: CANCELLED | OUTPATIENT
Start: 2023-02-08

## 2023-02-07 RX ORDER — ACETAMINOPHEN 325 MG/1
650 TABLET ORAL ONCE
Status: CANCELLED | OUTPATIENT
Start: 2023-02-15

## 2023-02-07 RX ORDER — METHYLPREDNISOLONE SODIUM SUCCINATE 125 MG/2ML
125 INJECTION, POWDER, LYOPHILIZED, FOR SOLUTION INTRAMUSCULAR; INTRAVENOUS ONCE
Status: CANCELLED | OUTPATIENT
Start: 2023-02-15

## 2023-02-07 RX ORDER — FAMOTIDINE 10 MG/ML
20 INJECTION, SOLUTION INTRAVENOUS ONCE
Status: CANCELLED | OUTPATIENT
Start: 2023-02-22

## 2023-02-07 RX ORDER — FAMOTIDINE 10 MG/ML
20 INJECTION, SOLUTION INTRAVENOUS ONCE
Status: CANCELLED | OUTPATIENT
Start: 2023-02-15

## 2023-02-07 RX ORDER — SODIUM CHLORIDE 9 MG/ML
250 INJECTION, SOLUTION INTRAVENOUS ONCE
Status: CANCELLED | OUTPATIENT
Start: 2023-02-15

## 2023-02-07 RX ORDER — FAMOTIDINE 10 MG/ML
20 INJECTION, SOLUTION INTRAVENOUS AS NEEDED
Status: CANCELLED | OUTPATIENT
Start: 2023-02-15

## 2023-02-07 RX ORDER — METHYLPREDNISOLONE SODIUM SUCCINATE 125 MG/2ML
125 INJECTION, POWDER, LYOPHILIZED, FOR SOLUTION INTRAMUSCULAR; INTRAVENOUS ONCE
Status: CANCELLED | OUTPATIENT
Start: 2023-02-22

## 2023-02-07 RX ORDER — ACETAMINOPHEN 325 MG/1
650 TABLET ORAL ONCE
Status: CANCELLED | OUTPATIENT
Start: 2023-02-22

## 2023-02-07 RX ORDER — SODIUM CHLORIDE 9 MG/ML
250 INJECTION, SOLUTION INTRAVENOUS ONCE
Status: CANCELLED | OUTPATIENT
Start: 2023-02-22

## 2023-02-07 NOTE — PRE-PROCEDURE INSTRUCTIONS
PAT phone history completed with pt's daughter, Meliza for upcoming procedure on  2/8/23. Advised to contact Dr. Guerra's office regarding home medications and if any should be taken day of surgery prior to arriving at hospital.    PAT PASS GIVEN/REVIEWED WITH PT.  VERBALIZED UNDERSTANDING OF THE FOLLOWING:  DO NOT EAT, DRINK, SMOKE, USE SMOKELESS TOBACCO OR CHEW GUM AFTER MIDNIGHT THE NIGHT BEFORE SURGERY.  THIS ALSO INCLUDES HARD CANDIES AND MINTS.    DO NOT SHAVE THE AREA TO BE OPERATED ON AT LEAST 48 HOURS PRIOR TO THE PROCEDURE.  DO NOT WEAR MAKE UP OR NAIL POLISH.  DO NOT LEAVE IN ANY PIERCING OR WEAR JEWELRY THE DAY OF SURGERY.      DO NOT USE ADHESIVES IF YOU WEAR DENTURES.    DO NOT WEAR EYE CONTACTS; BRING IN YOUR GLASSES.    ONLY TAKE MEDICATION THE MORNING OF YOUR PROCEDURE IF INSTRUCTED BY YOUR SURGEON WITH ENOUGH WATER TO SWALLOW THE MEDICATION.  IF YOUR SURGEON DID NOT SPECIFY WHICH MEDICATIONS TO TAKE, YOU WILL NEED TO CALL THEIR OFFICE FOR FURTHER INSTRUCTIONS AND DO AS THEY INSTRUCT.    LEAVE ANYTHING YOU CONSIDER VALUABLE AT HOME.    YOU WILL NEED TO ARRANGE FOR SOMEONE TO DRIVE YOU HOME AFTER SURGERY.  IT IS RECOMMENDED THAT YOU DO NOT DRIVE, WORK, DRINK ALCOHOL OR MAKE MAJOR DECISIONS FOR AT LEAST 24 HOURS AFTER YOUR PROCEDURE IS COMPLETE.      THE DAY OF YOUR PROCEDURE, BRING IN THE FOLLOWING IF APPLICABLE:   PICTURE ID AND INSURANCE/MEDICARE OR MEDICAID CARDS/ANY CO-PAY THAT MAY BE DUE   COPY OF ADVANCED DIRECTIVE/LIVING WILL/POWER OR    CPAP/BIPAP/INHALERS   SKIN PREP SHEET   YOUR PREADMISSION TESTING PASS (IF NOT A PHONE HISTORY)

## 2023-02-07 NOTE — TELEPHONE ENCOUNTER
"I returned pt's daughter's call, she stated \"I am calling about my mother's medicine.\"  I advised her that she may take her blood pressure medicine with a small sip of water in the am prior to port-a-cath placement.  She stated \"ok, her blood pressure has been running a little low.\"  I asked her to call her primary care provider's office and let them know.  She voiced understanding.  "

## 2023-02-07 NOTE — TELEPHONE ENCOUNTER
[place] forms for dtr Meliza dropped off.  Meliza was notified today that forms were ready to .  She will  2/8/23 at the [facility].    Leave# [id number]

## 2023-02-08 ENCOUNTER — APPOINTMENT (OUTPATIENT)
Dept: GENERAL RADIOLOGY | Facility: HOSPITAL | Age: 78
End: 2023-02-08
Payer: MEDICARE

## 2023-02-08 ENCOUNTER — HOSPITAL ENCOUNTER (OUTPATIENT)
Facility: HOSPITAL | Age: 78
Setting detail: HOSPITAL OUTPATIENT SURGERY
Discharge: HOME | End: 2023-02-08
Attending: SURGERY | Admitting: SURGERY
Payer: MEDICARE

## 2023-02-08 ENCOUNTER — ANESTHESIA EVENT (OUTPATIENT)
Dept: PERIOP | Facility: HOSPITAL | Age: 78
End: 2023-02-08
Payer: MEDICARE

## 2023-02-08 ENCOUNTER — OFFICE VISIT (OUTPATIENT)
Dept: ONCOLOGY | Facility: CLINIC | Age: 78
End: 2023-02-08
Payer: MEDICARE

## 2023-02-08 ENCOUNTER — ANESTHESIA (OUTPATIENT)
Dept: PERIOP | Facility: HOSPITAL | Age: 78
End: 2023-02-08
Payer: MEDICARE

## 2023-02-08 VITALS
TEMPERATURE: 96.8 F | BODY MASS INDEX: 27.75 KG/M2 | OXYGEN SATURATION: 95 % | SYSTOLIC BLOOD PRESSURE: 183 MMHG | WEIGHT: 147 LBS | RESPIRATION RATE: 12 BRPM | HEIGHT: 61 IN | DIASTOLIC BLOOD PRESSURE: 77 MMHG | HEART RATE: 71 BPM

## 2023-02-08 VITALS
WEIGHT: 147 LBS | OXYGEN SATURATION: 96 % | RESPIRATION RATE: 16 BRPM | BODY MASS INDEX: 27.78 KG/M2 | HEART RATE: 77 BPM | DIASTOLIC BLOOD PRESSURE: 72 MMHG | SYSTOLIC BLOOD PRESSURE: 157 MMHG | TEMPERATURE: 97.9 F

## 2023-02-08 DIAGNOSIS — C76.0 SQUAMOUS CELL CARCINOMA OF HEAD AND NECK: Primary | ICD-10-CM

## 2023-02-08 DIAGNOSIS — C09.9 SQUAMOUS CELL CARCINOMA OF LEFT TONSIL: ICD-10-CM

## 2023-02-08 DIAGNOSIS — C76.0 SQUAMOUS CELL CARCINOMA OF HEAD AND NECK: ICD-10-CM

## 2023-02-08 PROCEDURE — 99214 OFFICE O/P EST MOD 30 MIN: CPT | Performed by: INTERNAL MEDICINE

## 2023-02-08 PROCEDURE — 0 CEFAZOLIN SODIUM-DEXTROSE 2-3 GM-%(50ML) RECONSTITUTED SOLUTION: Performed by: SURGERY

## 2023-02-08 PROCEDURE — 25010000002 ONDANSETRON PER 1 MG: Performed by: NURSE ANESTHETIST, CERTIFIED REGISTERED

## 2023-02-08 PROCEDURE — 36561 INSERT TUNNELED CV CATH: CPT | Performed by: SURGERY

## 2023-02-08 PROCEDURE — 25010000002 DEXAMETHASONE PER 1 MG: Performed by: NURSE ANESTHETIST, CERTIFIED REGISTERED

## 2023-02-08 PROCEDURE — 0 LIDOCAINE 1 % SOLUTION: Performed by: SURGERY

## 2023-02-08 PROCEDURE — 77001 FLUOROGUIDE FOR VEIN DEVICE: CPT | Performed by: SURGERY

## 2023-02-08 PROCEDURE — 25010000002 HEPARIN (PORCINE) PER 1000 UNITS: Performed by: SURGERY

## 2023-02-08 PROCEDURE — C1788 PORT, INDWELLING, IMP: HCPCS | Performed by: SURGERY

## 2023-02-08 PROCEDURE — 25010000002 PROPOFOL 10 MG/ML EMULSION: Performed by: NURSE ANESTHETIST, CERTIFIED REGISTERED

## 2023-02-08 PROCEDURE — 25010000002 METOCLOPRAMIDE PER 10 MG: Performed by: NURSE ANESTHETIST, CERTIFIED REGISTERED

## 2023-02-08 PROCEDURE — 25010000002 FENTANYL CITRATE (PF) 50 MCG/ML SOLUTION: Performed by: NURSE ANESTHETIST, CERTIFIED REGISTERED

## 2023-02-08 PROCEDURE — 71045 X-RAY EXAM CHEST 1 VIEW: CPT

## 2023-02-08 PROCEDURE — 76000 FLUOROSCOPY <1 HR PHYS/QHP: CPT

## 2023-02-08 DEVICE — PRT INTRO VASC/INTERV VORTEX FILL/HL DETACH/POLYURET/CATH 8F: Type: IMPLANTABLE DEVICE | Site: CHEST | Status: FUNCTIONAL

## 2023-02-08 RX ORDER — DIPHENHYDRAMINE HYDROCHLORIDE 50 MG/ML
50 INJECTION INTRAMUSCULAR; INTRAVENOUS AS NEEDED
OUTPATIENT
Start: 2023-03-01

## 2023-02-08 RX ORDER — DRONABINOL 2.5 MG/1
2.5 CAPSULE ORAL
Qty: 60 CAPSULE | Refills: 1 | Status: SHIPPED | OUTPATIENT
Start: 2023-02-08 | End: 2023-03-01 | Stop reason: HOSPADM

## 2023-02-08 RX ORDER — DEXAMETHASONE SODIUM PHOSPHATE 4 MG/ML
INJECTION, SOLUTION INTRA-ARTICULAR; INTRALESIONAL; INTRAMUSCULAR; INTRAVENOUS; SOFT TISSUE AS NEEDED
Status: DISCONTINUED | OUTPATIENT
Start: 2023-02-08 | End: 2023-02-08 | Stop reason: SURG

## 2023-02-08 RX ORDER — MEPERIDINE HYDROCHLORIDE 25 MG/ML
25 INJECTION INTRAMUSCULAR; INTRAVENOUS; SUBCUTANEOUS
OUTPATIENT
Start: 2023-03-01

## 2023-02-08 RX ORDER — FAMOTIDINE 10 MG/ML
20 INJECTION, SOLUTION INTRAVENOUS AS NEEDED
OUTPATIENT
Start: 2023-03-01

## 2023-02-08 RX ORDER — FENTANYL CITRATE 50 UG/ML
INJECTION, SOLUTION INTRAMUSCULAR; INTRAVENOUS AS NEEDED
Status: DISCONTINUED | OUTPATIENT
Start: 2023-02-08 | End: 2023-02-08 | Stop reason: SURG

## 2023-02-08 RX ORDER — CEFAZOLIN SODIUM 2 G/50ML
2 SOLUTION INTRAVENOUS ONCE
Status: COMPLETED | OUTPATIENT
Start: 2023-02-08 | End: 2023-02-08

## 2023-02-08 RX ORDER — LIDOCAINE HYDROCHLORIDE 20 MG/ML
INJECTION, SOLUTION INTRAVENOUS AS NEEDED
Status: DISCONTINUED | OUTPATIENT
Start: 2023-02-08 | End: 2023-02-08 | Stop reason: SURG

## 2023-02-08 RX ORDER — PROPOFOL 10 MG/ML
VIAL (ML) INTRAVENOUS AS NEEDED
Status: DISCONTINUED | OUTPATIENT
Start: 2023-02-08 | End: 2023-02-08 | Stop reason: SURG

## 2023-02-08 RX ORDER — SODIUM CHLORIDE 9 MG/ML
250 INJECTION, SOLUTION INTRAVENOUS ONCE
OUTPATIENT
Start: 2023-03-01

## 2023-02-08 RX ORDER — LIDOCAINE HYDROCHLORIDE 10 MG/ML
INJECTION, SOLUTION INFILTRATION; PERINEURAL AS NEEDED
Status: DISCONTINUED | OUTPATIENT
Start: 2023-02-08 | End: 2023-02-08 | Stop reason: HOSPADM

## 2023-02-08 RX ORDER — METOCLOPRAMIDE HYDROCHLORIDE 5 MG/ML
INJECTION INTRAMUSCULAR; INTRAVENOUS AS NEEDED
Status: DISCONTINUED | OUTPATIENT
Start: 2023-02-08 | End: 2023-02-08 | Stop reason: SURG

## 2023-02-08 RX ORDER — METHYLPREDNISOLONE SODIUM SUCCINATE 125 MG/2ML
125 INJECTION, POWDER, LYOPHILIZED, FOR SOLUTION INTRAMUSCULAR; INTRAVENOUS ONCE
OUTPATIENT
Start: 2023-03-01

## 2023-02-08 RX ORDER — ONDANSETRON 2 MG/ML
4 INJECTION INTRAMUSCULAR; INTRAVENOUS ONCE AS NEEDED
Status: DISCONTINUED | OUTPATIENT
Start: 2023-02-08 | End: 2023-02-08 | Stop reason: HOSPADM

## 2023-02-08 RX ORDER — SODIUM CHLORIDE, SODIUM LACTATE, POTASSIUM CHLORIDE, CALCIUM CHLORIDE 600; 310; 30; 20 MG/100ML; MG/100ML; MG/100ML; MG/100ML
1000 INJECTION, SOLUTION INTRAVENOUS CONTINUOUS
Status: DISCONTINUED | OUTPATIENT
Start: 2023-02-08 | End: 2023-02-08 | Stop reason: HOSPADM

## 2023-02-08 RX ORDER — MAGNESIUM HYDROXIDE 1200 MG/15ML
LIQUID ORAL AS NEEDED
Status: DISCONTINUED | OUTPATIENT
Start: 2023-02-08 | End: 2023-02-08 | Stop reason: HOSPADM

## 2023-02-08 RX ORDER — FAMOTIDINE 10 MG/ML
20 INJECTION, SOLUTION INTRAVENOUS ONCE
OUTPATIENT
Start: 2023-03-01

## 2023-02-08 RX ORDER — HYDROCODONE BITARTRATE AND ACETAMINOPHEN 7.5; 325 MG/1; MG/1
1 TABLET ORAL EVERY 6 HOURS PRN
Qty: 15 TABLET | Refills: 0 | Status: ON HOLD | OUTPATIENT
Start: 2023-02-08 | End: 2023-03-01 | Stop reason: SDUPTHER

## 2023-02-08 RX ORDER — ACETAMINOPHEN 325 MG/1
650 TABLET ORAL ONCE
OUTPATIENT
Start: 2023-03-01

## 2023-02-08 RX ORDER — ONDANSETRON 2 MG/ML
INJECTION INTRAMUSCULAR; INTRAVENOUS AS NEEDED
Status: DISCONTINUED | OUTPATIENT
Start: 2023-02-08 | End: 2023-02-08 | Stop reason: SURG

## 2023-02-08 RX ADMIN — SODIUM CHLORIDE, POTASSIUM CHLORIDE, SODIUM LACTATE AND CALCIUM CHLORIDE 1000 ML: 600; 310; 30; 20 INJECTION, SOLUTION INTRAVENOUS at 09:12

## 2023-02-08 RX ADMIN — GLYCOPYRROLATE 0.2 MCG: 0.2 INJECTION, SOLUTION INTRAMUSCULAR; INTRAVITREAL at 12:08

## 2023-02-08 RX ADMIN — DEXAMETHASONE SODIUM PHOSPHATE 4 MG: 4 INJECTION, SOLUTION INTRAMUSCULAR; INTRAVENOUS at 12:08

## 2023-02-08 RX ADMIN — FENTANYL CITRATE 100 MCG: 50 INJECTION INTRAMUSCULAR; INTRAVENOUS at 12:08

## 2023-02-08 RX ADMIN — ONDANSETRON 4 MG: 2 INJECTION INTRAMUSCULAR; INTRAVENOUS at 12:08

## 2023-02-08 RX ADMIN — METOCLOPRAMIDE 10 MG: 5 INJECTION, SOLUTION INTRAMUSCULAR; INTRAVENOUS at 12:08

## 2023-02-08 RX ADMIN — LIDOCAINE HYDROCHLORIDE 100 MG: 20 INJECTION, SOLUTION INTRAVENOUS at 12:08

## 2023-02-08 RX ADMIN — PROPOFOL 150 MG: 10 INJECTION, EMULSION INTRAVENOUS at 12:08

## 2023-02-08 RX ADMIN — CEFAZOLIN SODIUM 2 G: 2 SOLUTION INTRAVENOUS at 12:10

## 2023-02-08 NOTE — ANESTHESIA POSTPROCEDURE EVALUATION
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) [C76.0])    Surgeons: Brigido Guerra MD Provider: Alphonse Kellogg CRNA    Anesthesia Type: MAC ASA Status: 3          Anesthesia Type: MAC    Vitals  Vitals Value Taken Time   /72 02/08/23 1314   Temp 97.9 °F (36.6 °C) 02/08/23 1314   Pulse 77 02/08/23 1314   Resp 16 02/08/23 1314   SpO2 96 % 02/08/23 1314           Post Anesthesia Care and Evaluation    Patient location during evaluation: PHASE II  Patient participation: complete - patient participated  Level of consciousness: awake  Pain score: 1  Pain management: adequate    Airway patency: patent  Anesthetic complications: No anesthetic complications  PONV Status: controlled  Cardiovascular status: acceptable and stable  Respiratory status: acceptable  Hydration status: acceptable

## 2023-02-08 NOTE — ANESTHESIA PREPROCEDURE EVALUATION
Anesthesia Evaluation     Patient summary reviewed and Nursing notes reviewed   history of anesthetic complications: PONV  NPO Solid Status: > 8 hours  NPO Liquid Status: > 8 hours           Airway   Mallampati: III  TM distance: >3 FB  Neck ROM: full  Difficult intubation highly probable  Comment: Cancer of left tonsil    Dental    (+) upper dentures    Pulmonary - normal exam   (+) shortness of breath,   Cardiovascular - normal exam  Exercise tolerance: poor (<4 METS)    ECG reviewed  Patient on routine beta blocker and Beta blocker given within 24 hours of surgery    (+) hypertension 2 medications or greater, dysrhythmias Bradycardia, DVT, hyperlipidemia,     ROS comment: 1/20/23 ECHO -   •  Left ventricular systolic function is normal. Calculated left ventricular EF = 55% Left ventricular ejection fraction appears to be 56 - 60%.  •  Left ventricular diastolic function is consistent with (grade II w/high LAP) pseudonormalization.  •  Estimated right ventricular systolic pressure from tricuspid regurgitation is normal (<35 mmHg).         Normal sinus rhythm  Left ventricular hypertrophy with repolarization abnormality  Abnormal ECG  When compared with ECG of 19-JAN-2023 16:50, (Unconfirmed)  Sinus rhythm has replaced Junctional rhythm  Nonspecific T wave abnormality, worse in Lateral leads  Confirmed by TABBY PALMER ([id number]) on 1/25/2023 8:53:45 AM    Neuro/Psych  (+) psychiatric history Anxiety and Depression, dementia,    GI/Hepatic/Renal/Endo    (+)  GERD,      Musculoskeletal     Abdominal    Substance History - negative use     OB/GYN negative ob/gyn ROS         Other   arthritis,    history of cancer (tonsilar cancer) active                    Anesthesia Plan    ASA 3     MAC     (Risks and benefits discussed including risk of aspiration, recall and dental damage. All patient questions answered.    Will continue with plan of care.)  intravenous induction     Anesthetic plan, risks, benefits, and alternatives  have been provided, discussed and informed consent has been obtained with: patient.  Pre-procedure education provided  Plan discussed with CRNA.        CODE STATUS:

## 2023-02-08 NOTE — PROGRESS NOTES
DATE OF VISIT: 2/8/2023    REASON FOR VISIT: Followup for metastatic left tonsillar CA     PROBLEM LIST:  1.  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  Presented with a growing left cervical lymph node.  B.  Diagnosed after ultrasound-guided biopsy done on December 12, 2022.  C.  No enough tissue for molecular analysis  D.  Started concurrent Erbitux with radiation January 31, 2023.  2.  Hypertension  3.  Hypercholesteremia  4.  Gout arthritis      HISTORY OF PRESENT ILLNESS: The patient is a very pleasant 77 y.o. female  with past medical history significant for metastatic left tonsillar cancer diagnosed December 2022. The patient is here today for scheduled follow-up visit with treatment.    SUBJECTIVE: Vesta is here today with her daughter.  She was able to tolerate her first cycle without any immediate side effects.  She is complaining of poor appetite.    Past History:  Medical History: has a past medical history of Arthritis, Cancer (HCC), Deep vein thrombosis (HCC), Dementia (HCC), Dysphagia, GERD (gastroesophageal reflux disease), Hyperlipidemia, Hypertension, PONV (postoperative nausea and vomiting), SOB (shortness of breath), Vitamin D deficiency, and Wears dentures.   Surgical History: has a past surgical history that includes Colonoscopy; Appendectomy; Cataract extraction; Cholecystectomy; Tubal ligation; Hysterectomy (1991); and Oophorectomy (Bilateral, 1991).   Family History: family history includes Breast cancer in her maternal aunt and paternal aunt; Cancer in her father and sister; Heart failure in her mother; Hyperlipidemia in her mother; Hypertension in her mother; Stroke in her sister.   Social History: reports that she quit smoking about 36 years ago. Her smoking use included cigarettes. She has a 1.50 pack-year smoking history. She has never used smokeless tobacco. She reports that she does not drink alcohol and does not use drugs.    (Not in a  "hospital admission)     Allergies: Trazodone     Review of Systems   Constitutional: Positive for fatigue.       PHYSICAL EXAMINATION:   BP (!) 183/77   Pulse 71   Temp 96.8 °F (36 °C) (Temporal)   Resp 12   Ht 154.9 cm (61\")   Wt 66.7 kg (147 lb)   SpO2 95%   BMI 27.78 kg/m²    Pain Score    02/08/23 1410   PainSc: 0-No pain                     ECOG Performance Status: 2 - Symptomatic, <50% confined to bed      General Appearance:      alert, cooperative, no apparent distress and appears stated age   Lungs:   Clear to auscultation bilaterally; respirations regular, even, and unlabored bilaterally   Heart:  Regular rate and rhythm, no murmurs appreciated   Abdomen:   Soft, non-tender, non-distended and no organomegaly                 Infusion on 01/31/2023   Component Date Value Ref Range Status   • Magnesium 01/31/2023 2.0  1.6 - 2.4 mg/dL Final   • Glucose 01/31/2023 87  65 - 99 mg/dL Final   • BUN 01/31/2023 10  8 - 23 mg/dL Final   • Creatinine 01/31/2023 0.88  0.57 - 1.00 mg/dL Final   • Sodium 01/31/2023 137  136 - 145 mmol/L Final   • Potassium 01/31/2023 4.6  3.5 - 5.2 mmol/L Final   • Chloride 01/31/2023 105  98 - 107 mmol/L Final   • CO2 01/31/2023 22.6  22.0 - 29.0 mmol/L Final   • Calcium 01/31/2023 9.2  8.6 - 10.5 mg/dL Final   • Total Protein 01/31/2023 6.5  6.0 - 8.5 g/dL Final   • Albumin 01/31/2023 3.5  3.5 - 5.2 g/dL Final   • ALT (SGPT) 01/31/2023 12  1 - 33 U/L Final   • AST (SGOT) 01/31/2023 20  1 - 32 U/L Final   • Alkaline Phosphatase 01/31/2023 100  39 - 117 U/L Final   • Total Bilirubin 01/31/2023 0.3  0.0 - 1.2 mg/dL Final   • Globulin 01/31/2023 3.0  gm/dL Final   • A/G Ratio 01/31/2023 1.2  g/dL Final   • BUN/Creatinine Ratio 01/31/2023 11.4  7.0 - 25.0 Final   • Anion Gap 01/31/2023 9.4  5.0 - 15.0 mmol/L Final   • eGFR 01/31/2023 66.9  >60.0 mL/min/1.73 Final   • WBC 01/31/2023 10.05  3.40 - 10.80 10*3/mm3 Final   • RBC 01/31/2023 3.30 (L)  3.77 - 5.28 10*6/mm3 Final   • " Hemoglobin 01/31/2023 9.6 (L)  12.0 - 15.9 g/dL Final   • Hematocrit 01/31/2023 29.9 (L)  34.0 - 46.6 % Final   • MCV 01/31/2023 90.6  79.0 - 97.0 fL Final   • MCH 01/31/2023 29.1  26.6 - 33.0 pg Final   • MCHC 01/31/2023 32.1  31.5 - 35.7 g/dL Final   • RDW 01/31/2023 16.9 (H)  12.3 - 15.4 % Final   • RDW-SD 01/31/2023 56.0 (H)  37.0 - 54.0 fl Final   • MPV 01/31/2023 10.7  6.0 - 12.0 fL Final   • Platelets 01/31/2023 239  140 - 450 10*3/mm3 Final   • Neutrophil % 01/31/2023 73.9  42.7 - 76.0 % Final   • Lymphocyte % 01/31/2023 15.3 (L)  19.6 - 45.3 % Final   • Monocyte % 01/31/2023 7.3  5.0 - 12.0 % Final   • Eosinophil % 01/31/2023 2.5  0.3 - 6.2 % Final   • Basophil % 01/31/2023 0.5  0.0 - 1.5 % Final   • Immature Grans % 01/31/2023 0.5  0.0 - 0.5 % Final   • Neutrophils, Absolute 01/31/2023 7.43 (H)  1.70 - 7.00 10*3/mm3 Final   • Lymphocytes, Absolute 01/31/2023 1.54  0.70 - 3.10 10*3/mm3 Final   • Monocytes, Absolute 01/31/2023 0.73  0.10 - 0.90 10*3/mm3 Final   • Eosinophils, Absolute 01/31/2023 0.25  0.00 - 0.40 10*3/mm3 Final   • Basophils, Absolute 01/31/2023 0.05  0.00 - 0.20 10*3/mm3 Final   • Immature Grans, Absolute 01/31/2023 0.05  0.00 - 0.05 10*3/mm3 Final   • nRBC 01/31/2023 0.0  0.0 - 0.2 /100 WBC Final   Infusion on 01/27/2023   Component Date Value Ref Range Status   • Glucose 01/27/2023 107 (H)  65 - 99 mg/dL Final   • BUN 01/27/2023 10  8 - 23 mg/dL Final   • Creatinine 01/27/2023 0.80  0.57 - 1.00 mg/dL Final   • Sodium 01/27/2023 138  136 - 145 mmol/L Final   • Potassium 01/27/2023 3.1 (L)  3.5 - 5.2 mmol/L Final   • Chloride 01/27/2023 106  98 - 107 mmol/L Final   • CO2 01/27/2023 21.2 (L)  22.0 - 29.0 mmol/L Final   • Calcium 01/27/2023 7.9 (L)  8.6 - 10.5 mg/dL Final   • Total Protein 01/27/2023 5.8 (L)  6.0 - 8.5 g/dL Final   • Albumin 01/27/2023 3.1 (L)  3.5 - 5.2 g/dL Final   • ALT (SGPT) 01/27/2023 11  1 - 33 U/L Final   • AST (SGOT) 01/27/2023 15  1 - 32 U/L Final   • Alkaline  Phosphatase 01/27/2023 85  39 - 117 U/L Final   • Total Bilirubin 01/27/2023 0.3  0.0 - 1.2 mg/dL Final   • Globulin 01/27/2023 2.7  gm/dL Final   • A/G Ratio 01/27/2023 1.1  g/dL Final   • BUN/Creatinine Ratio 01/27/2023 12.5  7.0 - 25.0 Final   • Anion Gap 01/27/2023 10.8  5.0 - 15.0 mmol/L Final   • eGFR 01/27/2023 75.1  >60.0 mL/min/1.73 Final   • Magnesium 01/27/2023 1.6  1.6 - 2.4 mg/dL Final   • WBC 01/27/2023 8.40  3.40 - 10.80 10*3/mm3 Final   • RBC 01/27/2023 3.04 (L)  3.77 - 5.28 10*6/mm3 Final   • Hemoglobin 01/27/2023 9.0 (L)  12.0 - 15.9 g/dL Final   • Hematocrit 01/27/2023 28.1 (L)  34.0 - 46.6 % Final   • MCV 01/27/2023 92.4  79.0 - 97.0 fL Final   • MCH 01/27/2023 29.6  26.6 - 33.0 pg Final   • MCHC 01/27/2023 32.0  31.5 - 35.7 g/dL Final   • RDW 01/27/2023 16.9 (H)  12.3 - 15.4 % Final   • RDW-SD 01/27/2023 55.7 (H)  37.0 - 54.0 fl Final   • MPV 01/27/2023 10.7  6.0 - 12.0 fL Final   • Platelets 01/27/2023 221  140 - 450 10*3/mm3 Final   • Neutrophil % 01/27/2023 73.5  42.7 - 76.0 % Final   • Lymphocyte % 01/27/2023 13.7 (L)  19.6 - 45.3 % Final   • Monocyte % 01/27/2023 10.7  5.0 - 12.0 % Final   • Eosinophil % 01/27/2023 1.2  0.3 - 6.2 % Final   • Basophil % 01/27/2023 0.2  0.0 - 1.5 % Final   • Immature Grans % 01/27/2023 0.7 (H)  0.0 - 0.5 % Final   • Neutrophils, Absolute 01/27/2023 6.17  1.70 - 7.00 10*3/mm3 Final   • Lymphocytes, Absolute 01/27/2023 1.15  0.70 - 3.10 10*3/mm3 Final   • Monocytes, Absolute 01/27/2023 0.90  0.10 - 0.90 10*3/mm3 Final   • Eosinophils, Absolute 01/27/2023 0.10  0.00 - 0.40 10*3/mm3 Final   • Basophils, Absolute 01/27/2023 0.02  0.00 - 0.20 10*3/mm3 Final   • Immature Grans, Absolute 01/27/2023 0.06 (H)  0.00 - 0.05 10*3/mm3 Final   • nRBC 01/27/2023 0.0  0.0 - 0.2 /100 WBC Final        Adult Transthoracic Echo Complete W/ Cont if Necessary Per Protocol    Result Date: 1/20/2023  Narrative: •  Left ventricular systolic function is normal. Calculated left  ventricular EF = 55% Left ventricular ejection fraction appears to be 56 - 60%. •  Left ventricular diastolic function is consistent with (grade II w/high LAP) pseudonormalization. •  Estimated right ventricular systolic pressure from tricuspid regurgitation is normal (<35 mmHg).     CT Abdomen Pelvis Without Contrast    Result Date: 1/20/2023  Narrative: CT ABDOMEN PELVIS WO CONTRAST Date of Exam: 1/20/2023 11:24 AM EST Indication: diarrhea, crampy abd pain. history of metastatic tonsillar cancer. Comparison: None Technique: Axial CT images were obtained of the abdomen and pelvis without the administration of contrast. Reconstructed coronal and sagittal images were also obtained. Automated exposure control and iterative construction methods were used. Findings: There is mild left basilar atelectasis. Minimal subpleural fibrotic changes are present within the lung bases. Trace left basilar pleural fluid is present. Heart size is normal with mild coronary artery calcifications. There is diffuse wall thickening within the colon, suggesting infectious or inflammatory colitis. Small air-fluid levels are seen within both large bowel and small bowel. The stomach appears unremarkable. No indication of high-grade bowel obstruction. Cholecystectomy. Noncontrast appearance of the liver, spleen, pancreas, adrenals, and kidneys is within normal limits. Tiny esophageal hiatal hernia. Mild to moderate abdominal aortic calcific atherosclerosis. No pathologically enlarged lymph nodes are identified. Soft tissue mass is seen. Urinary bladder is normal. Small air-fluid level is seen within the rectum. Hysterectomy changes are present. Moderate L4-5 and L5-S1 facet arthropathy. Lumbar levoscoliosis with degenerative loss of disc height eccentrically on the right at L2-3 and L3-4. No acute osseous abnormality.     Impression: 1. Diffuse thickening of the colon suggesting infectious or inflammatory colitis. Moderate to. Air-fluid levels  are seen throughout the colon, rectum, and within multiple small bowel loops, suggesting a diarrheal state. Report 2. No evidence of a metastatic disease in the abdomen or pelvis in this patient with history of tonsillar cancer. 3. Mild left basilar atelectasis. 4. Cholecystectomy, hysterectomy. Electronically Signed: Kenya Dayzora  1/20/2023 11:49 AM EST  Workstation ID: [id number]    CT Head Without Contrast    Result Date: 1/19/2023  Narrative: CT HEAD WO CONTRAST Date of Exam: 1/19/2023 10:14 PM EST Indication: fall, hit head, AMS. Comparison: MRI 1/3/2023, CT 12/2/2022 Technique: Axial CT images were obtained of the head without contrast administration.  Reconstructed coronal and sagittal images were also obtained. Automated exposure control and iterative construction methods were used. Findings: There is no evidence of hemorrhage. There is no mass effect or midline shift. There is no extracerebral collection. Ventricles are normal in size and configuration for patient's stated age.  Stable moderate periventricular white matter disease likely related to chronic intravascular ischemic change. No significant soft tissue hematoma. Posterior fossa is within normal limits. Calvarium and skull base appear intact.  An air-fluid levels present within the left maxillary sinus. There is complete opacification of the right maxillary sinus. Air-fluid levels are present within the frontal sinuses, the sphenoid sinuses and the ethmoid air cells. Visualized orbits are unremarkable.     Impression: Impression: No acute intracranial process. Significant paranasal sinus disease with air-fluid levels consistent with acute sinusitis. Electronically Signed: Lisbeth Salinas  1/19/2023 10:52 PM EST  Workstation ID: [id number]    CT Soft Tissue Neck With Contrast    Result Date: 1/27/2023  Narrative: PROCEDURE: CT SOFT TISSUE NECK W CONTRAST-  HISTORY: c09.9; C09.9-Malignant neoplasm of tonsil, unspecified  COMPARISON: PET/CT dated 01/16/2023   TECHNIQUE: Axial images were obtained from the skull base through the upper chest following the administration of Isovue 300. Coronal images were reformatted.  FINDINGS: There is a left tonsillar mass with impingement of the left side of the oropharynx. This mass measures approximately 34 mm in AP dimension. Left tonsillar mass appears similar to the prior exam. A 22 mm mass/lymph node adjacent to the sternocleidomastoid muscle also appears similar to the prior exam. The thyroid is unremarkable. The vocal cords and epiglottis are unremarkable. The lung apices are clear.  The right maxillary sinus is nearly completely opacified with an air-fluid level, similar to the prior exam. There is bilateral ethmoid air cell thickening. Remaining paranasal sinuses and mastoid air cells are clear.       Impression: Stable left cervical masses comparison to recent PET exam.    This study was performed with techniques to keep radiation doses as low as reasonably achievable (ALARA). Individualized dose reduction techniques using automated exposure control or adjustment of mA and/or kV according to the patient size were employed.   139.51 mGy.cm    Images were reviewed, interpreted, and dictated by Dr. Kenya Levi MD Transcribed by Bere Palacios PA-C.  This report was signed and finalized on 1/27/2023 3:42 PM by Kenya Levi MD.    CT Chest Without Contrast Diagnostic    Result Date: 1/20/2023  Narrative: EXAMINATION: CT SCAN OF THE CHEST WITHOUT INTRAVENOUS CONTRAST DATE OF EXAM: 1/19/2023 10:20 PM HISTORY: recent COVID, evaluate for aspiration, exertional dyspnea COMPARISON: Chest radiograph 7/24/2017. TECHNIQUE: CT examination of the chest was performed without intravenous contrast. CT dose lowering techniques were used, to include: automated exposure control, adjustment for patient size, and/or use of iterative reconstruction. 3D MIP reconstruction was performed under concurrent supervision not requiring an independent  workstation, in order to increase the sensitivity for detection of pulmonary nodules. Note: The exam is limited because some types of pathology may not be adequately demonstrated due to lack of contrast enhancement. FINDINGS: CHEST: Lungs:  No focal consolidation. Bilateral lower lobe dependent reticular opacities. Patchy bandlike scarring in the right lower lobe. Airways appear to be clear.. Pleura: No pneumothorax or pleural effusion. Mediastinum And Scarlett: No evidence of acute abnormality. Cardiovascular: No evidence of acute abnormality on this nonenhanced exam. Patchy moderate calcified atherosclerosis of the aorta. At least mild patchy coronary artery calcification of the left coronary system. No pericardial effusion. Chest Wall:  No evidence of acute abnormality. Upper Abdomen: No evidence of acute abnormality. Status post cholecystectomy. Tiny hiatal hernia. Musculoskeletal: No evidence of acute abnormality. Generalized osteopenia. Degenerative changes of the spine, age-appropriate.     Impression: 1.  No focal consolidation, pneumothorax, or pleural effusion. 2.  Minimal bibasal atelectasis/scarring. Superimposed small airways infection cannot be entirely excluded. Electronically signed by:  Claudio Edwards D.O.  1/19/2023 10:12 PM Mountain Time    XR Chest 1 View    Result Date: 2/8/2023  Narrative: PROCEDURE: XR CHEST 1 VW-  HISTORY: port; C76.0-Malignant neoplasm of head, face and neck , port placement  COMPARISON:  None  FINDINGS:  Portable view of the chest demonstrates the lungs to be grossly clear. There is no evidence of effusion, pneumothorax or other significant pleural disease. The mediastinum is unremarkable.  The heart size is normal.  Right jugular Port-A-Cath is seen with tip in the mid SVC.      Impression: No evidence of pneumothorax following right-sided Port-A-Cath placement  This report was signed and finalized on 2/8/2023 1:46 PM by Linus Nash MD.    XR Chest 1 View    Result Date:  1/19/2023  Narrative: XR CHEST 1 VW Date of Exam: 1/19/2023 5:01 PM EST Indication: Weak/Dizzy/AMS triage protocol. Comparison: 7/24/2017 Findings: There are no airspace consolidations. No pleural fluid. No pneumothorax. The pulmonary vasculature appears within normal limits. The cardiac and mediastinal silhouette appear unremarkable. No acute osseous abnormality identified.     Impression: Impression: No acute cardiopulmonary process. Electronically Signed: Lisbeth Salinas  1/19/2023 5:29 PM EST  Workstation ID: [id number]    Duplex Venous Lower Extremity - Bilateral CAR    Result Date: 1/20/2023  Narrative: •  The bilateral lower extremity venous duplex scan is negative for DVT and SVT in the areas visualized. •  The tibial level veins were not well visualized, but appeared negative for DVT in the images obtained.     FL C Arm During Surgery    Result Date: 2/8/2023  Narrative: This procedure was auto-finalized with no dictation required.    NM PET/CT Skull Base to Mid Thigh    Result Date: 1/16/2023  Narrative: NM PET/CT SKULL BASE TO MID THIGH Date of Exam: 1/16/2023 1:50 PM EST Indication: staging scans, new diagnosis squamous cell carcinoma or head and neck. Comparison: No previous PET scan. No recent cross-sectional imaging studies. Technique: With fasting blood glucose level of 109 MG/DL, a total 12.6 mm of FDG was administered via right antecubital vein. Following appropriate delay, PET and CT images were obtained from the level of the skull vertex to the mid thighs and fused multiplanar images reconstructed. The CT scan is an unenhanced low-dose study for reference the PET scan only and does not constitute a standard diagnostic CT scan. Findings: Patient history indicates metastatic squamous cell carcinoma to the left neck. Diagnosis per left neck lymph node biopsy. Unknown primary. Today's study shows hypermetabolic emeka activity in the left neck, dorsal to the left sternocleidomastoid with maximum SUV of  10.8, and also a hypermetabolic mass approximately 3.0 x 2.5 cm in maximal oblique AP and transverse diameters, encompassing the area of the left palatine and lingual tonsils, with some narrowing of the airway, and with what appears to be mild invasion of the base the tongue.  Maximum SUV of this apparent left tonsillar mass is 14.3. There are a few immediately adjacent small nodes around the margins of the left neck node, which may be involved but cannot be measured separately for SUV. No additional separate hypermetabolic foci are seen in the neck. No significant asymmetry of uptake is seen in the brain. Very weak activity seen in the mediastinum and pulmonary oscar, but no significantly hypermetabolic node or mass is seen to suggest metastatic disease. No hypermetabolic pulmonary parenchymal disease is seen. Below no diaphragm, no hypermetabolic solid organ disease or adenopathy is appreciated. There is expected GI and  tract activity. No pathologic marrow space disease is seen. Review of the CT scan shows extensive paranasal sinus disease with air-fluid levels throughout most of the paranasal sinuses. Only low-level activity is seen with maximum SUV of 3.2 Mastoid air cells are clear. .     Impression: Impression: 1. Strongly hypermetabolic 3 cm left tonsillar mass, and strongly hypermetabolic left level 2B lymph node consistent with primary lesion and local metastasis. 2. No evidence of distant metastatic disease. 3. Incidentally noted pansinus disease, with numerous air-fluid levels, but only mild mucosal activity. Electronically Signed: Robinson Chopra  1/16/2023 4:08 PM EST  Workstation ID: [id number]      ASSESSMENT: The patient is a very pleasant 77 y.o. female  with left tonsillar cancer      PLAN:    1.  Left tonsillar squamous cell carcinoma T2 N1 M0 stage I, HPV unknown:  A.  I will proceed with treatment as scheduled today Erbitux weekly cycle #2.  B.  The patient will follow with us in 2 weeks to evaluate  for treatment tolerance.  C.  The patient will continue follow-up with Dr. Novoa for concurrent daily radiation.  D.  I will repeat her scans 6 weeks after completion of treatment.  E.  I did go over potential side effects of treatment including dermatitis colitis and hypomagnesemia.  F.  I will monitor the patient blood work including blood counts kidney function liver function and electrolytes.    2.  Malnutrition:  A.  I will set her up to meet with our oncology dietitian.  B.  We will continue nutritional supplements.    3.  Hypertension:  A.  She will continue Lasix Cozaar and metoprolol.  B.  We will monitor her blood pressure while she is in active cancer treatment.  This could interfere with our management since treatment can cause hypotension.    4.  Hypercholesteremia:  A.  She will continue Lipitor 10 mg daily    5.  Gout arthritis:  A.  She will continue allopurinol.    6.  Poor appetite with weight loss:  A.  I will start the patient on Marinol 2.5 mg twice daily.  B.  The patient has been on Megace before without any improvement.    FOLLOW UP: 2 weeks with treatment.    Jennifer Lora MD  2/8/2023

## 2023-02-08 NOTE — OP NOTE
PATIENT:    Vesta Landry    DATE OF SURGERY:  2/8/2023    PHYSICIAN:    Brigido Guerra MD    REFERRING PHYSICIAN:  Brigido Guerra MD    YOB: 1945    PREOPERATIVE DIAGNOSIS:  Need for chemotherapy    POSTOPERATIVE DIAGNOSIS:  Need for chemotherapy    PROCEDURE:  Right subclavian port-a-cath placement    EBL:  Less than 50 cc    COMPLICATIONS:  None    OPERATIVE PROCEDURE:  The patient was taken to the operating room in the normal manner.  I did speak with the patient today and marked them accordingly.  An appropriate timeout was performed by the nursing staff intraoperatively prior to the incision.  Preoperative IV antibiotics were given.  The patient was prepped and draped in a normal sterile fashion after being placed in the prone position.     The subclavian vein on the corresponding side was accessed via Seldinger technique after the use of lidocaine for anesthesia.  The wire was guided under fluoroscopy and then a pocket was created.  The dilator was placed and then the catheter was placed via the dilator sheath in good position utilizing fluoroscopy.  The catheter was then connected to the port itself which was then sutured in the pocket with a 3-0 silk suture.    Fluoroscopy was then used to confirm that the catheter was in good position.  3-0 Vicryl was used to close the wound and then 4-0 Vicryl was used to close the skin.  Steri-Strips were placed without difficulty.  The port was accessed via Dickens needle and flushed with heparinized saline, final flush was performed without difficulty.    The patient was stable at this point in time and transferred to the recovery room in stable condition where CXR will be obtained.     Brigido Guerra MD  2/8/2023  12:38 EST

## 2023-02-09 ENCOUNTER — INFUSION (OUTPATIENT)
Dept: ONCOLOGY | Facility: HOSPITAL | Age: 78
End: 2023-02-09
Payer: MEDICARE

## 2023-02-09 VITALS
DIASTOLIC BLOOD PRESSURE: 73 MMHG | BODY MASS INDEX: 28.12 KG/M2 | HEART RATE: 56 BPM | TEMPERATURE: 97.5 F | SYSTOLIC BLOOD PRESSURE: 182 MMHG | WEIGHT: 148.8 LBS

## 2023-02-09 DIAGNOSIS — C76.0 SQUAMOUS CELL CARCINOMA OF HEAD AND NECK: ICD-10-CM

## 2023-02-09 DIAGNOSIS — C09.9 SQUAMOUS CELL CARCINOMA OF LEFT TONSIL: Primary | ICD-10-CM

## 2023-02-09 LAB
ANION GAP SERPL CALCULATED.3IONS-SCNC: 13 MMOL/L (ref 5–15)
BUN SERPL-MCNC: 38 MG/DL (ref 8–23)
BUN/CREAT SERPL: 21.1 (ref 7–25)
CALCIUM SPEC-SCNC: 9.1 MG/DL (ref 8.6–10.5)
CHLORIDE SERPL-SCNC: 99 MMOL/L (ref 98–107)
CO2 SERPL-SCNC: 26 MMOL/L (ref 22–29)
CREAT SERPL-MCNC: 1.8 MG/DL (ref 0.57–1)
EGFRCR SERPLBLD CKD-EPI 2021: 28.4 ML/MIN/1.73
GLUCOSE SERPL-MCNC: 135 MG/DL (ref 65–99)
MAGNESIUM SERPL-MCNC: 2.2 MG/DL (ref 1.6–2.4)
POTASSIUM SERPL-SCNC: 4 MMOL/L (ref 3.5–5.2)
SODIUM SERPL-SCNC: 138 MMOL/L (ref 136–145)

## 2023-02-09 PROCEDURE — 83735 ASSAY OF MAGNESIUM: CPT

## 2023-02-09 PROCEDURE — 96375 TX/PRO/DX INJ NEW DRUG ADDON: CPT

## 2023-02-09 PROCEDURE — 25010000002 CETUXIMAB PER 10 MG: Performed by: INTERNAL MEDICINE

## 2023-02-09 PROCEDURE — 96413 CHEMO IV INFUSION 1 HR: CPT

## 2023-02-09 PROCEDURE — 80048 BASIC METABOLIC PNL TOTAL CA: CPT

## 2023-02-09 PROCEDURE — 25010000002 HEPARIN LOCK FLUSH PER 10 UNITS: Performed by: INTERNAL MEDICINE

## 2023-02-09 PROCEDURE — 36415 COLL VENOUS BLD VENIPUNCTURE: CPT

## 2023-02-09 PROCEDURE — 96361 HYDRATE IV INFUSION ADD-ON: CPT

## 2023-02-09 PROCEDURE — A9270 NON-COVERED ITEM OR SERVICE: HCPCS | Performed by: INTERNAL MEDICINE

## 2023-02-09 PROCEDURE — 96367 TX/PROPH/DG ADDL SEQ IV INF: CPT

## 2023-02-09 PROCEDURE — 25010000002 DIPHENHYDRAMINE PER 50 MG: Performed by: INTERNAL MEDICINE

## 2023-02-09 PROCEDURE — 25010000002 METHYLPREDNISOLONE PER 125 MG: Performed by: INTERNAL MEDICINE

## 2023-02-09 PROCEDURE — 63710000001 ACETAMINOPHEN 325 MG TABLET: Performed by: INTERNAL MEDICINE

## 2023-02-09 RX ORDER — HEPARIN SODIUM (PORCINE) LOCK FLUSH IV SOLN 100 UNIT/ML 100 UNIT/ML
300 SOLUTION INTRAVENOUS ONCE
Status: COMPLETED | OUTPATIENT
Start: 2023-02-09 | End: 2023-02-09

## 2023-02-09 RX ORDER — FAMOTIDINE 10 MG/ML
20 INJECTION, SOLUTION INTRAVENOUS ONCE
Status: COMPLETED | OUTPATIENT
Start: 2023-02-09 | End: 2023-02-09

## 2023-02-09 RX ORDER — METHYLPREDNISOLONE SODIUM SUCCINATE 125 MG/2ML
125 INJECTION, POWDER, LYOPHILIZED, FOR SOLUTION INTRAMUSCULAR; INTRAVENOUS ONCE
Status: COMPLETED | OUTPATIENT
Start: 2023-02-09 | End: 2023-02-09

## 2023-02-09 RX ORDER — SODIUM CHLORIDE 0.9 % (FLUSH) 0.9 %
10 SYRINGE (ML) INJECTION AS NEEDED
Status: CANCELLED | OUTPATIENT
Start: 2023-02-09

## 2023-02-09 RX ORDER — SODIUM CHLORIDE 0.9 % (FLUSH) 0.9 %
10 SYRINGE (ML) INJECTION AS NEEDED
Status: DISCONTINUED | OUTPATIENT
Start: 2023-02-09 | End: 2023-02-09

## 2023-02-09 RX ORDER — HEPARIN SODIUM (PORCINE) LOCK FLUSH IV SOLN 100 UNIT/ML 100 UNIT/ML
500 SOLUTION INTRAVENOUS AS NEEDED
Status: CANCELLED | OUTPATIENT
Start: 2023-02-09

## 2023-02-09 RX ORDER — HEPARIN SODIUM (PORCINE) LOCK FLUSH IV SOLN 100 UNIT/ML 100 UNIT/ML
300 SOLUTION INTRAVENOUS ONCE
Status: CANCELLED | OUTPATIENT
Start: 2023-02-09

## 2023-02-09 RX ORDER — SODIUM CHLORIDE 9 MG/ML
250 INJECTION, SOLUTION INTRAVENOUS ONCE
Status: DISCONTINUED | OUTPATIENT
Start: 2023-02-09 | End: 2023-02-09 | Stop reason: HOSPADM

## 2023-02-09 RX ORDER — ACETAMINOPHEN 325 MG/1
650 TABLET ORAL ONCE
Status: COMPLETED | OUTPATIENT
Start: 2023-02-09 | End: 2023-02-09

## 2023-02-09 RX ORDER — SODIUM CHLORIDE 9 MG/ML
1000 INJECTION, SOLUTION INTRAVENOUS ONCE
Status: COMPLETED | OUTPATIENT
Start: 2023-02-09 | End: 2023-02-09

## 2023-02-09 RX ADMIN — DIPHENHYDRAMINE HYDROCHLORIDE 50 MG: 50 INJECTION, SOLUTION INTRAMUSCULAR; INTRAVENOUS at 10:24

## 2023-02-09 RX ADMIN — FAMOTIDINE 20 MG: 10 INJECTION INTRAVENOUS at 10:21

## 2023-02-09 RX ADMIN — HEPARIN 500 UNITS: 100 SYRINGE at 12:47

## 2023-02-09 RX ADMIN — ACETAMINOPHEN 650 MG: 325 TABLET, FILM COATED ORAL at 10:20

## 2023-02-09 RX ADMIN — CETUXIMAB 400 MG: 2 SOLUTION INTRAVENOUS at 10:45

## 2023-02-09 RX ADMIN — METHYLPREDNISOLONE SODIUM SUCCINATE 125 MG: 125 INJECTION, POWDER, FOR SOLUTION INTRAMUSCULAR; INTRAVENOUS at 10:20

## 2023-02-09 RX ADMIN — SODIUM CHLORIDE 1000 ML: 9 INJECTION, SOLUTION INTRAVENOUS at 10:35

## 2023-02-09 RX ADMIN — Medication 10 ML: at 12:47

## 2023-02-15 ENCOUNTER — INFUSION (OUTPATIENT)
Dept: ONCOLOGY | Facility: HOSPITAL | Age: 78
End: 2023-02-15
Payer: MEDICARE

## 2023-02-15 ENCOUNTER — DOCUMENTATION (OUTPATIENT)
Dept: SOCIAL WORK | Facility: HOSPITAL | Age: 78
End: 2023-02-15
Payer: MEDICARE

## 2023-02-15 VITALS
SYSTOLIC BLOOD PRESSURE: 133 MMHG | OXYGEN SATURATION: 97 % | TEMPERATURE: 98.2 F | RESPIRATION RATE: 14 BRPM | DIASTOLIC BLOOD PRESSURE: 58 MMHG | BODY MASS INDEX: 27.21 KG/M2 | HEART RATE: 58 BPM | WEIGHT: 144 LBS

## 2023-02-15 DIAGNOSIS — C09.9 SQUAMOUS CELL CARCINOMA OF LEFT TONSIL: Primary | ICD-10-CM

## 2023-02-15 DIAGNOSIS — C76.0 SQUAMOUS CELL CARCINOMA OF HEAD AND NECK: ICD-10-CM

## 2023-02-15 LAB
ANION GAP SERPL CALCULATED.3IONS-SCNC: 8.9 MMOL/L (ref 5–15)
BUN SERPL-MCNC: 20 MG/DL (ref 8–23)
BUN/CREAT SERPL: 15.2 (ref 7–25)
CALCIUM SPEC-SCNC: 8.9 MG/DL (ref 8.6–10.5)
CHLORIDE SERPL-SCNC: 99 MMOL/L (ref 98–107)
CO2 SERPL-SCNC: 28.1 MMOL/L (ref 22–29)
CREAT SERPL-MCNC: 1.32 MG/DL (ref 0.57–1)
EGFRCR SERPLBLD CKD-EPI 2021: 41.2 ML/MIN/1.73
GLUCOSE SERPL-MCNC: 121 MG/DL (ref 65–99)
MAGNESIUM SERPL-MCNC: 1.7 MG/DL (ref 1.6–2.4)
POTASSIUM SERPL-SCNC: 3.8 MMOL/L (ref 3.5–5.2)
SODIUM SERPL-SCNC: 136 MMOL/L (ref 136–145)

## 2023-02-15 PROCEDURE — 96361 HYDRATE IV INFUSION ADD-ON: CPT

## 2023-02-15 PROCEDURE — 96375 TX/PRO/DX INJ NEW DRUG ADDON: CPT

## 2023-02-15 PROCEDURE — 25010000002 HEPARIN LOCK FLUSH PER 10 UNITS: Performed by: INTERNAL MEDICINE

## 2023-02-15 PROCEDURE — 96367 TX/PROPH/DG ADDL SEQ IV INF: CPT

## 2023-02-15 PROCEDURE — 25010000002 CETUXIMAB PER 10 MG: Performed by: INTERNAL MEDICINE

## 2023-02-15 PROCEDURE — 83735 ASSAY OF MAGNESIUM: CPT

## 2023-02-15 PROCEDURE — 80048 BASIC METABOLIC PNL TOTAL CA: CPT

## 2023-02-15 PROCEDURE — A9270 NON-COVERED ITEM OR SERVICE: HCPCS | Performed by: INTERNAL MEDICINE

## 2023-02-15 PROCEDURE — 25010000002 DIPHENHYDRAMINE PER 50 MG: Performed by: INTERNAL MEDICINE

## 2023-02-15 PROCEDURE — 96413 CHEMO IV INFUSION 1 HR: CPT

## 2023-02-15 PROCEDURE — 25010000002 METHYLPREDNISOLONE PER 125 MG: Performed by: INTERNAL MEDICINE

## 2023-02-15 PROCEDURE — 63710000001 ACETAMINOPHEN 325 MG TABLET: Performed by: INTERNAL MEDICINE

## 2023-02-15 RX ORDER — HEPARIN SODIUM (PORCINE) LOCK FLUSH IV SOLN 100 UNIT/ML 100 UNIT/ML
500 SOLUTION INTRAVENOUS AS NEEDED
Status: CANCELLED | OUTPATIENT
Start: 2023-02-15

## 2023-02-15 RX ORDER — FAMOTIDINE 10 MG/ML
20 INJECTION, SOLUTION INTRAVENOUS ONCE
Status: COMPLETED | OUTPATIENT
Start: 2023-02-15 | End: 2023-02-15

## 2023-02-15 RX ORDER — ACETAMINOPHEN 325 MG/1
650 TABLET ORAL ONCE
Status: COMPLETED | OUTPATIENT
Start: 2023-02-15 | End: 2023-02-15

## 2023-02-15 RX ORDER — HEPARIN SODIUM (PORCINE) LOCK FLUSH IV SOLN 100 UNIT/ML 100 UNIT/ML
500 SOLUTION INTRAVENOUS AS NEEDED
Status: DISCONTINUED | OUTPATIENT
Start: 2023-02-15 | End: 2023-02-15 | Stop reason: HOSPADM

## 2023-02-15 RX ORDER — SODIUM CHLORIDE 0.9 % (FLUSH) 0.9 %
10 SYRINGE (ML) INJECTION AS NEEDED
Status: CANCELLED | OUTPATIENT
Start: 2023-02-15

## 2023-02-15 RX ORDER — METHYLPREDNISOLONE SODIUM SUCCINATE 125 MG/2ML
125 INJECTION, POWDER, LYOPHILIZED, FOR SOLUTION INTRAMUSCULAR; INTRAVENOUS ONCE
Status: COMPLETED | OUTPATIENT
Start: 2023-02-15 | End: 2023-02-15

## 2023-02-15 RX ORDER — SODIUM CHLORIDE 9 MG/ML
250 INJECTION, SOLUTION INTRAVENOUS ONCE
Status: DISCONTINUED | OUTPATIENT
Start: 2023-02-15 | End: 2023-02-15 | Stop reason: HOSPADM

## 2023-02-15 RX ORDER — SODIUM CHLORIDE 0.9 % (FLUSH) 0.9 %
10 SYRINGE (ML) INJECTION AS NEEDED
Status: DISCONTINUED | OUTPATIENT
Start: 2023-02-15 | End: 2023-02-15 | Stop reason: HOSPADM

## 2023-02-15 RX ADMIN — Medication 500 UNITS: at 12:00

## 2023-02-15 RX ADMIN — METHYLPREDNISOLONE SODIUM SUCCINATE 125 MG: 125 INJECTION, POWDER, FOR SOLUTION INTRAMUSCULAR; INTRAVENOUS at 09:36

## 2023-02-15 RX ADMIN — Medication 10 ML: at 12:00

## 2023-02-15 RX ADMIN — CETUXIMAB 400 MG: 2 SOLUTION INTRAVENOUS at 10:04

## 2023-02-15 RX ADMIN — SODIUM CHLORIDE 1000 ML: 9 INJECTION, SOLUTION INTRAVENOUS at 11:05

## 2023-02-15 RX ADMIN — FAMOTIDINE 20 MG: 10 INJECTION INTRAVENOUS at 09:40

## 2023-02-15 RX ADMIN — ACETAMINOPHEN 650 MG: 325 TABLET, FILM COATED ORAL at 09:36

## 2023-02-15 RX ADMIN — DIPHENHYDRAMINE HYDROCHLORIDE 50 MG: 50 INJECTION, SOLUTION INTRAMUSCULAR; INTRAVENOUS at 09:41

## 2023-02-15 NOTE — PROGRESS NOTES
Case Management/Social Work    Patient Name:  Vesta Landry  YOB: 1945  MRN: [id number]  Admit Date:  (Not on file)        SW attempted to contact pt via telephone regarding oncology referral. Pt did not answer, different name on voicemail -- did not leave message. WILL will try again at a later time.       Electronically signed by:  DAVID Brock  02/15/23 11:21 EST

## 2023-02-16 ENCOUNTER — TELEPHONE (OUTPATIENT)
Dept: ONCOLOGY | Facility: CLINIC | Age: 78
End: 2023-02-16
Payer: MEDICARE

## 2023-02-16 DIAGNOSIS — R13.19 ESOPHAGEAL DYSPHAGIA: Primary | ICD-10-CM

## 2023-02-16 NOTE — TELEPHONE ENCOUNTER
Patients dtr Meliza dropped off FMLA forms 2/15/23  and requested continuous leave.  Meliza's letter from [facility] states leave from 2/20/23 thru 3/26/23.  Forms and records faxed in 2/17/23 with confirmation of receipt.  LM on Meliza's vm stating faxed and left copy up front to .  Meliza stated this needed to be done by 2/17/23.    Leave# [id number]

## 2023-02-16 NOTE — TELEPHONE ENCOUNTER
Caller: Meliza Back    Relationship: Emergency Contact    Best call back number: [phone]    What is the best time to reach you: ASAP    Who are you requesting to speak with (clinical staff, provider,  specific staff member): MARIA D, OR WHOEVER IS HANDLING FMLA PAPERWORK IN [place]    What was the call regarding: PT'S DAUGHTER WANTED TO SPEAK TO SOMEONE ABOUT THE FMLA PAPERWORK SHE DROPPED OFF YESTERDAY, NEEDS A CALL BACK TODAY IF POSSIBLE.    Do you require a callback: YES

## 2023-02-17 ENCOUNTER — TELEPHONE (OUTPATIENT)
Dept: INTERNAL MEDICINE | Facility: CLINIC | Age: 78
End: 2023-02-17
Payer: MEDICARE

## 2023-02-17 DIAGNOSIS — C09.9 SQUAMOUS CELL CARCINOMA OF LEFT TONSIL: Primary | ICD-10-CM

## 2023-02-17 NOTE — CASE MANAGEMENT/SOCIAL WORK
Case Management/Social Work    Patient Name:  Vesta Landry  YOB: 1945  MRN: [id number]  Admit Date:  (Not on file)      Received call from Yue Landry stating pt would be a new PEG tube placement on Monday. Contacted Emily at [facility] regarding HH needs at discharge. Awaiting a response.       Electronically signed by:  Monisha Espinal  02/17/23 08:25 EST

## 2023-02-17 NOTE — CONSULTS
"Adult Outpatient Nutrition  Assessment/PES    Patient Name:  Vesta Landry  YOB: 1945  MRN: [id number]    Assessment Date:  2/17/2023    Comments:     RD consulted for enteral nutrition recommendations.     TF regimen as follows: 3.5 cans/bolus feeds/day of Nutren 2.0. 1 can (250mL) at breakfast and lunch and 1.5 cans (375mL) at dinner w/ free water flush 190mL before and after each bolus feed.     Total TF regimen to provide: 1750kcals, 74g protein, and 1745mL fluid/day.     Monitor and replace electrolytes PRN.     RD available PRN.      Estimated/Assessed Needs - Anthropometrics     Row Name 02/17/23 1121          Anthropometrics    Age for Calculations 77     Height for Calculation 1.549 m (5' 1\")     Weight for Calculation 65.3 kg (144 lb)        Estimated/Assessed Needs    Additional Documentation Estimated Calorie Needs (Group);KCAL/KG (Group);Protein Requirements (Group);Fluid Requirements (Group)        Estimated Calorie Needs    Estimated Calorie Requirement (kcal/day) 1700        KCAL/KG    KCAL/KG 25 Kcal/Kg (kcal);30 Kcal/Kg (kcal)     25 Kcal/Kg (kcal) 1632.95     30 Kcal/Kg (kcal) 1959.54        Protein Requirements    Weight Used For Protein Calculations 65.3 kg (144 lb)     Est Protein Requirement Amount (gms/kg) 1.2 gm protein     Estimated Protein Requirements (gms/day) 78.38        Fluid Requirements    Fluid Requirements (mL/day) 1700  1mL/kcal     RDA Method (mL) 1700                     Problem/Interventions:   Problem 1     Row Name 02/17/23 1303          Nutrition Diagnoses Problem 1    Problem 1 Needs Alternate Route     Etiology (related to) Medical Diagnosis     Oncology Other (comment)  tonsil cancer     Signs/Symptoms (evidenced by) Other (comment)  pt w/ peg-tube placement                        Intervention Goal     Row Name 02/17/23 1304          Intervention Goal    General Maintain nutrition;Nutrition support treatment;Improved nutrition related " lab(s);Reduce/improve symptoms;Meet nutritional needs for age/condition;Disease management/therapy     TF/PN Establish TF tolerance;Inititiate TF/PN;Maintain TF/PN     Weight Maintain weight                  Nutrition Prescription     Row Name 02/17/23 1305          Nutrition Prescription PO    New PO Prescription Ordered? No, recommended        Nutrition Prescription EN    Enteral Prescription Enteral begin/change     Enteral Route PEG     Product Nutren 2 edwin     TF Delivery Method Bolus     TF Bolus Goal Volume (mL) 875 mL     TF Bolus Starting Volume (mL) 250 mL     TF Bolus Frequency 3 times a day     TF Bolus Cycle Over 30 minutes     Water flush (mL)  190 mL     Water Flush Frequency Every 4 hours     New EN Prescription Ordered? Yes                Education/Evaluation     Row Name 02/17/23 3689          Education    Education Education not appropriate at this time        Monitor/Evaluation    Monitor Per protocol;Pertinent labs;TF delivery/tolerance;Weight;Skin status;Symptoms                 Electronically signed by:  Ciara Parks RD  02/17/23 13:11 EST

## 2023-02-17 NOTE — PAT
Called Dr. Pedraza's office and spoke with Bailey Patel.  Informed regarding need for order for Home Health for skilled nursing and teaching care of PEG tube and tube feedings and supplies, with the first visit being on 2/20/23.  Verbalized understanding, and stated that she would notify Dr. Pedraza.

## 2023-02-17 NOTE — PAT
Called case management and spoke with Monisha Espinal.  Informed that Home Health order has been obtained from Dr. Pedraza.  Verbalized understanding.  Stated that [facility] have accepted the pt.

## 2023-02-17 NOTE — PRE-PROCEDURE INSTRUCTIONS
PAT phone history completed with pt's daughter for upcoming procedure on 2/20/23 with Dr. Guerra.      PAT PASS GIVEN/REVIEWED WITH PT'S DAUGHTER.  VERBALIZED UNDERSTANDING OF THE FOLLOWING:  DO NOT EAT, DRINK, SMOKE, USE SMOKELESS TOBACCO OR CHEW GUM AFTER MIDNIGHT THE NIGHT BEFORE SURGERY.  THIS ALSO INCLUDES HARD CANDIES AND MINTS.    DO NOT SHAVE THE AREA TO BE OPERATED ON AT LEAST 48 HOURS PRIOR TO THE PROCEDURE.  DO NOT WEAR MAKE UP OR NAIL POLISH.  DO NOT LEAVE IN ANY PIERCING OR WEAR JEWELRY THE DAY OF SURGERY.      DO NOT USE ADHESIVES IF YOU WEAR DENTURES.    DO NOT WEAR EYE CONTACTS; BRING IN YOUR GLASSES.    ONLY TAKE MEDICATION THE MORNING OF YOUR PROCEDURE IF INSTRUCTED BY YOUR SURGEON WITH ENOUGH WATER TO SWALLOW THE MEDICATION.  IF YOUR SURGEON DID NOT SPECIFY WHICH MEDICATIONS TO TAKE, YOU WILL NEED TO CALL THEIR OFFICE FOR FURTHER INSTRUCTIONS AND DO AS THEY INSTRUCT.    LEAVE ANYTHING YOU CONSIDER VALUABLE AT HOME.    YOU WILL NEED TO ARRANGE FOR SOMEONE TO DRIVE YOU HOME AFTER SURGERY.  IT IS RECOMMENDED THAT YOU DO NOT DRIVE, WORK, DRINK ALCOHOL OR MAKE MAJOR DECISIONS FOR AT LEAST 24 HOURS AFTER YOUR PROCEDURE IS COMPLETE.      THE DAY OF YOUR PROCEDURE, BRING IN THE FOLLOWING IF APPLICABLE:   PICTURE ID AND INSURANCE/MEDICARE OR MEDICAID CARDS/ANY CO-PAY THAT MAY BE DUE   COPY OF ADVANCED DIRECTIVE/LIVING WILL/POWER OR    CPAP/BIPAP/INHALERS   SKIN PREP SHEET   YOUR PREADMISSION TESTING PASS (IF NOT A PHONE HISTORY)

## 2023-02-17 NOTE — PAT
Called Cuca Miller MA at Dr. Guerra's office.  Informed that an impatient nutrition consult and case management consults were needed in preparation for pt's upcoming surgery on 2/20/23 with Dr. Guerra.  Verbalized understanding, and Cuca stated that Dr. Guerra was in surgery today in [place], and she would check with another provider for orders and call me back.      Called Amelie Cullen, nutritionist, at 0816 regarding pt scheduled for PEG tube placement on 2/20/23.  At 0818, contacted Monisha Espinal, in case management as well.  Both verbalized understanding.

## 2023-02-17 NOTE — TELEPHONE ENCOUNTER
"Yue Landry from [facility] called on behalf of patient to request a home health order be placed due to patient receiving PEG tube on 2/20/23. Yue states it needs to say specifically \"Skilled nursing, teaching care of PEG tube, tube feeding and supplies\".   "

## 2023-02-19 ENCOUNTER — HOSPITAL ENCOUNTER (EMERGENCY)
Facility: HOSPITAL | Age: 78
Discharge: HOME | End: 2023-02-19
Attending: EMERGENCY MEDICINE | Admitting: EMERGENCY MEDICINE
Payer: MEDICARE

## 2023-02-19 VITALS
WEIGHT: 145.4 LBS | SYSTOLIC BLOOD PRESSURE: 180 MMHG | TEMPERATURE: 98.6 F | HEART RATE: 65 BPM | OXYGEN SATURATION: 97 % | RESPIRATION RATE: 18 BRPM | BODY MASS INDEX: 27.45 KG/M2 | DIASTOLIC BLOOD PRESSURE: 62 MMHG | HEIGHT: 61 IN

## 2023-02-19 DIAGNOSIS — E86.0 MILD DEHYDRATION: Primary | ICD-10-CM

## 2023-02-19 LAB
ALBUMIN SERPL-MCNC: 3.7 G/DL (ref 3.5–5.2)
ALBUMIN/GLOB SERPL: 1.5 G/DL
ALP SERPL-CCNC: 96 U/L (ref 39–117)
ALT SERPL W P-5'-P-CCNC: 8 U/L (ref 1–33)
ANION GAP SERPL CALCULATED.3IONS-SCNC: 8.7 MMOL/L (ref 5–15)
AST SERPL-CCNC: 15 U/L (ref 1–32)
BASOPHILS # BLD AUTO: 0.03 10*3/MM3 (ref 0–0.2)
BASOPHILS NFR BLD AUTO: 0.5 % (ref 0–1.5)
BILIRUB SERPL-MCNC: 0.6 MG/DL (ref 0–1.2)
BUN SERPL-MCNC: 22 MG/DL (ref 8–23)
BUN/CREAT SERPL: 14.6 (ref 7–25)
CALCIUM SPEC-SCNC: 8.8 MG/DL (ref 8.6–10.5)
CHLORIDE SERPL-SCNC: 104 MMOL/L (ref 98–107)
CO2 SERPL-SCNC: 27.3 MMOL/L (ref 22–29)
CREAT SERPL-MCNC: 1.51 MG/DL (ref 0.57–1)
DEPRECATED RDW RBC AUTO: 57.4 FL (ref 37–54)
EGFRCR SERPLBLD CKD-EPI 2021: 35 ML/MIN/1.73
EOSINOPHIL # BLD AUTO: 0.18 10*3/MM3 (ref 0–0.4)
EOSINOPHIL NFR BLD AUTO: 2.7 % (ref 0.3–6.2)
ERYTHROCYTE [DISTWIDTH] IN BLOOD BY AUTOMATED COUNT: 16.8 % (ref 12.3–15.4)
GLOBULIN UR ELPH-MCNC: 2.5 GM/DL
GLUCOSE SERPL-MCNC: 99 MG/DL (ref 65–99)
HCT VFR BLD AUTO: 32.3 % (ref 34–46.6)
HGB BLD-MCNC: 10.2 G/DL (ref 12–15.9)
HOLD SPECIMEN: NORMAL
HOLD SPECIMEN: NORMAL
IMM GRANULOCYTES # BLD AUTO: 0.03 10*3/MM3 (ref 0–0.05)
IMM GRANULOCYTES NFR BLD AUTO: 0.5 % (ref 0–0.5)
LYMPHOCYTES # BLD AUTO: 0.57 10*3/MM3 (ref 0.7–3.1)
LYMPHOCYTES NFR BLD AUTO: 8.6 % (ref 19.6–45.3)
MAGNESIUM SERPL-MCNC: 1.8 MG/DL (ref 1.6–2.4)
MCH RBC QN AUTO: 29.5 PG (ref 26.6–33)
MCHC RBC AUTO-ENTMCNC: 31.6 G/DL (ref 31.5–35.7)
MCV RBC AUTO: 93.4 FL (ref 79–97)
MONOCYTES # BLD AUTO: 0.7 10*3/MM3 (ref 0.1–0.9)
MONOCYTES NFR BLD AUTO: 10.6 % (ref 5–12)
NEUTROPHILS NFR BLD AUTO: 5.1 10*3/MM3 (ref 1.7–7)
NEUTROPHILS NFR BLD AUTO: 77.1 % (ref 42.7–76)
NRBC BLD AUTO-RTO: 0 /100 WBC (ref 0–0.2)
PLATELET # BLD AUTO: 141 10*3/MM3 (ref 140–450)
PMV BLD AUTO: 10.6 FL (ref 6–12)
POTASSIUM SERPL-SCNC: 4 MMOL/L (ref 3.5–5.2)
PROT SERPL-MCNC: 6.2 G/DL (ref 6–8.5)
RBC # BLD AUTO: 3.46 10*6/MM3 (ref 3.77–5.28)
SODIUM SERPL-SCNC: 140 MMOL/L (ref 136–145)
WBC NRBC COR # BLD: 6.61 10*3/MM3 (ref 3.4–10.8)
WHOLE BLOOD HOLD COAG: NORMAL
WHOLE BLOOD HOLD SPECIMEN: NORMAL

## 2023-02-19 PROCEDURE — 99283 EMERGENCY DEPT VISIT LOW MDM: CPT

## 2023-02-19 PROCEDURE — 80053 COMPREHEN METABOLIC PANEL: CPT | Performed by: EMERGENCY MEDICINE

## 2023-02-19 PROCEDURE — 85025 COMPLETE CBC W/AUTO DIFF WBC: CPT | Performed by: EMERGENCY MEDICINE

## 2023-02-19 PROCEDURE — 36415 COLL VENOUS BLD VENIPUNCTURE: CPT

## 2023-02-19 PROCEDURE — 25010000002 HEPARIN LOCK FLUSH PER 10 UNITS: Performed by: EMERGENCY MEDICINE

## 2023-02-19 PROCEDURE — 93005 ELECTROCARDIOGRAM TRACING: CPT | Performed by: EMERGENCY MEDICINE

## 2023-02-19 PROCEDURE — 83735 ASSAY OF MAGNESIUM: CPT | Performed by: EMERGENCY MEDICINE

## 2023-02-19 RX ORDER — HEPARIN SODIUM (PORCINE) LOCK FLUSH IV SOLN 100 UNIT/ML 100 UNIT/ML
300 SOLUTION INTRAVENOUS ONCE
Status: COMPLETED | OUTPATIENT
Start: 2023-02-19 | End: 2023-02-19

## 2023-02-19 RX ORDER — SODIUM CHLORIDE 0.9 % (FLUSH) 0.9 %
10 SYRINGE (ML) INJECTION AS NEEDED
Status: DISCONTINUED | OUTPATIENT
Start: 2023-02-19 | End: 2023-02-19 | Stop reason: HOSPADM

## 2023-02-19 RX ADMIN — SODIUM CHLORIDE, POTASSIUM CHLORIDE, SODIUM LACTATE AND CALCIUM CHLORIDE 1000 ML: 600; 310; 30; 20 INJECTION, SOLUTION INTRAVENOUS at 10:14

## 2023-02-19 RX ADMIN — Medication 1 APPLICATION: at 10:14

## 2023-02-19 RX ADMIN — HEPARIN 300 UNITS: 100 SYRINGE at 12:15

## 2023-02-20 ENCOUNTER — ANESTHESIA EVENT (OUTPATIENT)
Dept: GASTROENTEROLOGY | Facility: HOSPITAL | Age: 78
End: 2023-02-20
Payer: MEDICARE

## 2023-02-20 ENCOUNTER — ANESTHESIA (OUTPATIENT)
Dept: GASTROENTEROLOGY | Facility: HOSPITAL | Age: 78
End: 2023-02-20
Payer: MEDICARE

## 2023-02-20 ENCOUNTER — TELEPHONE (OUTPATIENT)
Dept: ONCOLOGY | Facility: CLINIC | Age: 78
End: 2023-02-20
Payer: MEDICARE

## 2023-02-20 ENCOUNTER — TELEPHONE (OUTPATIENT)
Dept: SURGERY | Facility: CLINIC | Age: 78
End: 2023-02-20
Payer: MEDICARE

## 2023-02-20 ENCOUNTER — HOME HEALTH ADMISSION (OUTPATIENT)
Dept: HOME HEALTH SERVICES | Facility: HOME HEALTHCARE | Age: 78
End: 2023-02-20
Payer: MEDICARE

## 2023-02-20 ENCOUNTER — HOSPITAL ENCOUNTER (OUTPATIENT)
Facility: HOSPITAL | Age: 78
Setting detail: HOSPITAL OUTPATIENT SURGERY
Discharge: HOME | End: 2023-02-20
Attending: SURGERY | Admitting: SURGERY
Payer: MEDICARE

## 2023-02-20 VITALS
RESPIRATION RATE: 14 BRPM | WEIGHT: 144 LBS | HEART RATE: 60 BPM | SYSTOLIC BLOOD PRESSURE: 193 MMHG | HEIGHT: 61 IN | DIASTOLIC BLOOD PRESSURE: 78 MMHG | BODY MASS INDEX: 27.19 KG/M2 | TEMPERATURE: 98.2 F | OXYGEN SATURATION: 93 %

## 2023-02-20 DIAGNOSIS — R13.19 ESOPHAGEAL DYSPHAGIA: ICD-10-CM

## 2023-02-20 PROCEDURE — 25010000002 HYDROMORPHONE PER 4 MG: Performed by: NURSE ANESTHETIST, CERTIFIED REGISTERED

## 2023-02-20 PROCEDURE — 0 LIDOCAINE 1 % SOLUTION: Performed by: SURGERY

## 2023-02-20 PROCEDURE — 25010000002 PROPOFOL 10 MG/ML EMULSION: Performed by: NURSE ANESTHETIST, CERTIFIED REGISTERED

## 2023-02-20 PROCEDURE — S0260 H&P FOR SURGERY: HCPCS | Performed by: SURGERY

## 2023-02-20 PROCEDURE — 43246 EGD PLACE GASTROSTOMY TUBE: CPT | Performed by: SURGERY

## 2023-02-20 PROCEDURE — 25010000002 ONDANSETRON PER 1 MG: Performed by: NURSE ANESTHETIST, CERTIFIED REGISTERED

## 2023-02-20 RX ORDER — LIDOCAINE HYDROCHLORIDE 20 MG/ML
INJECTION, SOLUTION INTRAVENOUS AS NEEDED
Status: DISCONTINUED | OUTPATIENT
Start: 2023-02-20 | End: 2023-02-20 | Stop reason: SURG

## 2023-02-20 RX ORDER — HYDROMORPHONE HCL 110MG/55ML
0.25 PATIENT CONTROLLED ANALGESIA SYRINGE INTRAVENOUS
Status: DISCONTINUED | OUTPATIENT
Start: 2023-02-20 | End: 2023-02-20 | Stop reason: HOSPADM

## 2023-02-20 RX ORDER — PROPOFOL 10 MG/ML
VIAL (ML) INTRAVENOUS AS NEEDED
Status: DISCONTINUED | OUTPATIENT
Start: 2023-02-20 | End: 2023-02-20 | Stop reason: SURG

## 2023-02-20 RX ORDER — SODIUM CHLORIDE, SODIUM LACTATE, POTASSIUM CHLORIDE, CALCIUM CHLORIDE 600; 310; 30; 20 MG/100ML; MG/100ML; MG/100ML; MG/100ML
1000 INJECTION, SOLUTION INTRAVENOUS CONTINUOUS
Status: DISCONTINUED | OUTPATIENT
Start: 2023-02-20 | End: 2023-02-20 | Stop reason: HOSPADM

## 2023-02-20 RX ORDER — LIDOCAINE HYDROCHLORIDE 10 MG/ML
INJECTION, SOLUTION INFILTRATION; PERINEURAL AS NEEDED
Status: DISCONTINUED | OUTPATIENT
Start: 2023-02-20 | End: 2023-02-20 | Stop reason: HOSPADM

## 2023-02-20 RX ORDER — ONDANSETRON 2 MG/ML
4 INJECTION INTRAMUSCULAR; INTRAVENOUS ONCE AS NEEDED
Status: COMPLETED | OUTPATIENT
Start: 2023-02-20 | End: 2023-02-20

## 2023-02-20 RX ORDER — SODIUM CHLORIDE 0.9 % (FLUSH) 0.9 %
10 SYRINGE (ML) INJECTION AS NEEDED
Status: DISCONTINUED | OUTPATIENT
Start: 2023-02-20 | End: 2023-02-20 | Stop reason: HOSPADM

## 2023-02-20 RX ADMIN — HYDROMORPHONE HYDROCHLORIDE 0.25 MG: 2 INJECTION INTRAMUSCULAR; INTRAVENOUS; SUBCUTANEOUS at 14:18

## 2023-02-20 RX ADMIN — ONDANSETRON 4 MG: 2 INJECTION INTRAMUSCULAR; INTRAVENOUS at 14:15

## 2023-02-20 RX ADMIN — PROPOFOL 50 MG: 10 INJECTION, EMULSION INTRAVENOUS at 13:16

## 2023-02-20 RX ADMIN — PROPOFOL 50 MG: 10 INJECTION, EMULSION INTRAVENOUS at 13:10

## 2023-02-20 RX ADMIN — PROPOFOL 50 MG: 10 INJECTION, EMULSION INTRAVENOUS at 13:22

## 2023-02-20 RX ADMIN — LIDOCAINE HYDROCHLORIDE 40 MG: 20 INJECTION, SOLUTION INTRAVENOUS at 13:10

## 2023-02-20 RX ADMIN — SODIUM CHLORIDE, POTASSIUM CHLORIDE, SODIUM LACTATE AND CALCIUM CHLORIDE 1000 ML: 600; 310; 30; 20 INJECTION, SOLUTION INTRAVENOUS at 12:16

## 2023-02-20 NOTE — OP NOTE
PATIENT:    Vesta Landry    DATE OF SURGERY:  2/20/2023     PHYSICIAN:    Brigido Guerra MD    REFERRING PHYSICIAN:   Brigido Guerra MD    YOB: 1945    PREOPERATIVE DIAGNOSIS:  Difficulty swallowing with radiation for tonsillar cancer    POSTOPERATIVE DIAGNOSIS:  Difficulty swallowing with radiation for tonsillar cancer    PROCEDURE:  PEG (percutaneous endoscopic gastrostomy tube placement).    EBL:  Less than 50 cc    COMPLICATIONS:  None    OPERATIVE PROCEDURE:  The patient was brought to the endoscopy unit, placed in the supine position, and given IV sedation.  I did discuss the situation with the patient and family preoperatively and marked them accordingly.  An appropriate timeout was performed prior to the incision and appropriate IV antibiotics were given preoperatively.      The scope was inserted into the patient’s mouth, then into the esophagus without difficulty, and carefully advanced.  The esophagus was noted to be within normal limits.  The stomach was entered and found to contain no abnormalities.  The duodenum was entered and found to be normal.      A retroflexed view was obtained and this was fairly normal.  Air was insufflated into the stomach and there was a light apparent on the anterior abdominal wall.  The patient was then prepped and draped in a normal sterile fashion and local lidocaine was used for anesthesia.  I then placed three bolster sutures through the anterior wall of the abdomen into the anterior wall of the stomach without difficulty under direct vision.  We tacked the anterior wall of the stomach to the anterior wall of the abdominal cavity.  The stomach was then entered via Seldinger technique under direct vision and dilators were placed.  A PEG was placed in the normal manner and the balloon was insufflated.  The dilators had been removed.  The PEG tube was sutured to the skin with 3-0 nylon suture.     The scope was used to suction air from the  stomach and it was then carefully withdrawn without difficulty.  The patient was stable at this point in time and subsequently transferred back to the recovery room in stable condition.       Brigido Guerra MD  2/20/2023  13:32 EST

## 2023-02-20 NOTE — DISCHARGE INSTRUCTIONS
No pushing, pulling, tugging,  heavy lifting, or strenuous activity.  No major decision making, driving, or drinking alcoholic beverages for 24 hours. ( due to the medications you have  received)  Always use good hand hygiene/washing techniques.  NO driving while taking pain medications.    * if you have an incision:  Check your incision area every day for signs of infection.   Check for:  * more redness, swelling, or pain  *more fluid or blood  *warmth  *pus or bad smell     To assist you in voiding:  Drink plenty of fluids  Listen to running water while attempting to void.    If you are unable to urinate and you have an uncomfortable urge to void or it has been   6 hours since you were discharged, return to the Emergency Room

## 2023-02-20 NOTE — ADDENDUM NOTE
Addendum  created 02/20/23 1404 by Erinn Barillas, CRNA    Order list changed, Pharmacy for encounter modified

## 2023-02-20 NOTE — CASE MANAGEMENT/SOCIAL WORK
Case Management/Social Work    Patient Name:  Vesta Landry  YOB: 1945  MRN: [id number]  Admit Date:  (Not on file)      Spoke to Daiana at [facility]. [facility] pt will owe 35 dollars a month on her tube feed.  After pt's PEG is placed she will need to go to [facility] [place] for her teaching and tube feed.  [facility] has accepted pt for home care needs.        Electronically signed by:  Monisha Espinal  02/20/23 09:10 EST

## 2023-02-20 NOTE — TELEPHONE ENCOUNTER
Attempted to call patient to check on her after being notified by on call physician that she called in over the weekend.  Noted she went to ER yesterday for fluids.  On Call physician did advise her to call us if she felt like she needed more fluids.

## 2023-02-20 NOTE — ANESTHESIA PREPROCEDURE EVALUATION
Anesthesia Evaluation     Patient summary reviewed and Nursing notes reviewed   history of anesthetic complications: PONV  NPO Solid Status: > 8 hours  NPO Liquid Status: > 8 hours           Airway   Mallampati: III  TM distance: >3 FB  Neck ROM: full  Difficult intubation highly probable  Comment: Cancer of left tonsil    Dental    (+) upper dentures    Pulmonary - normal exam   (+) shortness of breath,   Cardiovascular - normal exam  Exercise tolerance: poor (<4 METS)    ECG reviewed  Patient on routine beta blocker and Beta blocker given within 24 hours of surgery    (+) hypertension 2 medications or greater, dysrhythmias Bradycardia, DVT, hyperlipidemia,     ROS comment: 1/20/23 ECHO -   •  Left ventricular systolic function is normal. Calculated left ventricular EF = 55% Left ventricular ejection fraction appears to be 56 - 60%.  •  Left ventricular diastolic function is consistent with (grade II w/high LAP) pseudonormalization.  •  Estimated right ventricular systolic pressure from tricuspid regurgitation is normal (<35 mmHg).         Normal sinus rhythm  Left ventricular hypertrophy with repolarization abnormality  Abnormal ECG  When compared with ECG of 19-JAN-2023 16:50, (Unconfirmed)  Sinus rhythm has replaced Junctional rhythm  Nonspecific T wave abnormality, worse in Lateral leads  Confirmed by TABBY PALMER ([id number]) on 1/25/2023 8:53:45 AM    Neuro/Psych  (+) psychiatric history Anxiety and Depression, dementia, poor historian.,    GI/Hepatic/Renal/Endo    (+)  GERD,      Musculoskeletal     Abdominal    Substance History - negative use     OB/GYN negative ob/gyn ROS         Other   arthritis,    history of cancer (tonsilar cancer) active                      Anesthesia Plan    ASA 3     MAC     (Risks and benefits discussed including risk of aspiration, recall and dental damage. All patient questions answered.    Will continue with plan of care.)  intravenous induction     Anesthetic plan, risks,  benefits, and alternatives have been provided, discussed and informed consent has been obtained with: patient.  Pre-procedure education provided      CODE STATUS:

## 2023-02-20 NOTE — H&P
Vesta Landry    1945    Primary Care Provider: Bossman Pedraza MD    No chief complaint on file.         SUBJECTIVE:    History of present illness: I was asked to see this patient today for evaluation and treatment of a history significant for the need for G-tube placement.  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  She is going to need to undergo G-tube placement.    Review of Systems:  Constitutional:  Negative for chills, fever, and unexpected weight change.  HENT: Negative for trouble swallowing and voice change.  Eyes:  Negative for visual disturbance.  Respiratory:  Negative for apnea, cough, chest tightness, shortness of breath, and wheezing.  Cardiovascular:  Negative for chest pain, palpitations, and leg swelling.  Gastrointestinal:  Negative for abdominal distention, abdominal pain, anal bleeding, blood in stool, constipation, diarrhea, nausea, rectal pain, and vomiting.  Musculoskeletal:  Negative for back pain, gait problem, and joint swelling.  Skin:  Negative for color change, rash, and wound  Neurological:  Negative for dizziness, syncope, speech difficulty, weakness, numbness, and headaches.  Hematological:  Negative for adenopathy.  Does not bruise/bleed easily.  Psychiatric/Behavioral:  Negative for confusion.  The patient is not nervous/anxious.        History:    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   • Deep vein thrombosis (HCC)     bilateral- daughter states on 2/17/23 that she doesn't remember patient having this   • Dementia (HCC)    • Dysphagia    • GERD (gastroesophageal reflux disease)    • Hyperlipidemia    • Hypertension    • PONV (postoperative nausea and vomiting)    • SOB (shortness of breath)     following chemo infusion on day  "of infusion last week (week of -23) per pt's daughter   • Vitamin D deficiency    • Wears dentures     uppers - instructed not to use adhesive DOS       Past Surgical History:   Procedure Laterality Date   • APPENDECTOMY     • CATARACT EXTRACTION     • CHOLECYSTECTOMY     • COLONOSCOPY      Approx    • HYSTERECTOMY     • OOPHORECTOMY Bilateral    • PORTACATH PLACEMENT Right 2023    Procedure: INSERTION OF PORTACATH;  Surgeon: Brigido Guerra MD;  [facility]: [facility];  Service: General;  Laterality: Right;   • TUBAL ABDOMINAL LIGATION         Family History   Problem Relation Age of Onset   • Heart failure Mother    • Hyperlipidemia Mother    • Hypertension Mother    • Cancer Father    • Cancer Sister    • Stroke Sister    • Breast cancer Maternal Aunt    • Breast cancer Paternal Aunt    • Ovarian cancer Neg Hx        Social History     Socioeconomic History   • Marital status:    Tobacco Use   • Smoking status: Former     Packs/day: 0.15     Years: 10.00     Pack years: 1.50     Types: Cigarettes     Quit date: 1987     Years since quittin.1   • Smokeless tobacco: Never   Vaping Use   • Vaping Use: Never used   Substance and Sexual Activity   • Alcohol use: No   • Drug use: No   • Sexual activity: Defer       Allergies:  Allergies   Allergen Reactions   • Trazodone Confusion       Medications:    Current Facility-Administered Medications:   •  lactated ringers infusion 1,000 mL, 1,000 mL, Intravenous, Continuous, Brigido Guerra MD, Last Rate: 25 mL/hr at 23 1254, Currently Infusing at 23 1254    OBJECTIVE:    Vital Signs:   Vitals:    23 1107 23 1153 23 1200   BP:  (!) 198/76 177/68   BP Location:  Right arm    Patient Position:  Sitting    Pulse:  61    Resp:  20    Temp:  97.6 °F (36.4 °C)    TempSrc:  Temporal    SpO2:  98%    Weight: 65.3 kg (144 lb)     Height: 154.9 cm (61\")         Physical Exam:   General Appearance:    Alert, " cooperative, in no acute distress   Head:    Normocephalic, without obvious abnormality, atraumatic   Eyes:            Lids and lashes normal, conjunctivae and sclerae normal, no   icterus, no pallor, corneas clear, PERRLA   Throat:   No oral lesions, no thrush, oral mucosa moist   Neck:   No adenopathy, supple, trachea midline, no thyromegaly, no   carotid bruit, no JVD   Lungs:     Clear to auscultation,respirations regular, even and                  unlabored    Heart:    Regular rhythm and normal rate, normal S1 and S2, no            murmur, no gallop, no rub, no click   Chest Wall:    No abnormalities observed   Abdomen:     Normal bowel sounds, no masses, no organomegaly, soft        non-tender, non-distended, no guarding, no rebound                tenderness, well-healed right upper quadrant Kocher incision   Extremities:   Moves all extremities well, no edema, no cyanosis, no             redness   Pulses:   Pulses palpable and equal bilaterally   Skin:   No bleeding, bruising or rash   Lymph nodes:   No palpable adenopathy   Neurologic:   Cranial nerves 2 - 12 grossly intact, sensation intact, DTR       present and equal bilaterally   Results Review:    Lab Results (last 24 hours)     ** No results found for the last 24 hours. **            I reviewed the patient's new clinical results.  I reviewed the patient's new imaging results and agree with the interpretation.  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  She needs to undergo percutaneous endoscopic gastrostomy tube placement, risk and benefits of operative versus nonoperative intervention have been discussed with the patient and the daughter, they understand and agree, and wished to proceed with the above-mentioned surgical treatment plan.    I discussed the patients findings and my recommendations with patient and family    Brigido Guerra MD  02/20/23  13:28 EST

## 2023-02-20 NOTE — TELEPHONE ENCOUNTER
[facility] called. Lena the nurse for [facility] [place] can not be at [facility] until later today. Lena can be reached at [phone].

## 2023-02-20 NOTE — ANESTHESIA POSTPROCEDURE EVALUATION
Patient: Vesta Landry    Procedure Summary     Date: 02/20/23 Room / Location: [facility] ENDOSCOPY 3 / [facility] ENDOSCOPY    Anesthesia Start: 1254 Anesthesia Stop: 1324    Procedure: ESOPHAGOGASTRODUODENOSCOPY WITH PERCUTANEOUS ENDOSCOPIC GASTROSTOMY TUBE INSERTION Diagnosis:       Esophageal dysphagia      (Esophageal dysphagia [R13.19])    Surgeons: Brigido Guerra MD Provider: Henry Corbett CRNA    Anesthesia Type: MAC ASA Status: 3          Anesthesia Type: MAC    Vitals  HR 66  Sat 99  Resp 20  Temp 98  \68        Post Anesthesia Care and Evaluation    Patient location during evaluation: bedside  Patient participation: complete - patient participated  Level of consciousness: awake and alert and sleepy but conscious  Pain score: 0  Pain management: adequate    Airway patency: patent  Anesthetic complications: No anesthetic complications  PONV Status: none  Cardiovascular status: acceptable  Respiratory status: acceptable  Hydration status: acceptable

## 2023-02-21 ENCOUNTER — HOME CARE VISIT (OUTPATIENT)
Dept: HOME HEALTH SERVICES | Facility: HOME HEALTHCARE | Age: 78
End: 2023-02-21
Payer: MEDICARE

## 2023-02-21 ENCOUNTER — PATIENT OUTREACH (OUTPATIENT)
Dept: CASE MANAGEMENT | Facility: OTHER | Age: 78
End: 2023-02-21
Payer: MEDICARE

## 2023-02-21 VITALS
HEART RATE: 70 BPM | RESPIRATION RATE: 16 BRPM | SYSTOLIC BLOOD PRESSURE: 146 MMHG | DIASTOLIC BLOOD PRESSURE: 70 MMHG | TEMPERATURE: 97.5 F | OXYGEN SATURATION: 95 %

## 2023-02-21 PROCEDURE — G0299 HHS/HOSPICE OF RN EA 15 MIN: HCPCS

## 2023-02-21 NOTE — HOME HEALTH
SOC Note:( Port is used and maintained only by oncology.)    Home Health ordered for: disciplines SN    Reason for Hosp/Primary Dx/Co-morbidities: Patient is a 77yr old  female. 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 Pt is being treated with chemotherapy and radiation and has difficulty swallowing. Pt is able to take medications orally. Pt admitted for G-tube placement. Other pmh: GERD, HTN, GOUT, ARTHRITIS, HLD, ANXIETY, DEPRESSION, H/O COVID, ANEMIA, DEMENTIA. Daughter, Britany, is a SRNA and is knowledegable regarding tube feedings. She is staying with pt. Pt is receiving radition daily for 7weeks. Pt is currently on 3rd week.     Focus of Care: Disease process neoplasm of tonsil,  Education and teaching of Gtube feedings, tube site care and dressing changes, medication management and falls prevention    Current Functional status/mobility/DME: walker    HB status/Living Arrangements:lives with . Dtr, Britany and Meliza, assist with care and tube feedings    Skin Integrity/wound status: Skin cdi    Code Status: CPR    Fall Risk: YES    POC confirmed with Patient and caregiver on 2.21.23    Plan for next visit: education and teaching on tube feedings

## 2023-02-21 NOTE — OUTREACH NOTE
AMBULATORY CASE MANAGEMENT NOTE    Name and Relationship of Patient/Support Person: Meliza Back - Emergency Contact    Patient Outreach    [facility] ED visit 2/19/2023: Mild dehydration. Admission / Discharge 2/20/23 for PEG tube placement.  Daughter reports patient is doing well with tube feedings.  Has experienced some nausea with free water flush, education provided on antiemetic administration, may give prior to feeding or flush to avoid nausea. Also may slow rate.  Verbalized understanding.  Role of ACM explained, denies needs at this time, service declined.     Claire SHELBY  Ambulatory Case Management    2/21/2023, 14:06 EST

## 2023-02-22 ENCOUNTER — HOME CARE VISIT (OUTPATIENT)
Dept: HOME HEALTH SERVICES | Facility: HOME HEALTHCARE | Age: 78
End: 2023-02-22
Payer: MEDICARE

## 2023-02-22 ENCOUNTER — OFFICE VISIT (OUTPATIENT)
Dept: ONCOLOGY | Facility: CLINIC | Age: 78
End: 2023-02-22
Payer: MEDICARE

## 2023-02-22 ENCOUNTER — INFUSION (OUTPATIENT)
Dept: ONCOLOGY | Facility: HOSPITAL | Age: 78
End: 2023-02-22
Payer: MEDICARE

## 2023-02-22 VITALS
HEART RATE: 70 BPM | HEIGHT: 61 IN | TEMPERATURE: 97.1 F | WEIGHT: 143 LBS | RESPIRATION RATE: 16 BRPM | BODY MASS INDEX: 27 KG/M2 | SYSTOLIC BLOOD PRESSURE: 141 MMHG | DIASTOLIC BLOOD PRESSURE: 64 MMHG | OXYGEN SATURATION: 98 %

## 2023-02-22 VITALS
HEART RATE: 72 BPM | DIASTOLIC BLOOD PRESSURE: 80 MMHG | SYSTOLIC BLOOD PRESSURE: 150 MMHG | OXYGEN SATURATION: 96 % | RESPIRATION RATE: 16 BRPM | TEMPERATURE: 97.7 F

## 2023-02-22 DIAGNOSIS — C76.0 SQUAMOUS CELL CARCINOMA OF HEAD AND NECK: ICD-10-CM

## 2023-02-22 DIAGNOSIS — C09.9 SQUAMOUS CELL CARCINOMA OF LEFT TONSIL: Primary | ICD-10-CM

## 2023-02-22 LAB
ANION GAP SERPL CALCULATED.3IONS-SCNC: 11.2 MMOL/L (ref 5–15)
BUN SERPL-MCNC: 27 MG/DL (ref 8–23)
BUN/CREAT SERPL: 19.4 (ref 7–25)
CALCIUM SPEC-SCNC: 8.7 MG/DL (ref 8.6–10.5)
CHLORIDE SERPL-SCNC: 101 MMOL/L (ref 98–107)
CO2 SERPL-SCNC: 26.8 MMOL/L (ref 22–29)
CREAT SERPL-MCNC: 1.39 MG/DL (ref 0.57–1)
EGFRCR SERPLBLD CKD-EPI 2021: 38.7 ML/MIN/1.73
GLUCOSE SERPL-MCNC: 107 MG/DL (ref 65–99)
MAGNESIUM SERPL-MCNC: 1.7 MG/DL (ref 1.6–2.4)
POTASSIUM SERPL-SCNC: 3.8 MMOL/L (ref 3.5–5.2)
SODIUM SERPL-SCNC: 139 MMOL/L (ref 136–145)

## 2023-02-22 PROCEDURE — 25010000002 DIPHENHYDRAMINE PER 50 MG: Performed by: INTERNAL MEDICINE

## 2023-02-22 PROCEDURE — 80048 BASIC METABOLIC PNL TOTAL CA: CPT

## 2023-02-22 PROCEDURE — 83735 ASSAY OF MAGNESIUM: CPT

## 2023-02-22 PROCEDURE — 96413 CHEMO IV INFUSION 1 HR: CPT

## 2023-02-22 PROCEDURE — 96375 TX/PRO/DX INJ NEW DRUG ADDON: CPT

## 2023-02-22 PROCEDURE — 25010000002 HEPARIN LOCK FLUSH PER 10 UNITS: Performed by: INTERNAL MEDICINE

## 2023-02-22 PROCEDURE — A9270 NON-COVERED ITEM OR SERVICE: HCPCS | Performed by: INTERNAL MEDICINE

## 2023-02-22 PROCEDURE — 63710000001 ACETAMINOPHEN 325 MG TABLET: Performed by: INTERNAL MEDICINE

## 2023-02-22 PROCEDURE — 25010000002 CETUXIMAB PER 10 MG: Performed by: INTERNAL MEDICINE

## 2023-02-22 PROCEDURE — 99214 OFFICE O/P EST MOD 30 MIN: CPT | Performed by: NURSE PRACTITIONER

## 2023-02-22 PROCEDURE — 25010000002 METHYLPREDNISOLONE PER 125 MG: Performed by: INTERNAL MEDICINE

## 2023-02-22 PROCEDURE — G0299 HHS/HOSPICE OF RN EA 15 MIN: HCPCS

## 2023-02-22 RX ORDER — METHYLPREDNISOLONE SODIUM SUCCINATE 125 MG/2ML
125 INJECTION, POWDER, LYOPHILIZED, FOR SOLUTION INTRAMUSCULAR; INTRAVENOUS ONCE
Status: COMPLETED | OUTPATIENT
Start: 2023-02-22 | End: 2023-02-22

## 2023-02-22 RX ORDER — FAMOTIDINE 10 MG/ML
20 INJECTION, SOLUTION INTRAVENOUS ONCE
Status: COMPLETED | OUTPATIENT
Start: 2023-02-22 | End: 2023-02-22

## 2023-02-22 RX ORDER — HEPARIN SODIUM (PORCINE) LOCK FLUSH IV SOLN 100 UNIT/ML 100 UNIT/ML
500 SOLUTION INTRAVENOUS AS NEEDED
Status: DISCONTINUED | OUTPATIENT
Start: 2023-02-22 | End: 2023-02-22 | Stop reason: HOSPADM

## 2023-02-22 RX ORDER — OXYCODONE HCL 5 MG/5 ML
5 SOLUTION, ORAL ORAL EVERY 4 HOURS
COMMUNITY
End: 2023-03-01 | Stop reason: HOSPADM

## 2023-02-22 RX ORDER — HYDROCODONE BITARTRATE AND ACETAMINOPHEN 7.5; 325 MG/1; MG/1
TABLET ORAL
COMMUNITY
Start: 2023-02-08 | End: 2023-03-01 | Stop reason: HOSPADM

## 2023-02-22 RX ORDER — HEPARIN SODIUM (PORCINE) LOCK FLUSH IV SOLN 100 UNIT/ML 100 UNIT/ML
500 SOLUTION INTRAVENOUS AS NEEDED
OUTPATIENT
Start: 2023-02-22

## 2023-02-22 RX ORDER — ACETAMINOPHEN 325 MG/1
650 TABLET ORAL ONCE
Status: COMPLETED | OUTPATIENT
Start: 2023-02-22 | End: 2023-02-22

## 2023-02-22 RX ORDER — SODIUM CHLORIDE 9 MG/ML
250 INJECTION, SOLUTION INTRAVENOUS ONCE
Status: DISCONTINUED | OUTPATIENT
Start: 2023-02-22 | End: 2023-02-22 | Stop reason: HOSPADM

## 2023-02-22 RX ORDER — SODIUM CHLORIDE 0.9 % (FLUSH) 0.9 %
10 SYRINGE (ML) INJECTION AS NEEDED
Status: DISCONTINUED | OUTPATIENT
Start: 2023-02-22 | End: 2023-02-22 | Stop reason: HOSPADM

## 2023-02-22 RX ORDER — SODIUM CHLORIDE 0.9 % (FLUSH) 0.9 %
10 SYRINGE (ML) INJECTION AS NEEDED
OUTPATIENT
Start: 2023-02-22

## 2023-02-22 RX ADMIN — CETUXIMAB 400 MG: 2 SOLUTION INTRAVENOUS at 10:28

## 2023-02-22 RX ADMIN — Medication 10 ML: at 12:36

## 2023-02-22 RX ADMIN — Medication 500 UNITS: at 12:36

## 2023-02-22 RX ADMIN — SODIUM CHLORIDE 1000 ML: 9 INJECTION, SOLUTION INTRAVENOUS at 09:24

## 2023-02-22 RX ADMIN — METHYLPREDNISOLONE SODIUM SUCCINATE 125 MG: 125 INJECTION, POWDER, FOR SOLUTION INTRAMUSCULAR; INTRAVENOUS at 09:25

## 2023-02-22 RX ADMIN — DIPHENHYDRAMINE HYDROCHLORIDE 50 MG: 50 INJECTION INTRAMUSCULAR; INTRAVENOUS at 09:30

## 2023-02-22 RX ADMIN — ACETAMINOPHEN 650 MG: 325 TABLET, FILM COATED ORAL at 09:26

## 2023-02-22 RX ADMIN — FAMOTIDINE 20 MG: 10 INJECTION INTRAVENOUS at 09:27

## 2023-02-22 NOTE — CODE DOCUMENTATION
Spoke with nurse from [facility], they stated that they would contact patient and see if there was anything they could help with.

## 2023-02-22 NOTE — PROGRESS NOTES
DATE OF VISIT: 2/22/2023    REASON FOR VISIT: Followup for metastatic left tonsillar CA     PROBLEM LIST:  1.  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  Presented with a growing left cervical lymph node.  B.  Diagnosed after ultrasound-guided biopsy done on December 12, 2022.  C.  No enough tissue for molecular analysis  D.  Started concurrent Erbitux with radiation January 31, 2023.  2.  Hypertension  3.  Hypercholesteremia  4.  Gout arthritis      HISTORY OF PRESENT ILLNESS: The patient is a very pleasant 77 y.o. female  with past medical history significant for metastatic left tonsillar cancer diagnosed December 2022. The patient is here today for scheduled follow-up visit with treatment.    SUBJECTIVE: Vesta is here today with her daughter.  She was able to tolerate her treatment without any immediate side effects.  Denies any rash.  She did have an area around her fingernail that she used tea tree oil and that has resolved.  She had feeding tube placed mainly due to neurological decline rather than sore throat.  She is tolerating tube feeding well.    Past History:  Medical History: has a past medical history of Arthritis, Cancer (HCC), Cancer of tonsil (HCC), Deep vein thrombosis (HCC), Dementia (HCC), Dysphagia, GERD (gastroesophageal reflux disease), Hyperlipidemia, Hypertension, PONV (postoperative nausea and vomiting), SOB (shortness of breath), Vitamin D deficiency, and Wears dentures.   Surgical History: has a past surgical history that includes Colonoscopy; Appendectomy; Cataract extraction; Cholecystectomy; Tubal ligation; Hysterectomy (1991); Oophorectomy (Bilateral, 1991); Portacath placement (Right, 02/08/2023); and Esophagogastroduodenoscopy w/ PEG (N/A, 2/20/2023).   Family History: family history includes Breast cancer in her maternal aunt and paternal aunt; Cancer in her father and sister; Heart failure in her mother; Hyperlipidemia in her mother;  "Hypertension in her mother; Stroke in her sister.   Social History: reports that she quit smoking about 36 years ago. Her smoking use included cigarettes. She has a 1.50 pack-year smoking history. She has never used smokeless tobacco. She reports that she does not drink alcohol and does not use drugs.    (Not in a hospital admission)     Allergies: Trazodone     Review of Systems   Constitutional: Positive for fatigue.   Gastrointestinal: Positive for abdominal pain.        Left quadrant feeding tube   Psychiatric/Behavioral: Positive for confusion.   All other systems reviewed and are negative.      PHYSICAL EXAMINATION:   /64   Pulse 70   Temp 97.1 °F (36.2 °C) (Temporal)   Resp 16   Ht 154.9 cm (61\")   Wt 64.9 kg (143 lb)   SpO2 98%   BMI 27.02 kg/m²    Pain Score    02/22/23 0830   PainSc: 0-No pain                     ECOG Performance Status: 2 - Symptomatic, <50% confined to bed      General Appearance:      alert, cooperative, no apparent distress and appears stated age   Lungs:   Clear to auscultation bilaterally; respirations regular, even, and unlabored bilaterally   Heart:  Regular rate and rhythm, no murmurs appreciated   Abdomen:   Soft, non-tender, non-distended and no organomegaly                 Admission on 02/19/2023, Discharged on 02/19/2023   Component Date Value Ref Range Status   • Extra Tube 02/19/2023 Hold for add-ons.   Final    Auto resulted.   • Extra Tube 02/19/2023 hold for add-on   Final    Auto resulted   • Extra Tube 02/19/2023 Hold for add-ons.   Final    Auto resulted.   • Extra Tube 02/19/2023 Hold for add-ons.   Final    Auto resulted   • Glucose 02/19/2023 99  65 - 99 mg/dL Final   • BUN 02/19/2023 22  8 - 23 mg/dL Final   • Creatinine 02/19/2023 1.51 (H)  0.57 - 1.00 mg/dL Final   • Sodium 02/19/2023 140  136 - 145 mmol/L Final   • Potassium 02/19/2023 4.0  3.5 - 5.2 mmol/L Final   • Chloride 02/19/2023 104  98 - 107 mmol/L Final   • CO2 02/19/2023 27.3  22.0 - 29.0 " mmol/L Final   • Calcium 02/19/2023 8.8  8.6 - 10.5 mg/dL Final   • Total Protein 02/19/2023 6.2  6.0 - 8.5 g/dL Final   • Albumin 02/19/2023 3.7  3.5 - 5.2 g/dL Final   • ALT (SGPT) 02/19/2023 8  1 - 33 U/L Final   • AST (SGOT) 02/19/2023 15  1 - 32 U/L Final   • Alkaline Phosphatase 02/19/2023 96  39 - 117 U/L Final   • Total Bilirubin 02/19/2023 0.6  0.0 - 1.2 mg/dL Final   • Globulin 02/19/2023 2.5  gm/dL Final   • A/G Ratio 02/19/2023 1.5  g/dL Final   • BUN/Creatinine Ratio 02/19/2023 14.6  7.0 - 25.0 Final   • Anion Gap 02/19/2023 8.7  5.0 - 15.0 mmol/L Final   • eGFR 02/19/2023 35.0 (L)  >60.0 mL/min/1.73 Final   • Magnesium 02/19/2023 1.8  1.6 - 2.4 mg/dL Final   • WBC 02/19/2023 6.61  3.40 - 10.80 10*3/mm3 Final   • RBC 02/19/2023 3.46 (L)  3.77 - 5.28 10*6/mm3 Final   • Hemoglobin 02/19/2023 10.2 (L)  12.0 - 15.9 g/dL Final   • Hematocrit 02/19/2023 32.3 (L)  34.0 - 46.6 % Final   • MCV 02/19/2023 93.4  79.0 - 97.0 fL Final   • MCH 02/19/2023 29.5  26.6 - 33.0 pg Final   • MCHC 02/19/2023 31.6  31.5 - 35.7 g/dL Final   • RDW 02/19/2023 16.8 (H)  12.3 - 15.4 % Final   • RDW-SD 02/19/2023 57.4 (H)  37.0 - 54.0 fl Final   • MPV 02/19/2023 10.6  6.0 - 12.0 fL Final   • Platelets 02/19/2023 141  140 - 450 10*3/mm3 Final   • Neutrophil % 02/19/2023 77.1 (H)  42.7 - 76.0 % Final   • Lymphocyte % 02/19/2023 8.6 (L)  19.6 - 45.3 % Final   • Monocyte % 02/19/2023 10.6  5.0 - 12.0 % Final   • Eosinophil % 02/19/2023 2.7  0.3 - 6.2 % Final   • Basophil % 02/19/2023 0.5  0.0 - 1.5 % Final   • Immature Grans % 02/19/2023 0.5  0.0 - 0.5 % Final   • Neutrophils, Absolute 02/19/2023 5.10  1.70 - 7.00 10*3/mm3 Final   • Lymphocytes, Absolute 02/19/2023 0.57 (L)  0.70 - 3.10 10*3/mm3 Final   • Monocytes, Absolute 02/19/2023 0.70  0.10 - 0.90 10*3/mm3 Final   • Eosinophils, Absolute 02/19/2023 0.18  0.00 - 0.40 10*3/mm3 Final   • Basophils, Absolute 02/19/2023 0.03  0.00 - 0.20 10*3/mm3 Final   • Immature Grans, Absolute  02/19/2023 0.03  0.00 - 0.05 10*3/mm3 Final   • nRBC 02/19/2023 0.0  0.0 - 0.2 /100 WBC Final   Infusion on 02/15/2023   Component Date Value Ref Range Status   • Glucose 02/15/2023 121 (H)  65 - 99 mg/dL Final   • BUN 02/15/2023 20  8 - 23 mg/dL Final   • Creatinine 02/15/2023 1.32 (H)  0.57 - 1.00 mg/dL Final   • Sodium 02/15/2023 136  136 - 145 mmol/L Final   • Potassium 02/15/2023 3.8  3.5 - 5.2 mmol/L Final   • Chloride 02/15/2023 99  98 - 107 mmol/L Final   • CO2 02/15/2023 28.1  22.0 - 29.0 mmol/L Final   • Calcium 02/15/2023 8.9  8.6 - 10.5 mg/dL Final   • BUN/Creatinine Ratio 02/15/2023 15.2  7.0 - 25.0 Final   • Anion Gap 02/15/2023 8.9  5.0 - 15.0 mmol/L Final   • eGFR 02/15/2023 41.2 (L)  >60.0 mL/min/1.73 Final   • Magnesium 02/15/2023 1.7  1.6 - 2.4 mg/dL Final   Infusion on 02/09/2023   Component Date Value Ref Range Status   • Glucose 02/09/2023 135 (H)  65 - 99 mg/dL Final   • BUN 02/09/2023 38 (H)  8 - 23 mg/dL Final   • Creatinine 02/09/2023 1.80 (H)  0.57 - 1.00 mg/dL Final   • Sodium 02/09/2023 138  136 - 145 mmol/L Final   • Potassium 02/09/2023 4.0  3.5 - 5.2 mmol/L Final   • Chloride 02/09/2023 99  98 - 107 mmol/L Final   • CO2 02/09/2023 26.0  22.0 - 29.0 mmol/L Final   • Calcium 02/09/2023 9.1  8.6 - 10.5 mg/dL Final   • BUN/Creatinine Ratio 02/09/2023 21.1  7.0 - 25.0 Final   • Anion Gap 02/09/2023 13.0  5.0 - 15.0 mmol/L Final   • eGFR 02/09/2023 28.4 (L)  >60.0 mL/min/1.73 Final   • Magnesium 02/09/2023 2.2  1.6 - 2.4 mg/dL Final        CT Soft Tissue Neck With Contrast    Result Date: 1/27/2023  Narrative: PROCEDURE: CT SOFT TISSUE NECK W CONTRAST-  HISTORY: c09.9; C09.9-Malignant neoplasm of tonsil, unspecified  COMPARISON: PET/CT dated 01/16/2023  TECHNIQUE: Axial images were obtained from the skull base through the upper chest following the administration of Isovue 300. Coronal images were reformatted.  FINDINGS: There is a left tonsillar mass with impingement of the left side of the  oropharynx. This mass measures approximately 34 mm in AP dimension. Left tonsillar mass appears similar to the prior exam. A 22 mm mass/lymph node adjacent to the sternocleidomastoid muscle also appears similar to the prior exam. The thyroid is unremarkable. The vocal cords and epiglottis are unremarkable. The lung apices are clear.  The right maxillary sinus is nearly completely opacified with an air-fluid level, similar to the prior exam. There is bilateral ethmoid air cell thickening. Remaining paranasal sinuses and mastoid air cells are clear.       Impression: Stable left cervical masses comparison to recent PET exam.    This study was performed with techniques to keep radiation doses as low as reasonably achievable (ALARA). Individualized dose reduction techniques using automated exposure control or adjustment of mA and/or kV according to the patient size were employed.   139.51 mGy.cm    Images were reviewed, interpreted, and dictated by Dr. Kenya Levi MD Transcribed by Bere Palacios PA-C.  This report was signed and finalized on 1/27/2023 3:42 PM by Kenya Levi MD.    XR Chest 1 View    Result Date: 2/8/2023  Narrative: PROCEDURE: XR CHEST 1 VW-  HISTORY: port; C76.0-Malignant neoplasm of head, face and neck , port placement  COMPARISON:  None  FINDINGS:  Portable view of the chest demonstrates the lungs to be grossly clear. There is no evidence of effusion, pneumothorax or other significant pleural disease. The mediastinum is unremarkable.  The heart size is normal.  Right jugular Port-A-Cath is seen with tip in the mid SVC.      Impression: No evidence of pneumothorax following right-sided Port-A-Cath placement  This report was signed and finalized on 2/8/2023 1:46 PM by Linus Nash MD.    FL C Arm During Surgery    Result Date: 2/8/2023  Narrative: This procedure was auto-finalized with no dictation required.      ASSESSMENT: The patient is a very pleasant 77 y.o. female  with left tonsillar  cancer      PLAN:    1.  Left tonsillar squamous cell carcinoma T2 N1 M0 stage I, HPV unknown:  A.  I will proceed with treatment as scheduled today Erbitux weekly cycle #4.  B.  The patient will follow with us in 2 weeks to evaluate for treatment tolerance.  C.  The patient will continue follow-up with Dr. Novoa for concurrent daily radiation.  D.  I will repeat her scans 6 weeks after completion of treatment.  E.  I did go over potential side effects of treatment including dermatitis colitis and hypomagnesemia.  F.  I will continue to monitor the patient blood work including blood counts kidney function liver function and electrolytes.    2.  Malnutrition:  A.  Patient will continue to see oncology dietitian as needed.  B.  We will continue nutritional supplements.    3.  Hypertension:  A.  She will continue Lasix Cozaar and metoprolol.  B.  We will continue to monitor her blood pressure while she is in active cancer treatment.  This could interfere with our management since treatment can cause hypotension.    4.  Hypercholesteremia:  A.  She will continue Lipitor 10 mg daily    5.  Gout arthritis:  A.  She will continue allopurinol.    6.  Poor appetite with weight loss:  A.  I will continue the patient on Marinol 2.5 mg twice daily.  B.  The patient has been on Megace before without any improvement.    7.  Port-A-Cath  A.  We will continue with port maintenance.  We will flush port today after treatment    8.  PEG  A.  Continue to follow-up with home health as well as dietary management    9.  Neurological changes  A.  This is been ongoing for at least the past 6 months.  B.  She does have a neurology consult and appointment in April.      FOLLOW UP: 2 weeks with treatment.    Jaylin Rocha, APRN  2/22/2023

## 2023-02-23 ENCOUNTER — HOSPITAL ENCOUNTER (INPATIENT)
Facility: HOSPITAL | Age: 78
LOS: 6 days | Discharge: HOME HEALTH | DRG: 280 | End: 2023-03-01
Attending: EMERGENCY MEDICINE | Admitting: INTERNAL MEDICINE
Payer: MEDICARE

## 2023-02-23 ENCOUNTER — APPOINTMENT (OUTPATIENT)
Dept: GENERAL RADIOLOGY | Facility: HOSPITAL | Age: 78
DRG: 280 | End: 2023-02-23
Payer: MEDICARE

## 2023-02-23 ENCOUNTER — APPOINTMENT (OUTPATIENT)
Dept: CT IMAGING | Facility: HOSPITAL | Age: 78
DRG: 280 | End: 2023-02-23
Payer: MEDICARE

## 2023-02-23 ENCOUNTER — TELEPHONE (OUTPATIENT)
Dept: SURGERY | Facility: CLINIC | Age: 78
End: 2023-02-23
Payer: MEDICARE

## 2023-02-23 DIAGNOSIS — R53.81 DEBILITY: ICD-10-CM

## 2023-02-23 DIAGNOSIS — C76.0 SQUAMOUS CELL CARCINOMA OF HEAD AND NECK: ICD-10-CM

## 2023-02-23 DIAGNOSIS — C09.9 SQUAMOUS CELL CARCINOMA OF LEFT TONSIL: ICD-10-CM

## 2023-02-23 DIAGNOSIS — Z78.9 ON TUBE FEEDING DIET: ICD-10-CM

## 2023-02-23 DIAGNOSIS — C09.9 SQUAMOUS CELL CARCINOMA OF LEFT TONSIL: Primary | ICD-10-CM

## 2023-02-23 DIAGNOSIS — I21.4 NSTEMI (NON-ST ELEVATED MYOCARDIAL INFARCTION): Primary | ICD-10-CM

## 2023-02-23 DIAGNOSIS — R13.19 ESOPHAGEAL DYSPHAGIA: ICD-10-CM

## 2023-02-23 LAB
ALBUMIN SERPL-MCNC: 3.7 G/DL (ref 3.5–5.2)
ALBUMIN/GLOB SERPL: 1.4 G/DL
ALP SERPL-CCNC: 97 U/L (ref 39–117)
ALT SERPL W P-5'-P-CCNC: 11 U/L (ref 1–33)
ANION GAP SERPL CALCULATED.3IONS-SCNC: 10 MMOL/L (ref 5–15)
AST SERPL-CCNC: 20 U/L (ref 1–32)
BASOPHILS # BLD AUTO: 0.01 10*3/MM3 (ref 0–0.2)
BASOPHILS NFR BLD AUTO: 0.1 % (ref 0–1.5)
BILIRUB SERPL-MCNC: 0.3 MG/DL (ref 0–1.2)
BUN SERPL-MCNC: 31 MG/DL (ref 8–23)
BUN/CREAT SERPL: 25.4 (ref 7–25)
CALCIUM SPEC-SCNC: 8.7 MG/DL (ref 8.6–10.5)
CHLORIDE SERPL-SCNC: 103 MMOL/L (ref 98–107)
CO2 SERPL-SCNC: 27 MMOL/L (ref 22–29)
CREAT SERPL-MCNC: 1.22 MG/DL (ref 0.57–1)
D-LACTATE SERPL-SCNC: 1.9 MMOL/L (ref 0.5–2)
DEPRECATED RDW RBC AUTO: 57 FL (ref 37–54)
EGFRCR SERPLBLD CKD-EPI 2021: 45.2 ML/MIN/1.73
EOSINOPHIL # BLD AUTO: 0 10*3/MM3 (ref 0–0.4)
EOSINOPHIL NFR BLD AUTO: 0 % (ref 0.3–6.2)
ERYTHROCYTE [DISTWIDTH] IN BLOOD BY AUTOMATED COUNT: 17 % (ref 12.3–15.4)
GEN 5 2HR TROPONIN T REFLEX: 115 NG/L
GLOBULIN UR ELPH-MCNC: 2.6 GM/DL
GLUCOSE SERPL-MCNC: 139 MG/DL (ref 65–99)
HCT VFR BLD AUTO: 32 % (ref 34–46.6)
HGB BLD-MCNC: 10.4 G/DL (ref 12–15.9)
HOLD SPECIMEN: NORMAL
HOLD SPECIMEN: NORMAL
IMM GRANULOCYTES # BLD AUTO: 0.09 10*3/MM3 (ref 0–0.05)
IMM GRANULOCYTES NFR BLD AUTO: 0.7 % (ref 0–0.5)
LIPASE SERPL-CCNC: 21 U/L (ref 13–60)
LYMPHOCYTES # BLD AUTO: 0.41 10*3/MM3 (ref 0.7–3.1)
LYMPHOCYTES NFR BLD AUTO: 3 % (ref 19.6–45.3)
MCH RBC QN AUTO: 29.5 PG (ref 26.6–33)
MCHC RBC AUTO-ENTMCNC: 32.5 G/DL (ref 31.5–35.7)
MCV RBC AUTO: 90.9 FL (ref 79–97)
MONOCYTES # BLD AUTO: 1.02 10*3/MM3 (ref 0.1–0.9)
MONOCYTES NFR BLD AUTO: 7.5 % (ref 5–12)
NEUTROPHILS NFR BLD AUTO: 12.1 10*3/MM3 (ref 1.7–7)
NEUTROPHILS NFR BLD AUTO: 88.7 % (ref 42.7–76)
NRBC BLD AUTO-RTO: 0 /100 WBC (ref 0–0.2)
PLATELET # BLD AUTO: 172 10*3/MM3 (ref 140–450)
PMV BLD AUTO: 11.3 FL (ref 6–12)
POTASSIUM SERPL-SCNC: 4.1 MMOL/L (ref 3.5–5.2)
PROT SERPL-MCNC: 6.3 G/DL (ref 6–8.5)
RBC # BLD AUTO: 3.52 10*6/MM3 (ref 3.77–5.28)
SODIUM SERPL-SCNC: 140 MMOL/L (ref 136–145)
TROPONIN T DELTA: 31 NG/L
TROPONIN T SERPL HS-MCNC: 84 NG/L
WBC NRBC COR # BLD: 13.63 10*3/MM3 (ref 3.4–10.8)
WHOLE BLOOD HOLD COAG: NORMAL
WHOLE BLOOD HOLD SPECIMEN: NORMAL

## 2023-02-23 PROCEDURE — 83605 ASSAY OF LACTIC ACID: CPT

## 2023-02-23 PROCEDURE — 25510000001 IOPAMIDOL 61 % SOLUTION: Performed by: STUDENT IN AN ORGANIZED HEALTH CARE EDUCATION/TRAINING PROGRAM

## 2023-02-23 PROCEDURE — 85025 COMPLETE CBC W/AUTO DIFF WBC: CPT

## 2023-02-23 PROCEDURE — 99223 1ST HOSP IP/OBS HIGH 75: CPT | Performed by: INTERNAL MEDICINE

## 2023-02-23 PROCEDURE — 93005 ELECTROCARDIOGRAM TRACING: CPT

## 2023-02-23 PROCEDURE — 80053 COMPREHEN METABOLIC PANEL: CPT

## 2023-02-23 PROCEDURE — 99221 1ST HOSP IP/OBS SF/LOW 40: CPT | Performed by: SURGERY

## 2023-02-23 PROCEDURE — 83690 ASSAY OF LIPASE: CPT

## 2023-02-23 PROCEDURE — 36415 COLL VENOUS BLD VENIPUNCTURE: CPT

## 2023-02-23 PROCEDURE — 99285 EMERGENCY DEPT VISIT HI MDM: CPT

## 2023-02-23 PROCEDURE — 74177 CT ABD & PELVIS W/CONTRAST: CPT

## 2023-02-23 PROCEDURE — 84484 ASSAY OF TROPONIN QUANT: CPT

## 2023-02-23 PROCEDURE — 71045 X-RAY EXAM CHEST 1 VIEW: CPT

## 2023-02-23 RX ORDER — ENOXAPARIN SODIUM 100 MG/ML
1 INJECTION SUBCUTANEOUS EVERY 12 HOURS
Status: DISCONTINUED | OUTPATIENT
Start: 2023-02-24 | End: 2023-02-26 | Stop reason: DRUGHIGH

## 2023-02-23 RX ORDER — NALOXONE HCL 0.4 MG/ML
0.4 VIAL (ML) INJECTION
Status: DISCONTINUED | OUTPATIENT
Start: 2023-02-23 | End: 2023-03-01 | Stop reason: HOSPADM

## 2023-02-23 RX ORDER — NYSTATIN 100000 [USP'U]/G
POWDER TOPICAL 3 TIMES DAILY
Qty: 60 G | Refills: 0 | Status: SHIPPED | OUTPATIENT
Start: 2023-02-23

## 2023-02-23 RX ORDER — NALOXONE HCL 0.4 MG/ML
0.4 VIAL (ML) INJECTION
Status: DISCONTINUED | OUTPATIENT
Start: 2023-02-23 | End: 2023-02-27

## 2023-02-23 RX ORDER — ACETAMINOPHEN 325 MG/1
650 TABLET ORAL EVERY 4 HOURS PRN
Status: DISCONTINUED | OUTPATIENT
Start: 2023-02-23 | End: 2023-03-01 | Stop reason: HOSPADM

## 2023-02-23 RX ORDER — ACETAMINOPHEN 650 MG/1
650 SUPPOSITORY RECTAL EVERY 4 HOURS PRN
Status: DISCONTINUED | OUTPATIENT
Start: 2023-02-23 | End: 2023-03-01 | Stop reason: HOSPADM

## 2023-02-23 RX ORDER — SODIUM CHLORIDE 0.9 % (FLUSH) 0.9 %
10 SYRINGE (ML) INJECTION AS NEEDED
Status: DISCONTINUED | OUTPATIENT
Start: 2023-02-23 | End: 2023-03-01 | Stop reason: HOSPADM

## 2023-02-23 RX ORDER — ASPIRIN 81 MG/1
81 TABLET, CHEWABLE ORAL DAILY
Status: DISCONTINUED | OUTPATIENT
Start: 2023-02-24 | End: 2023-02-26

## 2023-02-23 RX ORDER — LABETALOL HYDROCHLORIDE 5 MG/ML
10 INJECTION, SOLUTION INTRAVENOUS EVERY 6 HOURS PRN
Status: DISCONTINUED | OUTPATIENT
Start: 2023-02-23 | End: 2023-03-01 | Stop reason: HOSPADM

## 2023-02-23 RX ORDER — PANTOPRAZOLE SODIUM 40 MG/10ML
40 INJECTION, POWDER, LYOPHILIZED, FOR SOLUTION INTRAVENOUS
Status: DISCONTINUED | OUTPATIENT
Start: 2023-02-24 | End: 2023-02-24 | Stop reason: ALTCHOICE

## 2023-02-23 RX ORDER — NITROGLYCERIN 0.4 MG/1
0.4 TABLET SUBLINGUAL ONCE
Status: COMPLETED | OUTPATIENT
Start: 2023-02-23 | End: 2023-02-23

## 2023-02-23 RX ORDER — MORPHINE SULFATE 2 MG/ML
2 INJECTION, SOLUTION INTRAMUSCULAR; INTRAVENOUS EVERY 4 HOURS PRN
Status: DISCONTINUED | OUTPATIENT
Start: 2023-02-23 | End: 2023-02-27

## 2023-02-23 RX ORDER — SODIUM CHLORIDE, SODIUM LACTATE, POTASSIUM CHLORIDE, CALCIUM CHLORIDE 600; 310; 30; 20 MG/100ML; MG/100ML; MG/100ML; MG/100ML
75 INJECTION, SOLUTION INTRAVENOUS CONTINUOUS
Status: DISCONTINUED | OUTPATIENT
Start: 2023-02-24 | End: 2023-02-25

## 2023-02-23 RX ORDER — ONDANSETRON 2 MG/ML
4 INJECTION INTRAMUSCULAR; INTRAVENOUS EVERY 6 HOURS PRN
Status: DISCONTINUED | OUTPATIENT
Start: 2023-02-23 | End: 2023-03-01 | Stop reason: HOSPADM

## 2023-02-23 RX ORDER — SODIUM CHLORIDE 9 MG/ML
40 INJECTION, SOLUTION INTRAVENOUS AS NEEDED
Status: DISCONTINUED | OUTPATIENT
Start: 2023-02-23 | End: 2023-03-01 | Stop reason: HOSPADM

## 2023-02-23 RX ORDER — LIDOCAINE HYDROCHLORIDE 20 MG/ML
1 JELLY TOPICAL ONCE
Status: COMPLETED | OUTPATIENT
Start: 2023-02-23 | End: 2023-02-23

## 2023-02-23 RX ORDER — ENOXAPARIN SODIUM 100 MG/ML
1 INJECTION SUBCUTANEOUS ONCE
Status: COMPLETED | OUTPATIENT
Start: 2023-02-23 | End: 2023-02-24

## 2023-02-23 RX ORDER — SODIUM CHLORIDE 0.9 % (FLUSH) 0.9 %
10 SYRINGE (ML) INJECTION EVERY 12 HOURS SCHEDULED
Status: DISCONTINUED | OUTPATIENT
Start: 2023-02-24 | End: 2023-03-01 | Stop reason: HOSPADM

## 2023-02-23 RX ORDER — ACETAMINOPHEN 160 MG/5ML
650 SOLUTION ORAL EVERY 4 HOURS PRN
Status: DISCONTINUED | OUTPATIENT
Start: 2023-02-23 | End: 2023-03-01 | Stop reason: HOSPADM

## 2023-02-23 RX ADMIN — LIDOCAINE HYDROCHLORIDE 1 APPLICATION: 20 JELLY TOPICAL at 21:22

## 2023-02-23 RX ADMIN — Medication 1 APPLICATION: at 19:59

## 2023-02-23 RX ADMIN — NITROGLYCERIN 0.4 MG: 0.4 TABLET SUBLINGUAL at 21:22

## 2023-02-23 RX ADMIN — SODIUM CHLORIDE 1000 ML: 9 INJECTION, SOLUTION INTRAVENOUS at 21:22

## 2023-02-23 RX ADMIN — IOPAMIDOL 100 ML: 612 INJECTION, SOLUTION INTRAVENOUS at 20:43

## 2023-02-23 NOTE — TELEPHONE ENCOUNTER
Meliza called in reporting patient has a red rash under her breasts and also abdominal pain and distention since having her feeding tube put in last week.  I advised she needs to f/u with surgeon regarding the feeding tube and abdominal pain.  Her daughter reports her bowels are moving ok.  I discussed on phone with LADARIUS Wylie and she said it may be yeast under her breast and we can send I some nystatin powder for her and she agreed they needed to f/u with surgeon office.  I called daughter back to let her know about the script and she said she spoke with Dr. Guerra's office and they advised that she should go to ER for evaluation.

## 2023-02-23 NOTE — TELEPHONE ENCOUNTER
"Pt's daughter called the office, she stated \"Mom has a rash on her upper abdomen up to her chest and she is screaming in pain from her abdomen.\"  I asked her to take her to [facility] for evaluation, she stated that she will \"go home and assess her and take her.\"  "

## 2023-02-23 NOTE — HOME HEALTH
Sn visit for leaking gtube. Small port had small interm. drip. Hospital had given pt/cg adapters that did not fit feeding tube. Cloth tape applied around port site to secure from any leaks. Pt tolerated well.

## 2023-02-24 ENCOUNTER — APPOINTMENT (OUTPATIENT)
Dept: CARDIOLOGY | Facility: HOSPITAL | Age: 78
DRG: 280 | End: 2023-02-24
Payer: MEDICARE

## 2023-02-24 ENCOUNTER — HOME CARE VISIT (OUTPATIENT)
Dept: HOME HEALTH SERVICES | Facility: HOME HEALTHCARE | Age: 78
End: 2023-02-24
Payer: MEDICARE

## 2023-02-24 ENCOUNTER — APPOINTMENT (OUTPATIENT)
Dept: GENERAL RADIOLOGY | Facility: HOSPITAL | Age: 78
DRG: 280 | End: 2023-02-24
Payer: MEDICARE

## 2023-02-24 LAB
ALBUMIN SERPL-MCNC: 3.2 G/DL (ref 3.5–5.2)
ALBUMIN/GLOB SERPL: 1.3 G/DL
ALP SERPL-CCNC: 87 U/L (ref 39–117)
ALT SERPL W P-5'-P-CCNC: 9 U/L (ref 1–33)
ANION GAP SERPL CALCULATED.3IONS-SCNC: 8.4 MMOL/L (ref 5–15)
ANION GAP SERPL CALCULATED.3IONS-SCNC: 9.1 MMOL/L (ref 5–15)
AST SERPL-CCNC: 18 U/L (ref 1–32)
BASOPHILS # BLD AUTO: 0.01 10*3/MM3 (ref 0–0.2)
BASOPHILS NFR BLD AUTO: 0.1 % (ref 0–1.5)
BILIRUB SERPL-MCNC: 0.4 MG/DL (ref 0–1.2)
BUN SERPL-MCNC: 26 MG/DL (ref 8–23)
BUN SERPL-MCNC: 27 MG/DL (ref 8–23)
BUN/CREAT SERPL: 23.7 (ref 7–25)
BUN/CREAT SERPL: 25.7 (ref 7–25)
C DIFF GDH + TOXINS A+B STL QL IA.RAPID: NEGATIVE
C DIFF GDH + TOXINS A+B STL QL IA.RAPID: NEGATIVE
CALCIUM SPEC-SCNC: 8.1 MG/DL (ref 8.6–10.5)
CALCIUM SPEC-SCNC: 8.2 MG/DL (ref 8.6–10.5)
CHLORIDE SERPL-SCNC: 104 MMOL/L (ref 98–107)
CHLORIDE SERPL-SCNC: 105 MMOL/L (ref 98–107)
CO2 SERPL-SCNC: 24.9 MMOL/L (ref 22–29)
CO2 SERPL-SCNC: 25.6 MMOL/L (ref 22–29)
CREAT SERPL-MCNC: 1.01 MG/DL (ref 0.57–1)
CREAT SERPL-MCNC: 1.14 MG/DL (ref 0.57–1)
DEPRECATED RDW RBC AUTO: 58.2 FL (ref 37–54)
EGFRCR SERPLBLD CKD-EPI 2021: 49.1 ML/MIN/1.73
EGFRCR SERPLBLD CKD-EPI 2021: 56.7 ML/MIN/1.73
EOSINOPHIL # BLD AUTO: 0 10*3/MM3 (ref 0–0.4)
EOSINOPHIL NFR BLD AUTO: 0 % (ref 0.3–6.2)
ERYTHROCYTE [DISTWIDTH] IN BLOOD BY AUTOMATED COUNT: 17.1 % (ref 12.3–15.4)
GLOBULIN UR ELPH-MCNC: 2.4 GM/DL
GLUCOSE SERPL-MCNC: 139 MG/DL (ref 65–99)
GLUCOSE SERPL-MCNC: 140 MG/DL (ref 65–99)
HCT VFR BLD AUTO: 31.9 % (ref 34–46.6)
HGB BLD-MCNC: 10.1 G/DL (ref 12–15.9)
IMM GRANULOCYTES # BLD AUTO: 0.07 10*3/MM3 (ref 0–0.05)
IMM GRANULOCYTES NFR BLD AUTO: 0.7 % (ref 0–0.5)
LYMPHOCYTES # BLD AUTO: 0.51 10*3/MM3 (ref 0.7–3.1)
LYMPHOCYTES NFR BLD AUTO: 5.4 % (ref 19.6–45.3)
MAGNESIUM SERPL-MCNC: 1.6 MG/DL (ref 1.6–2.4)
MCH RBC QN AUTO: 29.6 PG (ref 26.6–33)
MCHC RBC AUTO-ENTMCNC: 31.7 G/DL (ref 31.5–35.7)
MCV RBC AUTO: 93.5 FL (ref 79–97)
MONOCYTES # BLD AUTO: 0.75 10*3/MM3 (ref 0.1–0.9)
MONOCYTES NFR BLD AUTO: 8 % (ref 5–12)
NEUTROPHILS NFR BLD AUTO: 8.05 10*3/MM3 (ref 1.7–7)
NEUTROPHILS NFR BLD AUTO: 85.8 % (ref 42.7–76)
NRBC BLD AUTO-RTO: 0 /100 WBC (ref 0–0.2)
PLATELET # BLD AUTO: 131 10*3/MM3 (ref 140–450)
PMV BLD AUTO: 11.6 FL (ref 6–12)
POTASSIUM SERPL-SCNC: 3.7 MMOL/L (ref 3.5–5.2)
POTASSIUM SERPL-SCNC: 4 MMOL/L (ref 3.5–5.2)
PROT SERPL-MCNC: 5.6 G/DL (ref 6–8.5)
RBC # BLD AUTO: 3.41 10*6/MM3 (ref 3.77–5.28)
SODIUM SERPL-SCNC: 138 MMOL/L (ref 136–145)
SODIUM SERPL-SCNC: 139 MMOL/L (ref 136–145)
TROPONIN T SERPL HS-MCNC: 137 NG/L
TROPONIN T SERPL HS-MCNC: 138 NG/L
WBC NRBC COR # BLD: 9.39 10*3/MM3 (ref 3.4–10.8)

## 2023-02-24 PROCEDURE — 84484 ASSAY OF TROPONIN QUANT: CPT | Performed by: INTERNAL MEDICINE

## 2023-02-24 PROCEDURE — 85025 COMPLETE CBC W/AUTO DIFF WBC: CPT | Performed by: INTERNAL MEDICINE

## 2023-02-24 PROCEDURE — 93321 DOPPLER ECHO F-UP/LMTD STD: CPT

## 2023-02-24 PROCEDURE — 80053 COMPREHEN METABOLIC PANEL: CPT | Performed by: INTERNAL MEDICINE

## 2023-02-24 PROCEDURE — 87449 NOS EACH ORGANISM AG IA: CPT | Performed by: INTERNAL MEDICINE

## 2023-02-24 PROCEDURE — 71045 X-RAY EXAM CHEST 1 VIEW: CPT

## 2023-02-24 PROCEDURE — 93308 TTE F-UP OR LMTD: CPT | Performed by: INTERNAL MEDICINE

## 2023-02-24 PROCEDURE — 93325 DOPPLER ECHO COLOR FLOW MAPG: CPT | Performed by: INTERNAL MEDICINE

## 2023-02-24 PROCEDURE — 99232 SBSQ HOSP IP/OBS MODERATE 35: CPT | Performed by: NURSE PRACTITIONER

## 2023-02-24 PROCEDURE — 87324 CLOSTRIDIUM AG IA: CPT | Performed by: INTERNAL MEDICINE

## 2023-02-24 PROCEDURE — 25010000002 ENOXAPARIN PER 10 MG: Performed by: INTERNAL MEDICINE

## 2023-02-24 PROCEDURE — 93321 DOPPLER ECHO F-UP/LMTD STD: CPT | Performed by: INTERNAL MEDICINE

## 2023-02-24 PROCEDURE — 93325 DOPPLER ECHO COLOR FLOW MAPG: CPT

## 2023-02-24 PROCEDURE — 83735 ASSAY OF MAGNESIUM: CPT | Performed by: INTERNAL MEDICINE

## 2023-02-24 PROCEDURE — 25010000002 ENOXAPARIN PER 10 MG

## 2023-02-24 PROCEDURE — 93308 TTE F-UP OR LMTD: CPT

## 2023-02-24 PROCEDURE — 99222 1ST HOSP IP/OBS MODERATE 55: CPT | Performed by: INTERNAL MEDICINE

## 2023-02-24 RX ORDER — ATORVASTATIN CALCIUM 40 MG/1
40 TABLET, FILM COATED ORAL NIGHTLY
Status: DISCONTINUED | OUTPATIENT
Start: 2023-02-24 | End: 2023-03-01 | Stop reason: HOSPADM

## 2023-02-24 RX ORDER — MIRTAZAPINE 15 MG/1
15 TABLET, FILM COATED ORAL NIGHTLY
Status: DISCONTINUED | OUTPATIENT
Start: 2023-02-24 | End: 2023-03-01 | Stop reason: HOSPADM

## 2023-02-24 RX ORDER — METOPROLOL TARTRATE 50 MG/1
50 TABLET, FILM COATED ORAL EVERY 12 HOURS SCHEDULED
Status: DISCONTINUED | OUTPATIENT
Start: 2023-02-24 | End: 2023-02-25

## 2023-02-24 RX ORDER — CLOPIDOGREL BISULFATE 75 MG/1
75 TABLET ORAL DAILY
Status: DISCONTINUED | OUTPATIENT
Start: 2023-02-25 | End: 2023-02-26

## 2023-02-24 RX ORDER — SIMETHICONE 80 MG
80 TABLET,CHEWABLE ORAL 4 TIMES DAILY PRN
Status: DISCONTINUED | OUTPATIENT
Start: 2023-02-24 | End: 2023-03-01 | Stop reason: HOSPADM

## 2023-02-24 RX ORDER — LIDOCAINE HYDROCHLORIDE 20 MG/ML
15 SOLUTION OROPHARYNGEAL AS NEEDED
Status: DISCONTINUED | OUTPATIENT
Start: 2023-02-24 | End: 2023-03-01 | Stop reason: HOSPADM

## 2023-02-24 RX ORDER — CLOPIDOGREL BISULFATE 75 MG/1
300 TABLET ORAL ONCE
Status: COMPLETED | OUTPATIENT
Start: 2023-02-24 | End: 2023-02-24

## 2023-02-24 RX ORDER — FUROSEMIDE 40 MG/1
40 TABLET ORAL DAILY
Status: DISCONTINUED | OUTPATIENT
Start: 2023-02-24 | End: 2023-03-01 | Stop reason: HOSPADM

## 2023-02-24 RX ORDER — LOSARTAN POTASSIUM 50 MG/1
100 TABLET ORAL DAILY
Status: DISCONTINUED | OUTPATIENT
Start: 2023-02-24 | End: 2023-03-01 | Stop reason: HOSPADM

## 2023-02-24 RX ORDER — NITROGLYCERIN 0.4 MG/1
TABLET SUBLINGUAL
Status: COMPLETED
Start: 2023-02-24 | End: 2023-02-24

## 2023-02-24 RX ORDER — NITROGLYCERIN 0.4 MG/1
0.4 TABLET SUBLINGUAL
Status: DISCONTINUED | OUTPATIENT
Start: 2023-02-24 | End: 2023-03-01 | Stop reason: HOSPADM

## 2023-02-24 RX ORDER — FAMOTIDINE 10 MG/ML
20 INJECTION, SOLUTION INTRAVENOUS DAILY
Status: DISCONTINUED | OUTPATIENT
Start: 2023-02-25 | End: 2023-02-25 | Stop reason: ALTCHOICE

## 2023-02-24 RX ORDER — HYDRALAZINE HYDROCHLORIDE 25 MG/1
25 TABLET, FILM COATED ORAL 2 TIMES DAILY
Status: DISCONTINUED | OUTPATIENT
Start: 2023-02-24 | End: 2023-02-25

## 2023-02-24 RX ORDER — MAGNESIUM SULFATE HEPTAHYDRATE 40 MG/ML
4 INJECTION, SOLUTION INTRAVENOUS ONCE
Status: CANCELLED | OUTPATIENT
Start: 2023-02-24 | End: 2023-02-24

## 2023-02-24 RX ADMIN — HYDRALAZINE HYDROCHLORIDE 25 MG: 25 TABLET, FILM COATED ORAL at 14:51

## 2023-02-24 RX ADMIN — METOPROLOL TARTRATE 50 MG: 50 TABLET, FILM COATED ORAL at 10:13

## 2023-02-24 RX ADMIN — SIMETHICONE 80 MG: 80 TABLET, CHEWABLE ORAL at 19:00

## 2023-02-24 RX ADMIN — NITROGLYCERIN 0.4 MG: 0.4 TABLET SUBLINGUAL at 16:57

## 2023-02-24 RX ADMIN — SODIUM CHLORIDE, POTASSIUM CHLORIDE, SODIUM LACTATE AND CALCIUM CHLORIDE 75 ML/HR: 600; 310; 30; 20 INJECTION, SOLUTION INTRAVENOUS at 00:20

## 2023-02-24 RX ADMIN — ENOXAPARIN SODIUM 60 MG: 60 INJECTION SUBCUTANEOUS at 14:52

## 2023-02-24 RX ADMIN — LOSARTAN POTASSIUM 100 MG: 50 TABLET, FILM COATED ORAL at 14:51

## 2023-02-24 RX ADMIN — PANTOPRAZOLE SODIUM 40 MG: 40 INJECTION, POWDER, FOR SOLUTION INTRAVENOUS at 00:23

## 2023-02-24 RX ADMIN — MIRTAZAPINE 15 MG: 15 TABLET, FILM COATED ORAL at 21:24

## 2023-02-24 RX ADMIN — Medication 10 ML: at 21:24

## 2023-02-24 RX ADMIN — FUROSEMIDE 40 MG: 40 TABLET ORAL at 14:52

## 2023-02-24 RX ADMIN — HYDRALAZINE HYDROCHLORIDE 25 MG: 25 TABLET, FILM COATED ORAL at 21:24

## 2023-02-24 RX ADMIN — ENOXAPARIN SODIUM 60 MG: 60 INJECTION SUBCUTANEOUS at 23:50

## 2023-02-24 RX ADMIN — ACETAMINOPHEN 650 MG: 650 SOLUTION ORAL at 10:13

## 2023-02-24 RX ADMIN — Medication 10 ML: at 00:25

## 2023-02-24 RX ADMIN — SODIUM CHLORIDE, POTASSIUM CHLORIDE, SODIUM LACTATE AND CALCIUM CHLORIDE 75 ML/HR: 600; 310; 30; 20 INJECTION, SOLUTION INTRAVENOUS at 14:50

## 2023-02-24 RX ADMIN — PANTOPRAZOLE SODIUM 40 MG: 40 INJECTION, POWDER, FOR SOLUTION INTRAVENOUS at 05:46

## 2023-02-24 RX ADMIN — ASPIRIN 81 MG CHEWABLE TABLET 81 MG: 81 TABLET CHEWABLE at 10:13

## 2023-02-24 RX ADMIN — ATORVASTATIN CALCIUM 40 MG: 40 TABLET, FILM COATED ORAL at 21:24

## 2023-02-24 RX ADMIN — LABETALOL HYDROCHLORIDE 10 MG: 5 INJECTION, SOLUTION INTRAVENOUS at 00:21

## 2023-02-24 RX ADMIN — METOPROLOL TARTRATE 50 MG: 50 TABLET, FILM COATED ORAL at 21:24

## 2023-02-24 RX ADMIN — ENOXAPARIN SODIUM 60 MG: 60 INJECTION SUBCUTANEOUS at 00:24

## 2023-02-24 RX ADMIN — CLOPIDOGREL BISULFATE 300 MG: 75 TABLET ORAL at 10:13

## 2023-02-24 RX ADMIN — Medication 10 ML: at 10:15

## 2023-02-24 NOTE — HOME HEALTH
Patient went to the ER at [facility] with no call to home health for abdominal pain. After going to the ER she had shoulder pain and abnormal labs and found to have NSTEMI. Patient is inpatient effective 2.23.23.

## 2023-02-25 ENCOUNTER — APPOINTMENT (OUTPATIENT)
Dept: CT IMAGING | Facility: HOSPITAL | Age: 78
DRG: 280 | End: 2023-02-25
Payer: MEDICARE

## 2023-02-25 LAB
ANION GAP SERPL CALCULATED.3IONS-SCNC: 11.3 MMOL/L (ref 5–15)
BACTERIA UR QL AUTO: ABNORMAL /HPF
BASOPHILS # BLD AUTO: 0.02 10*3/MM3 (ref 0–0.2)
BASOPHILS NFR BLD AUTO: 0.3 % (ref 0–1.5)
BILIRUB UR QL STRIP: NEGATIVE
BUN SERPL-MCNC: 18 MG/DL (ref 8–23)
BUN/CREAT SERPL: 19.4 (ref 7–25)
CALCIUM SPEC-SCNC: 8.1 MG/DL (ref 8.6–10.5)
CHLORIDE SERPL-SCNC: 102 MMOL/L (ref 98–107)
CLARITY UR: ABNORMAL
CO2 SERPL-SCNC: 25.7 MMOL/L (ref 22–29)
COLOR UR: YELLOW
CREAT SERPL-MCNC: 0.93 MG/DL (ref 0.57–1)
DEPRECATED RDW RBC AUTO: 57.8 FL (ref 37–54)
EGFRCR SERPLBLD CKD-EPI 2021: 62.6 ML/MIN/1.73
EOSINOPHIL # BLD AUTO: 0.07 10*3/MM3 (ref 0–0.4)
EOSINOPHIL NFR BLD AUTO: 1.1 % (ref 0.3–6.2)
ERYTHROCYTE [DISTWIDTH] IN BLOOD BY AUTOMATED COUNT: 16.9 % (ref 12.3–15.4)
GLUCOSE SERPL-MCNC: 131 MG/DL (ref 65–99)
GLUCOSE UR STRIP-MCNC: NEGATIVE MG/DL
HCT VFR BLD AUTO: 30.1 % (ref 34–46.6)
HGB BLD-MCNC: 9.6 G/DL (ref 12–15.9)
HGB UR QL STRIP.AUTO: NEGATIVE
HYALINE CASTS UR QL AUTO: ABNORMAL /LPF
IMM GRANULOCYTES # BLD AUTO: 0.04 10*3/MM3 (ref 0–0.05)
IMM GRANULOCYTES NFR BLD AUTO: 0.6 % (ref 0–0.5)
KETONES UR QL STRIP: NEGATIVE
LEUKOCYTE ESTERASE UR QL STRIP.AUTO: ABNORMAL
LYMPHOCYTES # BLD AUTO: 0.43 10*3/MM3 (ref 0.7–3.1)
LYMPHOCYTES NFR BLD AUTO: 6.5 % (ref 19.6–45.3)
MCH RBC QN AUTO: 29.4 PG (ref 26.6–33)
MCHC RBC AUTO-ENTMCNC: 31.9 G/DL (ref 31.5–35.7)
MCV RBC AUTO: 92 FL (ref 79–97)
MONOCYTES # BLD AUTO: 0.44 10*3/MM3 (ref 0.1–0.9)
MONOCYTES NFR BLD AUTO: 6.7 % (ref 5–12)
NEUTROPHILS NFR BLD AUTO: 5.61 10*3/MM3 (ref 1.7–7)
NEUTROPHILS NFR BLD AUTO: 84.8 % (ref 42.7–76)
NITRITE UR QL STRIP: NEGATIVE
NRBC BLD AUTO-RTO: 0 /100 WBC (ref 0–0.2)
PH UR STRIP.AUTO: 6.5 [PH] (ref 5–8)
PLATELET # BLD AUTO: 128 10*3/MM3 (ref 140–450)
PMV BLD AUTO: 10.8 FL (ref 6–12)
POTASSIUM SERPL-SCNC: 3.6 MMOL/L (ref 3.5–5.2)
PROT UR QL STRIP: ABNORMAL
RBC # BLD AUTO: 3.27 10*6/MM3 (ref 3.77–5.28)
RBC # UR STRIP: ABNORMAL /HPF
REF LAB TEST METHOD: ABNORMAL
SODIUM SERPL-SCNC: 139 MMOL/L (ref 136–145)
SP GR UR STRIP: 1.01 (ref 1–1.03)
SQUAMOUS #/AREA URNS HPF: ABNORMAL /HPF
UROBILINOGEN UR QL STRIP: ABNORMAL
WBC # UR STRIP: ABNORMAL /HPF
WBC NRBC COR # BLD: 6.61 10*3/MM3 (ref 3.4–10.8)

## 2023-02-25 PROCEDURE — 81001 URINALYSIS AUTO W/SCOPE: CPT | Performed by: NURSE PRACTITIONER

## 2023-02-25 PROCEDURE — 87077 CULTURE AEROBIC IDENTIFY: CPT | Performed by: NURSE PRACTITIONER

## 2023-02-25 PROCEDURE — 99232 SBSQ HOSP IP/OBS MODERATE 35: CPT | Performed by: INTERNAL MEDICINE

## 2023-02-25 PROCEDURE — 99232 SBSQ HOSP IP/OBS MODERATE 35: CPT | Performed by: NURSE PRACTITIONER

## 2023-02-25 PROCEDURE — 87186 SC STD MICRODIL/AGAR DIL: CPT | Performed by: NURSE PRACTITIONER

## 2023-02-25 PROCEDURE — 87040 BLOOD CULTURE FOR BACTERIA: CPT | Performed by: NURSE PRACTITIONER

## 2023-02-25 PROCEDURE — 85025 COMPLETE CBC W/AUTO DIFF WBC: CPT | Performed by: NURSE PRACTITIONER

## 2023-02-25 PROCEDURE — 70450 CT HEAD/BRAIN W/O DYE: CPT

## 2023-02-25 PROCEDURE — 87086 URINE CULTURE/COLONY COUNT: CPT | Performed by: NURSE PRACTITIONER

## 2023-02-25 PROCEDURE — 80048 BASIC METABOLIC PNL TOTAL CA: CPT | Performed by: NURSE PRACTITIONER

## 2023-02-25 PROCEDURE — 25010000002 HYDRALAZINE PER 20 MG: Performed by: INTERNAL MEDICINE

## 2023-02-25 PROCEDURE — 25010000002 ENOXAPARIN PER 10 MG: Performed by: INTERNAL MEDICINE

## 2023-02-25 RX ORDER — HYDRALAZINE HYDROCHLORIDE 25 MG/1
50 TABLET, FILM COATED ORAL 2 TIMES DAILY
Status: DISCONTINUED | OUTPATIENT
Start: 2023-02-25 | End: 2023-02-26

## 2023-02-25 RX ORDER — PANTOPRAZOLE SODIUM 40 MG/10ML
40 INJECTION, POWDER, LYOPHILIZED, FOR SOLUTION INTRAVENOUS
Status: DISCONTINUED | OUTPATIENT
Start: 2023-02-26 | End: 2023-02-26

## 2023-02-25 RX ORDER — L.ACID,PARA/B.BIFIDUM/S.THERM 8B CELL
1 CAPSULE ORAL
Status: DISCONTINUED | OUTPATIENT
Start: 2023-02-25 | End: 2023-03-01 | Stop reason: HOSPADM

## 2023-02-25 RX ORDER — QUETIAPINE FUMARATE 25 MG/1
25 TABLET, FILM COATED ORAL ONCE
Status: COMPLETED | OUTPATIENT
Start: 2023-02-25 | End: 2023-02-25

## 2023-02-25 RX ORDER — METOPROLOL TARTRATE 100 MG/1
100 TABLET ORAL EVERY 12 HOURS SCHEDULED
Status: DISCONTINUED | OUTPATIENT
Start: 2023-02-25 | End: 2023-03-01 | Stop reason: HOSPADM

## 2023-02-25 RX ORDER — HYDRALAZINE HYDROCHLORIDE 20 MG/ML
10 INJECTION INTRAMUSCULAR; INTRAVENOUS EVERY 6 HOURS PRN
Status: DISCONTINUED | OUTPATIENT
Start: 2023-02-25 | End: 2023-03-01 | Stop reason: HOSPADM

## 2023-02-25 RX ORDER — QUETIAPINE FUMARATE 25 MG/1
25 TABLET, FILM COATED ORAL NIGHTLY
Status: DISCONTINUED | OUTPATIENT
Start: 2023-02-25 | End: 2023-02-26

## 2023-02-25 RX ADMIN — FUROSEMIDE 40 MG: 40 TABLET ORAL at 09:39

## 2023-02-25 RX ADMIN — QUETIAPINE FUMARATE 25 MG: 25 TABLET ORAL at 09:37

## 2023-02-25 RX ADMIN — METOPROLOL TARTRATE 100 MG: 100 TABLET, FILM COATED ORAL at 21:39

## 2023-02-25 RX ADMIN — LIDOCAINE HYDROCHLORIDE 15 ML: 20 SOLUTION ORAL; TOPICAL at 12:23

## 2023-02-25 RX ADMIN — QUETIAPINE FUMARATE 25 MG: 25 TABLET ORAL at 18:47

## 2023-02-25 RX ADMIN — Medication 10 ML: at 09:57

## 2023-02-25 RX ADMIN — ACETAMINOPHEN 650 MG: 650 SOLUTION ORAL at 21:37

## 2023-02-25 RX ADMIN — ASPIRIN 81 MG CHEWABLE TABLET 81 MG: 81 TABLET CHEWABLE at 09:39

## 2023-02-25 RX ADMIN — FAMOTIDINE 20 MG: 10 INJECTION INTRAVENOUS at 09:57

## 2023-02-25 RX ADMIN — ATORVASTATIN CALCIUM 40 MG: 40 TABLET, FILM COATED ORAL at 21:39

## 2023-02-25 RX ADMIN — Medication 10 ML: at 21:38

## 2023-02-25 RX ADMIN — ENOXAPARIN SODIUM 60 MG: 60 INJECTION SUBCUTANEOUS at 12:23

## 2023-02-25 RX ADMIN — METOPROLOL TARTRATE 50 MG: 50 TABLET, FILM COATED ORAL at 09:39

## 2023-02-25 RX ADMIN — Medication 1 CAPSULE: at 10:03

## 2023-02-25 RX ADMIN — LOSARTAN POTASSIUM 100 MG: 50 TABLET, FILM COATED ORAL at 09:38

## 2023-02-25 RX ADMIN — CLOPIDOGREL BISULFATE 75 MG: 75 TABLET ORAL at 09:38

## 2023-02-25 RX ADMIN — HYDRALAZINE HYDROCHLORIDE 25 MG: 25 TABLET, FILM COATED ORAL at 09:39

## 2023-02-25 RX ADMIN — HYDRALAZINE HYDROCHLORIDE 50 MG: 25 TABLET, FILM COATED ORAL at 21:39

## 2023-02-25 RX ADMIN — MIRTAZAPINE 15 MG: 15 TABLET, FILM COATED ORAL at 21:39

## 2023-02-25 RX ADMIN — Medication 1 CAPSULE: at 18:47

## 2023-02-25 RX ADMIN — HYDRALAZINE HYDROCHLORIDE 10 MG: 20 INJECTION INTRAMUSCULAR; INTRAVENOUS at 12:23

## 2023-02-26 ENCOUNTER — APPOINTMENT (OUTPATIENT)
Dept: CT IMAGING | Facility: HOSPITAL | Age: 78
DRG: 280 | End: 2023-02-26
Payer: MEDICARE

## 2023-02-26 ENCOUNTER — APPOINTMENT (OUTPATIENT)
Dept: GENERAL RADIOLOGY | Facility: HOSPITAL | Age: 78
DRG: 280 | End: 2023-02-26
Payer: MEDICARE

## 2023-02-26 LAB
ANION GAP SERPL CALCULATED.3IONS-SCNC: 8.5 MMOL/L (ref 5–15)
BASOPHILS # BLD AUTO: 0.02 10*3/MM3 (ref 0–0.2)
BASOPHILS NFR BLD AUTO: 0.3 % (ref 0–1.5)
BUN SERPL-MCNC: 18 MG/DL (ref 8–23)
BUN/CREAT SERPL: 18.8 (ref 7–25)
CALCIUM SPEC-SCNC: 7.8 MG/DL (ref 8.6–10.5)
CHLORIDE SERPL-SCNC: 103 MMOL/L (ref 98–107)
CO2 SERPL-SCNC: 27.5 MMOL/L (ref 22–29)
CREAT SERPL-MCNC: 0.96 MG/DL (ref 0.57–1)
DEPRECATED RDW RBC AUTO: 58 FL (ref 37–54)
EGFRCR SERPLBLD CKD-EPI 2021: 60.3 ML/MIN/1.73
EOSINOPHIL # BLD AUTO: 0.11 10*3/MM3 (ref 0–0.4)
EOSINOPHIL NFR BLD AUTO: 1.9 % (ref 0.3–6.2)
ERYTHROCYTE [DISTWIDTH] IN BLOOD BY AUTOMATED COUNT: 17 % (ref 12.3–15.4)
FLUAV RNA RESP QL NAA+PROBE: NOT DETECTED
FLUBV RNA RESP QL NAA+PROBE: NOT DETECTED
GLUCOSE SERPL-MCNC: 148 MG/DL (ref 65–99)
HCT VFR BLD AUTO: 27.7 % (ref 34–46.6)
HCT VFR BLD AUTO: 31.3 % (ref 34–46.6)
HGB BLD-MCNC: 10.2 G/DL (ref 12–15.9)
HGB BLD-MCNC: 8.9 G/DL (ref 12–15.9)
IMM GRANULOCYTES # BLD AUTO: 0.03 10*3/MM3 (ref 0–0.05)
IMM GRANULOCYTES NFR BLD AUTO: 0.5 % (ref 0–0.5)
INR PPP: 1.14 (ref 0.9–1.1)
LYMPHOCYTES # BLD AUTO: 0.28 10*3/MM3 (ref 0.7–3.1)
LYMPHOCYTES NFR BLD AUTO: 4.8 % (ref 19.6–45.3)
MCH RBC QN AUTO: 29.9 PG (ref 26.6–33)
MCHC RBC AUTO-ENTMCNC: 32.1 G/DL (ref 31.5–35.7)
MCV RBC AUTO: 93 FL (ref 79–97)
MONOCYTES # BLD AUTO: 0.41 10*3/MM3 (ref 0.1–0.9)
MONOCYTES NFR BLD AUTO: 7 % (ref 5–12)
NEUTROPHILS NFR BLD AUTO: 5.04 10*3/MM3 (ref 1.7–7)
NEUTROPHILS NFR BLD AUTO: 85.5 % (ref 42.7–76)
NRBC BLD AUTO-RTO: 0 /100 WBC (ref 0–0.2)
PLATELET # BLD AUTO: 129 10*3/MM3 (ref 140–450)
PMV BLD AUTO: 11.2 FL (ref 6–12)
POTASSIUM SERPL-SCNC: 3.3 MMOL/L (ref 3.5–5.2)
PROTHROMBIN TIME: 15 SECONDS (ref 12.5–14.5)
RBC # BLD AUTO: 2.98 10*6/MM3 (ref 3.77–5.28)
SARS-COV-2 RNA RESP QL NAA+PROBE: NOT DETECTED
SODIUM SERPL-SCNC: 139 MMOL/L (ref 136–145)
WBC NRBC COR # BLD: 5.89 10*3/MM3 (ref 3.4–10.8)

## 2023-02-26 PROCEDURE — 99232 SBSQ HOSP IP/OBS MODERATE 35: CPT | Performed by: NURSE PRACTITIONER

## 2023-02-26 PROCEDURE — C9803 HOPD COVID-19 SPEC COLLECT: HCPCS | Performed by: NURSE PRACTITIONER

## 2023-02-26 PROCEDURE — 80048 BASIC METABOLIC PNL TOTAL CA: CPT | Performed by: NURSE PRACTITIONER

## 2023-02-26 PROCEDURE — 87636 SARSCOV2 & INF A&B AMP PRB: CPT | Performed by: NURSE PRACTITIONER

## 2023-02-26 PROCEDURE — 25010000002 HYDRALAZINE PER 20 MG: Performed by: INTERNAL MEDICINE

## 2023-02-26 PROCEDURE — 85610 PROTHROMBIN TIME: CPT | Performed by: NURSE PRACTITIONER

## 2023-02-26 PROCEDURE — 25010000002 ENOXAPARIN PER 10 MG: Performed by: INTERNAL MEDICINE

## 2023-02-26 PROCEDURE — 25010000002 CEFTRIAXONE SODIUM-DEXTROSE 1-3.74 GM-%(50ML) RECONSTITUTED SOLUTION: Performed by: NURSE PRACTITIONER

## 2023-02-26 PROCEDURE — 99232 SBSQ HOSP IP/OBS MODERATE 35: CPT | Performed by: INTERNAL MEDICINE

## 2023-02-26 PROCEDURE — 85025 COMPLETE CBC W/AUTO DIFF WBC: CPT | Performed by: NURSE PRACTITIONER

## 2023-02-26 PROCEDURE — 85014 HEMATOCRIT: CPT | Performed by: NURSE PRACTITIONER

## 2023-02-26 PROCEDURE — 85018 HEMOGLOBIN: CPT | Performed by: NURSE PRACTITIONER

## 2023-02-26 PROCEDURE — 71045 X-RAY EXAM CHEST 1 VIEW: CPT

## 2023-02-26 PROCEDURE — 74176 CT ABD & PELVIS W/O CONTRAST: CPT

## 2023-02-26 RX ORDER — SPIRONOLACTONE 25 MG/1
25 TABLET ORAL DAILY
Status: DISCONTINUED | OUTPATIENT
Start: 2023-02-26 | End: 2023-03-01 | Stop reason: HOSPADM

## 2023-02-26 RX ORDER — CEFTRIAXONE 1 G/50ML
1 INJECTION, SOLUTION INTRAVENOUS EVERY 24 HOURS
Status: DISCONTINUED | OUTPATIENT
Start: 2023-02-26 | End: 2023-02-27

## 2023-02-26 RX ORDER — PANTOPRAZOLE SODIUM 40 MG/10ML
40 INJECTION, POWDER, LYOPHILIZED, FOR SOLUTION INTRAVENOUS EVERY 12 HOURS SCHEDULED
Status: DISCONTINUED | OUTPATIENT
Start: 2023-02-26 | End: 2023-03-01 | Stop reason: HOSPADM

## 2023-02-26 RX ORDER — HYDRALAZINE HYDROCHLORIDE 25 MG/1
50 TABLET, FILM COATED ORAL EVERY 8 HOURS SCHEDULED
Status: DISCONTINUED | OUTPATIENT
Start: 2023-02-26 | End: 2023-02-27

## 2023-02-26 RX ORDER — SODIUM CHLORIDE 9 MG/ML
75 INJECTION, SOLUTION INTRAVENOUS CONTINUOUS
Status: DISCONTINUED | OUTPATIENT
Start: 2023-02-26 | End: 2023-02-27

## 2023-02-26 RX ORDER — SUCRALFATE 1 G/1
1 TABLET ORAL
Status: DISCONTINUED | OUTPATIENT
Start: 2023-02-26 | End: 2023-03-01 | Stop reason: HOSPADM

## 2023-02-26 RX ORDER — ENOXAPARIN SODIUM 100 MG/ML
40 INJECTION SUBCUTANEOUS EVERY 24 HOURS
Status: DISCONTINUED | OUTPATIENT
Start: 2023-02-27 | End: 2023-02-26

## 2023-02-26 RX ADMIN — CLOPIDOGREL BISULFATE 75 MG: 75 TABLET ORAL at 09:22

## 2023-02-26 RX ADMIN — ATORVASTATIN CALCIUM 40 MG: 40 TABLET, FILM COATED ORAL at 21:34

## 2023-02-26 RX ADMIN — SUCRALFATE 1 G: 1 TABLET ORAL at 17:28

## 2023-02-26 RX ADMIN — HYDRALAZINE HYDROCHLORIDE 50 MG: 25 TABLET, FILM COATED ORAL at 21:34

## 2023-02-26 RX ADMIN — Medication 1 CAPSULE: at 09:22

## 2023-02-26 RX ADMIN — CEFTRIAXONE 1 G: 1 INJECTION, SOLUTION INTRAVENOUS at 10:09

## 2023-02-26 RX ADMIN — FUROSEMIDE 40 MG: 40 TABLET ORAL at 09:22

## 2023-02-26 RX ADMIN — SPIRONOLACTONE 25 MG: 25 TABLET, FILM COATED ORAL at 11:35

## 2023-02-26 RX ADMIN — SODIUM CHLORIDE 75 ML/HR: 9 INJECTION, SOLUTION INTRAVENOUS at 18:28

## 2023-02-26 RX ADMIN — SUCRALFATE 1 G: 1 TABLET ORAL at 21:34

## 2023-02-26 RX ADMIN — Medication 10 ML: at 09:22

## 2023-02-26 RX ADMIN — ACETAMINOPHEN 650 MG: 650 SOLUTION ORAL at 17:55

## 2023-02-26 RX ADMIN — MIRTAZAPINE 15 MG: 15 TABLET, FILM COATED ORAL at 21:34

## 2023-02-26 RX ADMIN — HYDRALAZINE HYDROCHLORIDE 10 MG: 20 INJECTION INTRAMUSCULAR; INTRAVENOUS at 14:05

## 2023-02-26 RX ADMIN — LOSARTAN POTASSIUM 100 MG: 50 TABLET, FILM COATED ORAL at 09:22

## 2023-02-26 RX ADMIN — ENOXAPARIN SODIUM 60 MG: 60 INJECTION SUBCUTANEOUS at 00:27

## 2023-02-26 RX ADMIN — METOPROLOL TARTRATE 100 MG: 100 TABLET, FILM COATED ORAL at 09:22

## 2023-02-26 RX ADMIN — ENOXAPARIN SODIUM 60 MG: 60 INJECTION SUBCUTANEOUS at 11:35

## 2023-02-26 RX ADMIN — ACETAMINOPHEN 650 MG: 650 SOLUTION ORAL at 05:15

## 2023-02-26 RX ADMIN — PANTOPRAZOLE SODIUM 40 MG: 40 INJECTION, POWDER, FOR SOLUTION INTRAVENOUS at 21:50

## 2023-02-26 RX ADMIN — ASPIRIN 81 MG CHEWABLE TABLET 81 MG: 81 TABLET CHEWABLE at 09:22

## 2023-02-26 RX ADMIN — METOPROLOL TARTRATE 100 MG: 100 TABLET, FILM COATED ORAL at 21:34

## 2023-02-26 RX ADMIN — Medication 1 CAPSULE: at 17:28

## 2023-02-26 RX ADMIN — HYDRALAZINE HYDROCHLORIDE 50 MG: 25 TABLET, FILM COATED ORAL at 09:22

## 2023-02-26 RX ADMIN — PANTOPRAZOLE SODIUM 40 MG: 40 INJECTION, POWDER, FOR SOLUTION INTRAVENOUS at 05:15

## 2023-02-26 RX ADMIN — Medication 10 ML: at 21:33

## 2023-02-27 LAB
ANION GAP SERPL CALCULATED.3IONS-SCNC: 9 MMOL/L (ref 5–15)
BACTERIA SPEC AEROBE CULT: ABNORMAL
BACTERIA SPEC AEROBE CULT: ABNORMAL
BASOPHILS # BLD AUTO: 0.02 10*3/MM3 (ref 0–0.2)
BASOPHILS NFR BLD AUTO: 0.4 % (ref 0–1.5)
BH CV ECHO MEAS - EDV(CUBED): 69.9 ML
BH CV ECHO MEAS - EDV(MOD-SP2): 66.9 ML
BH CV ECHO MEAS - EDV(MOD-SP4): 78.7 ML
BH CV ECHO MEAS - EF(MOD-BP): 63 %
BH CV ECHO MEAS - EF(MOD-SP2): 65.2 %
BH CV ECHO MEAS - EF(MOD-SP4): 61.5 %
BH CV ECHO MEAS - ESV(CUBED): 20.3 ML
BH CV ECHO MEAS - ESV(MOD-SP2): 23.3 ML
BH CV ECHO MEAS - ESV(MOD-SP4): 30.3 ML
BH CV ECHO MEAS - FS: 33.7 %
BH CV ECHO MEAS - IVS/LVPW: 0.88 CM
BH CV ECHO MEAS - IVSD: 1.2 CM
BH CV ECHO MEAS - LV MASS(C)D: 190.4 GRAMS
BH CV ECHO MEAS - LVIDD: 4.1 CM
BH CV ECHO MEAS - LVIDS: 2.7 CM
BH CV ECHO MEAS - LVOT AREA: 3.5 CM2
BH CV ECHO MEAS - LVOT DIAM: 2.1 CM
BH CV ECHO MEAS - LVPWD: 1.36 CM
BH CV ECHO MEAS - RAP SYSTOLE: 3 MMHG
BH CV ECHO MEAS - RVSP: 56 MMHG
BH CV ECHO MEAS - SV(MOD-SP2): 43.6 ML
BH CV ECHO MEAS - SV(MOD-SP4): 48.4 ML
BH CV ECHO MEAS - TR MAX PG: 53 MMHG
BH CV ECHO MEAS - TR MAX VEL: 363 CM/SEC
BUN SERPL-MCNC: 16 MG/DL (ref 8–23)
BUN/CREAT SERPL: 18 (ref 7–25)
CALCIUM SPEC-SCNC: 7.9 MG/DL (ref 8.6–10.5)
CHLORIDE SERPL-SCNC: 104 MMOL/L (ref 98–107)
CO2 SERPL-SCNC: 28 MMOL/L (ref 22–29)
CREAT SERPL-MCNC: 0.89 MG/DL (ref 0.57–1)
DEPRECATED RDW RBC AUTO: 58 FL (ref 37–54)
EGFRCR SERPLBLD CKD-EPI 2021: 66 ML/MIN/1.73
EOSINOPHIL # BLD AUTO: 0.11 10*3/MM3 (ref 0–0.4)
EOSINOPHIL NFR BLD AUTO: 2.1 % (ref 0.3–6.2)
ERYTHROCYTE [DISTWIDTH] IN BLOOD BY AUTOMATED COUNT: 17 % (ref 12.3–15.4)
GLUCOSE SERPL-MCNC: 116 MG/DL (ref 65–99)
HCT VFR BLD AUTO: 27.7 % (ref 34–46.6)
HGB BLD-MCNC: 8.7 G/DL (ref 12–15.9)
IMM GRANULOCYTES # BLD AUTO: 0.05 10*3/MM3 (ref 0–0.05)
IMM GRANULOCYTES NFR BLD AUTO: 1 % (ref 0–0.5)
LV EF 2D ECHO EST: 63 %
LYMPHOCYTES # BLD AUTO: 0.41 10*3/MM3 (ref 0.7–3.1)
LYMPHOCYTES NFR BLD AUTO: 8 % (ref 19.6–45.3)
MAXIMAL PREDICTED HEART RATE: 141 BPM
MCH RBC QN AUTO: 29 PG (ref 26.6–33)
MCHC RBC AUTO-ENTMCNC: 31.4 G/DL (ref 31.5–35.7)
MCV RBC AUTO: 92.3 FL (ref 79–97)
MONOCYTES # BLD AUTO: 0.48 10*3/MM3 (ref 0.1–0.9)
MONOCYTES NFR BLD AUTO: 9.4 % (ref 5–12)
NEUTROPHILS NFR BLD AUTO: 4.06 10*3/MM3 (ref 1.7–7)
NEUTROPHILS NFR BLD AUTO: 79.1 % (ref 42.7–76)
NRBC BLD AUTO-RTO: 0 /100 WBC (ref 0–0.2)
PLATELET # BLD AUTO: 136 10*3/MM3 (ref 140–450)
PMV BLD AUTO: 11.1 FL (ref 6–12)
POTASSIUM SERPL-SCNC: 3.1 MMOL/L (ref 3.5–5.2)
RBC # BLD AUTO: 3 10*6/MM3 (ref 3.77–5.28)
SODIUM SERPL-SCNC: 141 MMOL/L (ref 136–145)
STRESS TARGET HR: 120 BPM
WBC NRBC COR # BLD: 5.13 10*3/MM3 (ref 3.4–10.8)

## 2023-02-27 PROCEDURE — 25010000002 CEFTRIAXONE SODIUM-DEXTROSE 1-3.74 GM-%(50ML) RECONSTITUTED SOLUTION: Performed by: NURSE PRACTITIONER

## 2023-02-27 PROCEDURE — 97166 OT EVAL MOD COMPLEX 45 MIN: CPT

## 2023-02-27 PROCEDURE — 25010000002 ONDANSETRON PER 1 MG: Performed by: INTERNAL MEDICINE

## 2023-02-27 PROCEDURE — 99222 1ST HOSP IP/OBS MODERATE 55: CPT | Performed by: INTERNAL MEDICINE

## 2023-02-27 PROCEDURE — 99232 SBSQ HOSP IP/OBS MODERATE 35: CPT | Performed by: SURGERY

## 2023-02-27 PROCEDURE — 85025 COMPLETE CBC W/AUTO DIFF WBC: CPT | Performed by: NURSE PRACTITIONER

## 2023-02-27 PROCEDURE — 97161 PT EVAL LOW COMPLEX 20 MIN: CPT

## 2023-02-27 PROCEDURE — 99232 SBSQ HOSP IP/OBS MODERATE 35: CPT | Performed by: INTERNAL MEDICINE

## 2023-02-27 PROCEDURE — 80048 BASIC METABOLIC PNL TOTAL CA: CPT | Performed by: NURSE PRACTITIONER

## 2023-02-27 PROCEDURE — 99232 SBSQ HOSP IP/OBS MODERATE 35: CPT | Performed by: NURSE PRACTITIONER

## 2023-02-27 RX ORDER — ISOSORBIDE DINITRATE 10 MG/1
40 TABLET ORAL
Status: DISCONTINUED | OUTPATIENT
Start: 2023-02-27 | End: 2023-03-01 | Stop reason: HOSPADM

## 2023-02-27 RX ORDER — DIPHENHYDRAMINE HYDROCHLORIDE AND LIDOCAINE HYDROCHLORIDE AND ALUMINUM HYDROXIDE AND MAGNESIUM HYDRO
5 KIT EVERY 6 HOURS SCHEDULED
Status: DISCONTINUED | OUTPATIENT
Start: 2023-02-27 | End: 2023-02-28

## 2023-02-27 RX ORDER — SODIUM CHLORIDE 9 MG/ML
75 INJECTION, SOLUTION INTRAVENOUS CONTINUOUS
Status: DISCONTINUED | OUTPATIENT
Start: 2023-02-27 | End: 2023-03-01 | Stop reason: HOSPADM

## 2023-02-27 RX ORDER — HYDROCODONE BITARTRATE AND ACETAMINOPHEN 5; 325 MG/1; MG/1
1 TABLET ORAL EVERY 6 HOURS PRN
Status: DISCONTINUED | OUTPATIENT
Start: 2023-02-27 | End: 2023-03-01 | Stop reason: HOSPADM

## 2023-02-27 RX ORDER — CEFTRIAXONE 1 G/50ML
1 INJECTION, SOLUTION INTRAVENOUS EVERY 24 HOURS
Status: DISCONTINUED | OUTPATIENT
Start: 2023-02-28 | End: 2023-03-01 | Stop reason: HOSPADM

## 2023-02-27 RX ORDER — HYDRALAZINE HYDROCHLORIDE 25 MG/1
100 TABLET, FILM COATED ORAL EVERY 8 HOURS SCHEDULED
Status: DISCONTINUED | OUTPATIENT
Start: 2023-02-27 | End: 2023-03-01 | Stop reason: HOSPADM

## 2023-02-27 RX ORDER — AMLODIPINE BESYLATE 5 MG/1
10 TABLET ORAL
Status: DISCONTINUED | OUTPATIENT
Start: 2023-02-27 | End: 2023-03-01 | Stop reason: HOSPADM

## 2023-02-27 RX ADMIN — ONDANSETRON 4 MG: 2 INJECTION INTRAMUSCULAR; INTRAVENOUS at 13:41

## 2023-02-27 RX ADMIN — HYDRALAZINE HYDROCHLORIDE 100 MG: 25 TABLET, FILM COATED ORAL at 21:17

## 2023-02-27 RX ADMIN — FUROSEMIDE 40 MG: 40 TABLET ORAL at 09:31

## 2023-02-27 RX ADMIN — LABETALOL HYDROCHLORIDE 10 MG: 5 INJECTION, SOLUTION INTRAVENOUS at 12:25

## 2023-02-27 RX ADMIN — PANTOPRAZOLE SODIUM 40 MG: 40 INJECTION, POWDER, FOR SOLUTION INTRAVENOUS at 21:16

## 2023-02-27 RX ADMIN — CEFTRIAXONE 1 G: 1 INJECTION, SOLUTION INTRAVENOUS at 09:39

## 2023-02-27 RX ADMIN — DIPHENHYDRAMINE HYDROCHLORIDE AND LIDOCAINE HYDROCHLORIDE AND ALUMINUM HYDROXIDE AND MAGNESIUM HYDRO 5 ML: KIT at 18:33

## 2023-02-27 RX ADMIN — DIPHENHYDRAMINE HYDROCHLORIDE AND LIDOCAINE HYDROCHLORIDE AND ALUMINUM HYDROXIDE AND MAGNESIUM HYDRO 5 ML: KIT at 09:39

## 2023-02-27 RX ADMIN — PANTOPRAZOLE SODIUM 40 MG: 40 INJECTION, POWDER, FOR SOLUTION INTRAVENOUS at 09:31

## 2023-02-27 RX ADMIN — SPIRONOLACTONE 25 MG: 25 TABLET, FILM COATED ORAL at 09:31

## 2023-02-27 RX ADMIN — Medication 1 CAPSULE: at 09:31

## 2023-02-27 RX ADMIN — HYDRALAZINE HYDROCHLORIDE 50 MG: 25 TABLET, FILM COATED ORAL at 06:02

## 2023-02-27 RX ADMIN — HYDRALAZINE HYDROCHLORIDE 50 MG: 25 TABLET, FILM COATED ORAL at 13:40

## 2023-02-27 RX ADMIN — HYDROCODONE BITARTRATE AND ACETAMINOPHEN 1 TABLET: 5; 325 TABLET ORAL at 21:15

## 2023-02-27 RX ADMIN — Medication 10 ML: at 09:32

## 2023-02-27 RX ADMIN — SUCRALFATE 1 G: 1 TABLET ORAL at 06:30

## 2023-02-27 RX ADMIN — SODIUM CHLORIDE 75 ML/HR: 9 INJECTION, SOLUTION INTRAVENOUS at 16:42

## 2023-02-27 RX ADMIN — ATORVASTATIN CALCIUM 40 MG: 40 TABLET, FILM COATED ORAL at 21:16

## 2023-02-27 RX ADMIN — ISOSORBIDE DINITRATE 40 MG: 10 TABLET ORAL at 18:32

## 2023-02-27 RX ADMIN — LOSARTAN POTASSIUM 100 MG: 50 TABLET, FILM COATED ORAL at 09:31

## 2023-02-27 RX ADMIN — SUCRALFATE 1 G: 1 TABLET ORAL at 21:16

## 2023-02-27 RX ADMIN — METOPROLOL TARTRATE 100 MG: 100 TABLET, FILM COATED ORAL at 09:31

## 2023-02-27 RX ADMIN — SODIUM CHLORIDE 75 ML/HR: 9 INJECTION, SOLUTION INTRAVENOUS at 06:01

## 2023-02-27 RX ADMIN — SUCRALFATE 1 G: 1 TABLET ORAL at 12:25

## 2023-02-27 RX ADMIN — HYDROCODONE BITARTRATE AND ACETAMINOPHEN 1 TABLET: 5; 325 TABLET ORAL at 14:23

## 2023-02-27 RX ADMIN — ACETAMINOPHEN 650 MG: 650 SOLUTION ORAL at 03:55

## 2023-02-27 RX ADMIN — AMLODIPINE BESYLATE 10 MG: 5 TABLET ORAL at 16:42

## 2023-02-27 RX ADMIN — MIRTAZAPINE 15 MG: 15 TABLET, FILM COATED ORAL at 21:16

## 2023-02-27 RX ADMIN — SUCRALFATE 1 G: 1 TABLET ORAL at 16:42

## 2023-02-27 RX ADMIN — METOPROLOL TARTRATE 100 MG: 100 TABLET, FILM COATED ORAL at 21:17

## 2023-02-27 RX ADMIN — ACETAMINOPHEN 650 MG: 650 SOLUTION ORAL at 21:15

## 2023-02-27 RX ADMIN — Medication 1 CAPSULE: at 18:33

## 2023-02-28 LAB
ANION GAP SERPL CALCULATED.3IONS-SCNC: 11.7 MMOL/L (ref 5–15)
BASOPHILS # BLD AUTO: 0.03 10*3/MM3 (ref 0–0.2)
BASOPHILS NFR BLD AUTO: 0.6 % (ref 0–1.5)
BUN SERPL-MCNC: 16 MG/DL (ref 8–23)
BUN/CREAT SERPL: 17.8 (ref 7–25)
CALCIUM SPEC-SCNC: 7.9 MG/DL (ref 8.6–10.5)
CHLORIDE SERPL-SCNC: 105 MMOL/L (ref 98–107)
CO2 SERPL-SCNC: 25.3 MMOL/L (ref 22–29)
CREAT SERPL-MCNC: 0.9 MG/DL (ref 0.57–1)
D-LACTATE SERPL-SCNC: 0.6 MMOL/L (ref 0.5–2)
DEPRECATED RDW RBC AUTO: 57.7 FL (ref 37–54)
EGFRCR SERPLBLD CKD-EPI 2021: 65.2 ML/MIN/1.73
EOSINOPHIL # BLD AUTO: 0.19 10*3/MM3 (ref 0–0.4)
EOSINOPHIL NFR BLD AUTO: 3.7 % (ref 0.3–6.2)
ERYTHROCYTE [DISTWIDTH] IN BLOOD BY AUTOMATED COUNT: 16.9 % (ref 12.3–15.4)
GLUCOSE SERPL-MCNC: 97 MG/DL (ref 65–99)
HCT VFR BLD AUTO: 29.1 % (ref 34–46.6)
HGB BLD-MCNC: 9.1 G/DL (ref 12–15.9)
IMM GRANULOCYTES # BLD AUTO: 0.03 10*3/MM3 (ref 0–0.05)
IMM GRANULOCYTES NFR BLD AUTO: 0.6 % (ref 0–0.5)
LYMPHOCYTES # BLD AUTO: 0.43 10*3/MM3 (ref 0.7–3.1)
LYMPHOCYTES NFR BLD AUTO: 8.3 % (ref 19.6–45.3)
MAGNESIUM SERPL-MCNC: 1.6 MG/DL (ref 1.6–2.4)
MCH RBC QN AUTO: 29.2 PG (ref 26.6–33)
MCHC RBC AUTO-ENTMCNC: 31.3 G/DL (ref 31.5–35.7)
MCV RBC AUTO: 93.3 FL (ref 79–97)
MONOCYTES # BLD AUTO: 0.5 10*3/MM3 (ref 0.1–0.9)
MONOCYTES NFR BLD AUTO: 9.6 % (ref 5–12)
NEUTROPHILS NFR BLD AUTO: 4.01 10*3/MM3 (ref 1.7–7)
NEUTROPHILS NFR BLD AUTO: 77.2 % (ref 42.7–76)
NRBC BLD AUTO-RTO: 0 /100 WBC (ref 0–0.2)
PLATELET # BLD AUTO: 155 10*3/MM3 (ref 140–450)
PMV BLD AUTO: 10.9 FL (ref 6–12)
POTASSIUM SERPL-SCNC: 3.3 MMOL/L (ref 3.5–5.2)
RBC # BLD AUTO: 3.12 10*6/MM3 (ref 3.77–5.28)
SODIUM SERPL-SCNC: 142 MMOL/L (ref 136–145)
WBC NRBC COR # BLD: 5.19 10*3/MM3 (ref 3.4–10.8)

## 2023-02-28 PROCEDURE — 25010000002 CEFTRIAXONE SODIUM-DEXTROSE 1-3.74 GM-%(50ML) RECONSTITUTED SOLUTION: Performed by: NURSE PRACTITIONER

## 2023-02-28 PROCEDURE — 83735 ASSAY OF MAGNESIUM: CPT | Performed by: FAMILY MEDICINE

## 2023-02-28 PROCEDURE — 83605 ASSAY OF LACTIC ACID: CPT | Performed by: NURSE PRACTITIONER

## 2023-02-28 PROCEDURE — 85025 COMPLETE CBC W/AUTO DIFF WBC: CPT | Performed by: NURSE PRACTITIONER

## 2023-02-28 PROCEDURE — 0 MAGNESIUM SULFATE 4 GM/100ML SOLUTION: Performed by: FAMILY MEDICINE

## 2023-02-28 PROCEDURE — 99232 SBSQ HOSP IP/OBS MODERATE 35: CPT | Performed by: SURGERY

## 2023-02-28 PROCEDURE — 99232 SBSQ HOSP IP/OBS MODERATE 35: CPT | Performed by: FAMILY MEDICINE

## 2023-02-28 PROCEDURE — 87338 HPYLORI STOOL AG IA: CPT | Performed by: NURSE PRACTITIONER

## 2023-02-28 PROCEDURE — 80048 BASIC METABOLIC PNL TOTAL CA: CPT | Performed by: NURSE PRACTITIONER

## 2023-02-28 RX ORDER — POTASSIUM CHLORIDE 1.5 G/1.77G
40 POWDER, FOR SOLUTION ORAL EVERY 4 HOURS
Status: COMPLETED | OUTPATIENT
Start: 2023-02-28 | End: 2023-02-28

## 2023-02-28 RX ORDER — MAGNESIUM SULFATE HEPTAHYDRATE 40 MG/ML
4 INJECTION, SOLUTION INTRAVENOUS ONCE
Status: COMPLETED | OUTPATIENT
Start: 2023-02-28 | End: 2023-02-28

## 2023-02-28 RX ORDER — METOCLOPRAMIDE 5 MG/1
5 TABLET ORAL 3 TIMES DAILY
Status: DISCONTINUED | OUTPATIENT
Start: 2023-02-28 | End: 2023-03-01 | Stop reason: HOSPADM

## 2023-02-28 RX ORDER — MORPHINE SULFATE 10 MG/5ML
2.5 SOLUTION ORAL EVERY 4 HOURS PRN
Status: DISCONTINUED | OUTPATIENT
Start: 2023-02-28 | End: 2023-03-01 | Stop reason: HOSPADM

## 2023-02-28 RX ORDER — DIPHENHYDRAMINE HYDROCHLORIDE AND LIDOCAINE HYDROCHLORIDE AND ALUMINUM HYDROXIDE AND MAGNESIUM HYDRO
10 KIT EVERY 6 HOURS SCHEDULED
Status: DISCONTINUED | OUTPATIENT
Start: 2023-02-28 | End: 2023-03-01 | Stop reason: HOSPADM

## 2023-02-28 RX ADMIN — METOPROLOL TARTRATE 100 MG: 100 TABLET, FILM COATED ORAL at 08:23

## 2023-02-28 RX ADMIN — SPIRONOLACTONE 25 MG: 25 TABLET, FILM COATED ORAL at 08:23

## 2023-02-28 RX ADMIN — ISOSORBIDE DINITRATE 40 MG: 10 TABLET ORAL at 17:58

## 2023-02-28 RX ADMIN — DIPHENHYDRAMINE HYDROCHLORIDE AND LIDOCAINE HYDROCHLORIDE AND ALUMINUM HYDROXIDE AND MAGNESIUM HYDRO 5 ML: KIT at 10:50

## 2023-02-28 RX ADMIN — HYDROCODONE BITARTRATE AND ACETAMINOPHEN 1 TABLET: 5; 325 TABLET ORAL at 21:31

## 2023-02-28 RX ADMIN — Medication 10 ML: at 21:29

## 2023-02-28 RX ADMIN — METOPROLOL TARTRATE 100 MG: 100 TABLET, FILM COATED ORAL at 21:31

## 2023-02-28 RX ADMIN — DIPHENHYDRAMINE HYDROCHLORIDE AND LIDOCAINE HYDROCHLORIDE AND ALUMINUM HYDROXIDE AND MAGNESIUM HYDRO 5 ML: KIT at 00:17

## 2023-02-28 RX ADMIN — SUCRALFATE 1 G: 1 TABLET ORAL at 21:30

## 2023-02-28 RX ADMIN — METOCLOPRAMIDE 5 MG: 5 TABLET ORAL at 17:58

## 2023-02-28 RX ADMIN — PANTOPRAZOLE SODIUM 40 MG: 40 INJECTION, POWDER, FOR SOLUTION INTRAVENOUS at 21:30

## 2023-02-28 RX ADMIN — Medication 1 CAPSULE: at 08:23

## 2023-02-28 RX ADMIN — NYSTATIN 500000 UNITS: 100000 SUSPENSION ORAL at 17:58

## 2023-02-28 RX ADMIN — SODIUM CHLORIDE 75 ML/HR: 9 INJECTION, SOLUTION INTRAVENOUS at 06:32

## 2023-02-28 RX ADMIN — AMLODIPINE BESYLATE 10 MG: 5 TABLET ORAL at 08:23

## 2023-02-28 RX ADMIN — POTASSIUM CHLORIDE 40 MEQ: 1.5 POWDER, FOR SOLUTION ORAL at 21:30

## 2023-02-28 RX ADMIN — HYDRALAZINE HYDROCHLORIDE 100 MG: 25 TABLET, FILM COATED ORAL at 21:31

## 2023-02-28 RX ADMIN — METOCLOPRAMIDE 5 MG: 5 TABLET ORAL at 21:30

## 2023-02-28 RX ADMIN — MIRTAZAPINE 15 MG: 15 TABLET, FILM COATED ORAL at 21:31

## 2023-02-28 RX ADMIN — LIDOCAINE HYDROCHLORIDE 15 ML: 20 SOLUTION ORAL; TOPICAL at 14:14

## 2023-02-28 RX ADMIN — CEFTRIAXONE 1 G: 1 INJECTION, SOLUTION INTRAVENOUS at 10:34

## 2023-02-28 RX ADMIN — DIPHENHYDRAMINE HYDROCHLORIDE AND LIDOCAINE HYDROCHLORIDE AND ALUMINUM HYDROXIDE AND MAGNESIUM HYDRO 5 ML: KIT at 06:32

## 2023-02-28 RX ADMIN — Medication 1 CAPSULE: at 17:58

## 2023-02-28 RX ADMIN — HYDRALAZINE HYDROCHLORIDE 100 MG: 25 TABLET, FILM COATED ORAL at 06:32

## 2023-02-28 RX ADMIN — SUCRALFATE 1 G: 1 TABLET ORAL at 12:03

## 2023-02-28 RX ADMIN — PANTOPRAZOLE SODIUM 40 MG: 40 INJECTION, POWDER, FOR SOLUTION INTRAVENOUS at 08:29

## 2023-02-28 RX ADMIN — ISOSORBIDE DINITRATE 40 MG: 10 TABLET ORAL at 14:14

## 2023-02-28 RX ADMIN — HYDROCODONE BITARTRATE AND ACETAMINOPHEN 1 TABLET: 5; 325 TABLET ORAL at 12:05

## 2023-02-28 RX ADMIN — POTASSIUM CHLORIDE 40 MEQ: 1.5 POWDER, FOR SOLUTION ORAL at 17:58

## 2023-02-28 RX ADMIN — LOSARTAN POTASSIUM 100 MG: 50 TABLET, FILM COATED ORAL at 08:23

## 2023-02-28 RX ADMIN — ATORVASTATIN CALCIUM 40 MG: 40 TABLET, FILM COATED ORAL at 21:30

## 2023-02-28 RX ADMIN — SUCRALFATE 1 G: 1 TABLET ORAL at 17:55

## 2023-02-28 RX ADMIN — DIPHENHYDRAMINE HYDROCHLORIDE AND LIDOCAINE HYDROCHLORIDE AND ALUMINUM HYDROXIDE AND MAGNESIUM HYDRO 5 ML: KIT at 17:58

## 2023-02-28 RX ADMIN — ISOSORBIDE DINITRATE 40 MG: 10 TABLET ORAL at 08:23

## 2023-02-28 RX ADMIN — MAGNESIUM SULFATE HEPTAHYDRATE 4 G: 40 INJECTION, SOLUTION INTRAVENOUS at 11:22

## 2023-02-28 RX ADMIN — FUROSEMIDE 40 MG: 40 TABLET ORAL at 08:23

## 2023-02-28 RX ADMIN — SUCRALFATE 1 G: 1 TABLET ORAL at 06:33

## 2023-02-28 RX ADMIN — MORPHINE SULFATE 2.5 MG: 10 SOLUTION ORAL at 17:59

## 2023-03-01 ENCOUNTER — READMISSION MANAGEMENT (OUTPATIENT)
Dept: CALL CENTER | Facility: HOSPITAL | Age: 78
End: 2023-03-01
Payer: MEDICARE

## 2023-03-01 VITALS
WEIGHT: 147.49 LBS | DIASTOLIC BLOOD PRESSURE: 65 MMHG | BODY MASS INDEX: 27.85 KG/M2 | SYSTOLIC BLOOD PRESSURE: 172 MMHG | TEMPERATURE: 98.2 F | HEIGHT: 61 IN | HEART RATE: 73 BPM | OXYGEN SATURATION: 98 % | RESPIRATION RATE: 18 BRPM

## 2023-03-01 LAB — MAGNESIUM SERPL-MCNC: 2.4 MG/DL (ref 1.6–2.4)

## 2023-03-01 PROCEDURE — 97110 THERAPEUTIC EXERCISES: CPT

## 2023-03-01 PROCEDURE — 99239 HOSP IP/OBS DSCHRG MGMT >30: CPT | Performed by: FAMILY MEDICINE

## 2023-03-01 PROCEDURE — 25010000002 HEPARIN LOCK FLUSH PER 10 UNITS: Performed by: FAMILY MEDICINE

## 2023-03-01 PROCEDURE — 25010000002 CEFTRIAXONE SODIUM-DEXTROSE 1-3.74 GM-%(50ML) RECONSTITUTED SOLUTION: Performed by: NURSE PRACTITIONER

## 2023-03-01 PROCEDURE — 97116 GAIT TRAINING THERAPY: CPT

## 2023-03-01 PROCEDURE — 83735 ASSAY OF MAGNESIUM: CPT | Performed by: FAMILY MEDICINE

## 2023-03-01 PROCEDURE — 97530 THERAPEUTIC ACTIVITIES: CPT

## 2023-03-01 PROCEDURE — 99232 SBSQ HOSP IP/OBS MODERATE 35: CPT | Performed by: SURGERY

## 2023-03-01 RX ORDER — SODIUM CHLORIDE 0.9 % (FLUSH) 0.9 %
20 SYRINGE (ML) INJECTION AS NEEDED
Status: DISCONTINUED | OUTPATIENT
Start: 2023-03-01 | End: 2023-03-01 | Stop reason: HOSPADM

## 2023-03-01 RX ORDER — HYDROCODONE BITARTRATE AND ACETAMINOPHEN 7.5; 325 MG/1; MG/1
1 TABLET ORAL EVERY 6 HOURS PRN
Qty: 8 TABLET | Refills: 0 | Status: SHIPPED | OUTPATIENT
Start: 2023-03-01 | End: 2023-03-07 | Stop reason: HOSPADM

## 2023-03-01 RX ORDER — HEPARIN SODIUM (PORCINE) LOCK FLUSH IV SOLN 100 UNIT/ML 100 UNIT/ML
5 SOLUTION INTRAVENOUS AS NEEDED
Status: DISCONTINUED | OUTPATIENT
Start: 2023-03-01 | End: 2023-03-01 | Stop reason: HOSPADM

## 2023-03-01 RX ORDER — SPIRONOLACTONE 25 MG/1
25 TABLET ORAL DAILY
Qty: 30 TABLET | Refills: 0 | Status: SHIPPED | OUTPATIENT
Start: 2023-03-02 | End: 2023-03-07 | Stop reason: HOSPADM

## 2023-03-01 RX ORDER — METOCLOPRAMIDE 5 MG/1
5 TABLET ORAL 3 TIMES DAILY
Qty: 30 TABLET | Refills: 0 | Status: SHIPPED | OUTPATIENT
Start: 2023-03-01 | End: 2023-03-07 | Stop reason: HOSPADM

## 2023-03-01 RX ORDER — SUCRALFATE ORAL 1 G/10ML
1 SUSPENSION ORAL
Qty: 420 ML | Refills: 0 | Status: SHIPPED | OUTPATIENT
Start: 2023-03-01 | End: 2023-03-02

## 2023-03-01 RX ORDER — AMLODIPINE BESYLATE 10 MG/1
10 TABLET ORAL
Qty: 30 TABLET | Refills: 0 | Status: SHIPPED | OUTPATIENT
Start: 2023-03-02 | End: 2023-04-01

## 2023-03-01 RX ORDER — SODIUM CHLORIDE 0.9 % (FLUSH) 0.9 %
10 SYRINGE (ML) INJECTION AS NEEDED
Status: DISCONTINUED | OUTPATIENT
Start: 2023-03-01 | End: 2023-03-01 | Stop reason: HOSPADM

## 2023-03-01 RX ORDER — OMEPRAZOLE 40 MG/1
40 CAPSULE, DELAYED RELEASE ORAL DAILY
Qty: 30 CAPSULE | Refills: 0 | Status: SHIPPED | OUTPATIENT
Start: 2023-03-01 | End: 2023-03-31

## 2023-03-01 RX ORDER — NITROGLYCERIN 0.4 MG/1
0.4 TABLET SUBLINGUAL
Qty: 50 TABLET | Refills: 0 | Status: SHIPPED | OUTPATIENT
Start: 2023-03-01 | End: 2023-03-31

## 2023-03-01 RX ORDER — ISOSORBIDE DINITRATE 20 MG/1
40 TABLET ORAL
Qty: 180 TABLET | Refills: 0 | Status: SHIPPED | OUTPATIENT
Start: 2023-03-01 | End: 2023-03-31

## 2023-03-01 RX ORDER — SODIUM CHLORIDE 0.9 % (FLUSH) 0.9 %
10 SYRINGE (ML) INJECTION EVERY 12 HOURS SCHEDULED
Status: DISCONTINUED | OUTPATIENT
Start: 2023-03-01 | End: 2023-03-01 | Stop reason: HOSPADM

## 2023-03-01 RX ORDER — HYDRALAZINE HYDROCHLORIDE 50 MG/1
100 TABLET, FILM COATED ORAL EVERY 8 HOURS SCHEDULED
Qty: 180 TABLET | Refills: 0 | Status: SHIPPED | OUTPATIENT
Start: 2023-03-01 | End: 2023-03-31

## 2023-03-01 RX ORDER — CIPROFLOXACIN 500 MG/1
500 TABLET, FILM COATED ORAL 2 TIMES DAILY
Qty: 10 TABLET | Refills: 0 | Status: SHIPPED | OUTPATIENT
Start: 2023-03-01 | End: 2023-03-07 | Stop reason: HOSPADM

## 2023-03-01 RX ORDER — SODIUM CHLORIDE 9 MG/ML
40 INJECTION, SOLUTION INTRAVENOUS AS NEEDED
Status: DISCONTINUED | OUTPATIENT
Start: 2023-03-01 | End: 2023-03-01 | Stop reason: HOSPADM

## 2023-03-01 RX ORDER — PANTOPRAZOLE SODIUM 40 MG/10ML
40 INJECTION, POWDER, LYOPHILIZED, FOR SOLUTION INTRAVENOUS
Qty: 300 ML | Refills: 0 | Status: SHIPPED | OUTPATIENT
Start: 2023-03-01 | End: 2023-03-01 | Stop reason: HOSPADM

## 2023-03-01 RX ORDER — METOPROLOL TARTRATE 100 MG/1
100 TABLET ORAL EVERY 12 HOURS SCHEDULED
Qty: 60 TABLET | Refills: 0 | Status: SHIPPED | OUTPATIENT
Start: 2023-03-01 | End: 2023-03-31

## 2023-03-01 RX ORDER — ATORVASTATIN CALCIUM 40 MG/1
40 TABLET, FILM COATED ORAL NIGHTLY
Qty: 90 TABLET | Refills: 0 | Status: SHIPPED | OUTPATIENT
Start: 2023-03-01 | End: 2023-05-30

## 2023-03-01 RX ORDER — LIDOCAINE HYDROCHLORIDE 20 MG/ML
15 SOLUTION OROPHARYNGEAL AS NEEDED
Qty: 100 ML | Refills: 0 | Status: SHIPPED | OUTPATIENT
Start: 2023-03-01 | End: 2023-03-11

## 2023-03-01 RX ADMIN — METOPROLOL TARTRATE 100 MG: 100 TABLET, FILM COATED ORAL at 08:14

## 2023-03-01 RX ADMIN — Medication 10 ML: at 08:15

## 2023-03-01 RX ADMIN — SUCRALFATE 1 G: 1 TABLET ORAL at 06:31

## 2023-03-01 RX ADMIN — FUROSEMIDE 40 MG: 40 TABLET ORAL at 08:14

## 2023-03-01 RX ADMIN — PANTOPRAZOLE SODIUM 40 MG: 40 INJECTION, POWDER, FOR SOLUTION INTRAVENOUS at 08:14

## 2023-03-01 RX ADMIN — Medication 1 CAPSULE: at 08:14

## 2023-03-01 RX ADMIN — ACETAMINOPHEN 650 MG: 650 SOLUTION ORAL at 09:09

## 2023-03-01 RX ADMIN — HYDROCODONE BITARTRATE AND ACETAMINOPHEN 1 TABLET: 5; 325 TABLET ORAL at 08:31

## 2023-03-01 RX ADMIN — SPIRONOLACTONE 25 MG: 25 TABLET, FILM COATED ORAL at 08:14

## 2023-03-01 RX ADMIN — AMLODIPINE BESYLATE 10 MG: 5 TABLET ORAL at 08:14

## 2023-03-01 RX ADMIN — LIDOCAINE HYDROCHLORIDE 15 ML: 20 SOLUTION ORAL; TOPICAL at 08:13

## 2023-03-01 RX ADMIN — DIPHENHYDRAMINE HYDROCHLORIDE AND LIDOCAINE HYDROCHLORIDE AND ALUMINUM HYDROXIDE AND MAGNESIUM HYDRO 10 ML: KIT at 06:29

## 2023-03-01 RX ADMIN — SODIUM CHLORIDE, PRESERVATIVE FREE 500 UNITS: 5 INJECTION INTRAVENOUS at 16:30

## 2023-03-01 RX ADMIN — METOCLOPRAMIDE 5 MG: 5 TABLET ORAL at 08:14

## 2023-03-01 RX ADMIN — LOSARTAN POTASSIUM 100 MG: 50 TABLET, FILM COATED ORAL at 08:14

## 2023-03-01 RX ADMIN — NYSTATIN 500000 UNITS: 100000 SUSPENSION ORAL at 08:13

## 2023-03-01 RX ADMIN — CEFTRIAXONE 1 G: 1 INJECTION, SOLUTION INTRAVENOUS at 10:55

## 2023-03-01 RX ADMIN — SODIUM CHLORIDE 75 ML/HR: 9 INJECTION, SOLUTION INTRAVENOUS at 00:15

## 2023-03-01 RX ADMIN — ISOSORBIDE DINITRATE 40 MG: 10 TABLET ORAL at 08:14

## 2023-03-01 RX ADMIN — HYDRALAZINE HYDROCHLORIDE 100 MG: 25 TABLET, FILM COATED ORAL at 06:31

## 2023-03-01 RX ADMIN — SUCRALFATE 1 G: 1 TABLET ORAL at 10:55

## 2023-03-01 NOTE — DISCHARGE INSTRUCTIONS
-Continue the following medications started during your hospitalization as prescribed: Amlodipine, atorvastatin, spironolactone and isosorbide dinitrate.  -Continue omeprazole, Reglan and Carafate as prescribed.  -Use nitroglycerin pills as needed as prescribed.  -We increased your metoprolol to 100 mg daily and increased your hydralazine to 100 mg every 8 hours.  Please take as prescribed, please disregard the previously prescribed leftovers.  -Continue Magic mouthwash, nystatin and lidocaine viscus as prescribed.  -Continue ciprofloxacin to finish treatment of your UTI.  -Continue holding aspirin and Plavix for 1 week until following up with your cardiologist.   -Follow-up with Dr. Lora in the office on 3/8 as scheduled.  -Follow-up with your cardiologist Dr. Hamm in 1 week as discussed.  -Follow-up with your PCP in 2 to 3 days for recheck and continued care.

## 2023-03-01 NOTE — CASE MANAGEMENT/SOCIAL WORK
Case Management Discharge Note           Provided Post Acute Provider List?: Yes    Selected Continued Care - Admitted Since 2/23/2023     Destination    No services have been selected for the patient.              Durable Medical Equipment Coordination complete.    Service Provider Selected Services Address Phone Fax Patient Preferred    [facility] [address] [address] [address] [phone] --       Internal Comment last updated by Lisa Keene, APRN 3/1/2023 1604    Order for BSC obtained. BSC taken to pt room. Form signed by Meliza GASPAR                     Dialysis/Infusion Coordination complete.    Service Provider Selected Services Address Phone Fax Patient Preferred    [facility] [address], [facility] [address] [phone] --       Internal Comment last updated by Lisa Keene, APRN 3/1/2023 1611    3/1 Spoke with jhonny Santos who confirms plan for staff to come to facility this afternoon to tach family and deliver pump/supplies. They will order TF and ship to pt home. She should receive is Fri. [facility] [place] pt with 7 cartons of TF to use in interim                     Home Medical Care Coordination complete.    Service Provider Selected Services Address Phone Fax Patient Preferred     [facility] [address], [facility] [id number] [phone] --          Therapy    No services have been selected for the patient.              Community [facility]    No services have been selected for the patient.              [facility]    No services have been selected for the patient.                  Transportation Services  Private: Car    Final Discharge Disposition Code: 06 - home with home health care

## 2023-03-01 NOTE — PLAN OF CARE
Goal Outcome Evaluation:  Plan of Care Reviewed With: patient, family        Progress: improving  Outcome Evaluation: pt sitting EOB and agreeable to therapy. Performed sit <-> stand with CGA and Vc for hand placement, amb with  feet with occ path deviations noted, VC for safety during turns. Performed B LE ex in sitting 1x10 reps AP, LAQ, hip abd, marching. Con't with PT POC and progress as tolerated

## 2023-03-01 NOTE — DISCHARGE SUMMARY
[facility] HOSPITALIST   DISCHARGE SUMMARY      Name:  Vesta Landry   Age:  77 y.o.  Sex:  female  :  1945  MRN:  [id number]   Visit Number:  [id number]    Admission Date:  2023  Date of Discharge:  3/1/2023  Primary Care Physician:  Bossman Pedraza MD    Important issues to note:    -Started on: Amlodipine, atorvastatin, spironolactone, isosorbide dinitrate and nitroglycerin PRN.  -Started on omeprazole, Reglan and Carafate  - increased metoprolol to 100 mg daily and increased hydralazine to 100 mg every 8 hours.    -Continued on Magic mouthwash, nystatin and lidocaine viscus.  -Discharged on ciprofloxacin to finish treatment of UTI.  -Continue holding aspirin and Plavix for 1 week until following up with cardiologist.  -Follow-up with Dr. Lora in the office on 3/8 scheduled.  -Follow-up with cardiologist Dr. Hamm in 1 week.  -Follow-up with PCP in 2 to 3 days for recheck and continued care.    Discharge Diagnoses:     1. NSTEMI, POA  2. Acute UTI  3. Acute metabolic encephalopathy likely secondary to above  4. Mild hematemesis likely secondary to gastritis  5. 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 Diarrhea likely secondary to initiation of tube feeds  7. Hypertension  8. Dementia  9. 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     Presenting Problem:    Chief Complaint   Patient presents with   • Abdominal Pain   • Shoulder Pain      Consults:     Consulting Physician(s)     Provider Relationship Specialty    Benedicto Copeland,  MD Consulting Physician Cardiology    Maritza Segal MD Consulting Physician Hematology and Oncology    Brigido Guerra MD Consulting Physician General Surgery    Luther Chopra MD Consulting Physician Hematology and Oncology        Procedures Performed:        History of presenting illness/Hospital Course:    Patient is a chronically ill 77-year-old female with a history of probable dementia, VTE, history of tonsillar cancer undergoing chemo and radiation with [facility] and Dr. Nieves for radiation.  She had apparently been having increasing difficulty with eating and poor appetite and recently had a PEG tube placed.  She had presented to the emergency room with complaints of shoulder pain, shortness of breath.     ER work-up was showing mildly elevated troponin, which did have delta change.  CT scan abdomen pelvis which was largely unremarkable.  She was seen by cardiology, they elected for medical management and deferred on any coronary angiography due to her multiple comorbidities.  She was initially given therapeutic Lovenox and started on aspirin and Plavix in addition to high intensity statin therapy.  She did have up titration of hydralazine and metoprolol.  She had negative C. difficile testing due to diarrhea.     Patient had been on bolus tube feeds, nutrition recommended continuous tube feeding while hospitalization with option of nocturnal feeds upon discharge.  She had apparently had change in mental status, initial head CT was unremarkable.  Urinalysis was concerning for infection and she was started on Rocephin on 2/26/2023.  She did have repeat scan of the abdomen which did not demonstrate any acute abnormalities other than mild gastritis.  General surgery was consulted and patient was kept n.p.o. as her aspirin Plavix and Lovenox were held.  She did have initial evidence of vomiting, was concern for aspiration and she did have 1 fever. Oncology did see patient in  consultation as well.     NSTEMI:  Was medically management after discussion with cardiology and family.  Was on full dose Lovenox initially in addition to aspirin and Plavix, These were held after concern for possible GI bleed/gastritis and she was started on PPI and Carafate.  No recurrence of pain thus far.     UTI:  Likely precipitated metabolic encephalopathy in setting of cancer, weakness, overall debility with dementia.  Received Rocephin while hospitalized.  Urine culture with sensitivities noted.  Will be discharged on Cipro to finish treatment.     Tonsillar cancer/mucositis/dysphagia:  Obviously complicates all aspects of care.  Has been on chemo and addition of radiation follows with [facility] oncology.  Was started on Magic mouthwash, will increase dosing.  We will add low-dose morphine to help with mucositis.  Added nystatin due to thrush.  Should be largely n.p.o. at this point, tube feeding resumed per surgery recommendations and tolerated well.     Nutrition:  Recently had PEG tube placed.    Restarted tube feeding per surgery recommendations.  She has been on Protonix and Carafate.  started on low-dose Reglan as well.     Dementia:  Complicates all aspects of care.     Patient was seen and examined on the day of discharge.  Both of her daughters at bedside.  Patient sitting up on the chair and appears comfortable with no distress.  Patient reports doing very well today and is eager to go home. Daughters agreeable on the plan and wants to take the patient home and cancel the transfer [facility] at this time since patient has improved significantly.  Patient likely responded to UTI treatment and has improved clinically after she was started on Rocephin.  Discussed with the patient and her daughters about medication changes and plan for management on discharge.  After discussing risks and benefits with them, we will continue holding aspirin and Plavix at this time for 1 week until  patient follows up with her cardiologist Dr. Hamm as an outpatient.  Patient with no more GI bleed or hematemesis.  Patient denies any chest pain or shortness of breath.  Patient denies any acute complaints currently.  Tolerating tube feeding with no nausea or vomiting. Patient and her daughters reports that Magic mouthwash and nystatin has been helping on her mucositis symptoms.  Patient instructed on following up with Dr. Lora on her scheduled appointment on 3/8.  Patient follow-up with her PCP in 2 to 3 days for recheck and continued care.  Patient will be discharged on ciprofloxacin to finish treatment of her UTI.  Patient to continue tube feeding at home with home health ordered. Clear return precautions discussed with the patient and her daughters.  They all verbalized understanding and agreeable to plan.  All questions were answered to their satisfaction. The patient is confined to a room and there is no bathroom in that room, patient would benefit from bedside commode due to debility which was ordered on discharge.    Vital Signs:    Temp:  [97.5 °F (36.4 °C)-98.7 °F (37.1 °C)] 97.9 °F (36.6 °C)  Heart Rate:  [66-86] 66  Resp:  [16-18] 18  BP: (104-187)/(45-67) 128/46    Physical Exam:    General Appearance:  Alert and cooperative.  No acute distress.   Head:  Atraumatic and normocephalic.   Eyes: Conjunctivae and sclerae normal, no icterus. No pallor.   Ears:  Ears with no abnormalities noted.   Throat: Multiple mucosal lesions noted, consistent with likely chemo induced mucositis.   Neck: Supple, trachea midline, no thyromegaly.   Back:   No tenderness to palpation.   Lungs:   Breath sounds heard bilaterally equally.  No crackles or wheezing. No Pleural rub or bronchial breathing.   Heart:  Normal S1 and S2, no murmur, no gallop, no rub. No JVD.   Abdomen:   Normal bowel sounds, no masses, no organomegaly. Soft, nontender, nondistended, no rebound tenderness. PEG tube noted   Extremities: Supple, no  edema, no cyanosis, no clubbing.   Pulses: Pulses palpable bilaterally.   Skin: No bleeding or rash.   Neurologic: Alert and oriented x 3. No facial asymmetry. Moves all four limbs. No tremors.     Pertinent Lab Results:     Results from last 7 days   Lab Units 02/28/23  0628 02/27/23  0601 02/26/23  0612 02/24/23  1725 02/24/23  0536 02/23/23  2000   SODIUM mmol/L 142 141 139   < > 138 140   POTASSIUM mmol/L 3.3* 3.1* 3.3*   < > 4.0 4.1   CHLORIDE mmol/L 105 104 103   < > 104 103   CO2 mmol/L 25.3 28.0 27.5   < > 25.6 27.0   BUN mg/dL 16 16 18   < > 27* 31*   CREATININE mg/dL 0.90 0.89 0.96   < > 1.14* 1.22*   CALCIUM mg/dL 7.9* 7.9* 7.8*   < > 8.1* 8.7   BILIRUBIN mg/dL  --   --   --   --  0.4 0.3   ALK PHOS U/L  --   --   --   --  87 97   ALT (SGPT) U/L  --   --   --   --  9 11   AST (SGOT) U/L  --   --   --   --  18 20   GLUCOSE mg/dL 97 116* 148*   < > 140* 139*    < > = values in this interval not displayed.     Results from last 7 days   Lab Units 02/28/23  0628 02/27/23  0601 02/26/23  1608 02/26/23  0612   WBC 10*3/mm3 5.19 5.13  --  5.89   HEMOGLOBIN g/dL 9.1* 8.7* 10.2* 8.9*   HEMATOCRIT % 29.1* 27.7* 31.3* 27.7*   PLATELETS 10*3/mm3 155 136*  --  129*     Results from last 7 days   Lab Units 02/26/23  1608   INR  1.14*     Results from last 7 days   Lab Units 02/24/23  1725 02/24/23  0536 02/23/23  2204   HSTROP T ng/L 137* 138* 115*             Results from last 7 days   Lab Units 02/23/23  2000   LIPASE U/L 21         Results from last 7 days   Lab Units 02/25/23 1918 02/25/23  1900 02/25/23  1428   BLOODCX  No growth at 3 days No growth at 3 days  --    URINECX   --   --  >100,000 CFU/mL Escherichia coli*  50,000 CFU/mL Proteus penneri*       Pertinent Radiology Results:    Imaging Results (All)     Procedure Component Value Units Date/Time    XR Chest 1 View [[id number]] Collected: 02/26/23 1913     Updated: 02/26/23 2016    Narrative:      FINAL REPORT    CLINICAL HISTORY:  fever    FINDINGS:  A  chest port tip ends in the SVC.  The heart size is normal. The  mediastinum is normal. There is no focal infiltrate or edema.  There are no pleural effusions. There is no pneumothorax. There  is no osseous abnormality.      Impression:      No acute cardiopulmonary process    Authenticated and Electronically Signed by Bc Jaramillo MD  on 02/26/2023 07:13:03 PM    CT Abdomen Pelvis Without Contrast [[id number]] Collected: 02/26/23 1758     Updated: 02/26/23 1900    Narrative:      FINAL REPORT    TECHNIQUE:  Axial images through the abdomen and pelvis were performed  without contrast. This study was performed with techniques to  keep radiation doses as low as reasonably achievable, (ALARA).  Individualized dose reduction techniques using automated  exposure control or adjustment of mA and/or kV according to the  patient's size were employed.    CLINICAL HISTORY:  Nausea/vomiting    FINDINGS:  Abdomen: There is a small left pleural effusion which is new  since the prior examination.  There is bibasilar atelectasis.  The liver parenchyma is homogeneous.  The gallbladder is absent.  The spleen, pancreas, adrenals and kidneys are unremarkable.  A  gastrostomy feeding tube is in the stomach.  There is mild  mucosal edema in the gastric antrum, similar to prior  examination.  There is mild inflammation seen surrounding the  stomach.    Pelvis: A Quinonez balloon is seen within a  decompressed urinary bladder.  The appendix is not visualized.  There is no pelvic mass or inflammation.      Impression:      1.  New small left pleural effusion.    2.  Stable mucosal  thickening and inflammation of the gastric antrum.    Authenticated and Electronically Signed by Bc Jaramillo MD  on 02/26/2023 05:58:50 PM    CT Head Without Contrast [[id number]] Collected: 02/25/23 1916     Updated: 02/25/23 2021    Narrative:      FINAL REPORT    TECHNIQUE:  Axial CT images were performed through the head. Coronal  reformatted images were  submitted. This study was performed with  techniques to keep radiation doses as low as reasonably  achievable (ALARA). Individualized dose reduction techniques  using automated exposure control or adjustment of mA and/or kV  according to the patient's size were employed.    CLINICAL HISTORY:  Neuro deficit, acute, stroke suspected    FINDINGS:  There is mild parenchymal atrophy.  There are moderate patchy  areas of decreased attenuation throughout the deep white matter  are likely due to small vessel ischemia.  There is no evidence  of hemorrhage.  There is no mass or edema identified.  There is  no abnormal extra-axial fluid seen.  The sinuses are well  aerated.      Impression:      Moderate patchy decreased attenuation of the deep white matter  consistent with chronic small vessel ischemia.  No definite  infarction.    Authenticated and Electronically Signed by Bc Jaramillo MD  on 02/25/2023 07:16:12 PM    XR Chest 1 View [[id number]] Collected: 02/24/23 1828     Updated: 02/24/23 1931    Narrative:      FINAL REPORT    TECHNIQUE:  An AP portable view of the chest was obtained.    CLINICAL HISTORY:  Chest Pain - Rapid Response    FINDINGS:  A right IJ Mediport catheter ends in the SVC.  Blunting of the  costophrenic angles may be due to small pleural effusions.  There is mild bibasilar atelectasis.  The lungs are otherwise  clear.  There is no pneumothorax.  There is no acute osseous  abnormality.      Impression:      Mild right basilar atelectasis with probable small pleural  effusions.    Authenticated and Electronically Signed by Nghia Pena M.D. on  02/24/2023 06:28:30 PM    XR Chest 1 View [[id number]] Collected: 02/24/23 0820     Updated: 02/24/23 0911    Narrative:      PROCEDURE: XR CHEST 1 VIEW-     HISTORY: Pain radiating to left shoulder.     COMPARISON:  02/08/2023     FINDINGS:  Portable view of the chest demonstrates mild right basilar  atelectasis. Lungs are otherwise clear. There is no evidence  of  effusion, pneumothorax or other significant pleural disease. The  mediastinum is unremarkable.     The heart size is normal.  Right chest port is noted with tip in SVC.       Impression:      No acute findings.      This report was signed and finalized on 2/24/2023 9:09 AM by Linus Nash MD.    CT Abdomen Pelvis With Contrast [[id number]] Collected: 02/23/23 2158     Updated: 02/23/23 2159    Narrative:      FINAL REPORT    TECHNIQUE:  Axial CT images were performed from the lung bases through the  symphysis pubis after the administration of intravenous  contrast.  This study was performed with techniques to keep  radiation doses as low as reasonably achievable (ALARA).  Individualized dose reduction techniques using automated  exposure control or adjustment of mA and/or kV according to the  patient's size were employed.    CLINICAL HISTORY:  Epigastric pain    FINDINGS:  LOWER CHEST: The heart is normal size.  There is bilateral lung  base atelectasis.  ABDOMEN/PELVIS:  Liver, gallbladder and bile  ducts: The liver enhances homogeneously without suspicious focal  hepatic lesion.  There has been a prior cholecystectomy.  There  is post cholecystectomy biliary ectasia.  There is no definite  biliary duct dilatation.  Adrenal glands: The adrenal glands are  morphologically unremarkable without suspicious lesion.  Kidneys, ureter and urinary bladder: No suspicious renal lesion.  No hydronephrosis.  Urinary bladder is unremarkable.  Spleen:  The spleen is normal size.  Pancreas: The pancreas is grossly  unremarkable.  GI systems and mesentery: There is a percutaneous  gastrostomy tube the terminates in the stomach.  There is  significant distal gastric wall thickening with adjacent  inflammation.  No evidence of bowel obstruction.  The appendix  is not visualized but there are no secondary signs of  appendicitis.    No significant mesenteric inflammation.  Lymph  nodes: No definite pathologically enlarged  abdominal or pelvic  lymph nodes present.  Vessels: The aorta and abdominal arteries  are grossly patent.  The IVC and portal vein are patent and  grossly unremarkable.  Peritoneum: No free intraperitoneal fluid  or pneumoperitoneum.  Pelvic viscera: No acute findings.  Body  wall: No body wall contusion. No significant body wall hernias.  Bones: No acute fracture.      Impression:      Findings are compatible with gastritis.    Authenticated and Electronically Signed by Maximus Calles MD on  02/23/2023 09:58:13 PM          Echo:    Results for orders placed during the hospital encounter of 02/23/23    Adult Transthoracic Echo Limited W/ Cont if Necessary Per Protocol    Interpretation Summary  •  Left ventricular wall thickness is consistent with mild concentric hypertrophy.  •  Left ventricular diastolic function is consistent with age.  •  Estimated right ventricular systolic pressure from tricuspid regurgitation is markedly elevated (>55 mmHg).  •  Moderate to severe pulmonary hypertension is present.    Condition on Discharge:      Stable.    Code status during the hospital stay:    Code Status and Medical Interventions:   Ordered at: 02/24/23 1811     Code Status (Patient has no pulse and is not breathing):    CPR (Attempt to Resuscitate)     Medical Interventions (Patient has pulse or is breathing):    Full Support     Release to patient:    Routine Release     Discharge Disposition:    Home-Health Care [facility]    Discharge Medications:       Discharge Medications      New Medications      Instructions Start Date   amLODIPine 10 MG tablet  Commonly known as: NORVASC   10 mg, Oral, Every 24 Hours Scheduled   Start Date: March 2, 2023     atorvastatin 40 MG tablet  Commonly known as: LIPITOR   40 mg, Oral, Nightly      ciprofloxacin 500 MG tablet  Commonly known as: Cipro   500 mg, Oral, 2 Times Daily      isosorbide dinitrate 20 MG tablet  Commonly known as: ISORDIL   40 mg, Oral, 3 Times Daily (Nitrates)       magic mouthwash oral suspension   Swish and spit two teaspoons (10 mL) by mouth Every 6 (Six) Hours for 10 days.      metoclopramide 5 MG tablet  Commonly known as: REGLAN   5 mg, Oral, 3 Times Daily      nitroglycerin 0.4 MG SL tablet  Commonly known as: NITROSTAT   Place 1 tablet under the tongue Every 5 (Five) Minutes As Needed for Chest Pain. Take no more than 3 doses in 15 minutes.      nystatin 100,000 unit/mL suspension  Commonly known as: MYCOSTATIN   Swish and spit one teaspoon (5 mL) by mouth 4 (Four) Times a Day      nystatin 154923 UNIT/GM powder  Commonly known as: MYCOSTATIN   Topical, 3 Times Daily      omeprazole 40 MG capsule  Commonly known as: priLOSEC   40 mg, Oral, Daily      spironolactone 25 MG tablet  Commonly known as: ALDACTONE   25 mg, Oral, Daily   Start Date: March 2, 2023     sucralfate 1 g tablet  Commonly known as: CARAFATE   Take 1 tablet by mouth 4 (Four) Times a Day Before Meals & at Bedtime         Changes to Medications      Instructions Start Date   hydrALAZINE 50 MG tablet  Commonly known as: APRESOLINE  What changed:   · medication strength  · how much to take  · when to take this   100 mg, Oral, Every 8 Hours Scheduled      HYDROcodone-acetaminophen 7.5-325 MG per tablet  Commonly known as: NORCO  What changed: Another medication with the same name was removed. Continue taking this medication, and follow the directions you see here.   Take 1 tablet by mouth Every 6 (Six) Hours As Needed for Moderate Pain      metoprolol tartrate 100 MG tablet  Commonly known as: LOPRESSOR  What changed:   · medication strength  · how much to take  · when to take this   100 mg, Oral, Every 12 Hours Scheduled         Continue These Medications      Instructions Start Date   allopurinol 100 MG tablet  Commonly known as: ZYLOPRIM   100 mg, Oral, Daily      fluconazole 100 MG tablet  Commonly known as: DIFLUCAN   100 mg, Oral, Daily      furosemide 40 MG tablet  Commonly known as: LASIX   40 mg,  Oral, Daily, swelling      lactobacillus acidophilus capsule capsule   Take 1 capsule by mouth twice daily. Take with Breakfast & Dinner.      Lidocaine Viscous HCl 2 % solution  Commonly known as: XYLOCAINE   Take 15 mL by mouth As Needed for Moderate Pain for up to 10 days. Do not use more than every 3 hours      lidocaine-prilocaine 2.5-2.5 % cream  Commonly known as: EMLA   1 application, Topical, As Needed      losartan 100 MG tablet  Commonly known as: COZAAR   100 mg, Oral, Daily      mirtazapine 15 MG tablet  Commonly known as: REMERON   15 mg, Oral, Nightly      mometasone 0.1 % cream  Commonly known as: ELOCON   mometasone 0.1 % topical cream   APPLY A THIN LAYER TO THE AFFECTED AREA(S) BY TOPICAL ROUTE ONCE DAILY after radiation treatment      ondansetron 8 MG tablet  Commonly known as: Zofran   8 mg, Oral, Every 8 Hours PRN      senna 8.6 MG tablet  Commonly known as: SENOKOT   1 tablet, Oral, Daily         Stop These Medications    cholestyramine light 4 g packet  Commonly known as: Prevalite     dronabinol 2.5 MG capsule  Commonly known as: Marinol     oxyCODONE 5 MG/5ML solution  Commonly known as: ROXICODONE     PHARMACY MEDS TO BED CONSULT          Discharge Diet:   On tube feeding    Activity at Discharge:   As tolerated    Follow-up Appointments:    Additional Instructions for the Follow-ups that You Need to Schedule     Ambulatory Referral to Cardiac Rehab   As directed      Ambulatory Referral to Home Health (Hospital)   As directed      Face to Face Visit Date: 2/27/2023    Follow-up provider for Plan of Care?: I treated the patient in an acute care facility and will not continue treatment after discharge.    Follow-up provider: ZOË DOE [[id number]]    Reason/Clinical Findings: debility    Describe mobility limitations that make leaving home difficult: debility    Nursing/Therapeutic Services Requested: Skilled Nursing Physical Therapy Occupational Therapy    Skilled nursing orders: Enteral  therapy    PT orders: Therapeutic exercise Gait Training Strengthening    Weight Bearing Status: As Tolerated    Occupational orders: Activities of daily living Energy conservation Strengthening Cognition Home safety assessment Fine motor         Ambulatory Referral to Home Health (Hospital)   As directed      Face to Face Visit Date: 3/1/2023    Follow-up provider for Plan of Care?: I treated the patient in an acute care facility and will not continue treatment after discharge.    Follow-up provider: BOSSMAN PEDRAZA [[id number]]    Reason/Clinical Findings: PEG tube    Describe mobility limitations that make leaving home difficult: Breakthrough    Nursing/Therapeutic Services Requested: Dietician Skilled Nursing    Skilled nursing orders: Medication education Cardiopulmonary assessments    Dietician orders: Diet instruction Diet modification    Frequency: 1 Week 1            Contact information for follow-up providers     Roderick Hamm IV, MD Follow up in 1 week(s).    Specialties: Interventional Cardiology, Cardiology  Contact information:  [address]  [address]  [facility] [address]  [phone]             Bossman Pedraza MD Follow up in 3 day(s).    Specialty: Internal Medicine  Contact information:  [address]  [facility] [address]  [phone]             [facility] CARDIAC REHAB .    Specialty: Cardiac Rehabilitation  Contact information:  [address]  [address] [facility] [address] [phone]                 Contact information for after-discharge care     Dialysis/Infusion     [facility] HOME INFUSION .    Service: Infusion and IV Therapy  Contact information:  [address]  [facility] [address]  [phone]                 Home Medical Care     [facility] HOME CARE .    Service: Home Health Services  Contact information:  [address]  [facility]  [address]  [phone]                           Future Appointments   Date Time Provider Department Center   3/8/2023  9:15 AM Jennifer Lora MD [facility]   3/16/2023  9:00 AM CHAIR 7 - [facility] OP INF [facility]   3/17/2023  9:00 AM Roderick Hamm IV, MD [facility]   3/23/2023  9:30 AM CHAIR 3 - [facility] OP INF [facility]   4/19/2023  9:00 AM  [facility] NUTR WALK IN SCHEDULE [facility]   7/27/2023 10:00 AM Jeannette Yates APRN [facility] N [facility] [facility]     Test Results Pending at Discharge:    Pending Labs     Order Current Status    H. Pylori Antigen, Stool - Stool, Per Rectum In process    Blood Culture - Blood, Arm, Right Preliminary result    Blood Culture - Blood, Hand, Right Preliminary result             Jesenia Lowery MD  03/01/23  14:24 EST    Time: I spent 39 minutes on this discharge activity which included: face-to-face encounter with the patient, reviewing the data in the system, coordination of the care with the nursing staff as well as consultants, documentation, and entering orders.     Dictated utilizing Dragon dictation.

## 2023-03-01 NOTE — CASE MANAGEMENT/SOCIAL WORK
1215 Ronnie spoke with Nick Davis for pt update. She states pt is at baseline. Loose stool continues due to tube feed. Pt started on vivonex RFT yesterday at 3 pm with rate increases every 8 hrs. Currently at 40 ml/hr with goal rate 75 ml/hr. Cm will follow to facilitate pump/TF delivery from [facility] at [place].    1335 Cm spoke with jhonny Santos/[facility] regarding goal to dc pt today. She states they do not have   Vivonex RFT in stock. They can deliver pump and do teaching with family this afternoon and order TF to be delivered to patients home on Fri. She states 7 cartons of the TF would need to be sent home with pt at time of DC. Cm agreed to call her with  Update after I inquire if TF will be available for pt to take home. Secure chat regarding TF availability sent to NICK Davis. Awaiting response.     143 Ronnie spoke with pt and family (spouse/Karrie, dtrs/Britany Haider) at bedside to discuss DCP. Cm advised them that [facility] is out of Vivonex RFT but they will order it and have it delivered to pt home on Fri. Cm confirmed with Nick Davis that  hospital will send 7 cartons of Vivonex RFT home with pt. Cm advised family that [facility] would deliver pump/supplies this afternoon and do teaching with family. Ronnie spoke with jhonny Santos to confirm delivery this afternoon and confirmed (per Danielle's request) pt would also receive a pole to hang feeding on and bag to hold water. Cm gave family the option of BSC being delivered to home or taking one home at [place]. Britany and Karrie feel it would be best for pt to take one home at [place]. Cm advised family that  [facility] has been notified of HH order and they should be calling to resume visits. Family denies further needs at this time. Cm agrees to bring BSC to pt room for dc.    1614 BSC delivered to pt room. Signature obtained from yudy GASPAR. Family denies additional needs at this time.

## 2023-03-01 NOTE — PROGRESS NOTES
Adult Nutrition  Assessment/PES    Patient Name:  Vesta Landry  YOB: 1945  MRN: [id number]  Admit Date:  2/23/2023    Assessment Date:  3/1/2023    Comments:      TF regimen currently running @ 40mL/hr w/ goal rate of 75mL/hr. Pt seems to be tolerating thus far - no nausea at this time per RN. Pt to go home on TF w/ pump needing 7 cans of Vivonex RTF per day. [facility] currently does not have Vivonex RTF in stock and pt will need cans of Vivonex RTF sent home w/ her per CM's note. I spoke w/ pt's nurse and told her that we do have cans of Vivonex RTF in-house for pt to take home at time of discharge. Continue current TF regimen as ordered.     RD to follow-up and available PRN.    Reason for Assessment     Row Name 03/01/23 1425          Reason for Assessment    Reason For Assessment identified at risk by screening criteria;nurse/nurse practitioner consult;per organizational policy;TF/PN;follow-up protocol     Diagnosis cancer diagnosis/related complications     Identified At Risk by Screening Criteria tube feeding or parenteral nutrition;difficulty chewing/swallowing                  Labs/Tests/Procedures/Meds     Row Name 03/01/23 1426          Labs/Procedures/Meds    Lab Results Reviewed reviewed, pertinent     Lab Results Comments Low: K+        Medications    Pertinent Medications Reviewed reviewed, pertinent     Pertinent Medications Comments lipitor, lasix, losartan, protonix                Physical Findings     Row Name 03/01/23 1426          Physical Findings    Overall Physical Appearance normal wt for age, L lower abdomen wound, R upper chest wound     Enteral Access Devices PEG                  Nutrition Prescription Ordered     Row Name 03/01/23 1427          Nutrition Prescription EN    Enteral Route PEG     Product Vivonex RTF     TF Delivery Method Continuous     Continuous TF Goal Rate (mL/hr) 75 mL/hr     Continuous TF Current Rate (mL/hr) 40 mL/hr     Continuous TF Goal  Volume (mL) 1650 mL     Continuous TF Current Volume (mL) 880 mL     Water flush (mL)  60 mL     Water Flush Frequency Every 4 hours                Evaluation of Received Nutrient/Fluid Intake     Row Name 03/01/23 1428          Intake Assessment    Energy/Calorie Requirement Assessment not meeting needs     Protein Requirement Assessment not meeting needs        EN Evaluation    Number of Days EN Intake Evaluated 1 day     EN Average Volume Delivered (mL/day) 880 mL/day     % Goal Volume  53 %                   Problem/Interventions:   Problem 1     Row Name 03/01/23 1429          Nutrition Diagnoses Problem 1    Problem 1 Needs Alternate     Etiology (related to) Medical Diagnosis     Oncology Other (comment)  tonsilar cancer     Signs/Symptoms (evidenced by) Other (comment)  pt w/ PEG-J tube and need for enteral nutrition recommendations                      Intervention Goal     Row Name 03/01/23 1429          Intervention Goal    General Maintain nutrition;Nutrition support treatment;Improved nutrition related lab(s);Reduce/improve symptoms;Meet nutritional needs for age/condition;Disease management/therapy     TF/PN Maintain TF/PN;Tolerate TF at goal     Weight Maintain weight                Nutrition Intervention     Row Name 03/01/23 1435          Nutrition Intervention    RD/Tech Action Follow Tx progress;Care plan reviewd;Recommend/ordered     Recommended/Ordered EN                Nutrition Prescription     Row Name 03/01/23 1435          Nutrition Prescription PO    New PO Prescription Ordered? No, recommended        Nutrition Prescription EN    Enteral Prescription Continue same protocol     New EN Prescription Ordered? No, recommended        Other Orders    Obtain Weight Daily     Obtain Weight Ordered? No, recommended     Supplement Vitamin mineral supplement     Supplement Ordered? No, recommended     Labs Mg++;Na+;K+     Labs Ordered? No, recommended                Education/Evaluation     Row Name  03/01/23 1436          Education    Education Will Instruct as appropriate        Monitor/Evaluation    Monitor Per protocol;Pertinent labs;TF delivery/tolerance;Weight;Skin status;Symptoms                 Electronically signed by:  Ciara Parks RD  03/01/23 14:36 EST

## 2023-03-01 NOTE — PROGRESS NOTES
LOS: 6 days   Patient Care Team:  Bossman Pedraza MD as PCP - General (Internal Medicine)  Hakeem Lang DPM as Consulting Physician (Podiatry)  Jennifer Lora MD as Consulting Physician (Hematology and Oncology)  Steven Valverde MD (Radiation Oncology)      Chief Complaint: Patient states he feels better      Interval History:     Patient Complaints: See Above, passing flatus    History taken from: patient RN    Vital Signs  Temp:  [97.5 °F (36.4 °C)-98.7 °F (37.1 °C)] 97.9 °F (36.6 °C)  Heart Rate:  [66-86] 66  Resp:  [16-18] 18  BP: (104-187)/(45-67) 128/46    Physical Exam:     General Appearance:    Alert, cooperative, in no acute distress   Head:    Normocephalic, without obvious abnormality, atraumatic   Lungs:     Clear to auscultation,respirations regular, even and                  unlabored    Heart:    Regular rhythm and normal rate, normal S1 and S2, no            murmur, no gallop, no rub, no click   Abdomen:     Normal bowel sounds, no masses, no organomegaly, soft        non-tender, non-distended, no guarding, no rebound                tenderness   Extremities:   Moves all extremities well, no edema, no cyanosis, no             redness   Pulses:   Pulses palpable and equal bilaterally   Skin:   No bleeding, bruising or rash        Results Review:       Lab Results (last 24 hours)     Procedure Component Value Units Date/Time    Magnesium [[id number]]  (Normal) Collected: 03/01/23 0522    Specimen: Blood Updated: 03/01/23 0558     Magnesium 2.4 mg/dL     Blood Culture - Blood, Arm, Right [[id number]]  (Normal) Collected: 02/25/23 1918    Specimen: Blood from Arm, Right Updated: 02/28/23 1931     Blood Culture No growth at 3 days    Blood Culture - Blood, Hand, Right [[id number]]  (Normal) Collected: 02/25/23 1900    Specimen: Blood from Hand, Right Updated: 02/28/23 1931     Blood Culture No growth at 3 days              Assessment & Plan       NSTEMI (non-ST elevated myocardial  infarction) (HCC)    HTN (hypertension), benign    Chronic diastolic heart failure (HCC)    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  I really wonder if treatment for her urinary tract infection is not improved her markedly and this was not her main problem in addition to the possible myocardial ischemia.  I think she is doing fine, she can be discharged whenever it is okay with the hospitalist service, I have recommended to the family that they get the patient back to be seen by the oncology service ASAP.      Brigido Guerra MD  03/01/23  14:12 EST

## 2023-03-01 NOTE — PLAN OF CARE
Goal Outcome Evaluation:  Plan of Care Reviewed With: patient, daughter        Progress: improving  Outcome Evaluation: Pt received supine in bed, sat eob independently, stood with cga and walked 180' with RW and sba/cga.  Pt performed toilet transfer with cga/verbal cues.  Pt reports she is feeling much better and is hopeful she will go home today.  Recommend home health OT upon d/c.

## 2023-03-01 NOTE — THERAPY TREATMENT NOTE
Patient Name: Vesta Landry  : 1945    MRN: [id number]                              Today's Date: 3/1/2023       Admit Date: 2023    Visit Dx:     ICD-10-CM ICD-9-CM   1. NSTEMI (non-ST elevated myocardial infarction) (HCC)  I21.4 410.70   2. 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 Esophageal dysphagia  R13.19 787.29   4. Debility  R53.81 799.3   5. 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)   • Bradycardia   • Elevated troponin   • HLD (hyperlipidemia)   • Anxiety associated with depression   • Diarrhea   • Difficulty swallowing   • Personal history of COVID-19   • Exertional dyspnea   • Anemia   • 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)   • NSTEMI (non-ST elevated myocardial infarction) (HCC)     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   • Deep vein thrombosis (HCC)     bilateral- daughter states on 23 that she doesn't remember patient having this   • Dementia (HCC)    • Dysphagia    • GERD (gastroesophageal reflux disease)    • Hyperlipidemia    • Hypertension    • Impaired mobility    • PONV (postoperative nausea and vomiting)    • SOB (shortness of breath)     following chemo infusion on day of infusion last week (week of -23) per pt's daughter   • Vitamin D deficiency    • Wears dentures     uppers - instructed not to use adhesive DOS     Past Surgical History:   Procedure Laterality Date   • APPENDECTOMY     • CATARACT EXTRACTION     • CHOLECYSTECTOMY     • COLONOSCOPY      Approx  2009   • ENDOSCOPY W/ PEG TUBE PLACEMENT N/A 2/20/2023    Procedure: ESOPHAGOGASTRODUODENOSCOPY WITH PERCUTANEOUS ENDOSCOPIC GASTROSTOMY TUBE INSERTION;  Surgeon: Brigido Guerra MD;  Location: [facility] ENDOSCOPY;  Service: Gastroenterology;  Laterality: N/A;   • HYSTERECTOMY  1991   • OOPHORECTOMY Bilateral 1991   • PORTACATH PLACEMENT Right 02/08/2023    Procedure: INSERTION OF PORTACATH;  Surgeon: Brigido Guerra MD;  Location: [facility] OR;  Service: General;  Laterality: Right;   • TUBAL ABDOMINAL LIGATION        General Information     Row Name 03/01/23 1136          Physical Therapy Time and Intention    Document Type therapy note (daily note)  -     Mode of Treatment physical therapy  -     Row Name 03/01/23 1136          General Information    Patient Profile Reviewed yes  -     Existing Precautions/Restrictions fall  -CC     Row Name 03/01/23 1136          Safety Issues, Functional Mobility    Safety Issues Affecting Function (Mobility) safety precautions follow-through/compliance  -     Impairments Affecting Function (Mobility) balance;endurance/activity tolerance;strength  -           User Key  (r) = Recorded By, (t) = Taken By, (c) = Cosigned By    Initials Name Provider Type     Denise Waldron PTA Physical Therapist Assistant               Mobility     Row Name 03/01/23 1137          Sit-Stand Transfer    Sit-Stand [place] (Transfers) standby assist  -     Assistive Device (Sit-Stand Transfers) walker, front-wheeled  -CC     Row Name 03/01/23 1137          Gait/Stairs (Locomotion)    [place] Level (Gait) contact guard;standby assist  -     Assistive Device (Gait) walker, front-wheeled  -     Distance in Feet (Gait) 180  -CC     Deviations/Abnormal Patterns (Gait) valentín decreased;gait speed decreased  -CC     Bilateral Gait Deviations forward flexed posture  -           User Key  (r) = Recorded By, (t) = Taken By, (c) = Cosigned By    Initials Name Provider Type    CC  Denise Waldron PTA Physical Therapist Assistant               Obj/Interventions    No documentation.                Goals/Plan    No documentation.                Clinical Impression     Row Name 03/01/23 1138          Pain    Pretreatment Pain Rating 0/10 - no pain  -CC     Posttreatment Pain Rating 0/10 - no pain  -CC     Pain Intervention(s) Repositioned;Ambulation/increased activity  -CC     Additional Documentation Pain Scale: Numbers Pre/Post-Treatment (Group)  -CC     Row Name 03/01/23 1138          Plan of Care Review    Plan of Care Reviewed With patient;family  -CC     Progress improving  -CC     Outcome Evaluation pt sitting EOB and agreeable to therapy. Performed sit <-> stand with CGA and Vc for hand placement, amb with  feet with occ path deviations noted, VC for safety during turns. Performed B LE ex in sitting 1x10 reps AP, LAQ, hip abd, marching. Con't with PT POC and progress as tolerated  -CC     Row Name 03/01/23 1138          Positioning and Restraints    Pre-Treatment Position in bed  -CC     Post Treatment Position chair  -CC     In Chair reclined;call light within reach;encouraged to call for assist;with family/caregiver  -CC           User Key  (r) = Recorded By, (t) = Taken By, (c) = Cosigned By    Initials Name Provider Type    CC Denise Waldron, TERESA Physical Therapist Assistant               Outcome Measures     Row Name 03/01/23 114          How much help from another person do you currently need...    Turning from your back to your side while in flat bed without using bedrails? 3  -CC     Moving from lying on back to sitting on the side of a flat bed without bedrails? 3  -CC     Moving to and from a bed to a chair (including a wheelchair)? 3  -CC     Standing up from a chair using your arms (e.g., wheelchair, bedside chair)? 3  -CC     Climbing 3-5 steps with a railing? 2  -CC     To walk in hospital room? 3  -CC     AM-PAC 6 Clicks Score (PT) 17  -CC     Highest level of  mobility 5 --> Static standing  -     Row Name 03/01/23 1145 03/01/23 1120       Functional Assessment    Outcome Measure Options AM-PAC 6 Clicks Basic Mobility (PT)  - AM-PAC 6 Clicks Daily Activity (OT)  -          User Key  (r) = Recorded By, (t) = Taken By, (c) = Cosigned By    Initials Name Provider Type     Migdalia Cotton Occupational Therapist     Denise Waldron PTA Physical Therapist Assistant                             Physical Therapy Education     Title: PT OT SLP Therapies (In Progress)     Topic: Physical Therapy (In Progress)     Point: Mobility training (Done)     Learning Progress Summary           Patient Acceptance, E, VU by  at 2/27/2023 1325    Comment: Importance of mobility                   Point: Home exercise program (Done)     Learning Progress Summary           Patient Acceptance, E,TB, VU by  at 3/1/2023 1145    Comment: Importance of performing ex daily btw therapy sessions    Acceptance, E, VU by  at 2/27/2023 1325    Comment: Importance of mobility                   Point: Body mechanics (Not Started)     Learner Progress:  Not documented in this visit.          Point: Precautions (Not Started)     Learner Progress:  Not documented in this visit.                      User Key     Initials Effective Dates Name Provider Type Discipline     06/16/21 -  Denise Waldron PTA Physical Therapist Assistant PT     12/29/22 -  Christelle Ha, GERMANIA Student PT Student PT              PT Recommendation and Plan     Plan of Care Reviewed With: patient, family  Progress: improving  Outcome Evaluation: pt sitting EOB and agreeable to therapy. Performed sit <-> stand with CGA and Vc for hand placement, amb with  feet with occ path deviations noted, VC for safety during turns. Performed B LE ex in sitting 1x10 reps AP, LAQ, hip abd, marching. Con't with PT POC and progress as tolerated     Time Calculation:    PT Charges     Row Name 03/01/23 1146             Time  Calculation    PT Received On 03/01/23  -      PT Goal Re-Cert Due Date 03/09/23  -         Time Calculation- PT    Total Timed Code Minutes- PT 30 minute(s)  -CC         Timed Charges    01446 - PT Therapeutic Exercise Minutes 15  -CC      98154 - Gait Training Minutes  15  -CC         Total Minutes    Timed Charges Total Minutes 30  -CC       Total Minutes 30  -CC            User Key  (r) = Recorded By, (t) = Taken By, (c) = Cosigned By    Initials Name Provider Type    CC Denise Waldron, TERESA Physical Therapist Assistant              Therapy Charges for Today     Code Description Service Date Service Provider Modifiers Qty    16763436616 HC PT THER PROC EA 15 MIN 3/1/2023 Denise Waldron, TERESA GP 1    08428185613 HC GAIT TRAINING EA 15 MIN 3/1/2023 Denise Waldron, TERESA GP 1          PT G-Codes  Outcome Measure Options: AM-PAC 6 Clicks Basic Mobility (PT)  AM-PAC 6 Clicks Score (PT): 17  AM-PAC 6 Clicks Score (OT): 14       Denise Waldron PTA  3/1/2023

## 2023-03-01 NOTE — THERAPY TREATMENT NOTE
Patient Name: Vesta Landry  : 1945    MRN: [id number]                              Today's Date: 3/1/2023       Admit Date: 2023    Visit Dx:     ICD-10-CM ICD-9-CM   1. NSTEMI (non-ST elevated myocardial infarction) (HCC)  I21.4 410.70   2. 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 Esophageal dysphagia  R13.19 787.29   4. Debility  R53.81 799.3   5. 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)   • Bradycardia   • Elevated troponin   • HLD (hyperlipidemia)   • Anxiety associated with depression   • Diarrhea   • Difficulty swallowing   • Personal history of COVID-19   • Exertional dyspnea   • Anemia   • 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)   • NSTEMI (non-ST elevated myocardial infarction) (HCC)     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   • Deep vein thrombosis (HCC)     bilateral- daughter states on 23 that she doesn't remember patient having this   • Dementia (HCC)    • Dysphagia    • GERD (gastroesophageal reflux disease)    • Hyperlipidemia    • Hypertension    • Impaired mobility    • PONV (postoperative nausea and vomiting)    • SOB (shortness of breath)     following chemo infusion on day of infusion last week (week of -23) per pt's daughter   • Vitamin D deficiency    • Wears dentures     uppers - instructed not to use adhesive DOS     Past Surgical History:   Procedure Laterality Date   • APPENDECTOMY     • CATARACT EXTRACTION     • CHOLECYSTECTOMY     • COLONOSCOPY      Approx  2009   • ENDOSCOPY W/ PEG TUBE PLACEMENT N/A 2/20/2023    Procedure: ESOPHAGOGASTRODUODENOSCOPY WITH PERCUTANEOUS ENDOSCOPIC GASTROSTOMY TUBE INSERTION;  Surgeon: Brigido Guerra MD;  Location: [facility] ENDOSCOPY;  Service: Gastroenterology;  Laterality: N/A;   • HYSTERECTOMY  1991   • OOPHORECTOMY Bilateral 1991   • PORTACATH PLACEMENT Right 02/08/2023    Procedure: INSERTION OF PORTACATH;  Surgeon: Brigido Guerra MD;  Location: [facility] OR;  Service: General;  Laterality: Right;   • TUBAL ABDOMINAL LIGATION        General Information     Row Name 03/01/23 1114          OT Time and Intention    Document Type therapy note (daily note)  -     Mode of Treatment occupational therapy  -     Row Name 03/01/23 1114          General Information    Existing Precautions/Restrictions fall  -           User Key  (r) = Recorded By, (t) = Taken By, (c) = Cosigned By    Initials Name Provider Type     Migdalia Cotton Occupational Therapist                 Mobility/ADL's     Row Name 03/01/23 1114          Bed Mobility    Bed Mobility supine-sit  -     Supine-Sit Patrick (Bed Mobility) modified independence  -     Assistive Device (Bed Mobility) head of bed elevated  -     Row Name 03/01/23 1114          Transfers    Transfers sit-stand transfer;toilet transfer  -     Row Name 03/01/23 1114          Sit-Stand Transfer    Sit-Stand Patrick (Transfers) standby assist  -     Row Name 03/01/23 1114          Toilet Transfer    Type (Toilet Transfer) sit-stand;stand-sit  -     Patrick Level (Toilet Transfer) contact guard  -     Assistive Device (Toilet Transfer) walker, front-wheeled  -     Row Name 03/01/23 1114          Functional Mobility    Functional Mobility- Ind. Level standby assist;contact guard assist  -     Functional Mobility- Device walker, front-wheeled  -     Functional Mobility-Distance (Feet) 180  -     Row Name 03/01/23 1114          Lower Body Dressing Assessment/Training     Comment, (Lower Body Dressing) daughter assisted pt in donning her socks  -           User Key  (r) = Recorded By, (t) = Taken By, (c) = Cosigned By    Initials Name Provider Type    Migdalia Morelos Occupational Therapist               Obj/Interventions    No documentation.                Goals/Plan    No documentation.                Clinical Impression     Row Name 03/01/23 1118          Pain Assessment    Pretreatment Pain Rating 0/10 - no pain  -     Posttreatment Pain Rating 0/10 - no pain  -     Pain Intervention(s) Repositioned;Ambulation/increased activity  -     Row Name 03/01/23 1118          Plan of Care Review    Plan of Care Reviewed With patient;daughter  -     Progress improving  -     Outcome Evaluation Pt received supine in bed, sat eob independently, stood with cga and walked 180' with RW and sba/cga.  Pt performed toilet transfer with cga/verbal cues.  Pt reports she is feeling much better and is hopeful she will go home today.  Recommend home health OT upon d/c.  -[facility] Name 03/01/23 1118          Positioning and Restraints    Pre-Treatment Position in bed  -     Post Treatment Position chair  -     In Chair reclined;call light within reach;encouraged to call for assist;with family/caregiver;with PT  [facility]           User Key  (r) = Recorded By, (t) = Taken By, (c) = Cosigned By    Initials Name Provider Type    Migdalia Morelos Occupational Therapist               Outcome Measures     Row Name 03/01/23 1120          How much help from another is currently needed...    Putting on and taking off regular lower body clothing? 2  -AH     Bathing (including washing, rinsing, and drying) 2  -AH     Toileting (which includes using toilet bed pan or urinal) 3  -AH     Putting on and taking off regular upper body clothing 3  -AH     Taking care of personal grooming (such as brushing teeth) 3  -AH     Eating meals 1  feeding tube  -     AM-PAC 6 Clicks Score (OT) 14  -     Row Name  03/01/23 1120          Functional Assessment    Outcome Measure Options AM-PAC 6 Clicks Daily Activity (OT)  -           User Key  (r) = Recorded By, (t) = Taken By, (c) = Cosigned By    Initials Name Provider Type    Migdalia Morelos Occupational Therapist                Occupational Therapy Education     Title: PT OT SLP Therapies (In Progress)     Topic: Occupational Therapy (In Progress)     Point: ADL training (Done)     Description:   Instruct learner(s) on proper safety adaptation and remediation techniques during self care or transfers.   Instruct in proper use of assistive devices.              Learning Progress Summary           Patient Acceptance, E,TB, VU by  at 3/1/2023 1121    Comment: benefit of working with therapy, safety with transfers    Acceptance, E,TB, VU by SD at 2/27/2023 1406    Comment: OT POC   Family Acceptance, E,TB, VU by  at 3/1/2023 1121    Comment: benefit of working with therapy, safety with transfers                   Point: Home exercise program (Not Started)     Description:   Instruct learner(s) on appropriate technique for monitoring, assisting and/or progressing therapeutic exercises/activities.              Learner Progress:  Not documented in this visit.          Point: Precautions (Done)     Description:   Instruct learner(s) on prescribed precautions during self-care and functional transfers.              Learning Progress Summary           Patient Acceptance, E,TB, VU by  at 3/1/2023 1121    Comment: benefit of working with therapy, safety with transfers   Family Acceptance, E,TB, VU by  at 3/1/2023 1121    Comment: benefit of working with therapy, safety with transfers                   Point: Body mechanics (Not Started)     Description:   Instruct learner(s) on proper positioning and spine alignment during self-care, functional mobility activities and/or exercises.              Learner Progress:  Not documented in this visit.                      User Key      Initials Effective Dates Name Provider Type Discipline     06/16/21 -  Migdalia Cotton Occupational Therapist OT    SD 06/16/21 -  Gin Snowden OT Occupational Therapist OT              OT Recommendation and Plan     Plan of Care Review  Plan of Care Reviewed With: patient, daughter  Progress: improving  Outcome Evaluation: Pt received supine in bed, sat eob independently, stood with cga and walked 180' with RW and sba/cga.  Pt performed toilet transfer with cga/verbal cues.  Pt reports she is feeling much better and is hopeful she will go home today.  Recommend home health OT upon d/c.     Time Calculation:    Time Calculation- OT     Row Name 03/01/23 1121             Time Calculation- OT    OT Start Time 1026  -      OT Stop Time 1050  -      OT Time Calculation (min) 24 min  -      OT Received On 03/01/23  -      OT Goal Re-Cert Due Date 03/09/23  -         Timed Charges    14958 - OT Therapeutic Activity Minutes 24  -AH         Total Minutes    Timed Charges Total Minutes 24  -AH       Total Minutes 24  -AH            User Key  (r) = Recorded By, (t) = Taken By, (c) = Cosigned By    Initials Name Provider Type     Migdalia Cotton Occupational Therapist              Therapy Charges for Today     Code Description Service Date Service Provider Modifiers Qty    99679459540 HC OT THERAPEUTIC ACT EA 15 MIN 3/1/2023 Migdalia Cotton GO 2               Migdalia Cotton  3/1/2023

## 2023-03-01 NOTE — PLAN OF CARE
Goal Outcome Evaluation:         Pt up in chair with family at bedside at beginning of shift, color much improved.  Pt tolerating tube feeding at 20 ml, upped to 30 ml at 0130. Pt's blood pressure much improved and pt has been afebrile. Family very supportive of pt and optimistic for discharge soon.

## 2023-03-01 NOTE — DISCHARGE INSTR - OTHER ORDERS
-Tube feeding and supplies to be supplied by [facility]. Their contact number is [phone]  -[facility] to resume services. They should call pt to schedule visit within 2 days of discharge. Their contact number is [phone]

## 2023-03-02 ENCOUNTER — HOSPITAL ENCOUNTER (OUTPATIENT)
Facility: HOSPITAL | Age: 78
Setting detail: OBSERVATION
LOS: 1 days | Discharge: HOME HEALTH | End: 2023-03-07
Attending: EMERGENCY MEDICINE | Admitting: HOSPITALIST
Payer: MEDICARE

## 2023-03-02 ENCOUNTER — TRANSITIONAL CARE MANAGEMENT TELEPHONE ENCOUNTER (OUTPATIENT)
Dept: CALL CENTER | Facility: HOSPITAL | Age: 78
End: 2023-03-02
Payer: MEDICARE

## 2023-03-02 ENCOUNTER — READMISSION MANAGEMENT (OUTPATIENT)
Dept: CALL CENTER | Facility: HOSPITAL | Age: 78
End: 2023-03-02
Payer: MEDICARE

## 2023-03-02 ENCOUNTER — TELEPHONE (OUTPATIENT)
Dept: INTERNAL MEDICINE | Facility: CLINIC | Age: 78
End: 2023-03-02
Payer: MEDICARE

## 2023-03-02 ENCOUNTER — HOME CARE VISIT (OUTPATIENT)
Dept: HOME HEALTH SERVICES | Facility: HOME HEALTHCARE | Age: 78
End: 2023-03-02
Payer: MEDICARE

## 2023-03-02 ENCOUNTER — APPOINTMENT (OUTPATIENT)
Dept: CT IMAGING | Facility: HOSPITAL | Age: 78
End: 2023-03-02
Payer: MEDICARE

## 2023-03-02 ENCOUNTER — APPOINTMENT (OUTPATIENT)
Dept: GENERAL RADIOLOGY | Facility: HOSPITAL | Age: 78
End: 2023-03-02
Payer: MEDICARE

## 2023-03-02 VITALS
HEART RATE: 70 BPM | OXYGEN SATURATION: 96 % | RESPIRATION RATE: 16 BRPM | TEMPERATURE: 97.3 F | SYSTOLIC BLOOD PRESSURE: 128 MMHG | DIASTOLIC BLOOD PRESSURE: 70 MMHG

## 2023-03-02 DIAGNOSIS — K52.9 COLITIS: ICD-10-CM

## 2023-03-02 DIAGNOSIS — R77.8 ELEVATED TROPONIN: ICD-10-CM

## 2023-03-02 DIAGNOSIS — I25.2 HX OF NON-ST ELEVATION MYOCARDIAL INFARCTION (NSTEMI): ICD-10-CM

## 2023-03-02 DIAGNOSIS — K92.2 GASTROINTESTINAL HEMORRHAGE, UNSPECIFIED GASTROINTESTINAL HEMORRHAGE TYPE: Primary | ICD-10-CM

## 2023-03-02 DIAGNOSIS — K62.89 PROCTITIS: ICD-10-CM

## 2023-03-02 DIAGNOSIS — C76.0 SQUAMOUS CELL CARCINOMA OF HEAD AND NECK: ICD-10-CM

## 2023-03-02 PROBLEM — Z86.19 HISTORY OF CLOSTRIDIUM DIFFICILE COLITIS: Status: ACTIVE | Noted: 2023-03-02

## 2023-03-02 LAB
ABO GROUP BLD: NORMAL
ALBUMIN SERPL-MCNC: 3.2 G/DL (ref 3.5–5.2)
ALBUMIN/GLOB SERPL: 1 G/DL
ALP SERPL-CCNC: 86 U/L (ref 39–117)
ALT SERPL W P-5'-P-CCNC: 26 U/L (ref 1–33)
ANION GAP SERPL CALCULATED.3IONS-SCNC: 12 MMOL/L (ref 5–15)
AST SERPL-CCNC: 39 U/L (ref 1–32)
BACTERIA SPEC AEROBE CULT: NORMAL
BACTERIA SPEC AEROBE CULT: NORMAL
BACTERIA UR QL AUTO: ABNORMAL /HPF
BASOPHILS # BLD AUTO: 0.03 10*3/MM3 (ref 0–0.2)
BASOPHILS NFR BLD AUTO: 0.4 % (ref 0–1.5)
BILIRUB SERPL-MCNC: 0.3 MG/DL (ref 0–1.2)
BILIRUB UR QL STRIP: NEGATIVE
BLD GP AB SCN SERPL QL: NEGATIVE
BUN SERPL-MCNC: 15 MG/DL (ref 8–23)
BUN/CREAT SERPL: 19.5 (ref 7–25)
CALCIUM SPEC-SCNC: 8.5 MG/DL (ref 8.6–10.5)
CHLORIDE SERPL-SCNC: 101 MMOL/L (ref 98–107)
CLARITY UR: CLEAR
CO2 SERPL-SCNC: 23 MMOL/L (ref 22–29)
COLOR UR: YELLOW
CREAT SERPL-MCNC: 0.77 MG/DL (ref 0.57–1)
D-LACTATE SERPL-SCNC: 0.8 MMOL/L (ref 0.5–2)
DEPRECATED RDW RBC AUTO: 57.6 FL (ref 37–54)
EGFRCR SERPLBLD CKD-EPI 2021: 78.6 ML/MIN/1.73
EOSINOPHIL # BLD AUTO: 0.18 10*3/MM3 (ref 0–0.4)
EOSINOPHIL NFR BLD AUTO: 2.6 % (ref 0.3–6.2)
ERYTHROCYTE [DISTWIDTH] IN BLOOD BY AUTOMATED COUNT: 16.8 % (ref 12.3–15.4)
FLUAV SUBTYP SPEC NAA+PROBE: NOT DETECTED
FLUBV RNA ISLT QL NAA+PROBE: NOT DETECTED
GLOBULIN UR ELPH-MCNC: 3.2 GM/DL
GLUCOSE SERPL-MCNC: 112 MG/DL (ref 65–99)
GLUCOSE UR STRIP-MCNC: NEGATIVE MG/DL
H PYLORI AG STL QL IA: NEGATIVE
HCT VFR BLD AUTO: 31.8 % (ref 34–46.6)
HGB BLD-MCNC: 9.9 G/DL (ref 12–15.9)
HGB UR QL STRIP.AUTO: NEGATIVE
HOLD SPECIMEN: NORMAL
HYALINE CASTS UR QL AUTO: ABNORMAL /LPF
IMM GRANULOCYTES # BLD AUTO: 0.08 10*3/MM3 (ref 0–0.05)
IMM GRANULOCYTES NFR BLD AUTO: 1.1 % (ref 0–0.5)
KETONES UR QL STRIP: NEGATIVE
LEUKOCYTE ESTERASE UR QL STRIP.AUTO: ABNORMAL
LIPASE SERPL-CCNC: 48 U/L (ref 13–60)
LYMPHOCYTES # BLD AUTO: 1.1 10*3/MM3 (ref 0.7–3.1)
LYMPHOCYTES NFR BLD AUTO: 15.6 % (ref 19.6–45.3)
MCH RBC QN AUTO: 29 PG (ref 26.6–33)
MCHC RBC AUTO-ENTMCNC: 31.1 G/DL (ref 31.5–35.7)
MCV RBC AUTO: 93.3 FL (ref 79–97)
MONOCYTES # BLD AUTO: 0.59 10*3/MM3 (ref 0.1–0.9)
MONOCYTES NFR BLD AUTO: 8.4 % (ref 5–12)
NEUTROPHILS NFR BLD AUTO: 5.07 10*3/MM3 (ref 1.7–7)
NEUTROPHILS NFR BLD AUTO: 71.9 % (ref 42.7–76)
NITRITE UR QL STRIP: NEGATIVE
NRBC BLD AUTO-RTO: 0 /100 WBC (ref 0–0.2)
PH UR STRIP.AUTO: 6 [PH] (ref 5–8)
PLATELET # BLD AUTO: 214 10*3/MM3 (ref 140–450)
PMV BLD AUTO: 10.4 FL (ref 6–12)
POTASSIUM SERPL-SCNC: 3.6 MMOL/L (ref 3.5–5.2)
PROCALCITONIN SERPL-MCNC: 0.18 NG/ML (ref 0–0.25)
PROT SERPL-MCNC: 6.4 G/DL (ref 6–8.5)
PROT UR QL STRIP: NEGATIVE
RBC # BLD AUTO: 3.41 10*6/MM3 (ref 3.77–5.28)
RBC # UR STRIP: ABNORMAL /HPF
REF LAB TEST METHOD: ABNORMAL
RH BLD: POSITIVE
SARS-COV-2 RNA RESP QL NAA+PROBE: NOT DETECTED
SODIUM SERPL-SCNC: 136 MMOL/L (ref 136–145)
SP GR UR STRIP: 1.01 (ref 1–1.03)
SQUAMOUS #/AREA URNS HPF: ABNORMAL /HPF
T&S EXPIRATION DATE: NORMAL
TROPONIN T SERPL HS-MCNC: 63 NG/L
UROBILINOGEN UR QL STRIP: ABNORMAL
WBC # UR STRIP: ABNORMAL /HPF
WBC NRBC COR # BLD: 7.05 10*3/MM3 (ref 3.4–10.8)
WHOLE BLOOD HOLD COAG: NORMAL
WHOLE BLOOD HOLD SPECIMEN: NORMAL
YEAST URNS QL MICRO: ABNORMAL /HPF

## 2023-03-02 PROCEDURE — 84145 PROCALCITONIN (PCT): CPT | Performed by: NURSE PRACTITIONER

## 2023-03-02 PROCEDURE — 83690 ASSAY OF LIPASE: CPT

## 2023-03-02 PROCEDURE — 99223 1ST HOSP IP/OBS HIGH 75: CPT | Performed by: INTERNAL MEDICINE

## 2023-03-02 PROCEDURE — 83605 ASSAY OF LACTIC ACID: CPT

## 2023-03-02 PROCEDURE — 84484 ASSAY OF TROPONIN QUANT: CPT | Performed by: NURSE PRACTITIONER

## 2023-03-02 PROCEDURE — C9803 HOPD COVID-19 SPEC COLLECT: HCPCS | Performed by: HOSPITALIST

## 2023-03-02 PROCEDURE — 36415 COLL VENOUS BLD VENIPUNCTURE: CPT

## 2023-03-02 PROCEDURE — 85025 COMPLETE CBC W/AUTO DIFF WBC: CPT

## 2023-03-02 PROCEDURE — 74178 CT ABD&PLV WO CNTR FLWD CNTR: CPT

## 2023-03-02 PROCEDURE — 96375 TX/PRO/DX INJ NEW DRUG ADDON: CPT

## 2023-03-02 PROCEDURE — 71045 X-RAY EXAM CHEST 1 VIEW: CPT

## 2023-03-02 PROCEDURE — 86900 BLOOD TYPING SEROLOGIC ABO: CPT | Performed by: INTERNAL MEDICINE

## 2023-03-02 PROCEDURE — 86850 RBC ANTIBODY SCREEN: CPT | Performed by: INTERNAL MEDICINE

## 2023-03-02 PROCEDURE — 99284 EMERGENCY DEPT VISIT MOD MDM: CPT

## 2023-03-02 PROCEDURE — 93005 ELECTROCARDIOGRAM TRACING: CPT | Performed by: NURSE PRACTITIONER

## 2023-03-02 PROCEDURE — 86923 COMPATIBILITY TEST ELECTRIC: CPT

## 2023-03-02 PROCEDURE — 25510000001 IOPAMIDOL PER 1 ML: Performed by: EMERGENCY MEDICINE

## 2023-03-02 PROCEDURE — 81001 URINALYSIS AUTO W/SCOPE: CPT

## 2023-03-02 PROCEDURE — 71275 CT ANGIOGRAPHY CHEST: CPT

## 2023-03-02 PROCEDURE — 80053 COMPREHEN METABOLIC PANEL: CPT

## 2023-03-02 PROCEDURE — 86901 BLOOD TYPING SEROLOGIC RH(D): CPT | Performed by: INTERNAL MEDICINE

## 2023-03-02 PROCEDURE — 87636 SARSCOV2 & INF A&B AMP PRB: CPT | Performed by: NURSE PRACTITIONER

## 2023-03-02 PROCEDURE — G0495 RN CARE TRAIN/EDU IN HH: HCPCS

## 2023-03-02 RX ORDER — ALLOPURINOL 100 MG/1
100 TABLET ORAL DAILY
Status: DISCONTINUED | OUTPATIENT
Start: 2023-03-03 | End: 2023-03-07 | Stop reason: HOSPADM

## 2023-03-02 RX ORDER — ACETAMINOPHEN 325 MG/1
650 TABLET ORAL EVERY 4 HOURS PRN
Status: DISCONTINUED | OUTPATIENT
Start: 2023-03-02 | End: 2023-03-06

## 2023-03-02 RX ORDER — SUCRALFATE 1 G/1
1 TABLET ORAL 4 TIMES DAILY
COMMUNITY

## 2023-03-02 RX ORDER — HYDRALAZINE HYDROCHLORIDE 50 MG/1
100 TABLET, FILM COATED ORAL EVERY 8 HOURS SCHEDULED
Status: DISCONTINUED | OUTPATIENT
Start: 2023-03-03 | End: 2023-03-07 | Stop reason: HOSPADM

## 2023-03-02 RX ORDER — METOPROLOL TARTRATE 100 MG/1
100 TABLET ORAL EVERY 12 HOURS SCHEDULED
Status: DISCONTINUED | OUTPATIENT
Start: 2023-03-03 | End: 2023-03-07 | Stop reason: HOSPADM

## 2023-03-02 RX ORDER — AMLODIPINE BESYLATE 10 MG/1
10 TABLET ORAL
Status: DISCONTINUED | OUTPATIENT
Start: 2023-03-03 | End: 2023-03-07 | Stop reason: HOSPADM

## 2023-03-02 RX ORDER — MIRTAZAPINE 15 MG/1
15 TABLET, FILM COATED ORAL NIGHTLY
Status: DISCONTINUED | OUTPATIENT
Start: 2023-03-03 | End: 2023-03-07 | Stop reason: HOSPADM

## 2023-03-02 RX ORDER — LOSARTAN POTASSIUM 50 MG/1
100 TABLET ORAL DAILY
Status: DISCONTINUED | OUTPATIENT
Start: 2023-03-03 | End: 2023-03-07 | Stop reason: HOSPADM

## 2023-03-02 RX ORDER — LIDOCAINE HYDROCHLORIDE 20 MG/ML
10 SOLUTION OROPHARYNGEAL
Status: DISCONTINUED | OUTPATIENT
Start: 2023-03-02 | End: 2023-03-07 | Stop reason: HOSPADM

## 2023-03-02 RX ORDER — ATORVASTATIN CALCIUM 40 MG/1
40 TABLET, FILM COATED ORAL NIGHTLY
Status: DISCONTINUED | OUTPATIENT
Start: 2023-03-03 | End: 2023-03-07 | Stop reason: HOSPADM

## 2023-03-02 RX ORDER — SENNA PLUS 8.6 MG/1
1 TABLET ORAL DAILY
Status: DISCONTINUED | OUTPATIENT
Start: 2023-03-03 | End: 2023-03-02

## 2023-03-02 RX ORDER — VANCOMYCIN HYDROCHLORIDE 125 MG/1
125 CAPSULE ORAL EVERY 6 HOURS SCHEDULED
Status: DISCONTINUED | OUTPATIENT
Start: 2023-03-03 | End: 2023-03-03

## 2023-03-02 RX ORDER — HYDROCODONE BITARTRATE AND ACETAMINOPHEN 7.5; 325 MG/1; MG/1
1 TABLET ORAL EVERY 6 HOURS PRN
Status: DISPENSED | OUTPATIENT
Start: 2023-03-02 | End: 2023-03-03

## 2023-03-02 RX ORDER — PANTOPRAZOLE SODIUM 40 MG/10ML
80 INJECTION, POWDER, LYOPHILIZED, FOR SOLUTION INTRAVENOUS ONCE
Status: COMPLETED | OUTPATIENT
Start: 2023-03-02 | End: 2023-03-02

## 2023-03-02 RX ORDER — SUCRALFATE 1 G/1
1 TABLET ORAL 4 TIMES DAILY
Status: DISCONTINUED | OUTPATIENT
Start: 2023-03-03 | End: 2023-03-07 | Stop reason: HOSPADM

## 2023-03-02 RX ORDER — DIPHENHYDRAMINE HYDROCHLORIDE AND LIDOCAINE HYDROCHLORIDE AND ALUMINUM HYDROXIDE AND MAGNESIUM HYDRO
10 KIT EVERY 6 HOURS
Status: DISCONTINUED | OUTPATIENT
Start: 2023-03-03 | End: 2023-03-07 | Stop reason: HOSPADM

## 2023-03-02 RX ORDER — NYSTATIN 100000 [USP'U]/G
POWDER TOPICAL 3 TIMES DAILY
Status: DISCONTINUED | OUTPATIENT
Start: 2023-03-03 | End: 2023-03-07 | Stop reason: HOSPADM

## 2023-03-02 RX ORDER — L.ACID,PARA/B.BIFIDUM/S.THERM 8B CELL
1 CAPSULE ORAL
Status: DISCONTINUED | OUTPATIENT
Start: 2023-03-03 | End: 2023-03-07 | Stop reason: HOSPADM

## 2023-03-02 RX ORDER — ISOSORBIDE DINITRATE 20 MG/1
40 TABLET ORAL
Status: DISCONTINUED | OUTPATIENT
Start: 2023-03-03 | End: 2023-03-07 | Stop reason: HOSPADM

## 2023-03-02 RX ORDER — SODIUM CHLORIDE, SODIUM LACTATE, POTASSIUM CHLORIDE, CALCIUM CHLORIDE 600; 310; 30; 20 MG/100ML; MG/100ML; MG/100ML; MG/100ML
50 INJECTION, SOLUTION INTRAVENOUS CONTINUOUS
Status: ACTIVE | OUTPATIENT
Start: 2023-03-03 | End: 2023-03-03

## 2023-03-02 RX ORDER — ACETAMINOPHEN 160 MG/5ML
650 SOLUTION ORAL EVERY 4 HOURS PRN
Status: DISCONTINUED | OUTPATIENT
Start: 2023-03-02 | End: 2023-03-05

## 2023-03-02 RX ORDER — PANTOPRAZOLE SODIUM 40 MG/10ML
40 INJECTION, POWDER, LYOPHILIZED, FOR SOLUTION INTRAVENOUS EVERY 12 HOURS SCHEDULED
Status: DISCONTINUED | OUTPATIENT
Start: 2023-03-03 | End: 2023-03-07 | Stop reason: HOSPADM

## 2023-03-02 RX ORDER — ACETAMINOPHEN 650 MG/1
650 SUPPOSITORY RECTAL EVERY 4 HOURS PRN
Status: DISCONTINUED | OUTPATIENT
Start: 2023-03-02 | End: 2023-03-06

## 2023-03-02 RX ORDER — SODIUM CHLORIDE 9 MG/ML
10 INJECTION INTRAVENOUS AS NEEDED
Status: DISCONTINUED | OUTPATIENT
Start: 2023-03-02 | End: 2023-03-07 | Stop reason: HOSPADM

## 2023-03-02 RX ADMIN — SODIUM CHLORIDE, POTASSIUM CHLORIDE, SODIUM LACTATE AND CALCIUM CHLORIDE 50 ML/HR: 600; 310; 30; 20 INJECTION, SOLUTION INTRAVENOUS at 23:45

## 2023-03-02 RX ADMIN — IOPAMIDOL 100 ML: 755 INJECTION, SOLUTION INTRAVENOUS at 18:55

## 2023-03-02 RX ADMIN — PANTOPRAZOLE SODIUM 80 MG: 40 INJECTION, POWDER, FOR SOLUTION INTRAVENOUS at 22:27

## 2023-03-02 NOTE — OUTREACH NOTE
Call Center TCM Note    Flowsheet Row Responses   [facility] patient discharged from? [facility]   Does the patient have one of the following disease processes/diagnoses(primary or secondary)? Acute MI (STEMI,NSTEMI)   TCM attempt successful? No   Unsuccessful attempts Attempt 1  [No updated verbal release on file from PCP group]          Haylee Espinosa RN    3/2/2023, 13:44 EST

## 2023-03-02 NOTE — TELEPHONE ENCOUNTER
Caller: HAL ORELLANA     Relationship:     Best call back number: [phone]    What orders are you requesting (i.e. lab or imaging): VERBAL ORDERS EVAL FOR PHYS THERAPY AND  OCC THER, BATH AIDE ONCE A WEEK, AND MSW EVAL.    In what timeframe would the patient need to come in: WEEK OF 599136     Where will you receive your lab/imaging services: HOME

## 2023-03-02 NOTE — HOME HEALTH
vivonex RTF AT 65cc. for 22hrs. entraflo. 4.5 cartons + 330ml of water        referral for pt, ot, msw, hha    stool- dark, coffee ground

## 2023-03-02 NOTE — ED PROVIDER NOTES
Subjective   History of Present Illness  Pleasantly demented patient and her daughter present to the ER for dark black coffee-ground stools as well as weakness fever cough.  She was recently discharged from [facility] for urinary tract infection.  Aspiration pneumonia and a non-STEMI.  She was anticoagulated but it appears that it was stopped after she developed some bleeding.  Family advised they did not do any further intervention given the patient's frailty.  She has a history of tonsillar cancer and her daughter tells me these treatments are currently on hold given her recent illness.  She follows Dr. Lora for this [place] and she has been seeing Dr. Segal.  She has a feeding tube related to aspiration in her tonsillar cancer.  There is also a history of dementia.        Review of Systems   Constitutional: Positive for fever. Negative for chills and diaphoresis.   HENT: Negative for congestion and sore throat.    Respiratory: Positive for cough and shortness of breath. Negative for choking, chest tightness and wheezing.    Cardiovascular: Negative for chest pain and leg swelling.   Gastrointestinal: Positive for blood in stool. Negative for abdominal distention, abdominal pain, anal bleeding, constipation, diarrhea, nausea and vomiting.   Genitourinary: Negative for difficulty urinating, dysuria, flank pain, frequency, hematuria and urgency.   All other systems reviewed and are negative.      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   • Deep vein thrombosis (HCC)     bilateral- daughter states on 2/17/23 that she doesn't remember patient having this   • Dementia (HCC)    • Dysphagia    • GERD (gastroesophageal reflux disease)    • Hyperlipidemia    • Hypertension    • Impaired mobility    • PONV (postoperative nausea and vomiting)    • SOB (shortness  of breath)     following chemo infusion on day of infusion last week (week of -23) per pt's daughter   • Vitamin D deficiency    • Wears dentures     uppers - instructed not to use adhesive DOS       Allergies   Allergen Reactions   • Trazodone Confusion       Past Surgical History:   Procedure Laterality Date   • APPENDECTOMY     • CATARACT EXTRACTION     • CHOLECYSTECTOMY     • COLONOSCOPY      Approx    • ENDOSCOPY W/ PEG TUBE PLACEMENT N/A 2023    Procedure: ESOPHAGOGASTRODUODENOSCOPY WITH PERCUTANEOUS ENDOSCOPIC GASTROSTOMY TUBE INSERTION;  Surgeon: Brigido Guerra MD;  Location[facility];  Service: Gastroenterology;  Laterality: N/A;   • HYSTERECTOMY     • OOPHORECTOMY Bilateral    • PORTACATH PLACEMENT Right 2023    Procedure: INSERTION OF PORTACATH;  Surgeon: Brigido Guerra MD;  Location[facility] OR;  Service: General;  Laterality: Right;   • TUBAL ABDOMINAL LIGATION         Family History   Problem Relation Age of Onset   • Heart failure Mother    • Hyperlipidemia Mother    • Hypertension Mother    • Cancer Father    • Cancer Sister    • Stroke Sister    • Breast cancer Maternal Aunt    • Breast cancer Paternal Aunt    • Ovarian cancer Neg Hx        Social History     Socioeconomic History   • Marital status:    Tobacco Use   • Smoking status: Former     Packs/day: 0.15     Years: 10.00     Pack years: 1.50     Types: Cigarettes     Quit date: 1987     Years since quittin.1   • Smokeless tobacco: Never   Vaping Use   • Vaping Use: Never used   Substance and Sexual Activity   • Alcohol use: No   • Drug use: No   • Sexual activity: Defer           Objective   Physical Exam  Constitutional:       Appearance: She is well-developed. She is ill-appearing.   HENT:      Head: Normocephalic and atraumatic.      Right Ear: External ear normal.      Left Ear: External ear normal.      Nose: Nose normal.   Eyes:      Conjunctiva/sclera: Conjunctivae normal.       Pupils: Pupils are equal, round, and reactive to light.   Cardiovascular:      Rate and Rhythm: Normal rate and regular rhythm.      Heart sounds: Normal heart sounds.   Pulmonary:      Effort: Pulmonary effort is normal.      Breath sounds: Normal breath sounds.   Abdominal:      General: Bowel sounds are normal.      Palpations: Abdomen is soft.   Musculoskeletal:         General: Normal range of motion.      Cervical back: Normal range of motion and neck supple.   Skin:     General: Skin is warm and dry.   Neurological:      Mental Status: She is alert. She is disoriented.   Psychiatric:         Behavior: Behavior normal.         Thought Content: Thought content normal.         Judgment: Judgment normal.         Procedures           ED Course  ED Course as of 03/02/23 2128   Thu Mar 02, 2023   1547 Pleasant patient with a broad differential.  We will need to get baseline labs check guaiac.  More likely will need to get CAT scans of her chest abdomen pelvis for further evaluation of possible GI bleeding as well as aspiration pneumonia. [JM]   2049 Broad differential.  Awaiting high-sensitivity troponin.  We will compare that to the previous as she was treated for non-STEMI at [place].  Evidence of colitis on the CAT scans.  Given her recent anticoagulation this could be related to that.  Less likely complication from her feeding tube. [JM]      ED Course User Index  [JM] Jared Lang APRN           CT Angiogram Chest   Final Result      1. No evidence of pulmonary metastases   2. Small bilateral pleural effusions and minimal dependent opacities slightly increased in comparison   3. A few scattered groundglass opacities likely postinflammatory or infectious. Recommend follow-up to document resolution               Electronically Signed: Heriberto Lopez     3/2/2023 7:15 PM EST     Workstation ID: [id number]      CT Abdomen Pelvis With & Without Contrast   Final Result      1. Findings of the chest are reported  separately   2. Percutaneous gastrostomy tube projects in expected position.  There is a small complex fluid collection along the anterior abdominal wall with possible extension along the margin of the stomach. Findings are likely postsurgical in nature. The    sterility of the collection is indeterminate. Recommend continued follow-up as clinically indicated   3. Small amount of fluid with possible developing enhancement within the pelvis. Findings could represent a developing abscess in the correct setting.   4. Mild wall thickening and stranding along the distal rectum. Findings may represent underlying proctitis, colitis. Small amount of fluid and liquid stool noted throughout the colon. Inflammatory changes noted on the prior study are less prominent.                   Electronically Signed: Heriberto Lopez     3/2/2023 7:30 PM EST     Workstation ID: [id number]      XR Chest 1 View   Final Result   Impression:   Left basilar atelectasis or pneumonia with possible subpulmonic pleural effusion      Electronically Signed: Ari Landry     3/2/2023 4:25 PM EST     Workstation ID: [id number]                                        Medical Decision Making  Colitis: complicated acute illness or injury  Elevated troponin: complicated acute illness or injury  Gastrointestinal hemorrhage, unspecified gastrointestinal hemorrhage type: complicated acute illness or injury  Hx of non-ST elevation myocardial infarction (NSTEMI): complicated acute illness or injury  Proctitis: complicated acute illness or injury  Amount and/or Complexity of Data Reviewed  External Data Reviewed: labs, radiology, ECG and notes.  Labs: ordered.  Radiology: ordered. Decision-making details documented in ED Course.  ECG/medicine tests: ordered. Decision-making details documented in ED Course.      Risk  Prescription drug management.  Decision regarding hospitalization.          Final diagnoses:   Gastrointestinal hemorrhage, unspecified  gastrointestinal hemorrhage type   Colitis   Proctitis   Hx of non-ST elevation myocardial infarction (NSTEMI)   Elevated troponin       ED Disposition  ED Disposition     ED Disposition   Decision to Admit    Condition   --    Comment   --             No follow-up provider specified.       Medication List      ASK your doctor about these medications    sucralfate 1 g tablet  Commonly known as: CARAFATE  Ask about: Which instructions should I use?             Jared Lang, APRN  03/02/23 7108

## 2023-03-02 NOTE — OUTREACH NOTE
Prep Survey    Flowsheet Row Responses   [facility] patient discharged from? [facility]   Is LACE score < 7 ? No   Eligibility [facility]   Date of Admission 02/23/23   Date of Discharge 03/01/23   Discharge Disposition Home-Health Care [facility]   Discharge diagnosis NSTEMI (non-ST elevated myocardial infarction   Does the patient have one of the following disease processes/diagnoses(primary or secondary)? Acute MI (STEMI,NSTEMI)   Does the patient have Home health ordered? Yes   What is the Home health agency?  [facility]   Is there a DME ordered? Yes   What DME was ordered? [facility]   Prep survey completed? Yes          Marleen GREGORIO - Registered Nurse

## 2023-03-02 NOTE — PAYOR COMM NOTE
"To:  [employer]  From: Joanne Atwood RN  Phone: [phone]  Fax: [phone]  NPI: [id number]  TIN: [id number]  Member ID: [id number]  MRN: [id number]    Magnus Landry (77 y.o. Female)     Date of Birth   1945    Social Security Number       Address   [address] [place], [place] [address]    Home Phone   [phone]    MRN   [id number]       Muslim   Latter-day    Marital Status                               Admission Date   2/23/23    Admission Type   Emergency    Admitting Provider   Tyrell Jack Jr., MD    Attending Provider       Department, Room/Bed   [facility] TELEMETRY 4, 428/1       Discharge Date   3/1/2023    Discharge Disposition   Home-Health Care [facility]    Discharge Destination                               Attending Provider: (none)   Allergies: Trazodone    Isolation: None   Infection: COVID (History) (01/20/23)   Code Status: Prior    Ht: 155 cm (61.02\")   Wt: 66.9 kg (147 lb 7.8 oz)    Admission Cmt: None   Principal Problem: NSTEMI (non-ST elevated myocardial infarction) (HCC) [I21.4]                 Active Insurance as of 2/23/2023     Primary Coverage     Payor Plan Insurance Group Employer/Plan Group    HUMANA MEDICARE REPLACEMENT HU[employer] MEDICARE REPLACEMENT [id number]     Payor Plan Address Payor Plan Phone Number Payor Plan Fax Number Effective Dates    [address] [address] [phone]  1/1/2018 - None Entered    [facility] [id number]       Subscriber Name Subscriber Birth Date Member ID       MAGNUS LANDRY 1945 [id number]                 Emergency Contacts      (Rel.) Home Phone Work Phone Mobile Phone    Karrie Landry (Spouse) [phone] -- [phone]    Meliza Back (Daughter) [phone] -- [phone]    JOSÉ AMAYA (Daughter) [phone] -- [phone]               Discharge Summary      Jesenia Lowery MD at 03/01/23 1424              [facility] HOSPITALIST   DISCHARGE SUMMARY      Name:  Magnus Diallo " Gris   Age:  77 y.o.  Sex:  female  :  1945  MRN:  [id number]   Visit Number:  [id number]    Admission Date:  2023  Date of Discharge:  3/1/2023  Primary Care Physician:  Bossman Pedraza MD    Important issues to note:    -Started on: Amlodipine, atorvastatin, spironolactone, isosorbide dinitrate and nitroglycerin PRN.  -Started on omeprazole, Reglan and Carafate  - increased metoprolol to 100 mg daily and increased hydralazine to 100 mg every 8 hours.    -Continued on Magic mouthwash, nystatin and lidocaine viscus.  -Discharged on ciprofloxacin to finish treatment of UTI.  -Continue holding aspirin and Plavix for 1 week until following up with cardiologist.  -Follow-up with Dr. Lora in the office on 3/8 scheduled.  -Follow-up with cardiologist Dr. Hamm in 1 week.  -Follow-up with PCP in 2 to 3 days for recheck and continued care.    Discharge Diagnoses:     1. NSTEMI, POA  2. Acute UTI  3. Acute metabolic encephalopathy likely secondary to above  4. Mild hematemesis likely secondary to gastritis  5. 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 Diarrhea likely secondary to initiation of tube feeds  7. Hypertension  8. Dementia  9. 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     Presenting Problem:    Chief Complaint   Patient presents with   • Abdominal Pain   • Shoulder Pain      Consults:     Consulting Physician(s)     Provider Relationship Specialty    Benedicto Copeland MD Consulting Physician Cardiology    Maritza Segal MD Consulting Physician  Hematology and Oncology    Brigido Guerra MD Consulting Physician General Surgery    Luther Chopra MD Consulting Physician Hematology and Oncology        Procedures Performed:        History of presenting illness/Hospital Course:    Patient is a chronically ill 77-year-old female with a history of probable dementia, VTE, history of tonsillar cancer undergoing chemo and radiation with [facility] and Dr. Nieves for radiation.  She had apparently been having increasing difficulty with eating and poor appetite and recently had a PEG tube placed.  She had presented to the emergency room with complaints of shoulder pain, shortness of breath.     ER work-up was showing mildly elevated troponin, which did have delta change.  CT scan abdomen pelvis which was largely unremarkable.  She was seen by cardiology, they elected for medical management and deferred on any coronary angiography due to her multiple comorbidities.  She was initially given therapeutic Lovenox and started on aspirin and Plavix in addition to high intensity statin therapy.  She did have up titration of hydralazine and metoprolol.  She had negative C. difficile testing due to diarrhea.     Patient had been on bolus tube feeds, nutrition recommended continuous tube feeding while hospitalization with option of nocturnal feeds upon discharge.  She had apparently had change in mental status, initial head CT was unremarkable.  Urinalysis was concerning for infection and she was started on Rocephin on 2/26/2023.  She did have repeat scan of the abdomen which did not demonstrate any acute abnormalities other than mild gastritis.  General surgery was consulted and patient was kept n.p.o. as her aspirin Plavix and Lovenox were held.  She did have initial evidence of vomiting, was concern for aspiration and she did have 1 fever. Oncology did see patient in consultation as well.     NSTEMI:  Was medically management after discussion with  cardiology and family.  Was on full dose Lovenox initially in addition to aspirin and Plavix, These were held after concern for possible GI bleed/gastritis and she was started on PPI and Carafate.  No recurrence of pain thus far.     UTI:  Likely precipitated metabolic encephalopathy in setting of cancer, weakness, overall debility with dementia.  Received Rocephin while hospitalized.  Urine culture with sensitivities noted.  Will be discharged on Cipro to finish treatment.     Tonsillar cancer/mucositis/dysphagia:  Obviously complicates all aspects of care.  Has been on chemo and addition of radiation follows with [facility] oncology.  Was started on Magic mouthwash, will increase dosing.  We will add low-dose morphine to help with mucositis.  Added nystatin due to thrush.  Should be largely n.p.o. at this point, tube feeding resumed per surgery recommendations and tolerated well.     Nutrition:  Recently had PEG tube placed.    Restarted tube feeding per surgery recommendations.  She has been on Protonix and Carafate.  started on low-dose Reglan as well.     Dementia:  Complicates all aspects of care.     Patient was seen and examined on the day of discharge.  Both of her daughters at bedside.  Patient sitting up on the chair and appears comfortable with no distress.  Patient reports doing very well today and is eager to go home. Daughters agreeable on the plan and wants to take the patient home and cancel the transfer [facility] [place] [facility] at this time since patient has improved significantly.  Patient likely responded to UTI treatment and has improved clinically after she was started on Rocephin.  Discussed with the patient and her daughters about medication changes and plan for management on discharge.  After discussing risks and benefits with them, we will continue holding aspirin and Plavix at this time for 1 week until patient follows up with her cardiologist Dr. Hamm as an outpatient.  Patient with  no more GI bleed or hematemesis.  Patient denies any chest pain or shortness of breath.  Patient denies any acute complaints currently.  Tolerating tube feeding with no nausea or vomiting. Patient and her daughters reports that Magic mouthwash and nystatin has been helping on her mucositis symptoms.  Patient instructed on following up with Dr. Lora on her scheduled appointment on 3/8.  Patient follow-up with her PCP in 2 to 3 days for recheck and continued care.  Patient will be discharged on ciprofloxacin to finish treatment of her UTI.  Patient to continue tube feeding at home with home health ordered. Clear return precautions discussed with the patient and her daughters.  They all verbalized understanding and agreeable to plan.  All questions were answered to their satisfaction. The patient is confined to a room and there is no bathroom in that room, patient would benefit from bedside commode due to debility which was ordered on discharge.    Vital Signs:    Temp:  [97.5 °F (36.4 °C)-98.7 °F (37.1 °C)] 97.9 °F (36.6 °C)  Heart Rate:  [66-86] 66  Resp:  [16-18] 18  BP: (104-187)/(45-67) 128/46    Physical Exam:    General Appearance:  Alert and cooperative.  No acute distress.   Head:  Atraumatic and normocephalic.   Eyes: Conjunctivae and sclerae normal, no icterus. No pallor.   Ears:  Ears with no abnormalities noted.   Throat: Multiple mucosal lesions noted, consistent with likely chemo induced mucositis.   Neck: Supple, trachea midline, no thyromegaly.   Back:   No tenderness to palpation.   Lungs:   Breath sounds heard bilaterally equally.  No crackles or wheezing. No Pleural rub or bronchial breathing.   Heart:  Normal S1 and S2, no murmur, no gallop, no rub. No JVD.   Abdomen:   Normal bowel sounds, no masses, no organomegaly. Soft, nontender, nondistended, no rebound tenderness. PEG tube noted   Extremities: Supple, no edema, no cyanosis, no clubbing.   Pulses: Pulses palpable bilaterally.   Skin: No  bleeding or rash.   Neurologic: Alert and oriented x 3. No facial asymmetry. Moves all four limbs. No tremors.     Pertinent Lab Results:     Results from last 7 days   Lab Units 02/28/23  0628 02/27/23  0601 02/26/23  0612 02/24/23  1725 02/24/23  0536 02/23/23 2000   SODIUM mmol/L 142 141 139   < > 138 140   POTASSIUM mmol/L 3.3* 3.1* 3.3*   < > 4.0 4.1   CHLORIDE mmol/L 105 104 103   < > 104 103   CO2 mmol/L 25.3 28.0 27.5   < > 25.6 27.0   BUN mg/dL 16 16 18   < > 27* 31*   CREATININE mg/dL 0.90 0.89 0.96   < > 1.14* 1.22*   CALCIUM mg/dL 7.9* 7.9* 7.8*   < > 8.1* 8.7   BILIRUBIN mg/dL  --   --   --   --  0.4 0.3   ALK PHOS U/L  --   --   --   --  87 97   ALT (SGPT) U/L  --   --   --   --  9 11   AST (SGOT) U/L  --   --   --   --  18 20   GLUCOSE mg/dL 97 116* 148*   < > 140* 139*    < > = values in this interval not displayed.     Results from last 7 days   Lab Units 02/28/23  0628 02/27/23  0601 02/26/23  1608 02/26/23  0612   WBC 10*3/mm3 5.19 5.13  --  5.89   HEMOGLOBIN g/dL 9.1* 8.7* 10.2* 8.9*   HEMATOCRIT % 29.1* 27.7* 31.3* 27.7*   PLATELETS 10*3/mm3 155 136*  --  129*     Results from last 7 days   Lab Units 02/26/23  1608   INR  1.14*     Results from last 7 days   Lab Units 02/24/23  1725 02/24/23  0536 02/23/23  2204   HSTROP T ng/L 137* 138* 115*             Results from last 7 days   Lab Units 02/23/23  2000   LIPASE U/L 21         Results from last 7 days   Lab Units 02/25/23  1918 02/25/23  1900 02/25/23  1428   BLOODCX  No growth at 3 days No growth at 3 days  --    URINECX   --   --  >100,000 CFU/mL Escherichia coli*  50,000 CFU/mL Proteus penneri*       Pertinent Radiology Results:    Imaging Results (All)     Procedure Component Value Units Date/Time    XR Chest 1 View [[id number]] Collected: 02/26/23 1913     Updated: 02/26/23 2016    Narrative:      FINAL REPORT    CLINICAL HISTORY:  fever    FINDINGS:  A chest port tip ends in the SVC.  The heart size is normal. The  mediastinum is  normal. There is no focal infiltrate or edema.  There are no pleural effusions. There is no pneumothorax. There  is no osseous abnormality.      Impression:      No acute cardiopulmonary process    Authenticated and Electronically Signed by Bc Jaramlilo MD  on 02/26/2023 07:13:03 PM    CT Abdomen Pelvis Without Contrast [[id number]] Collected: 02/26/23 1758     Updated: 02/26/23 1900    Narrative:      FINAL REPORT    TECHNIQUE:  Axial images through the abdomen and pelvis were performed  without contrast. This study was performed with techniques to  keep radiation doses as low as reasonably achievable, (ALARA).  Individualized dose reduction techniques using automated  exposure control or adjustment of mA and/or kV according to the  patient's size were employed.    CLINICAL HISTORY:  Nausea/vomiting    FINDINGS:  Abdomen: There is a small left pleural effusion which is new  since the prior examination.  There is bibasilar atelectasis.  The liver parenchyma is homogeneous.  The gallbladder is absent.  The spleen, pancreas, adrenals and kidneys are unremarkable.  A  gastrostomy feeding tube is in the stomach.  There is mild  mucosal edema in the gastric antrum, similar to prior  examination.  There is mild inflammation seen surrounding the  stomach.    Pelvis: A Quinonez balloon is seen within a  decompressed urinary bladder.  The appendix is not visualized.  There is no pelvic mass or inflammation.      Impression:      1.  New small left pleural effusion.    2.  Stable mucosal  thickening and inflammation of the gastric antrum.    Authenticated and Electronically Signed by Bc Jaramillo MD  on 02/26/2023 05:58:50 PM    CT Head Without Contrast [[id number]] Collected: 02/25/23 1916     Updated: 02/25/23 2021    Narrative:      FINAL REPORT    TECHNIQUE:  Axial CT images were performed through the head. Coronal  reformatted images were submitted. This study was performed with  techniques to keep radiation doses as  low as reasonably  achievable (ALARA). Individualized dose reduction techniques  using automated exposure control or adjustment of mA and/or kV  according to the patient's size were employed.    CLINICAL HISTORY:  Neuro deficit, acute, stroke suspected    FINDINGS:  There is mild parenchymal atrophy.  There are moderate patchy  areas of decreased attenuation throughout the deep white matter  are likely due to small vessel ischemia.  There is no evidence  of hemorrhage.  There is no mass or edema identified.  There is  no abnormal extra-axial fluid seen.  The sinuses are well  aerated.      Impression:      Moderate patchy decreased attenuation of the deep white matter  consistent with chronic small vessel ischemia.  No definite  infarction.    Authenticated and Electronically Signed by Bc Jaramillo MD  on 02/25/2023 07:16:12 PM    XR Chest 1 View [[id number]] Collected: 02/24/23 1828     Updated: 02/24/23 1931    Narrative:      FINAL REPORT    TECHNIQUE:  An AP portable view of the chest was obtained.    CLINICAL HISTORY:  Chest Pain - Rapid Response    FINDINGS:  A right IJ Mediport catheter ends in the SVC.  Blunting of the  costophrenic angles may be due to small pleural effusions.  There is mild bibasilar atelectasis.  The lungs are otherwise  clear.  There is no pneumothorax.  There is no acute osseous  abnormality.      Impression:      Mild right basilar atelectasis with probable small pleural  effusions.    Authenticated and Electronically Signed by Nghia Pena M.D. on  02/24/2023 06:28:30 PM    XR Chest 1 View [[id number]] Collected: 02/24/23 0820     Updated: 02/24/23 0911    Narrative:      PROCEDURE: XR CHEST 1 VIEW-     HISTORY: Pain radiating to left shoulder.     COMPARISON:  02/08/2023     FINDINGS:  Portable view of the chest demonstrates mild right basilar  atelectasis. Lungs are otherwise clear. There is no evidence of  effusion, pneumothorax or other significant pleural disease. The  mediastinum  is unremarkable.     The heart size is normal.  Right chest port is noted with tip in SVC.       Impression:      No acute findings.      This report was signed and finalized on 2/24/2023 9:09 AM by Linus Nash MD.    CT Abdomen Pelvis With Contrast [[id number]] Collected: 02/23/23 2158     Updated: 02/23/23 2159    Narrative:      FINAL REPORT    TECHNIQUE:  Axial CT images were performed from the lung bases through the  symphysis pubis after the administration of intravenous  contrast.  This study was performed with techniques to keep  radiation doses as low as reasonably achievable (ALARA).  Individualized dose reduction techniques using automated  exposure control or adjustment of mA and/or kV according to the  patient's size were employed.    CLINICAL HISTORY:  Epigastric pain    FINDINGS:  LOWER CHEST: The heart is normal size.  There is bilateral lung  base atelectasis.  ABDOMEN/PELVIS:  Liver, gallbladder and bile  ducts: The liver enhances homogeneously without suspicious focal  hepatic lesion.  There has been a prior cholecystectomy.  There  is post cholecystectomy biliary ectasia.  There is no definite  biliary duct dilatation.  Adrenal glands: The adrenal glands are  morphologically unremarkable without suspicious lesion.  Kidneys, ureter and urinary bladder: No suspicious renal lesion.  No hydronephrosis.  Urinary bladder is unremarkable.  Spleen:  The spleen is normal size.  Pancreas: The pancreas is grossly  unremarkable.  GI systems and mesentery: There is a percutaneous  gastrostomy tube the terminates in the stomach.  There is  significant distal gastric wall thickening with adjacent  inflammation.  No evidence of bowel obstruction.  The appendix  is not visualized but there are no secondary signs of  appendicitis.    No significant mesenteric inflammation.  Lymph  nodes: No definite pathologically enlarged abdominal or pelvic  lymph nodes present.  Vessels: The aorta and abdominal arteries  are  grossly patent.  The IVC and portal vein are patent and  grossly unremarkable.  Peritoneum: No free intraperitoneal fluid  or pneumoperitoneum.  Pelvic viscera: No acute findings.  Body  wall: No body wall contusion. No significant body wall hernias.  Bones: No acute fracture.      Impression:      Findings are compatible with gastritis.    Authenticated and Electronically Signed by Maximus Calles MD on  02/23/2023 09:58:13 PM          Echo:    Results for orders placed during the hospital encounter of 02/23/23    Adult Transthoracic Echo Limited W/ Cont if Necessary Per Protocol    Interpretation Summary  •  Left ventricular wall thickness is consistent with mild concentric hypertrophy.  •  Left ventricular diastolic function is consistent with age.  •  Estimated right ventricular systolic pressure from tricuspid regurgitation is markedly elevated (>55 mmHg).  •  Moderate to severe pulmonary hypertension is present.    Condition on Discharge:      Stable.    Code status during the hospital stay:    Code Status and Medical Interventions:   Ordered at: 02/24/23 1811     Code Status (Patient has no pulse and is not breathing):    CPR (Attempt to Resuscitate)     Medical Interventions (Patient has pulse or is breathing):    Full Support     Release to patient:    Routine Release     Discharge Disposition:    Home-Health Care [facility]    Discharge Medications:       Discharge Medications      New Medications      Instructions Start Date   amLODIPine 10 MG tablet  Commonly known as: NORVASC   10 mg, Oral, Every 24 Hours Scheduled   Start Date: March 2, 2023     atorvastatin 40 MG tablet  Commonly known as: LIPITOR   40 mg, Oral, Nightly      ciprofloxacin 500 MG tablet  Commonly known as: Cipro   500 mg, Oral, 2 Times Daily      isosorbide dinitrate 20 MG tablet  Commonly known as: ISORDIL   40 mg, Oral, 3 Times Daily (Nitrates)      magic mouthwash oral suspension   Swish and spit two teaspoons (10 mL) by mouth Every 6 (Six)  Hours for 10 days.      metoclopramide 5 MG tablet  Commonly known as: REGLAN   5 mg, Oral, 3 Times Daily      nitroglycerin 0.4 MG SL tablet  Commonly known as: NITROSTAT   Place 1 tablet under the tongue Every 5 (Five) Minutes As Needed for Chest Pain. Take no more than 3 doses in 15 minutes.      nystatin 100,000 unit/mL suspension  Commonly known as: MYCOSTATIN   Swish and spit one teaspoon (5 mL) by mouth 4 (Four) Times a Day      nystatin 576054 UNIT/GM powder  Commonly known as: MYCOSTATIN   Topical, 3 Times Daily      omeprazole 40 MG capsule  Commonly known as: priLOSEC   40 mg, Oral, Daily      spironolactone 25 MG tablet  Commonly known as: ALDACTONE   25 mg, Oral, Daily   Start Date: March 2, 2023     sucralfate 1 g tablet  Commonly known as: CARAFATE   Take 1 tablet by mouth 4 (Four) Times a Day Before Meals & at Bedtime         Changes to Medications      Instructions Start Date   hydrALAZINE 50 MG tablet  Commonly known as: APRESOLINE  What changed:   · medication strength  · how much to take  · when to take this   100 mg, Oral, Every 8 Hours Scheduled      HYDROcodone-acetaminophen 7.5-325 MG per tablet  Commonly known as: NORCO  What changed: Another medication with the same name was removed. Continue taking this medication, and follow the directions you see here.   Take 1 tablet by mouth Every 6 (Six) Hours As Needed for Moderate Pain      metoprolol tartrate 100 MG tablet  Commonly known as: LOPRESSOR  What changed:   · medication strength  · how much to take  · when to take this   100 mg, Oral, Every 12 Hours Scheduled         Continue These Medications      Instructions Start Date   allopurinol 100 MG tablet  Commonly known as: ZYLOPRIM   100 mg, Oral, Daily      fluconazole 100 MG tablet  Commonly known as: DIFLUCAN   100 mg, Oral, Daily      furosemide 40 MG tablet  Commonly known as: LASIX   40 mg, Oral, Daily, swelling      lactobacillus acidophilus capsule capsule   Take 1 capsule by mouth  twice daily. Take with Breakfast & Dinner.      Lidocaine Viscous HCl 2 % solution  Commonly known as: XYLOCAINE   Take 15 mL by mouth As Needed for Moderate Pain for up to 10 days. Do not use more than every 3 hours      lidocaine-prilocaine 2.5-2.5 % cream  Commonly known as: EMLA   1 application, Topical, As Needed      losartan 100 MG tablet  Commonly known as: COZAAR   100 mg, Oral, Daily      mirtazapine 15 MG tablet  Commonly known as: REMERON   15 mg, Oral, Nightly      mometasone 0.1 % cream  Commonly known as: ELOCON   mometasone 0.1 % topical cream   APPLY A THIN LAYER TO THE AFFECTED AREA(S) BY TOPICAL ROUTE ONCE DAILY after radiation treatment      ondansetron 8 MG tablet  Commonly known as: Zofran   8 mg, Oral, Every 8 Hours PRN      senna 8.6 MG tablet  Commonly known as: SENOKOT   1 tablet, Oral, Daily         Stop These Medications    cholestyramine light 4 g packet  Commonly known as: Prevalite     dronabinol 2.5 MG capsule  Commonly known as: Marinol     oxyCODONE 5 MG/5ML solution  Commonly known as: ROXICODONE     PHARMACY MEDS TO BED CONSULT          Discharge Diet:   On tube feeding    Activity at Discharge:   As tolerated    Follow-up Appointments:    Additional Instructions for the Follow-ups that You Need to Schedule     Ambulatory Referral to Cardiac Rehab   As directed      Ambulatory Referral to Home Health ([facility])   As directed      Face to Face Visit Date: 2/27/2023    Follow-up provider for Plan of Care?: I treated the patient in an acute care facility and will not continue treatment after discharge.    Follow-up provider: ZOË DOE [[id number]]    Reason/Clinical Findings: debility    Describe mobility limitations that make leaving home difficult: debility    Nursing/Therapeutic Services Requested: Skilled Nursing Physical Therapy Occupational Therapy    Skilled nursing orders: Enteral therapy    PT orders: Therapeutic exercise Gait Training Strengthening    Weight Bearing Status:  As Tolerated    Occupational orders: Activities of daily living Energy conservation Strengthening Cognition Home safety assessment Fine motor         Ambulatory Referral to Home Health (Hospital)   As directed      Face to Face Visit Date: 3/1/2023    Follow-up provider for Plan of Care?: I treated the patient in an acute care facility and will not continue treatment after discharge.    Follow-up provider: BOSSMAN PEDRAZA [[id number]]    Reason/Clinical Findings: PEG tube    Describe mobility limitations that make leaving home difficult: Breakthrough    Nursing/Therapeutic Services Requested: Dietician Skilled Nursing    Skilled nursing orders: Medication education Cardiopulmonary assessments    Dietician orders: Diet instruction Diet modification    Frequency: 1 Week 1            Contact information for follow-up providers     Roderick Hamm IV, MD Follow up in 1 week(s).    Specialties: Interventional Cardiology, Cardiology  Contact information:  [address]  [address]  [facility] [address]  [phone]             Bosmsan Pedraza MD Follow up in 3 day(s).    Specialty: Internal Medicine  Contact information:  [address]  [address]  [facility] [address]  [phone]             [facility]ARD[facility]AC REH[facility] .    Specialty: Cardiac Rehabilitation  Contact information:  [address]  [address] [facility] [id number] [phone]                 Contact information for after-discharge care     Dialysis/Infusion     [facility] HOME INFUSION .    Service: Infusion and IV Therapy  Contact information:  [address]  [facility] [address]  [phone]                 Home Medical Care     [facility] .    Service: Home Health Services  Contact information:  [address]  [facility] [id number] [phone]                           Future Appointments   Date Time Provider Department Center   3/8/2023   9:15 AM Jennifer Lora MD [facility]   3/16/2023  9:00 AM CHAIR 7 - [facility] OP INF [facility]   3/17/2023  9:00 AM Roderick Hamm IV, MD [facility]   3/23/2023  9:30 AM CHAIR 3 - [facility]F [facility]   4/19/2023  9:00 AM  [facility] NUTR WALK IN SCHEDULE [facility]   7/27/2023 10:00 AM Jeannette Yates APRN E N [facility]     Test Results Pending at Discharge:    Pending Labs     Order Current Status    H. Pylori Antigen, Stool - Stool, Per Rectum In process    Blood Culture - Blood, Arm, Right Preliminary result    Blood Culture - Blood, Hand, Right Preliminary result             Jesenia Lowery MD  03/01/23  14:24 EST    Time: I spent 39 minutes on this discharge activity which included: face-to-face encounter with the patient, reviewing the data in the system, coordination of the care with the nursing staff as well as consultants, documentation, and entering orders.     Dictated utilizing Dragon dictation.      Electronically signed by Jesenia Lowery MD at 03/01/23 5271

## 2023-03-02 NOTE — OUTREACH NOTE
Call Center TCM Note    Flowsheet Row Responses   [facility] patient discharged from? [facility]   Does the patient have one of the following disease processes/diagnoses(primary or secondary)? Acute MI (STEMI,NSTEMI)   TCM attempt successful? Yes   Call start time 1518   Call end time 1519   Discharge diagnosis NSTEMI non-ST elevated myocardial infarction   Is patient permission given to speak with other caregiver? Yes   List who call center can speak with Meliza daughter    Person spoke with today (if not patient) and relationship Meliza daughter    Comments Hosp dc fu apt on 3/3/23 with PCP    Does the patient have an appointment with their PCP within 7 days of discharge? Yes   What is the patient's perception of their health status since discharge? New symptoms unrelated to diagnosis  [pt was currently in the ER with her daughter per Dr. Pedraza's advice d/t c/o coffee ground like stool ]   TCM call completed? Yes   Wrap up additional comments Daughter able to answer above questions to the best of her ability.  Daughter states pt has had coffee ground stools-daughter spoke with patient's PCP -PCP referred pt to ER-daughter and pt currently in the ER, waiting to be seen    Call end time 1519          Haylee Espinosa, RN    3/2/2023, 15:22 EST

## 2023-03-03 PROBLEM — R19.8 ABNORMAL BOWEL MOVEMENT: Status: ACTIVE | Noted: 2023-03-02

## 2023-03-03 LAB
ABO GROUP BLD: NORMAL
ANION GAP SERPL CALCULATED.3IONS-SCNC: 15 MMOL/L (ref 5–15)
BASOPHILS # BLD AUTO: 0.02 10*3/MM3 (ref 0–0.2)
BASOPHILS NFR BLD AUTO: 0.4 % (ref 0–1.5)
BUN SERPL-MCNC: 11 MG/DL (ref 8–23)
BUN/CREAT SERPL: 14.1 (ref 7–25)
CALCIUM SPEC-SCNC: 8.3 MG/DL (ref 8.6–10.5)
CHLORIDE SERPL-SCNC: 103 MMOL/L (ref 98–107)
CLUMPED PLATELETS: PRESENT
CO2 SERPL-SCNC: 20 MMOL/L (ref 22–29)
CREAT SERPL-MCNC: 0.78 MG/DL (ref 0.57–1)
DEPRECATED RDW RBC AUTO: 54.6 FL (ref 37–54)
EGFRCR SERPLBLD CKD-EPI 2021: 77.4 ML/MIN/1.73
EOSINOPHIL # BLD AUTO: 0.21 10*3/MM3 (ref 0–0.4)
EOSINOPHIL NFR BLD AUTO: 3.9 % (ref 0.3–6.2)
ERYTHROCYTE [DISTWIDTH] IN BLOOD BY AUTOMATED COUNT: 16.5 % (ref 12.3–15.4)
GLUCOSE SERPL-MCNC: 78 MG/DL (ref 65–99)
HCT VFR BLD AUTO: 27.5 % (ref 34–46.6)
HCT VFR BLD AUTO: 29.8 % (ref 34–46.6)
HGB BLD-MCNC: 9 G/DL (ref 12–15.9)
HGB BLD-MCNC: 9.4 G/DL (ref 12–15.9)
IMM GRANULOCYTES # BLD AUTO: 0.14 10*3/MM3 (ref 0–0.05)
IMM GRANULOCYTES NFR BLD AUTO: 2.6 % (ref 0–0.5)
LYMPHOCYTES # BLD AUTO: 0.93 10*3/MM3 (ref 0.7–3.1)
LYMPHOCYTES NFR BLD AUTO: 17.5 % (ref 19.6–45.3)
MCH RBC QN AUTO: 29.5 PG (ref 26.6–33)
MCHC RBC AUTO-ENTMCNC: 32.7 G/DL (ref 31.5–35.7)
MCV RBC AUTO: 90.2 FL (ref 79–97)
MONOCYTES # BLD AUTO: 0.36 10*3/MM3 (ref 0.1–0.9)
MONOCYTES NFR BLD AUTO: 6.8 % (ref 5–12)
NEUTROPHILS NFR BLD AUTO: 3.66 10*3/MM3 (ref 1.7–7)
NEUTROPHILS NFR BLD AUTO: 68.8 % (ref 42.7–76)
NRBC BLD AUTO-RTO: 0 /100 WBC (ref 0–0.2)
PLATELET # BLD AUTO: 164 10*3/MM3 (ref 140–450)
PMV BLD AUTO: 11.6 FL (ref 6–12)
POTASSIUM SERPL-SCNC: 3.7 MMOL/L (ref 3.5–5.2)
RBC # BLD AUTO: 3.05 10*6/MM3 (ref 3.77–5.28)
RBC MORPH BLD: NORMAL
RH BLD: POSITIVE
SODIUM SERPL-SCNC: 138 MMOL/L (ref 136–145)
WBC MORPH BLD: NORMAL
WBC NRBC COR # BLD: 5.32 10*3/MM3 (ref 3.4–10.8)

## 2023-03-03 PROCEDURE — 96365 THER/PROPH/DIAG IV INF INIT: CPT

## 2023-03-03 PROCEDURE — 99223 1ST HOSP IP/OBS HIGH 75: CPT | Performed by: INTERNAL MEDICINE

## 2023-03-03 PROCEDURE — 96376 TX/PRO/DX INJ SAME DRUG ADON: CPT

## 2023-03-03 PROCEDURE — 86901 BLOOD TYPING SEROLOGIC RH(D): CPT

## 2023-03-03 PROCEDURE — G0378 HOSPITAL OBSERVATION PER HR: HCPCS

## 2023-03-03 PROCEDURE — 25010000002 PIPERACILLIN SOD-TAZOBACTAM PER 1 G: Performed by: NURSE PRACTITIONER

## 2023-03-03 PROCEDURE — 87040 BLOOD CULTURE FOR BACTERIA: CPT | Performed by: NURSE PRACTITIONER

## 2023-03-03 PROCEDURE — 99233 SBSQ HOSP IP/OBS HIGH 50: CPT | Performed by: INTERNAL MEDICINE

## 2023-03-03 PROCEDURE — 96367 TX/PROPH/DG ADDL SEQ IV INF: CPT

## 2023-03-03 PROCEDURE — 85014 HEMATOCRIT: CPT | Performed by: NURSE PRACTITIONER

## 2023-03-03 PROCEDURE — 85007 BL SMEAR W/DIFF WBC COUNT: CPT | Performed by: NURSE PRACTITIONER

## 2023-03-03 PROCEDURE — 86900 BLOOD TYPING SEROLOGIC ABO: CPT

## 2023-03-03 PROCEDURE — 25010000002 VANCOMYCIN 10 G RECONSTITUTED SOLUTION: Performed by: NURSE PRACTITIONER

## 2023-03-03 PROCEDURE — 85025 COMPLETE CBC W/AUTO DIFF WBC: CPT | Performed by: NURSE PRACTITIONER

## 2023-03-03 PROCEDURE — 85018 HEMOGLOBIN: CPT | Performed by: NURSE PRACTITIONER

## 2023-03-03 PROCEDURE — 80048 BASIC METABOLIC PNL TOTAL CA: CPT | Performed by: NURSE PRACTITIONER

## 2023-03-03 PROCEDURE — 25010000002 CEFTRIAXONE PER 250 MG: Performed by: INTERNAL MEDICINE

## 2023-03-03 RX ORDER — METRONIDAZOLE 500 MG/100ML
500 INJECTION, SOLUTION INTRAVENOUS EVERY 8 HOURS SCHEDULED
Status: COMPLETED | OUTPATIENT
Start: 2023-03-03 | End: 2023-03-07

## 2023-03-03 RX ORDER — DEXTROSE AND SODIUM CHLORIDE 5; .9 G/100ML; G/100ML
75 INJECTION, SOLUTION INTRAVENOUS CONTINUOUS
Status: DISCONTINUED | OUTPATIENT
Start: 2023-03-03 | End: 2023-03-04

## 2023-03-03 RX ORDER — HYDROCODONE BITARTRATE AND ACETAMINOPHEN 7.5; 325 MG/1; MG/1
1 TABLET ORAL EVERY 8 HOURS
Status: DISCONTINUED | OUTPATIENT
Start: 2023-03-03 | End: 2023-03-06

## 2023-03-03 RX ORDER — VANCOMYCIN HYDROCHLORIDE 125 MG/1
125 CAPSULE ORAL EVERY 6 HOURS SCHEDULED
Status: DISCONTINUED | OUTPATIENT
Start: 2023-03-03 | End: 2023-03-05

## 2023-03-03 RX ADMIN — METOPROLOL TARTRATE 100 MG: 100 TABLET, FILM COATED ORAL at 00:01

## 2023-03-03 RX ADMIN — Medication 1 CAPSULE: at 08:43

## 2023-03-03 RX ADMIN — DIPHENHYDRAMINE HYDROCHLORIDE AND LIDOCAINE HYDROCHLORIDE AND ALUMINUM HYDROXIDE AND MAGNESIUM HYDRO 10 ML: KIT at 05:11

## 2023-03-03 RX ADMIN — VANCOMYCIN HYDROCHLORIDE 1500 MG: 10 INJECTION, POWDER, LYOPHILIZED, FOR SOLUTION INTRAVENOUS at 00:32

## 2023-03-03 RX ADMIN — CEFTRIAXONE 2 G: 2 INJECTION, POWDER, FOR SOLUTION INTRAMUSCULAR; INTRAVENOUS at 14:56

## 2023-03-03 RX ADMIN — SUCRALFATE 1 G: 1 TABLET ORAL at 08:43

## 2023-03-03 RX ADMIN — ISOSORBIDE DINITRATE 40 MG: 20 TABLET ORAL at 08:43

## 2023-03-03 RX ADMIN — ATORVASTATIN CALCIUM 40 MG: 40 TABLET, FILM COATED ORAL at 00:01

## 2023-03-03 RX ADMIN — NYSTATIN 500000 UNITS: 100000 SUSPENSION ORAL at 08:42

## 2023-03-03 RX ADMIN — VANCOMYCIN HYDROCHLORIDE 125 MG: 125 CAPSULE ORAL at 13:18

## 2023-03-03 RX ADMIN — METRONIDAZOLE 500 MG: 5 INJECTION, SOLUTION INTRAVENOUS at 23:49

## 2023-03-03 RX ADMIN — HYDRALAZINE HYDROCHLORIDE 100 MG: 50 TABLET, FILM COATED ORAL at 05:10

## 2023-03-03 RX ADMIN — MINERAL OIL: 1000 LIQUID ORAL at 23:54

## 2023-03-03 RX ADMIN — SUCRALFATE 1 G: 1 TABLET ORAL at 13:17

## 2023-03-03 RX ADMIN — VANCOMYCIN HYDROCHLORIDE 125 MG: KIT at 01:29

## 2023-03-03 RX ADMIN — LIDOCAINE HYDROCHLORIDE 10 ML: 20 SOLUTION ORAL; TOPICAL at 00:17

## 2023-03-03 RX ADMIN — ISOSORBIDE DINITRATE 40 MG: 20 TABLET ORAL at 18:21

## 2023-03-03 RX ADMIN — MIRTAZAPINE 15 MG: 15 TABLET, FILM COATED ORAL at 00:01

## 2023-03-03 RX ADMIN — SUCRALFATE 1 G: 1 TABLET ORAL at 00:01

## 2023-03-03 RX ADMIN — TAZOBACTAM SODIUM AND PIPERACILLIN SODIUM 3.38 G: 375; 3 INJECTION, SOLUTION INTRAVENOUS at 05:10

## 2023-03-03 RX ADMIN — ALLOPURINOL 100 MG: 100 TABLET ORAL at 08:43

## 2023-03-03 RX ADMIN — METRONIDAZOLE 500 MG: 5 INJECTION, SOLUTION INTRAVENOUS at 14:57

## 2023-03-03 RX ADMIN — NYSTATIN: 100000 POWDER TOPICAL at 20:46

## 2023-03-03 RX ADMIN — TAZOBACTAM SODIUM AND PIPERACILLIN SODIUM 3.38 G: 375; 3 INJECTION, SOLUTION INTRAVENOUS at 00:32

## 2023-03-03 RX ADMIN — NYSTATIN: 100000 POWDER TOPICAL at 08:43

## 2023-03-03 RX ADMIN — DIPHENHYDRAMINE HYDROCHLORIDE AND LIDOCAINE HYDROCHLORIDE AND ALUMINUM HYDROXIDE AND MAGNESIUM HYDRO 10 ML: KIT at 18:23

## 2023-03-03 RX ADMIN — NYSTATIN 500000 UNITS: 100000 SUSPENSION ORAL at 13:18

## 2023-03-03 RX ADMIN — HYDROCODONE BITARTRATE AND ACETAMINOPHEN 1 TABLET: 7.5; 325 TABLET ORAL at 14:55

## 2023-03-03 RX ADMIN — METOPROLOL TARTRATE 100 MG: 100 TABLET, FILM COATED ORAL at 08:42

## 2023-03-03 RX ADMIN — HYDRALAZINE HYDROCHLORIDE 100 MG: 50 TABLET, FILM COATED ORAL at 13:17

## 2023-03-03 RX ADMIN — PANTOPRAZOLE SODIUM 40 MG: 40 INJECTION, POWDER, FOR SOLUTION INTRAVENOUS at 20:45

## 2023-03-03 RX ADMIN — HYDROCODONE BITARTRATE AND ACETAMINOPHEN 1 TABLET: 7.5; 325 TABLET ORAL at 11:12

## 2023-03-03 RX ADMIN — NYSTATIN 1 APPLICATION: 100000 POWDER TOPICAL at 16:29

## 2023-03-03 RX ADMIN — VANCOMYCIN HYDROCHLORIDE 125 MG: KIT at 05:10

## 2023-03-03 RX ADMIN — ISOSORBIDE DINITRATE 40 MG: 20 TABLET ORAL at 13:17

## 2023-03-03 RX ADMIN — AMLODIPINE BESYLATE 10 MG: 10 TABLET ORAL at 08:43

## 2023-03-03 RX ADMIN — NYSTATIN 500000 UNITS: 100000 SUSPENSION ORAL at 20:45

## 2023-03-03 RX ADMIN — VANCOMYCIN HYDROCHLORIDE 125 MG: 125 CAPSULE ORAL at 18:22

## 2023-03-03 RX ADMIN — NYSTATIN 500000 UNITS: 100000 SUSPENSION ORAL at 18:22

## 2023-03-03 RX ADMIN — DEXTROSE AND SODIUM CHLORIDE 75 ML/HR: 5; 900 INJECTION, SOLUTION INTRAVENOUS at 20:45

## 2023-03-03 RX ADMIN — LOSARTAN POTASSIUM 100 MG: 50 TABLET, FILM COATED ORAL at 08:42

## 2023-03-03 RX ADMIN — SUCRALFATE 1 G: 1 TABLET ORAL at 18:21

## 2023-03-03 RX ADMIN — DIPHENHYDRAMINE HYDROCHLORIDE AND LIDOCAINE HYDROCHLORIDE AND ALUMINUM HYDROXIDE AND MAGNESIUM HYDRO 10 ML: KIT at 13:18

## 2023-03-03 RX ADMIN — Medication 1 CAPSULE: at 18:21

## 2023-03-03 RX ADMIN — NYSTATIN 500000 UNITS: 100000 SUSPENSION ORAL at 00:01

## 2023-03-03 NOTE — CONSULTS
Clinical Nutrition     Nutrition Support Assessment  Reason for Visit: EN      Patient Name: Vesta Landry  YOB: 1945  MRN: [id number]  Date of Encounter: 03/03/23 09:59 EST  Admission date: 3/2/2023    Comments:    Once PEG ok for use/ok to start EN, RD recommends the following regimen:  Isosource HN @ 30ml/hr advance by 15ml q 6 as tolerated to goal @ 65ml/hr. Water flush @ 25ml/hr.   =1430ml, 1716 kcals (100% est needs), 77g pro (104% est needs), 0g fiber, 1158 ml free water (+550ml water flush), 1708ml TFW.      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    PSH:  She  has a past surgical history that includes Colonoscopy; Appendectomy; Cataract extraction; Cholecystectomy; Tubal ligation; Hysterectomy (1991); Oophorectomy (Bilateral, 1991); Portacath placement (Right, 02/08/2023); and Esophagogastroduodenoscopy w/ PEG (N/A, 2/20/2023).      Applicable Nutrition Concerns:   Skin:  Oral:  GI:      Applicable Interval History:   +PEG (2/20/23)      Reported/Observed/Food/Nutrition Related History:     RD spoke w/pt, dtr at bedside who assisted w/nutrition hx: Pt w/minimal po intakes in Jan 2023, had PEG placed and on Nutren 2.0 bolus feeds at home. Dtr states pt was not  "tolerating formula as indicated by abdominal pain, bloating, nausea. Dtr uncertain if diarrhea from formula vs c.diff she had in Jan. Dtr also concerned pt not meeting EEN 2/2 recent complications w/nutrition support. Dtr reports pt has had wt loss since Dec 2022 and WIV=296nit. ? accuracy of bed scale wt, RD requested reweigh from ns as able. GI and colorectal sx consults pending prior to resuming EN regimen. RN aware of recs in chart.       Labs    Labs Reviewed: Yes     Results from last 7 days   Lab Units 03/03/23  0329 03/02/23  1601 03/01/23  0522 02/28/23  0628 02/25/23  1900 02/24/23  1725   GLUCOSE mg/dL 78 112*  --  97   < > 139*   BUN mg/dL 11 15  --  16   < > 26*   CREATININE mg/dL 0.78 0.77  --  0.90   < > 1.01*   SODIUM mmol/L 138 136  --  142   < > 139   CHLORIDE mmol/L 103 101  --  105   < > 105   POTASSIUM mmol/L 3.7 3.6  --  3.3*   < > 3.7   MAGNESIUM mg/dL  --   --  2.4 1.6  --  1.6   ALT (SGPT) U/L  --  26  --   --   --   --     < > = values in this interval not displayed.       Results from last 7 days   Lab Units 03/02/23  1601   ALBUMIN g/dL 3.2*           Lab Results   Lab Value Date/Time    HGBA1C 5.30 01/20/2023 0643               Medications    Medications Reviewed: Yes  Pertinent: Probiotic, Remeron, Protonix, Carafate,   Infusion:  PRN:       Intake/Ouptut 24 hrs (0701 - 0700)   I&O's Reviewed: Yes   +BM 3/3    Anthropometrics     Flowsheet Rows    Flowsheet Row First Filed Value   Admission Height 162.6 cm (64\") Documented at 03/02/2023 1524   Admission Weight 65.8 kg (145 lb) Documented at 03/02/2023 1524            Height: Height: 162.6 cm (64\")  Last Filed Weight: Weight: 68.4 kg (150 lb 12.8 oz) (03/03/23 0520)  Method: Weight Method: Bed scale  BMI: BMI (Calculated): 25.9  BMI classification: Overweight: 25.0-29.9kg/m2   IBW:  120lbs    UBW: ~156lbs per dtr report  Weight change: Pt 147lbs on 3/1/23 and 143lbs on 2/22/23-requested reweigh from [facility].  154lbs (12/2022)  158lbs " "(7/2022)    Nutrition Focused Physical Exam     Date: 3/3    Unable to perform exam due to: Pt unable to participate at time of visit    Needs Assessment   Date: 3/3    Height used:Height: 162.6 cm (64\")  Weights used: 68kg      Estimated Calorie needs: ~ 1710  Kcal/day  Method:  Kcals/KG (25kcal/kg)  Method:  MSJ x 1.2 =1380kcals    Estimated Protein needs: ~ 75 g PRO/day  Method: 1-1.2 g/Kg (68-82g)    Estimated Fluid needs: ~1700 ml/day   Per clinical status    Current Nutrition Prescription     PO: NPO Diet NPO Type: Strict NPO  Oral Nutrition Supplement:   Intake: Insufficient data    Nutrition Diagnosis   Date: 3/3 Updated:   Problem Inadequate oral intake   Etiology Minimal po intake 2/2 neurological decline per MD note   Signs/Symptoms +PEG   Status:     Date:  3/3 Updated:  Problem Unintended weight loss   Etiology Minimal po intakes 2/2 tonsillar ca   Signs/Symptoms Possible 9lb/6% wt loss x 3 months-pending verified wt   Status:    Goal:   General: Nutrition support treatment  PO: N/A  EN/PN: Initiate EN    Nutrition Intervention      Follow treatment progress, Care plan reviewed    Once PEG ok for use/ok to start EN pending GI/Colorectal sx consults, RD recommends the following regimen:  Isosource HN @ 30ml/hr advance by 15ml q 6 as tolerated to goal @ 65ml/hr. Water flush @ 25ml/hr.   =1430ml, 1716 kcals (100% est needs), 77g pro (104% est needs), 0g fiber, 1158 ml free water (+550ml water flush), 1708ml TFW.    Monitoring/Evaluation:   Per protocol, I&O, Pertinent labs, EN delivery/tolerance, Weight, Symptoms, POC/GOC      Mar Biggs RD  Time Spent: 45  "

## 2023-03-03 NOTE — OUTREACH NOTE
AMI Week 2 Survey    Flowsheet Row Responses   [facility]   Does the patient have one of the following disease processes/diagnoses(primary or secondary)? Acute MI (STEMI,NSTEMI)   Week 2 attempt successful? No   Unsuccessful attempts Attempt 1   Revoke Readmitted          ALTHEA TRINIDAD - Registered Nurse

## 2023-03-03 NOTE — CONSULTS
Bossman Pedraza MD  Consulting physician: Kaylynn    Chief Complaint   Patient presents with   • Diarrhea       Reason for consult: [facility]    HPI: Patient is a 77-year-old female with a multitude of medical issues including head and neck cancer, GI bleed, pelvic abscess, dementia.  Patient's daughter states that since November 2022 the patient has been slowly declining, however over the last week to 10 days the patient has had a dramatic decline in respect to both her functional and mental status.  Patient has been having increasing confusion as well as requiring more assistance with ADLs from her children.  Patient is also had a PEG tube placed for tube feeds.  This was done about 11 days or so ago.  Interspaced the symptoms the family does state the patient will complain of some mouth pain however when I did asked the patient she denied any symptoms.  Family states that the patient will tend to deny pain when asked by other people.  Family does also report the patient did have some mouth wash at a outside facility that seem to help her pain in the past.    Pain assesment: See above    Dyspnea: Denies    N/V: Denies    PPS: 20      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   • Deep vein thrombosis (HCC)     bilateral- daughter states on 2/17/23 that she doesn't remember patient having this   • Dementia (HCC)    • Dysphagia    • GERD (gastroesophageal reflux disease)    • Hyperlipidemia    • Hypertension    • Impaired mobility    • PONV (postoperative nausea and vomiting)    • SOB (shortness of breath)     following chemo infusion on day of infusion last week (week of 1/29-2/4/23) per pt's daughter   • Vitamin D deficiency    • Wears dentures     uppers - instructed not to use adhesive DOS     Past Surgical History:   Procedure Laterality Date   • APPENDECTOMY     • CATARACT EXTRACTION     •  CHOLECYSTECTOMY     • COLONOSCOPY      Approx 2009   • ENDOSCOPY W/ PEG TUBE PLACEMENT N/A 2/20/2023    Procedure: ESOPHAGOGASTRODUODENOSCOPY WITH PERCUTANEOUS ENDOSCOPIC GASTROSTOMY TUBE INSERTION;  Surgeon: Brigido Guerra MD;  Location: [facility] ENDOSCOPY;  Service: Gastroenterology;  Laterality: N/A;   • HYSTERECTOMY  1991   • OOPHORECTOMY Bilateral 1991   • PORTACATH PLACEMENT Right 02/08/2023    Procedure: INSERTION OF PORTACATH;  Surgeon: Brigido Guerra MD;  Location: [facility] OR;  Service: General;  Laterality: Right;   • TUBAL ABDOMINAL LIGATION       Current Code Status     Date Active Code Status Order ID Comments User Context       3/2/2023 2245 CPR (Attempt to Resuscitate) [id number]  Donald, Jeanne, APRN ED      Question Answer    Code Status (Patient has no pulse and is not breathing) CPR (Attempt to Resuscitate)    Medical Interventions (Patient has pulse or is breathing) Full Support    Level Of Support Discussed With Health Care Surrogate              Current Facility-Administered Medications   Medication Dose Route Frequency Provider Last Rate Last Admin   • acetaminophen (TYLENOL) tablet 650 mg  650 mg Oral Q4H PRN Donald, Jeanne, APRN        Or   • acetaminophen (TYLENOL) 160 MG/5ML solution 650 mg  650 mg Oral Q4H PRN Donald, Jeanne, APRN        Or   • acetaminophen (TYLENOL) suppository 650 mg  650 mg Rectal Q4H PRN Donald, Jeanne, APRN       • allopurinol (ZYLOPRIM) tablet 100 mg  100 mg Per G Tube Daily Donald, Jeanne, APRN   100 mg at 03/03/23 0843   • amLODIPine (NORVASC) tablet 10 mg  10 mg Per G Tube Q24H Donald, Jaenne, APRN   10 mg at 03/03/23 0843   • atorvastatin (LIPITOR) tablet 40 mg  40 mg Per G Tube Nightly Donald, Jeanne, APRN   40 mg at 03/03/23 0001   • Calcium Replacement - Initiate Nurse / BPA Driven Protocol   Does not apply PRN Donald, Jeanne, APRN       • cefTRIAXone (ROCEPHIN) 2 g/100 mL 0.9% NS IVPB (MBP)  2 g Intravenous Q24H Daren Palacios MD        • First Mouthwash (Magic Mouthwash) 10 mL  10 mL Swish & Spit Q6H Donald, Jeanne, APRN   10 mL at 03/03/23 1318   • hydrALAZINE (APRESOLINE) tablet 100 mg  100 mg Per G Tube Q8H Donald, Jeanne, APRN   100 mg at 03/03/23 1317   • HYDROcodone-acetaminophen (NORCO) 7.5-325 MG per tablet 1 tablet  1 tablet Per G Tube Q6H PRN Donald, Jeanne, APRN   1 tablet at 03/03/23 1112   • HYDROcodone-acetaminophen (NORCO) 7.5-325 MG per tablet 1 tablet  1 tablet Oral Q8H Jaylan Harrington, DO       • isosorbide dinitrate (ISORDIL) tablet 40 mg  40 mg Per G Tube TID - Nitrates Donald, Jeanne, APRN   40 mg at 03/03/23 1317   • lactobacillus acidophilus (RISAQUAD) capsule 1 capsule  1 capsule Per G Tube Daily With Breakfast & Dinner Donald, Jeanne, APRN   1 capsule at 03/03/23 0843   • Lidocaine Viscous HCl (XYLOCAINE) 2 % solution 10 mL  10 mL Mouth/Throat Q3H PRN Donald, Jeanne, APRN   10 mL at 03/03/23 0017   • losartan (COZAAR) tablet 100 mg  100 mg Per G Tube Daily Donald, Jeanne, APRN   100 mg at 03/03/23 0842   • Magnesium Standard Dose Replacement - Initiate Nurse / BPA Driven Protocol   Does not apply PRN Donald, Jeanne, APRN       • metoprolol tartrate (LOPRESSOR) tablet 100 mg  100 mg Per G Tube Q12H Donald, Jeanne, APRN   100 mg at 03/03/23 0842   • metroNIDAZOLE (FLAGYL) IVPB 500 mg  500 mg Intravenous Q8H Daren Palacios MD       • mirtazapine (REMERON) tablet 15 mg  15 mg Per G Tube Nightly Donald, Jeanne, APRN   15 mg at 03/03/23 0001   • nystatin (MYCOSTATIN) 100,000 unit/mL suspension 500,000 Units  5 mL Swish & Spit 4x Daily Donald, Jeanne, APRN   500,000 Units at 03/03/23 1318   • nystatin (MYCOSTATIN) powder   Topical TID Jeanne Bennett APRN   Given at 03/03/23 0843   • palliative care oral rinse   Mouth/Throat PRN Arpit Chaidez DO       • pantoprazole (PROTONIX) injection 40 mg  40 mg Intravenous Q12H [place],        • Pharmacy Consult   Does not apply Continuous  PRN Donald, Jeanne, APRN       • Phosphorus Replacement - Initiate Nurse / BPA Driven Protocol   Does not apply PRN Donald, Jeanne, APRN       • Potassium Replacement - Initiate Nurse / BPA Driven Protocol   Does not apply PRN Donald, Jeanne, APRN       • Sodium Chloride (PF) 0.9 % 10 mL  10 mL Intravenous PRN Emergency, Triage Protocol, MD       • sucralfate (CARAFATE) tablet 1 g  1 g Per G Tube 4x Daily Donald, Jeanne, APRN   1 g at 23 1317   • vancomycin (VANCOCIN) capsule 125 mg  125 mg Oral Q6H Daren Palacios MD   125 mg at 23 1318     Pharmacy Consult,       •  acetaminophen **OR** acetaminophen **OR** acetaminophen  •  Calcium Replacement - Initiate Nurse / BPA Driven Protocol  •  HYDROcodone-acetaminophen  •  Lidocaine Viscous HCl  •  Magnesium Standard Dose Replacement - Initiate Nurse / BPA Driven Protocol  •  palliative care oral rinse  •  Pharmacy Consult  •  Phosphorus Replacement - Initiate Nurse / BPA Driven Protocol  •  Potassium Replacement - Initiate Nurse / BPA Driven Protocol  •  Sodium Chloride (PF)  Allergies   Allergen Reactions   • Trazodone Confusion     Family History   Problem Relation Age of Onset   • Heart failure Mother    • Hyperlipidemia Mother    • Hypertension Mother    • Cancer Father    • Cancer Sister    • Stroke Sister    • Breast cancer Maternal Aunt    • Breast cancer Paternal Aunt    • Ovarian cancer Neg Hx      Social History     Socioeconomic History   • Marital status:    Tobacco Use   • Smoking status: Former     Packs/day: 0.15     Years: 10.00     Pack years: 1.50     Types: Cigarettes     Quit date: 1987     Years since quittin.1   • Smokeless tobacco: Never   Vaping Use   • Vaping Use: Never used   Substance and Sexual Activity   • Alcohol use: No   • Drug use: No   • Sexual activity: Defer     Review of Systems -all others reviewed and found negative except as mentioned in the HPI      /64 (BP Location: Left arm,  "Patient Position: Lying)   Pulse 69   Temp 97.7 °F (36.5 °C) (Oral)   Resp 16   Ht 162.6 cm (64\")   Wt 68.4 kg (150 lb 12.8 oz)   SpO2 96%   BMI 25.88 kg/m²     Intake/Output Summary (Last 24 hours) at 3/3/2023 1440  Last data filed at 3/3/2023 0510  Gross per 24 hour   Intake 600 ml   Output --   Net 600 ml     Physical Exam:      General Appearance:   Awake, confused appearing   Head:    Normocephalic, without obvious abnormality, atraumatic   Eyes:            Lids and lashes normal, conjunctivae and sclerae normal, no   icterus, no pallor, corneas clear, PERRLA   Ears:    Ears appear intact with no abnormalities noted   Throat:  Patient does have a lesion on her tongue   Neck:   No adenopathy, supple, trachea midline, no thyromegaly, no     carotid bruit, no JVD   Back:     No kyphosis present, no scoliosis present, no skin lesions,       erythema or scars, no tenderness to percussion or                   palpation,   range of motion normal   Lungs:     Clear to auscultation,respirations regular, even and                   unlabored    Heart:    Regular rhythm and normal rate, normal S1 and S2, no            murmur, no gallop, no rub, no click   Breast Exam:    Deferred   Abdomen:     Normal bowel sounds, no masses, no organomegaly, soft        non-tender, non-distended, no guarding, no rebound                 tenderness   Genitalia:    Deferred   Extremities:   Moves all extremities well, no edema, no cyanosis, no              redness   Pulses:   Pulses palpable and equal bilaterally   Skin:   No bleeding, bruising or rash   Lymph nodes:   No palpable adenopathy   Neurologic:   Cranial nerves 2 - 12 grossly intact, sensation intact, DTR        present and equal bilaterally       Results from last 7 days   Lab Units 03/03/23  0329   WBC 10*3/mm3 5.32   HEMOGLOBIN g/dL 9.0*   HEMATOCRIT % 27.5*   PLATELETS 10*3/mm3 164     Results from last 7 days   Lab Units 03/03/23  0329 03/02/23  1601   SODIUM mmol/L " 138 136   POTASSIUM mmol/L 3.7 3.6   CHLORIDE mmol/L 103 101   CO2 mmol/L 20.0* 23.0   BUN mg/dL 11 15   CREATININE mg/dL 0.78 0.77   CALCIUM mg/dL 8.3* 8.5*   BILIRUBIN mg/dL  --  0.3   ALK PHOS U/L  --  86   ALT (SGPT) U/L  --  26   AST (SGOT) U/L  --  39*   GLUCOSE mg/dL 78 112*     Results from last 7 days   Lab Units 03/03/23  0329   SODIUM mmol/L 138   POTASSIUM mmol/L 3.7   CHLORIDE mmol/L 103   CO2 mmol/L 20.0*   BUN mg/dL 11   CREATININE mg/dL 0.78   GLUCOSE mg/dL 78   CALCIUM mg/dL 8.3*     Imaging Results (Last 72 Hours)     Procedure Component Value Units Date/Time    CT Abdomen Pelvis With & Without Contrast [[id number]] Collected: 03/02/23 1917     Updated: 03/02/23 1933    Narrative:      CT ABDOMEN PELVIS W WO CONTRAST    Date of Exam: 3/2/2023 6:40 PM EST    Indication: Tonsillar cancer.  Dark sticky stools.  Recent feeding tube placement..    Comparison: 1/20/2023    Technique: Axial CT images were obtained of the abdomen and pelvis before and after the uneventful intravenous administration of 100 mL Isovue-370. Sagittal and coronal reconstructions were performed.  Automated exposure control and iterative   reconstruction methods were used.       FINDINGS:    Abdomen/Pelvis:    Lower Chest: Findings of the chest are reported separately     Organs: Patient is status post cholecystectomy.     Liver, spleen, pancreas and adrenal glands are grossly. There are some cortical scarring of the kidneys right greater than left. Kidneys are otherwise unremarkable    GI/Bowel: There is a percutaneous gastrostomy tube noted within the body of the stomach. Very small air-fluid collection along the anterior abdominal wall is noted measuring approximately 12 x 7 mm adjacent to the insertion site. Findings likely   postoperative in nature. There is a small amount of fluid tracking along the body of the stomach.    Small bowel demonstrates no evidence of obstruction.    The patient is status post appendectomy. The  ileocecal valve appears unremarkable. The majority of colon demonstrates some air-fluid levels without evidence of distention or significant wall thickening. Mild stranding is noted within the pelvis which is   nonspecific. Question mild enhancement of the fluid collections without complete loculation. There is mild wall thickening of the colon involving the distal rectum.    Pelvis: There is a small amount of fluid within the urinary bladder. Patient is status post hysterectomy. Ovaries are not visualized.    Peritoneum/Retroperitoneum: The aorta is normal in caliber. There is atelectatic changes. No suspicious retroperitoneal    Bones/Soft Tissues: No destructive bone lesion is noted. Underlying scoliotic curvature the spine is noted. Multilevel degenerative changes are noted      Impression:        1. Findings of the chest are reported separately  2. Percutaneous gastrostomy tube projects in expected position.  There is a small complex fluid collection along the anterior abdominal wall with possible extension along the margin of the stomach. Findings are likely postsurgical in nature. The   sterility of the collection is indeterminate. Recommend continued follow-up as clinically indicated  3. Small amount of fluid with possible developing enhancement within the pelvis. Findings could represent a developing abscess in the correct setting.  4. Mild wall thickening and stranding along the distal rectum. Findings may represent underlying proctitis, colitis. Small amount of fluid and liquid stool noted throughout the colon. Inflammatory changes noted on the prior study are less prominent.             Electronically Signed: Heriberto Lopez    3/2/2023 7:30 PM EST    Workstation ID: [id number]    CT Angiogram Chest [[id number]] Collected: 03/02/23 1903     Updated: 03/02/23 1918    Narrative:      CT ANGIOGRAM CHEST    Date of Exam: 3/2/2023 6:40 PM EST    Indication: pe tonsillar cancer.  Recent aspiration  pneumonia.    Comparison: 1/19/2023    Technique: CTA of the chest was performed after the uneventful intravenous administration of intravenous Isovue. Reconstructed coronal and sagittal images were also obtained. In addition, a 3-D volume rendered image was created for interpretation.   Automated exposure control and iterative reconstruction methods were used.        FINDINGS:    [ ]    Pulmonary Arteries: Pulmonary arteries are adequately opacified for evaluation. No evidence of intraluminal filling defect to suggest pulmonary embolism. Main pulmonary artery is normal in caliber.    Mediastinum: Heart size is within normal limits. There is no suspicious pericardial effusion.    No suspicious hilar adenopathy is noted. Small paratracheal lymph nodes are noted. Right paratracheal node with a short axis. 6 mm is not significantly changed comparison.    Limited imaging of the base of the neck is trace no acute abnormality on limited imaging. There is a right subclavian port is in place. The aorta and the origin of great vessels and trace no acute process. The esophagus is grossly marked appearance.    Lungs/pleura: There is a small left-sided pleural effusion and trace amount of pleural fluid right increase in the comparison. Minimal dependent opacities are noted at the lung bases. There is some vascular crowding accentuated by relatively low lung   lines.    The central airways are patent.    Small groundglass nodular opacity just posterior to the fissure and this appears segment left lower lobe appears grossly stable although somewhat less confluence. No suspicious new nodules noted    Upper Abdomen: Findings in the abdomen and pelvis are reported separately    Soft tissues/Bones: No acute bone or soft tissue abnormality.      Impression:        1. No evidence of pulmonary metastases  2. Small bilateral pleural effusions and minimal dependent opacities slightly increased in comparison  3. A few scattered groundglass  opacities likely postinflammatory or infectious. Recommend follow-up to document resolution          Electronically Signed: Heriberto Lopez    3/2/2023 7:15 PM EST    Workstation ID: [id number]    XR Chest 1 View [[id number]] Collected: 03/02/23 1623     Updated: 03/02/23 1628    Narrative:      XR CHEST 1 VW    Date of Exam: 3/2/2023 3:52 PM EST    Indication: fever. recent aspiration pneumonia..    Comparison: 1/19/2023    Findings:  Rotated to the left. There is a right subclavian port. Heart size is probably normal. Pulmonary vasculature is within normal limits. Right lung is grossly clear other than mild atelectasis in the base. Airspace disease in the left lung base with possible   subpulmonic fluid      Impression:      Impression:  Left basilar atelectasis or pneumonia with possible subpulmonic pleural effusion    Electronically Signed: Ari Landry    3/2/2023 4:25 PM EST    Workstation ID: [id number]        Impression: Head and neck cancer  Pelvic abscess  GI bleed  Neoplastic pain  Dementia  Debility  Goals of care  Plan: Did have a long discussion with the patient's 2 daughters who are at bedside.  We did discuss CODE STATUS in great detail as well as discussing continue current plan of care versus a comfort/hospice plan of care especially given the fact that the patient has been declining for about the last 5 to 6 months.  The daughters are going to discuss with other physicians they state today as well as discussing with their father about how the family would like to proceed forward.        Jaylan Harrington DO  03/03/23  14:40 EST

## 2023-03-03 NOTE — CASE MANAGEMENT/SOCIAL WORK
Discharge Planning Assessment  [facility]     Patient Name: Vesta Landry  MRN: [id number]  Today's Date: 3/3/2023    Admit Date: 3/2/2023    Plan: home with    Discharge Needs Assessment     Row Name 03/03/23 0936       Living Environment    People in Home spouse    Name(s) of People in Home Karrie Landry    Current Living Arrangements home    Primary Care Provided by self;homecare agency    Provides Primary Care For no one    Caregiving Concerns Pt is followed by luis CARDENAS    Family Caregiver if Needed spouse;child(lois), adult    Quality of Family Relationships involved;helpful    Able to Return to Prior Arrangements yes       Resource/Environmental Concerns    Transportation Concerns no car       Food Insecurity    Within the past 12 months, you worried that your food would run out before you got the money to buy more. Never true    Within the past 12 months, the food you bought just didn't last and you didn't have money to get more. Never true       Transition Planning    Patient/Family Anticipates Transition to home with help/services    Patient/Family Anticipated Services at Transition none    Transportation Anticipated family or friend will provide       Discharge Needs Assessment    Readmission Within the Last 30 Days unable to assess    Equipment Currently Used at Home wheelchair;walker, rolling    Concerns to be Addressed discharge planning    Discharge Facility/Level of Care Needs home with home health               Discharge Plan     Row Name 03/03/23 0937       Plan    Plan home with     Patient/Family in Agreement with Plan yes    Plan Comments Pt lives in [facility] with her . She uses a wheelchair for mobility and also has a walker at home. Luis  has been following her and [facility] has been supplying her tube feedings. Pt is followed by her PCP and has drug coverage. Palliative has been consulted. Once GOC are established CM will discuss discharge options  with family.    Final Discharge Disposition Code 06 - home with home health care              Continued Care and Services - Admitted Since 3/2/2023    Coordination has not been started for this encounter.     Selected Continued Care - Prior Encounters Includes continued care and service providers with selected services from prior encounters from 12/2/2022 to 3/3/2023    Discharged on 3/1/2023 Admission date: 2/23/2023 - Discharge disposition: Home-Health Care Svc    Durable Medical Equipment     Service Provider Selected Services Address Phone Fax Patient Preferred    [facility] [address] [address] [phone] --       Internal Comment last updated by Lisa Keene, APRN 3/1/2023 1604    Order for BSC obtained. BSC taken to pt room. Form signed by Meliza GASPAR                     Dialysis/Infusion     Service Provider Selected Services Address Phone Fax Patient Preferred    [facility] [address] [address] [phone] --       Internal Comment last updated by Lisa Keene, APRN 3/1/2023 1611    3/1 Spoke with chana Santos who confirms plan for staff to come to facility this afternoon to tach family and deliver pump/supplies. They will order TF and ship to pt home. She should receive is Fri. [facility] [place] pt with 7 cartons of TF to use in interim                     Home Medical Care     Service Provider Selected Services Address Phone Fax Patient Preferred     [facility] [address] [address] [phone] --                       Demographic Summary     Row Name 03/03/23 0935       General Information    Admission Type inpatient    Referral Source physician    Reason for Consult discharge planning    General Information Comments PCP Bossman Pedraza       Contact Information    Permission  Granted to Share Info With family/designee    Contact Information Comments Karrie Landry [phone]               Functional Status     Row Name 03/03/23 8758       Physical Activity    On average, how many days per week do you engage in moderate to strenuous exercise (like a brisk walk)? 0 days    On average, how many minutes do you engage in exercise at this level? 0 min    Number of minutes of exercise per week 0       Functional Status, IADL    Medications assistive person    Meal Preparation assistive person    Housekeeping assistive person    Laundry assistive person    Shopping assistive person    IADL Comments Pt lives at home with  and has HH       Mental Status    General Appearance WDL WDL               Psychosocial    No documentation.                Abuse/Neglect    No documentation.                Legal    No documentation.                Substance Abuse    No documentation.                Patient Forms    No documentation.                   Gin Dickinson RN

## 2023-03-03 NOTE — CONSULTS
Chart reviewed / discussed with Patient and Family     History of oral squamous cancer  G-tube placed by general surgery at [facility]  G-tube bleeding  History of chemotherapy/radiation, remote history of C. difficile.    CT reviewed without evidence of free air or other acute finding  Plans for consultation with GI noted  If colorectal pathology requiring consideration of surgery is identified please let me know.  If difficulty with the G-tube, consider consultation with General Surgery.

## 2023-03-03 NOTE — PLAN OF CARE
Goal Outcome Evaluation:  Pt admitted this shift from ED for further evaluation of dark, tarry stool. Pt is A&O to self at baseline, family at bedside. VSS on RA, up to BSC x1 assist. Small BM since admission, green liquid stool. No signs of bleeding observed. Pt rested well throughout shift. Will continue to monitor.

## 2023-03-03 NOTE — PLAN OF CARE
Goal Outcome Evaluation:              Outcome Evaluation: Pt A&Ox2-4, Peg tube in place with no drainage or issue noted. Patient NPO, pain controlled with prn medications and scheduled narcotics. All consults were completed. Restarting continuous feeding per RD reccomendations, Will continue to monitor and follow patient and physician plan of care per md order.

## 2023-03-03 NOTE — PROGRESS NOTES
"Pharmacy Consult-Vancomycin Dosing  Vesta Landry is a  77 y.o. female receiving vancomycin therapy.     Indication: intra-abdominal infection  Consulting Provider: hospitalist  ID Consult:     Goal AUC: 400 - 600 mg/L*hr    Current Antimicrobial Therapy  Anti-Infectives (From admission, onward)      Ordered     Dose/Rate Route Frequency Start Stop    03/02/23 2335  piperacillin-tazobactam (ZOSYN) 3.375 g in iso-osmotic dextrose 50 ml (premix)        Ordering Provider: Jeanne Bennett APRN    3.375 g  over 4 Hours Intravenous Every 8 Hours 03/03/23 0600 03/07/23 0559    03/03/23 0034  vancomycin oral solution reconstituted 125 mg        Ordering Provider: Mark Santana IV, PharmD    125 mg Per G Tube Every 6 Hours Scheduled 03/03/23 0130 03/12/23 2359    03/02/23 2335  piperacillin-tazobactam (ZOSYN) 3.375 g in iso-osmotic dextrose 50 ml (premix)        Ordering Provider: Jeanne Bennett APRN    3.375 g  over 30 Minutes Intravenous Once 03/03/23 0030 03/03/23 0102    03/02/23 2340  vancomycin 1500 mg/500 mL 0.9% NS IVPB ([facility])        Ordering Provider: Jeanne Bennett APRN    20 mg/kg × 68.8 kg Intravenous Once 03/03/23 0030 03/03/23 0032    03/02/23 2335  Pharmacy to dose vancomycin        Ordering Provider: Jeanne Bennett APRN     Does not apply Continuous PRN 03/02/23 2335 03/07/23 2334            Allergies  Allergies as of 03/02/2023 - Reviewed 03/02/2023   Allergen Reaction Noted    Trazodone Confusion 01/26/2023       Labs    Results from last 7 days   Lab Units 03/02/23  1601 02/28/23  0628 02/27/23  0601   BUN mg/dL 15 16 16   CREATININE mg/dL 0.77 0.90 0.89       Results from last 7 days   Lab Units 03/02/23  1601 02/28/23  0628 02/27/23  0601   WBC 10*3/mm3 7.05 5.19 5.13       Evaluation of Dosing     Last Dose Received in the ED/Outside Facility: 1500mg  Is Patient on Dialysis or Renal Replacement: no    Ht - 162.6 cm (64\")  Wt - 68.8 kg (151 lb 11.2 oz)    Estimated Creatinine " Clearance: 56.4 mL/min (by C-G formula based on SCr of 0.77 mg/dL).    Intake & Output (last 3 days)         02/28 0701 03/01 0700 03/01 0701 03/02 0700 03/02 0701 03/03 0700    IV Piggyback   550    Total Intake(mL/kg)   550 (8)    Net   +550           Urine Unmeasured Occurrence   1 x    Stool Unmeasured Occurrence   1 x            Microbiology and Radiology  Microbiology Results (last 10 days)       Procedure Component Value - Date/Time    COVID PRE-OP / PRE-PROCEDURE SCREENING ORDER (NO ISOLATION) - Swab, Nasopharynx [[id number]]  (Normal) Collected: 03/02/23 1607    Lab Status: Final result Specimen: Swab from Nasopharynx Updated: 03/02/23 1651    Narrative:      The following orders were created for panel order COVID PRE-OP / PRE-PROCEDURE SCREENING ORDER (NO ISOLATION) - Swab, Nasopharynx.  Procedure                               Abnormality         Status                     ---------                               -----------         ------                     COVID-19 and FLU A/B PCR...[[id number]]  Normal              Final result                 Please view results for these tests on the individual orders.    COVID-19 and FLU A/B PCR - Swab, Nasopharynx [[id number]]  (Normal) Collected: 03/02/23 1607    Lab Status: Final result Specimen: Swab from Nasopharynx Updated: 03/02/23 1651     COVID19 Not Detected     Influenza A PCR Not Detected     Influenza B PCR Not Detected    Narrative:      Fact sheet for providers: https://www.fda.gov/media/204916/download    Fact sheet for patients: https://www.fda.gov/media/574328/download    Test performed by PCR.    COVID PRE-OP / PRE-PROCEDURE SCREENING ORDER (NO ISOLATION) - Swab, Nasopharynx [[id number]]  (Normal) Collected: 02/26/23 1907    Lab Status: Final result Specimen: Swab from Nasopharynx Updated: 02/26/23 1936    Narrative:      The following orders were created for panel order COVID PRE-OP / PRE-PROCEDURE SCREENING ORDER (NO ISOLATION) - Swab,  Nasopharynx.  Procedure                               Abnormality         Status                     ---------                               -----------         ------                     COVID-19 and FLU A/B PCR...[[id number]]  Normal              Final result                 Please view results for these tests on the individual orders.    COVID-19 and FLU A/B PCR - Swab, Nasopharynx [[id number]]  (Normal) Collected: 02/26/23 1907    Lab Status: Final result Specimen: Swab from Nasopharynx Updated: 02/26/23 1936     COVID19 Not Detected     Influenza A PCR Not Detected     Influenza B PCR Not Detected    Narrative:      Fact sheet for providers: https://www.fda.gov/media/568331/download    Fact sheet for patients: https://www.fda.gov/media/153093/download    Test performed by PCR.    Blood Culture - Blood, Arm, Right [[id number]]  (Normal) Collected: 02/25/23 1918    Lab Status: Final result Specimen: Blood from Arm, Right Updated: 03/02/23 1931     Blood Culture No growth at 5 days    Blood Culture - Blood, Hand, Right [[id number]]  (Normal) Collected: 02/25/23 1900    Lab Status: Final result Specimen: Blood from Hand, Right Updated: 03/02/23 1931     Blood Culture No growth at 5 days    Urine Culture - Urine, Urine, Clean Catch [[id number]]  (Abnormal)  (Susceptibility) Collected: 02/25/23 1428    Lab Status: Final result Specimen: Urine, Clean Catch Updated: 02/27/23 0837     Urine Culture >100,000 CFU/mL Escherichia coli      50,000 CFU/mL Proteus penneri    Narrative:      Colonization of the urinary tract without infection is common. Treatment is discouraged unless the patient is symptomatic, pregnant, or undergoing an invasive urologic procedure.      Susceptibility        Escherichia coli      PRISCA      Ampicillin Susceptible      Ampicillin + Sulbactam Susceptible      Cefazolin Susceptible      Cefepime Susceptible      Ceftazidime Susceptible      Ceftriaxone Susceptible      Gentamicin Susceptible       Levofloxacin Susceptible      Nitrofurantoin Susceptible      Piperacillin + Tazobactam Susceptible      Trimethoprim + Sulfamethoxazole Susceptible                       Susceptibility        Proteus penneri      PRISCA      Ampicillin Resistant      Ampicillin + Sulbactam Intermediate      Cefazolin Resistant      Cefepime Susceptible      Ceftazidime Susceptible      Ceftriaxone Susceptible      Gentamicin Susceptible      Levofloxacin Susceptible      Nitrofurantoin Resistant      Piperacillin + Tazobactam Susceptible      Trimethoprim + Sulfamethoxazole Susceptible                           Clostridioides difficile Toxin - Stool, Per Rectum [[id number]]  (Normal) Collected: 02/24/23 2008    Lab Status: Final result Specimen: Stool from Per Rectum Updated: 02/24/23 2054    Narrative:      The following orders were created for panel order Clostridioides difficile Toxin - Stool, Per Rectum.  Procedure                               Abnormality         Status                     ---------                               -----------         ------                     Clostridioides difficile...[[id number]]  Normal              Final result                 Please view results for these tests on the individual orders.    Clostridioides difficile EIA - Stool, Per Rectum [[id number]]  (Normal) Collected: 02/24/23 2008    Lab Status: Final result Specimen: Stool from Per Rectum Updated: 02/24/23 2054     C Diff GDH Ag Negative     C.diff Toxin Ag Negative    Narrative:      The result indicates the absence of toxigenic C.difficile from stool specimen.            Reported Vancomycin Levels                         [employer] AUC Calculation:    Current AUC:   0 mg/L*hr    Predicted Steady State AUC :   454 mg/L*hr    Assessment/Plan: The patient will be started on a bolus of 20mg/kg for a dose of 1500mg . Will initiate maintenance dose at 1250 mg IV every 24 hours.  Plan for trough as patient approaches steady state, prior to the  3rd dose.  Due to infection severity, will target an AUC of 400-600.      Pharmacy will continue to follow the patient’s culture results and clinical progress daily.    Michael Longoria, MichealD

## 2023-03-03 NOTE — PLAN OF CARE
Goal Outcome Evaluation:  Plan of Care Reviewed With: patient        Progress: no change  Outcome Evaluation: New palliative consult for assistance with GOC and symptom management per Dr. Chaidez.  Dr. Harrington and palliative RN saw pt for initial palliative consult this am.  Pt. complained of oral discomfort at 10/10.  Pt. suffers confusion at times and often will not complain of pain per family report.  Family at  asked if pain meds could be scheduled.  Dr. Harrignton asked to please consider scheduling pain meds.  BP noted to be elevated on monitor as well which could also be indicative of pain.  Dr. Harrington discussed code status with family who became tearful.  Family to consider code status.  Pt. very sleepy per nursing report and never complains of any symptoms.  Palliative brochure given to family.  Palliative care to follow for support, POC and ongoing GOC.      1330 Palliative IDT meeting: TAYLOR Magdaleno RN, CHPN; CHUCK Son, APRN; ABE Carreno RN, CHPN; MARIAJOSE Perea RN, CHPN ; THI Harrington, ; STEVEN Mae, [facility]; RAYNE Jluian, RN, CHPN and inpatient hospice team; SONAL Johns RN After hours, weekends and holidays, contact Palliative Provider by calling [phone].        Problem: Palliative Care  Goal: Enhanced Quality of Life  Intervention: Promote Advance Care Planning  Flowsheets (Taken 3/3/2023 1426)  Life Transition/Adjustment:   palliative care discussed   palliative care initiated     Problem: Adult Inpatient Plan of Care  Goal: Plan of Care Review  Flowsheets (Taken 3/3/2023 1418)  Progress: no change  Plan of Care Reviewed With: patient  Outcome Evaluation: New palliative consult for assistance with GOC and symptom management per Dr. Chaidez.  Dr. Harrington and palliative RN saw pt for initial palliative consult this am.  Pt. complained of oral discomfort at 10/10.  Pt. suffers confusion at times and often will not complain of pain per family report.  Family at  asked if pain meds could be scheduled.  Dr. Harrington  asked to please consider scheduling pain meds.  BP noted to be elevated on monitor as well which could also be indicative of pain.  Dr. Harrington discussed code status with family who became tearful.  Family to consider code status.  Pt. very sleepy per nursing report and never complains of any symptoms.  Palliative brochure given to family.  Palliative care to follow for support, POC and ongoing GOC.

## 2023-03-03 NOTE — CONSULTS
Patient Name: Vesta Landry  : 1945  MRN: [id number]    Date of Consult: 3/3/2023  Admission Date: 3/2/2023    Requesting Provider: Kaylynn  Evaluating Physician: Daren Palacios MD    Chief Complaint: GI bleed    Reason for Consultation: possible abdominal infection    History of present illness:   Patient is a 77 y.o. 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 COVID dx 2023, but did not require hospitalization. Admitted to [facility] on  with severe C. difficile colitis.  Treated with high-dose oral vancomycin for 14 days and initially IV Flagyl.    Readmitted to [facility] in  with NSTEMI and also treated for E. coli and Proteus UTI initially with IV antibiotics and then discharged on 3/1/23 on p.o. Cipro but was not given any prophylaxis for C. Difficile, although testing for C. difficile was negative. [facility] was not contacted about this episode.    3/3/2023: Readmitted to [facility] last night with dark tarry stools began the day before.  CT scan with thickening of the distal rectum also possible fluid collection around PEG tube site.  Patient is poor historian but family at bedside helps to clarify.  Has had poor appetite and not much intake, even via PEG tube.  No complaints of PEG tube drainage or tenderness.  Loose stools with each episode of urination but not copious watery diarrhea.     Review of Systems  Unable to obtain other than as above due to confusion.     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   • Deep vein thrombosis (HCC)     bilateral- daughter states on 23 that she doesn't remember patient having this   • Dementia (HCC)    • Dysphagia    • GERD (gastroesophageal  reflux disease)    • Hyperlipidemia    • Hypertension    • Impaired mobility    • PONV (postoperative nausea and vomiting)    • SOB (shortness of breath)     following chemo infusion on day of infusion last week (week of -23) per pt's daughter   • Vitamin D deficiency    • Wears dentures     uppers - instructed not to use adhesive DOS       Past Surgical History:   Procedure Laterality Date   • APPENDECTOMY     • CATARACT EXTRACTION     • CHOLECYSTECTOMY     • COLONOSCOPY      Approx    • ENDOSCOPY W/ PEG TUBE PLACEMENT N/A 2023    Procedure: ESOPHAGOGASTRODUODENOSCOPY WITH PERCUTANEOUS ENDOSCOPIC GASTROSTOMY TUBE INSERTION;  Surgeon: Brigido Guerra MD;  Location: [facility] ENDOSCOPY;  Service: Gastroenterology;  Laterality: N/A;   • HYSTERECTOMY     • OOPHORECTOMY Bilateral    • PORTACATH PLACEMENT Right 2023    Procedure: INSERTION OF PORTACATH;  Surgeon: Brigido Guerra MD;  Location: [facility] OR;  Service: General;  Laterality: Right;   • TUBAL ABDOMINAL LIGATION         Social History     Socioeconomic History   • Marital status:    Tobacco Use   • Smoking status: Former     Packs/day: 0.15     Years: 10.00     Pack years: 1.50     Types: Cigarettes     Quit date: 1987     Years since quittin.1   • Smokeless tobacco: Never   Vaping Use   • Vaping Use: Never used   Substance and Sexual Activity   • Alcohol use: No   • Drug use: No   • Sexual activity: Defer        Family History   Problem Relation Age of Onset   • Heart failure Mother    • Hyperlipidemia Mother    • Hypertension Mother    • Cancer Father    • Cancer Sister    • Stroke Sister    • Breast cancer Maternal Aunt    • Breast cancer Paternal Aunt    • Ovarian cancer Neg Hx        Allergies   Allergen Reactions   • Trazodone Confusion       Antibiotics:  Anti-Infectives (From admission, onward)    Ordered     Dose/Rate Route Frequency Start Stop    23 0254  vancomycin 1250 mg/250 mL 0.9% NS IVPB ([facility])         Ordering Provider: Michael Longoria, PharmD    1,250 mg  over 90 Minutes Intravenous Every 24 Hours 03/04/23 0600 03/09/23 0559    03/02/23 2335  piperacillin-tazobactam (ZOSYN) 3.375 g in iso-osmotic dextrose 50 ml (premix)        Ordering Provider: Jeanne Bennett APRN    3.375 g  over 4 Hours Intravenous Every 8 Hours 03/03/23 0600 03/07/23 0559    03/03/23 0034  vancomycin oral solution reconstituted 125 mg        Ordering Provider: Mark Santana IV, PharmD    125 mg Per G Tube Every 6 Hours Scheduled 03/03/23 0130 03/12/23 2359    03/02/23 2335  piperacillin-tazobactam (ZOSYN) 3.375 g in iso-osmotic dextrose 50 ml (premix)        Ordering Provider: Jeanne Bennett APRN    3.375 g  over 30 Minutes Intravenous Once 03/03/23 0030 03/03/23 0102    03/02/23 2340  vancomycin 1500 mg/500 mL 0.9% NS IVPB ([facility])        Ordering Provider: Jeanne Bennett APRN    20 mg/kg × 68.8 kg Intravenous Once 03/03/23 0030 03/03/23 0032    03/02/23 2335  Pharmacy to dose vancomycin        Ordering Provider: Jeanne Bennett APRN     Does not apply Continuous PRN 03/02/23 2335 03/07/23 2334          Other Medications:  Current Facility-Administered Medications   Medication Dose Route Frequency Provider Last Rate Last Admin   • [START ON 3/5/2023] !vanc trough 3/5@0530, do not hang dose until lab drawn and pharmacy reviewed   Does not apply Once Michael Longoria, PharmD       • acetaminophen (TYLENOL) tablet 650 mg  650 mg Oral Q4H PRN Donald, Jeanne, APRN        Or   • acetaminophen (TYLENOL) 160 MG/5ML solution 650 mg  650 mg Oral Q4H PRN Donald, Jeanne, APRN        Or   • acetaminophen (TYLENOL) suppository 650 mg  650 mg Rectal Q4H PRN Donald, Jeanne, APRN       • allopurinol (ZYLOPRIM) tablet 100 mg  100 mg Per G Tube Daily Doanld Jeanne, APRN   100 mg at 03/03/23 0843   • amLODIPine (NORVASC) tablet 10 mg  10 mg Per G Tube Q24H Jeanne Bennett, APRN   10 mg at 03/03/23 0843   • atorvastatin (LIPITOR)  tablet 40 mg  40 mg Per G Tube Nightly Donald, Jeanne, APRN   40 mg at 03/03/23 0001   • Calcium Replacement - Initiate Nurse / BPA Driven Protocol   Does not apply PRN Donald, Jeanne, APRN       • First Mouthwash (Magic Mouthwash) 10 mL  10 mL Swish & Spit Q6H Donald, Jeanne, APRN   10 mL at 03/03/23 0511   • hydrALAZINE (APRESOLINE) tablet 100 mg  100 mg Per G Tube Q8H Donald, Jeanne, APRN   100 mg at 03/03/23 0510   • HYDROcodone-acetaminophen (NORCO) 7.5-325 MG per tablet 1 tablet  1 tablet Per G Tube Q6H PRN Donald, Jeanne, APRN   1 tablet at 03/03/23 1112   • isosorbide dinitrate (ISORDIL) tablet 40 mg  40 mg Per G Tube TID - Nitrates Donald, Jeanne, APRN   40 mg at 03/03/23 0843   • lactobacillus acidophilus (RISAQUAD) capsule 1 capsule  1 capsule Per G Tube Daily With Breakfast & Dinner Donald, Jeanne, APRN   1 capsule at 03/03/23 0843   • Lidocaine Viscous HCl (XYLOCAINE) 2 % solution 10 mL  10 mL Mouth/Throat Q3H PRN Donald, Jeanne, APRN   10 mL at 03/03/23 0017   • losartan (COZAAR) tablet 100 mg  100 mg Per G Tube Daily Donald, Jeanne, APRN   100 mg at 03/03/23 0842   • Magnesium Standard Dose Replacement - Initiate Nurse / BPA Driven Protocol   Does not apply PRN Donald, Jeanne, APRN       • metoprolol tartrate (LOPRESSOR) tablet 100 mg  100 mg Per G Tube Q12H Donald, Jeanne, APRN   100 mg at 03/03/23 0842   • mirtazapine (REMERON) tablet 15 mg  15 mg Per G Tube Nightly Donald, Jeanne, APRN   15 mg at 03/03/23 0001   • nystatin (MYCOSTATIN) 100,000 unit/mL suspension 500,000 Units  5 mL Swish & Spit 4x Daily Donald, Jeanne, APRN   500,000 Units at 03/03/23 0842   • nystatin (MYCOSTATIN) powder   Topical TID Jeanne Bennett APRN   Given at 03/03/23 0843   • palliative care oral rinse   Mouth/Throat PRN Arpit Chaidez DO       • pantoprazole (PROTONIX) injection 40 mg  40 mg Intravenous Q12H [place],        • Pharmacy Consult   Does not apply Continuous PRN  "Donald, Jeanne, APRN       • Pharmacy to dose vancomycin   Does not apply Continuous PRN Donald, Jeanne, APRN       • Phosphorus Replacement - Initiate Nurse / BPA Driven Protocol   Does not apply PRN Donald, Jeanne, APRN       • piperacillin-tazobactam (ZOSYN) 3.375 g in iso-osmotic dextrose 50 ml (premix)  3.375 g Intravenous Q8H Donald, Jeanne, APRN   3.375 g at 03/03/23 0510   • Potassium Replacement - Initiate Nurse / BPA Driven Protocol   Does not apply PRN Donald, Jeanne, APRN       • Sodium Chloride (PF) 0.9 % 10 mL  10 mL Intravenous PRN Emergency, Triage Protocol, MD       • sucralfate (CARAFATE) tablet 1 g  1 g Per G Tube 4x Daily Donald, Jeanne, APRN   1 g at 03/03/23 0843   • [START ON 3/4/2023] vancomycin 1250 mg/250 mL 0.9% NS IVPB ([facility])  1,250 mg Intravenous Q24H Michael Longoria, PharmD       • vancomycin oral solution reconstituted 125 mg  125 mg Per G Tube Q6H Mark Santana IV, PharmD   125 mg at 03/03/23 0510       Physical Exam:   Vital Signs   /64 (BP Location: Left arm, Patient Position: Lying)   Pulse 69   Temp 97.7 °F (36.5 °C) (Oral)   Resp 16   Ht 162.6 cm (64\")   Wt 68.4 kg (150 lb 12.8 oz)   SpO2 96%   BMI 25.88 kg/m²     GENERAL: Awake and alert, chronically ill-appearing and withdrawn  HEENT: Normocephalic, atraumatic.  EOMI. No conjunctival injection. No icterus.  Dried blood in mouth and on lips  NECK: Supple without nuchal rigidity.  No meningismus  HEART: RRR; No murmur.  LUNGS: Clear to auscultation bilaterally without wheezing, rales, rhonchi. Normal respiratory effort. Nonlabored.  ABDOMEN: Soft, nontender, nondistended. No rebound or guarding.  PEG tube in place without signs of bleeding or surrounding irritation, drainage  EXT:  No edema.  : Without Quinonez catheter.  MSK: no major joint swelling.   SKIN: no rash.   NEURO: alert but not oriented   PSYCHIATRIC: confused    Laboratory Data  Lab Results   Component Value Date    WBC 5.32 03/03/2023 "    HGB 9.0 (L) 03/03/2023    HCT 27.5 (L) 03/03/2023    MCV 90.2 03/03/2023     03/03/2023     Lab Results   Component Value Date    GLUCOSE 78 03/03/2023    CALCIUM 8.3 (L) 03/03/2023     03/03/2023    K 3.7 03/03/2023    CO2 20.0 (L) 03/03/2023     03/03/2023    BUN 11 03/03/2023    CREATININE 0.78 03/03/2023     Estimated Creatinine Clearance: 55.6 mL/min (by C-G formula based on SCr of 0.78 mg/dL).  Lab Results   Component Value Date    ALT 26 03/02/2023    AST 39 (H) 03/02/2023    ALKPHOS 86 03/02/2023    BILITOT 0.3 03/02/2023     Lab Results   Component Value Date    CRP 2.2 07/02/2015     Lab Results   Component Value Date    SEDRATE 8 07/02/2015       The above labs were reviewed.     Microbiology:  Microbiology Results (last 10 days)     Procedure Component Value - Date/Time    COVID PRE-OP / PRE-PROCEDURE SCREENING ORDER (NO ISOLATION) - Swab, Nasopharynx [[id number]]  (Normal) Collected: 03/02/23 1607    Lab Status: Final result Specimen: Swab from Nasopharynx Updated: 03/02/23 1651    Narrative:      The following orders were created for panel order COVID PRE-OP / PRE-PROCEDURE SCREENING ORDER (NO ISOLATION) - Swab, Nasopharynx.  Procedure                               Abnormality         Status                     ---------                               -----------         ------                     COVID-19 and FLU A/B PCR...[[id number]]  Normal              Final result                 Please view results for these tests on the individual orders.    COVID-19 and FLU A/B PCR - Swab, Nasopharynx [[id number]]  (Normal) Collected: 03/02/23 1607    Lab Status: Final result Specimen: Swab from Nasopharynx Updated: 03/02/23 1651     COVID19 Not Detected     Influenza A PCR Not Detected     Influenza B PCR Not Detected    Narrative:      Fact sheet for providers: https://www.fda.gov/media/782667/download    Fact sheet for patients: https://www.fda.gov/media/308393/download    Test performed  by PCR.    COVID PRE-OP / PRE-PROCEDURE SCREENING ORDER (NO ISOLATION) - Swab, Nasopharynx [[id number]]  (Normal) Collected: 02/26/23 1907    Lab Status: Final result Specimen: Swab from Nasopharynx Updated: 02/26/23 1936    Narrative:      The following orders were created for panel order COVID PRE-OP / PRE-PROCEDURE SCREENING ORDER (NO ISOLATION) - Swab, Nasopharynx.  Procedure                               Abnormality         Status                     ---------                               -----------         ------                     COVID-19 and FLU A/B PCR...[[id number]]  Normal              Final result                 Please view results for these tests on the individual orders.    COVID-19 and FLU A/B PCR - Swab, Nasopharynx [[id number]]  (Normal) Collected: 02/26/23 1907    Lab Status: Final result Specimen: Swab from Nasopharynx Updated: 02/26/23 1936     COVID19 Not Detected     Influenza A PCR Not Detected     Influenza B PCR Not Detected    Narrative:      Fact sheet for providers: https://www.fda.gov/media/517199/download    Fact sheet for patients: https://www.fda.gov/media/268268/download    Test performed by PCR.    Blood Culture - Blood, Arm, Right [[id number]]  (Normal) Collected: 02/25/23 1918    Lab Status: Final result Specimen: Blood from Arm, Right Updated: 03/02/23 1931     Blood Culture No growth at 5 days    Blood Culture - Blood, Hand, Right [[id number]]  (Normal) Collected: 02/25/23 1900    Lab Status: Final result Specimen: Blood from Hand, Right Updated: 03/02/23 1931     Blood Culture No growth at 5 days    Urine Culture - Urine, Urine, Clean Catch [[id number]]  (Abnormal)  (Susceptibility) Collected: 02/25/23 1428    Lab Status: Final result Specimen: Urine, Clean Catch Updated: 02/27/23 0837     Urine Culture >100,000 CFU/mL Escherichia coli      50,000 CFU/mL Proteus penneri    Narrative:      Colonization of the urinary tract without infection is common. Treatment is discouraged  unless the patient is symptomatic, pregnant, or undergoing an invasive urologic procedure.      Susceptibility      Escherichia coli      PRISCA      Ampicillin Susceptible      Ampicillin + Sulbactam Susceptible      Cefazolin Susceptible      Cefepime Susceptible      Ceftazidime Susceptible      Ceftriaxone Susceptible      Gentamicin Susceptible      Levofloxacin Susceptible      Nitrofurantoin Susceptible      Piperacillin + Tazobactam Susceptible      Trimethoprim + Sulfamethoxazole Susceptible                       Susceptibility      Proteus penneri      PRISCA      Ampicillin Resistant     Ampicillin + Sulbactam Intermediate      Cefazolin Resistant     Cefepime Susceptible      Ceftazidime Susceptible      Ceftriaxone Susceptible      Gentamicin Susceptible      Levofloxacin Susceptible      Nitrofurantoin Resistant     Piperacillin + Tazobactam Susceptible      Trimethoprim + Sulfamethoxazole Susceptible                           Clostridioides difficile Toxin - Stool, Per Rectum [[id number]]  (Normal) Collected: 02/24/23 2008    Lab Status: Final result Specimen: Stool from Per Rectum Updated: 02/24/23 2054    Narrative:      The following orders were created for panel order Clostridioides difficile Toxin - Stool, Per Rectum.  Procedure                               Abnormality         Status                     ---------                               -----------         ------                     Clostridioides difficile...[[id number]]  Normal              Final result                 Please view results for these tests on the individual orders.    Clostridioides difficile EIA - Stool, Per Rectum [[id number]]  (Normal) Collected: 02/24/23 2008    Lab Status: Final result Specimen: Stool from Per Rectum Updated: 02/24/23 2054     C Diff GDH Ag Negative     C.diff Toxin Ag Negative    Narrative:      The result indicates the absence of toxigenic C.difficile from stool specimen.          Radiology:  CT Abdomen  Pelvis With & Without Contrast    Result Date: 3/2/2023  1. Findings of the chest are reported separately 2. Percutaneous gastrostomy tube projects in expected position.  There is a small complex fluid collection along the anterior abdominal wall with possible extension along the margin of the stomach. Findings are likely postsurgical in nature. The sterility of the collection is indeterminate. Recommend continued follow-up as clinically indicated 3. Small amount of fluid with possible developing enhancement within the pelvis. Findings could represent a developing abscess in the correct setting. 4. Mild wall thickening and stranding along the distal rectum. Findings may represent underlying proctitis, colitis. Small amount of fluid and liquid stool noted throughout the colon. Inflammatory changes noted on the prior study are less prominent.  Electronically Signed: Heriberto Lopez  3/2/2023 7:30 PM EST  Workstation ID: [id number]    CT Abdomen Pelvis Without Contrast    Result Date: 2/26/2023  1.  New small left pleural effusion.    2.  Stable mucosal thickening and inflammation of the gastric antrum. Authenticated and Electronically Signed by Bc Jaramillo MD on 02/26/2023 05:58:50 PM    CT Head Without Contrast    Result Date: 2/25/2023  Moderate patchy decreased attenuation of the deep white matter consistent with chronic small vessel ischemia.  No definite infarction. Authenticated and Electronically Signed by Bc Jaramillo MD on 02/25/2023 07:16:12 PM    CT Abdomen Pelvis With Contrast    Result Date: 2/23/2023  Findings are compatible with gastritis. Authenticated and Electronically Signed by Maximus Calles MD on 02/23/2023 09:58:13 PM    XR Chest 1 View    Result Date: 3/2/2023  Impression: Left basilar atelectasis or pneumonia with possible subpulmonic pleural effusion Electronically Signed: Ari Landry  3/2/2023 4:25 PM EST  Workstation ID: [id number]    XR Chest 1 View    Result Date: 2/26/2023  No acute  cardiopulmonary process Authenticated and Electronically Signed by Bc Jaramillo MD on 02/26/2023 07:13:03 PM    XR Chest 1 View    Result Date: 2/24/2023  Mild right basilar atelectasis with probable small pleural effusions. Authenticated and Electronically Signed by Nghia Pena M.D. on 02/24/2023 06:28:30 PM    XR Chest 1 View    Result Date: 2/24/2023  No acute findings.  This report was signed and finalized on 2/24/2023 9:09 AM by Linus Nash MD.    CT Angiogram Chest    Result Date: 3/2/2023  1. No evidence of pulmonary metastases 2. Small bilateral pleural effusions and minimal dependent opacities slightly increased in comparison 3. A few scattered groundglass opacities likely postinflammatory or infectious. Recommend follow-up to document resolution Electronically Signed: Heriberto John  3/2/2023 7:15 PM EST  Workstation ID: [id number]    I personally reviewed the above CT chest and abdomen images.  Tiny groundglass opacity at right base but lungs otherwise clear      Impression:   1. GI bleed  2. Possible intra-abdominal infection: per radiology on admission CT but no clear findings of abscess. WBC normal and afebrile.   3. Mild wall thickening of distal rectum, possible proctitis: Concerning with recent C. difficile however no copious stool output reported by family prior to readmission.  C. difficile testing was negative during last admission at [facility].  Will follow  4. Fluid collection around PEG on imaging: Doubt infection based on benign external exam and no signs/symptoms of tenderness, drainage  5. Recent E coli and Proteus UTI: repeat UA improved.  No signs of pyelonephritis on CT per radiology   6. Recent severe C difficile colitis in Jan 2023: high risk for relapse  7. 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 Left tonsillar squamous cell cancer  9. CKD stage 3B  10. Acute encephalopathy on top of chronic dementia  11. Hx of DVT    PLAN:   - Follow-up pending blood cultures  -  imaging reviewed  - Follow CBC, CMP    - Stop IV vancomycin and Zosyn.    - Start IV ceftriaxone 2 gm daily and IV flagyl 500 mg q8h  - Continue PO vancomycin for C. difficile prophylaxis while on broad-spectrum antibiotics      Complex medical decision making.  Discussed with family at bedside.  Goals of care discussion, palliative evaluation also ongoing.  Call if any acute new concerns, positive culture, or further decompensation over the weekend; otherwise I will reevaluate on Monday.     Daren Palacios MD  3/3/2023

## 2023-03-03 NOTE — H&P
[facility] Services  HISTORY AND PHYSICAL    Patient Name: Vesta Landry  : 1945  MRN: [id number]  Primary Care Physician: Bossman Pedraza MD  Date of admission: 3/2/2023    Subjective   Subjective     Chief Complaint:  Dark tarry stools    HPI:  Vesta Landry is a 77 y.o. female with a past medical history significant for essential hypertension, hyperlipidemia, dementia, clostridium difficile colitis, left tonsillar squamous cell cancer, HFpEF, CKD III and gout presents to the ED accompanied by family due to dark tarry coffee ground stools that began today.  Patient was recently admitted to [facility] with complaints of shoulder pain and shortness of air.  She was diagnosed with NSTEMI  And UTI.  She was started on therapeutic lovenox, ASA and plavix.  While hospitalized patient developed hematemesis and cardiology decided to manage medically after discussing with family.  She was started on PPI and carafate.  She was treated with Rocephin for her UTI and discharged home on cipro.  Patient was discharged home yesterday from [facility].  Daughters at bedside report today she had two to three dark tarry coffee ground colored stools prompting their arrival to the ED tonight.  They deny any ongoing hematemesis.  They further deny any shortness of air, cough, chest pain, dizziness, lightheadedness or palpitations.  Family does state that the patient has not been acting herself lately.  With her underlying dementia they report at baseline she is almost combative but has been even more so confused lately.  They additionally report a temp of 100.1 this morning.  Patients last chemotherapy treatment was last Wednesday and everything is on hold for now.  CT of abdomen and pelvis obtained tonight reveals a small complex fluid collection along the anterior abdominal wall with possible extension along the margin of the stomach.  Findings are likely postsurgical in  nature.  The sterility of the collection is indeterminate.  Small amount of fluid with possible developing enhancement within the pelvis.  Findings could represent developing abscess in the correct setting.  Mild wall thickening and stranding along the distal rectum.  Findings may represent underlying proctitis, colitis.  Findings were discussed with family.  Patient will be admitted to [facility] under the care of the hospitalist for further evaluation and treatment.        Review of Systems   Unable to perform ROS: Dementia        All other systems reviewed and are negative.     Personal History     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   • Deep vein thrombosis (HCC)     bilateral- daughter states on 2/17/23 that she doesn't remember patient having this   • Dementia (HCC)    • Dysphagia    • GERD (gastroesophageal reflux disease)    • Hyperlipidemia    • Hypertension    • Impaired mobility    • PONV (postoperative nausea and vomiting)    • SOB (shortness of breath)     following chemo infusion on day of infusion last week (week of 1/29-2/4/23) per pt's daughter   • Vitamin D deficiency    • Wears dentures     uppers - instructed not to use adhesive DOS         Oncology Problem List:  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) (01/30/2023; Status:   Active)  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) (01/03/2023; Status:   Active)    Oncology/Hematology History   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)   1/3/2023 Initial Diagnosis    Metastatic 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 (HCC)     1/18/2023 Cancer Staged    Staging form: Pharynx - HPV-Mediated Oropharynx, AJCC 8th Edition  - Clinical stage from 1/18/2023: Stage I (cT2, cN1, cM0, p16: Unknown, HPV: Unknown) - Signed by Jennifer Lora MD on 1/18/2023 1/27/2023 -  Chemotherapy    OP SUPPORTIVE  HYDRATION + ANTIEMETICS     1/31/2023 -  Chemotherapy    OP HEAD & NECK Cetuximab + XRT     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)   1/30/2023 Initial Diagnosis    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)     2/9/2023 -  Chemotherapy    OP CENTRAL VENOUS ACCESS DEVICE ACCESS, CARE, AND MAINTENANCE (CVAD)         Past Surgical History:   Procedure Laterality Date   • APPENDECTOMY     • CATARACT EXTRACTION     • CHOLECYSTECTOMY     • COLONOSCOPY      Approx 2009   • ENDOSCOPY W/ PEG TUBE PLACEMENT N/A 2/20/2023    Procedure: ESOPHAGOGASTRODUODENOSCOPY WITH PERCUTANEOUS ENDOSCOPIC GASTROSTOMY TUBE INSERTION;  Surgeon: Brigido Guerra MD;  Location: [facility] ENDOSCOPY;  Service: Gastroenterology;  Laterality: N/A;   • HYSTERECTOMY  1991   • OOPHORECTOMY Bilateral 1991   • PORTACATH PLACEMENT Right 02/08/2023    Procedure: INSERTION OF PORTACATH;  Surgeon: Brigido Guerra MD;  Location: [facility] OR;  Service: General;  Laterality: Right;   • TUBAL ABDOMINAL LIGATION         Family History:  family history includes Breast cancer in her maternal aunt and paternal aunt; Cancer in her father and sister; Heart failure in her mother; Hyperlipidemia in her mother; Hypertension in her mother; Stroke in her sister. Otherwise pertinent FHx was reviewed and unremarkable.     Social History:  reports that she quit smoking about 36 years ago. Her smoking use included cigarettes. She has a 1.50 pack-year smoking history. She has never used smokeless tobacco. She reports that she does not drink alcohol and does not use drugs.  Social History     Social History Narrative   • Not on file       Medications:  HYDROcodone-acetaminophen, Lidocaine Viscous HCl, allopurinol, amLODIPine, atorvastatin, ciprofloxacin, fluconazole, furosemide, hydrALAZINE, isosorbide dinitrate, lactobacillus acidophilus, lidocaine-prilocaine, losartan, magic mouthwash oral suspension, metoclopramide, metoprolol tartrate, mirtazapine, mometasone,  nitroglycerin, nystatin, omeprazole, ondansetron, senna, spironolactone, and sucralfate    Allergies   Allergen Reactions   • Trazodone Confusion       Objective   Objective     Vital Signs:   Temp:  [97.3 °F (36.3 °C)-97.9 °F (36.6 °C)] 97.9 °F (36.6 °C)  Heart Rate:  [70-87] 82  Resp:  [16] 16  BP: (128-178)/(70-83) 172/83    Physical Exam  Vitals and nursing note reviewed.   Constitutional:       General: She is not in acute distress.     Appearance: Normal appearance. She is not ill-appearing, toxic-appearing or diaphoretic.   HENT:      Head: Normocephalic and atraumatic.      Nose: Nose normal.      Mouth/Throat:      Comments: Ulcerations noted on tongue and throughout mouth.  Eyes:      Extraocular Movements: Extraocular movements intact.      Pupils: Pupils are equal, round, and reactive to light.   Cardiovascular:      Rate and Rhythm: Normal rate and regular rhythm.      Pulses: Normal pulses.      Heart sounds: Normal heart sounds.   Pulmonary:      Effort: Pulmonary effort is normal.      Breath sounds: Normal breath sounds.   Abdominal:      General: Bowel sounds are normal. There is no distension.      Palpations: Abdomen is soft. There is no mass.      Tenderness: There is no abdominal tenderness. There is no right CVA tenderness, left CVA tenderness, guarding or rebound.      Hernia: No hernia is present.      Comments: Feeding tube noted   Musculoskeletal:         General: No swelling, tenderness, deformity or signs of injury. Normal range of motion.      Cervical back: Normal range of motion.      Right lower leg: Edema present.      Left lower leg: Edema present.   Skin:     General: Skin is dry.   Neurological:      Mental Status: She is alert. Mental status is at baseline. She is disoriented.   Psychiatric:         Mood and Affect: Mood normal.         Behavior: Behavior normal.         Result Review:  I have personally reviewed the results from the time of this admission to 3/2/2023 22:12 EST  and agree with these findings:  [x]  Laboratory list / accordion  [x]  Microbiology  [x]  Radiology  [x]  EKG/Telemetry   []  Cardiology/Vascular   []  Pathology  []  Old records  []  Other:  Most notable findings include:     LAB RESULTS:      Lab 03/02/23  1601 02/28/23  0628 02/27/23  0601 02/26/23  1608 02/26/23  0612 02/25/23  1918   WBC 7.05 5.19 5.13  --  5.89 6.61   HEMOGLOBIN 9.9* 9.1* 8.7* 10.2* 8.9* 9.6*   HEMATOCRIT 31.8* 29.1* 27.7* 31.3* 27.7* 30.1*   PLATELETS 214 155 136*  --  129* 128*   NEUTROS ABS 5.07 4.01 4.06  --  5.04 5.61   IMMATURE GRANS (ABS) 0.08* 0.03 0.05  --  0.03 0.04   LYMPHS ABS 1.10 0.43* 0.41*  --  0.28* 0.43*   MONOS ABS 0.59 0.50 0.48  --  0.41 0.44   EOS ABS 0.18 0.19 0.11  --  0.11 0.07   MCV 93.3 93.3 92.3  --  93.0 92.0   LACTATE 0.8 0.6  --   --   --   --    PROTIME  --   --   --  15.0*  --   --          Lab 03/02/23  1601 03/01/23  0522 02/28/23  0628 02/27/23  0601 02/26/23  0612 02/25/23  1900 02/24/23  1725   SODIUM 136  --  142 141 139 139 139   POTASSIUM 3.6  --  3.3* 3.1* 3.3* 3.6 3.7   CHLORIDE 101  --  105 104 103 102 105   CO2 23.0  --  25.3 28.0 27.5 25.7 24.9   ANION GAP 12.0  --  11.7 9.0 8.5 11.3 9.1   BUN 15  --  16 16 18 18 26*   CREATININE 0.77  --  0.90 0.89 0.96 0.93 1.01*   EGFR 78.6  --  65.2 66.0 60.3 62.6 56.7*   GLUCOSE 112*  --  97 116* 148* 131* 139*   CALCIUM 8.5*  --  7.9* 7.9* 7.8* 8.1* 8.2*   MAGNESIUM  --  2.4 1.6  --   --   --  1.6         Lab 03/02/23  1601 02/24/23  0536   TOTAL PROTEIN 6.4 5.6*   ALBUMIN 3.2* 3.2*   GLOBULIN 3.2 2.4   ALT (SGPT) 26 9   AST (SGOT) 39* 18   BILIRUBIN 0.3 0.4   ALK PHOS 86 87   LIPASE 48  --          Lab 03/02/23  1601 02/26/23  1608 02/24/23  1725 02/24/23  0536   HSTROP T 63*  --  137* 138*   PROTIME  --  15.0*  --   --    INR  --  1.14*  --   --                  Brief Urine Lab Results  (Last result in the past 365 days)      Color   Clarity   Blood   Leuk Est   Nitrite   Protein   CREAT   Urine HCG         03/02/23 1617 Yellow   Clear   Negative   Trace   Negative   Negative               Microbiology Results (last 10 days)     Procedure Component Value - Date/Time    COVID PRE-OP / PRE-PROCEDURE SCREENING ORDER (NO ISOLATION) - Swab, Nasopharynx [[id number]]  (Normal) Collected: 03/02/23 1607    Lab Status: Final result Specimen: Swab from Nasopharynx Updated: 03/02/23 1651    Narrative:      The following orders were created for panel order COVID PRE-OP / PRE-PROCEDURE SCREENING ORDER (NO ISOLATION) - Swab, Nasopharynx.  Procedure                               Abnormality         Status                     ---------                               -----------         ------                     COVID-19 and FLU A/B PCR...[[id number]]  Normal              Final result                 Please view results for these tests on the individual orders.    COVID-19 and FLU A/B PCR - Swab, Nasopharynx [[id number]]  (Normal) Collected: 03/02/23 1607    Lab Status: Final result Specimen: Swab from Nasopharynx Updated: 03/02/23 1651     COVID19 Not Detected     Influenza A PCR Not Detected     Influenza B PCR Not Detected    Narrative:      Fact sheet for providers: https://www.fda.gov/media/070758/download    Fact sheet for patients: https://www.fda.gov/media/801157/download    Test performed by PCR.    COVID PRE-OP / PRE-PROCEDURE SCREENING ORDER (NO ISOLATION) - Swab, Nasopharynx [[id number]]  (Normal) Collected: 02/26/23 1907    Lab Status: Final result Specimen: Swab from Nasopharynx Updated: 02/26/23 1936    Narrative:      The following orders were created for panel order COVID PRE-OP / PRE-PROCEDURE SCREENING ORDER (NO ISOLATION) - Swab, Nasopharynx.  Procedure                               Abnormality         Status                     ---------                               -----------         ------                     COVID-19 and FLU A/B PCR...[[id number]]  Normal              Final result                 Please view results  for these tests on the individual orders.    COVID-19 and FLU A/B PCR - Swab, Nasopharynx [[id number]]  (Normal) Collected: 02/26/23 1907    Lab Status: Final result Specimen: Swab from Nasopharynx Updated: 02/26/23 1936     COVID19 Not Detected     Influenza A PCR Not Detected     Influenza B PCR Not Detected    Narrative:      Fact sheet for providers: https://www.fda.gov/media/678613/download    Fact sheet for patients: https://www.fda.gov/media/985365/download    Test performed by PCR.    Blood Culture - Blood, Arm, Right [[id number]]  (Normal) Collected: 02/25/23 1918    Lab Status: Final result Specimen: Blood from Arm, Right Updated: 03/02/23 1931     Blood Culture No growth at 5 days    Blood Culture - Blood, Hand, Right [[id number]]  (Normal) Collected: 02/25/23 1900    Lab Status: Final result Specimen: Blood from Hand, Right Updated: 03/02/23 1931     Blood Culture No growth at 5 days    Urine Culture - Urine, Urine, Clean Catch [[id number]]  (Abnormal)  (Susceptibility) Collected: 02/25/23 1428    Lab Status: Final result Specimen: Urine, Clean Catch Updated: 02/27/23 0837     Urine Culture >100,000 CFU/mL Escherichia coli      50,000 CFU/mL Proteus penneri    Narrative:      Colonization of the urinary tract without infection is common. Treatment is discouraged unless the patient is symptomatic, pregnant, or undergoing an invasive urologic procedure.      Susceptibility      Escherichia coli      PRISCA      Ampicillin Susceptible      Ampicillin + Sulbactam Susceptible      Cefazolin Susceptible      Cefepime Susceptible      Ceftazidime Susceptible      Ceftriaxone Susceptible      Gentamicin Susceptible      Levofloxacin Susceptible      Nitrofurantoin Susceptible      Piperacillin + Tazobactam Susceptible      Trimethoprim + Sulfamethoxazole Susceptible                       Susceptibility      Proteus penneri      PRISCA      Ampicillin Resistant     Ampicillin + Sulbactam Intermediate      Cefazolin  Resistant     Cefepime Susceptible      Ceftazidime Susceptible      Ceftriaxone Susceptible      Gentamicin Susceptible      Levofloxacin Susceptible      Nitrofurantoin Resistant     Piperacillin + Tazobactam Susceptible      Trimethoprim + Sulfamethoxazole Susceptible                           Clostridioides difficile Toxin - Stool, Per Rectum [[id number]]  (Normal) Collected: 02/24/23 2008    Lab Status: Final result Specimen: Stool from Per Rectum Updated: 02/24/23 2054    Narrative:      The following orders were created for panel order Clostridioides difficile Toxin - Stool, Per Rectum.  Procedure                               Abnormality         Status                     ---------                               -----------         ------                     Clostridioides difficile...[[id number]]  Normal              Final result                 Please view results for these tests on the individual orders.    Clostridioides difficile EIA - Stool, Per Rectum [[id number]]  (Normal) Collected: 02/24/23 2008    Lab Status: Final result Specimen: Stool from Per Rectum Updated: 02/24/23 2054     C Diff GDH Ag Negative     C.diff Toxin Ag Negative    Narrative:      The result indicates the absence of toxigenic C.difficile from stool specimen.          CT Abdomen Pelvis With & Without Contrast    Result Date: 3/2/2023  CT ABDOMEN PELVIS W WO CONTRAST Date of Exam: 3/2/2023 6:40 PM EST Indication: Tonsillar cancer.  Dark sticky stools.  Recent feeding tube placement.. Comparison: 1/20/2023 Technique: Axial CT images were obtained of the abdomen and pelvis before and after the uneventful intravenous administration of 100 mL Isovue-370. Sagittal and coronal reconstructions were performed.  Automated exposure control and iterative reconstruction methods were used. FINDINGS: Abdomen/Pelvis: Lower Chest: Findings of the chest are reported separately Organs: Patient is status post cholecystectomy.  Liver, spleen, pancreas  and adrenal glands are grossly. There are some cortical scarring of the kidneys right greater than left. Kidneys are otherwise unremarkable GI/Bowel: There is a percutaneous gastrostomy tube noted within the body of the stomach. Very small air-fluid collection along the anterior abdominal wall is noted measuring approximately 12 x 7 mm adjacent to the insertion site. Findings likely postoperative in nature. There is a small amount of fluid tracking along the body of the stomach. Small bowel demonstrates no evidence of obstruction. The patient is status post appendectomy. The ileocecal valve appears unremarkable. The majority of colon demonstrates some air-fluid levels without evidence of distention or significant wall thickening. Mild stranding is noted within the pelvis which is nonspecific. Question mild enhancement of the fluid collections without complete loculation. There is mild wall thickening of the colon involving the distal rectum. Pelvis: There is a small amount of fluid within the urinary bladder. Patient is status post hysterectomy. Ovaries are not visualized. Peritoneum/Retroperitoneum: The aorta is normal in caliber. There is atelectatic changes. No suspicious retroperitoneal Bones/Soft Tissues: No destructive bone lesion is noted. Underlying scoliotic curvature the spine is noted. Multilevel degenerative changes are noted     Impression: 1. Findings of the chest are reported separately 2. Percutaneous gastrostomy tube projects in expected position.  There is a small complex fluid collection along the anterior abdominal wall with possible extension along the margin of the stomach. Findings are likely postsurgical in nature. The sterility of the collection is indeterminate. Recommend continued follow-up as clinically indicated 3. Small amount of fluid with possible developing enhancement within the pelvis. Findings could represent a developing abscess in the correct setting. 4. Mild wall thickening and  stranding along the distal rectum. Findings may represent underlying proctitis, colitis. Small amount of fluid and liquid stool noted throughout the colon. Inflammatory changes noted on the prior study are less prominent.  Electronically Signed: Heribertojono Lopez  3/2/2023 7:30 PM EST  Workstation ID: [id number]    XR Chest 1 View    Result Date: 3/2/2023  XR CHEST 1 VW Date of Exam: 3/2/2023 3:52 PM EST Indication: fever. recent aspiration pneumonia.. Comparison: 1/19/2023 Findings: Rotated to the left. There is a right subclavian port. Heart size is probably normal. Pulmonary vasculature is within normal limits. Right lung is grossly clear other than mild atelectasis in the base. Airspace disease in the left lung base with possible  subpulmonic fluid     Impression: Impression: Left basilar atelectasis or pneumonia with possible subpulmonic pleural effusion Electronically Signed: Ari Landry  3/2/2023 4:25 PM EST  Workstation ID: [id number]    CT Angiogram Chest    Result Date: 3/2/2023  CT ANGIOGRAM CHEST Date of Exam: 3/2/2023 6:40 PM EST Indication: pe tonsillar cancer.  Recent aspiration pneumonia. Comparison: 1/19/2023 Technique: CTA of the chest was performed after the uneventful intravenous administration of intravenous Isovue. Reconstructed coronal and sagittal images were also obtained. In addition, a 3-D volume rendered image was created for interpretation. Automated exposure control and iterative reconstruction methods were used.  FINDINGS: [ ] Pulmonary Arteries: Pulmonary arteries are adequately opacified for evaluation. No evidence of intraluminal filling defect to suggest pulmonary embolism. Main pulmonary artery is normal in caliber. Mediastinum: Heart size is within normal limits. There is no suspicious pericardial effusion. No suspicious hilar adenopathy is noted. Small paratracheal lymph nodes are noted. Right paratracheal node with a short axis. 6 mm is not significantly changed comparison. Limited  imaging of the base of the neck is trace no acute abnormality on limited imaging. There is a right subclavian port is in place. The aorta and the origin of great vessels and trace no acute process. The esophagus is grossly marked appearance. Lungs/pleura: There is a small left-sided pleural effusion and trace amount of pleural fluid right increase in the comparison. Minimal dependent opacities are noted at the lung bases. There is some vascular crowding accentuated by relatively low lung lines. The central airways are patent. Small groundglass nodular opacity just posterior to the fissure and this appears segment left lower lobe appears grossly stable although somewhat less confluence. No suspicious new nodules noted Upper Abdomen: Findings in the abdomen and pelvis are reported separately Soft tissues/Bones: No acute bone or soft tissue abnormality.     Impression: 1. No evidence of pulmonary metastases 2. Small bilateral pleural effusions and minimal dependent opacities slightly increased in comparison 3. A few scattered groundglass opacities likely postinflammatory or infectious. Recommend follow-up to document resolution Electronically Signed: Heriberto Lopez  3/2/2023 7:15 PM EST  Workstation ID: [id number]      Results for orders placed during the hospital encounter of 02/23/23    Adult Transthoracic Echo Limited W/ Cont if Necessary Per Protocol    Interpretation Summary  •  Left ventricular wall thickness is consistent with mild concentric hypertrophy.  •  Left ventricular diastolic function is consistent with age.  •  Estimated right ventricular systolic pressure from tricuspid regurgitation is markedly elevated (>55 mmHg).  •  Moderate to severe pulmonary hypertension is present.      Assessment & Plan   Assessment & Plan       Gastrointestinal hemorrhage, unspecified gastrointestinal hemorrhage type    HTN (hypertension), benign    Chronic diastolic heart failure (HCC)    Squamous cell carcinoma of left  tonsil (HCC)    HLD (hyperlipidemia)    History of Clostridium difficile colitis      77-year-old female with known left tonsillar cancer following with Dr. Lora presents the ED with dark tarry coffee-ground stools that were noted today.    1) gastrointestinal hemorrhage      - CT of abdomen and pelvis as mentioned above  - Consult GI and colorectal in a.m.  - Blood cultures x2 pending  - Occult stool pending  - H&H currently 9.9 and 31.8  - Type and screen  - Consider general surgery consult to evaluate fluid collection around feeding tube site.  - N.p.o.  - Trend H&H  - PPI twice daily    2)  ? Proctitis versus colitis with possible pelvic abscess       History of C. difficile colitis January 2023  - Start Zosyn, due to recent C. difficile colitis will start oral vancomycin 4 times daily  - Consult ID in a.m., ? Need for CT guided drain  - IV fluids  - Check procalcitonin  - Lactic 0.8  - Consult colorectal in a.m.    3) Fluid collection to anterior abd at G tube site  --? Bleed vs infection  --Gi consult, may need general surgery to eval in am.     4) squamous cell carcinoma of left tonsil  - Follows with Dr. Lora  -Diagnosed December 2022  - Has received 4 cycles of Erbitux.  Last infusion was last Wednesday.  On hold for now  - Last radiation treatment was last Thursday.  --many medical issues currently complicating her care. Consider palliative consult in am to further discuss goals of care with patient and family    5) Mucositis/Stomatis   -Continue Magic mouthwash  - Per family feeding tube was placed approximately 11 days ago  -Consult nutrition in a.m.  --? Reaction to chemotherapy    6) essential hypertension  - On Norvasc, hydralazine and Cozaar    7) hyperlipidemia  -Continue statin      DVT prophylaxis:  scds    CODE STATUS:  Full Code        Expected Discharge  TBD      This note has been completed as part of a split-shared workflow.     Signature: Electronically signed by LADARIUS Rodrigues  03/02/23, 10:34 PM EST.      Total time spent:70 mins  Time spent includes time reviewing chart, face-to-face time, counseling patient/family/caregiver, ordering medications/tests/procedures, communicating with other health care professionals, documenting clinical information in the electronic health record, and coordination of care.          Attending   Admission Attestation       I have performed an independent face-to-face diagnostic evaluation including performing an independent physical examination as documented here.  The documented plan of care above was reviewed and developed with the advanced practice clinician (APC).      Brief Summary Statement:   Vesta Landry is a 77 y.o. 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  While admitted and on therapeutic Lovenox, aspirin, and Plavix, the patient developed upper GI bleed.  G-tube was placed approximately 11 days ago.  Patient was treated medically for her GI bleed with PPI and Carafate.  She was discharged with ciprofloxacin.  Patient now with coffee-ground emesis and dark stool at home.  Mar reports low-grade temp of 100.1 last night.  No further bright red hematemesis like she had in the hospital the other day.  She denies any abdominal pain, chest pain, or dizziness.      Patient with multiple findings on her CT including possible abscess involving anterior abdominal wall near insertion site of G-tube, possible pelvic abscess, and proctitis/colitis of the distal colon.  She does have recent history of C. difficile colitis.  Family denies any watery diarrhea recently.  Patient denies any abdominal pain      Remainder of detailed HPI is as noted by APC and has been reviewed and/or edited by me for completeness.    Attending Physical  Exam:  Temp:  [97.3 °F (36.3 °C)-97.9 °F (36.6 °C)] 97.9 °F (36.6 °C)  Heart Rate:  [70-87] 82  Resp:  [16] 16  BP: (128-178)/(70-83) 172/83    Constitutional: Awake, alert appears chronically ill  Eyes: PERRLA, sclerae anicteric, no conjunctival injection  HENT: NCAT, mucous membranes dry, multiple blistering lesions to tongue and mouth  Neck: Supple, no thyromegaly, no lymphadenopathy, trachea midline  Respiratory: Clear to auscultation bilaterally, nonlabored respirations   Cardiovascular: RRR, no murmurs, rubs, or gallops, palpable pedal pulses bilaterally  Gastrointestinal: Positive bowel sounds, soft, nontender, nondistended  Musculoskeletal: No bilateral ankle edema, no clubbing or cyanosis to extremities  Psychiatric: Appropriate affect, cooperative  Neurologic: Oriented x 3, strength symmetric in all extremities, Cranial Nerves grossly intact to confrontation, speech clear  Skin: No rashes      Brief Assessment/Plan :  See detailed assessment and plan developed with APC which I have reviewed and/or edited for completeness.            Tay Canada DO  03/02/23  Total time spent: 40  Time spent includes time reviewing chart, face-to-face time, counseling patient/family/caregiver, ordering medications/tests/procedures, communicating with other health care professionals, documenting clinical information in the electronic health record, and coordination of care.

## 2023-03-03 NOTE — CONSULTS
[facility]    Referring Provider: No ref. provider found    Primary Care Provider: Bossman Pedraza MD    Reason for Consultation: Melena    Chief complaint : Dark tarry stools    History of present illness:  Vesta Landry is a 77 y.o. female who is admitted with dark tarry stools.  Family states her stools are dark but not sticky.  They did not have foul odor abdominal.  They describe more coffee-ground type material.  No nausea vomiting hematemesis.  She did have a PEG with T-fasteners placed on 2/22/2023.  There is been no bleeding around the PEG.  There has been no blood withdrawn from the PEG.  She denies abdominal pain.    Pres no history positive for tonsillar cancer for which she had radiation.  She has a history of C. difficile colitis January 20..  She was recently diagnosed with a non-STEMI as well as UTI.  CKD stage III, HFpEF and gout.      She has not been receiving much of her tube feeds due to intolerance of the formula.  This has been resolved.  She did have an episode of hematemesis at the time of her non-STEMI.  Decision at that time was to continue on PPI and Carafate.  The EGD performed February 20 did not show any ulcerations.      Allergies:  Trazodone    Scheduled Meds:  allopurinol, 100 mg, Per G Tube, Daily  amLODIPine, 10 mg, Per G Tube, Q24H  atorvastatin, 40 mg, Per G Tube, Nightly  cefTRIAXone, 2 g, Intravenous, Q24H  First Mouthwash (Magic Mouthwash), 10 mL, Swish & Spit, Q6H  hydrALAZINE, 100 mg, Per G Tube, Q8H  HYDROcodone-acetaminophen, 1 tablet, Oral, Q8H  isosorbide dinitrate, 40 mg, Per G Tube, TID - Nitrates  lactobacillus acidophilus, 1 capsule, Per G Tube, Daily With Breakfast & Dinner  losartan, 100 mg, Per G Tube, Daily  metoprolol tartrate, 100 mg, Per G Tube, Q12H  metroNIDAZOLE, 500 mg, Intravenous, Q8H  mirtazapine, 15 mg, Per G Tube, Nightly  nystatin, 5 mL, Swish & Spit, 4x Daily  nystatin, , Topical, TID  pantoprazole, 40 mg, Intravenous,  Q12H  sucralfate, 1 g, Per G Tube, 4x Daily  vancomycin, 125 mg, Oral, Q6H         Infusions:  Pharmacy Consult,         PRN Meds:  •  acetaminophen **OR** acetaminophen **OR** acetaminophen  •  Calcium Replacement - Initiate Nurse / BPA Driven Protocol  •  HYDROcodone-acetaminophen  •  Lidocaine Viscous HCl  •  Magnesium Standard Dose Replacement - Initiate Nurse / BPA Driven Protocol  •  palliative care oral rinse  •  Pharmacy Consult  •  Phosphorus Replacement - Initiate Nurse / BPA Driven Protocol  •  Potassium Replacement - Initiate Nurse / BPA Driven Protocol  •  Sodium Chloride (PF)    Home Meds:  Medications Prior to Admission   Medication Sig Dispense Refill Last Dose   • allopurinol (ZYLOPRIM) 100 MG tablet Take 1 tablet by mouth Daily. 90 tablet 3    • amLODIPine (NORVASC) 10 MG tablet Take 1 tablet by mouth Daily for 30 days. 30 tablet 0    • atorvastatin (LIPITOR) 40 MG tablet Take 1 tablet by mouth Every Night for 90 days. 90 tablet 0    • ciprofloxacin (Cipro) 500 MG tablet Take 1 tablet by mouth 2 (Two) Times a Day for 5 days. 10 tablet 0    • fluconazole (DIFLUCAN) 100 MG tablet Take 1 tablet by mouth Daily.      • furosemide (LASIX) 40 MG tablet Take 1 tablet by mouth Daily. swelling 90 tablet 3    • hydrALAZINE (APRESOLINE) 50 MG tablet Take 2 tablets by mouth Every 8 (Eight) Hours for 30 days. 180 tablet 0    • HYDROcodone-acetaminophen (NORCO) 7.5-325 MG per tablet Take 1 tablet by mouth Every 6 (Six) Hours As Needed for Moderate Pain for up to 2 days. 8 tablet 0    • isosorbide dinitrate (ISORDIL) 20 MG tablet Take 2 tablets by mouth 3 (Three) Times a Day for 30 days. 180 tablet 0    • lactobacillus acidophilus (RISAQUAD) capsule capsule Take 1 capsule by mouth twice daily. Take with Breakfast & Dinner. 11 capsule 0    • Lidocaine Viscous HCl (XYLOCAINE) 2 % solution Take 15 mL by mouth As Needed for Moderate Pain for up to 10 days. Do not use more than every 3 hours 100 mL 0    •  lidocaine-prilocaine (EMLA) 2.5-2.5 % cream Apply 1 application topically to the appropriate area as directed As Needed (45-60 minutes prior to port access.  Cover with saran/plastic wrap.). 30 g 3    • losartan (COZAAR) 100 MG tablet Take 1 tablet by mouth Daily. 90 tablet 1    • magic mouthwash oral suspension Swish and spit two teaspoons (10 mL) by mouth Every 6 (Six) Hours for 10 days. 390 mL 0    • metoclopramide (REGLAN) 5 MG tablet Take 1 tablet by mouth 3 (Three) Times a Day for 10 days. 30 tablet 0    • metoprolol tartrate (LOPRESSOR) 100 MG tablet Take 1 tablet by mouth Every 12 (Twelve) Hours for 30 days. 60 tablet 0    • mirtazapine (REMERON) 15 MG tablet Take 1 tablet by mouth Every Night.      • mometasone (ELOCON) 0.1 % cream mometasone 0.1 % topical cream   APPLY A THIN LAYER TO THE AFFECTED AREA(S) BY TOPICAL ROUTE ONCE DAILY after radiation treatment      • nitroglycerin (NITROSTAT) 0.4 MG SL tablet Place 1 tablet under the tongue Every 5 (Five) Minutes As Needed for Chest Pain. Take no more than 3 doses in 15 minutes. 50 tablet 0    • nystatin (MYCOSTATIN) 100,000 unit/mL suspension Swish and spit one teaspoon (5 mL) by mouth 4 (Four) Times a Day 437 mL 0    • nystatin (MYCOSTATIN) 771711 UNIT/GM powder Apply  topically to the appropriate area as directed 3 (Three) Times a Day. 60 g 0    • omeprazole (priLOSEC) 40 MG capsule Take 1 capsule by mouth Daily for 30 days. 30 capsule 0    • ondansetron (Zofran) 8 MG tablet Take 1 tablet by mouth Every 8 (Eight) Hours As Needed for Nausea or Vomiting. 30 tablet 3    • senna (SENOKOT) 8.6 MG tablet Take 1 tablet by mouth Daily.      • spironolactone (ALDACTONE) 25 MG tablet Take 1 tablet by mouth Daily for 30 days. 30 tablet 0    • sucralfate (CARAFATE) 1 g tablet Take 1 tablet by mouth 4 (Four) Times a Day.          ROS: Review of Systems   Constitutional: Positive for appetite change and fever. Negative for chills.   Respiratory: Negative for shortness  "of breath.    Cardiovascular: Positive for chest pain.   Gastrointestinal: Positive for blood in stool. Negative for abdominal pain.       PAST MED HX: Pt  has a past medical history of Arthritis, Cancer (HCC), Cancer of tonsil (HCC), Deep vein thrombosis (HCC), Dementia (HCC), Dysphagia, GERD (gastroesophageal reflux disease), Hyperlipidemia, Hypertension, Impaired mobility, PONV (postoperative nausea and vomiting), SOB (shortness of breath), Vitamin D deficiency, and Wears dentures.  PAST SURG HX: Pt  has a past surgical history that includes Colonoscopy; Appendectomy; Cataract extraction; Cholecystectomy; Tubal ligation; Hysterectomy (1991); Oophorectomy (Bilateral, 1991); Portacath placement (Right, 02/08/2023); and Esophagogastroduodenoscopy w/ PEG (N/A, 2/20/2023).  FAM HX: family history includes Breast cancer in her maternal aunt and paternal aunt; Cancer in her father and sister; Heart failure in her mother; Hyperlipidemia in her mother; Hypertension in her mother; Stroke in her sister.  SOC HX: Pt  reports that she quit smoking about 36 years ago. Her smoking use included cigarettes. She has a 1.50 pack-year smoking history. She has never used smokeless tobacco. She reports that she does not drink alcohol and does not use drugs.    /64 (BP Location: Left arm, Patient Position: Lying)   Pulse 69   Temp 97.7 °F (36.5 °C) (Oral)   Resp 16   Ht 162.6 cm (64\")   Wt 68.4 kg (150 lb 12.8 oz)   SpO2 96%   BMI 25.88 kg/m²     Physical Exam  Wt Readings from Last 3 Encounters:   03/03/23 68.4 kg (150 lb 12.8 oz)   03/01/23 66.9 kg (147 lb 7.8 oz)   02/22/23 64.9 kg (143 lb)   ,body mass index is 25.88 kg/m².    General Well developed; well nourished; no acute distress.   ENT .  Oral mucosa pink & moist without thrush or lesions.    Neck Neck supple; trachea midline. No thyromegaly  Resp CTA; no rhonchi, rales, or wheezes.  Respiration effort normal  CV RRR; ; no M/R/G. No lower extremity edema  GI Abd " soft, NT, ND, normal active bowel sounds.  A PEG with bolster sutured to her skin.  Pledgets from T-fastener noted  Skin No rash; no lesions; no bruises.  Skin turgor normal  MSK No clubbing; no cyanosis.    Psych Oriented to time, place, and person.  Appropriate affect      Results Review:   I reviewed the patient's new clinical results.  I reviewed the patient's new imaging results and agree with the interpretation.    Lab Results   Component Value Date    WBC 5.32 03/03/2023    HGB 9.0 (L) 03/03/2023    HCT 27.5 (L) 03/03/2023    MCV 90.2 03/03/2023     03/03/2023       Lab Results   Component Value Date    GLUCOSE 78 03/03/2023    BUN 11 03/03/2023    CREATININE 0.78 03/03/2023    EGFRIFNONA 39 (L) 01/13/2022    EGFRIFAFRI 47 (L) 01/13/2022    BCR 14.1 03/03/2023    CO2 20.0 (L) 03/03/2023    CALCIUM 8.3 (L) 03/03/2023    PROTENTOTREF 6.9 11/16/2022    ALBUMIN 3.2 (L) 03/02/2023    LABIL2 2.0 11/16/2022    AST 39 (H) 03/02/2023    ALT 26 03/02/2023      Latest Reference Range & Units 02/26/23 16:08 02/27/23 06:01 02/28/23 06:28 03/02/23 16:01 03/03/23 00:29 03/03/23 03:29   Hemoglobin 12.0 - 15.9 g/dL 10.2 (L) 8.7 (L) 9.1 (L) 9.9 (L) 9.4 (L) 9.0 (L)   (L): Data is abnormally low   Latest Reference Range & Units 02/25/23 19:00 02/26/23 06:12 02/27/23 06:01 02/28/23 06:28 03/02/23 16:01 03/03/23 03:29   BUN 8 - 23 mg/dL 18 18 16 16 15 11     CT abdomen pelvis with/ithout contrast per radiologist report       FINDINGS:     Abdomen/Pelvis:     Lower Chest: Findings of the chest are reported separately      Organs: Patient is status post cholecystectomy.      Liver, spleen, pancreas and adrenal glands are grossly. There are some cortical scarring of the kidneys right greater than left. Kidneys are otherwise unremarkable     GI/Bowel: There is a percutaneous gastrostomy tube noted within the body of the stomach. Very small air-fluid collection along the anterior abdominal wall is noted measuring approximately  12 x 7 mm adjacent to the insertion site. Findings likely   postoperative in nature. There is a small amount of fluid tracking along the body of the stomach.     Small bowel demonstrates no evidence of obstruction.     The patient is status post appendectomy. The ileocecal valve appears unremarkable. The majority of colon demonstrates some air-fluid levels without evidence of distention or significant wall thickening. Mild stranding is noted within the pelvis which is   nonspecific. Question mild enhancement of the fluid collections without complete loculation. There is mild wall thickening of the colon involving the distal rectum.     Pelvis: There is a small amount of fluid within the urinary bladder. Patient is status post hysterectomy. Ovaries are not visualized.     Peritoneum/Retroperitoneum: The aorta is normal in caliber. There is atelectatic changes. No suspicious retroperitoneal     Bones/Soft Tissues: No destructive bone lesion is noted. Underlying scoliotic curvature the spine is noted. Multilevel degenerative changes are noted     IMPRESSION:     1. Findings of the chest are reported separately  2. Percutaneous gastrostomy tube projects in expected position.  There is a small complex fluid collection along the anterior abdominal wall with possible extension along the margin of the stomach. Findings are likely postsurgical in nature. The   sterility of the collection is indeterminate. Recommend continued follow-up as clinically indicated  3. Small amount of fluid with possible developing enhancement within the pelvis. Findings could represent a developing abscess in the correct setting.  4. Mild wall thickening and stranding along the distal rectum. Findings may represent underlying proctitis, colitis. Small amount of fluid and liquid stool noted throughout the colon. Inflammatory changes noted on the prior study are less prominent.                 Electronically Signed: Heriberto Lopez    3/2/2023 7:30  PM EST    Workstation ID: [id number]  ASSESSMENTS/PLANS    1.  Question melena  2.  Status post PEG placement  3.  Tonsillar squamous cell carcinoma currently under chemotherapy which has been on hold  4.  Abnormal CT scan      1.  Concerning the melena this does not appear to be melena.  Will continue to observe.  There has not been a fall in hemoglobin her BUN is stable.    CT scan does show some mild proctitis.  She has had C. difficile.  I do not feel this needs further evaluation at this time.    I suspect the changes seen on the CT right apex that are related to the PEG placement.  This did not appear to be infected.    We will continue to follow.  Resume tube feeds    I discussed the patient's findings and my recommendations with patient and family    Alphonse Tabares MD  03/03/23  16:26 EST

## 2023-03-03 NOTE — PROGRESS NOTES
"    [facility] Medicine Services  PROGRESS NOTE    Patient Name: Vesta Landry  : 1945  MRN: [id number]    Date of Admission: 3/2/2023  Primary Care Physician: Bossman Pedraza MD    Subjective   Subjective     CC:  Follow-up concern for GI bleed    HPI:  Patient admitted last night, seen at bedside this a.m. with daughter present.  After evaluation and discussion second daughter shows up and requests repeat update.  Patient herself denies acute complaints, denies pain, daughters at bedside say she intermittently has pain and they can tell by elevated blood pressure and patient wincing.  Patient having frequent \"granular\" bowel movements    ROS:  Gen- No fevers, chills  CV- No chest pain, palpitations  Resp- No cough, dyspnea  GI- No N/V/D, abd pain    Objective   Objective     Vital Signs:   Temp:  [97.7 °F (36.5 °C)-98.4 °F (36.9 °C)] 97.7 °F (36.5 °C)  Heart Rate:  [67-87] 69  Resp:  [16-18] 16  BP: (133-184)/(64-87) 133/64     Physical Exam:  Constitutional: Awake, alert, ill-appearing female laying in bed  HENT: NCAT, mucous membranes dry, mucositis  Respiratory: Clear to auscultation bilaterally, respiratory effort normal   Cardiovascular: RRR, palpable radial pulses  Gastrointestinal: Positive bowel sounds, soft, nontender, nondistended; G-tube in abdominal wall with slight surrounding erythema  Psychiatric: Appropriate affect, cooperative    Results Reviewed:  LAB RESULTS:      Lab 23  0329 23  0029 23  1601 23  0628 23  0601 23  1608 23  0612   WBC 5.32  --  7.05 5.19 5.13  --  5.89   HEMOGLOBIN 9.0* 9.4* 9.9* 9.1* 8.7* 10.2* 8.9*   HEMATOCRIT 27.5* 29.8* 31.8* 29.1* 27.7* 31.3* 27.7*   PLATELETS 164  --  214 155 136*  --  129*   NEUTROS ABS 3.66  --  5.07 4.01 4.06  --  5.04   IMMATURE GRANS (ABS) 0.14*  --  0.08* 0.03 0.05  --  0.03   LYMPHS ABS 0.93  --  1.10 0.43* 0.41*  --  0.28*   MONOS ABS 0.36  --  0.59 0.50 0.48  --  " 0.41   EOS ABS 0.21  --  0.18 0.19 0.11  --  0.11   MCV 90.2  --  93.3 93.3 92.3  --  93.0   PROCALCITONIN  --   --  0.18  --   --   --   --    LACTATE  --   --  0.8 0.6  --   --   --    PROTIME  --   --   --   --   --  15.0*  --          Lab 03/03/23  0329 03/02/23  1601 03/01/23  0522 02/28/23  0628 02/27/23  0601 02/26/23  0612   SODIUM 138 136  --  142 141 139   POTASSIUM 3.7 3.6  --  3.3* 3.1* 3.3*   CHLORIDE 103 101  --  105 104 103   CO2 20.0* 23.0  --  25.3 28.0 27.5   ANION GAP 15.0 12.0  --  11.7 9.0 8.5   BUN 11 15  --  16 16 18   CREATININE 0.78 0.77  --  0.90 0.89 0.96   EGFR 77.4 78.6  --  65.2 66.0 60.3   GLUCOSE 78 112*  --  97 116* 148*   CALCIUM 8.3* 8.5*  --  7.9* 7.9* 7.8*   MAGNESIUM  --   --  2.4 1.6  --   --          Lab 03/02/23  1601   TOTAL PROTEIN 6.4   ALBUMIN 3.2*   GLOBULIN 3.2   ALT (SGPT) 26   AST (SGOT) 39*   BILIRUBIN 0.3   ALK PHOS 86   LIPASE 48         Lab 03/02/23  1601 02/26/23  1608   HSTROP T 63*  --    PROTIME  --  15.0*   INR  --  1.14*             Lab 03/03/23  0029 03/02/23 2226   ABO TYPING O O   RH TYPING Positive Positive   ANTIBODY SCREEN  --  Negative         Brief Urine Lab Results  (Last result in the past 365 days)      Color   Clarity   Blood   Leuk Est   Nitrite   Protein   CREAT   Urine HCG        03/02/23 1617 Yellow   Clear   Negative   Trace   Negative   Negative                 Microbiology Results Abnormal     Procedure Component Value - Date/Time    COVID PRE-OP / PRE-PROCEDURE SCREENING ORDER (NO ISOLATION) - Swab, Nasopharynx [[id number]]  (Normal) Collected: 03/02/23 1607    Lab Status: Final result Specimen: Swab from Nasopharynx Updated: 03/02/23 9562    Narrative:      The following orders were created for panel order COVID PRE-OP / PRE-PROCEDURE SCREENING ORDER (NO ISOLATION) - Swab, Nasopharynx.  Procedure                               Abnormality         Status                     ---------                               -----------         ------                      COVID-19 and FLU A/B PCR...[[id number]]  Normal              Final result                 Please view results for these tests on the individual orders.    COVID-19 and FLU A/B PCR - Swab, Nasopharynx [[id number]]  (Normal) Collected: 03/02/23 1607    Lab Status: Final result Specimen: Swab from Nasopharynx Updated: 03/02/23 1651     COVID19 Not Detected     Influenza A PCR Not Detected     Influenza B PCR Not Detected    Narrative:      Fact sheet for providers: https://www.fda.gov/media/274666/download    Fact sheet for patients: https://www.fda.gov/media/893275/download    Test performed by PCR.          CT Abdomen Pelvis With & Without Contrast    Result Date: 3/2/2023  CT ABDOMEN PELVIS W WO CONTRAST Date of Exam: 3/2/2023 6:40 PM EST Indication: Tonsillar cancer.  Dark sticky stools.  Recent feeding tube placement.. Comparison: 1/20/2023 Technique: Axial CT images were obtained of the abdomen and pelvis before and after the uneventful intravenous administration of 100 mL Isovue-370. Sagittal and coronal reconstructions were performed.  Automated exposure control and iterative reconstruction methods were used. FINDINGS: Abdomen/Pelvis: Lower Chest: Findings of the chest are reported separately Organs: Patient is status post cholecystectomy.  Liver, spleen, pancreas and adrenal glands are grossly. There are some cortical scarring of the kidneys right greater than left. Kidneys are otherwise unremarkable GI/Bowel: There is a percutaneous gastrostomy tube noted within the body of the stomach. Very small air-fluid collection along the anterior abdominal wall is noted measuring approximately 12 x 7 mm adjacent to the insertion site. Findings likely postoperative in nature. There is a small amount of fluid tracking along the body of the stomach. Small bowel demonstrates no evidence of obstruction. The patient is status post appendectomy. The ileocecal valve appears unremarkable. The majority of colon  demonstrates some air-fluid levels without evidence of distention or significant wall thickening. Mild stranding is noted within the pelvis which is nonspecific. Question mild enhancement of the fluid collections without complete loculation. There is mild wall thickening of the colon involving the distal rectum. Pelvis: There is a small amount of fluid within the urinary bladder. Patient is status post hysterectomy. Ovaries are not visualized. Peritoneum/Retroperitoneum: The aorta is normal in caliber. There is atelectatic changes. No suspicious retroperitoneal Bones/Soft Tissues: No destructive bone lesion is noted. Underlying scoliotic curvature the spine is noted. Multilevel degenerative changes are noted     Impression: 1. Findings of the chest are reported separately 2. Percutaneous gastrostomy tube projects in expected position.  There is a small complex fluid collection along the anterior abdominal wall with possible extension along the margin of the stomach. Findings are likely postsurgical in nature. The sterility of the collection is indeterminate. Recommend continued follow-up as clinically indicated 3. Small amount of fluid with possible developing enhancement within the pelvis. Findings could represent a developing abscess in the correct setting. 4. Mild wall thickening and stranding along the distal rectum. Findings may represent underlying proctitis, colitis. Small amount of fluid and liquid stool noted throughout the colon. Inflammatory changes noted on the prior study are less prominent.  Electronically Signed: Heriberto Lopez  3/2/2023 7:30 PM EST  Workstation ID: [id number]    XR Chest 1 View    Result Date: 3/2/2023  XR CHEST 1 VW Date of Exam: 3/2/2023 3:52 PM EST Indication: fever. recent aspiration pneumonia.. Comparison: 1/19/2023 Findings: Rotated to the left. There is a right subclavian port. Heart size is probably normal. Pulmonary vasculature is within normal limits. Right lung is grossly  clear other than mild atelectasis in the base. Airspace disease in the left lung base with possible  subpulmonic fluid     Impression: Impression: Left basilar atelectasis or pneumonia with possible subpulmonic pleural effusion Electronically Signed: Ari Landry  3/2/2023 4:25 PM EST  Workstation ID: [id number]    CT Angiogram Chest    Result Date: 3/2/2023  CT ANGIOGRAM CHEST Date of Exam: 3/2/2023 6:40 PM EST Indication: pe tonsillar cancer.  Recent aspiration pneumonia. Comparison: 1/19/2023 Technique: CTA of the chest was performed after the uneventful intravenous administration of intravenous Isovue. Reconstructed coronal and sagittal images were also obtained. In addition, a 3-D volume rendered image was created for interpretation. Automated exposure control and iterative reconstruction methods were used.  FINDINGS: [ ] Pulmonary Arteries: Pulmonary arteries are adequately opacified for evaluation. No evidence of intraluminal filling defect to suggest pulmonary embolism. Main pulmonary artery is normal in caliber. Mediastinum: Heart size is within normal limits. There is no suspicious pericardial effusion. No suspicious hilar adenopathy is noted. Small paratracheal lymph nodes are noted. Right paratracheal node with a short axis. 6 mm is not significantly changed comparison. Limited imaging of the base of the neck is trace no acute abnormality on limited imaging. There is a right subclavian port is in place. The aorta and the origin of great vessels and trace no acute process. The esophagus is grossly marked appearance. Lungs/pleura: There is a small left-sided pleural effusion and trace amount of pleural fluid right increase in the comparison. Minimal dependent opacities are noted at the lung bases. There is some vascular crowding accentuated by relatively low lung lines. The central airways are patent. Small groundglass nodular opacity just posterior to the fissure and this appears segment left lower lobe  appears grossly stable although somewhat less confluence. No suspicious new nodules noted Upper Abdomen: Findings in the abdomen and pelvis are reported separately Soft tissues/Bones: No acute bone or soft tissue abnormality.     Impression: 1. No evidence of pulmonary metastases 2. Small bilateral pleural effusions and minimal dependent opacities slightly increased in comparison 3. A few scattered groundglass opacities likely postinflammatory or infectious. Recommend follow-up to document resolution Electronically Signed: Heriberto John  3/2/2023 7:15 PM EST  Workstation ID: [id number]      Results for orders placed during the hospital encounter of 02/23/23    Adult Transthoracic Echo Limited W/ Cont if Necessary Per Protocol    Interpretation Summary  •  Left ventricular wall thickness is consistent with mild concentric hypertrophy.  •  Left ventricular diastolic function is consistent with age.  •  Estimated right ventricular systolic pressure from tricuspid regurgitation is markedly elevated (>55 mmHg).  •  Moderate to severe pulmonary hypertension is present.      Current medications:  Scheduled Meds:allopurinol, 100 mg, Per G Tube, Daily  amLODIPine, 10 mg, Per G Tube, Q24H  atorvastatin, 40 mg, Per G Tube, Nightly  cefTRIAXone, 2 g, Intravenous, Q24H  First Mouthwash (Magic Mouthwash), 10 mL, Swish & Spit, Q6H  hydrALAZINE, 100 mg, Per G Tube, Q8H  HYDROcodone-acetaminophen, 1 tablet, Oral, Q8H  isosorbide dinitrate, 40 mg, Per G Tube, TID - Nitrates  lactobacillus acidophilus, 1 capsule, Per G Tube, Daily With Breakfast & Dinner  losartan, 100 mg, Per G Tube, Daily  metoprolol tartrate, 100 mg, Per G Tube, Q12H  metroNIDAZOLE, 500 mg, Intravenous, Q8H  mirtazapine, 15 mg, Per G Tube, Nightly  nystatin, 5 mL, Swish & Spit, 4x Daily  nystatin, , Topical, TID  pantoprazole, 40 mg, Intravenous, Q12H  sucralfate, 1 g, Per G Tube, 4x Daily  vancomycin, 125 mg, Oral, Q6H      Continuous Infusions:Pharmacy Consult,        PRN Meds:.•  acetaminophen **OR** acetaminophen **OR** acetaminophen  •  Calcium Replacement - Initiate Nurse / BPA Driven Protocol  •  HYDROcodone-acetaminophen  •  Lidocaine Viscous HCl  •  Magnesium Standard Dose Replacement - Initiate Nurse / BPA Driven Protocol  •  palliative care oral rinse  •  Pharmacy Consult  •  Phosphorus Replacement - Initiate Nurse / BPA Driven Protocol  •  Potassium Replacement - Initiate Nurse / BPA Driven Protocol  •  Sodium Chloride (PF)    Assessment & Plan   Assessment & Plan     Active Hospital Problems    Diagnosis  POA   • **Abnormal bowel movement [R19.8]  Yes   • History of Clostridium difficile colitis [Z86.19]  Not Applicable   • HLD (hyperlipidemia) [E78.5]  Yes   • 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        Brief Hospital Course to date:  Vesta Landry is a 77 y.o. female w/ HTN, HLD, dementia (notable recent decline), prior C. 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, currently on hold due to severe mucositis (recent G-tube placement due to inability to eat from mucositis); she was admitted to [facility] 2/23 - 3/1 with NSTEMI, due to functional status and comorbidities she was managed medically per cardiology, additionally on antibiotics for reported UTI; while on Lovenox/Plavix for NSTEMI she had reported hematemesis which additionally was reportedly managed medically; she was discharged home on oral antibiotic and developed abnormal bowel movements, family was concerned for melena, prompting evaluation in our ED, CT abd/pel had multiple incidental findings including fluid collection near G-tube, potential proctitis/colitis    The following problems are new to me today    Assessment/plan    Abnormal bowel movements, concern for  melena  - Patient had BM this a.m., did not appear melanotic  -H&H slightly decreased compared to recent, monitor  -cont PPI  -GI consulted on admission    Possible proctitis vs colitis  Hx C. Difficile colitis (January 2023)  -CT abd/pel w/ small fluid collection near recent G-tube placement, unclear significance due to recency of procedure, distal rectal wall thickening/stranding concerning for proctitis/colitis  -GI, CRS, ID consulted by admitting physician  - Started on vancomycin/Zosyn at admit, defer further changes until ID consult  -cont oral vancomycin    Squamous cell carcinoma of LT tonsil on chemotherapy/radiotherapy, with severe mucositis  -Follows with radiation oncology in [place]  -Follows with Dr. Lora locally  -Has been on radiation/chemo therapy, currently on hold due to severe mucositis  -cont Magic mouthwash, add prn palliative oral rinse  - G-tube recently placed at OSH due to inability to tolerate oral intake from mucositis  - Palliative care consultation, assistance with symptoms and goals of care discussions  -Tube feeds once cleared by surgical services    Hypertension  Hyperlipidemia  HFpEF, chronic, compensated  -Amlodipine, atorvastatin, hydralazine, isosorbide dinitrate, losartan, Lopressor    Dementia  -Family notes accelerated decline with recent chemotherapy  - Mirtazapine    CKD stage 2  -BLine eGFR 60's; 0.8-1.0    Expected Discharge Location and Transportation: TBD, ongoing medical management, GOC discussions, etc.  Expected Discharge   Expected Discharge Date and Time     Expected Discharge Date Expected Discharge Time    Mar 6, 2023            DVT prophylaxis:  Mechanical DVT prophylaxis orders are present.          CODE STATUS:   Code Status and Medical Interventions:   Ordered at: 03/02/23 1777     Level Of Support Discussed With:    Health Care Surrogate     Code Status (Patient has no pulse and is not breathing):    CPR (Attempt to Resuscitate)     Medical Interventions  (Patient has pulse or is breathing):    Full Support       Arpit Chaidez, DO  03/03/23

## 2023-03-04 LAB
ANION GAP SERPL CALCULATED.3IONS-SCNC: 11 MMOL/L (ref 5–15)
BASOPHILS # BLD AUTO: 0.02 10*3/MM3 (ref 0–0.2)
BASOPHILS NFR BLD AUTO: 0.4 % (ref 0–1.5)
BUN SERPL-MCNC: 10 MG/DL (ref 8–23)
BUN/CREAT SERPL: 12.8 (ref 7–25)
CALCIUM SPEC-SCNC: 7.7 MG/DL (ref 8.6–10.5)
CHLORIDE SERPL-SCNC: 106 MMOL/L (ref 98–107)
CO2 SERPL-SCNC: 21 MMOL/L (ref 22–29)
CREAT SERPL-MCNC: 0.78 MG/DL (ref 0.57–1)
DEPRECATED RDW RBC AUTO: 56.5 FL (ref 37–54)
EGFRCR SERPLBLD CKD-EPI 2021: 77.4 ML/MIN/1.73
EOSINOPHIL # BLD AUTO: 0.18 10*3/MM3 (ref 0–0.4)
EOSINOPHIL NFR BLD AUTO: 3.8 % (ref 0.3–6.2)
ERYTHROCYTE [DISTWIDTH] IN BLOOD BY AUTOMATED COUNT: 16.5 % (ref 12.3–15.4)
GASTROCULT GAST QL: POSITIVE
GLUCOSE BLDC GLUCOMTR-MCNC: 100 MG/DL (ref 70–130)
GLUCOSE SERPL-MCNC: 89 MG/DL (ref 65–99)
HCT VFR BLD AUTO: 26.2 % (ref 34–46.6)
HCT VFR BLD AUTO: 26.9 % (ref 34–46.6)
HCT VFR BLD AUTO: 32.2 % (ref 34–46.6)
HGB BLD-MCNC: 10.2 G/DL (ref 12–15.9)
HGB BLD-MCNC: 8 G/DL (ref 12–15.9)
HGB BLD-MCNC: 8.3 G/DL (ref 12–15.9)
IMM GRANULOCYTES # BLD AUTO: 0.03 10*3/MM3 (ref 0–0.05)
IMM GRANULOCYTES NFR BLD AUTO: 0.6 % (ref 0–0.5)
LYMPHOCYTES # BLD AUTO: 0.74 10*3/MM3 (ref 0.7–3.1)
LYMPHOCYTES NFR BLD AUTO: 15.7 % (ref 19.6–45.3)
MAGNESIUM SERPL-MCNC: 1.6 MG/DL (ref 1.6–2.4)
MCH RBC QN AUTO: 28.8 PG (ref 26.6–33)
MCHC RBC AUTO-ENTMCNC: 30.5 G/DL (ref 31.5–35.7)
MCV RBC AUTO: 94.2 FL (ref 79–97)
MONOCYTES # BLD AUTO: 0.32 10*3/MM3 (ref 0.1–0.9)
MONOCYTES NFR BLD AUTO: 6.8 % (ref 5–12)
NEUTROPHILS NFR BLD AUTO: 3.42 10*3/MM3 (ref 1.7–7)
NEUTROPHILS NFR BLD AUTO: 72.7 % (ref 42.7–76)
NRBC BLD AUTO-RTO: 0 /100 WBC (ref 0–0.2)
PHOSPHATE SERPL-MCNC: 3.3 MG/DL (ref 2.5–4.5)
PLATELET # BLD AUTO: 179 10*3/MM3 (ref 140–450)
PMV BLD AUTO: 10.1 FL (ref 6–12)
POTASSIUM SERPL-SCNC: 3 MMOL/L (ref 3.5–5.2)
POTASSIUM SERPL-SCNC: 5 MMOL/L (ref 3.5–5.2)
POTASSIUM SERPL-SCNC: 5.9 MMOL/L (ref 3.5–5.2)
RBC # BLD AUTO: 2.78 10*6/MM3 (ref 3.77–5.28)
SODIUM SERPL-SCNC: 138 MMOL/L (ref 136–145)
WBC NRBC COR # BLD: 4.71 10*3/MM3 (ref 3.4–10.8)

## 2023-03-04 PROCEDURE — 84132 ASSAY OF SERUM POTASSIUM: CPT | Performed by: INTERNAL MEDICINE

## 2023-03-04 PROCEDURE — 99232 SBSQ HOSP IP/OBS MODERATE 35: CPT | Performed by: INTERNAL MEDICINE

## 2023-03-04 PROCEDURE — 99231 SBSQ HOSP IP/OBS SF/LOW 25: CPT | Performed by: NURSE PRACTITIONER

## 2023-03-04 PROCEDURE — 85025 COMPLETE CBC W/AUTO DIFF WBC: CPT | Performed by: INTERNAL MEDICINE

## 2023-03-04 PROCEDURE — 80048 BASIC METABOLIC PNL TOTAL CA: CPT | Performed by: INTERNAL MEDICINE

## 2023-03-04 PROCEDURE — 86900 BLOOD TYPING SEROLOGIC ABO: CPT

## 2023-03-04 PROCEDURE — 36430 TRANSFUSION BLD/BLD COMPNT: CPT

## 2023-03-04 PROCEDURE — 82271 OCCULT BLOOD OTHER SOURCES: CPT

## 2023-03-04 PROCEDURE — 83735 ASSAY OF MAGNESIUM: CPT | Performed by: FAMILY MEDICINE

## 2023-03-04 PROCEDURE — 84100 ASSAY OF PHOSPHORUS: CPT | Performed by: FAMILY MEDICINE

## 2023-03-04 PROCEDURE — G0378 HOSPITAL OBSERVATION PER HR: HCPCS

## 2023-03-04 PROCEDURE — 85018 HEMOGLOBIN: CPT | Performed by: INTERNAL MEDICINE

## 2023-03-04 PROCEDURE — 85014 HEMATOCRIT: CPT | Performed by: INTERNAL MEDICINE

## 2023-03-04 PROCEDURE — P9016 RBC LEUKOCYTES REDUCED: HCPCS

## 2023-03-04 PROCEDURE — 85018 HEMOGLOBIN: CPT

## 2023-03-04 PROCEDURE — 96376 TX/PRO/DX INJ SAME DRUG ADON: CPT

## 2023-03-04 PROCEDURE — 25010000002 CEFTRIAXONE PER 250 MG: Performed by: INTERNAL MEDICINE

## 2023-03-04 PROCEDURE — 82962 GLUCOSE BLOOD TEST: CPT

## 2023-03-04 PROCEDURE — 85014 HEMATOCRIT: CPT

## 2023-03-04 RX ORDER — DIAZEPAM 2 MG/1
2 TABLET ORAL EVERY 12 HOURS PRN
Status: DISCONTINUED | OUTPATIENT
Start: 2023-03-04 | End: 2023-03-07 | Stop reason: HOSPADM

## 2023-03-04 RX ORDER — POTASSIUM CHLORIDE 7.45 MG/ML
10 INJECTION INTRAVENOUS
Status: DISCONTINUED | OUTPATIENT
Start: 2023-03-04 | End: 2023-03-04

## 2023-03-04 RX ORDER — POTASSIUM CHLORIDE 1.5 G/1.77G
40 POWDER, FOR SOLUTION ORAL EVERY 4 HOURS
Status: COMPLETED | OUTPATIENT
Start: 2023-03-04 | End: 2023-03-04

## 2023-03-04 RX ADMIN — LOSARTAN POTASSIUM 100 MG: 50 TABLET, FILM COATED ORAL at 09:36

## 2023-03-04 RX ADMIN — SUCRALFATE 1 G: 1 TABLET ORAL at 17:24

## 2023-03-04 RX ADMIN — ISOSORBIDE DINITRATE 40 MG: 20 TABLET ORAL at 09:36

## 2023-03-04 RX ADMIN — Medication 1 CAPSULE: at 09:36

## 2023-03-04 RX ADMIN — ISOSORBIDE DINITRATE 40 MG: 20 TABLET ORAL at 17:08

## 2023-03-04 RX ADMIN — CEFTRIAXONE 2 G: 2 INJECTION, POWDER, FOR SOLUTION INTRAMUSCULAR; INTRAVENOUS at 12:29

## 2023-03-04 RX ADMIN — MIRTAZAPINE 15 MG: 15 TABLET, FILM COATED ORAL at 21:43

## 2023-03-04 RX ADMIN — DIPHENHYDRAMINE HYDROCHLORIDE AND LIDOCAINE HYDROCHLORIDE AND ALUMINUM HYDROXIDE AND MAGNESIUM HYDRO 10 ML: KIT at 17:18

## 2023-03-04 RX ADMIN — ALLOPURINOL 100 MG: 100 TABLET ORAL at 09:36

## 2023-03-04 RX ADMIN — ISOSORBIDE DINITRATE 40 MG: 20 TABLET ORAL at 12:27

## 2023-03-04 RX ADMIN — ATORVASTATIN CALCIUM 40 MG: 40 TABLET, FILM COATED ORAL at 21:43

## 2023-03-04 RX ADMIN — SUCRALFATE 1 G: 1 TABLET ORAL at 12:27

## 2023-03-04 RX ADMIN — NYSTATIN 500000 UNITS: 100000 SUSPENSION ORAL at 17:24

## 2023-03-04 RX ADMIN — Medication 1 CAPSULE: at 17:08

## 2023-03-04 RX ADMIN — METRONIDAZOLE 500 MG: 5 INJECTION, SOLUTION INTRAVENOUS at 21:44

## 2023-03-04 RX ADMIN — SUCRALFATE 1 G: 1 TABLET ORAL at 09:37

## 2023-03-04 RX ADMIN — HYDRALAZINE HYDROCHLORIDE 100 MG: 50 TABLET, FILM COATED ORAL at 12:28

## 2023-03-04 RX ADMIN — AMLODIPINE BESYLATE 10 MG: 10 TABLET ORAL at 09:37

## 2023-03-04 RX ADMIN — POTASSIUM CHLORIDE 40 MEQ: 1.5 POWDER, FOR SOLUTION ORAL at 12:27

## 2023-03-04 RX ADMIN — VANCOMYCIN HYDROCHLORIDE 125 MG: 125 CAPSULE ORAL at 17:24

## 2023-03-04 RX ADMIN — VANCOMYCIN HYDROCHLORIDE 125 MG: 125 CAPSULE ORAL at 12:27

## 2023-03-04 RX ADMIN — NYSTATIN: 100000 POWDER TOPICAL at 17:18

## 2023-03-04 RX ADMIN — NYSTATIN 500000 UNITS: 100000 SUSPENSION ORAL at 21:50

## 2023-03-04 RX ADMIN — METOPROLOL TARTRATE 100 MG: 100 TABLET, FILM COATED ORAL at 21:43

## 2023-03-04 RX ADMIN — DIPHENHYDRAMINE HYDROCHLORIDE AND LIDOCAINE HYDROCHLORIDE AND ALUMINUM HYDROXIDE AND MAGNESIUM HYDRO 10 ML: KIT at 12:29

## 2023-03-04 RX ADMIN — ACETAMINOPHEN 325MG 650 MG: 325 TABLET ORAL at 14:51

## 2023-03-04 RX ADMIN — POTASSIUM CHLORIDE 40 MEQ: 1.5 POWDER, FOR SOLUTION ORAL at 17:25

## 2023-03-04 RX ADMIN — PANTOPRAZOLE SODIUM 40 MG: 40 INJECTION, POWDER, FOR SOLUTION INTRAVENOUS at 21:46

## 2023-03-04 RX ADMIN — PANTOPRAZOLE SODIUM 40 MG: 40 INJECTION, POWDER, FOR SOLUTION INTRAVENOUS at 09:36

## 2023-03-04 RX ADMIN — HYDROCODONE BITARTRATE AND ACETAMINOPHEN 1 TABLET: 7.5; 325 TABLET ORAL at 21:43

## 2023-03-04 RX ADMIN — NYSTATIN 500000 UNITS: 100000 SUSPENSION ORAL at 12:29

## 2023-03-04 RX ADMIN — HYDRALAZINE HYDROCHLORIDE 100 MG: 50 TABLET, FILM COATED ORAL at 21:43

## 2023-03-04 RX ADMIN — HYDROCODONE BITARTRATE AND ACETAMINOPHEN 1 TABLET: 7.5; 325 TABLET ORAL at 12:27

## 2023-03-04 RX ADMIN — MINERAL OIL: 1000 LIQUID ORAL at 04:28

## 2023-03-04 RX ADMIN — NYSTATIN: 100000 POWDER TOPICAL at 21:51

## 2023-03-04 RX ADMIN — DIAZEPAM 2 MG: 2 TABLET ORAL at 14:51

## 2023-03-04 RX ADMIN — SUCRALFATE 1 G: 1 TABLET ORAL at 21:43

## 2023-03-04 RX ADMIN — NYSTATIN 500000 UNITS: 100000 SUSPENSION ORAL at 09:37

## 2023-03-04 RX ADMIN — POTASSIUM CHLORIDE 40 MEQ: 1.5 POWDER, FOR SOLUTION ORAL at 09:35

## 2023-03-04 RX ADMIN — METOPROLOL TARTRATE 100 MG: 100 TABLET, FILM COATED ORAL at 09:36

## 2023-03-04 RX ADMIN — NYSTATIN 1 APPLICATION: 100000 POWDER TOPICAL at 09:38

## 2023-03-04 RX ADMIN — DIPHENHYDRAMINE HYDROCHLORIDE AND LIDOCAINE HYDROCHLORIDE AND ALUMINUM HYDROXIDE AND MAGNESIUM HYDRO 10 ML: KIT at 05:13

## 2023-03-04 RX ADMIN — METRONIDAZOLE 500 MG: 5 INJECTION, SOLUTION INTRAVENOUS at 13:24

## 2023-03-04 RX ADMIN — METRONIDAZOLE 500 MG: 5 INJECTION, SOLUTION INTRAVENOUS at 04:24

## 2023-03-04 NOTE — SIGNIFICANT NOTE
Pt has minimal bright red blood from PEG. Pending gastro occult. Pending H/H at 0000 and 0600. Will hold off on tube feeds for now, strict NPO. Starting D5 for nutritional support until AM.

## 2023-03-04 NOTE — PLAN OF CARE
Gastric contents positive for occult blood. TF currently on hold. Patient requires two person assist for transfer to [facility]. Disoriented to place/situation, easily reoriented. SBP moderately elevated., SR on cardiac mx. Bladder volume periodically assessed. Call light in reach. Daughter at bedside.

## 2023-03-04 NOTE — CONSULTS
Continued Stay Note  [facility]     Patient Name: Vesta Landry  MRN: [id number]  Today's Date: 3/4/2023    Admit Date: 3/2/2023    Plan: Home with [facility]   Discharge Plan     Row Name 03/04/23 1441       Plan    Plan Home with [facility]    Plan Comments Hospice referral received, chart reviewed. Visit made to pt, pt's two daughters Meliza and Britany present, family requested to meet outside pt's room. Family reviewed pt's current condition and prognosis, daughters tearful during the meeting. Daughters aware of the hospice referral, teaching done on hospice philosophy, goals of care and services, answered questions and addressed concerns. Dr. Chaidez arrived during the meeting, stated was informed the family had questions regarding pt's condition. Daughters stated has noticed pt with a droopy mouth and difficulty talking-concerned that pt has had a stroke. Dr. Chaidez stated a stroke work up can be done, though no treatment would be started if pt did have a stroke, daughters opted not to have any work up. Daughters stated want to take pt home with hospice, informed that daughters that the liaison with [facility] will call on Monday to get equipment delivered and arrange transportation, pt will need a hospital bed and overbed table. Will continue to follow. Please call [phone] if can be of further assistance.               Discharge Codes    No documentation.               Expected Discharge Date and Time     Expected Discharge Date Expected Discharge Time    Mar 6, 2023             Laisha Perea RN

## 2023-03-04 NOTE — PROGRESS NOTES
[facility] Medicine Services  PROGRESS NOTE    Patient Name: Vesta Landry  : 1945  MRN: [id number]    Date of Admission: 3/2/2023  Primary Care Physician: Bossman Pedraza MD    Subjective   Subjective     CC:  Follow-up cancer, concern for GI bleed    HPI:  Patient with reported bright red blood via G-tube overnight, TF's were held and gastric occult positive.    Daughters at bedside. 2 separate GOC meetings today:    First for ~1hr discussing overall trajectory, prognosis, number of complicating problems, patient's continued decline. H&H is trending down and patient had recent NSTEMI, for now they are agreeable to a unit of blood. They do state they do not wish to pursue endoscopy or escalation of care (GI notified), and they would like the patient to be made DNR/DNI. Interested in hospice eval for possible home w/ hospice vs placement w/ hospice closer to home.    Second meeting 30-45min w/ hospice liaison and subsequently w/ patient's ; concern for new facial droop but family understanding that Tx would be ASA +/- plavix which is being avoiding for concern of active bleed. They are making preliminary plans to likely take the patient home with hospice in the coming days but do with to spend some more time with her today.    ROS:  Unable to assess due to patient's lethargy this AM    Objective   Objective     Vital Signs:   Temp:  [97.3 °F (36.3 °C)-99.1 °F (37.3 °C)] 97.9 °F (36.6 °C)  Heart Rate:  [70-83] 78  Resp:  [16-18] 18  BP: (130-157)/(58-81) 144/69     Physical Exam:  Constitutional: Ill appearing female, lethargic, laying in bed, not in distress  HENT: NCAT, mucous membranes dry and cracked  Respiratory: Clear to auscultation bilaterally, respiratory effort normal   Cardiovascular: RRR, palpable radial pulses  Gastrointestinal: Positive bowel sounds, soft, nontender, nondistended; G-tube in abdominal wall with slight surrounding erythema  Psychiatric:  Unable to assess    Results Reviewed:  LAB RESULTS:      Lab 03/04/23  0511 03/04/23  0018 03/03/23 0329 03/03/23  0029 03/02/23  1601 02/28/23  0628 02/27/23  0601 02/26/23  1608   WBC 4.71  --  5.32  --  7.05 5.19 5.13  --    HEMOGLOBIN 8.0* 8.3* 9.0* 9.4* 9.9* 9.1* 8.7* 10.2*   HEMATOCRIT 26.2* 26.9* 27.5* 29.8* 31.8* 29.1* 27.7* 31.3*   PLATELETS 179  --  164  --  214 155 136*  --    NEUTROS ABS 3.42  --  3.66  --  5.07 4.01 4.06  --    IMMATURE GRANS (ABS) 0.03  --  0.14*  --  0.08* 0.03 0.05  --    LYMPHS ABS 0.74  --  0.93  --  1.10 0.43* 0.41*  --    MONOS ABS 0.32  --  0.36  --  0.59 0.50 0.48  --    EOS ABS 0.18  --  0.21  --  0.18 0.19 0.11  --    MCV 94.2  --  90.2  --  93.3 93.3 92.3  --    PROCALCITONIN  --   --   --   --  0.18  --   --   --    LACTATE  --   --   --   --  0.8 0.6  --   --    PROTIME  --   --   --   --   --   --   --  15.0*         Lab 03/04/23  0511 03/03/23  0329 03/02/23  1601 03/01/23  0522 02/28/23  0628 02/27/23  0601   SODIUM 138 138 136  --  142 141   POTASSIUM 3.0* 3.7 3.6  --  3.3* 3.1*   CHLORIDE 106 103 101  --  105 104   CO2 21.0* 20.0* 23.0  --  25.3 28.0   ANION GAP 11.0 15.0 12.0  --  11.7 9.0   BUN 10 11 15  --  16 16   CREATININE 0.78 0.78 0.77  --  0.90 0.89   EGFR 77.4 77.4 78.6  --  65.2 66.0   GLUCOSE 89 78 112*  --  97 116*   CALCIUM 7.7* 8.3* 8.5*  --  7.9* 7.9*   MAGNESIUM 1.6  --   --  2.4 1.6  --    PHOSPHORUS 3.3  --   --   --   --   --          Lab 03/02/23  1601   TOTAL PROTEIN 6.4   ALBUMIN 3.2*   GLOBULIN 3.2   ALT (SGPT) 26   AST (SGOT) 39*   BILIRUBIN 0.3   ALK PHOS 86   LIPASE 48         Lab 03/02/23  1601 02/26/23  1608   HSTROP T 63*  --    PROTIME  --  15.0*   INR  --  1.14*             Lab 03/03/23  0029 03/02/23  2226   ABO TYPING O O   RH TYPING Positive Positive   ANTIBODY SCREEN  --  Negative         Brief Urine Lab Results  (Last result in the past 365 days)      Color   Clarity   Blood   Leuk Est   Nitrite   Protein   CREAT   Urine HCG         03/02/23 1617 Yellow   Clear   Negative   Trace   Negative   Negative                 Microbiology Results Abnormal     Procedure Component Value - Date/Time    Blood Culture - Blood, Arm, Right [[id number]]  (Normal) Collected: 03/03/23 0029    Lab Status: Preliminary result Specimen: Blood from Arm, Right Updated: 03/04/23 0725     Blood Culture No growth at 24 hours    Narrative:      Aerobic bottle only      Blood Culture - Blood, Arm, Right [[id number]]  (Normal) Collected: 03/03/23 0029    Lab Status: Preliminary result Specimen: Blood from Arm, Right Updated: 03/04/23 0725     Blood Culture No growth at 24 hours    Narrative:      Aerobic bottle only      COVID PRE-OP / PRE-PROCEDURE SCREENING ORDER (NO ISOLATION) - Swab, Nasopharynx [[id number]]  (Normal) Collected: 03/02/23 1607    Lab Status: Final result Specimen: Swab from Nasopharynx Updated: 03/02/23 1651    Narrative:      The following orders were created for panel order COVID PRE-OP / PRE-PROCEDURE SCREENING ORDER (NO ISOLATION) - Swab, Nasopharynx.  Procedure                               Abnormality         Status                     ---------                               -----------         ------                     COVID-19 and FLU A/B PCR...[[id number]]  Normal              Final result                 Please view results for these tests on the individual orders.    COVID-19 and FLU A/B PCR - Swab, Nasopharynx [[id number]]  (Normal) Collected: 03/02/23 1607    Lab Status: Final result Specimen: Swab from Nasopharynx Updated: 03/02/23 1651     COVID19 Not Detected     Influenza A PCR Not Detected     Influenza B PCR Not Detected    Narrative:      Fact sheet for providers: https://www.fda.gov/media/295873/download    Fact sheet for patients: https://www.fda.gov/media/661971/download    Test performed by PCR.          CT Abdomen Pelvis With & Without Contrast    Result Date: 3/2/2023  CT ABDOMEN PELVIS W WO CONTRAST Date of Exam: 3/2/2023 6:40 PM  EST Indication: Tonsillar cancer.  Dark sticky stools.  Recent feeding tube placement.. Comparison: 1/20/2023 Technique: Axial CT images were obtained of the abdomen and pelvis before and after the uneventful intravenous administration of 100 mL Isovue-370. Sagittal and coronal reconstructions were performed.  Automated exposure control and iterative reconstruction methods were used. FINDINGS: Abdomen/Pelvis: Lower Chest: Findings of the chest are reported separately Organs: Patient is status post cholecystectomy.  Liver, spleen, pancreas and adrenal glands are grossly. There are some cortical scarring of the kidneys right greater than left. Kidneys are otherwise unremarkable GI/Bowel: There is a percutaneous gastrostomy tube noted within the body of the stomach. Very small air-fluid collection along the anterior abdominal wall is noted measuring approximately 12 x 7 mm adjacent to the insertion site. Findings likely postoperative in nature. There is a small amount of fluid tracking along the body of the stomach. Small bowel demonstrates no evidence of obstruction. The patient is status post appendectomy. The ileocecal valve appears unremarkable. The majority of colon demonstrates some air-fluid levels without evidence of distention or significant wall thickening. Mild stranding is noted within the pelvis which is nonspecific. Question mild enhancement of the fluid collections without complete loculation. There is mild wall thickening of the colon involving the distal rectum. Pelvis: There is a small amount of fluid within the urinary bladder. Patient is status post hysterectomy. Ovaries are not visualized. Peritoneum/Retroperitoneum: The aorta is normal in caliber. There is atelectatic changes. No suspicious retroperitoneal Bones/Soft Tissues: No destructive bone lesion is noted. Underlying scoliotic curvature the spine is noted. Multilevel degenerative changes are noted     Impression: 1. Findings of the chest are  reported separately 2. Percutaneous gastrostomy tube projects in expected position.  There is a small complex fluid collection along the anterior abdominal wall with possible extension along the margin of the stomach. Findings are likely postsurgical in nature. The sterility of the collection is indeterminate. Recommend continued follow-up as clinically indicated 3. Small amount of fluid with possible developing enhancement within the pelvis. Findings could represent a developing abscess in the correct setting. 4. Mild wall thickening and stranding along the distal rectum. Findings may represent underlying proctitis, colitis. Small amount of fluid and liquid stool noted throughout the colon. Inflammatory changes noted on the prior study are less prominent.  Electronically Signed: Heriberto Lopez  3/2/2023 7:30 PM EST  Workstation ID: [id number]    CT Angiogram Chest    Result Date: 3/2/2023  CT ANGIOGRAM CHEST Date of Exam: 3/2/2023 6:40 PM EST Indication: pe tonsillar cancer.  Recent aspiration pneumonia. Comparison: 1/19/2023 Technique: CTA of the chest was performed after the uneventful intravenous administration of intravenous Isovue. Reconstructed coronal and sagittal images were also obtained. In addition, a 3-D volume rendered image was created for interpretation. Automated exposure control and iterative reconstruction methods were used.  FINDINGS: [ ] Pulmonary Arteries: Pulmonary arteries are adequately opacified for evaluation. No evidence of intraluminal filling defect to suggest pulmonary embolism. Main pulmonary artery is normal in caliber. Mediastinum: Heart size is within normal limits. There is no suspicious pericardial effusion. No suspicious hilar adenopathy is noted. Small paratracheal lymph nodes are noted. Right paratracheal node with a short axis. 6 mm is not significantly changed comparison. Limited imaging of the base of the neck is trace no acute abnormality on limited imaging. There is a  right subclavian port is in place. The aorta and the origin of great vessels and trace no acute process. The esophagus is grossly marked appearance. Lungs/pleura: There is a small left-sided pleural effusion and trace amount of pleural fluid right increase in the comparison. Minimal dependent opacities are noted at the lung bases. There is some vascular crowding accentuated by relatively low lung lines. The central airways are patent. Small groundglass nodular opacity just posterior to the fissure and this appears segment left lower lobe appears grossly stable although somewhat less confluence. No suspicious new nodules noted Upper Abdomen: Findings in the abdomen and pelvis are reported separately Soft tissues/Bones: No acute bone or soft tissue abnormality.     Impression: 1. No evidence of pulmonary metastases 2. Small bilateral pleural effusions and minimal dependent opacities slightly increased in comparison 3. A few scattered groundglass opacities likely postinflammatory or infectious. Recommend follow-up to document resolution Electronically Signed: Heriberto Lopez  3/2/2023 7:15 PM EST  Workstation ID: [id number]      Results for orders placed during the hospital encounter of 02/23/23    Adult Transthoracic Echo Limited W/ Cont if Necessary Per Protocol    Interpretation Summary  •  Left ventricular wall thickness is consistent with mild concentric hypertrophy.  •  Left ventricular diastolic function is consistent with age.  •  Estimated right ventricular systolic pressure from tricuspid regurgitation is markedly elevated (>55 mmHg).  •  Moderate to severe pulmonary hypertension is present.      Current medications:  Scheduled Meds:allopurinol, 100 mg, Per G Tube, Daily  amLODIPine, 10 mg, Per G Tube, Q24H  atorvastatin, 40 mg, Per G Tube, Nightly  cefTRIAXone, 2 g, Intravenous, Q24H  First Mouthwash (Magic Mouthwash), 10 mL, Swish & Spit, Q6H  hydrALAZINE, 100 mg, Per G Tube, Q8H  HYDROcodone-acetaminophen, 1  tablet, Oral, Q8H  isosorbide dinitrate, 40 mg, Per G Tube, TID - Nitrates  lactobacillus acidophilus, 1 capsule, Per G Tube, Daily With Breakfast & Dinner  losartan, 100 mg, Per G Tube, Daily  metoprolol tartrate, 100 mg, Per G Tube, Q12H  metroNIDAZOLE, 500 mg, Intravenous, Q8H  mirtazapine, 15 mg, Per G Tube, Nightly  nystatin, 5 mL, Swish & Spit, 4x Daily  nystatin, , Topical, TID  pantoprazole, 40 mg, Intravenous, Q12H  sucralfate, 1 g, Per G Tube, 4x Daily  vancomycin, 125 mg, Oral, Q6H      Continuous Infusions:Pharmacy Consult,       PRN Meds:.•  acetaminophen **OR** acetaminophen **OR** acetaminophen  •  Calcium Replacement - Initiate Nurse / BPA Driven Protocol  •  diazePAM  •  Lidocaine Viscous HCl  •  Magnesium Standard Dose Replacement - Initiate Nurse / BPA Driven Protocol  •  palliative care oral rinse  •  Pharmacy Consult  •  Phosphorus Replacement - Initiate Nurse / BPA Driven Protocol  •  Potassium Replacement - Initiate Nurse / BPA Driven Protocol  •  Sodium Chloride (PF)    Assessment & Plan   Assessment & Plan     Active Hospital Problems    Diagnosis  POA   • **Abnormal bowel movement [R19.8]  Yes   • History of Clostridium difficile colitis [Z86.19]  Not Applicable   • HLD (hyperlipidemia) [E78.5]  Yes   • 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        Brief Hospital Course to date:  Vesta Landry is a 77 y.o. female w/ HTN, HLD, dementia (notable recent decline), prior C. 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, currently on hold due to severe mucositis (recent G-tube placement due to inability to eat from mucositis); she was admitted to [facility] 2/23 - 3/1 with NSTEMI, due to functional status and comorbidities she was managed medically per cardiology,  additionally on antibiotics for reported UTI; while on Lovenox/Plavix for NSTEMI she had reported hematemesis which additionally was reportedly managed medically; she was discharged home on oral antibiotic and developed abnormal bowel movements, family was concerned for melena, prompting evaluation in our ED, CT abd/pel had multiple incidental findings including fluid collection near G-tube, potential proctitis/colitis    Assessment/plan    Abnormal bowel movements, concern for melena  -cont BID IV PPI  -H&H cont to gradually drift downward, 8.0 this AM; night shift noted bright red blood via G-tube. Long discussion with family, they do not wish to pursue endoscopy/invasive eval or management (GI notified); in light of recent NSTEMI treated medically we did discuss blood transfusion and they are agreeable to 1U today while they make further GOC decisions, will monitor H&H unless patient made full comfort measures, plan to discuss w/ family prior to any further transfusions  -patient is to be DNR/DNI, hospice consult made with family's consent; additionally participated in meeting with hospice, family appears to be gradually making the decision to take the patient home with hospice early this coming week    Possible proctitis vs colitis  Hx C. Difficile colitis (January 2023)  -CT abd/pel w/ small fluid collection near recent G-tube placement, unclear significance due to recency of procedure, distal rectal wall thickening/stranding concerning for proctitis/colitis  -seen by ID, abx adjusted to CTX and flagyl, cont the same for now  -cont oral vancomycin    Squamous cell carcinoma of LT tonsil on chemotherapy/radiotherapy, with severe mucositis  -Follows with radiation oncology in [place]  -Follows with Dr. Lora locally  -Has been on radiation/chemo therapy, currently on hold due to severe mucositis  -cont Magic mouthwash, add prn palliative oral rinse  - G-tube recently placed at OSH due to inability to tolerate  oral intake from mucositis  - Palliative care following and adjusting meds; I did add prn valium via G-tube at family request (pt has had previously and tolerated)  -cont tube feeds, family do not want the patient to be hungry    Hypertension  Hyperlipidemia  HFpEF, chronic, compensated  -Amlodipine, atorvastatin, hydralazine, isosorbide dinitrate, losartan, Lopressor    Dementia  -Family notes accelerated decline with recent chemotherapy  - Mirtazapine    CKD stage 2  -BLine eGFR 60's; 0.8-1.0    Expected Discharge Location and Transportation: Ongoing GOC discussions but family does appear to be deciding on hospice at discharge  Expected Discharge   Expected Discharge Date and Time     Expected Discharge Date Expected Discharge Time    Mar 6, 2023            DVT prophylaxis:  Mechanical DVT prophylaxis orders are present.          CODE STATUS:   Code Status and Medical Interventions:   Ordered at: 03/04/23 1129     Medical Intervention Limits:    NO intubation (DNI)    NO vasopressors     Code Status (Patient has no pulse and is not breathing):    No CPR (Do Not Attempt to Resuscitate)     Medical Interventions (Patient has pulse or is breathing):    Limited Support     Release to patient:    Routine Release       Arpit Chaidez, DO  03/04/23

## 2023-03-04 NOTE — PLAN OF CARE
Goal Outcome Evaluation:                   Patient code status changed to DNR, end of life care discussed, vital signs stable, alert and oriented occasional confusion, tube feed started, new IV started, 1 unit of blood given, frequent bowel movements, patient had some blood coming from the wound on her tongue, family at bedside, will continue to monitor

## 2023-03-04 NOTE — PLAN OF CARE
Goal Outcome Evaluation:      Visited with pt and family. Family asked for prayer. Daughter stated pt cognitive function was compromised.

## 2023-03-04 NOTE — PROGRESS NOTES
Brief EN Review Note    Patient Name: Vesta Landry  Date of Encounter: 03/04/23 12:49 EST  MRN: [id number]  Admission date: 3/2/2023    Reason for visit: EN review . RD to continue to follow per protocol.    EMR reviewed     Additional Information Obtained: EN held last night due to positive occult blood.  Resumed this morning (just now per RN). Started @ 15ml/hr.  Received 1 bag of D5W last night due to tube feeding being held.  Labs reviewed, potassium depleted, RN reports it has been replaced this morning.  Phos and Mag not obtained, RD added to morning labs.  Still has order for NPO (no diet).    EN running @ 15ml/hr at bedside.  Daughters at bedside, we further dicussed patient nutrition/ EN delivery prior to this admission.  Overall poor intake for about 6 weeks. It seems she was not tolerating tube feeding after PEG - started out with Nutren 2.0, then switched to Nutren 1.5 and then viovnex- she only received only bottle of viovenx.   Based on this information, patient is at risk for refeeding syndrome.       Current diet: NPO Diet NPO Type: Strict NPO    EN: IsoSource HN  Goal Rate: 65ml/hr  Water Flushes: 25ml/hr  Modular: None  Route: PEG  Tube: Unknown    At goal over: 22Hrs/day    Rx will supply:   Goal Volume 1430 mL/day     Flush Volume 550 mL/day     Energy 1716 Kcal/day 100 % Est Need   Protein 77 g/day 100 % Est Need   Fiber 0 g/day     Water in  EN 1158 mL     Total Water 1708 mL     Meet DRI Yes        --------------------------------------------------------------------------  Product/Rate verified at bedside: Yes  Infusing Rate at time of visit: @ 15ml/hr.     Average Delivery from Charting: Insufficient data -EN started this morning     Intervention:  Follow treatment plan  Care plan reviewed  Showing signs of possible refeeding, phos and mag added to morning labs.    EN @ 15ml/hr for today. Will start to advance tomorrow pending electrolytes levels and EN tolerance.  Discussed with  daughter and they in agreement with this plan.     Follow up:   Per protocol      Jina Lozada, NORA  12:49 EST  Time: 30min

## 2023-03-04 NOTE — PROGRESS NOTES
"GI Daily Progress Note  Subjective:    Chief Complaint:  F/u melena     Pt resting in bed in no acute distress.  Had a small amount of blood out via PEG overnight.  HGB stable.  No further reports of melena or BRB per PEG today. No pain.     Objective:    /70 (BP Location: Left arm, Patient Position: Lying)   Pulse 70   Temp 98.7 °F (37.1 °C) (Oral)   Resp 16   Ht 162.6 cm (64\")   Wt 71.4 kg (157 lb 4.8 oz)   SpO2 97%   BMI 27.00 kg/m²     Physical Exam  Vitals and nursing note reviewed.   Constitutional:       General: She is not in acute distress.     Appearance: Normal appearance. She is normal weight. She is ill-appearing. She is not toxic-appearing.   Cardiovascular:      Rate and Rhythm: Normal rate and regular rhythm.      Pulses: Normal pulses.      Heart sounds: Normal heart sounds.   Pulmonary:      Effort: Pulmonary effort is normal. No respiratory distress.      Breath sounds: Normal breath sounds.   Abdominal:      General: Abdomen is flat. Bowel sounds are normal. There is no distension.      Palpations: Abdomen is soft. There is no mass.      Tenderness: There is no abdominal tenderness. There is no guarding or rebound.      Hernia: No hernia is present.      Comments: PEG site in place; small amount of bloody output noted from around tube-gastric lavage completed with non bloody output.   Skin:     General: Skin is warm and dry.      Capillary Refill: Capillary refill takes less than 2 seconds.      Coloration: Skin is pale. Skin is not jaundiced.   Neurological:      Mental Status: She is alert. Mental status is at baseline.   Psychiatric:         Mood and Affect: Mood normal.         Behavior: Behavior normal.         Thought Content: Thought content normal.         Judgment: Judgment normal.         Lab  I have personally reviewed most recent cardiac tracings, lab results, pathology results and radiology images and interpretations and agree with findings.    Lab Results   Component " Value Date    WBC 4.71 03/04/2023    HGB 8.0 (L) 03/04/2023    HGB 8.3 (L) 03/04/2023    HGB 9.0 (L) 03/03/2023    MCV 94.2 03/04/2023     03/04/2023    INR 1.14 (H) 02/26/2023    INR 1.26 (H) 01/20/2023       Lab Results   Component Value Date    GLUCOSE 89 03/04/2023    BUN 10 03/04/2023    CREATININE 0.78 03/04/2023    EGFRIFNONA 39 (L) 01/13/2022    EGFRIFAFRI 47 (L) 01/13/2022    BCR 12.8 03/04/2023     03/04/2023    K 3.0 (L) 03/04/2023    CO2 21.0 (L) 03/04/2023    CALCIUM 7.7 (L) 03/04/2023    PROTENTOTREF 6.9 11/16/2022    ALBUMIN 3.2 (L) 03/02/2023    ALKPHOS 86 03/02/2023    BILITOT 0.3 03/02/2023    ALT 26 03/02/2023    AST 39 (H) 03/02/2023     Assessment:      Abnormal bowel movement    HTN (hypertension), benign    Chronic diastolic heart failure (HCC)    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    Gastrointestinal bleeding with melena  2.  Status post PEG placement  3.  Tonsillar squamous cell carcinoma currently under chemotherapy which has been on hold  4.  Abnormal CT scan     Plan:  Patient doing well.  No further reports of melena.  Hemoglobin remained stable.  Gastric lavage without bloody output.  >>> Case discussed with Dr. Chaidez; noted ongoing plan of care discussions and family desire to avoid any further endoscopic interventions.  >>> Continue twice daily PPI for concerns of upper GI bleeding  >>> Continue to use PEG for medications and alternate route of nutrition    GI will sign off.  Please call with any further questions or concerns.    Homer Craft, LADARIUS  03/04/23  14:25 EST

## 2023-03-05 LAB
ANION GAP SERPL CALCULATED.3IONS-SCNC: 13 MMOL/L (ref 5–15)
BH BB BLOOD EXPIRATION DATE: NORMAL
BH BB BLOOD TYPE BARCODE: 5100
BH BB DISPENSE STATUS: NORMAL
BH BB PRODUCT CODE: NORMAL
BH BB UNIT NUMBER: NORMAL
BUN SERPL-MCNC: 10 MG/DL (ref 8–23)
BUN/CREAT SERPL: 14.9 (ref 7–25)
CALCIUM SPEC-SCNC: 8.3 MG/DL (ref 8.6–10.5)
CHLORIDE SERPL-SCNC: 107 MMOL/L (ref 98–107)
CO2 SERPL-SCNC: 16 MMOL/L (ref 22–29)
CREAT SERPL-MCNC: 0.67 MG/DL (ref 0.57–1)
CROSSMATCH INTERPRETATION: NORMAL
DEPRECATED RDW RBC AUTO: 66.9 FL (ref 37–54)
EGFRCR SERPLBLD CKD-EPI 2021: 89 ML/MIN/1.73
ERYTHROCYTE [DISTWIDTH] IN BLOOD BY AUTOMATED COUNT: 19 % (ref 12.3–15.4)
GLUCOSE SERPL-MCNC: 104 MG/DL (ref 65–99)
HCT VFR BLD AUTO: 37.5 % (ref 34–46.6)
HGB BLD-MCNC: 11.1 G/DL (ref 12–15.9)
MAGNESIUM SERPL-MCNC: 1.6 MG/DL (ref 1.6–2.4)
MCH RBC QN AUTO: 28.7 PG (ref 26.6–33)
MCHC RBC AUTO-ENTMCNC: 29.6 G/DL (ref 31.5–35.7)
MCV RBC AUTO: 96.9 FL (ref 79–97)
PHOSPHATE SERPL-MCNC: 2.1 MG/DL (ref 2.5–4.5)
PHOSPHATE SERPL-MCNC: 3.2 MG/DL (ref 2.5–4.5)
PLATELET # BLD AUTO: 222 10*3/MM3 (ref 140–450)
PMV BLD AUTO: 10.1 FL (ref 6–12)
POTASSIUM SERPL-SCNC: 4.6 MMOL/L (ref 3.5–5.2)
RBC # BLD AUTO: 3.87 10*6/MM3 (ref 3.77–5.28)
SODIUM SERPL-SCNC: 136 MMOL/L (ref 136–145)
UNIT  ABO: NORMAL
UNIT  RH: NORMAL
WBC NRBC COR # BLD: 5.48 10*3/MM3 (ref 3.4–10.8)

## 2023-03-05 PROCEDURE — 84100 ASSAY OF PHOSPHORUS: CPT | Performed by: HOSPITALIST

## 2023-03-05 PROCEDURE — 85027 COMPLETE CBC AUTOMATED: CPT | Performed by: INTERNAL MEDICINE

## 2023-03-05 PROCEDURE — 96376 TX/PRO/DX INJ SAME DRUG ADON: CPT

## 2023-03-05 PROCEDURE — 25010000002 CEFTRIAXONE PER 250 MG: Performed by: INTERNAL MEDICINE

## 2023-03-05 PROCEDURE — 83735 ASSAY OF MAGNESIUM: CPT | Performed by: FAMILY MEDICINE

## 2023-03-05 PROCEDURE — 99231 SBSQ HOSP IP/OBS SF/LOW 25: CPT | Performed by: HOSPITALIST

## 2023-03-05 PROCEDURE — G0378 HOSPITAL OBSERVATION PER HR: HCPCS

## 2023-03-05 PROCEDURE — 0 MAGNESIUM SULFATE 4 GM/100ML SOLUTION: Performed by: HOSPITALIST

## 2023-03-05 PROCEDURE — 84100 ASSAY OF PHOSPHORUS: CPT | Performed by: FAMILY MEDICINE

## 2023-03-05 PROCEDURE — 80048 BASIC METABOLIC PNL TOTAL CA: CPT | Performed by: INTERNAL MEDICINE

## 2023-03-05 RX ORDER — MAGNESIUM SULFATE HEPTAHYDRATE 40 MG/ML
4 INJECTION, SOLUTION INTRAVENOUS ONCE
Status: COMPLETED | OUTPATIENT
Start: 2023-03-05 | End: 2023-03-05

## 2023-03-05 RX ORDER — HYDROMORPHONE HYDROCHLORIDE 1 MG/ML
0.5 INJECTION, SOLUTION INTRAMUSCULAR; INTRAVENOUS; SUBCUTANEOUS
Status: DISCONTINUED | OUTPATIENT
Start: 2023-03-05 | End: 2023-03-07 | Stop reason: HOSPADM

## 2023-03-05 RX ORDER — ACETAMINOPHEN 160 MG/5ML
650 SOLUTION ORAL EVERY 6 HOURS PRN
Status: DISCONTINUED | OUTPATIENT
Start: 2023-03-05 | End: 2023-03-06

## 2023-03-05 RX ADMIN — PANTOPRAZOLE SODIUM 40 MG: 40 INJECTION, POWDER, FOR SOLUTION INTRAVENOUS at 22:01

## 2023-03-05 RX ADMIN — METRONIDAZOLE 500 MG: 5 INJECTION, SOLUTION INTRAVENOUS at 06:32

## 2023-03-05 RX ADMIN — CEFTRIAXONE 2 G: 2 INJECTION, POWDER, FOR SOLUTION INTRAMUSCULAR; INTRAVENOUS at 15:05

## 2023-03-05 RX ADMIN — Medication 1 CAPSULE: at 09:13

## 2023-03-05 RX ADMIN — AMLODIPINE BESYLATE 10 MG: 10 TABLET ORAL at 09:15

## 2023-03-05 RX ADMIN — DIPHENHYDRAMINE HYDROCHLORIDE AND LIDOCAINE HYDROCHLORIDE AND ALUMINUM HYDROXIDE AND MAGNESIUM HYDRO 10 ML: KIT at 11:57

## 2023-03-05 RX ADMIN — NYSTATIN: 100000 POWDER TOPICAL at 09:16

## 2023-03-05 RX ADMIN — NYSTATIN: 100000 POWDER TOPICAL at 16:02

## 2023-03-05 RX ADMIN — ISOSORBIDE DINITRATE 40 MG: 20 TABLET ORAL at 10:20

## 2023-03-05 RX ADMIN — ALLOPURINOL 100 MG: 100 TABLET ORAL at 09:15

## 2023-03-05 RX ADMIN — SUCRALFATE 1 G: 1 TABLET ORAL at 22:01

## 2023-03-05 RX ADMIN — METOPROLOL TARTRATE 100 MG: 100 TABLET, FILM COATED ORAL at 09:13

## 2023-03-05 RX ADMIN — DIPHENHYDRAMINE HYDROCHLORIDE AND LIDOCAINE HYDROCHLORIDE AND ALUMINUM HYDROXIDE AND MAGNESIUM HYDRO 10 ML: KIT at 17:07

## 2023-03-05 RX ADMIN — MAGNESIUM SULFATE HEPTAHYDRATE 4 G: 40 INJECTION, SOLUTION INTRAVENOUS at 18:25

## 2023-03-05 RX ADMIN — PANTOPRAZOLE SODIUM 40 MG: 40 INJECTION, POWDER, FOR SOLUTION INTRAVENOUS at 09:13

## 2023-03-05 RX ADMIN — SODIUM PHOSPHATE, MONOBASIC, MONOHYDRATE AND SODIUM PHOSPHATE, DIBASIC, ANHYDROUS 15 MMOL: 276; 142 INJECTION, SOLUTION INTRAVENOUS at 17:06

## 2023-03-05 RX ADMIN — DIPHENHYDRAMINE HYDROCHLORIDE AND LIDOCAINE HYDROCHLORIDE AND ALUMINUM HYDROXIDE AND MAGNESIUM HYDRO 10 ML: KIT at 01:48

## 2023-03-05 RX ADMIN — NYSTATIN 500000 UNITS: 100000 SUSPENSION ORAL at 11:56

## 2023-03-05 RX ADMIN — ATORVASTATIN CALCIUM 40 MG: 40 TABLET, FILM COATED ORAL at 22:01

## 2023-03-05 RX ADMIN — SUCRALFATE 1 G: 1 TABLET ORAL at 09:14

## 2023-03-05 RX ADMIN — HYDROCODONE BITARTRATE AND ACETAMINOPHEN 1 TABLET: 7.5; 325 TABLET ORAL at 06:32

## 2023-03-05 RX ADMIN — METRONIDAZOLE 500 MG: 5 INJECTION, SOLUTION INTRAVENOUS at 15:50

## 2023-03-05 RX ADMIN — NYSTATIN 500000 UNITS: 100000 SUSPENSION ORAL at 22:01

## 2023-03-05 RX ADMIN — HYDROCODONE BITARTRATE AND ACETAMINOPHEN 1 TABLET: 7.5; 325 TABLET ORAL at 15:00

## 2023-03-05 RX ADMIN — VANCOMYCIN HYDROCHLORIDE 125 MG: KIT at 11:56

## 2023-03-05 RX ADMIN — HYDRALAZINE HYDROCHLORIDE 100 MG: 50 TABLET, FILM COATED ORAL at 15:00

## 2023-03-05 RX ADMIN — ISOSORBIDE DINITRATE 40 MG: 20 TABLET ORAL at 17:57

## 2023-03-05 RX ADMIN — SUCRALFATE 1 G: 1 TABLET ORAL at 11:56

## 2023-03-05 RX ADMIN — ISOSORBIDE DINITRATE 40 MG: 20 TABLET ORAL at 15:49

## 2023-03-05 RX ADMIN — HYDRALAZINE HYDROCHLORIDE 100 MG: 50 TABLET, FILM COATED ORAL at 22:01

## 2023-03-05 RX ADMIN — NYSTATIN 500000 UNITS: 100000 SUSPENSION ORAL at 17:58

## 2023-03-05 RX ADMIN — DIPHENHYDRAMINE HYDROCHLORIDE AND LIDOCAINE HYDROCHLORIDE AND ALUMINUM HYDROXIDE AND MAGNESIUM HYDRO 10 ML: KIT at 23:15

## 2023-03-05 RX ADMIN — HYDROCODONE BITARTRATE AND ACETAMINOPHEN 1 TABLET: 7.5; 325 TABLET ORAL at 22:01

## 2023-03-05 RX ADMIN — NYSTATIN: 100000 POWDER TOPICAL at 22:10

## 2023-03-05 RX ADMIN — Medication 1 CAPSULE: at 17:58

## 2023-03-05 RX ADMIN — VANCOMYCIN HYDROCHLORIDE 125 MG: 125 CAPSULE ORAL at 06:32

## 2023-03-05 RX ADMIN — SUCRALFATE 1 G: 1 TABLET ORAL at 17:58

## 2023-03-05 RX ADMIN — Medication 5 ML: at 11:56

## 2023-03-05 RX ADMIN — DIPHENHYDRAMINE HYDROCHLORIDE AND LIDOCAINE HYDROCHLORIDE AND ALUMINUM HYDROXIDE AND MAGNESIUM HYDRO 10 ML: KIT at 06:44

## 2023-03-05 RX ADMIN — METRONIDAZOLE 500 MG: 5 INJECTION, SOLUTION INTRAVENOUS at 22:01

## 2023-03-05 RX ADMIN — MIRTAZAPINE 15 MG: 15 TABLET, FILM COATED ORAL at 22:01

## 2023-03-05 RX ADMIN — METOPROLOL TARTRATE 100 MG: 100 TABLET, FILM COATED ORAL at 22:01

## 2023-03-05 RX ADMIN — VANCOMYCIN HYDROCHLORIDE 125 MG: 125 CAPSULE ORAL at 01:48

## 2023-03-05 RX ADMIN — LOSARTAN POTASSIUM 100 MG: 50 TABLET, FILM COATED ORAL at 09:13

## 2023-03-05 RX ADMIN — VANCOMYCIN HYDROCHLORIDE 125 MG: KIT at 18:02

## 2023-03-05 RX ADMIN — HYDRALAZINE HYDROCHLORIDE 100 MG: 50 TABLET, FILM COATED ORAL at 06:32

## 2023-03-05 NOTE — PROGRESS NOTES
Brief EN Review Note    Patient Name: Vesta Landry  Date of Encounter: 23 12:22 EST  MRN: [id number]  Admission date: 3/2/2023    Reason for visit: EN review . RD to continue to follow per protocol.    EMR reviewed     Additional Information Obtained: Tolerating tube feeding @ 15ml/hr so far. Morning labs obtained, phos low.  With orders to replace. Will advance EN after phos replacement.      Results from last 7 days   Lab Units 23  0824 23  0511   PHOSPHORUS mg/dL 2.1* 3.3     Current diet: NPO Diet NPO Type: Strict NPO    EN: IsoSource HN  Goal Rate: 15ml/hr  Water Flushes: 25ml/hr  Modular: None  Route: PEG  Tube: Unknown    At goal over: 22Hrs/day    Rx will supply:  (@ goal rate of 65ml/hr)   Goal Volume 1430 mL/day     Flush Volume 550 mL/day     Energy 1716 Kcal/day 100 % Est Need   Protein 77 g/day 100 % Est Need   Fiber 0 g/day     Water in  EN 1158 mL     Total Water 1708 mL     Meet DRI Yes        --------------------------------------------------------------------------  Product/Rate verified at bedside: Yes  Infusing Rate at time of visit: @ 15ml/hr.     Average Delivery from Chartin Day: 447ml (135%)     Intervention:  · Follow treatment plan  · Care plan reviewed  · Replace Phos  · Advance EN after replacing phos, will increase to 30ml/hr today and Recheck phos again in the morning.     Follow up:   Per protocol      Jina Lozada RD  12:49 EST  Time: 30min

## 2023-03-05 NOTE — PROGRESS NOTES
[facility] Medicine Services  PROGRESS NOTE    Patient Name: Vesta Landry  : 1945  MRN: [id number]    Date of Admission: 3/2/2023  Primary Care Physician: Bossman Pedraza MD    Subjective   Subjective     CC:  Follow-up cancer, concern for GI bleed    HPI: Patient notes HA. Some intermittent R ear pain. Slept okay. Daughters at bedside.    ROS:  Difficult to assess    Objective   Objective     Vital Signs:   Temp:  [97.5 °F (36.4 °C)-98.7 °F (37.1 °C)] 98 °F (36.7 °C)  Heart Rate:  [64-79] 74  Resp:  [16-18] 18  BP: (130-168)/(58-74) 147/67     Physical Exam:  NAD, somnolent but arouses  OP dry, mucositis noted  RRR, murmur noted  CTAB  +BS, soft, mod distention  NO c/c  No obvious rashes    Results Reviewed:  LAB RESULTS:      Lab 23  2207 23  0511 23  0018 23  0329 23  0029 23  1601 23  0628 23  0601 23  1608   WBC  --  4.71  --  5.32  --  7.05 5.19 5.13  --    HEMOGLOBIN 10.2* 8.0* 8.3* 9.0* 9.4* 9.9* 9.1* 8.7* 10.2*   HEMATOCRIT 32.2* 26.2* 26.9* 27.5* 29.8* 31.8* 29.1* 27.7* 31.3*   PLATELETS  --  179  --  164  --  214 155 136*  --    NEUTROS ABS  --  3.42  --  3.66  --  5.07 4.01 4.06  --    IMMATURE GRANS (ABS)  --  0.03  --  0.14*  --  0.08* 0.03 0.05  --    LYMPHS ABS  --  0.74  --  0.93  --  1.10 0.43* 0.41*  --    MONOS ABS  --  0.32  --  0.36  --  0.59 0.50 0.48  --    EOS ABS  --  0.18  --  0.21  --  0.18 0.19 0.11  --    MCV  --  94.2  --  90.2  --  93.3 93.3 92.3  --    PROCALCITONIN  --   --   --   --   --  0.18  --   --   --    LACTATE  --   --   --   --   --  0.8 0.6  --   --    PROTIME  --   --   --   --   --   --   --   --  15.0*         Lab 237 23  1954 23  0511 23  0329 23  1601 23  0522 23  0628 23  0601   SODIUM  --   --  138 138 136  --  142 141   POTASSIUM 5.0 5.9* 3.0* 3.7 3.6  --  3.3* 3.1*   CHLORIDE  --   --  106 103 101  --  105 104    CO2  --   --  21.0* 20.0* 23.0  --  25.3 28.0   ANION GAP  --   --  11.0 15.0 12.0  --  11.7 9.0   BUN  --   --  10 11 15  --  16 16   CREATININE  --   --  0.78 0.78 0.77  --  0.90 0.89   EGFR  --   --  77.4 77.4 78.6  --  65.2 66.0   GLUCOSE  --   --  89 78 112*  --  97 116*   CALCIUM  --   --  7.7* 8.3* 8.5*  --  7.9* 7.9*   MAGNESIUM  --   --  1.6  --   --  2.4 1.6  --    PHOSPHORUS  --   --  3.3  --   --   --   --   --          Lab 03/02/23  1601   TOTAL PROTEIN 6.4   ALBUMIN 3.2*   GLOBULIN 3.2   ALT (SGPT) 26   AST (SGOT) 39*   BILIRUBIN 0.3   ALK PHOS 86   LIPASE 48         Lab 03/02/23  1601 02/26/23  1608   HSTROP T 63*  --    PROTIME  --  15.0*   INR  --  1.14*             Lab 03/03/23  0029 03/02/23  2226   ABO TYPING O O   RH TYPING Positive Positive   ANTIBODY SCREEN  --  Negative         Brief Urine Lab Results  (Last result in the past 365 days)      Color   Clarity   Blood   Leuk Est   Nitrite   Protein   CREAT   Urine HCG        03/02/23 1617 Yellow   Clear   Negative   Trace   Negative   Negative                 Microbiology Results Abnormal     Procedure Component Value - Date/Time    Blood Culture - Blood, Arm, Right [[id number]]  (Normal) Collected: 03/03/23 0029    Lab Status: Preliminary result Specimen: Blood from Arm, Right Updated: 03/05/23 0115     Blood Culture No growth at 2 days    Narrative:      Aerobic bottle only      Blood Culture - Blood, Arm, Right [[id number]]  (Normal) Collected: 03/03/23 0029    Lab Status: Preliminary result Specimen: Blood from Arm, Right Updated: 03/05/23 0115     Blood Culture No growth at 2 days    Narrative:      Aerobic bottle only      COVID PRE-OP / PRE-PROCEDURE SCREENING ORDER (NO ISOLATION) - Swab, Nasopharynx [[id number]]  (Normal) Collected: 03/02/23 1607    Lab Status: Final result Specimen: Swab from Nasopharynx Updated: 03/02/23 0163    Narrative:      The following orders were created for panel order COVID PRE-OP / PRE-PROCEDURE SCREENING  ORDER (NO ISOLATION) - Swab, Nasopharynx.  Procedure                               Abnormality         Status                     ---------                               -----------         ------                     COVID-19 and FLU A/B PCR...[[id number]]  Normal              Final result                 Please view results for these tests on the individual orders.    COVID-19 and FLU A/B PCR - Swab, Nasopharynx [[id number]]  (Normal) Collected: 03/02/23 1607    Lab Status: Final result Specimen: Swab from Nasopharynx Updated: 03/02/23 1651     COVID19 Not Detected     Influenza A PCR Not Detected     Influenza B PCR Not Detected    Narrative:      Fact sheet for providers: https://www.fda.gov/media/252331/download    Fact sheet for patients: https://www.fda.gov/media/280692/download    Test performed by PCR.          No radiology results from the last 24 hrs    Results for orders placed during the hospital encounter of 02/23/23    Adult Transthoracic Echo Limited W/ Cont if Necessary Per Protocol    Interpretation Summary  •  Left ventricular wall thickness is consistent with mild concentric hypertrophy.  •  Left ventricular diastolic function is consistent with age.  •  Estimated right ventricular systolic pressure from tricuspid regurgitation is markedly elevated (>55 mmHg).  •  Moderate to severe pulmonary hypertension is present.      Current medications:  Scheduled Meds:allopurinol, 100 mg, Per G Tube, Daily  amLODIPine, 10 mg, Per G Tube, Q24H  atorvastatin, 40 mg, Per G Tube, Nightly  cefTRIAXone, 2 g, Intravenous, Q24H  First Mouthwash (Magic Mouthwash), 10 mL, Swish & Spit, Q6H  hydrALAZINE, 100 mg, Per G Tube, Q8H  HYDROcodone-acetaminophen, 1 tablet, Oral, Q8H  isosorbide dinitrate, 40 mg, Per G Tube, TID - Nitrates  lactobacillus acidophilus, 1 capsule, Per G Tube, Daily With Breakfast & Dinner  losartan, 100 mg, Per G Tube, Daily  metoprolol tartrate, 100 mg, Per G Tube, Q12H  metroNIDAZOLE, 500 mg,  Intravenous, Q8H  mirtazapine, 15 mg, Per G Tube, Nightly  nystatin, 5 mL, Swish & Spit, 4x Daily  nystatin, , Topical, TID  pantoprazole, 40 mg, Intravenous, Q12H  sucralfate, 1 g, Per G Tube, 4x Daily  vancomycin, 125 mg, Oral, Q6H      Continuous Infusions:Pharmacy Consult,       PRN Meds:.•  acetaminophen **OR** acetaminophen **OR** acetaminophen  •  acetaminophen  •  Calcium Replacement - Initiate Nurse / BPA Driven Protocol  •  diazePAM  •  HYDROmorphone  •  Lidocaine Viscous HCl  •  Magnesium Standard Dose Replacement - Initiate Nurse / BPA Driven Protocol  •  palliative care oral rinse  •  Pharmacy Consult  •  Phosphorus Replacement - Initiate Nurse / BPA Driven Protocol  •  Potassium Replacement - Initiate Nurse / BPA Driven Protocol  •  Sodium Chloride (PF)    Assessment & Plan   Assessment & Plan     Active Hospital Problems    Diagnosis  POA   • **Abnormal bowel movement [R19.8]  Yes   • History of Clostridium difficile colitis [Z86.19]  Not Applicable   • HLD (hyperlipidemia) [E78.5]  Yes   • 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        Brief Hospital Course to date:  Vesta Landry is a 77 y.o. female w/ HTN, HLD, dementia (notable recent decline), prior C. 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, currently on hold due to severe mucositis (recent G-tube placement due to inability to eat from mucositis); she was admitted to [facility] 2/23 - 3/1 with NSTEMI, due to functional status and comorbidities she was managed medically per cardiology, additionally on antibiotics for reported UTI; while on Lovenox/Plavix for NSTEMI she had reported hematemesis which additionally was reportedly managed medically; she was discharged home on oral antibiotic and developed abnormal  bowel movements, family was concerned for melena, prompting evaluation in our ED, CT abd/pel had multiple incidental findings including fluid collection near G-tube, potential proctitis/colitis    Assessment/plan    Abnormal bowel movements, concern for melena  -PPI  -transfused  -team d/w family, no invasive work up at this time, monitor    Possible proctitis vs colitis  Hx C. Difficile colitis (January 2023)  -CT A/P findings reviewed  -seen by ID, on CTX/flagyl  -on oral vancomycin    Squamous cell carcinoma of LT tonsil on chemotherapy/radiotherapy, with severe mucositis  -followed by Rad/Onc, Geno, s/p radiation/chemo, held for mucositis    Hypertension  Hyperlipidemia  HFpEF, chronic, compensated  -Amlodipine, atorvastatin, hydralazine, isosorbide dinitrate, losartan, Lopressor    Dementia  -Family notes accelerated decline with recent chemotherapy  -Mirtazapine      Expected Discharge Location and Transportation: Ongoing GOC discussions but family does appear to be deciding on hospice at discharge  Expected Discharge   Expected Discharge Date and Time     Expected Discharge Date Expected Discharge Time    Mar 6, 2023            DVT prophylaxis:  Mechanical DVT prophylaxis orders are present.          CODE STATUS:   Code Status and Medical Interventions:   Ordered at: 03/04/23 1129     Medical Intervention Limits:    NO intubation (DNI)    NO vasopressors     Code Status (Patient has no pulse and is not breathing):    No CPR (Do Not Attempt to Resuscitate)     Medical Interventions (Patient has pulse or is breathing):    Limited Support     Release to patient:    Routine Release       Robinson Murphy MD  03/05/23

## 2023-03-06 ENCOUNTER — TELEPHONE (OUTPATIENT)
Dept: INTERNAL MEDICINE | Facility: CLINIC | Age: 78
End: 2023-03-06
Payer: MEDICARE

## 2023-03-06 LAB
ANION GAP SERPL CALCULATED.3IONS-SCNC: 9 MMOL/L (ref 5–15)
BUN SERPL-MCNC: 11 MG/DL (ref 8–23)
BUN/CREAT SERPL: 16.2 (ref 7–25)
CALCIUM SPEC-SCNC: 7.7 MG/DL (ref 8.6–10.5)
CHLORIDE SERPL-SCNC: 107 MMOL/L (ref 98–107)
CO2 SERPL-SCNC: 21 MMOL/L (ref 22–29)
CREAT SERPL-MCNC: 0.68 MG/DL (ref 0.57–1)
DEPRECATED RDW RBC AUTO: 59.7 FL (ref 37–54)
EGFRCR SERPLBLD CKD-EPI 2021: 88.7 ML/MIN/1.73
ERYTHROCYTE [DISTWIDTH] IN BLOOD BY AUTOMATED COUNT: 18.2 % (ref 12.3–15.4)
GLUCOSE SERPL-MCNC: 130 MG/DL (ref 65–99)
HCT VFR BLD AUTO: 31.5 % (ref 34–46.6)
HGB BLD-MCNC: 9.8 G/DL (ref 12–15.9)
MAGNESIUM SERPL-MCNC: 2.5 MG/DL (ref 1.6–2.4)
MCH RBC QN AUTO: 28.1 PG (ref 26.6–33)
MCHC RBC AUTO-ENTMCNC: 31.1 G/DL (ref 31.5–35.7)
MCV RBC AUTO: 90.3 FL (ref 79–97)
PHOSPHATE SERPL-MCNC: 2.8 MG/DL (ref 2.5–4.5)
PLATELET # BLD AUTO: 212 10*3/MM3 (ref 140–450)
PMV BLD AUTO: 9.7 FL (ref 6–12)
POTASSIUM SERPL-SCNC: 4 MMOL/L (ref 3.5–5.2)
RBC # BLD AUTO: 3.49 10*6/MM3 (ref 3.77–5.28)
SODIUM SERPL-SCNC: 137 MMOL/L (ref 136–145)
WBC NRBC COR # BLD: 4.99 10*3/MM3 (ref 3.4–10.8)

## 2023-03-06 PROCEDURE — 25010000002 CEFTRIAXONE PER 250 MG: Performed by: INTERNAL MEDICINE

## 2023-03-06 PROCEDURE — 99232 SBSQ HOSP IP/OBS MODERATE 35: CPT | Performed by: HOSPITALIST

## 2023-03-06 PROCEDURE — 83735 ASSAY OF MAGNESIUM: CPT | Performed by: HOSPITALIST

## 2023-03-06 PROCEDURE — U0004 COV-19 TEST NON-CDC HGH THRU: HCPCS | Performed by: HOSPITALIST

## 2023-03-06 PROCEDURE — 84100 ASSAY OF PHOSPHORUS: CPT

## 2023-03-06 PROCEDURE — G0378 HOSPITAL OBSERVATION PER HR: HCPCS

## 2023-03-06 PROCEDURE — 80048 BASIC METABOLIC PNL TOTAL CA: CPT | Performed by: HOSPITALIST

## 2023-03-06 PROCEDURE — C9803 HOPD COVID-19 SPEC COLLECT: HCPCS | Performed by: HOSPITALIST

## 2023-03-06 PROCEDURE — 85027 COMPLETE CBC AUTOMATED: CPT | Performed by: HOSPITALIST

## 2023-03-06 PROCEDURE — 96376 TX/PRO/DX INJ SAME DRUG ADON: CPT

## 2023-03-06 RX ORDER — ACETAMINOPHEN 325 MG/1
650 TABLET ORAL EVERY 6 HOURS PRN
Status: DISCONTINUED | OUTPATIENT
Start: 2023-03-06 | End: 2023-03-07 | Stop reason: HOSPADM

## 2023-03-06 RX ORDER — ACETAMINOPHEN 160 MG/5ML
650 SOLUTION ORAL EVERY 6 HOURS PRN
Status: DISCONTINUED | OUTPATIENT
Start: 2023-03-06 | End: 2023-03-06

## 2023-03-06 RX ORDER — ACETAMINOPHEN 325 MG/1
650 TABLET ORAL EVERY 4 HOURS PRN
Status: DISCONTINUED | OUTPATIENT
Start: 2023-03-06 | End: 2023-03-06

## 2023-03-06 RX ORDER — ACETAMINOPHEN 650 MG/1
650 SUPPOSITORY RECTAL EVERY 6 HOURS PRN
Status: DISCONTINUED | OUTPATIENT
Start: 2023-03-06 | End: 2023-03-07 | Stop reason: HOSPADM

## 2023-03-06 RX ORDER — ACETAMINOPHEN 160 MG/5ML
650 SOLUTION ORAL EVERY 6 HOURS PRN
Status: DISCONTINUED | OUTPATIENT
Start: 2023-03-06 | End: 2023-03-07 | Stop reason: HOSPADM

## 2023-03-06 RX ORDER — ACETAMINOPHEN 650 MG/1
650 SUPPOSITORY RECTAL EVERY 4 HOURS PRN
Status: DISCONTINUED | OUTPATIENT
Start: 2023-03-06 | End: 2023-03-06

## 2023-03-06 RX ORDER — HYDROCODONE BITARTRATE AND ACETAMINOPHEN 7.5; 325 MG/1; MG/1
1 TABLET ORAL EVERY 8 HOURS
Status: DISCONTINUED | OUTPATIENT
Start: 2023-03-06 | End: 2023-03-07 | Stop reason: HOSPADM

## 2023-03-06 RX ADMIN — NYSTATIN 500000 UNITS: 100000 SUSPENSION ORAL at 13:05

## 2023-03-06 RX ADMIN — ISOSORBIDE DINITRATE 40 MG: 20 TABLET ORAL at 09:05

## 2023-03-06 RX ADMIN — Medication 1 CAPSULE: at 17:48

## 2023-03-06 RX ADMIN — METOPROLOL TARTRATE 100 MG: 100 TABLET, FILM COATED ORAL at 09:06

## 2023-03-06 RX ADMIN — HYDRALAZINE HYDROCHLORIDE 100 MG: 50 TABLET, FILM COATED ORAL at 13:05

## 2023-03-06 RX ADMIN — HYDROCODONE BITARTRATE AND ACETAMINOPHEN 1 TABLET: 7.5; 325 TABLET ORAL at 13:05

## 2023-03-06 RX ADMIN — DIPHENHYDRAMINE HYDROCHLORIDE AND LIDOCAINE HYDROCHLORIDE AND ALUMINUM HYDROXIDE AND MAGNESIUM HYDRO 10 ML: KIT at 13:06

## 2023-03-06 RX ADMIN — NYSTATIN 500000 UNITS: 100000 SUSPENSION ORAL at 20:08

## 2023-03-06 RX ADMIN — NYSTATIN: 100000 POWDER TOPICAL at 20:10

## 2023-03-06 RX ADMIN — METRONIDAZOLE 500 MG: 5 INJECTION, SOLUTION INTRAVENOUS at 05:11

## 2023-03-06 RX ADMIN — METOPROLOL TARTRATE 100 MG: 100 TABLET, FILM COATED ORAL at 20:08

## 2023-03-06 RX ADMIN — ISOSORBIDE DINITRATE 40 MG: 20 TABLET ORAL at 17:48

## 2023-03-06 RX ADMIN — CEFTRIAXONE 2 G: 2 INJECTION, POWDER, FOR SOLUTION INTRAMUSCULAR; INTRAVENOUS at 13:05

## 2023-03-06 RX ADMIN — SUCRALFATE 1 G: 1 TABLET ORAL at 13:05

## 2023-03-06 RX ADMIN — NYSTATIN: 100000 POWDER TOPICAL at 09:08

## 2023-03-06 RX ADMIN — DIAZEPAM 2 MG: 2 TABLET ORAL at 09:26

## 2023-03-06 RX ADMIN — NYSTATIN: 100000 POWDER TOPICAL at 15:36

## 2023-03-06 RX ADMIN — DIPHENHYDRAMINE HYDROCHLORIDE AND LIDOCAINE HYDROCHLORIDE AND ALUMINUM HYDROXIDE AND MAGNESIUM HYDRO 10 ML: KIT at 17:48

## 2023-03-06 RX ADMIN — ISOSORBIDE DINITRATE 40 MG: 20 TABLET ORAL at 13:05

## 2023-03-06 RX ADMIN — ALLOPURINOL 100 MG: 100 TABLET ORAL at 09:06

## 2023-03-06 RX ADMIN — LOSARTAN POTASSIUM 100 MG: 50 TABLET, FILM COATED ORAL at 09:06

## 2023-03-06 RX ADMIN — HYDROCODONE BITARTRATE AND ACETAMINOPHEN 1 TABLET: 7.5; 325 TABLET ORAL at 05:07

## 2023-03-06 RX ADMIN — SUCRALFATE 1 G: 1 TABLET ORAL at 20:09

## 2023-03-06 RX ADMIN — NYSTATIN 500000 UNITS: 100000 SUSPENSION ORAL at 17:48

## 2023-03-06 RX ADMIN — HYDRALAZINE HYDROCHLORIDE 100 MG: 50 TABLET, FILM COATED ORAL at 20:08

## 2023-03-06 RX ADMIN — SUCRALFATE 1 G: 1 TABLET ORAL at 09:06

## 2023-03-06 RX ADMIN — Medication 1 CAPSULE: at 09:06

## 2023-03-06 RX ADMIN — MIRTAZAPINE 15 MG: 15 TABLET, FILM COATED ORAL at 20:09

## 2023-03-06 RX ADMIN — METRONIDAZOLE 500 MG: 5 INJECTION, SOLUTION INTRAVENOUS at 22:27

## 2023-03-06 RX ADMIN — SUCRALFATE 1 G: 1 TABLET ORAL at 17:48

## 2023-03-06 RX ADMIN — VANCOMYCIN HYDROCHLORIDE 125 MG: KIT at 13:05

## 2023-03-06 RX ADMIN — METRONIDAZOLE 500 MG: 5 INJECTION, SOLUTION INTRAVENOUS at 13:46

## 2023-03-06 RX ADMIN — ATORVASTATIN CALCIUM 40 MG: 40 TABLET, FILM COATED ORAL at 20:09

## 2023-03-06 RX ADMIN — VANCOMYCIN HYDROCHLORIDE 125 MG: KIT at 01:05

## 2023-03-06 RX ADMIN — HYDROCODONE BITARTRATE AND ACETAMINOPHEN 1 TABLET: 7.5; 325 TABLET ORAL at 20:09

## 2023-03-06 RX ADMIN — AMLODIPINE BESYLATE 10 MG: 10 TABLET ORAL at 09:07

## 2023-03-06 RX ADMIN — MINERAL OIL: 1000 LIQUID ORAL at 09:05

## 2023-03-06 RX ADMIN — HYDRALAZINE HYDROCHLORIDE 100 MG: 50 TABLET, FILM COATED ORAL at 05:07

## 2023-03-06 RX ADMIN — DIPHENHYDRAMINE HYDROCHLORIDE AND LIDOCAINE HYDROCHLORIDE AND ALUMINUM HYDROXIDE AND MAGNESIUM HYDRO 10 ML: KIT at 05:08

## 2023-03-06 RX ADMIN — PANTOPRAZOLE SODIUM 40 MG: 40 INJECTION, POWDER, FOR SOLUTION INTRAVENOUS at 09:06

## 2023-03-06 RX ADMIN — VANCOMYCIN HYDROCHLORIDE 125 MG: KIT at 05:07

## 2023-03-06 RX ADMIN — PANTOPRAZOLE SODIUM 40 MG: 40 INJECTION, POWDER, FOR SOLUTION INTRAVENOUS at 20:09

## 2023-03-06 RX ADMIN — NYSTATIN 500000 UNITS: 100000 SUSPENSION ORAL at 09:05

## 2023-03-06 NOTE — PLAN OF CARE
Problem: Palliative Care  Goal: Enhanced Quality of Life  Intervention: Optimize Psychosocial Wellbeing  Recent Flowsheet Documentation  Taken 3/6/2023 1231 by Carmenza De León, MSW  Supportive Measures: (symptom assessment)   active listening utilized   positive reinforcement provided   verbalization of feelings encouraged   other (see comments)  Family/Support System Care:   support provided   self-care encouraged   caregiver stress acknowledged  Visit at bedside with patient and her daughter.  Patient awake and engaging, has TF but would like to eat; frustrated with loss of independence.  Patient with no complaints otherwise.  Planning home with [facility] per Hospice Liaison and family - tentative d/c tomorrow.   Palliative Care continues to follow for support and assist with plan of care.    1300 Palliative IDT:  THI Harrington DO; CHUCK Son APRN; MANJIT De León [facility], [facility]; ABE Carreno RN, CHPN; STEVEN Mae MDiv, [facility]; MARIAJOSE Perea RN, CHPN; SONAL Johns RN, OCN

## 2023-03-06 NOTE — PLAN OF CARE
Goal Outcome Evaluation:  Plan of Care Reviewed With: patient, daughter, family        Progress: improving       Tube feed infusing per order, increased to 30ml/hr after phosphorous infusion completed. Port accessed due to iv infiltrating (have asked night Rn to pull old iv). Mag and phosphorous currently being replaced, have asked night Rn to retime redraws due to late administration due to antibiotics. PEG patent. Assist x1/ x2 to BSC either family or RN/PCT assistance. Awaiting further orders.

## 2023-03-06 NOTE — PROGRESS NOTES
Clinical Nutrition     Nutrition Support Assessment  Reason for Visit: Follow-up protocol, EN      Patient Name: Vesta Landry  YOB: 1945  MRN: [id number]  Date of Encounter: 03/06/23 08:05 EST  Admission date: 3/2/2023    Comments:    Phos rechecked this am (phos=2.8), will advance EN slowly.    Advance EN as tolerated: Isosource HN @ 30ml/hr advance by 10ml q 6 as tolerated to goal @ 65ml/hr. Water flush @ 25ml/hr.   =1430ml, 1716 kcals (100% est needs), 77g pro (104% est needs), 0g fiber, 1158 ml free water (+550ml water flush), 1708ml TFW.      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    PSH:  She  has a past surgical history that includes Colonoscopy; Appendectomy; Cataract extraction; Cholecystectomy; Tubal ligation; Hysterectomy (1991); Oophorectomy (Bilateral, 1991); Portacath placement (Right, 02/08/2023); and Esophagogastroduodenoscopy w/ PEG (N/A, 2/20/2023).      Applicable Nutrition Concerns:   Skin:  Oral:  GI:      Applicable Interval History:   +PEG (2/20/23)      Reported/Observed/Food/Nutrition Related History:     3/6  Phos checked and replaced yesterday, EN advanced and held @ 30ml/hr. Pt denies any GI distress/nausea and is currently tolerating EN. Phos WNLs this am, will advance EN slowly.  Monitor and replace phos per protocol.     3/3  RD spoke w/pt, dtr at bedside who assisted w/nutrition hx: Pt w/minimal po intakes in Jan 2023, had PEG placed and on Nutren 2.0 bolus feeds at home. Dtr states pt was not tolerating formula as indicated by abdominal pain, bloating, nausea. Dtr uncertain if diarrhea from formula vs c.diff she had in Jan. Dtr also concerned pt not meeting EEN 2/2 recent complications w/nutrition support. Dtr reports pt has had wt loss since Dec 2022 and TCT=363wrc. ? accuracy of bed scale wt, RD requested reweigh from ns as able. GI and colorectal sx consults pending prior to resuming EN regimen. RN aware of recs in chart.       Labs    Labs Reviewed: Yes     Results from last 7 days   Lab Units 03/06/23  0613 03/05/23  2320 03/05/23  0824 03/04/23  2207 03/04/23  1954 03/04/23  0511 03/03/23  0329 03/02/23  1601   GLUCOSE mg/dL 130*  --  104*  --   --  89   < > 112*   BUN mg/dL 11  --  10  --   --  10   < > 15   CREATININE mg/dL 0.68  --  0.67  --   --  0.78   < > 0.77   SODIUM mmol/L 137  --  136  --   --  138   < > 136   CHLORIDE mmol/L 107  --  107  --   --  106   < > 101   POTASSIUM mmol/L 4.0  --  4.6 5.0   < > 3.0*   < > 3.6   PHOSPHORUS mg/dL  --  3.2 2.1*  --   --  3.3  --   --    MAGNESIUM mg/dL 2.5*  --  1.6  --   --  1.6  --   --    ALT (SGPT) U/L  --   --   --   --   --   --   --  26    < > = values in this interval not displayed.       Results from last 7 days   Lab Units 03/02/23  1601   ALBUMIN g/dL 3.2*       Results from last 7 days   Lab Units 03/04/23  0004   GLUCOSE mg/dL 100     Lab Results   Lab Value Date/Time    HGBA1C 5.30 01/20/2023 0643               Medications    Medications Reviewed: Yes  Pertinent: Probiotic, Remeron, Protonix, Carafate, Hydrocodone, Vanc  Infusion:  PRN:       Intake/Ouptut 24 hrs (0701 - 0700)   I&O's Reviewed: Yes   Intake & Output (last day)       03/05 0701 03/06 0700 03/06 0701 03/07 0700    Blood      Other 601     NG/      "Total Intake(mL/kg) 1281 (17.9)     Urine (mL/kg/hr) 175 (0.1)     Total Output 175     Net +1106               +BM 3/5    Anthropometrics     Flowsheet Rows    Flowsheet Row First Filed Value   Admission Height 162.6 cm (64\") Documented at 2023 1524   Admission Weight 65.8 kg (145 lb) Documented at 2023 1524            Height: Height: 162.6 cm (64\")  Last Filed Weight: Weight: 71.4 kg (157 lb 4.8 oz) (23 0350)  Method: Weight Method: Bed scale  BMI: BMI (Calculated): 27  BMI classification: Overweight: 25.0-29.9kg/m2   IBW:  120lbs    UBW: ~156lbs per dtr report  Weight change: Pt 147lbs on 3/1/23 and 143lbs on 23-requested reweigh from nsg.  154lbs (2022)  158lbs (2022)    Nutrition Focused Physical Exam     Date: 3/3    Unable to perform exam due to: Pt unable to participate at time of visit    Needs Assessment   Date: 3/3    Height used:Height: 162.6 cm (64\")  Weights used: 68kg      Estimated Calorie needs: ~ 1710  Kcal/day  Method:  Kcals/KG (25kcal/kg)  Method:  MSJ x 1.2 =1380kcals    Estimated Protein needs: ~ 75 g PRO/day  Method: 1-1.2 g/Kg (68-82g)    Estimated Fluid needs: ~1700 ml/day   Per clinical status    Current Nutrition Prescription       EN: IsoSource HN  Goal Rate:  65 Water Flushes: 25ml/hr  Modular: None  Route: PEG  Tube: Unknown    At goal over: 22Hrs/day    Rx will supply:   Goal Volume 1430 mL/day       Flush Volume 550 mL/day       Energy 1716 Kcal/day 100 % Est Need   Protein 77 g/day 100 % Est Need   Fiber 0 g/day       Water in  EN 1158 mL       Total Water 1708 mL       Meet DRI Yes           --------------------------------------------------------------------------  Product/Rate verified at bedside: Yes  Infusing Rate at time of visit: 30    Average Delivery from Chartin Day: 530ml (84%)    PO: NPO Diet NPO Type: Strict NPO  Oral Nutrition Supplement:   Intake: Insufficient data    Nutrition Diagnosis   Date: 3/3 Updated:   Problem Inadequate oral " intake   Etiology Minimal po intake 2/2 neurological decline per MD note   Signs/Symptoms +PEG   Status:     Date:  3/3 Updated:  Problem Unintended weight loss   Etiology Minimal po intakes 2/2 tonsillar ca   Signs/Symptoms Possible 9lb/6% wt loss x 3 months-pending verified wt   Status:    Goal:   General: Nutrition support treatment  PO: N/A  EN/PN: Initiate EN    Nutrition Intervention      Follow treatment progress, Care plan reviewed    Advance EN regimen by 10ml q 6 to goal @ 65ml/hr. Monitor phos and replace per protocol.     EN regimen:   Isosource HN @ 30ml/hr advance by 15ml q 6 as tolerated to goal @ 65ml/hr. Water flush @ 25ml/hr.   =1430ml, 1716 kcals (100% est needs), 77g pro (104% est needs), 0g fiber, 1158 ml free water (+550ml water flush), 1708ml TFW.    Monitoring/Evaluation:   Per protocol, I&O, Pertinent labs, EN delivery/tolerance, Weight, Symptoms, POC/GOC      Mar Biggs RD  Time Spent: 30

## 2023-03-06 NOTE — PROGRESS NOTES
Patient Name: Vesta Landry  : 1945  MRN: [id number]      Reason for Consultation: possible abdominal infection    History of present illness:   Patient is a 77 y.o. 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 COVID dx 2023, but did not require hospitalization. Admitted to [facility] on  with severe C. difficile colitis.  Treated with high-dose oral vancomycin for 14 days and initially IV Flagyl.    Readmitted to [facility] in  with NSTEMI and also treated for E. coli and Proteus UTI initially with IV antibiotics and then discharged on 3/1/23 on p.o. Cipro but was not given any prophylaxis for C. Difficile, although testing for C. difficile was negative. [facility] was not contacted about this episode.    3/3/2023: Readmitted to [facility] last night with dark tarry stools began the day before.  CT scan with thickening of the distal rectum also possible fluid collection around PEG tube site.  Patient is poor historian but family at bedside helps to clarify.  Has had poor appetite and not much intake, even via PEG tube.  No complaints of PEG tube drainage or tenderness.  Loose stools with each episode of urination but not copious watery diarrhea.     3/6/23: Improved over the weekend.  Much more alert and interactive.  Patient denies having any abdominal pain at this time.  Stools are formed.  Hopeful for discharge home with hospice as soon as tomorrow    Allergies   Allergen Reactions   • Trazodone Confusion       Antibiotics:  Anti-Infectives (From admission, onward)    Ordered     Dose/Rate Route Frequency Start Stop    23 1147  vancomycin oral solution reconstituted 125 mg        Ordering Provider: Daren Palacios MD    125 mg Per G Tube Every 6 Hours Scheduled 23 1245 23 1159    23 1238   cefTRIAXone (ROCEPHIN) 2 g/100 mL 0.9% NS IVPB (MBP)        Ordering Provider: Daren Palacios MD    2 g  over 30 Minutes Intravenous Every 24 Hours 03/03/23 1400 03/10/23 1359    03/03/23 1238  metroNIDAZOLE (FLAGYL) IVPB 500 mg        Ordering Provider: Daren Palacios MD    500 mg  100 mL/hr over 60 Minutes Intravenous Every 8 Hours Scheduled 03/03/23 1400 03/10/23 1359    03/02/23 2335  piperacillin-tazobactam (ZOSYN) 3.375 g in iso-osmotic dextrose 50 ml (premix)        Ordering Provider: Dasia Bennetta, APRN    3.375 g  over 30 Minutes Intravenous Once 03/03/23 0030 03/03/23 0102    03/02/23 2340  vancomycin 1500 mg/500 mL 0.9% NS IVPB ([facility])        Ordering Provider: Donald Jeanne, APRN    20 mg/kg × 68.8 kg Intravenous Once 03/03/23 0030 03/03/23 0032          Other Medications:  Current Facility-Administered Medications   Medication Dose Route Frequency Provider Last Rate Last Admin   • acetaminophen (TYLENOL) tablet 650 mg  650 mg Per G Tube Q6H PRN Donald, Jeanne, APRN        Or   • acetaminophen (TYLENOL) 160 MG/5ML solution 650 mg  650 mg Per G Tube Q6H PRN Donald, Jeanne, APRN        Or   • acetaminophen (TYLENOL) suppository 650 mg  650 mg Rectal Q6H PRN Donald, Jeanne, APRN       • allopurinol (ZYLOPRIM) tablet 100 mg  100 mg Per G Tube Daily Donald, Jeanne, APRN   100 mg at 03/06/23 0906   • amLODIPine (NORVASC) tablet 10 mg  10 mg Per G Tube Q24H Donald, Jeanne, APRN   10 mg at 03/06/23 0907   • atorvastatin (LIPITOR) tablet 40 mg  40 mg Per G Tube Nightly Donald, Jeanne, APRN   40 mg at 03/05/23 2201   • Calcium Replacement - Initiate Nurse / BPA Driven Protocol   Does not apply PRN Donald, Jeanne, APRN       • cefTRIAXone (ROCEPHIN) 2 g/100 mL 0.9% NS IVPB (MBP)  2 g Intravenous Q24H Daren Palacios MD 0 mL/hr at 03/03/23 1800 2 g at 03/06/23 1305   • diazePAM (VALIUM) tablet 2 mg  2 mg Nasogastric Q12H PRN Arpit Chaidez DO   2 mg at 03/06/23 0962   •  First Mouthwash (Magic Mouthwash) 10 mL  10 mL Swish & Spit Q6H Donald, Jeanne, APRN   10 mL at 03/06/23 1306   • hydrALAZINE (APRESOLINE) tablet 100 mg  100 mg Per G Tube Q8H Donald, Jeanne, APRN   100 mg at 03/06/23 1305   • HYDROcodone-acetaminophen (NORCO) 7.5-325 MG per tablet 1 tablet  1 tablet Per G Tube Q8H Jaylan Harrington DO   1 tablet at 03/06/23 1305   • HYDROmorphone (DILAUDID) injection 0.5 mg  0.5 mg Intravenous Q2H PRN Robinson Murphy MD       • isosorbide dinitrate (ISORDIL) tablet 40 mg  40 mg Per G Tube TID - Nitrates Donald, Jeanne, APRN   40 mg at 03/06/23 1305   • lactobacillus acidophilus (RISAQUAD) capsule 1 capsule  1 capsule Per G Tube Daily With Breakfast & Dinner Donald, Jeanne, APRN   1 capsule at 03/06/23 0906   • Lidocaine Viscous HCl (XYLOCAINE) 2 % solution 10 mL  10 mL Mouth/Throat Q3H PRN Donald, Jeanne, APRN   10 mL at 03/03/23 0017   • losartan (COZAAR) tablet 100 mg  100 mg Per G Tube Daily Donald, Jeanne, APRN   100 mg at 03/06/23 0906   • Magnesium Standard Dose Replacement - Initiate Nurse / BPA Driven Protocol   Does not apply PRN Donald, Jeanne, APRN       • metoprolol tartrate (LOPRESSOR) tablet 100 mg  100 mg Per G Tube Q12H Donald, Jeanne, APRN   100 mg at 03/06/23 0906   • metroNIDAZOLE (FLAGYL) IVPB 500 mg  500 mg Intravenous Q8H Daren Palacios  mL/hr at 03/06/23 1346 500 mg at 03/06/23 1346   • mirtazapine (REMERON) tablet 15 mg  15 mg Per G Tube Nightly Donald, Jeanne, APRN   15 mg at 03/05/23 2201   • nystatin (MYCOSTATIN) 100,000 unit/mL suspension 500,000 Units  5 mL Swish & Spit 4x Daily Donald, Jeanne, APRN   500,000 Units at 03/06/23 1305   • nystatin (MYCOSTATIN) powder   Topical TID Jeanne Bennett APRN   Given at 03/06/23 1536   • palliative care oral rinse   Mouth/Throat PRN Arpit Chaidez DO   Given at 03/06/23 0905   • pantoprazole (PROTONIX) injection 40 mg  40 mg Intravenous Q12H Tay Canada DO   40 mg at  "03/06/23 0906   • Pharmacy Consult   Does not apply Continuous PRN Jeanne Bennett APRN       • Phosphorus Replacement - Initiate Nurse / BPA Driven Protocol   Does not apply PRN Jeanne Bennett APRN       • Potassium Replacement - Initiate Nurse / BPA Driven Protocol   Does not apply PRN Dasia Bennetta, APRN       • Sodium Chloride (PF) 0.9 % 10 mL  10 mL Intravenous PRN Emergency, Triage Protocol, MD       • sucralfate (CARAFATE) tablet 1 g  1 g Per G Tube 4x Daily Jeanne Bennett APRN   1 g at 03/06/23 1305   • vancomycin oral solution reconstituted 125 mg  125 mg Per G Tube Q6H Daren Palacios MD   125 mg at 03/06/23 1305       Physical Exam:   Vital Signs   /83   Pulse 78   Temp 98 °F (36.7 °C) (Axillary)   Resp 16   Ht 162.6 cm (64\")   Wt 71.4 kg (157 lb 4.8 oz)   SpO2 97%   BMI 27.00 kg/m²     GENERAL: Awake and alert, much more interactive today   HEENT: Normocephalic, atraumatic.  EOMI. No conjunctival injection. No icterus.  Dried blood in mouth and on lips. significant mucositis.   NECK: Supple without nuchal rigidity.  No meningismus  HEART: RRR; No murmur.  LUNGS: Clear to auscultation bilaterally without wheezing, rales, rhonchi. Normal respiratory effort. Nonlabored.  ABDOMEN: Soft, nontender, nondistended. No rebound or guarding.  PEG tube in place without signs of bleeding or surrounding irritation, drainage  EXT:  No edema.  : Without Quinonez catheter.  MSK: no major joint swelling.   SKIN: no rash.   NEURO: alert & oriented   PSYCHIATRIC: less confused    Laboratory Data  Lab Results   Component Value Date    WBC 4.99 03/06/2023    HGB 9.8 (L) 03/06/2023    HCT 31.5 (L) 03/06/2023    MCV 90.3 03/06/2023     03/06/2023     Lab Results   Component Value Date    GLUCOSE 130 (H) 03/06/2023    CALCIUM 7.7 (L) 03/06/2023     03/06/2023    K 4.0 03/06/2023    CO2 21.0 (L) 03/06/2023     03/06/2023    BUN 11 03/06/2023    CREATININE 0.68 03/06/2023 "     Estimated Creatinine Clearance: 65 mL/min (by C-G formula based on SCr of 0.68 mg/dL).  Lab Results   Component Value Date    ALT 26 03/02/2023    AST 39 (H) 03/02/2023    ALKPHOS 86 03/02/2023    BILITOT 0.3 03/02/2023     Lab Results   Component Value Date    CRP 2.2 07/02/2015     Lab Results   Component Value Date    SEDRATE 8 07/02/2015       The above labs were reviewed.     Microbiology:  Microbiology Results (last 10 days)     Procedure Component Value - Date/Time    Blood Culture - Blood, Arm, Right [[id number]]  (Normal) Collected: 03/03/23 0029    Lab Status: Preliminary result Specimen: Blood from Arm, Right Updated: 03/06/23 0115     Blood Culture No growth at 3 days    Narrative:      Aerobic bottle only      Blood Culture - Blood, Arm, Right [[id number]]  (Normal) Collected: 03/03/23 0029    Lab Status: Preliminary result Specimen: Blood from Arm, Right Updated: 03/06/23 0115     Blood Culture No growth at 3 days    Narrative:      Aerobic bottle only      COVID PRE-OP / PRE-PROCEDURE SCREENING ORDER (NO ISOLATION) - Swab, Nasopharynx [[id number]]  (Normal) Collected: 03/02/23 1607    Lab Status: Final result Specimen: Swab from Nasopharynx Updated: 03/02/23 1651    Narrative:      The following orders were created for panel order COVID PRE-OP / PRE-PROCEDURE SCREENING ORDER (NO ISOLATION) - Swab, Nasopharynx.  Procedure                               Abnormality         Status                     ---------                               -----------         ------                     COVID-19 and FLU A/B PCR...[[id number]]  Normal              Final result                 Please view results for these tests on the individual orders.    COVID-19 and FLU A/B PCR - Swab, Nasopharynx [[id number]]  (Normal) Collected: 03/02/23 1607    Lab Status: Final result Specimen: Swab from Nasopharynx Updated: 03/02/23 1651     COVID19 Not Detected     Influenza A PCR Not Detected     Influenza B PCR Not Detected     Narrative:      Fact sheet for providers: https://www.fda.gov/media/102272/download    Fact sheet for patients: https://www.fda.gov/media/434929/download    Test performed by PCR.    COVID PRE-OP / PRE-PROCEDURE SCREENING ORDER (NO ISOLATION) - Swab, Nasopharynx [[id number]]  (Normal) Collected: 02/26/23 1907    Lab Status: Final result Specimen: Swab from Nasopharynx Updated: 02/26/23 1936    Narrative:      The following orders were created for panel order COVID PRE-OP / PRE-PROCEDURE SCREENING ORDER (NO ISOLATION) - Swab, Nasopharynx.  Procedure                               Abnormality         Status                     ---------                               -----------         ------                     COVID-19 and FLU A/B PCR...[[id number]]  Normal              Final result                 Please view results for these tests on the individual orders.    COVID-19 and FLU A/B PCR - Swab, Nasopharynx [[id number]]  (Normal) Collected: 02/26/23 1907    Lab Status: Final result Specimen: Swab from Nasopharynx Updated: 02/26/23 1936     COVID19 Not Detected     Influenza A PCR Not Detected     Influenza B PCR Not Detected    Narrative:      Fact sheet for providers: https://www.fda.gov/media/944155/download    Fact sheet for patients: https://www.fda.gov/media/265261/download    Test performed by PCR.    Blood Culture - Blood, Arm, Right [[id number]]  (Normal) Collected: 02/25/23 1918    Lab Status: Final result Specimen: Blood from Arm, Right Updated: 03/02/23 1931     Blood Culture No growth at 5 days    Blood Culture - Blood, Hand, Right [[id number]]  (Normal) Collected: 02/25/23 1900    Lab Status: Final result Specimen: Blood from Hand, Right Updated: 03/02/23 1931     Blood Culture No growth at 5 days    Urine Culture - Urine, Urine, Clean Catch [[id number]]  (Abnormal)  (Susceptibility) Collected: 02/25/23 1428    Lab Status: Final result Specimen: Urine, Clean Catch Updated: 02/27/23 0837     Urine Culture  >100,000 CFU/mL Escherichia coli      50,000 CFU/mL Proteus penneri    Narrative:      Colonization of the urinary tract without infection is common. Treatment is discouraged unless the patient is symptomatic, pregnant, or undergoing an invasive urologic procedure.      Susceptibility      Escherichia coli      PRISCA      Ampicillin Susceptible      Ampicillin + Sulbactam Susceptible      Cefazolin Susceptible      Cefepime Susceptible      Ceftazidime Susceptible      Ceftriaxone Susceptible      Gentamicin Susceptible      Levofloxacin Susceptible      Nitrofurantoin Susceptible      Piperacillin + Tazobactam Susceptible      Trimethoprim + Sulfamethoxazole Susceptible                       Susceptibility      Proteus penneri      PRISCA      Ampicillin Resistant     Ampicillin + Sulbactam Intermediate      Cefazolin Resistant     Cefepime Susceptible      Ceftazidime Susceptible      Ceftriaxone Susceptible      Gentamicin Susceptible      Levofloxacin Susceptible      Nitrofurantoin Resistant     Piperacillin + Tazobactam Susceptible      Trimethoprim + Sulfamethoxazole Susceptible                           Clostridioides difficile Toxin - Stool, Per Rectum [[id number]]  (Normal) Collected: 02/24/23 2008    Lab Status: Final result Specimen: Stool from Per Rectum Updated: 02/24/23 2054    Narrative:      The following orders were created for panel order Clostridioides difficile Toxin - Stool, Per Rectum.  Procedure                               Abnormality         Status                     ---------                               -----------         ------                     Clostridioides difficile...[[id number]]  Normal              Final result                 Please view results for these tests on the individual orders.    Clostridioides difficile EIA - Stool, Per Rectum [[id number]]  (Normal) Collected: 02/24/23 2008    Lab Status: Final result Specimen: Stool from Per Rectum Updated: 02/24/23 2054     [facility]  Ag Negative     C.diff Toxin Ag Negative    Narrative:      The result indicates the absence of toxigenic C.difficile from stool specimen.          Radiology:  CT Abdomen Pelvis With & Without Contrast    Result Date: 3/2/2023  1. Findings of the chest are reported separately 2. Percutaneous gastrostomy tube projects in expected position.  There is a small complex fluid collection along the anterior abdominal wall with possible extension along the margin of the stomach. Findings are likely postsurgical in nature. The sterility of the collection is indeterminate. Recommend continued follow-up as clinically indicated 3. Small amount of fluid with possible developing enhancement within the pelvis. Findings could represent a developing abscess in the correct setting. 4. Mild wall thickening and stranding along the distal rectum. Findings may represent underlying proctitis, colitis. Small amount of fluid and liquid stool noted throughout the colon. Inflammatory changes noted on the prior study are less prominent.  Electronically Signed: Heriberto Lopez  3/2/2023 7:30 PM EST  Workstation ID: [id number]    XR Chest 1 View    Result Date: 3/2/2023  Impression: Left basilar atelectasis or pneumonia with possible subpulmonic pleural effusion Electronically Signed: Ari Landry  3/2/2023 4:25 PM EST  Workstation ID: [id number]    XR Chest 1 View    Result Date: 2/26/2023  No acute cardiopulmonary process Authenticated and Electronically Signed by Bc Jaramillo MD on 02/26/2023 07:13:03 PM    CT Angiogram Chest    Result Date: 3/2/2023  1. No evidence of pulmonary metastases 2. Small bilateral pleural effusions and minimal dependent opacities slightly increased in comparison 3. A few scattered groundglass opacities likely postinflammatory or infectious. Recommend follow-up to document resolution Electronically Signed: Heriberto Lopez  3/2/2023 7:15 PM EST  Workstation ID: [id number]    I personally reviewed the above CT chest and  abdomen images.  Tiny groundglass opacity at right base but lungs otherwise clear      Impression:   1. GI bleed  2. Possible intra-abdominal infection on admission: per radiology on admission CT but no clear findings of abscess. WBC normal and afebrile. Now symptoms resolved   3. Mild wall thickening of distal rectum, possible proctitis: Concerning with recent C. difficile however no copious stool output reported by family prior to readmission.  C. difficile testing was negative during last admission at [facility].  Stools formed   4. Fluid collection around PEG on imaging: Doubt infection based on benign external exam and no signs/symptoms of tenderness, drainage  5. Recent E coli and Proteus UTI: repeat UA improved.  No signs of pyelonephritis on CT per radiology   6. Recent severe C difficile colitis in Jan 2023: high risk for relapse  7. 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 Left tonsillar squamous cell cancer  9. CKD stage 3B  10. Acute encephalopathy on top of chronic dementia  11. Hx of DVT    PLAN:   - Labs and microbiology data reviewed    Noted plans for discharge home with hospice tomorrow afternoon.  Will give final dose of ceftriaxone tomorrow prior to discharge.  Also stop Flagyl at that time.      If consistent with goals of care, be reasonable to continue oral vancomycin 125 mg twice daily for 5 additional days after stopping broad-spectrum antibiotics to help prevent C. difficile relapse.       Discussed with family at bedside.  I will sign off for now but please call if any new concerns develop    Daren Palacios MD  3/6/2023

## 2023-03-06 NOTE — PROGRESS NOTES
[facility] Medicine Services  PROGRESS NOTE    Patient Name: Vesta Landry  : 1945  MRN: [id number]    Date of Admission: 3/2/2023  Primary Care Physician: Bossman Pedraza MD    Subjective   Subjective     CC:  Follow-up cancer, concern for GI bleed    HPI: Denies HA. NO dyspnea. No pain. No n/v. No current issues.    ROS:  Difficult to assess    Objective   Objective     Vital Signs:   Temp:  [97.9 °F (36.6 °C)-98.1 °F (36.7 °C)] 97.9 °F (36.6 °C)  Heart Rate:  [65-82] 80  Resp:  [16-18] 16  BP: (134-153)/(62-71) 153/71     Physical Exam:  NAD, awake and alert and conversant  OP dry, mucositis noted, looks better  RRR, murmur noted  CTAB  +BS, soft, mod distention  NO c/c  No obvious rashes  No change from 3/5 otherwise    Results Reviewed:  LAB RESULTS:      Lab 23  0613 23  0824 23  2207 23  0511 23  0018 23  0329 23  0029 23  1601 23  0628   WBC 4.99 5.48  --  4.71  --  5.32  --  7.05 5.19   HEMOGLOBIN 9.8* 11.1* 10.2* 8.0* 8.3* 9.0*   < > 9.9* 9.1*   HEMATOCRIT 31.5* 37.5 32.2* 26.2* 26.9* 27.5*   < > 31.8* 29.1*   PLATELETS 212 222  --  179  --  164  --  214 155   NEUTROS ABS  --   --   --  3.42  --  3.66  --  5.07 4.01   IMMATURE GRANS (ABS)  --   --   --  0.03  --  0.14*  --  0.08* 0.03   LYMPHS ABS  --   --   --  0.74  --  0.93  --  1.10 0.43*   MONOS ABS  --   --   --  0.32  --  0.36  --  0.59 0.50   EOS ABS  --   --   --  0.18  --  0.21  --  0.18 0.19   MCV 90.3 96.9  --  94.2  --  90.2  --  93.3 93.3   PROCALCITONIN  --   --   --   --   --   --   --  0.18  --    LACTATE  --   --   --   --   --   --   --  0.8 0.6    < > = values in this interval not displayed.         Lab 23  0613 23  2320 23  0824 23  2207 23  1954 23  0511 23  0329 23  1601 23  0522 23  0628   SODIUM 137  --  136  --   --  138 138 136  --  142   POTASSIUM 4.0  --  4.6 5.0 5.9* 3.0*  3.7 3.6  --  3.3*   CHLORIDE 107  --  107  --   --  106 103 101  --  105   CO2 21.0*  --  16.0*  --   --  21.0* 20.0* 23.0  --  25.3   ANION GAP 9.0  --  13.0  --   --  11.0 15.0 12.0  --  11.7   BUN 11  --  10  --   --  10 11 15  --  16   CREATININE 0.68  --  0.67  --   --  0.78 0.78 0.77  --  0.90   EGFR 88.7  --  89.0  --   --  77.4 77.4 78.6  --  65.2   GLUCOSE 130*  --  104*  --   --  89 78 112*  --  97   CALCIUM 7.7*  --  8.3*  --   --  7.7* 8.3* 8.5*  --  7.9*   MAGNESIUM 2.5*  --  1.6  --   --  1.6  --   --  2.4 1.6   PHOSPHORUS 2.8 3.2 2.1*  --   --  3.3  --   --   --   --          Lab 03/02/23  1601   TOTAL PROTEIN 6.4   ALBUMIN 3.2*   GLOBULIN 3.2   ALT (SGPT) 26   AST (SGOT) 39*   BILIRUBIN 0.3   ALK PHOS 86   LIPASE 48         Lab 03/02/23  1601   HSTROP T 63*             Lab 03/03/23 0029 03/02/23  2226   ABO TYPING O O   RH TYPING Positive Positive   ANTIBODY SCREEN  --  Negative         Brief Urine Lab Results  (Last result in the past 365 days)      Color   Clarity   Blood   Leuk Est   Nitrite   Protein   CREAT   Urine HCG        03/02/23 1617 Yellow   Clear   Negative   Trace   Negative   Negative                 Microbiology Results Abnormal     Procedure Component Value - Date/Time    Blood Culture - Blood, Arm, Right [[id number]]  (Normal) Collected: 03/03/23 0029    Lab Status: Preliminary result Specimen: Blood from Arm, Right Updated: 03/06/23 0115     Blood Culture No growth at 3 days    Narrative:      Aerobic bottle only      Blood Culture - Blood, Arm, Right [[id number]]  (Normal) Collected: 03/03/23 0029    Lab Status: Preliminary result Specimen: Blood from Arm, Right Updated: 03/06/23 0115     Blood Culture No growth at 3 days    Narrative:      Aerobic bottle only      COVID PRE-OP / PRE-PROCEDURE SCREENING ORDER (NO ISOLATION) - Swab, Nasopharynx [[id number]]  (Normal) Collected: 03/02/23 1607    Lab Status: Final result Specimen: Swab from Nasopharynx Updated: 03/02/23 3946     Narrative:      The following orders were created for panel order COVID PRE-OP / PRE-PROCEDURE SCREENING ORDER (NO ISOLATION) - Swab, Nasopharynx.  Procedure                               Abnormality         Status                     ---------                               -----------         ------                     COVID-19 and FLU A/B PCR...[[id number]]  Normal              Final result                 Please view results for these tests on the individual orders.    COVID-19 and FLU A/B PCR - Swab, Nasopharynx [[id number]]  (Normal) Collected: 03/02/23 1607    Lab Status: Final result Specimen: Swab from Nasopharynx Updated: 03/02/23 1651     COVID19 Not Detected     Influenza A PCR Not Detected     Influenza B PCR Not Detected    Narrative:      Fact sheet for providers: https://www.fda.gov/media/832944/download    Fact sheet for patients: https://www.fda.gov/media/842010/download    Test performed by PCR.          No radiology results from the last 24 hrs    Results for orders placed during the hospital encounter of 02/23/23    Adult Transthoracic Echo Limited W/ Cont if Necessary Per Protocol    Interpretation Summary  •  Left ventricular wall thickness is consistent with mild concentric hypertrophy.  •  Left ventricular diastolic function is consistent with age.  •  Estimated right ventricular systolic pressure from tricuspid regurgitation is markedly elevated (>55 mmHg).  •  Moderate to severe pulmonary hypertension is present.      Current medications:  Scheduled Meds:allopurinol, 100 mg, Per G Tube, Daily  amLODIPine, 10 mg, Per G Tube, Q24H  atorvastatin, 40 mg, Per G Tube, Nightly  cefTRIAXone, 2 g, Intravenous, Q24H  First Mouthwash (Magic Mouthwash), 10 mL, Swish & Spit, Q6H  hydrALAZINE, 100 mg, Per G Tube, Q8H  HYDROcodone-acetaminophen, 1 tablet, Oral, Q8H  isosorbide dinitrate, 40 mg, Per G Tube, TID - Nitrates  lactobacillus acidophilus, 1 capsule, Per G Tube, Daily With Breakfast &  Dinner  losartan, 100 mg, Per G Tube, Daily  metoprolol tartrate, 100 mg, Per G Tube, Q12H  metroNIDAZOLE, 500 mg, Intravenous, Q8H  mirtazapine, 15 mg, Per G Tube, Nightly  nystatin, 5 mL, Swish & Spit, 4x Daily  nystatin, , Topical, TID  pantoprazole, 40 mg, Intravenous, Q12H  sucralfate, 1 g, Per G Tube, 4x Daily  vancomycin, 125 mg, Per G Tube, Q6H      Continuous Infusions:Pharmacy Consult,       PRN Meds:.•  acetaminophen  •  acetaminophen **OR** [DISCONTINUED] acetaminophen **OR** acetaminophen  •  Calcium Replacement - Initiate Nurse / BPA Driven Protocol  •  diazePAM  •  HYDROmorphone  •  Lidocaine Viscous HCl  •  Magnesium Standard Dose Replacement - Initiate Nurse / BPA Driven Protocol  •  palliative care oral rinse  •  Pharmacy Consult  •  Phosphorus Replacement - Initiate Nurse / BPA Driven Protocol  •  Potassium Replacement - Initiate Nurse / BPA Driven Protocol  •  Sodium Chloride (PF)    Assessment & Plan   Assessment & Plan     Active Hospital Problems    Diagnosis  POA   • **Abnormal bowel movement [R19.8]  Yes   • History of Clostridium difficile colitis [Z86.19]  Not Applicable   • HLD (hyperlipidemia) [E78.5]  Yes   • 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        Brief Hospital Course to date:  Vesta Landry is a 77 y.o. female w/ HTN, HLD, dementia (notable recent decline), prior C. 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, currently on hold due to severe mucositis (recent G-tube placement due to inability to eat from mucositis); she was admitted to [facility] 2/23 - 3/1 with NSTEMI, due to functional status and comorbidities she was managed medically per cardiology, additionally on antibiotics for reported UTI; while on Lovenox/Plavix for NSTEMI she had  reported hematemesis which additionally was reportedly managed medically; she was discharged home on oral antibiotic and developed abnormal bowel movements, family was concerned for melena, prompting evaluation in our ED, CT abd/pel had multiple incidental findings including fluid collection near G-tube, potential proctitis/colitis    Assessment/plan    Abnormal bowel movements, concern for melena  -PPI  -transfused  -team d/w family, no invasive work up at this time, monitoring  -CBC somewhat stable after PRBC    Possible proctitis vs colitis  Hx C. Difficile colitis (January 2023)  -CT A/P findings reviewed  -seen by ID, on CTX/flagyl  -on oral vancomycin    Squamous cell carcinoma of LT tonsil on chemotherapy/radiotherapy, with severe mucositis  -followed by Rad/Onc, Geno, s/p radiation/chemo, held for mucositis    Hypertension  Hyperlipidemia  HFpEF, chronic, compensated  -Amlodipine, atorvastatin, hydralazine, isosorbide dinitrate, losartan, Lopressor    Dementia  -Family notes accelerated decline with recent chemotherapy  -Mirtazapine    Questionable facial droop  -none appreciated today and speech seems clear  -family did not wish to pursue work up after team discussion last week, given understanding for treatment would involve antiplatelet/anticoagulation in setting of possible GIB      Expected Discharge Location and Transportation: Ongoing GOC discussions but family does appear to be deciding on hospice at discharge  Expected Discharge   Expected Discharge Date and Time     Expected Discharge Date Expected Discharge Time    Mar 6, 2023            DVT prophylaxis:  Mechanical DVT prophylaxis orders are present.     AM-PAC 6 Clicks Score (PT): 14 (03/05/23 0800)    CODE STATUS:   Code Status and Medical Interventions:   Ordered at: 03/04/23 1129     Medical Intervention Limits:    NO intubation (DNI)    NO vasopressors     Code Status (Patient has no pulse and is not breathing):    No CPR (Do Not Attempt to  Resuscitate)     Medical Interventions (Patient has pulse or is breathing):    Limited Support     Release to patient:    Routine Release       Robinson Murphy MD  03/06/23

## 2023-03-06 NOTE — PLAN OF CARE
Goal Outcome Evaluation:  Plan of Care Reviewed With: patient, daughter        Progress: no change  Outcome Evaluation: Alert, oriented to self and place. Confused at times. PEG tub in place with tube feed infusing, Isosource HN at 50 ml/hr, tolerating well.  Goal rate 65 ml/hr. Up with assist x 1. Voiding well. No c/o pain. Home with Hospice tomorrow. Will continue to monitor.

## 2023-03-06 NOTE — TELEPHONE ENCOUNTER
Cathie with [facility] states they received a referral and wants to see if dr gambino will follow care. Call her at [phone]

## 2023-03-06 NOTE — PROGRESS NOTES
Continued Stay Note  [facility]     Patient Name: Vesta Landry  MRN: [id number]  Today's Date: 3/6/2023    Admit Date: 3/2/2023    Plan: Home with [facility]   Discharge Plan     Row Name 03/06/23 1609       Plan    Plan Home with [facility]    Plan Comments Sharona ROMERO, liaison for [facility], made a visit to pt today. Sharona further discussed hospice with the family, the family has decided to take pt home with [facility]. Pt lives with daughter Britany. Sharona discussed equipment needs and will have the equipment delivered tomorrow. Pt will need ambulance transportation. [facility] arranged for 1430 tomorrow. Pt's daughter Britany completed an EMS/DNR. Notified pt's care team of discharge plans and that [facility] requires a covid 19 test prior to discharge. Requested scripts for a 5 day supply of the hydrocodone and valium be sent to meds to bed. Will continue to follow. Please call [phone] if can be of further assistance.               Discharge Codes    No documentation.               Expected Discharge Date and Time     Expected Discharge Date Expected Discharge Time    Mar 6, 2023             Laisha Perea RN

## 2023-03-07 ENCOUNTER — READMISSION MANAGEMENT (OUTPATIENT)
Dept: CALL CENTER | Facility: HOSPITAL | Age: 78
End: 2023-03-07
Payer: MEDICARE

## 2023-03-07 VITALS
BODY MASS INDEX: 28.34 KG/M2 | SYSTOLIC BLOOD PRESSURE: 135 MMHG | RESPIRATION RATE: 16 BRPM | HEIGHT: 64 IN | HEART RATE: 71 BPM | DIASTOLIC BLOOD PRESSURE: 67 MMHG | OXYGEN SATURATION: 90 % | TEMPERATURE: 98.6 F | WEIGHT: 166 LBS

## 2023-03-07 LAB
PHOSPHATE SERPL-MCNC: 2.3 MG/DL (ref 2.5–4.5)
SARS-COV-2 RNA NOSE QL NAA+PROBE: NOT DETECTED

## 2023-03-07 PROCEDURE — 84100 ASSAY OF PHOSPHORUS: CPT

## 2023-03-07 PROCEDURE — 96376 TX/PRO/DX INJ SAME DRUG ADON: CPT

## 2023-03-07 PROCEDURE — 25010000002 HEPARIN LOCK FLUSH PER 10 UNITS: Performed by: HOSPITALIST

## 2023-03-07 PROCEDURE — 99239 HOSP IP/OBS DSCHRG MGMT >30: CPT | Performed by: HOSPITALIST

## 2023-03-07 PROCEDURE — 25010000002 CEFTRIAXONE PER 250 MG: Performed by: INTERNAL MEDICINE

## 2023-03-07 PROCEDURE — G0378 HOSPITAL OBSERVATION PER HR: HCPCS

## 2023-03-07 RX ORDER — VANCOMYCIN HYDROCHLORIDE 125 MG/1
125 CAPSULE ORAL 4 TIMES DAILY
Qty: 20 CAPSULE | Refills: 0 | Status: SHIPPED | OUTPATIENT
Start: 2023-03-07 | End: 2023-03-12

## 2023-03-07 RX ORDER — DIAZEPAM 2 MG/1
2 TABLET ORAL EVERY 12 HOURS PRN
Qty: 10 TABLET | Refills: 0 | Status: SHIPPED | OUTPATIENT
Start: 2023-03-07 | End: 2023-03-17

## 2023-03-07 RX ORDER — HEPARIN SODIUM (PORCINE) LOCK FLUSH IV SOLN 100 UNIT/ML 100 UNIT/ML
5 SOLUTION INTRAVENOUS AS NEEDED
Status: DISCONTINUED | OUTPATIENT
Start: 2023-03-07 | End: 2023-03-07 | Stop reason: HOSPADM

## 2023-03-07 RX ORDER — HYDROCODONE BITARTRATE AND ACETAMINOPHEN 7.5; 325 MG/1; MG/1
1 TABLET ORAL EVERY 8 HOURS
Qty: 15 TABLET | Refills: 0 | Status: SHIPPED | OUTPATIENT
Start: 2023-03-07 | End: 2023-03-12

## 2023-03-07 RX ADMIN — DIPHENHYDRAMINE HYDROCHLORIDE AND LIDOCAINE HYDROCHLORIDE AND ALUMINUM HYDROXIDE AND MAGNESIUM HYDRO 10 ML: KIT at 11:16

## 2023-03-07 RX ADMIN — PANTOPRAZOLE SODIUM 40 MG: 40 INJECTION, POWDER, FOR SOLUTION INTRAVENOUS at 09:08

## 2023-03-07 RX ADMIN — HYDROCODONE BITARTRATE AND ACETAMINOPHEN 1 TABLET: 7.5; 325 TABLET ORAL at 13:48

## 2023-03-07 RX ADMIN — ISOSORBIDE DINITRATE 40 MG: 20 TABLET ORAL at 09:07

## 2023-03-07 RX ADMIN — HYDRALAZINE HYDROCHLORIDE 100 MG: 50 TABLET, FILM COATED ORAL at 05:07

## 2023-03-07 RX ADMIN — CEFTRIAXONE 2 G: 2 INJECTION, POWDER, FOR SOLUTION INTRAMUSCULAR; INTRAVENOUS at 11:16

## 2023-03-07 RX ADMIN — HYDROCODONE BITARTRATE AND ACETAMINOPHEN 1 TABLET: 7.5; 325 TABLET ORAL at 05:07

## 2023-03-07 RX ADMIN — VANCOMYCIN HYDROCHLORIDE 125 MG: KIT at 09:07

## 2023-03-07 RX ADMIN — SUCRALFATE 1 G: 1 TABLET ORAL at 09:07

## 2023-03-07 RX ADMIN — NYSTATIN: 100000 POWDER TOPICAL at 09:07

## 2023-03-07 RX ADMIN — METOPROLOL TARTRATE 100 MG: 100 TABLET, FILM COATED ORAL at 09:07

## 2023-03-07 RX ADMIN — Medication 1 CAPSULE: at 09:08

## 2023-03-07 RX ADMIN — AMLODIPINE BESYLATE 10 MG: 10 TABLET ORAL at 09:07

## 2023-03-07 RX ADMIN — MINERAL OIL: 1000 LIQUID ORAL at 09:08

## 2023-03-07 RX ADMIN — HEPARIN 500 UNITS: 100 SYRINGE at 14:40

## 2023-03-07 RX ADMIN — METRONIDAZOLE 500 MG: 5 INJECTION, SOLUTION INTRAVENOUS at 05:07

## 2023-03-07 RX ADMIN — DIPHENHYDRAMINE HYDROCHLORIDE AND LIDOCAINE HYDROCHLORIDE AND ALUMINUM HYDROXIDE AND MAGNESIUM HYDRO 10 ML: KIT at 05:07

## 2023-03-07 RX ADMIN — ALLOPURINOL 100 MG: 100 TABLET ORAL at 09:08

## 2023-03-07 RX ADMIN — LOSARTAN POTASSIUM 100 MG: 50 TABLET, FILM COATED ORAL at 09:07

## 2023-03-07 RX ADMIN — NYSTATIN 500000 UNITS: 100000 SUSPENSION ORAL at 09:08

## 2023-03-07 RX ADMIN — SUCRALFATE 1 G: 1 TABLET ORAL at 11:18

## 2023-03-07 RX ADMIN — DIAZEPAM 2 MG: 2 TABLET ORAL at 05:13

## 2023-03-07 RX ADMIN — NYSTATIN 500000 UNITS: 100000 SUSPENSION ORAL at 11:18

## 2023-03-07 RX ADMIN — ISOSORBIDE DINITRATE 40 MG: 20 TABLET ORAL at 13:48

## 2023-03-07 RX ADMIN — HYDRALAZINE HYDROCHLORIDE 100 MG: 50 TABLET, FILM COATED ORAL at 13:48

## 2023-03-07 NOTE — PLAN OF CARE
A&Ox3 (disoriented to situation), patient follows all commands, conversant. VSS on RA, resting in bed without sx/si of pain/acute distress. Resp even/unlabored. Skin w/d to touch. Will cont to mx. Call light in reach.

## 2023-03-07 NOTE — DISCHARGE SUMMARY
[facility] Medicine Services  DISCHARGE SUMMARY    Patient Name: Vesta Landry  : 1945  MRN: [id number]    Date of Admission: 3/2/2023  4:22 PM  Date of Discharge:  3/7/2023  Primary Care Physician: Bossman Pedraza MD    Consults     Date and Time Order Name Status Description    3/3/2023  8:17 AM Inpatient Palliative Care MD Consult Completed     3/3/2023 12:34 AM Inpatient Infectious Diseases Consult Completed     3/3/2023 12:34 AM Inpatient Colorectal Surgery Consult Completed     3/3/2023 12:34 AM Inpatient Gastroenterology Consult Completed     2023 11:46 PM Inpatient Cardiology Consult Completed           Hospital Course     Presenting Problem:   Gastrointestinal hemorrhage, unspecified gastrointestinal hemorrhage type [K92.2]    Active Hospital Problems    Diagnosis  POA   • **Abnormal bowel movement [R19.8]  Yes   • History of Clostridium difficile colitis [Z86.19]  Not Applicable   • HLD (hyperlipidemia) [E78.5]  Yes   • 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          Hospital Course:  Vesta Landry is a 77 y.o. female w/ HTN, HLD, dementia (notable recent decline), prior C. 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, currently on hold due to severe mucositis (recent G-tube placement due to inability to eat from mucositis); she was admitted to [facility]  - 3/1 with NSTEMI, due to functional status and comorbidities she was managed medically per cardiology, additionally on antibiotics for reported UTI; while on Lovenox/Plavix for NSTEMI she had reported hematemesis which additionally was reportedly managed medically; she was discharged home on oral antibiotic and developed abnormal bowel movements, family was concerned for  melena, prompting evaluation in our ED, CT abd/pel had multiple incidental findings including fluid collection near G-tube, potential proctitis/colitis.     Assessment/plan:    Ultimately the family felt that the patient's best interest lied in pursuit of palliative and hospice care. She received care as outlined below but has reached maximal hospital benefit at this time.     Abnormal bowel movements, concern for melena  -She was given PPI and transfused, and no invasive/aggressive work up was pursued.     Possible proctitis vs colitis  Hx C. Difficile colitis (January 2023)  -CT A/P findings reviewed  -She was evaluated by ID and given a course of rocephin and flagyl and will continue oral vancomycin x 5 days after DC to prevent C. Diff relapse.     Squamous cell carcinoma of LT tonsil on chemotherapy/radiotherapy, with severe mucositis  -She was followed by Rad/Onc, Geno, s/p radiation/chemo, held for mucositis, and the family and patient ultimately decided to return home with hospice care.     Hypertension  Hyperlipidemia  HFpEF, chronic, compensated  -Amlodipine, atorvastatin, hydralazine, isosorbide dinitrate, losartan, Lopressor     Dementia  -Family notes accelerated decline with recent chemotherapy  -Mirtazapine     Questionable facial droop  -This improved, the family did not wish to pursue work up after team discussion last week, given understanding for treatment would involve antiplatelet/anticoagulation in setting of possible GIB.    Discharge Follow Up Recommendations for outpatient labs/diagnostics:  Home with hospice care    Day of Discharge     HPI: No issues. Denies pain or SOA.      Review of Systems  As above    Vital Signs:   Temp:  [97.7 °F (36.5 °C)-98.1 °F (36.7 °C)] 97.8 °F (36.6 °C)  Heart Rate:  [65-83] 74  Resp:  [16] 16  BP: (128-153)/(66-83) 150/66      Physical Exam:  NAD, alert and oriented  OP clear, dry MM, mucositis noted  Neck supple  RRR  CTAB  +BS, soft, PEG in place  RING  Flat  affect    Pertinent  and/or Most Recent Results     LAB RESULTS:      Lab 03/06/23  0613 03/05/23  0824 03/04/23 2207 03/04/23  0511 03/04/23 0018 03/03/23 0329 03/03/23  0029 03/02/23  1601   WBC 4.99 5.48  --  4.71  --  5.32  --  7.05   HEMOGLOBIN 9.8* 11.1* 10.2* 8.0* 8.3* 9.0*   < > 9.9*   HEMATOCRIT 31.5* 37.5 32.2* 26.2* 26.9* 27.5*   < > 31.8*   PLATELETS 212 222  --  179  --  164  --  214   NEUTROS ABS  --   --   --  3.42  --  3.66  --  5.07   IMMATURE GRANS (ABS)  --   --   --  0.03  --  0.14*  --  0.08*   LYMPHS ABS  --   --   --  0.74  --  0.93  --  1.10   MONOS ABS  --   --   --  0.32  --  0.36  --  0.59   EOS ABS  --   --   --  0.18  --  0.21  --  0.18   MCV 90.3 96.9  --  94.2  --  90.2  --  93.3   PROCALCITONIN  --   --   --   --   --   --   --  0.18   LACTATE  --   --   --   --   --   --   --  0.8    < > = values in this interval not displayed.         Lab 03/07/23  0609 03/06/23  0613 03/05/23 2320 03/05/23 0824 03/04/23 2207 03/04/23  1954 03/04/23  0511 03/03/23 0329 03/02/23  1601 03/02/23  1601 03/01/23  0522   SODIUM  --  137  --  136  --   --  138 138  --  136  --    POTASSIUM  --  4.0  --  4.6 5.0 5.9* 3.0* 3.7   < > 3.6  --    CHLORIDE  --  107  --  107  --   --  106 103  --  101  --    CO2  --  21.0*  --  16.0*  --   --  21.0* 20.0*  --  23.0  --    ANION GAP  --  9.0  --  13.0  --   --  11.0 15.0  --  12.0  --    BUN  --  11  --  10  --   --  10 11  --  15  --    CREATININE  --  0.68  --  0.67  --   --  0.78 0.78  --  0.77  --    EGFR  --  88.7  --  89.0  --   --  77.4 77.4  --  78.6  --    GLUCOSE  --  130*  --  104*  --   --  89 78  --  112*  --    CALCIUM  --  7.7*  --  8.3*  --   --  7.7* 8.3*  --  8.5*  --    MAGNESIUM  --  2.5*  --  1.6  --   --  1.6  --   --   --  2.4   PHOSPHORUS 2.3* 2.8 3.2 2.1*  --   --  3.3  --   --   --   --     < > = values in this interval not displayed.         Lab 03/02/23  1601   TOTAL PROTEIN 6.4   ALBUMIN 3.2*   GLOBULIN 3.2   ALT (SGPT) 26   AST  (SGOT) 39*   BILIRUBIN 0.3   ALK PHOS 86   LIPASE 48         Lab 03/02/23  1601   HSTROP T 63*             Lab 03/03/23  0029 03/02/23  2226   ABO TYPING O O   RH TYPING Positive Positive   ANTIBODY SCREEN  --  Negative         Brief Urine Lab Results  (Last result in the past 365 days)      Color   Clarity   Blood   Leuk Est   Nitrite   Protein   CREAT   Urine HCG        03/02/23 1617 Yellow   Clear   Negative   Trace   Negative   Negative               Microbiology Results (last 10 days)     Procedure Component Value - Date/Time    Blood Culture - Blood, Arm, Right [[id number]]  (Normal) Collected: 03/03/23 0029    Lab Status: Preliminary result Specimen: Blood from Arm, Right Updated: 03/07/23 0115     Blood Culture No growth at 4 days    Narrative:      Aerobic bottle only      Blood Culture - Blood, Arm, Right [[id number]]  (Normal) Collected: 03/03/23 0029    Lab Status: Preliminary result Specimen: Blood from Arm, Right Updated: 03/07/23 0115     Blood Culture No growth at 4 days    Narrative:      Aerobic bottle only      COVID PRE-OP / PRE-PROCEDURE SCREENING ORDER (NO ISOLATION) - Swab, Nasopharynx [[id number]]  (Normal) Collected: 03/02/23 1607    Lab Status: Final result Specimen: Swab from Nasopharynx Updated: 03/02/23 1651    Narrative:      The following orders were created for panel order COVID PRE-OP / PRE-PROCEDURE SCREENING ORDER (NO ISOLATION) - Swab, Nasopharynx.  Procedure                               Abnormality         Status                     ---------                               -----------         ------                     COVID-19 and FLU A/B PCR...[[id number]]  Normal              Final result                 Please view results for these tests on the individual orders.    COVID-19 and FLU A/B PCR - Swab, Nasopharynx [[id number]]  (Normal) Collected: 03/02/23 1607    Lab Status: Final result Specimen: Swab from Nasopharynx Updated: 03/02/23 1651     COVID19 Not Detected     Influenza A  PCR Not Detected     Influenza B PCR Not Detected    Narrative:      Fact sheet for providers: https://www.fda.gov/media/447123/download    Fact sheet for patients: https://www.fda.gov/media/488131/download    Test performed by PCR.    COVID PRE-OP / PRE-PROCEDURE SCREENING ORDER (NO ISOLATION) - Swab, Nasopharynx [[id number]]  (Normal) Collected: 02/26/23 1907    Lab Status: Final result Specimen: Swab from Nasopharynx Updated: 02/26/23 1936    Narrative:      The following orders were created for panel order COVID PRE-OP / PRE-PROCEDURE SCREENING ORDER (NO ISOLATION) - Swab, Nasopharynx.  Procedure                               Abnormality         Status                     ---------                               -----------         ------                     COVID-19 and FLU A/B PCR...[[id number]]  Normal              Final result                 Please view results for these tests on the individual orders.    COVID-19 and FLU A/B PCR - Swab, Nasopharynx [[id number]]  (Normal) Collected: 02/26/23 1907    Lab Status: Final result Specimen: Swab from Nasopharynx Updated: 02/26/23 1936     COVID19 Not Detected     Influenza A PCR Not Detected     Influenza B PCR Not Detected    Narrative:      Fact sheet for providers: https://www.fda.gov/media/844635/download    Fact sheet for patients: https://www.fda.gov/media/497356/download    Test performed by PCR.    Blood Culture - Blood, Arm, Right [[id number]]  (Normal) Collected: 02/25/23 1918    Lab Status: Final result Specimen: Blood from Arm, Right Updated: 03/02/23 1931     Blood Culture No growth at 5 days    Blood Culture - Blood, Hand, Right [[id number]]  (Normal) Collected: 02/25/23 1900    Lab Status: Final result Specimen: Blood from Hand, Right Updated: 03/02/23 1931     Blood Culture No growth at 5 days    Urine Culture - Urine, Urine, Clean Catch [[id number]]  (Abnormal)  (Susceptibility) Collected: 02/25/23 1428    Lab Status: Final result Specimen: Urine, Clean  Catch Updated: 02/27/23 0837     Urine Culture >100,000 CFU/mL Escherichia coli      50,000 CFU/mL Proteus penneri    Narrative:      Colonization of the urinary tract without infection is common. Treatment is discouraged unless the patient is symptomatic, pregnant, or undergoing an invasive urologic procedure.      Susceptibility      Escherichia coli      PRISCA      Ampicillin Susceptible      Ampicillin + Sulbactam Susceptible      Cefazolin Susceptible      Cefepime Susceptible      Ceftazidime Susceptible      Ceftriaxone Susceptible      Gentamicin Susceptible      Levofloxacin Susceptible      Nitrofurantoin Susceptible      Piperacillin + Tazobactam Susceptible      Trimethoprim + Sulfamethoxazole Susceptible                       Susceptibility      Proteus penneri      PRISCA      Ampicillin Resistant     Ampicillin + Sulbactam Intermediate      Cefazolin Resistant     Cefepime Susceptible      Ceftazidime Susceptible      Ceftriaxone Susceptible      Gentamicin Susceptible      Levofloxacin Susceptible      Nitrofurantoin Resistant     Piperacillin + Tazobactam Susceptible      Trimethoprim + Sulfamethoxazole Susceptible                                 CT Abdomen Pelvis With & Without Contrast    Result Date: 3/2/2023  CT ABDOMEN PELVIS W WO CONTRAST Date of Exam: 3/2/2023 6:40 PM EST Indication: Tonsillar cancer.  Dark sticky stools.  Recent feeding tube placement.. Comparison: 1/20/2023 Technique: Axial CT images were obtained of the abdomen and pelvis before and after the uneventful intravenous administration of 100 mL Isovue-370. Sagittal and coronal reconstructions were performed.  Automated exposure control and iterative reconstruction methods were used. FINDINGS: Abdomen/Pelvis: Lower Chest: Findings of the chest are reported separately Organs: Patient is status post cholecystectomy.  Liver, spleen, pancreas and adrenal glands are grossly. There are some cortical scarring of the kidneys right greater  than left. Kidneys are otherwise unremarkable GI/Bowel: There is a percutaneous gastrostomy tube noted within the body of the stomach. Very small air-fluid collection along the anterior abdominal wall is noted measuring approximately 12 x 7 mm adjacent to the insertion site. Findings likely postoperative in nature. There is a small amount of fluid tracking along the body of the stomach. Small bowel demonstrates no evidence of obstruction. The patient is status post appendectomy. The ileocecal valve appears unremarkable. The majority of colon demonstrates some air-fluid levels without evidence of distention or significant wall thickening. Mild stranding is noted within the pelvis which is nonspecific. Question mild enhancement of the fluid collections without complete loculation. There is mild wall thickening of the colon involving the distal rectum. Pelvis: There is a small amount of fluid within the urinary bladder. Patient is status post hysterectomy. Ovaries are not visualized. Peritoneum/Retroperitoneum: The aorta is normal in caliber. There is atelectatic changes. No suspicious retroperitoneal Bones/Soft Tissues: No destructive bone lesion is noted. Underlying scoliotic curvature the spine is noted. Multilevel degenerative changes are noted     1. Findings of the chest are reported separately 2. Percutaneous gastrostomy tube projects in expected position.  There is a small complex fluid collection along the anterior abdominal wall with possible extension along the margin of the stomach. Findings are likely postsurgical in nature. The sterility of the collection is indeterminate. Recommend continued follow-up as clinically indicated 3. Small amount of fluid with possible developing enhancement within the pelvis. Findings could represent a developing abscess in the correct setting. 4. Mild wall thickening and stranding along the distal rectum. Findings may represent underlying proctitis, colitis. Small amount of  fluid and liquid stool noted throughout the colon. Inflammatory changes noted on the prior study are less prominent.  Electronically Signed: Heriberto Lopez  3/2/2023 7:30 PM EST  Workstation ID: [id number]    CT Abdomen Pelvis Without Contrast    Result Date: 2/26/2023  FINAL REPORT TECHNIQUE: Axial images through the abdomen and pelvis were performed without contrast. This study was performed with techniques to keep radiation doses as low as reasonably achievable, (ALARA). Individualized dose reduction techniques using automated exposure control or adjustment of mA and/or kV according to the patient's size were employed. CLINICAL HISTORY: Nausea/vomiting FINDINGS: Abdomen: There is a small left pleural effusion which is new since the prior examination.  There is bibasilar atelectasis. The liver parenchyma is homogeneous.  The gallbladder is absent. The spleen, pancreas, adrenals and kidneys are unremarkable.  A gastrostomy feeding tube is in the stomach.  There is mild mucosal edema in the gastric antrum, similar to prior examination.  There is mild inflammation seen surrounding the stomach.    Pelvis: A Quinonez balloon is seen within a decompressed urinary bladder.  The appendix is not visualized. There is no pelvic mass or inflammation.     1.  New small left pleural effusion.    2.  Stable mucosal thickening and inflammation of the gastric antrum. Authenticated and Electronically Signed by Bc Jaramillo MD on 02/26/2023 05:58:50 PM    CT Head Without Contrast    Result Date: 2/25/2023  FINAL REPORT TECHNIQUE: Axial CT images were performed through the head. Coronal reformatted images were submitted. This study was performed with techniques to keep radiation doses as low as reasonably achievable (ALARA). Individualized dose reduction techniques using automated exposure control or adjustment of mA and/or kV according to the patient's size were employed. CLINICAL HISTORY: Neuro deficit, acute, stroke suspected  FINDINGS: There is mild parenchymal atrophy.  There are moderate patchy areas of decreased attenuation throughout the deep white matter are likely due to small vessel ischemia.  There is no evidence of hemorrhage.  There is no mass or edema identified.  There is no abnormal extra-axial fluid seen.  The sinuses are well aerated.     Moderate patchy decreased attenuation of the deep white matter consistent with chronic small vessel ischemia.  No definite infarction. Authenticated and Electronically Signed by Bc Jaramillo MD on 02/25/2023 07:16:12 PM    XR Chest 1 View    Result Date: 3/2/2023  XR CHEST 1 VW Date of Exam: 3/2/2023 3:52 PM EST Indication: fever. recent aspiration pneumonia.. Comparison: 1/19/2023 Findings: Rotated to the left. There is a right subclavian port. Heart size is probably normal. Pulmonary vasculature is within normal limits. Right lung is grossly clear other than mild atelectasis in the base. Airspace disease in the left lung base with possible  subpulmonic fluid     Impression: Left basilar atelectasis or pneumonia with possible subpulmonic pleural effusion Electronically Signed: Ari Landry  3/2/2023 4:25 PM EST  Workstation ID: [id number]    XR Chest 1 View    Result Date: 2/26/2023  FINAL REPORT CLINICAL HISTORY: fever FINDINGS: A chest port tip ends in the SVC.  The heart size is normal. The mediastinum is normal. There is no focal infiltrate or edema. There are no pleural effusions. There is no pneumothorax. There is no osseous abnormality.     No acute cardiopulmonary process Authenticated and Electronically Signed by Bc Jaramillo MD on 02/26/2023 07:13:03 PM    XR Chest 1 View    Result Date: 2/24/2023  FINAL REPORT TECHNIQUE: An AP portable view of the chest was obtained. CLINICAL HISTORY: Chest Pain - Rapid Response FINDINGS: A right IJ Mediport catheter ends in the SVC.  Blunting of the costophrenic angles may be due to small pleural effusions. There is mild bibasilar  atelectasis.  The lungs are otherwise clear.  There is no pneumothorax.  There is no acute osseous abnormality.     Mild right basilar atelectasis with probable small pleural effusions. Authenticated and Electronically Signed by Nghia Pena M.D. on 02/24/2023 06:28:30 PM    Adult Transthoracic Echo Limited W/ Cont if Necessary Per Protocol    Result Date: 2/27/2023  •  Left ventricular wall thickness is consistent with mild concentric hypertrophy. •  Left ventricular diastolic function is consistent with age. •  Estimated right ventricular systolic pressure from tricuspid regurgitation is markedly elevated (>55 mmHg). •  Moderate to severe pulmonary hypertension is present.     CT Angiogram Chest    Result Date: 3/2/2023  CT ANGIOGRAM CHEST Date of Exam: 3/2/2023 6:40 PM EST Indication: pe tonsillar cancer.  Recent aspiration pneumonia. Comparison: 1/19/2023 Technique: CTA of the chest was performed after the uneventful intravenous administration of intravenous Isovue. Reconstructed coronal and sagittal images were also obtained. In addition, a 3-D volume rendered image was created for interpretation. Automated exposure control and iterative reconstruction methods were used.  FINDINGS: [ ] Pulmonary Arteries: Pulmonary arteries are adequately opacified for evaluation. No evidence of intraluminal filling defect to suggest pulmonary embolism. Main pulmonary artery is normal in caliber. Mediastinum: Heart size is within normal limits. There is no suspicious pericardial effusion. No suspicious hilar adenopathy is noted. Small paratracheal lymph nodes are noted. Right paratracheal node with a short axis. 6 mm is not significantly changed comparison. Limited imaging of the base of the neck is trace no acute abnormality on limited imaging. There is a right subclavian port is in place. The aorta and the origin of great vessels and trace no acute process. The esophagus is grossly marked appearance. Lungs/pleura: There is a  small left-sided pleural effusion and trace amount of pleural fluid right increase in the comparison. Minimal dependent opacities are noted at the lung bases. There is some vascular crowding accentuated by relatively low lung lines. The central airways are patent. Small groundglass nodular opacity just posterior to the fissure and this appears segment left lower lobe appears grossly stable although somewhat less confluence. No suspicious new nodules noted Upper Abdomen: Findings in the abdomen and pelvis are reported separately Soft tissues/Bones: No acute bone or soft tissue abnormality.     1. No evidence of pulmonary metastases 2. Small bilateral pleural effusions and minimal dependent opacities slightly increased in comparison 3. A few scattered groundglass opacities likely postinflammatory or infectious. Recommend follow-up to document resolution Electronically Signed: Heriberto Lopez  3/2/2023 7:15 PM EST  Workstation ID: [id number]      Results for orders placed during the hospital encounter of 01/19/23    Duplex Venous Lower Extremity - Bilateral CAR    Interpretation Summary  •  The bilateral lower extremity venous duplex scan is negative for DVT and SVT in the areas visualized.  •  The tibial level veins were not well visualized, but appeared negative for DVT in the images obtained.      Results for orders placed during the hospital encounter of 01/19/23    Duplex Venous Lower Extremity - Bilateral CAR    Interpretation Summary  •  The bilateral lower extremity venous duplex scan is negative for DVT and SVT in the areas visualized.  •  The tibial level veins were not well visualized, but appeared negative for DVT in the images obtained.      Results for orders placed during the hospital encounter of 02/23/23    Adult Transthoracic Echo Limited W/ Cont if Necessary Per Protocol    Interpretation Summary  •  Left ventricular wall thickness is consistent with mild concentric hypertrophy.  •  Left ventricular  diastolic function is consistent with age.  •  Estimated right ventricular systolic pressure from tricuspid regurgitation is markedly elevated (>55 mmHg).  •  Moderate to severe pulmonary hypertension is present.      Plan for Follow-up of Pending Labs/Results:   Pending Labs     Order Current Status    COVID PRE-OP / PRE-PROCEDURE SCREENING ORDER (NO ISOLATION) - Swab, Nasopharynx In process    COVID-19 PCR, [facility], NP SWAB IN [facility] TRANSPORT MEDIA/ORAL SWISH 24-30 HR TAT - Swab, Nasopharynx In process    Blood Culture - Blood, Arm, Right Preliminary result    Blood Culture - Blood, Arm, Right Preliminary result        Discharge Details        Discharge Medications      New Medications      Instructions Start Date   diazePAM 2 MG tablet  Commonly known as: VALIUM   2 mg, Per G Tube, Every 12 Hours PRN      PHARMACY MEDS TO BED CONSULT   Does not apply, Daily      vancomycin 50 MG/ML reconstituted solution oral solution reconstituted   125 mg, Per G Tube, Daily With Breakfast & Dinner         Changes to Medications      Instructions Start Date   HYDROcodone-acetaminophen 7.5-325 MG per tablet  Commonly known as: NORCO  What changed:   · how to take this  · when to take this  · reasons to take this   1 tablet, Per G Tube, Every 8 Hours         Continue These Medications      Instructions Start Date   allopurinol 100 MG tablet  Commonly known as: ZYLOPRIM   100 mg, Oral, Daily      amLODIPine 10 MG tablet  Commonly known as: NORVASC   10 mg, Oral, Every 24 Hours Scheduled      atorvastatin 40 MG tablet  Commonly known as: LIPITOR   40 mg, Oral, Nightly      hydrALAZINE 50 MG tablet  Commonly known as: APRESOLINE   100 mg, Oral, Every 8 Hours Scheduled      isosorbide dinitrate 20 MG tablet  Commonly known as: ISORDIL   40 mg, Oral, 3 Times Daily (Nitrates)      lactobacillus acidophilus capsule capsule   Take 1 capsule by mouth twice daily. Take with Breakfast & Dinner.      Lidocaine Viscous HCl 2 %  solution  Commonly known as: XYLOCAINE   Take 15 mL by mouth As Needed for Moderate Pain for up to 10 days. Do not use more than every 3 hours      lidocaine-prilocaine 2.5-2.5 % cream  Commonly known as: EMLA   1 application, Topical, As Needed      losartan 100 MG tablet  Commonly known as: COZAAR   100 mg, Oral, Daily      magic mouthwash oral suspension   Swish and spit two teaspoons (10 mL) by mouth Every 6 (Six) Hours for 10 days.      metoprolol tartrate 100 MG tablet  Commonly known as: LOPRESSOR   100 mg, Oral, Every 12 Hours Scheduled      mirtazapine 15 MG tablet  Commonly known as: REMERON   15 mg, Oral, Nightly      mometasone 0.1 % cream  Commonly known as: ELOCON   mometasone 0.1 % topical cream   APPLY A THIN LAYER TO THE AFFECTED AREA(S) BY TOPICAL ROUTE ONCE DAILY after radiation treatment      nitroglycerin 0.4 MG SL tablet  Commonly known as: NITROSTAT   Place 1 tablet under the tongue Every 5 (Five) Minutes As Needed for Chest Pain. Take no more than 3 doses in 15 minutes.      nystatin 100,000 unit/mL suspension  Commonly known as: MYCOSTATIN   Swish and spit one teaspoon (5 mL) by mouth 4 (Four) Times a Day      nystatin [id number] UNIT/GM powder  Commonly known as: MYCOSTATIN   Topical, 3 Times Daily      omeprazole 40 MG capsule  Commonly known as: priLOSEC   40 mg, Oral, Daily      ondansetron 8 MG tablet  Commonly known as: Zofran   8 mg, Oral, Every 8 Hours PRN      sucralfate 1 g tablet  Commonly known as: CARAFATE   1 g, Oral, 4 Times Daily         Stop These Medications    ciprofloxacin 500 MG tablet  Commonly known as: Cipro     fluconazole 100 MG tablet  Commonly known as: DIFLUCAN     furosemide 40 MG tablet  Commonly known as: LASIX     metoclopramide 5 MG tablet  Commonly known as: REGLAN     senna 8.6 MG tablet  Commonly known as: SENOKOT     spironolactone 25 MG tablet  Commonly known as: ALDACTONE            Allergies   Allergen Reactions   • Trazodone Confusion         Discharge  Disposition:  Home or Self Care    Diet:  Hospital:  Diet Order   Procedures   • NPO Diet NPO Type: Strict NPO       Activity:      Restrictions or Other Recommendations:         CODE STATUS:    Code Status and Medical Interventions:   Ordered at: 03/04/23 1129     Medical Intervention Limits:    NO intubation (DNI)    NO vasopressors     Code Status (Patient has no pulse and is not breathing):    No CPR (Do Not Attempt to Resuscitate)     Medical Interventions (Patient has pulse or is breathing):    Limited Support     Release to patient:    Routine Release       Future Appointments   Date Time Provider Department Center   3/7/2023  2:30 PM EMS 1  [facility]   3/16/2023  9:00 AM CHAIR 7 -  [facility]  [facility]   3/17/2023  9:00 AM Roderick Hamm IV, MD [facility]   3/23/2023  9:30 AM CHAIR 3 - [facility]  [facility]   4/19/2023  9:00 AM  [facility]EDULE [facility]   7/27/2023 10:00 AM [facility]                 Robinson Murphy MD  03/07/23      Time Spent on Discharge:  I spent  40  minutes on this discharge activity which included: face-to-face encounter with the patient, reviewing the data in the system, coordination of the care with the nursing staff as well as consultants, documentation, and entering orders.

## 2023-03-07 NOTE — OUTREACH NOTE
Prep Survey    Flowsheet Row Responses   [facility] patient discharged from? [facility]   Is LACE score < 7 ? No   Eligibility Not Eligible   What are the reasons patient is not eligible? Hospice/Pallative Care   Does the patient have one of the following disease processes/diagnoses(primary or secondary)? Other   Prep survey completed? Yes          Rebecca TOMLINSON - Registered Nurse

## 2023-03-07 NOTE — DISCHARGE INSTR - OTHER ORDERS
Please remember to call [facility] @ [phone] when your family member arrives home so that the nurse can come to complete the admission process. Your family member is not admitted to hospice until the admission paper work has been completed. Thank you.

## 2023-03-07 NOTE — DISCHARGE PLACEMENT REQUEST
"Magnus Landry (77 y.o. Female)   D/C Summary    Date of Birth   1945    Social Security Number       Address   [address] [place], [place] [address]    Home Phone   [phone]    MRN   [id number]       [facility]    Marital Status                               Admission Date   3/2/23    Admission Type   Emergency    Admitting Provider   Robinson Murphy MD    Attending Provider   Robinson Murphy MD    Department, Room/Bed   [address], S387/1       Discharge Date       Discharge Disposition   Home-Health Care [facility]    Discharge Destination                               Attending Provider: Robinson Murphy MD    Allergies: Trazodone    Isolation: None   Infection: COVID (History) (23), COVID Screen (preop/placement) (23)   Code Status: No CPR    Ht: 162.6 cm (64\")   Wt: 75.3 kg (166 lb)    Admission Cmt: None   Principal Problem: Abnormal bowel movement [R19.8]                 Active Insurance as of 3/2/2023     Primary Coverage     Payor Plan Insurance Group Employer/Plan Group    HUMANA MEDICARE REPLACEMENT HUMANA MEDICARE REPLACEMENT [id number]     Payor Plan Address Payor Plan Phone Number Payor Plan Fax Number Effective Dates    [address] [address] [phone]  2018 - None Entered    Formerly [facility] [phone]       Subscriber Name Subscriber Birth Date Member ID       MAGNUS LANDRY 1945 [id number]                 Emergency Contacts      (Rel.) Home Phone Work Phone Mobile Phone    Karrie Landry (Spouse) [phone] -- [phone]    Meliza Back (Daughter) [phone] -- [phone]    JOSÉ AMAYA (Daughter) [phone] -- [phone]                 Discharge Summary      Robinson Murphy MD at [address] [place] [address] Medicine Services  DISCHARGE SUMMARY    Patient Name: Magnus Landry  : 1945  MRN: [id number]    Date of Admission: 3/2/2023  4:22 PM  Date of Discharge:  " 3/7/2023  Primary Care Physician: Bossman Pedraza MD    Consults     Date and Time Order Name Status Description    3/3/2023  8:17 AM Inpatient Palliative Care MD Consult Completed     3/3/2023 12:34 AM Inpatient Infectious Diseases Consult Completed     3/3/2023 12:34 AM Inpatient Colorectal Surgery Consult Completed     3/3/2023 12:34 AM Inpatient Gastroenterology Consult Completed     2/23/2023 11:46 PM Inpatient Cardiology Consult Completed           Hospital Course     Presenting Problem:   Gastrointestinal hemorrhage, unspecified gastrointestinal hemorrhage type [K92.2]    Active Hospital Problems    Diagnosis  POA   • **Abnormal bowel movement [R19.8]  Yes   • History of Clostridium difficile colitis [Z86.19]  Not Applicable   • HLD (hyperlipidemia) [E78.5]  Yes   • 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          Hospital Course:  Vesta Landry is a 77 y.o. female w/ HTN, HLD, dementia (notable recent decline), prior C. 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, currently on hold due to severe mucositis (recent G-tube placement due to inability to eat from mucositis); she was admitted to [facility] 2/23 - 3/1 with NSTEMI, due to functional status and comorbidities she was managed medically per cardiology, additionally on antibiotics for reported UTI; while on Lovenox/Plavix for NSTEMI she had reported hematemesis which additionally was reportedly managed medically; she was discharged home on oral antibiotic and developed abnormal bowel movements, family was concerned for melena, prompting evaluation in our ED, CT abd/pel had multiple incidental findings including fluid collection near G-tube, potential proctitis/colitis.     Assessment/plan:    Ultimately the family felt that  the patient's best interest lied in pursuit of palliative and hospice care. She received care as outlined below but has reached maximal hospital benefit at this time.     Abnormal bowel movements, concern for melena  -She was given PPI and transfused, and no invasive/aggressive work up was pursued.     Possible proctitis vs colitis  Hx C. Difficile colitis (January 2023)  -CT A/P findings reviewed  -She was evaluated by ID and given a course of rocephin and flagyl and will continue oral vancomycin x 5 days after DC to prevent C. Diff relapse.     Squamous cell carcinoma of LT tonsil on chemotherapy/radiotherapy, with severe mucositis  -She was followed by Rad/Onc, Geno, s/p radiation/chemo, held for mucositis, and the family and patient ultimately decided to return home with hospice care.     Hypertension  Hyperlipidemia  HFpEF, chronic, compensated  -Amlodipine, atorvastatin, hydralazine, isosorbide dinitrate, losartan, Lopressor     Dementia  -Family notes accelerated decline with recent chemotherapy  -Mirtazapine     Questionable facial droop  -This improved, the family did not wish to pursue work up after team discussion last week, given understanding for treatment would involve antiplatelet/anticoagulation in setting of possible GIB.    Discharge Follow Up Recommendations for outpatient labs/diagnostics:  Home with hospice care    Day of Discharge     HPI: No issues. Denies pain or SOA.      Review of Systems  As above    Vital Signs:   Temp:  [97.7 °F (36.5 °C)-98.1 °F (36.7 °C)] 97.8 °F (36.6 °C)  Heart Rate:  [65-83] 74  Resp:  [16] 16  BP: (128-153)/(66-83) 150/66      Physical Exam:  NAD, alert and oriented  OP clear, dry MM, mucositis noted  Neck supple  RRR  CTAB  +BS, soft, PEG in place  RING  Flat affect    Pertinent  and/or Most Recent Results     LAB RESULTS:      Lab 03/06/23  0613 03/05/23  0824 03/04/23  2207 03/04/23  0511 03/04/23  0018 03/03/23  0329 03/03/23  0029 03/02/23  1601   WBC 4.99  5.48  --  4.71  --  5.32  --  7.05   HEMOGLOBIN 9.8* 11.1* 10.2* 8.0* 8.3* 9.0*   < > 9.9*   HEMATOCRIT 31.5* 37.5 32.2* 26.2* 26.9* 27.5*   < > 31.8*   PLATELETS 212 222  --  179  --  164  --  214   NEUTROS ABS  --   --   --  3.42  --  3.66  --  5.07   IMMATURE GRANS (ABS)  --   --   --  0.03  --  0.14*  --  0.08*   LYMPHS ABS  --   --   --  0.74  --  0.93  --  1.10   MONOS ABS  --   --   --  0.32  --  0.36  --  0.59   EOS ABS  --   --   --  0.18  --  0.21  --  0.18   MCV 90.3 96.9  --  94.2  --  90.2  --  93.3   PROCALCITONIN  --   --   --   --   --   --   --  0.18   LACTATE  --   --   --   --   --   --   --  0.8    < > = values in this interval not displayed.         Lab 03/07/23  0609 03/06/23  0613 03/05/23  2320 03/05/23  0824 03/04/23  2207 03/04/23  1954 03/04/23  0511 03/03/23  0329 03/02/23  1601 03/02/23  1601 03/01/23  0522   SODIUM  --  137  --  136  --   --  138 138  --  136  --    POTASSIUM  --  4.0  --  4.6 5.0 5.9* 3.0* 3.7   < > 3.6  --    CHLORIDE  --  107  --  107  --   --  106 103  --  101  --    CO2  --  21.0*  --  16.0*  --   --  21.0* 20.0*  --  23.0  --    ANION GAP  --  9.0  --  13.0  --   --  11.0 15.0  --  12.0  --    BUN  --  11  --  10  --   --  10 11  --  15  --    CREATININE  --  0.68  --  0.67  --   --  0.78 0.78  --  0.77  --    EGFR  --  88.7  --  89.0  --   --  77.4 77.4  --  78.6  --    GLUCOSE  --  130*  --  104*  --   --  89 78  --  112*  --    CALCIUM  --  7.7*  --  8.3*  --   --  7.7* 8.3*  --  8.5*  --    MAGNESIUM  --  2.5*  --  1.6  --   --  1.6  --   --   --  2.4   PHOSPHORUS 2.3* 2.8 3.2 2.1*  --   --  3.3  --   --   --   --     < > = values in this interval not displayed.         Lab 03/02/23  1601   TOTAL PROTEIN 6.4   ALBUMIN 3.2*   GLOBULIN 3.2   ALT (SGPT) 26   AST (SGOT) 39*   BILIRUBIN 0.3   ALK PHOS 86   LIPASE 48         Lab 03/02/23  1601   HSTROP T 63*             Lab 03/03/23  0029 03/02/23  2226   ABO TYPING O O   RH TYPING Positive Positive   ANTIBODY  SCREEN  --  Negative         Brief Urine Lab Results  (Last result in the past 365 days)      Color   Clarity   Blood   Leuk Est   Nitrite   Protein   CREAT   Urine HCG        03/02/23 1617 Yellow   Clear   Negative   Trace   Negative   Negative               Microbiology Results (last 10 days)     Procedure Component Value - Date/Time    Blood Culture - Blood, Arm, Right [[id number]]  (Normal) Collected: 03/03/23 0029    Lab Status: Preliminary result Specimen: Blood from Arm, Right Updated: 03/07/23 0115     Blood Culture No growth at 4 days    Narrative:      Aerobic bottle only      Blood Culture - Blood, Arm, Right [[id number]]  (Normal) Collected: 03/03/23 0029    Lab Status: Preliminary result Specimen: Blood from Arm, Right Updated: 03/07/23 0115     Blood Culture No growth at 4 days    Narrative:      Aerobic bottle only      COVID PRE-OP / PRE-PROCEDURE SCREENING ORDER (NO ISOLATION) - Swab, Nasopharynx [[id number]]  (Normal) Collected: 03/02/23 1607    Lab Status: Final result Specimen: Swab from Nasopharynx Updated: 03/02/23 1651    Narrative:      The following orders were created for panel order COVID PRE-OP / PRE-PROCEDURE SCREENING ORDER (NO ISOLATION) - Swab, Nasopharynx.  Procedure                               Abnormality         Status                     ---------                               -----------         ------                     COVID-19 and FLU A/B PCR...[[id number]]  Normal              Final result                 Please view results for these tests on the individual orders.    COVID-19 and FLU A/B PCR - Swab, Nasopharynx [[id number]]  (Normal) Collected: 03/02/23 1607    Lab Status: Final result Specimen: Swab from Nasopharynx Updated: 03/02/23 1651     COVID19 Not Detected     Influenza A PCR Not Detected     Influenza B PCR Not Detected    Narrative:      Fact sheet for providers: https://www.fda.gov/media/560770/download    Fact sheet for patients:  https://www.fda.gov/media/114925/download    Test performed by PCR.    COVID PRE-OP / PRE-PROCEDURE SCREENING ORDER (NO ISOLATION) - Swab, Nasopharynx [[id number]]  (Normal) Collected: 02/26/23 1907    Lab Status: Final result Specimen: Swab from Nasopharynx Updated: 02/26/23 1936    Narrative:      The following orders were created for panel order COVID PRE-OP / PRE-PROCEDURE SCREENING ORDER (NO ISOLATION) - Swab, Nasopharynx.  Procedure                               Abnormality         Status                     ---------                               -----------         ------                     COVID-19 and FLU A/B PCR...[[id number]]  Normal              Final result                 Please view results for these tests on the individual orders.    COVID-19 and FLU A/B PCR - Swab, Nasopharynx [[id number]]  (Normal) Collected: 02/26/23 1907    Lab Status: Final result Specimen: Swab from Nasopharynx Updated: 02/26/23 1936     COVID19 Not Detected     Influenza A PCR Not Detected     Influenza B PCR Not Detected    Narrative:      Fact sheet for providers: https://www.fda.gov/media/207904/download    Fact sheet for patients: https://www.fda.gov/media/742389/download    Test performed by PCR.    Blood Culture - Blood, Arm, Right [[id number]]  (Normal) Collected: 02/25/23 1918    Lab Status: Final result Specimen: Blood from Arm, Right Updated: 03/02/23 1931     Blood Culture No growth at 5 days    Blood Culture - Blood, Hand, Right [[id number]]  (Normal) Collected: 02/25/23 1900    Lab Status: Final result Specimen: Blood from Hand, Right Updated: 03/02/23 1931     Blood Culture No growth at 5 days    Urine Culture - Urine, Urine, Clean Catch [[id number]]  (Abnormal)  (Susceptibility) Collected: 02/25/23 1428    Lab Status: Final result Specimen: Urine, Clean Catch Updated: 02/27/23 0837     Urine Culture >100,000 CFU/mL Escherichia coli      50,000 CFU/mL Proteus penneri    Narrative:      Colonization of the urinary  tract without infection is common. Treatment is discouraged unless the patient is symptomatic, pregnant, or undergoing an invasive urologic procedure.      Susceptibility      Escherichia coli      PRISCA      Ampicillin Susceptible      Ampicillin + Sulbactam Susceptible      Cefazolin Susceptible      Cefepime Susceptible      Ceftazidime Susceptible      Ceftriaxone Susceptible      Gentamicin Susceptible      Levofloxacin Susceptible      Nitrofurantoin Susceptible      Piperacillin + Tazobactam Susceptible      Trimethoprim + Sulfamethoxazole Susceptible                       Susceptibility      Proteus penneri      PRISCA      Ampicillin Resistant     Ampicillin + Sulbactam Intermediate      Cefazolin Resistant     Cefepime Susceptible      Ceftazidime Susceptible      Ceftriaxone Susceptible      Gentamicin Susceptible      Levofloxacin Susceptible      Nitrofurantoin Resistant     Piperacillin + Tazobactam Susceptible      Trimethoprim + Sulfamethoxazole Susceptible                                 CT Abdomen Pelvis With & Without Contrast    Result Date: 3/2/2023  CT ABDOMEN PELVIS W WO CONTRAST Date of Exam: 3/2/2023 6:40 PM EST Indication: Tonsillar cancer.  Dark sticky stools.  Recent feeding tube placement.. Comparison: 1/20/2023 Technique: Axial CT images were obtained of the abdomen and pelvis before and after the uneventful intravenous administration of 100 mL Isovue-370. Sagittal and coronal reconstructions were performed.  Automated exposure control and iterative reconstruction methods were used. FINDINGS: Abdomen/Pelvis: Lower Chest: Findings of the chest are reported separately Organs: Patient is status post cholecystectomy.  Liver, spleen, pancreas and adrenal glands are grossly. There are some cortical scarring of the kidneys right greater than left. Kidneys are otherwise unremarkable GI/Bowel: There is a percutaneous gastrostomy tube noted within the body of the stomach. Very small air-fluid  collection along the anterior abdominal wall is noted measuring approximately 12 x 7 mm adjacent to the insertion site. Findings likely postoperative in nature. There is a small amount of fluid tracking along the body of the stomach. Small bowel demonstrates no evidence of obstruction. The patient is status post appendectomy. The ileocecal valve appears unremarkable. The majority of colon demonstrates some air-fluid levels without evidence of distention or significant wall thickening. Mild stranding is noted within the pelvis which is nonspecific. Question mild enhancement of the fluid collections without complete loculation. There is mild wall thickening of the colon involving the distal rectum. Pelvis: There is a small amount of fluid within the urinary bladder. Patient is status post hysterectomy. Ovaries are not visualized. Peritoneum/Retroperitoneum: The aorta is normal in caliber. There is atelectatic changes. No suspicious retroperitoneal Bones/Soft Tissues: No destructive bone lesion is noted. Underlying scoliotic curvature the spine is noted. Multilevel degenerative changes are noted     1. Findings of the chest are reported separately 2. Percutaneous gastrostomy tube projects in expected position.  There is a small complex fluid collection along the anterior abdominal wall with possible extension along the margin of the stomach. Findings are likely postsurgical in nature. The sterility of the collection is indeterminate. Recommend continued follow-up as clinically indicated 3. Small amount of fluid with possible developing enhancement within the pelvis. Findings could represent a developing abscess in the correct setting. 4. Mild wall thickening and stranding along the distal rectum. Findings may represent underlying proctitis, colitis. Small amount of fluid and liquid stool noted throughout the colon. Inflammatory changes noted on the prior study are less prominent.  Electronically Signed: Heriberto  John  3/2/2023 7:30 PM EST  Workstation ID: [id number]    CT Abdomen Pelvis Without Contrast    Result Date: 2/26/2023  FINAL REPORT TECHNIQUE: Axial images through the abdomen and pelvis were performed without contrast. This study was performed with techniques to keep radiation doses as low as reasonably achievable, (ALARA). Individualized dose reduction techniques using automated exposure control or adjustment of mA and/or kV according to the patient's size were employed. CLINICAL HISTORY: Nausea/vomiting FINDINGS: Abdomen: There is a small left pleural effusion which is new since the prior examination.  There is bibasilar atelectasis. The liver parenchyma is homogeneous.  The gallbladder is absent. The spleen, pancreas, adrenals and kidneys are unremarkable.  A gastrostomy feeding tube is in the stomach.  There is mild mucosal edema in the gastric antrum, similar to prior examination.  There is mild inflammation seen surrounding the stomach.    Pelvis: A Quinonez balloon is seen within a decompressed urinary bladder.  The appendix is not visualized. There is no pelvic mass or inflammation.     1.  New small left pleural effusion.    2.  Stable mucosal thickening and inflammation of the gastric antrum. Authenticated and Electronically Signed by Bc Jaramillo MD on 02/26/2023 05:58:50 PM    CT Head Without Contrast    Result Date: 2/25/2023  FINAL REPORT TECHNIQUE: Axial CT images were performed through the head. Coronal reformatted images were submitted. This study was performed with techniques to keep radiation doses as low as reasonably achievable (ALARA). Individualized dose reduction techniques using automated exposure control or adjustment of mA and/or kV according to the patient's size were employed. CLINICAL HISTORY: Neuro deficit, acute, stroke suspected FINDINGS: There is mild parenchymal atrophy.  There are moderate patchy areas of decreased attenuation throughout the deep white matter are likely due to  small vessel ischemia.  There is no evidence of hemorrhage.  There is no mass or edema identified.  There is no abnormal extra-axial fluid seen.  The sinuses are well aerated.     Moderate patchy decreased attenuation of the deep white matter consistent with chronic small vessel ischemia.  No definite infarction. Authenticated and Electronically Signed by Bc Jaramillo MD on 02/25/2023 07:16:12 PM    XR Chest 1 View    Result Date: 3/2/2023  XR CHEST 1 VW Date of Exam: 3/2/2023 3:52 PM EST Indication: fever. recent aspiration pneumonia.. Comparison: 1/19/2023 Findings: Rotated to the left. There is a right subclavian port. Heart size is probably normal. Pulmonary vasculature is within normal limits. Right lung is grossly clear other than mild atelectasis in the base. Airspace disease in the left lung base with possible  subpulmonic fluid     Impression: Left basilar atelectasis or pneumonia with possible subpulmonic pleural effusion Electronically Signed: Ari Gris  3/2/2023 4:25 PM EST  Workstation ID: [id number]    XR Chest 1 View    Result Date: 2/26/2023  FINAL REPORT CLINICAL HISTORY: fever FINDINGS: A chest port tip ends in the SVC.  The heart size is normal. The mediastinum is normal. There is no focal infiltrate or edema. There are no pleural effusions. There is no pneumothorax. There is no osseous abnormality.     No acute cardiopulmonary process Authenticated and Electronically Signed by Bc Jaramillo MD on 02/26/2023 07:13:03 PM    XR Chest 1 View    Result Date: 2/24/2023  FINAL REPORT TECHNIQUE: An AP portable view of the chest was obtained. CLINICAL HISTORY: Chest Pain - Rapid Response FINDINGS: A right IJ Mediport catheter ends in the SVC.  Blunting of the costophrenic angles may be due to small pleural effusions. There is mild bibasilar atelectasis.  The lungs are otherwise clear.  There is no pneumothorax.  There is no acute osseous abnormality.     Mild right basilar atelectasis with  probable small pleural effusions. Authenticated and Electronically Signed by Nghia Pena M.D. on 02/24/2023 06:28:30 PM    Adult Transthoracic Echo Limited W/ Cont if Necessary Per Protocol    Result Date: 2/27/2023  •  Left ventricular wall thickness is consistent with mild concentric hypertrophy. •  Left ventricular diastolic function is consistent with age. •  Estimated right ventricular systolic pressure from tricuspid regurgitation is markedly elevated (>55 mmHg). •  Moderate to severe pulmonary hypertension is present.     CT Angiogram Chest    Result Date: 3/2/2023  CT ANGIOGRAM CHEST Date of Exam: 3/2/2023 6:40 PM EST Indication: pe tonsillar cancer.  Recent aspiration pneumonia. Comparison: 1/19/2023 Technique: CTA of the chest was performed after the uneventful intravenous administration of intravenous Isovue. Reconstructed coronal and sagittal images were also obtained. In addition, a 3-D volume rendered image was created for interpretation. Automated exposure control and iterative reconstruction methods were used.  FINDINGS: [ ] Pulmonary Arteries: Pulmonary arteries are adequately opacified for evaluation. No evidence of intraluminal filling defect to suggest pulmonary embolism. Main pulmonary artery is normal in caliber. Mediastinum: Heart size is within normal limits. There is no suspicious pericardial effusion. No suspicious hilar adenopathy is noted. Small paratracheal lymph nodes are noted. Right paratracheal node with a short axis. 6 mm is not significantly changed comparison. Limited imaging of the base of the neck is trace no acute abnormality on limited imaging. There is a right subclavian port is in place. The aorta and the origin of great vessels and trace no acute process. The esophagus is grossly marked appearance. Lungs/pleura: There is a small left-sided pleural effusion and trace amount of pleural fluid right increase in the comparison. Minimal dependent opacities are noted at the lung  bases. There is some vascular crowding accentuated by relatively low lung lines. The central airways are patent. Small groundglass nodular opacity just posterior to the fissure and this appears segment left lower lobe appears grossly stable although somewhat less confluence. No suspicious new nodules noted Upper Abdomen: Findings in the abdomen and pelvis are reported separately Soft tissues/Bones: No acute bone or soft tissue abnormality.     1. No evidence of pulmonary metastases 2. Small bilateral pleural effusions and minimal dependent opacities slightly increased in comparison 3. A few scattered groundglass opacities likely postinflammatory or infectious. Recommend follow-up to document resolution Electronically Signed: Heriberto Lopez  3/2/2023 7:15 PM EST  Workstation ID: [id number]      Results for orders placed during the hospital encounter of 01/19/23    Duplex Venous Lower Extremity - Bilateral CAR    Interpretation Summary  •  The bilateral lower extremity venous duplex scan is negative for DVT and SVT in the areas visualized.  •  The tibial level veins were not well visualized, but appeared negative for DVT in the images obtained.      Results for orders placed during the hospital encounter of 01/19/23    Duplex Venous Lower Extremity - Bilateral CAR    Interpretation Summary  •  The bilateral lower extremity venous duplex scan is negative for DVT and SVT in the areas visualized.  •  The tibial level veins were not well visualized, but appeared negative for DVT in the images obtained.      Results for orders placed during the hospital encounter of 02/23/23    Adult Transthoracic Echo Limited W/ Cont if Necessary Per Protocol    Interpretation Summary  •  Left ventricular wall thickness is consistent with mild concentric hypertrophy.  •  Left ventricular diastolic function is consistent with age.  •  Estimated right ventricular systolic pressure from tricuspid regurgitation is markedly elevated (>55  mmHg).  •  Moderate to severe pulmonary hypertension is present.      Plan for Follow-up of Pending Labs/Results:   Pending Labs     Order Current Status    COVID PRE-OP / PRE-PROCEDURE SCREENING ORDER (NO ISOLATION) - Swab, Nasopharynx In process    COVID-19 PCR, [facility], NP SWAB IN [facility] TRANSPORT MEDIA/ORAL SWISH 24-30 HR TAT - Swab, Nasopharynx In process    Blood Culture - Blood, Arm, Right Preliminary result    Blood Culture - Blood, Arm, Right Preliminary result        Discharge Details        Discharge Medications      New Medications      Instructions Start Date   diazePAM 2 MG tablet  Commonly known as: VALIUM   2 mg, Per G Tube, Every 12 Hours PRN      PHARMACY MEDS TO BED CONSULT   Does not apply, Daily      vancomycin 50 MG/ML reconstituted solution oral solution reconstituted   125 mg, Per G Tube, Daily With Breakfast & Dinner         Changes to Medications      Instructions Start Date   HYDROcodone-acetaminophen 7.5-325 MG per tablet  Commonly known as: NORCO  What changed:   · how to take this  · when to take this  · reasons to take this   1 tablet, Per G Tube, Every 8 Hours         Continue These Medications      Instructions Start Date   allopurinol 100 MG tablet  Commonly known as: ZYLOPRIM   100 mg, Oral, Daily      amLODIPine 10 MG tablet  Commonly known as: NORVASC   10 mg, Oral, Every 24 Hours Scheduled      atorvastatin 40 MG tablet  Commonly known as: LIPITOR   40 mg, Oral, Nightly      hydrALAZINE 50 MG tablet  Commonly known as: APRESOLINE   100 mg, Oral, Every 8 Hours Scheduled      isosorbide dinitrate 20 MG tablet  Commonly known as: ISORDIL   40 mg, Oral, 3 Times Daily (Nitrates)      lactobacillus acidophilus capsule capsule   Take 1 capsule by mouth twice daily. Take with Breakfast & Dinner.      Lidocaine Viscous HCl 2 % solution  Commonly known as: XYLOCAINE   Take 15 mL by mouth As Needed for Moderate Pain for up to 10 days. Do not use more than every 3 hours       lidocaine-prilocaine 2.5-2.5 % cream  Commonly known as: EMLA   1 application, Topical, As Needed      losartan 100 MG tablet  Commonly known as: COZAAR   100 mg, Oral, Daily      magic mouthwash oral suspension   Swish and spit two teaspoons (10 mL) by mouth Every 6 (Six) Hours for 10 days.      metoprolol tartrate 100 MG tablet  Commonly known as: LOPRESSOR   100 mg, Oral, Every 12 Hours Scheduled      mirtazapine 15 MG tablet  Commonly known as: REMERON   15 mg, Oral, Nightly      mometasone 0.1 % cream  Commonly known as: ELOCON   mometasone 0.1 % topical cream   APPLY A THIN LAYER TO THE AFFECTED AREA(S) BY TOPICAL ROUTE ONCE DAILY after radiation treatment      nitroglycerin 0.4 MG SL tablet  Commonly known as: NITROSTAT   Place 1 tablet under the tongue Every 5 (Five) Minutes As Needed for Chest Pain. Take no more than 3 doses in 15 minutes.      nystatin 100,000 unit/mL suspension  Commonly known as: MYCOSTATIN   Swish and spit one teaspoon (5 mL) by mouth 4 (Four) Times a Day      nystatin 575166 UNIT/GM powder  Commonly known as: MYCOSTATIN   Topical, 3 Times Daily      omeprazole 40 MG capsule  Commonly known as: priLOSEC   40 mg, Oral, Daily      ondansetron 8 MG tablet  Commonly known as: Zofran   8 mg, Oral, Every 8 Hours PRN      sucralfate 1 g tablet  Commonly known as: CARAFATE   1 g, Oral, 4 Times Daily         Stop These Medications    ciprofloxacin 500 MG tablet  Commonly known as: Cipro     fluconazole 100 MG tablet  Commonly known as: DIFLUCAN     furosemide 40 MG tablet  Commonly known as: LASIX     metoclopramide 5 MG tablet  Commonly known as: REGLAN     senna 8.6 MG tablet  Commonly known as: SENOKOT     spironolactone 25 MG tablet  Commonly known as: ALDACTONE            Allergies   Allergen Reactions   • Trazodone Confusion         Discharge Disposition:  Home or Self Care    Diet:  Hospital:  Diet Order   Procedures   • NPO Diet NPO Type: Strict NPO       Activity:      Restrictions or  Other Recommendations:         CODE STATUS:    Code Status and Medical Interventions:   Ordered at: 03/04/23 1129     Medical Intervention Limits:    NO intubation (DNI)    NO vasopressors     Code Status (Patient has no pulse and is not breathing):    No CPR (Do Not Attempt to Resuscitate)     Medical Interventions (Patient has pulse or is breathing):    Limited Support     Release to patient:    Routine Release       Future Appointments   Date Time Provider Department Center   3/7/2023  2:30 PM EMS 1  [facility]   3/16/2023  9:00 AM CHAIR 7 -  [facility]  [facility]   3/17/2023  9:00 AM Roderick Hamm IV, MD [facility]   3/23/2023  9:30 AM CHAIR 3 -  [facility]  [facility]   4/19/2023  9:00 AM  [facility]EDU[facility]   7/27/2023 10:00 AM Jeannette Yates APRN [facility]                 Robinson Murphy MD  03/07/23      Time Spent on Discharge:  I spent  40  minutes on this discharge activity which included: face-to-face encounter with the patient, reviewing the data in the system, coordination of the care with the nursing staff as well as consultants, documentation, and entering orders.            Electronically signed by Robinson Murphy MD at 03/07/23 0886

## 2023-03-07 NOTE — PROGRESS NOTES
Continued Stay Note  [facility]     Patient Name: Vesta Landry  MRN: [id number]  Today's Date: 3/7/2023    Admit Date: 3/2/2023    Plan: Home w/ [facility]   Discharge Plan     Row Name 03/07/23 0851       Plan    Plan Home w/ [facility]    Patient/Family in Agreement with Plan yes    Final Discharge Disposition Code 50 - home with hospice    Final Note Pt will d/c home w/ [facility] via ambulance @ 1430. Pt.'s PCS & EMS DNR have been placed on the pt.'s chart. Family does have the 24 hr number for [facility] to initiate hospice services when the pt. arrives home.                                     Expected Discharge Date Expected Discharge Time    Mar 7, 2023             Nehal Richmond

## 2023-03-08 ENCOUNTER — TELEPHONE (OUTPATIENT)
Dept: ONCOLOGY | Facility: CLINIC | Age: 78
End: 2023-03-08
Payer: MEDICARE

## 2023-03-08 LAB
BACTERIA SPEC AEROBE CULT: NORMAL
BACTERIA SPEC AEROBE CULT: NORMAL

## 2023-03-08 NOTE — TELEPHONE ENCOUNTER
Left message for patients daughter that we saw patient got d/c home yesterday with hospice and just wanted to f/u with her to touch base.  Advised if any questions to call me back.

## 2023-03-08 NOTE — TELEPHONE ENCOUNTER
Meliza returned my call and advised that patient opted for hospice as she was snowballing with her health issues as well.

## 2023-03-09 NOTE — ED PROVIDER NOTES
Subjective  History of Present Illness:    Chief Complaint: Decreased p.o. intake  History of Present Illness: 77-year-old female presents for evaluation above complaint, history of throat cancer, getting chemotherapy.  Family concern for dehydration  Nurses Notes reviewed and agree, including vitals, allergies, social history and prior medical history.     REVIEW OF SYSTEMS: All systems reviewed and not pertinent unless noted.  Review of Systems      Positive for: Decreased p.o. intake    Negative for: Fever vomiting diarrhea    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   • Deep vein thrombosis (HCC)     bilateral- daughter states on 2/17/23 that she doesn't remember patient having this   • Dementia (HCC)    • Dysphagia    • GERD (gastroesophageal reflux disease)    • Hyperlipidemia    • Hypertension    • Impaired mobility    • PONV (postoperative nausea and vomiting)    • SOB (shortness of breath)     following chemo infusion on day of infusion last week (week of 1/29-2/4/23) per pt's daughter   • Vitamin D deficiency    • Wears dentures     uppers - instructed not to use adhesive DOS       Allergies:    Trazodone      Past Surgical History:   Procedure Laterality Date   • APPENDECTOMY     • CATARACT EXTRACTION     • CHOLECYSTECTOMY     • COLONOSCOPY      Approx 2009   • ENDOSCOPY W/ PEG TUBE PLACEMENT N/A 2/20/2023    Procedure: ESOPHAGOGASTRODUODENOSCOPY WITH PERCUTANEOUS ENDOSCOPIC GASTROSTOMY TUBE INSERTION;  Surgeon: Brigido Guerra MD;  Location: [facility] ENDOSCOPY;  Service: Gastroenterology;  Laterality: N/A;   • HYSTERECTOMY  1991   • OOPHORECTOMY Bilateral 1991   • PORTACATH PLACEMENT Right 02/08/2023    Procedure: INSERTION OF PORTACATH;  Surgeon: Brigido Guerra MD;  Location: [facility] OR;  Service: General;  Laterality: Right;   • TUBAL ABDOMINAL LIGATION           Social  "History     Socioeconomic History   • Marital status:    Tobacco Use   • Smoking status: Former     Packs/day: 0.15     Years: 10.00     Pack years: 1.50     Types: Cigarettes     Quit date: 1987     Years since quittin.2   • Smokeless tobacco: Never   Vaping Use   • Vaping Use: Never used   Substance and Sexual Activity   • Alcohol use: No   • Drug use: No   • Sexual activity: Defer         Family History   Problem Relation Age of Onset   • Heart failure Mother    • Hyperlipidemia Mother    • Hypertension Mother    • Cancer Father    • Cancer Sister    • Stroke Sister    • Breast cancer Maternal Aunt    • Breast cancer Paternal Aunt    • Ovarian cancer Neg Hx        Objective  Physical Exam:  /62   Pulse 65   Temp 98.6 °F (37 °C) (Oral)   Resp 18   Ht 154.9 cm (61\")   Wt 66 kg (145 lb 6.4 oz)   SpO2 97%   BMI 27.47 kg/m²      Physical Exam    CONSTITUTIONAL: Well developed, nontoxic 77-year-old,  in no acute distress.  VITAL SIGNS: per nursing, reviewed and noted  SKIN: exposed skin with no rashes, ulcerations or petechiae  EYES: Grossly EOMI, no icterus  ENT: Moist mucous membrane   RESPIRATORY:  No increased work of breathing. No retractions.   CARDIOVASCULAR:  regular rate and rhythm, no murmurs.  Good Peripheral pulses. Good cap refill to extremities.   GI: Abdomen soft, nontender, normal bowel sounds. No hernia. No ascites.  MUSCULOSKELETAL: Age-appropriate bulk and tone, moves all 4 extremities  NEUROLOGIC: Alert, oriented x 3. No gross deficits. GCS 15.   PSYCH: appropriate affect.  : no bladder tenderness or distention, no CVA tenderness    Procedures  No attending physician procedures were performed on this patient.  ED Course:        ED Course as of 23 0259   Sun 2023   0956 EKG interpreted by me reveals sinus bradycardia rate 56.  No ectopy no ischemic changes [PF]      ED Course User Index  [PF] Alok Del Toro DO       Lab Results (last 24 hours)     ** No " results found for the last 24 hours. **           No radiology results from the last 24 hrs       MDM    Initial impression of presenting illness: 77-year-old female presents for evaluation decreased p.o. intake and concern for dehydration secondary to throat cancer    DDX: includes but is not limited to: Dehydration    Patient arrives hypertensive afebrile no tachycardia oxygen saturations 97% with vitals interpreted by myself.     Pertinent features from physical exam: Benign exam.    Initial diagnostic plan: Basic labs IV fluids    Results from initial plan were reviewed and interpreted by me revealing creatinine 1.51 BUN 22 magnesium 1.8 normal white cell count hemoglobin 10 point hematocrit 32.3    Diagnostic information from other sources: Old records reviewed for comparison to lab    Interventions / Re-evaluation: Feeling better after intervention    Results/clinical rationale were discussed with patient and family    Consultations/Discussion of results with other physicians: None    Disposition plan: Patient was discharged in home stable condition.  Counseled on supportive care, outpatient follow-up. Return precaution discussed.  Patient/family was understanding and agreeable with plan    -----    Final diagnoses:   Mild dehydration        Alok Del Toro,   03/09/23 0259

## 2023-03-10 ENCOUNTER — HOSPITAL ENCOUNTER (EMERGENCY)
Facility: HOSPITAL | Age: 78
Discharge: HOME | End: 2023-03-10
Attending: EMERGENCY MEDICINE | Admitting: EMERGENCY MEDICINE
Payer: MEDICARE

## 2023-03-10 ENCOUNTER — TELEPHONE (OUTPATIENT)
Dept: SURGERY | Facility: CLINIC | Age: 78
End: 2023-03-10
Payer: MEDICARE

## 2023-03-10 ENCOUNTER — APPOINTMENT (OUTPATIENT)
Dept: GENERAL RADIOLOGY | Facility: HOSPITAL | Age: 78
End: 2023-03-10
Payer: MEDICARE

## 2023-03-10 VITALS
RESPIRATION RATE: 16 BRPM | DIASTOLIC BLOOD PRESSURE: 77 MMHG | BODY MASS INDEX: 27 KG/M2 | HEIGHT: 61 IN | TEMPERATURE: 98.4 F | WEIGHT: 143 LBS | SYSTOLIC BLOOD PRESSURE: 140 MMHG | HEART RATE: 69 BPM | OXYGEN SATURATION: 97 %

## 2023-03-10 DIAGNOSIS — K94.23 PEG TUBE MALFUNCTION: Primary | ICD-10-CM

## 2023-03-10 PROCEDURE — 96372 THER/PROPH/DIAG INJ SC/IM: CPT

## 2023-03-10 PROCEDURE — 99283 EMERGENCY DEPT VISIT LOW MDM: CPT

## 2023-03-10 PROCEDURE — 74018 RADEX ABDOMEN 1 VIEW: CPT

## 2023-03-10 PROCEDURE — 25010000002 MORPHINE PER 10 MG: Performed by: EMERGENCY MEDICINE

## 2023-03-10 RX ORDER — MORPHINE SULFATE 2 MG/ML
2 INJECTION, SOLUTION INTRAMUSCULAR; INTRAVENOUS ONCE
Status: COMPLETED | OUTPATIENT
Start: 2023-03-10 | End: 2023-03-10

## 2023-03-10 RX ADMIN — MORPHINE SULFATE 2 MG: 2 INJECTION, SOLUTION INTRAMUSCULAR; INTRAVENOUS at 12:20

## 2023-03-10 NOTE — TELEPHONE ENCOUNTER
Patients daughter called back this morning and stated her moms tube is still clogged up. She was just released from [facility] last week for bleeding through the tube and her bowels. They do give crushed meds through the tube. She is just wanting to know if she should just have hospice take her to the ER. Thanks

## 2023-03-10 NOTE — DISCHARGE INSTRUCTIONS
Return to the ER for any worsening symptoms.  Follow-up with primary care.  The Gastrografin that was injected for the x-ray will likely make the patient have multiple bowel movements in the near future.

## 2023-03-10 NOTE — ED PROVIDER NOTES
Subjective  History of Present Illness:    This is a 77-year-old female history of cancer of the tonsils and dysphagia, hypertension, hyperlipidemia, GERD, impaired mobility currently on hospice care presents emergency room today for evaluation of feeding tube issue.  Patient has a feeding tube in the left upper quadrant of her abdomen.  It has been in place for approximately 3.5 to 4 weeks per daughter at bedside.  Original placement was performed by Dr. Guerra.  This was placed approximately 3-1/2 to 4 weeks ago per the daughter at bedside.  She is currently on hospice for a GI bleed, they have tried to flush the tube, tried Coca-Cola to help break up sedimentation without any relief.  She has not had anything push through the G-tube since yesterday morning.  She denies any abdominal pain or any physical symptoms.      Nurses Notes reviewed and agree, including vitals, allergies, social history and prior medical history.     REVIEW OF SYSTEMS: All systems reviewed and not pertinent unless noted.  Review of Systems   Gastrointestinal: Negative for abdominal distention and abdominal pain.   All other systems reviewed and are negative.      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   • Deep vein thrombosis (HCC)     bilateral- daughter states on 2/17/23 that she doesn't remember patient having this   • Dementia (HCC)    • Dysphagia    • GERD (gastroesophageal reflux disease)    • Hyperlipidemia    • Hypertension    • Impaired mobility    • PONV (postoperative nausea and vomiting)    • SOB (shortness of breath)     following chemo infusion on day of infusion last week (week of 1/29-2/4/23) per pt's daughter   • Vitamin D deficiency    • Wears dentures     uppers - instructed not to use adhesive DOS       Allergies:    Trazodone      Past Surgical History:   Procedure Laterality Date   •  "APPENDECTOMY     • CATARACT EXTRACTION     • CHOLECYSTECTOMY     • COLONOSCOPY      Approx    • ENDOSCOPY W/ PEG TUBE PLACEMENT N/A 2023    Procedure: ESOPHAGOGASTRODUODENOSCOPY WITH PERCUTANEOUS ENDOSCOPIC GASTROSTOMY TUBE INSERTION;  Surgeon: Brigido Guerra MD;  Location: [facility] ENDOSCOPY;  Service: Gastroenterology;  Laterality: N/A;   • HYSTERECTOMY     • OOPHORECTOMY Bilateral    • PORTACATH PLACEMENT Right 2023    Procedure: INSERTION OF PORTACATH;  Surgeon: Brigido Guerra MD;  Location: [facility] OR;  Service: General;  Laterality: Right;   • TUBAL ABDOMINAL LIGATION           Social History     Socioeconomic History   • Marital status:    Tobacco Use   • Smoking status: Former     Packs/day: 0.15     Years: 10.00     Pack years: 1.50     Types: Cigarettes     Quit date: 1987     Years since quittin.2   • Smokeless tobacco: Never   Vaping Use   • Vaping Use: Never used   Substance and Sexual Activity   • Alcohol use: No   • Drug use: No   • Sexual activity: Defer         Family History   Problem Relation Age of Onset   • Heart failure Mother    • Hyperlipidemia Mother    • Hypertension Mother    • Cancer Father    • Cancer Sister    • Stroke Sister    • Breast cancer Maternal Aunt    • Breast cancer Paternal Aunt    • Ovarian cancer Neg Hx        Objective  Physical Exam:  /77 (BP Location: Left arm, Patient Position: Sitting)   Pulse 69   Temp 98.4 °F (36.9 °C) (Oral)   Resp 16   Ht 154.9 cm (61\")   Wt 64.9 kg (143 lb)   SpO2 97%   BMI 27.02 kg/m²      Physical Exam  Vitals and nursing note reviewed.   Constitutional:       General: She is not in acute distress.     Appearance: She is normal weight. She is not ill-appearing, toxic-appearing or diaphoretic.   HENT:      Head: Normocephalic and atraumatic.      Nose: Nose normal. No congestion or rhinorrhea.      Mouth/Throat:      Mouth: Mucous membranes are moist.      Pharynx: Oropharynx is clear.   Eyes: "      Extraocular Movements: Extraocular movements intact.   Cardiovascular:      Pulses: Normal pulses.      Comments: Appears well perfused  Pulmonary:      Effort: Pulmonary effort is normal. No respiratory distress.   Abdominal:      General: There is no distension.      Palpations: Abdomen is soft.      Tenderness: There is no abdominal tenderness. There is no guarding.      Comments: There is a PEG tube in place in the left upper quadrant of the abdomen.  The surrounding site does not appear infected.  There is no erythema or purulent discharge.   Musculoskeletal:         General: Normal range of motion.      Cervical back: Normal range of motion.   Skin:     General: Skin is warm and dry.      Capillary Refill: Capillary refill takes less than 2 seconds.   Neurological:      General: No focal deficit present.      Mental Status: She is alert and oriented to person, place, and time.   Psychiatric:         Mood and Affect: Mood normal.         Behavior: Behavior normal.         Thought Content: Thought content normal.         Judgment: Judgment normal.               Feeding Tube Replacement    Date/Time: 3/10/2023 12:45 PM  Performed by: Heriberto Hdz PA-C  Authorized by: Acosta Maxwell DO     Consent:     Consent obtained:  Verbal    Consent given by:  Patient    Risks, benefits, and alternatives were discussed: yes      Risks discussed:  Bleeding, infection and pain    Alternatives discussed:  No treatment  Universal protocol:     Procedure explained and questions answered to patient or proxy's satisfaction: yes      Patient identity confirmed:  Verbally with patient  Pre-procedure details:     Old tube type:  Gastrostomy    Old tube size:  18 Fr  Sedation:     Sedation type:  Anxiolysis (2 mg morphine)  Anesthesia:     Anesthesia method:  None  Procedure details:     Patient position:  Supine    Procedure type:  Replacement    Tube type:  Gastrostomy    Tube size:  18 Fr    Bulb inflation volume:   10cc    Bulb inflation fluid:  Normal saline  Post-procedure details:     Placement/position confirmation:  X-ray    Placement difficulty:  None    Bleeding:  None    Procedure completion:  Tolerated well, no immediate complications        ED Course:    ED Course as of 03/10/23 1341   Fri Mar 10, 2023   1309 PEG tube removed and replaced without any difficulty. [JR]   1309 KUB with Gastrografin obtained for confirmation of tube placement. [JR]      ED Course User Index  [JR] Heriberto Hdz PA-C       Lab Results (last 24 hours)     ** No results found for the last 24 hours. **           XR Abdomen KUB    Result Date: 3/10/2023  XR ABDOMEN KUB-  INDICATION:  PEG tube placement.  COMPARISON:  None.  FINDINGS:  A supine view of the abdomen was obtained.  An enteric tube projects over the left abdomen. A small amount of contrast was injected through the indwelling tube. Contrast fills the stomach without convincing extravasation. The bowel gas pattern is otherwise nonspecific but nonobstructive.  There are no pathologic calcifications.  No acute osseous abnormality is identified.      Impression: Percutaneous feeding tube in the stomach.  This report was signed and finalized on 3/10/2023 1:29 PM by Christel Wright MD.         MDM    Initial impression of presenting illness: This is a 77-year-old female presents emergency room today for evaluation of feeding tube issue.  Patient has a feeding tube in the left upper quadrant of her abdomen.  It has been in place for 3 weeks.  Original surgery was performed by Dr. Guerra.  This was placed approximately 3-1/2 to 4 weeks ago per the daughter at bedside.  She is currently on hospice for a GI bleed, they have tried to flush the tube, tried Coca-Cola to help break up sedimentation without any relief.  She has not had anything push to the G-tube since yesterday morning.  She denies any abdominal pain or any physical symptoms.      DDX: includes but is not limited to: PEG tube  issue, PEG tube clot, PEG tube malfunction    Patient arrives hemodynamically stable, afebrile, some hypertension 140/77, nontachycardic, nontoxic-appearing with vitals interpreted by myself.     Pertinent features from physical exam: Abdominal exam and without any rebound guarding rigidity or tenderness.  Abdomen soft.  There is a PEG tube in place in left upper quadrant without any erythema or purulent discharge at insertion site.    Initial diagnostic plan: We will obtain KUB with Gastrografin after replacement of PEG tube.    Results from initial plan were reviewed and interpreted by me revealing PEG tube in the correct placement in the stomach per radiology interpretation.    Diagnostic information from other sources: Reviewed prior gastrotomy tube insertion surgical note from 2/20/2023.    Interventions / Re-evaluation: The PEG tube was attempted to be flushed and aspirated without any success.  Patient consents for PEG tube replacement.  Patient requested some anxiolysis and was given 2 mg of morphine IM prior to PEG tube replacement.  Original PEG tube bulb was deflated, removed without any difficulty, new PEG tube was replaced by me without any difficulty with an 18 Haitian.    Results/clinical rationale were discussed with patient at bedside.  Discussed that the PEG tube appears to be in place per the KUB with Gastrografin per radiology's interpretation..  Discussed return precautions.  Patient stable for discharge home at this time.  Did discuss with patient and daughter at bedside that the Gastrografin will likely make her have multiple bowel movements over the next couple days.  Discussed returning for further malfunction of PEG tube's or abdominal pain among other return precautions    Consultations/Discussion of results with other physicians: N/A    Disposition plan: Discharge back to hospice care with daughter at bedside.  Follow-up with primary care physician and hospice physician.  Return  precautions given.  -----    Final diagnoses:   PEG tube malfunction (HCC)        Heriberto Hdz PA-C  03/10/23 3662

## 2023-03-12 LAB
QT INTERVAL: 400 MS
QTC INTERVAL: 478 MS

## 2023-04-10 ENCOUNTER — TELEPHONE (OUTPATIENT)
Dept: INTERNAL MEDICINE | Facility: CLINIC | Age: 78
End: 2023-04-10
Payer: MEDICARE

## 2023-04-10 NOTE — TELEPHONE ENCOUNTER
Caller: [facility]    Relationship: Other    Best call back number: [phone]    What was the call regarding: [facility] STATES THAT THEY WOULD LIKE TO DECREASE PATIENT'S FEEDING. PLEASE ADVISE    Do you require a callback: YES

## 2023-05-23 RX ORDER — AMLODIPINE BESYLATE 2.5 MG/1
2.5 TABLET ORAL DAILY
Qty: 30 TABLET | Refills: 5 | OUTPATIENT
Start: 2023-05-23

## 2023-08-25 ENCOUNTER — TELEPHONE (OUTPATIENT)
Dept: INTERNAL MEDICINE | Facility: CLINIC | Age: 78
End: 2023-08-25
Payer: MEDICARE

## 2023-08-25 NOTE — TELEPHONE ENCOUNTER
Jayshree from Hospice called to say this patient is being discharged because she is on longer hospice appropriate.

## 2023-08-31 ENCOUNTER — TELEPHONE (OUTPATIENT)
Dept: INTERNAL MEDICINE | Facility: CLINIC | Age: 78
End: 2023-08-31

## 2023-08-31 NOTE — TELEPHONE ENCOUNTER
Caller: [facility]    Relationship to patient: Provider    Best call back number: [phone]    Patient is needing: [facility] SENDING ORDER TODAY FOR HOSPITAL BED THAT PCP  WOULD NEED TO SIGN AND SEND BACK PLEASE

## 2023-09-07 ENCOUNTER — OFFICE VISIT (OUTPATIENT)
Dept: INTERNAL MEDICINE | Facility: CLINIC | Age: 78
End: 2023-09-07
Payer: OTHER MISCELLANEOUS

## 2023-09-07 VITALS
HEART RATE: 50 BPM | HEIGHT: 61 IN | WEIGHT: 135.8 LBS | OXYGEN SATURATION: 98 % | TEMPERATURE: 98.4 F | DIASTOLIC BLOOD PRESSURE: 60 MMHG | SYSTOLIC BLOOD PRESSURE: 110 MMHG | BODY MASS INDEX: 25.64 KG/M2

## 2023-09-07 DIAGNOSIS — I10 HTN (HYPERTENSION), BENIGN: ICD-10-CM

## 2023-09-07 DIAGNOSIS — C76.0 SQUAMOUS CELL CARCINOMA OF HEAD AND NECK: Primary | ICD-10-CM

## 2023-09-07 DIAGNOSIS — R60.0 BILATERAL EDEMA OF LOWER EXTREMITY: ICD-10-CM

## 2023-09-07 RX ORDER — HYDROCODONE BITARTRATE AND ACETAMINOPHEN 7.5; 325 MG/1; MG/1
1 TABLET ORAL EVERY 4 HOURS PRN
COMMUNITY

## 2023-09-07 RX ORDER — FUROSEMIDE 20 MG/1
20 TABLET ORAL DAILY
COMMUNITY

## 2023-09-07 RX ORDER — DIAZEPAM 2 MG/1
2 TABLET ORAL 2 TIMES DAILY PRN
COMMUNITY

## 2023-09-07 RX ORDER — OMEPRAZOLE 20 MG/1
20 CAPSULE, DELAYED RELEASE ORAL DAILY
COMMUNITY

## 2023-09-07 RX ORDER — CYCLOBENZAPRINE HCL 5 MG
5 TABLET ORAL AS NEEDED
COMMUNITY

## 2023-09-07 RX ORDER — ALLOPURINOL 100 MG/1
100 TABLET ORAL DAILY
Qty: 90 TABLET | Refills: 3 | Status: SHIPPED | OUTPATIENT
Start: 2023-09-07

## 2023-09-07 NOTE — PROGRESS NOTES
The ABCs of the Annual Wellness Visit  Subsequent Medicare Wellness Visit    Subjective      Vesta Landry is a 78 y.o. female who presents for a Subsequent Medicare Wellness Visit.    Review of Systems   Constitutional:  Positive for fatigue. Negative for activity change, appetite change and fever.   HENT:  Negative for congestion, ear discharge, ear pain and trouble swallowing.    Eyes:  Negative for photophobia and visual disturbance.   Respiratory:  Negative for cough and shortness of breath.    Cardiovascular:  Negative for chest pain and palpitations.   Gastrointestinal:  Negative for abdominal distention, abdominal pain, constipation, diarrhea, nausea and vomiting.   Endocrine: Negative.    Genitourinary:  Negative for dysuria, hematuria and urgency.   Musculoskeletal:  Positive for arthralgias and back pain. Negative for joint swelling and myalgias.   Skin:  Negative for color change and rash.   Allergic/Immunologic: Negative.    Neurological:  Negative for dizziness, weakness, light-headedness and headaches.   Hematological:  Negative for adenopathy. Does not bruise/bleed easily.   Psychiatric/Behavioral:  Positive for sleep disturbance. Negative for agitation, confusion and dysphoric mood. The patient is not nervous/anxious.       The following portions of the patient's history were reviewed and   updated as appropriate: allergies, current medications, past family history, past medical history, past social history, past surgical history, and problem list.    Compared to one year ago, the patient feels her physical   health is worse.    Compared to one year ago, the patient feels her mental   health is worse.    Recent Hospitalizations:  This patient has had a [facility] admission record on file within the last 365 days.    Current Medical Providers:  Patient Care Team:  Bossman Pedraza MD as PCP - General (Internal Medicine)  Hakeem Lang DPM as Consulting Physician  (Podiatry)  Jennifer Lora MD as Consulting Physician (Hematology and Oncology)  Steven Valverde MD (Radiation Oncology)    Outpatient Medications Prior to Visit   Medication Sig Dispense Refill    allopurinol (ZYLOPRIM) 100 MG tablet Take 1 tablet by mouth Daily. 90 tablet 3    cyclobenzaprine (FLEXERIL) 5 MG tablet Take 1 tablet by mouth As Needed for Muscle Spasms.      diazePAM (VALIUM) 2 MG tablet Take 1 tablet by mouth 2 (Two) Times a Day As Needed for Anxiety.      furosemide (LASIX) 20 MG tablet Take 1 tablet by mouth Daily.      HYDROcodone-acetaminophen (NORCO) 7.5-325 MG per tablet Take 1 tablet by mouth Every 4 (Four) Hours As Needed for Moderate Pain.      metoprolol tartrate (LOPRESSOR) 100 MG tablet Take 1 tablet by mouth Every 12 (Twelve) Hours for 30 days. 60 tablet 0    mirtazapine (REMERON) 30 MG tablet Take 1 tablet by mouth Every Night.      nitroglycerin (NITROSTAT) 0.4 MG SL tablet Place 1 tablet under the tongue Every 5 (Five) Minutes As Needed for Chest Pain. Take no more than 3 doses in 15 minutes. 50 tablet 0    omeprazole (priLOSEC) 20 MG capsule Take 1 capsule by mouth Daily.      ondansetron (Zofran) 8 MG tablet Take 1 tablet by mouth Every 8 (Eight) Hours As Needed for Nausea or Vomiting. 30 tablet 3    PHARMACY MEDS TO BED CONSULT Daily.      hydrALAZINE (APRESOLINE) 50 MG tablet Take 2 tablets by mouth Every 8 (Eight) Hours for 30 days. 180 tablet 0    isosorbide dinitrate (ISORDIL) 20 MG tablet Take 2 tablets by mouth 3 (Three) Times a Day for 30 days. 180 tablet 0    lactobacillus acidophilus (RISAQUAD) capsule capsule Take 1 capsule by mouth twice daily. Take with Breakfast & Dinner. 11 capsule 0    lidocaine-prilocaine (EMLA) 2.5-2.5 % cream Apply 1 application topically to the appropriate area as directed As Needed (45-60 minutes prior to port access.  Cover with saran/plastic wrap.). 30 g 3    losartan (COZAAR) 100 MG tablet Take 1 tablet by mouth Daily. 90 tablet 1     mometasone (ELOCON) 0.1 % cream mometasone 0.1 % topical cream   APPLY A THIN LAYER TO THE AFFECTED AREA(S) BY TOPICAL ROUTE ONCE DAILY after radiation treatment      nystatin (MYCOSTATIN) 309975 UNIT/GM powder Apply  topically to the appropriate area as directed 3 (Three) Times a Day. 60 g 0    sucralfate (CARAFATE) 1 g tablet Take 1 tablet by mouth 4 (Four) Times a Day.       No facility-administered medications prior to visit.       Opioid medication/s are on active medication list.  and I have evaluated her active treatment plan and pain score trends (see table).  There were no vitals filed for this visit.  I have reviewed the chart for potential of high risk medication and harmful drug interactions in the elderly.          Aspirin is not on active medication list.  Aspirin use is not indicated based on review of current medical condition/s. Risk of harm outweighs potential benefits.  .    Patient Active Problem List   Diagnosis    Gastroesophageal reflux disease without esophagitis    Pure hypercholesterolemia    HTN (hypertension), benign    Vitamin D deficiency    Bilateral edema of lower extremity    Dysthymia    Chronic diastolic heart failure    Squamous cell carcinoma of left tonsil    Bradycardia    Elevated troponin    HLD (hyperlipidemia)    Anxiety associated with depression    Diarrhea    Difficulty swallowing    Personal history of COVID-19    Exertional dyspnea    Anemia    Squamous cell carcinoma of head and neck    NSTEMI (non-ST elevated myocardial infarction)    Abnormal bowel movement    History of Clostridium difficile colitis     Advance Care Planning  Advance Directive is on file.  ACP discussion was held with the patient during this visit. Patient has an advance directive in EMR which is still valid.      Objective    Vitals:    09/07/23 1553   BP: 110/60   BP Location: Right arm   Patient Position: Sitting   Cuff Size: Adult   Pulse: 50   Temp: 98.4 °F (36.9 °C)   SpO2: 98%   Weight: 61.6  "kg (135 lb 12.8 oz)   Height: 154.9 cm (61\")     Estimated body mass index is 25.66 kg/m² as calculated from the following:    Height as of this encounter: 154.9 cm (61\").    Weight as of this encounter: 61.6 kg (135 lb 12.8 oz).    Physical Exam  Constitutional:       General: She is not in acute distress.     Appearance: She is well-developed.   HENT:      Nose: Nose normal.   Eyes:      General: No scleral icterus.     Conjunctiva/sclera: Conjunctivae normal.   Neck:      Thyroid: No thyromegaly.      Trachea: No tracheal deviation.   Cardiovascular:      Rate and Rhythm: Normal rate and regular rhythm.      Heart sounds: No murmur heard.    No friction rub.   Pulmonary:      Effort: No respiratory distress.      Breath sounds: No wheezing or rales.   Abdominal:      General: There is no distension.      Palpations: Abdomen is soft. There is no mass.      Tenderness: There is no abdominal tenderness. There is no guarding.      Comments: G tube in place   Musculoskeletal:         General: No deformity. Normal range of motion.      Right lower leg: Edema present.      Left lower leg: Edema present.   Lymphadenopathy:      Cervical: No cervical adenopathy.   Skin:     General: Skin is warm and dry.      Findings: No erythema or rash.   Neurological:      Mental Status: She is alert and oriented to person, place, and time.      Cranial Nerves: No cranial nerve deficit.      Coordination: Coordination normal.      Deep Tendon Reflexes: Reflexes are normal and symmetric.   Psychiatric:         Behavior: Behavior normal.         Thought Content: Thought content normal.         Judgment: Judgment normal.       BMI is >= 25 and <30. (Overweight) The following options were offered after discussion;: nutrition counseling/recommendations      Does the patient have evidence of cognitive impairment?   Yes: Continue current medications.            HEALTH RISK ASSESSMENT    Smoking Status:  Social History     Tobacco Use "   Smoking Status Former    Packs/day: 0.15    Years: 10.00    Pack years: 1.50    Types: Cigarettes    Quit date: 1987    Years since quittin.7   Smokeless Tobacco Never     Alcohol Consumption:  Social History     Substance and Sexual Activity   Alcohol Use No     Fall Risk Screen:    [place] Fall Risk Assessment was completed, and patient is at MODERATE risk for falls. Assessment completed on:2023    Depression Screenin/7/2023     4:06 PM   PHQ-2/PHQ-9 Depression Screening   Little Interest or Pleasure in Doing Things 0-->not at all   Feeling Down, Depressed or Hopeless 0-->not at all   PHQ-9: Brief Depression Severity Measure Score 0       Health Habits and Functional and Cognitive Screenin/7/2023     4:06 PM   Functional & Cognitive Status   Do you have difficulty preparing food and eating? No   Do you have difficulty bathing yourself, getting dressed or grooming yourself? No   Do you have difficulty using the toilet? No   Do you have difficulty moving around from place to place? No   Do you have trouble with steps or getting out of a bed or a chair? No   Current Diet Well Balanced Diet        Current Diet Comment Feeding tube.    Dental Exam Up to date   Eye Exam Not up to date   Exercise (times per week) 0 times per week   Current Exercises Include No Regular Exercise   Do you need help using the phone?  No   Are you deaf or do you have serious difficulty hearing?  Yes   Do you need help to go to places out of walking distance? No   Do you need help shopping? Yes   Do you need help preparing meals?  No   Do you need help with housework?  Yes   Do you need help with laundry? Yes   Do you need help taking your medications? Yes   Do you need help managing money? No   Do you ever drive or ride in a car without wearing a seat belt? No   Have you felt unusual stress, anger or loneliness in the last month? No   Who do you live with? Spouse   If you need help, do you have trouble finding  someone available to you? No   Have you been bothered in the last four weeks by sexual problems? No   Do you have difficulty concentrating, remembering or making decisions? Yes       Age-appropriate Screening Schedule:  Refer to the list below for future screening recommendations based on patient's age, sex and/or medical conditions. Orders for these recommended tests are listed in the plan section. The patient has been provided with a written plan.    Health Maintenance   Topic Date Due    DXA SCAN  08/12/2017    COVID-19 Vaccine (4 - Pfizer series) 01/31/2022    ANNUAL WELLNESS VISIT  01/13/2023    LIPID PANEL  01/13/2023    Pneumococcal Vaccine 65+ (2 - PPSV23 or PCV20) 11/16/2023 (Originally 9/23/2016)    TDAP/TD VACCINES (1 - Tdap) 06/24/2029 (Originally 5/28/1964)    ZOSTER VACCINE (2 of 2) 06/24/2029 (Originally 12/3/2012)    INFLUENZA VACCINE  10/01/2023    BMI FOLLOWUP  01/18/2024    COLORECTAL CANCER SCREENING  10/19/2025                CMS Preventative Services Quick Reference  Risk Factors Identified During Encounter:    Chronic Pain: Natural history and expected course discussed. Questions answered.  OTC analgesics as needed. Proper dosing schedule discussed.   Polypharmacy: Medication List reviewed and Medications are appropriate for patient    The above risks/problems have been discussed with the patient.  Pertinent information has been shared with the patient in the After Visit Summary.    Diagnoses and all orders for this visit:    1. Squamous cell carcinoma of head and neck (Primary) currently asymptomatic has undergone radiation treatment was on hospice care however this is being discontinued since she is doing significantly better.  Oral intake is good will have oncology see her again  Call placed to her surgeon will put in the G-tube.  Will keep it intact for now may consider removing it at a future date if she tolerates oral intake well  2. Bilateral edema of lower extremity currently stable  Lasix as needed only discussed leg elevation and compression stockings    3. HTN (hypertension), benign stable now off amlodipine        Follow Up:   Next Medicare Wellness visit to be scheduled in 1 year.      An After Visit Summary and PPPS were made available to the patient.

## 2023-09-08 LAB
ALBUMIN SERPL-MCNC: 4.5 G/DL (ref 3.8–4.8)
ALBUMIN/GLOB SERPL: 1.6 {RATIO} (ref 1.2–2.2)
ALP SERPL-CCNC: 110 IU/L (ref 44–121)
ALT SERPL-CCNC: 16 IU/L (ref 0–32)
AST SERPL-CCNC: 22 IU/L (ref 0–40)
BILIRUB SERPL-MCNC: 0.3 MG/DL (ref 0–1.2)
BUN SERPL-MCNC: 35 MG/DL (ref 8–27)
BUN/CREAT SERPL: 28 (ref 12–28)
CALCIUM SERPL-MCNC: 9.6 MG/DL (ref 8.7–10.3)
CHLORIDE SERPL-SCNC: 98 MMOL/L (ref 96–106)
CO2 SERPL-SCNC: 25 MMOL/L (ref 20–29)
CREAT SERPL-MCNC: 1.27 MG/DL (ref 0.57–1)
EGFRCR SERPLBLD CKD-EPI 2021: 43 ML/MIN/1.73
ERYTHROCYTE [DISTWIDTH] IN BLOOD BY AUTOMATED COUNT: 14.1 % (ref 11.7–15.4)
GLOBULIN SER CALC-MCNC: 2.8 G/DL (ref 1.5–4.5)
GLUCOSE SERPL-MCNC: 98 MG/DL (ref 70–99)
HCT VFR BLD AUTO: 35.1 % (ref 34–46.6)
HGB BLD-MCNC: 12.1 G/DL (ref 11.1–15.9)
MCH RBC QN AUTO: 31 PG (ref 26.6–33)
MCHC RBC AUTO-ENTMCNC: 34.5 G/DL (ref 31.5–35.7)
MCV RBC AUTO: 90 FL (ref 79–97)
PLATELET # BLD AUTO: 189 X10E3/UL (ref 150–450)
POTASSIUM SERPL-SCNC: 3.9 MMOL/L (ref 3.5–5.2)
PROT SERPL-MCNC: 7.3 G/DL (ref 6–8.5)
RBC # BLD AUTO: 3.9 X10E6/UL (ref 3.77–5.28)
SODIUM SERPL-SCNC: 138 MMOL/L (ref 134–144)
WBC # BLD AUTO: 9.2 X10E3/UL (ref 3.4–10.8)

## 2023-09-11 RX ORDER — ALLOPURINOL 100 MG/1
100 TABLET ORAL DAILY
Qty: 90 TABLET | Refills: 3 | OUTPATIENT
Start: 2023-09-11

## 2023-09-22 ENCOUNTER — TELEPHONE (OUTPATIENT)
Dept: INTERNAL MEDICINE | Facility: CLINIC | Age: 78
End: 2023-09-22

## 2023-09-22 DIAGNOSIS — C79.89 METASTATIC SQUAMOUS CELL CARCINOMA TO HEAD AND NECK: ICD-10-CM

## 2023-09-22 RX ORDER — MIRTAZAPINE 30 MG/1
30 TABLET, FILM COATED ORAL NIGHTLY
Qty: 90 TABLET | Refills: 1 | Status: SHIPPED | OUTPATIENT
Start: 2023-09-22

## 2023-09-22 NOTE — TELEPHONE ENCOUNTER
S/w Meliza and she states that patient has pitting edema in legs and that she is fatigue.   Meliza denies that the patient has any headaches or dizziness.   Relayed this information to Dr. Pedraza and he advises that patient take 2 tablets of 20 MG Lasix QD.   I asked that Meliza give us a call on Monday and let us know how patient is feeling.

## 2023-09-22 NOTE — TELEPHONE ENCOUNTER
Caller: Meliza Back     Relationship:     Best call back number:  [phone]    What is your medical concern?      PATIENT'S LEGS ARE SWOLLEN    /83

## 2023-09-25 ENCOUNTER — HOSPITAL ENCOUNTER (INPATIENT)
Facility: HOSPITAL | Age: 78
LOS: 1 days | Discharge: HOME | DRG: 280 | End: 2023-09-28
Attending: EMERGENCY MEDICINE | Admitting: STUDENT IN AN ORGANIZED HEALTH CARE EDUCATION/TRAINING PROGRAM
Payer: MEDICARE

## 2023-09-25 DIAGNOSIS — I21.4 NSTEMI (NON-ST ELEVATED MYOCARDIAL INFARCTION): ICD-10-CM

## 2023-09-25 DIAGNOSIS — C09.9 SQUAMOUS CELL CARCINOMA OF LEFT TONSIL: ICD-10-CM

## 2023-09-25 DIAGNOSIS — I16.1 HYPERTENSIVE EMERGENCY: Primary | ICD-10-CM

## 2023-09-25 DIAGNOSIS — C76.0 SQUAMOUS CELL CARCINOMA OF HEAD AND NECK: ICD-10-CM

## 2023-09-25 LAB
ALBUMIN SERPL-MCNC: 3.8 G/DL (ref 3.5–5.2)
ALBUMIN/GLOB SERPL: 1.3 G/DL
ALP SERPL-CCNC: 96 U/L (ref 39–117)
ALT SERPL W P-5'-P-CCNC: 13 U/L (ref 1–33)
ANION GAP SERPL CALCULATED.3IONS-SCNC: 13 MMOL/L (ref 5–15)
AST SERPL-CCNC: 16 U/L (ref 1–32)
BASOPHILS # BLD AUTO: 0.02 10*3/MM3 (ref 0–0.2)
BASOPHILS NFR BLD AUTO: 0.3 % (ref 0–1.5)
BILIRUB SERPL-MCNC: 0.5 MG/DL (ref 0–1.2)
BUN SERPL-MCNC: 14 MG/DL (ref 8–23)
BUN/CREAT SERPL: 12 (ref 7–25)
CALCIUM SPEC-SCNC: 9 MG/DL (ref 8.6–10.5)
CHLORIDE SERPL-SCNC: 104 MMOL/L (ref 98–107)
CO2 SERPL-SCNC: 24 MMOL/L (ref 22–29)
CREAT SERPL-MCNC: 1.17 MG/DL (ref 0.57–1)
DEPRECATED RDW RBC AUTO: 49.9 FL (ref 37–54)
EGFRCR SERPLBLD CKD-EPI 2021: 47.3 ML/MIN/1.73
EOSINOPHIL # BLD AUTO: 0.15 10*3/MM3 (ref 0–0.4)
EOSINOPHIL NFR BLD AUTO: 2.2 % (ref 0.3–6.2)
ERYTHROCYTE [DISTWIDTH] IN BLOOD BY AUTOMATED COUNT: 14.5 % (ref 12.3–15.4)
GLOBULIN UR ELPH-MCNC: 3 GM/DL
GLUCOSE SERPL-MCNC: 105 MG/DL (ref 65–99)
HCT VFR BLD AUTO: 37.2 % (ref 34–46.6)
HGB BLD-MCNC: 12 G/DL (ref 12–15.9)
IMM GRANULOCYTES # BLD AUTO: 0.02 10*3/MM3 (ref 0–0.05)
IMM GRANULOCYTES NFR BLD AUTO: 0.3 % (ref 0–0.5)
LYMPHOCYTES # BLD AUTO: 1.22 10*3/MM3 (ref 0.7–3.1)
LYMPHOCYTES NFR BLD AUTO: 18.1 % (ref 19.6–45.3)
MCH RBC QN AUTO: 30.6 PG (ref 26.6–33)
MCHC RBC AUTO-ENTMCNC: 32.3 G/DL (ref 31.5–35.7)
MCV RBC AUTO: 94.9 FL (ref 79–97)
MONOCYTES # BLD AUTO: 0.75 10*3/MM3 (ref 0.1–0.9)
MONOCYTES NFR BLD AUTO: 11.1 % (ref 5–12)
NEUTROPHILS NFR BLD AUTO: 4.59 10*3/MM3 (ref 1.7–7)
NEUTROPHILS NFR BLD AUTO: 68 % (ref 42.7–76)
NRBC BLD AUTO-RTO: 0 /100 WBC (ref 0–0.2)
NT-PROBNP SERPL-MCNC: 3990 PG/ML (ref 0–1800)
PLATELET # BLD AUTO: 174 10*3/MM3 (ref 140–450)
PMV BLD AUTO: 10.3 FL (ref 6–12)
POTASSIUM SERPL-SCNC: 3.2 MMOL/L (ref 3.5–5.2)
PROT SERPL-MCNC: 6.8 G/DL (ref 6–8.5)
QT INTERVAL: 494 MS
QTC INTERVAL: 446 MS
RBC # BLD AUTO: 3.92 10*6/MM3 (ref 3.77–5.28)
SODIUM SERPL-SCNC: 141 MMOL/L (ref 136–145)
TROPONIN T SERPL HS-MCNC: 255 NG/L
WBC NRBC COR # BLD: 6.75 10*3/MM3 (ref 3.4–10.8)

## 2023-09-25 PROCEDURE — 85025 COMPLETE CBC W/AUTO DIFF WBC: CPT | Performed by: EMERGENCY MEDICINE

## 2023-09-25 PROCEDURE — 84484 ASSAY OF TROPONIN QUANT: CPT | Performed by: EMERGENCY MEDICINE

## 2023-09-25 PROCEDURE — 93005 ELECTROCARDIOGRAM TRACING: CPT

## 2023-09-25 PROCEDURE — 80053 COMPREHEN METABOLIC PANEL: CPT | Performed by: EMERGENCY MEDICINE

## 2023-09-25 PROCEDURE — 93005 ELECTROCARDIOGRAM TRACING: CPT | Performed by: EMERGENCY MEDICINE

## 2023-09-25 PROCEDURE — 36415 COLL VENOUS BLD VENIPUNCTURE: CPT

## 2023-09-25 PROCEDURE — 83880 ASSAY OF NATRIURETIC PEPTIDE: CPT | Performed by: EMERGENCY MEDICINE

## 2023-09-25 PROCEDURE — 99285 EMERGENCY DEPT VISIT HI MDM: CPT

## 2023-09-25 RX ORDER — ONDANSETRON HYDROCHLORIDE 8 MG/1
8 TABLET, FILM COATED ORAL EVERY 8 HOURS PRN
Qty: 30 TABLET | Refills: 3 | Status: SHIPPED | OUTPATIENT
Start: 2023-09-25

## 2023-09-25 RX ORDER — LOSARTAN POTASSIUM 25 MG/1
25 TABLET ORAL
Status: DISCONTINUED | OUTPATIENT
Start: 2023-09-25 | End: 2023-09-27

## 2023-09-25 RX ORDER — OMEPRAZOLE 20 MG/1
20 CAPSULE, DELAYED RELEASE ORAL DAILY
Qty: 30 CAPSULE | Refills: 0 | Status: SHIPPED | OUTPATIENT
Start: 2023-09-25

## 2023-09-25 RX ORDER — HYDRALAZINE HYDROCHLORIDE 25 MG/1
100 TABLET, FILM COATED ORAL ONCE
Status: COMPLETED | OUTPATIENT
Start: 2023-09-25 | End: 2023-09-25

## 2023-09-25 RX ORDER — METOPROLOL TARTRATE 50 MG/1
100 TABLET, FILM COATED ORAL ONCE
Status: DISCONTINUED | OUTPATIENT
Start: 2023-09-25 | End: 2023-09-25

## 2023-09-25 RX ADMIN — HYDRALAZINE HYDROCHLORIDE 100 MG: 25 TABLET ORAL at 22:32

## 2023-09-25 RX ADMIN — LOSARTAN POTASSIUM 25 MG: 50 TABLET, FILM COATED ORAL at 22:32

## 2023-09-25 NOTE — Clinical Note
Level of Care: Telemetry [5]   Diagnosis: NSTEMI (non-ST elevated myocardial infarction) [044370]   Admitting Physician: ANNA MARIE MEDLEY III [[id number]]   Attending Physician: ANNA MARIE MEDLEY III [[id number]]   Bed Request Comments: tele inpt

## 2023-09-26 ENCOUNTER — APPOINTMENT (OUTPATIENT)
Dept: CARDIOLOGY | Facility: HOSPITAL | Age: 78
DRG: 280 | End: 2023-09-26
Payer: MEDICARE

## 2023-09-26 ENCOUNTER — APPOINTMENT (OUTPATIENT)
Dept: CT IMAGING | Facility: HOSPITAL | Age: 78
DRG: 280 | End: 2023-09-26
Payer: MEDICARE

## 2023-09-26 ENCOUNTER — APPOINTMENT (OUTPATIENT)
Dept: GENERAL RADIOLOGY | Facility: HOSPITAL | Age: 78
DRG: 280 | End: 2023-09-26
Payer: MEDICARE

## 2023-09-26 PROBLEM — E87.6 HYPOKALEMIA: Status: ACTIVE | Noted: 2023-09-26

## 2023-09-26 PROBLEM — I16.0 HYPERTENSIVE URGENCY: Status: ACTIVE | Noted: 2023-09-26

## 2023-09-26 PROBLEM — R79.89 ELEVATED SERUM CREATININE: Status: ACTIVE | Noted: 2023-09-26

## 2023-09-26 LAB
ANION GAP SERPL CALCULATED.3IONS-SCNC: 13 MMOL/L (ref 5–15)
BASOPHILS # BLD AUTO: 0.03 10*3/MM3 (ref 0–0.2)
BASOPHILS NFR BLD AUTO: 0.6 % (ref 0–1.5)
BUN SERPL-MCNC: 15 MG/DL (ref 8–23)
BUN/CREAT SERPL: 15.5 (ref 7–25)
CALCIUM SPEC-SCNC: 9 MG/DL (ref 8.6–10.5)
CHLORIDE SERPL-SCNC: 107 MMOL/L (ref 98–107)
CHOLEST SERPL-MCNC: 175 MG/DL (ref 0–200)
CO2 SERPL-SCNC: 24 MMOL/L (ref 22–29)
CREAT SERPL-MCNC: 0.97 MG/DL (ref 0.57–1)
DEPRECATED RDW RBC AUTO: 47.9 FL (ref 37–54)
EGFRCR SERPLBLD CKD-EPI 2021: 59.2 ML/MIN/1.73
EOSINOPHIL # BLD AUTO: 0.12 10*3/MM3 (ref 0–0.4)
EOSINOPHIL NFR BLD AUTO: 2.4 % (ref 0.3–6.2)
ERYTHROCYTE [DISTWIDTH] IN BLOOD BY AUTOMATED COUNT: 14.2 % (ref 12.3–15.4)
GEN 5 2HR TROPONIN T REFLEX: 270 NG/L
GLUCOSE SERPL-MCNC: 95 MG/DL (ref 65–99)
HBA1C MFR BLD: 4.9 % (ref 4.8–5.6)
HCT VFR BLD AUTO: 32.3 % (ref 34–46.6)
HDLC SERPL-MCNC: 27 MG/DL (ref 40–60)
HGB BLD-MCNC: 10.8 G/DL (ref 12–15.9)
IMM GRANULOCYTES # BLD AUTO: 0.01 10*3/MM3 (ref 0–0.05)
IMM GRANULOCYTES NFR BLD AUTO: 0.2 % (ref 0–0.5)
LDLC SERPL CALC-MCNC: 104 MG/DL (ref 0–100)
LDLC/HDLC SERPL: 3.59 {RATIO}
LYMPHOCYTES # BLD AUTO: 0.78 10*3/MM3 (ref 0.7–3.1)
LYMPHOCYTES NFR BLD AUTO: 15.9 % (ref 19.6–45.3)
MAGNESIUM SERPL-MCNC: 1.9 MG/DL (ref 1.6–2.4)
MCH RBC QN AUTO: 30.8 PG (ref 26.6–33)
MCHC RBC AUTO-ENTMCNC: 33.4 G/DL (ref 31.5–35.7)
MCV RBC AUTO: 92 FL (ref 79–97)
MONOCYTES # BLD AUTO: 0.6 10*3/MM3 (ref 0.1–0.9)
MONOCYTES NFR BLD AUTO: 12.2 % (ref 5–12)
NEUTROPHILS NFR BLD AUTO: 3.36 10*3/MM3 (ref 1.7–7)
NEUTROPHILS NFR BLD AUTO: 68.7 % (ref 42.7–76)
NRBC BLD AUTO-RTO: 0 /100 WBC (ref 0–0.2)
PLATELET # BLD AUTO: 153 10*3/MM3 (ref 140–450)
PMV BLD AUTO: 10.7 FL (ref 6–12)
POTASSIUM SERPL-SCNC: 3.2 MMOL/L (ref 3.5–5.2)
RBC # BLD AUTO: 3.51 10*6/MM3 (ref 3.77–5.28)
SODIUM SERPL-SCNC: 144 MMOL/L (ref 136–145)
TRIGL SERPL-MCNC: 256 MG/DL (ref 0–150)
TROPONIN T DELTA: 15 NG/L
TROPONIN T SERPL HS-MCNC: 236 NG/L
VLDLC SERPL-MCNC: 44 MG/DL (ref 5–40)
WBC NRBC COR # BLD: 4.9 10*3/MM3 (ref 3.4–10.8)

## 2023-09-26 PROCEDURE — G0378 HOSPITAL OBSERVATION PER HR: HCPCS

## 2023-09-26 PROCEDURE — 93306 TTE W/DOPPLER COMPLETE: CPT | Performed by: INTERNAL MEDICINE

## 2023-09-26 PROCEDURE — 70450 CT HEAD/BRAIN W/O DYE: CPT

## 2023-09-26 PROCEDURE — 84484 ASSAY OF TROPONIN QUANT: CPT | Performed by: NURSE PRACTITIONER

## 2023-09-26 PROCEDURE — 80061 LIPID PANEL: CPT | Performed by: NURSE PRACTITIONER

## 2023-09-26 PROCEDURE — 80048 BASIC METABOLIC PNL TOTAL CA: CPT | Performed by: NURSE PRACTITIONER

## 2023-09-26 PROCEDURE — 25010000002 FUROSEMIDE PER 20 MG: Performed by: STUDENT IN AN ORGANIZED HEALTH CARE EDUCATION/TRAINING PROGRAM

## 2023-09-26 PROCEDURE — 83036 HEMOGLOBIN GLYCOSYLATED A1C: CPT | Performed by: NURSE PRACTITIONER

## 2023-09-26 PROCEDURE — 25010000002 HYDRALAZINE PER 20 MG: Performed by: NURSE PRACTITIONER

## 2023-09-26 PROCEDURE — 93306 TTE W/DOPPLER COMPLETE: CPT

## 2023-09-26 PROCEDURE — 85025 COMPLETE CBC W/AUTO DIFF WBC: CPT | Performed by: NURSE PRACTITIONER

## 2023-09-26 PROCEDURE — 99222 1ST HOSP IP/OBS MODERATE 55: CPT | Performed by: INTERNAL MEDICINE

## 2023-09-26 PROCEDURE — 84484 ASSAY OF TROPONIN QUANT: CPT | Performed by: EMERGENCY MEDICINE

## 2023-09-26 PROCEDURE — 71045 X-RAY EXAM CHEST 1 VIEW: CPT

## 2023-09-26 PROCEDURE — 83735 ASSAY OF MAGNESIUM: CPT | Performed by: NURSE PRACTITIONER

## 2023-09-26 RX ORDER — DIAZEPAM 2 MG/1
2 TABLET ORAL 2 TIMES DAILY PRN
Status: DISCONTINUED | OUTPATIENT
Start: 2023-09-26 | End: 2023-09-28 | Stop reason: HOSPADM

## 2023-09-26 RX ORDER — HYDROCODONE BITARTRATE AND ACETAMINOPHEN 7.5; 325 MG/1; MG/1
1 TABLET ORAL EVERY 4 HOURS PRN
Status: DISCONTINUED | OUTPATIENT
Start: 2023-09-26 | End: 2023-09-28 | Stop reason: HOSPADM

## 2023-09-26 RX ORDER — HYDRALAZINE HYDROCHLORIDE 20 MG/ML
10 INJECTION INTRAMUSCULAR; INTRAVENOUS EVERY 6 HOURS PRN
Status: DISCONTINUED | OUTPATIENT
Start: 2023-09-26 | End: 2023-09-28 | Stop reason: HOSPADM

## 2023-09-26 RX ORDER — CYCLOBENZAPRINE HCL 10 MG
5 TABLET ORAL 3 TIMES DAILY PRN
Status: DISCONTINUED | OUTPATIENT
Start: 2023-09-26 | End: 2023-09-28 | Stop reason: HOSPADM

## 2023-09-26 RX ORDER — HYDRALAZINE HYDROCHLORIDE 25 MG/1
100 TABLET, FILM COATED ORAL EVERY 8 HOURS SCHEDULED
Status: DISCONTINUED | OUTPATIENT
Start: 2023-09-26 | End: 2023-09-28 | Stop reason: HOSPADM

## 2023-09-26 RX ORDER — AMOXICILLIN 250 MG
2 CAPSULE ORAL 2 TIMES DAILY
Status: DISCONTINUED | OUTPATIENT
Start: 2023-09-26 | End: 2023-09-27

## 2023-09-26 RX ORDER — SODIUM CHLORIDE 0.9 % (FLUSH) 0.9 %
10 SYRINGE (ML) INJECTION AS NEEDED
Status: DISCONTINUED | OUTPATIENT
Start: 2023-09-26 | End: 2023-09-28 | Stop reason: HOSPADM

## 2023-09-26 RX ORDER — ALLOPURINOL 100 MG/1
100 TABLET ORAL DAILY
Status: DISCONTINUED | OUTPATIENT
Start: 2023-09-26 | End: 2023-09-28 | Stop reason: HOSPADM

## 2023-09-26 RX ORDER — SODIUM CHLORIDE 0.9 % (FLUSH) 0.9 %
10 SYRINGE (ML) INJECTION EVERY 12 HOURS SCHEDULED
Status: DISCONTINUED | OUTPATIENT
Start: 2023-09-26 | End: 2023-09-28 | Stop reason: HOSPADM

## 2023-09-26 RX ORDER — POTASSIUM CHLORIDE 750 MG/1
40 CAPSULE, EXTENDED RELEASE ORAL EVERY 4 HOURS
Status: COMPLETED | OUTPATIENT
Start: 2023-09-26 | End: 2023-09-26

## 2023-09-26 RX ORDER — FUROSEMIDE 20 MG/1
20 TABLET ORAL DAILY
Status: DISCONTINUED | OUTPATIENT
Start: 2023-09-26 | End: 2023-09-28 | Stop reason: HOSPADM

## 2023-09-26 RX ORDER — MIRTAZAPINE 15 MG/1
30 TABLET, FILM COATED ORAL NIGHTLY
Status: DISCONTINUED | OUTPATIENT
Start: 2023-09-26 | End: 2023-09-28 | Stop reason: HOSPADM

## 2023-09-26 RX ORDER — SODIUM CHLORIDE 9 MG/ML
40 INJECTION, SOLUTION INTRAVENOUS AS NEEDED
Status: DISCONTINUED | OUTPATIENT
Start: 2023-09-26 | End: 2023-09-28 | Stop reason: HOSPADM

## 2023-09-26 RX ORDER — ACETAMINOPHEN 500 MG
1000 TABLET ORAL ONCE
Status: COMPLETED | OUTPATIENT
Start: 2023-09-26 | End: 2023-09-26

## 2023-09-26 RX ORDER — POLYETHYLENE GLYCOL 3350 17 G/17G
17 POWDER, FOR SOLUTION ORAL DAILY PRN
Status: DISCONTINUED | OUTPATIENT
Start: 2023-09-26 | End: 2023-09-27

## 2023-09-26 RX ORDER — BISACODYL 5 MG/1
5 TABLET, DELAYED RELEASE ORAL DAILY PRN
Status: DISCONTINUED | OUTPATIENT
Start: 2023-09-26 | End: 2023-09-27

## 2023-09-26 RX ORDER — PANTOPRAZOLE SODIUM 40 MG/1
40 TABLET, DELAYED RELEASE ORAL
Status: DISCONTINUED | OUTPATIENT
Start: 2023-09-26 | End: 2023-09-28 | Stop reason: HOSPADM

## 2023-09-26 RX ORDER — FUROSEMIDE 10 MG/ML
20 INJECTION INTRAMUSCULAR; INTRAVENOUS ONCE
Status: COMPLETED | OUTPATIENT
Start: 2023-09-26 | End: 2023-09-26

## 2023-09-26 RX ORDER — BISACODYL 10 MG
10 SUPPOSITORY, RECTAL RECTAL DAILY PRN
Status: DISCONTINUED | OUTPATIENT
Start: 2023-09-26 | End: 2023-09-27

## 2023-09-26 RX ORDER — ASPIRIN 81 MG/1
324 TABLET, CHEWABLE ORAL ONCE
Status: COMPLETED | OUTPATIENT
Start: 2023-09-26 | End: 2023-09-26

## 2023-09-26 RX ADMIN — ASPIRIN 81 MG CHEWABLE TABLET 324 MG: 81 TABLET CHEWABLE at 03:01

## 2023-09-26 RX ADMIN — CYCLOBENZAPRINE 5 MG: 10 TABLET, FILM COATED ORAL at 18:44

## 2023-09-26 RX ADMIN — POTASSIUM CHLORIDE 40 MEQ: 750 CAPSULE, EXTENDED RELEASE ORAL at 09:11

## 2023-09-26 RX ADMIN — HYDRALAZINE HYDROCHLORIDE 100 MG: 25 TABLET, FILM COATED ORAL at 13:43

## 2023-09-26 RX ADMIN — MIRTAZAPINE 30 MG: 15 TABLET, FILM COATED ORAL at 21:21

## 2023-09-26 RX ADMIN — Medication 10 ML: at 09:14

## 2023-09-26 RX ADMIN — Medication 10 ML: at 21:38

## 2023-09-26 RX ADMIN — ALLOPURINOL 100 MG: 100 TABLET ORAL at 09:13

## 2023-09-26 RX ADMIN — HYDRALAZINE HYDROCHLORIDE 100 MG: 25 TABLET, FILM COATED ORAL at 21:22

## 2023-09-26 RX ADMIN — POTASSIUM CHLORIDE 40 MEQ: 750 CAPSULE, EXTENDED RELEASE ORAL at 13:43

## 2023-09-26 RX ADMIN — ACETAMINOPHEN 1000 MG: 500 TABLET ORAL at 03:44

## 2023-09-26 RX ADMIN — SENNOSIDES AND DOCUSATE SODIUM 2 TABLET: 50; 8.6 TABLET ORAL at 09:14

## 2023-09-26 RX ADMIN — SENNOSIDES AND DOCUSATE SODIUM 2 TABLET: 50; 8.6 TABLET ORAL at 21:21

## 2023-09-26 RX ADMIN — FUROSEMIDE 20 MG: 20 TABLET ORAL at 09:13

## 2023-09-26 RX ADMIN — HYDRALAZINE HYDROCHLORIDE 100 MG: 25 TABLET, FILM COATED ORAL at 05:24

## 2023-09-26 RX ADMIN — PANTOPRAZOLE SODIUM 40 MG: 40 TABLET, DELAYED RELEASE ORAL at 05:24

## 2023-09-26 RX ADMIN — HYDRALAZINE HYDROCHLORIDE 10 MG: 20 INJECTION INTRAMUSCULAR; INTRAVENOUS at 19:04

## 2023-09-26 RX ADMIN — FUROSEMIDE 20 MG: 10 INJECTION, SOLUTION INTRAMUSCULAR; INTRAVENOUS at 18:44

## 2023-09-26 NOTE — PLAN OF CARE
Problem: Adult Inpatient Plan of Care  Goal: Plan of Care Review  Outcome: Ongoing, Progressing  Flowsheets (Taken 9/26/2023 0639)  Progress: no change  Plan of Care Reviewed With: patient  Outcome Evaluation: Pt currntly resting in bed with dughter at bedside. Pt remaind hypertensivre with reent b/p of 171/57,  other VSS. Pt on r/a >92% O2. Pt is A&Ox's 4. Pt reports pain in wrist pain lvl of 3, no medication given at this time. Cardiology consult placed for pt.     Problem: Adult Inpatient Plan of Care  Goal: Absence of Hospital-Acquired Illness or Injury  Intervention: Prevent Skin Injury  Recent Flowsheet Documentation  Taken 9/26/2023 0544 by Lynda Grimes RN  Body Position: position changed independently     Problem: Adult Inpatient Plan of Care  Goal: Optimal Comfort and Wellbeing  Outcome: Ongoing, Progressing  Intervention: Provide Person-Centered Care  Recent Flowsheet Documentation  Taken 9/26/2023 0544 by Lynda Grimes, RN  Trust Relationship/Rapport:   care explained   questions answered   Goal Outcome Evaluation:  Plan of Care Reviewed With: patient        Progress: no change  Outcome Evaluation: Pt currntly resting in bed with dughter at bedside. Pt remaind hypertensivre with reent b/p of 171/57,  other VSS. Pt on r/a >92% O2. Pt is A&Ox's 4. Pt reports pain in wrist pain lvl of 3, no medication given at this time. Cardiology consult placed for pt.

## 2023-09-26 NOTE — PROGRESS NOTES
[facility] Medicine Services  PROGRESS NOTE    Patient Name: Vesta Landry  : 1945  MRN: [id number]    Date of Admission: 2023  Primary Care Physician: Bossman Pedraza MD    Subjective   Subjective     CC:  Headache      HPI:  Evaluated patient this morning. Patient feels well, headache has resolved. No chest pain or shortness of breath. Admits to recent lower extremity edema. Discussed patient's care with daughter and 2 sons in room.      Objective   Objective     Vital Signs:   Temp:  [97 °F (36.1 °C)-97.7 °F (36.5 °C)] 97 °F (36.1 °C)  Heart Rate:  [50-65] 65  Resp:  [16] 16  BP: (159-231)/(64-93) 181/72     Physical Exam:  Constitutional: No acute distress, awake, alert  HENT: NCAT, mucous membranes moist  Respiratory: Breath sounds diminished at bases.  Cardiovascular: RRR, no murmurs, rubs, or gallops, +JVD  Gastrointestinal: Positive bowel sounds, soft, nontender, nondistended  Musculoskeletal: nonpitting lower extremity edema bilaterally.   Psychiatric: Appropriate affect, cooperative  Neurologic: Oriented x 3, no focal deficits.   Skin: No rashes      Results Reviewed:  LAB RESULTS:      Lab 23  0619 234   WBC 4.90 6.75   HEMOGLOBIN 10.8* 12.0   HEMATOCRIT 32.3* 37.2   PLATELETS 153 174   NEUTROS ABS 3.36 4.59   IMMATURE GRANS (ABS) 0.01 0.02   LYMPHS ABS 0.78 1.22   MONOS ABS 0.60 0.75   EOS ABS 0.12 0.15   MCV 92.0 94.9         Lab 23  0619 234   SODIUM 144 141   POTASSIUM 3.2* 3.2*   CHLORIDE 107 104   CO2 24.0 24.0   ANION GAP 13.0 13.0   BUN 15 14   CREATININE 0.97 1.17*   EGFR 59.2* 47.3*   GLUCOSE 95 105*   CALCIUM 9.0 9.0   MAGNESIUM 1.9  --    HEMOGLOBIN A1C 4.90  --          Lab 23  2244   TOTAL PROTEIN 6.8   ALBUMIN 3.8   GLOBULIN 3.0   ALT (SGPT) 13   AST (SGOT) 16   BILIRUBIN 0.5   ALK PHOS 96         Lab 23  0619 23  0137 23  0283   PROBNP  --   --  3,990.0*   HSTROP T 236* 270* 255*          Lab 09/26/23  0619   CHOLESTEROL 175   LDL CHOL 104*   HDL CHOL 27*   TRIGLYCERIDES 256*             Brief Urine Lab Results  (Last result in the past 365 days)        Color   Clarity   Blood   Leuk Est   Nitrite   Protein   CREAT   Urine HCG        03/02/23 1617 Yellow   Clear   Negative   Trace   Negative   Negative                   Microbiology Results Abnormal       None            CT Head Without Contrast    Result Date: 9/26/2023  CT HEAD WO CONTRAST Date of Exam: 9/26/2023 4:24 AM EDT Indication: elevated BP, headache, dizziness. Comparison: 2/25/2023. Technique: Axial CT images were obtained of the head without contrast administration.  Automated exposure control and iterative construction methods were used. Findings: There is no evidence of hemorrhage. There is no mass effect or midline shift. Stable periventricular white matter changes are present like related to chronic microvascular ischemic change. There is no extracerebral collection. Ventricles are normal in size and configuration for patient's stated age.  Posterior fossa is within normal limits. Calvarium and skull base appear intact.   Visualized sinuses show no air fluid levels. Visualized orbits are unremarkable.     Impression: Impression: No acute intracranial process identified. Electronically Signed: Lisbeth Salinas MD  9/26/2023 4:31 AM EDT  Workstation ID: [id number]    XR Chest 1 View    Result Date: 9/26/2023  XR CHEST 1 VW Date of Exam: 9/26/2023 1:54 PM EDT Indication: rule out pleural effusions Comparison: 3/2/2023 Findings: There is a right subclavian Rzizto-t-Mdvj catheter which appears unchanged. There are low lung volumes. There is slight blunting of the CP angles suggesting small amounts of pleural fluid. Previously seen left effusion has decreased in size. There is mild central pulmonary vascular congestion without definite findings of CHF/pulmonary edema. Heart size appears within normal limits. No pneumothorax is identified.  Aortic vascular calcifications present.     Impression: Impression: 1. Slight blunting of the CP angles bilaterally suggesting small amounts of pleural fluid. 2. Question mild central pulmonary vascular congestion without definite findings of CHF. Electronically Signed: Panda Terrazas MD  9/26/2023 2:31 PM EDT  Workstation ID: [id number]     Results for orders placed during the hospital encounter of 02/23/23    Adult Transthoracic Echo Limited W/ Cont if Necessary Per Protocol    Interpretation Summary    Left ventricular wall thickness is consistent with mild concentric hypertrophy.    Left ventricular diastolic function is consistent with age.    Estimated right ventricular systolic pressure from tricuspid regurgitation is markedly elevated (>55 mmHg).    Moderate to severe pulmonary hypertension is present.      Current medications:  Scheduled Meds:allopurinol, 100 mg, Oral, Daily  furosemide, 20 mg, Intravenous, Once  furosemide, 20 mg, Oral, Daily  hydrALAZINE, 100 mg, Oral, Q8H  losartan, 25 mg, Oral, Q24H  mirtazapine, 30 mg, Oral, Nightly  pantoprazole, 40 mg, Oral, Q AM  senna-docusate sodium, 2 tablet, Oral, BID  sodium chloride, 10 mL, Intravenous, Q12H      Continuous Infusions:   PRN Meds:.  senna-docusate sodium **AND** polyethylene glycol **AND** bisacodyl **AND** bisacodyl    Calcium Replacement - Follow Nurse / BPA Driven Protocol    cyclobenzaprine    diazePAM    hydrALAZINE    HYDROcodone-acetaminophen    Magnesium Standard Dose Replacement - Follow Nurse / BPA Driven Protocol    Phosphorus Replacement - Follow Nurse / BPA Driven Protocol    Potassium Replacement - Follow Nurse / BPA Driven Protocol    sodium chloride    sodium chloride    Assessment & Plan   Assessment & Plan     Active Hospital Problems    Diagnosis  POA    **NSTEMI (non-ST elevated myocardial infarction) [I21.4]  Yes    Hypertensive urgency [I16.0]  Yes    Hypokalemia [E87.6]  Yes    Elevated serum creatinine [R79.89]  Yes     Squamous cell carcinoma of head and neck [C76.0]  Yes    Elevated troponin [R77.8]  Yes    HLD (hyperlipidemia) [E78.5]  Yes    Bradycardia [R00.1]  Yes    Squamous cell carcinoma of left tonsil [C09.9]  Yes    Chronic diastolic heart failure [I50.32]  Yes      Resolved Hospital Problems   No resolved problems to display.        Brief Hospital Course to date:  Vesta Landry is a 78 y.o. female with past medical history significant for essential hypertension, HFpEF, hyperlipidemia, squamous cell carcinoma of head and neck, squamous cell carcinoma of left tonsil status postchemotherapy and radiotherapy presents to the ED accompanied by daughter due to headache. Found to have elevated troponin. Cardiology consulted.     This patient's problems and plans were partially entered by my partner and updated as appropriate by me 09/26/23.    Hypertensive urgency  - BP on arrival 231/93  - CT head nonacute.  - Was given oral losartan and hydralazine in the ED.  - Continue losartan, hydralazine. PRN hydralazine for SBP>180. Cardiology following. Echo pending.      Elevated troponin  - High sensitive troponin 255, 270 with delta 15  - proBNP 3990  - EKG with no acute ST changes  - Cardiology evaluated, deferred ischemic evaluation due to patient being asymptomatic and without EKG changes. Echo pending.      Acute on chronic HFpEF  - Last echo 2/24/2023 left ventricular diastolic function consistent with age. Mild concentric hypertrophy, moderate to severe pulmonary hypertension, RVSP greater than 55 mmHg  - CXR with mild pulmonary congestion.   - Will give one dose IV lasix 20 mg today. Then continue oral lasix. Echo pending. Continue strict I&Os, daily weights.     Hypokalemia  - Replace per protocol.     GERD  - Continue PPI.     Squamous cell carcinoma of head and neck  - s/p radiation treatment  - Previously on hospice care however was discontinue since she is doing significantly better  - Currently taking things by  mouth  - Follows with Dr. Lora     Expected Discharge Location and Transportation: D pending evaluation.   Expected Discharge   Expected Discharge Date: 9/28/2023; Expected Discharge Time:      DVT prophylaxis:  Mechanical DVT prophylaxis orders are present.          CODE STATUS:   Code Status and Medical Interventions:   Ordered at: 09/26/23 0350     Medical Intervention Limits:    NO intubation (DNI)     Level Of Support Discussed With:    Patient     Code Status (Patient has no pulse and is not breathing):    No CPR (Do Not Attempt to Resuscitate)     Medical Interventions (Patient has pulse or is breathing):    Limited Support       Theresa Valentino, DO  09/26/23

## 2023-09-26 NOTE — CASE MANAGEMENT/SOCIAL WORK
Discharge Planning Assessment  [facility]     Patient Name: Vesta Landry  MRN: [id number]  Today's Date: 9/26/2023    Admit Date: 9/25/2023    Plan: Home with spouse   Discharge Needs Assessment       Row Name 09/26/23 0820       Living Environment    People in Home spouse    Name(s) of People in Home Karrie (spouse) [phone]    Current Living Arrangements home    Potentially Unsafe Housing Conditions none    Primary Care Provided by self    Provides Primary Care For no one    Family Caregiver if Needed child(lois), adult;spouse    Able to Return to Prior Arrangements yes       Resource/Environmental Concerns    Resource/Environmental Concerns none    Transportation Concerns none       Food Insecurity    Within the past 12 months, you worried that your food would run out before you got the money to buy more. Never true    Within the past 12 months, the food you bought just didn't last and you didn't have money to get more. Never true       Transition Planning    Patient/Family Anticipates Transition to home with family    Patient/Family Anticipated Services at Transition none    Transportation Anticipated family or friend will provide       Discharge Needs Assessment    Readmission Within the Last 30 Days no previous admission in last 30 days    Equipment Currently Used at Home none    Concerns to be Addressed denies needs/concerns at this time    Anticipated Changes Related to Illness none    Equipment Needed After Discharge none                   Discharge Plan       Row Name 09/26/23 0822       Plan    Plan Home with spouse    Patient/Family in Agreement with Plan yes    Plan Comments Spoke with patient at bedside. Lives with Karrie (spouse) [phone] in [facility]. Is independent with ADL's. No problems affording [employer] Medicare or medications. Has a rolling walker at home. Has advanced directives. PCP is Bossman Pedraza MD. Plan is home with spouse. CM will continue to follow.    Final Discharge  Disposition Code 01 - home or self-care                  Continued Care and Services - Admitted Since 9/25/2023    Coordination has not been started for this encounter.          Demographic Summary       Row Name 09/26/23 0820       General Information    Admission Type inpatient    Arrived From emergency department    Referral Source admission list    Reason for Consult discharge planning    Preferred Language English       Contact Information    Permission Granted to Share Info With     Contact Information Obtained for                    Functional Status       Row Name 09/26/23 0820       Functional Status    Usual Activity Tolerance moderate    Current Activity Tolerance moderate       Functional Status, IADL    Medications independent    Meal Preparation independent    Housekeeping independent    Laundry independent    Shopping independent       Mental Status    General Appearance WDL WDL       Mental Status Summary    Recent Changes in Mental Status/Cognitive Functioning no changes       Employment/    Employment Status retired                   Psychosocial    No documentation.                  Abuse/Neglect    No documentation.                  Legal    No documentation.                  Substance Abuse    No documentation.                  Patient Forms    No documentation.                     Norris Herrera RN

## 2023-09-26 NOTE — ED PROVIDER NOTES
Subjective   History of Present Illness  Patient presents for evaluation of elevated blood pressures and an episode of headache that occurred earlier today.  Patient has a known history of hypertension.  She has previously been on a 4 drug regimen including metoprolol, hydralazine, Imdur, losartan.  She reports that approximately 3 weeks ago she had her medications adjusted where she is now only on 2 medications because she was having episodes of low blood pressure.  She has noticed that over the past week or 2 she has had progressive increases in her daily blood pressures but it has never been this high before.  She had a headache prior to arrival in the emergency room but that is resolved.  He has no numbness, weakness, headache, visual changes, chest pain at this time.    History provided by:  Patient    Review of Systems    Past Medical History:   Diagnosis Date    Arthritis     Cancer     squamous cell - tonsils    Cancer of tonsil     metastatic squamous cell - left tonsil    CHF (congestive heart failure)     Deep vein thrombosis     bilateral- daughter states on 2/17/23 that she doesn't remember patient having this    Dementia     Dysphagia     GERD (gastroesophageal reflux disease)     Hyperlipidemia     Hypertension     Impaired mobility     PONV (postoperative nausea and vomiting)     SOB (shortness of breath)     following chemo infusion on day of infusion last week (week of 1/29-2/4/23) per pt's daughter    Vitamin D deficiency     Wears dentures     uppers - instructed not to use adhesive DOS       Allergies   Allergen Reactions    Trazodone Confusion       Past Surgical History:   Procedure Laterality Date    APPENDECTOMY      CATARACT EXTRACTION      CHOLECYSTECTOMY      COLONOSCOPY      Approx 2009    ENDOSCOPY W/ PEG TUBE PLACEMENT N/A 2/20/2023    Procedure: ESOPHAGOGASTRODUODENOSCOPY WITH PERCUTANEOUS ENDOSCOPIC GASTROSTOMY TUBE INSERTION;  Surgeon: Brigido Guerra MD;  Location: [facility]  ENDOSCOPY;  Service: Gastroenterology;  Laterality: N/A;    HYSTERECTOMY      OOPHORECTOMY Bilateral     PORTACATH PLACEMENT Right 2023    Procedure: INSERTION OF PORTACATH;  Surgeon: Brigido Guerra MD;  Location: [facility];  Service: General;  Laterality: Right;    TUBAL ABDOMINAL LIGATION         Family History   Problem Relation Age of Onset    Heart failure Mother     Hyperlipidemia Mother     Hypertension Mother     Cancer Father     Cancer Sister     Stroke Sister     Breast cancer Maternal Aunt     Breast cancer Paternal Aunt     Ovarian cancer Neg Hx        Social History     Socioeconomic History    Marital status:    Tobacco Use    Smoking status: Former     Packs/day: 0.15     Years: 10.00     Pack years: 1.50     Types: Cigarettes     Quit date: 1987     Years since quittin.7    Smokeless tobacco: Never   Vaping Use    Vaping Use: Never used   Substance and Sexual Activity    Alcohol use: No    Drug use: No    Sexual activity: Defer           Objective   Physical Exam  Constitutional:       General: She is not in acute distress.  HENT:      Head: Normocephalic and atraumatic.   Eyes:      Conjunctiva/sclera: Conjunctivae normal.      Pupils: Pupils are equal, round, and reactive to light.   Cardiovascular:      Rate and Rhythm: Normal rate and regular rhythm.      Pulses: Normal pulses.      Heart sounds: No murmur heard.    No gallop.   Pulmonary:      Effort: Pulmonary effort is normal. No respiratory distress.   Abdominal:      General: Abdomen is flat. There is no distension.      Tenderness: There is no abdominal tenderness.   Musculoskeletal:         General: No swelling or deformity. Normal range of motion.   Skin:     General: Skin is warm and dry.      Capillary Refill: Capillary refill takes less than 2 seconds.   Neurological:      General: No focal deficit present.      Mental Status: She is alert and oriented to person, place, and time.      Comments: GCS 15.   Cranial Nerves II-XII intact without deficit.  Strength 5/5 in the bilateral upper extremities.  Strength 5/5 in the bilateral lower extremities.  Sensation to light touch intact throughout.  Cerebellar function intact via finger-nose-finger.     Psychiatric:         Mood and Affect: Mood normal.         Behavior: Behavior normal.       Procedures           ED Course                      HEART Score: 7                      Medical Decision Making  Differential diagnosis includes hypertensive urgency or emergency, acute renal or cardiac dysfunction.  Patient does not have signs of acute ischemic or hemorrhagic stroke at this time.  Lab studies were ordered to evaluate for endorgan damage.  Oral antihypertensive medications were administered and patient was reassessed.    Patient's blood pressures are downtrending at an appropriate rate.  Given her significant elevation in troponin this is concerning for a episode of hypertensive emergency.  Do not believe she demonstrates characteristics of a plaque rupture resulting in non-ST segment elevation MI and patient has had prior episodes of GI bleeding when anticoagulated on dual platelet therapy.  For these reasons heparin was not initiated but patient was given dose of aspirin in the emergency department.    Hospital medicine was consulted who is agreeable to this plan    Problems Addressed:  Hypertensive emergency: complicated acute illness or injury that poses a threat to life or bodily functions  NSTEMI (non-ST elevated myocardial infarction): complicated acute illness or injury    Amount and/or Complexity of Data Reviewed  External Data Reviewed: notes.     Details: Incision notes from 9/7/2023 discussing patient's chronic hypertension.  Labs: ordered.     Details: Oratory work-up independently interpreted by myself is notable for a significantly elevated initial troponin with positive delta, when compared with prior lab values this is above her baseline.  Radiology:  ordered and independent interpretation performed.     Details: CT scan of the brain independently interpreted by myself demonstrates no acute abnormalities  ECG/medicine tests: ordered.    Risk  OTC drugs.  Prescription drug management.  Decision regarding hospitalization.        Final diagnoses:   Hypertensive emergency   NSTEMI (non-ST elevated myocardial infarction)       ED Disposition  ED Disposition       ED Disposition   Decision to Admit    Condition   --    Comment   Level of Care: Telemetry [5]   Diagnosis: NSTEMI (non-ST elevated myocardial infarction) [778444]   Admitting Physician: ANNA MARIE MEDLEY III [[id number]]   Attending Physician: ANNA MARIE MEDLEY III [[id number]]   Isolate for COVID?: No [0]   Bed Request Comments: tele inpt   Certification: I Certify That Inpatient Hospital Services Are Medically Necessary For Greater Than 2 Midnights               Recent Results (from the past 24 hour(s))   ECG 12 Lead Other; HTN    Collection Time: 09/25/23  9:11 PM   Result Value Ref Range    QT Interval 494 ms    QTC Interval 446 ms   Comprehensive Metabolic Panel    Collection Time: 09/25/23 10:44 PM    Specimen: Blood   Result Value Ref Range    Glucose 105 (H) 65 - 99 mg/dL    BUN 14 8 - 23 mg/dL    Creatinine 1.17 (H) 0.57 - 1.00 mg/dL    Sodium 141 136 - 145 mmol/L    Potassium 3.2 (L) 3.5 - 5.2 mmol/L    Chloride 104 98 - 107 mmol/L    CO2 24.0 22.0 - 29.0 mmol/L    Calcium 9.0 8.6 - 10.5 mg/dL    Total Protein 6.8 6.0 - 8.5 g/dL    Albumin 3.8 3.5 - 5.2 g/dL    ALT (SGPT) 13 1 - 33 U/L    AST (SGOT) 16 1 - 32 U/L    Alkaline Phosphatase 96 39 - 117 U/L    Total Bilirubin 0.5 0.0 - 1.2 mg/dL    Globulin 3.0 gm/dL    A/G Ratio 1.3 g/dL    BUN/Creatinine Ratio 12.0 7.0 - 25.0    Anion Gap 13.0 5.0 - 15.0 mmol/L    eGFR 47.3 (L) >60.0 mL/min/1.73   Single High Sensitivity Troponin T    Collection Time: 09/25/23 10:44 PM    Specimen: Blood   Result Value Ref Range    HS Troponin T 255 (C) <10 ng/L    CBC Auto Differential    Collection Time: 09/25/23 10:44 PM    Specimen: Blood   Result Value Ref Range    WBC 6.75 3.40 - 10.80 10*3/mm3    RBC 3.92 3.77 - 5.28 10*6/mm3    Hemoglobin 12.0 12.0 - 15.9 g/dL    Hematocrit 37.2 34.0 - 46.6 %    MCV 94.9 79.0 - 97.0 fL    MCH 30.6 26.6 - 33.0 pg    MCHC 32.3 31.5 - 35.7 g/dL    RDW 14.5 12.3 - 15.4 %    RDW-SD 49.9 37.0 - 54.0 fl    MPV 10.3 6.0 - 12.0 fL    Platelets 174 140 - 450 10*3/mm3    Neutrophil % 68.0 42.7 - 76.0 %    Lymphocyte % 18.1 (L) 19.6 - 45.3 %    Monocyte % 11.1 5.0 - 12.0 %    Eosinophil % 2.2 0.3 - 6.2 %    Basophil % 0.3 0.0 - 1.5 %    Immature Grans % 0.3 0.0 - 0.5 %    Neutrophils, Absolute 4.59 1.70 - 7.00 10*3/mm3    Lymphocytes, Absolute 1.22 0.70 - 3.10 10*3/mm3    Monocytes, Absolute 0.75 0.10 - 0.90 10*3/mm3    Eosinophils, Absolute 0.15 0.00 - 0.40 10*3/mm3    Basophils, Absolute 0.02 0.00 - 0.20 10*3/mm3    Immature Grans, Absolute 0.02 0.00 - 0.05 10*3/mm3    nRBC 0.0 0.0 - 0.2 /100 WBC   proBNP    Collection Time: 09/25/23 10:44 PM    Specimen: Blood   Result Value Ref Range    proBNP 3,990.0 (H) 0.0 - 1,800.0 pg/mL   High Sensitivity Troponin T 2Hr    Collection Time: 09/26/23  1:37 AM    Specimen: Blood   Result Value Ref Range    HS Troponin T 270 (C) <10 ng/L    Troponin T Delta 15 (C) >=-4 - <+4 ng/L     Note: In addition to lab results from this visit, the labs listed above may include labs taken at another facility or during a different encounter within the last 24 hours. Please correlate lab times with ED admission and discharge times for further clarification of the services performed during this visit.    CT Head Without Contrast   Final Result   Impression:   No acute intracranial process identified.            Electronically Signed: Lisbeth Salinas MD     9/26/2023 4:31 AM EDT     Workstation ID: [id number]        Vitals:    09/26/23 0218 09/26/23 0225 09/26/23 0230 09/26/23 0505   BP: (!) 192/73  (!) 189/73 180/70   BP  Location:    Right arm   Patient Position:    Lying   Pulse: 57 52 52 50   Resp:    16   Temp:    97 °F (36.1 °C)   TempSrc:    Oral   SpO2: 99% 98% 97% 98%   Weight:    62.6 kg (138 lb)   Height:         Medications   losartan (COZAAR) tablet 25 mg (25 mg Oral Given 9/25/23 2232)   allopurinol (ZYLOPRIM) tablet 100 mg (has no administration in time range)   cyclobenzaprine (FLEXERIL) tablet 5 mg (has no administration in time range)   diazePAM (VALIUM) tablet 2 mg (has no administration in time range)   furosemide (LASIX) tablet 20 mg (has no administration in time range)   hydrALAZINE (APRESOLINE) tablet 100 mg (100 mg Oral Given 9/26/23 0524)   HYDROcodone-acetaminophen (NORCO) 7.5-325 MG per tablet 1 tablet (has no administration in time range)   mirtazapine (REMERON) tablet 30 mg (has no administration in time range)   pantoprazole (PROTONIX) EC tablet 40 mg (40 mg Oral Given 9/26/23 0524)   sodium chloride 0.9 % flush 10 mL (has no administration in time range)   sodium chloride 0.9 % flush 10 mL (has no administration in time range)   sodium chloride 0.9 % infusion 40 mL (has no administration in time range)   sennosides-docusate (PERICOLACE) 8.6-50 MG per tablet 2 tablet (has no administration in time range)     And   polyethylene glycol (MIRALAX) packet 17 g (has no administration in time range)     And   bisacodyl (DULCOLAX) EC tablet 5 mg (has no administration in time range)     And   bisacodyl (DULCOLAX) suppository 10 mg (has no administration in time range)   Potassium Replacement - Follow Nurse / BPA Driven Protocol (has no administration in time range)   Magnesium Standard Dose Replacement - Follow Nurse / BPA Driven Protocol (has no administration in time range)   Phosphorus Replacement - Follow Nurse / BPA Driven Protocol (has no administration in time range)   Calcium Replacement - Follow Nurse / BPA Driven Protocol (has no administration in time range)   hydrALAZINE (APRESOLINE) injection 10 mg  (has no administration in time range)   potassium chloride (MICRO-K/KLOR-CON) CR capsule (has no administration in time range)   hydrALAZINE (APRESOLINE) tablet 100 mg (100 mg Oral Given 9/25/23 2232)   aspirin chewable tablet 324 mg (324 mg Oral Given 9/26/23 0301)   acetaminophen (TYLENOL) tablet 1,000 mg (1,000 mg Oral Given 9/26/23 0344)     ECG/EMG Results (last 24 hours)       Procedure Component Value Units Date/Time    ECG 12 Lead Other; HTN [[id number]] Collected: 09/25/23 2111     Updated: 09/25/23 2312     QT Interval 494 ms      QTC Interval 446 ms     Narrative:      Test Reason : Other~  Blood Pressure :   */*   mmHG  Vent. Rate :  49 BPM     Atrial Rate :  49 BPM     P-R Int : 168 ms          QRS Dur :  82 ms      QT Int : 494 ms       P-R-T Axes :  13  12  35 degrees     QTc Int : 446 ms    Sinus bradycardia  Left ventricular hypertrophy with repolarization abnormality  Cannot rule out Septal infarct , age undetermined  Abnormal ECG  When compared with ECG of 02-MAR-2023 20:20,  Vent. rate has decreased BY  37 BPM  Minimal criteria for Septal infarct are now present  Nonspecific T wave abnormality now evident in Anterior leads  Confirmed by MD QUINN KYLE ([id number]) on 9/25/2023 11:12:10 PM    Referred By: ARMANDO           Confirmed By: FRANCO QUINN MD          ECG 12 Lead Other; HTN   Final Result   Test Reason : Other~   Blood Pressure :   */*   mmHG   Vent. Rate :  49 BPM     Atrial Rate :  49 BPM      P-R Int : 168 ms          QRS Dur :  82 ms       QT Int : 494 ms       P-R-T Axes :  13  12  35 degrees      QTc Int : 446 ms      Sinus bradycardia   Left ventricular hypertrophy with repolarization abnormality   Cannot rule out Septal infarct , age undetermined   Abnormal ECG   When compared with ECG of 02-MAR-2023 20:20,   Vent. rate has decreased BY  37 BPM   Minimal criteria for Septal infarct are now present   Nonspecific T wave abnormality now evident in Anterior leads   Confirmed by MD KAI,  FRACNO ([id number]) on 9/25/2023 11:12:10 PM      Referred By: ARMANDO           Confirmed By: FRANCO QUINN MD            No follow-up provider specified.       Medication List      No changes were made to your prescriptions during this visit.            Franco Quinn MD  09/26/23 0651       Franco Quinn MD  09/26/23 0651

## 2023-09-26 NOTE — CONSULTS
[facility] at [facility]  CARDIOLOGY CONSULTATION NOTE    Vesta Landry  : 1945  MRN:[id number]  Home Phone:[phone]    Date of Admission:2023  Date of Consultation: 23    PCP: Bossman Pedraza MD    IDENTIFICATION: A 78 y.o. female resident of [place], [place]     Chief Complaint   Patient presents with    Hypertension     PROBLEM LIST:     NSTEMI (non-ST elevated myocardial infarction)    Chronic diastolic heart failure    Squamous cell carcinoma of left tonsil    Bradycardia    Elevated troponin    HLD (hyperlipidemia)    Squamous cell carcinoma of head and neck    Hypertensive urgency    Hypokalemia    Elevated serum creatinine      ALLERGIES:   Allergies   Allergen Reactions    Trazodone Confusion       HOME MEDICINES:   Current Outpatient Medications   Medication Instructions    allopurinol (ZYLOPRIM) 100 mg, Oral, Daily    cyclobenzaprine (FLEXERIL) 5 mg, Oral, As Needed    diazePAM (VALIUM) 2 mg, Oral, 2 Times Daily PRN    furosemide (LASIX) 20 mg, Oral, Daily    hydrALAZINE (APRESOLINE) 100 mg, Oral, Every 8 Hours Scheduled    HYDROcodone-acetaminophen (NORCO) 7.5-325 MG per tablet 1 tablet, Oral, Every 4 Hours PRN    isosorbide dinitrate (ISORDIL) 40 mg, Oral, 3 Times Daily (Nitrates)    metoprolol tartrate (LOPRESSOR) 100 mg, Oral, Every 12 Hours Scheduled    mirtazapine (REMERON) 30 mg, Oral, Nightly    nitroglycerin (NITROSTAT) 0.4 MG SL tablet Place 1 tablet under the tongue Every 5 (Five) Minutes As Needed for Chest Pain. Take no more than 3 doses in 15 minutes.    omeprazole (PRILOSEC) 20 mg, Oral, Daily    ondansetron (ZOFRAN) 8 mg, Oral, Every 8 Hours PRN    PHARMACY MEDS TO BED CONSULT Does not apply, Daily       HPI: Mrs. Landry is an 79 y/o female with HTN, HLD, tonsillar cancer, s/p chemo and radiation, and history of prior NSTEMI treated medically, who is seen in consultation for elevated troponin in setting of hypertensive emergency. Patient  was admitted to [facility] in February of this year with a NSTEMI, troponin max of 138. She was evaluated by Cardiology at that time, and was treated medically due to multiple comorbidities. She developed hematemesis and melena concerning for GI bleed with Plavix and Lovenox treatment at that time, and these were discontinued. Echo 2/24/23 with normal EF, mild LVH, RVSP 56mmHg. She presents to our hospital with intermittent headaches for the past 1-2 weeks and elevated BPs. BP was 231/93 mmHg on arrival and she was given PO Hydralazine and Losartan. Daughter at bedside also reports some speech difficulties yesterday which have resolved. Troponin elevated at 255--270--236. EKG shows SB with no acute STT changes. Cardiology has been asked to see her due to elevated troponin and concern for NSTEMI. She denies any cardiac symptoms, specifically no chest pain, jaw pain, arm pain, and no dyspnea. She is currently back to her baseline, has a mild frontal headache that comes and goes. She has only been taking Metoprolol 100mg BID at home, and was supposed to start Hydralazine 100mg TID, however had not taken it yet.       ROS: All systems have been reviewed and are negative with the exception of those mentioned in the HPI and problem list above.    Surgical History:   Past Surgical History:   Procedure Laterality Date    APPENDECTOMY      CATARACT EXTRACTION      CHOLECYSTECTOMY      COLONOSCOPY      Approx 2009    ENDOSCOPY W/ PEG TUBE PLACEMENT N/A 2/20/2023    Procedure: ESOPHAGOGASTRODUODENOSCOPY WITH PERCUTANEOUS ENDOSCOPIC GASTROSTOMY TUBE INSERTION;  Surgeon: Brigido Guerra MD;  Location: [facility] ENDOSCOPY;  Service: Gastroenterology;  Laterality: N/A;    HYSTERECTOMY  1991    OOPHORECTOMY Bilateral 1991    PORTACATH PLACEMENT Right 02/08/2023    Procedure: INSERTION OF PORTACATH;  Surgeon: Brigido Guerra MD;  Location: [facility] OR;  Service: General;  Laterality: Right;    TUBAL ABDOMINAL LIGATION         Social  "History:   Social History     Socioeconomic History    Marital status:    Tobacco Use    Smoking status: Former     Packs/day: 0.15     Years: 10.00     Pack years: 1.50     Types: Cigarettes     Quit date: 1987     Years since quittin.7    Smokeless tobacco: Never   Vaping Use    Vaping Use: Never used   Substance and Sexual Activity    Alcohol use: No    Drug use: No    Sexual activity: Defer       Family History:   Family History   Problem Relation Age of Onset    Heart failure Mother     Hyperlipidemia Mother     Hypertension Mother     Cancer Father     Cancer Sister     Stroke Sister     Breast cancer Maternal Aunt     Breast cancer Paternal Aunt     Ovarian cancer Neg Hx        Objective     /70 (BP Location: Right arm, Patient Position: Lying)   Pulse 50   Temp 97 °F (36.1 °C) (Oral)   Resp 16   Ht 154.9 cm (61\")   Wt 62.6 kg (138 lb)   SpO2 98%   BMI 26.07 kg/m²   No intake or output data in the 24 hours ending 23 0859    PHYSICAL EXAM:  CONSTITUTIONAL: Well nourished, cooperative, in no acute distress  HEENT: Normocephalic, atraumatic, PERRLA, no JVD  CARDIOVASCULAR:  Regular rhythm and slow rate, no murmur, gallop, rub.   RESPIRATORY: Clear to auscultation, normal respiratory effort, no wheezing, rales or rhonchi  EXTREMITIES: No gross deformities, 1+ bilateral ankle edema.   SKIN: Warm, dry. No bleeding, bruising or rash  NEUROLOGICAL: No focal deficits  PSYCHIATRIC: Normal mood and affect. Behavior is normal     Labs/Diagnostic Data  Results from last 7 days   Lab Units 23  2244   SODIUM mmol/L 144 141   POTASSIUM mmol/L 3.2* 3.2*   CHLORIDE mmol/L 107 104   CO2 mmol/L 24.0 24.0   BUN mg/dL 15 14   CREATININE mg/dL 0.97 1.17*   GLUCOSE mg/dL 95 105*   CALCIUM mg/dL 9.0 9.0     Results from last 7 days   Lab Units 23  0619 23  0137 23  2244   HSTROP T ng/L 236* 270* 255*     Results from last 7 days   Lab Units 23  0619 " 09/25/23  2244   WBC 10*3/mm3 4.90 6.75   HEMOGLOBIN g/dL 10.8* 12.0   HEMATOCRIT % 32.3* 37.2   PLATELETS 10*3/mm3 153 174     Results from last 7 days   Lab Units 09/26/23  0619   MAGNESIUM mg/dL 1.9     Results from last 7 days   Lab Units 09/26/23  0619   CHOLESTEROL mg/dL 175   TRIGLYCERIDES mg/dL 256*   HDL CHOL mg/dL 27*   LDL CHOL mg/dL 104*             Results from last 7 days   Lab Units 09/25/23  2244   PROBNP pg/mL 3,990.0*           I personally reviewed the patient's EKG/Telemetry data    Radiology Data:   CT Head Without Contrast    Result Date: 9/26/2023  Impression: No acute intracranial process identified. Electronically Signed: Lisbeth Salinas MD  9/26/2023 4:31 AM EDT  Workstation ID: [id number]       Current Medications:    allopurinol, 100 mg, Oral, Daily  furosemide, 20 mg, Oral, Daily  hydrALAZINE, 100 mg, Oral, Q8H  losartan, 25 mg, Oral, Q24H  mirtazapine, 30 mg, Oral, Nightly  pantoprazole, 40 mg, Oral, Q AM  potassium chloride, 40 mEq, Oral, Q4H  senna-docusate sodium, 2 tablet, Oral, BID  sodium chloride, 10 mL, Intravenous, Q12H           Assessment and Plan:     1. NSTEMI, likely type II (Non-ischemic) in setting of hypertensive emergency  - HS troponins 255-270-236, with no significant delta  - EKG with SB, no acute STT changes  - denies any symptoms of angina or anginal equivalent, Therefore unlikely Type I mechanism.   - echo 2/2023 with normal EF, mild LVH, mod-severe PH with RVSP 56mmHg  - was given ASA on arrival, not treated with Heparin/Lovenox due to prior history of GI bleed  - recommend low dose ECASA 81mg daily and statin   - repeat echo for LVEF   - defer ischemic evaluation as she is asymptomatic, and no EKG changes concerning for type I MI     2. Hypertension with hypertensive emergency   - On Metoprolol 100mg BID at home only per patient's    - /93 on arrival  - agree with Hydralazine 100mg TID and Losartan 25mg   - hold BB for now due to bradycardia, may resume  at lower dose prior to discharge, possibly reduce metoprolol dose by half.     3. Dyslipidemia   - consider statin if within goals of care     4. Tonsillar carcinoma, s/p chemo/radiation   - followed by Dr. Lora     5. DNR code    Discussed with pt and family at bedside, all questions answered.     Scribed for Steven Elder MD by Jenny Arvizu PA-C. 9/26/2023  09:09 EDT    I,Steven Elder M.D., personally performed the services described in this documentation as scribed by the above named individual in my presence, and it is both accurate and complete.    Steven Elder MD, [facility], [facility]  09/26/23  10:44 EDT

## 2023-09-26 NOTE — H&P
[facility]  HISTORY AND PHYSICAL    Patient Name: Vesta Landry  : 1945  MRN: [id number]  Primary Care Physician: Bossman Pedraza MD  Date of admission: 2023    Subjective   Subjective     Chief Complaint:  Headache     HPI:  Vesta Landry is a 78 y.o. female with past medical history significant for essential hypertension, HFpEF, hyperlipidemia, squamous cell carcinoma of head and neck, squamous cell carcinoma of left tonsil status postchemotherapy and radiotherapy presents to the ED accompanied by daughter due to headache.  Patient reports over the past 2 weeks she has had intermittent headaches.  Additionally she notes her blood pressure has been elevated at home.  Previously she had been on metoprolol, hydralazine, Imdur and losartan.  Daughter states she was hypotensive at home therefore she was dropped down to taking only losartan.  3 weeks ago she had her metoprolol added back on due to elevation of her blood pressure.  Patient denies any recent fever, chills, nausea, vomiting, vision changes, chest pain, shortness of breath, cough, abdominal pain, dysuria, melena or numbness/tingling.  Upon arrival to the ED tonight her blood pressure was 231/93.  She was given oral losartan and hydralazine in the ED.  Systolic blood pressure is still currently in the 190s.  Patient denies any current headache at this time.  Patient will be admitted to [facility] under the care of the hospitalist for further evaluation and treatment.        Review of Systems   Constitutional:  Negative for activity change, appetite change, chills, diaphoresis, fatigue and fever.   Eyes:  Negative for photophobia and visual disturbance.   Respiratory:  Negative for cough and shortness of breath.    Cardiovascular:  Negative for chest pain, palpitations and leg swelling.   Gastrointestinal:  Negative for abdominal distention, abdominal pain, blood in stool,  constipation, diarrhea, nausea and vomiting.   Genitourinary: Negative.    Musculoskeletal:  Negative for back pain, neck pain and neck stiffness.   Skin: Negative.    Neurological:  Positive for dizziness and headaches. Negative for syncope, speech difficulty, weakness and numbness.   Psychiatric/Behavioral: Negative.                Personal History     Past Medical History:   Diagnosis Date    Arthritis     Cancer     squamous cell - tonsils    Cancer of tonsil     metastatic squamous cell - left tonsil    Deep vein thrombosis     bilateral- daughter states on 2/17/23 that she doesn't remember patient having this    Dementia     Dysphagia     GERD (gastroesophageal reflux disease)     Hyperlipidemia     Hypertension     Impaired mobility     PONV (postoperative nausea and vomiting)     SOB (shortness of breath)     following chemo infusion on day of infusion last week (week of 1/29-2/4/23) per pt's daughter    Vitamin D deficiency     Wears dentures     uppers - instructed not to use adhesive DOS         Oncology Problem List:  Squamous cell carcinoma of head and neck (01/30/2023; Status: Active)  Squamous cell carcinoma of left tonsil (01/03/2023; Status: Active)  Oncology/Hematology History   Squamous cell carcinoma of left tonsil   1/3/2023 Initial Diagnosis    Metastatic 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 (HCC)     1/18/2023 Cancer Staged    Staging form: Pharynx - HPV-Mediated Oropharynx, AJCC 8th Edition  - Clinical stage from 1/18/2023: Stage I (cT2, cN1, cM0, p16: Unknown, HPV: Unknown) - Signed by Jennifer Lora MD on 1/18/2023 1/27/2023 -  Chemotherapy    OP SUPPORTIVE HYDRATION + ANTIEMETICS       1/31/2023 -  Chemotherapy    OP HEAD & NECK Cetuximab + XRT       Squamous cell carcinoma of head and neck   1/30/2023 Initial Diagnosis    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)     2/9/2023 -  Chemotherapy    OP CENTRAL VENOUS ACCESS DEVICE ACCESS, CARE, AND MAINTENANCE (CVAD)            Past Surgical History:   Procedure Laterality Date    APPENDECTOMY      CATARACT EXTRACTION      CHOLECYSTECTOMY      COLONOSCOPY      Approx 2009    ENDOSCOPY W/ PEG TUBE PLACEMENT N/A 2/20/2023    Procedure: ESOPHAGOGASTRODUODENOSCOPY WITH PERCUTANEOUS ENDOSCOPIC GASTROSTOMY TUBE INSERTION;  Surgeon: Brigido Guerra MD;  Location: [facility] ENDOSCOPY;  Service: Gastroenterology;  Laterality: N/A;    HYSTERECTOMY  1991    OOPHORECTOMY Bilateral 1991    PORTACATH PLACEMENT Right 02/08/2023    Procedure: INSERTION OF PORTACATH;  Surgeon: Brigido Guerra MD;  Location: [facility] OR;  Service: General;  Laterality: Right;    TUBAL ABDOMINAL LIGATION         Family History:  family history includes Breast cancer in her maternal aunt and paternal aunt; Cancer in her father and sister; Heart failure in her mother; Hyperlipidemia in her mother; Hypertension in her mother; Stroke in her sister.     Social History:  reports that she quit smoking about 36 years ago. Her smoking use included cigarettes. She has a 1.50 pack-year smoking history. She has never used smokeless tobacco. She reports that she does not drink alcohol and does not use drugs.  Social History     Social History Narrative    Not on file       Medications:  HYDROcodone-acetaminophen, PHARMACY MEDS TO BED CONSULT, allopurinol, cyclobenzaprine, diazePAM, furosemide, hydrALAZINE, isosorbide dinitrate, metoprolol tartrate, mirtazapine, nitroglycerin, omeprazole, and ondansetron    Allergies   Allergen Reactions    Trazodone Confusion       Objective   Objective     Vital Signs:   Temp:  [97.7 °F (36.5 °C)] 97.7 °F (36.5 °C)  Heart Rate:  [52-57] 52  Resp:  [16] 16  BP: (189-231)/(73-93) 189/73    Physical Exam  Vitals and nursing note reviewed.   Constitutional:       General: She is not in acute distress.     Appearance: Normal appearance. She is not ill-appearing, toxic-appearing or diaphoretic.   HENT:      Head: Normocephalic and atraumatic.      Nose:  Nose normal.      Mouth/Throat:      Mouth: Mucous membranes are dry.      Pharynx: Oropharynx is clear.   Eyes:      Extraocular Movements: Extraocular movements intact.      Conjunctiva/sclera: Conjunctivae normal.      Pupils: Pupils are equal, round, and reactive to light.   Cardiovascular:      Rate and Rhythm: Regular rhythm. Bradycardia present.      Pulses: Normal pulses.      Heart sounds: Normal heart sounds.   Pulmonary:      Effort: Pulmonary effort is normal.      Breath sounds: Rales present.   Abdominal:      General: Bowel sounds are normal. There is no distension.      Palpations: Abdomen is soft. There is no mass.      Tenderness: There is no abdominal tenderness. There is no right CVA tenderness, left CVA tenderness, guarding or rebound.      Hernia: No hernia is present.   Musculoskeletal:         General: No swelling, tenderness, deformity or signs of injury. Normal range of motion.      Cervical back: Normal range of motion and neck supple.      Right lower leg: Edema present.      Left lower leg: Edema present.   Skin:     General: Skin is warm and dry.   Neurological:      General: No focal deficit present.      Mental Status: She is alert and oriented to person, place, and time. Mental status is at baseline.   Psychiatric:         Mood and Affect: Mood normal.         Behavior: Behavior normal.         Thought Content: Thought content normal.         Judgment: Judgment normal.          Result Review:  I have personally reviewed the results from the time of this admission to 9/26/2023 03:29 EDT and agree with these findings:  [x]  Laboratory list / accordion  [x]  Microbiology  [x]  Radiology  [x]  EKG/Telemetry   []  Cardiology/Vascular   []  Pathology  []  Old records  []  Other:  Most notable findings include:     LAB RESULTS:      Lab 09/25/23  2244   WBC 6.75   HEMOGLOBIN 12.0   HEMATOCRIT 37.2   PLATELETS 174   NEUTROS ABS 4.59   IMMATURE GRANS (ABS) 0.02   LYMPHS ABS 1.22   MONOS ABS  0.75   EOS ABS 0.15   MCV 94.9         Lab 09/25/23  2244   SODIUM 141   POTASSIUM 3.2*   CHLORIDE 104   CO2 24.0   ANION GAP 13.0   BUN 14   CREATININE 1.17*   EGFR 47.3*   GLUCOSE 105*   CALCIUM 9.0         Lab 09/25/23  2244   TOTAL PROTEIN 6.8   ALBUMIN 3.8   GLOBULIN 3.0   ALT (SGPT) 13   AST (SGOT) 16   BILIRUBIN 0.5   ALK PHOS 96         Lab 09/26/23  0137 09/25/23  2244   PROBNP  --  3,990.0*   HSTROP T 270* 255*                 Brief Urine Lab Results  (Last result in the past 365 days)        Color   Clarity   Blood   Leuk Est   Nitrite   Protein   CREAT   Urine HCG        03/02/23 1617 Yellow   Clear   Negative   Trace   Negative   Negative                 Microbiology Results (last 10 days)       ** No results found for the last 240 hours. **            No radiology results from the last 24 hrs    Results for orders placed during the hospital encounter of 02/23/23    Adult Transthoracic Echo Limited W/ Cont if Necessary Per Protocol    Interpretation Summary    Left ventricular wall thickness is consistent with mild concentric hypertrophy.    Left ventricular diastolic function is consistent with age.    Estimated right ventricular systolic pressure from tricuspid regurgitation is markedly elevated (>55 mmHg).    Moderate to severe pulmonary hypertension is present.      Assessment & Plan   Assessment & Plan       Chronic diastolic heart failure    Squamous cell carcinoma of left tonsil    Bradycardia    Elevated troponin    HLD (hyperlipidemia)    Squamous cell carcinoma of head and neck    Hypertensive urgency    Hypokalemia    Elevated serum creatinine      78-year-old female presents the ED with intermittent headaches over the past week along with elevation of blood pressure.    1) hypertensive urgency  - BP on arrival 231/93  - Was given oral losartan and hydralazine in the ED  - Systolic blood pressure currently still in the 190s  - We will add IV hydralazine  -We will obtain CT of head  "tonight.    2) elevated troponin  - High sensitive troponin 255, 270 with delta 15  - proBNP 3990  - EKG with no acute ST changes  - Consult cardiology in a.m.  - Aspirin given in the ED  - No heparin drip initiated due to prior GI bleed  - Repeat troponin in a.m.  -Check FLP, hemoglobin A1c  - Repeat EKG in a.m.  - N.p.o.    3) hypokalemia  - Replace per protocol  - Check magnesium  - Telemetry monitoring    4) chronic HFpEF  - Last echocardiogram 2/24/2023 left ventricular diastolic function is consistent with age.  Mild concentric hypertrophy, moderate to severe pulmonary hypertension RVSP greater than 55 mmHg  - On Lopressor, Lasix  - Strict I's and O's  - Daily weight    5) GERD  - PPI    6) squamous cell carcinoma of head and neck  - Status post radiation treatment  - Previously on hospice care however was discontinue since she is doing significantly better  - Currently taking things by mouth  - Follows with Dr. Lora             DVT prophylaxis:  scds    CODE STATUS: DNR/DNI        Expected Discharge  TBD     This note has been completed as part of a split-shared workflow.     Signature: Electronically signed by LADARIUS Rodrigues, 09/26/23, 3:44 AM EDT.    Total time spent: 65 mins  Time spent includes time reviewing chart, face-to-face time, counseling patient/family/caregiver, ordering medications/tests/procedures, communicating with other health care professionals, documenting clinical information in the electronic health record, and coordination of care.      Attending   Admission Attestation       I have performed an independent face-to-face diagnostic evaluation including performing an independent physical examination.  The documented plan of care above was reviewed and developed with the advanced practice clinician (APC).  I have updated the HPI as appropriate.    Brief HPI    78 F states she was at home tonight (Monday) and had onset of headache.  She states these have been \"off and on for the " "past 2 weeks,\" but tonight it seems little worse, and she and her daughter began checking her blood pressure with a home monitor.  The daughter states that they had systolic readings as high as the 220s, and the systolic measurements were \"going up all day.\"  She does confirm that her physicians whom she sees as an outpatient have been attempting to alter her blood pressure regimen due to varying periods of hypotension and uncontrolled hypertension at home.  On ED arrival, the patient had systolic pressure as high as 231.  She was given oral hydralazine and losartan in the ED and pressure came down to the 190s.  She states her headache is resolved.  The daughter also notes that the patient was not \"speaking like she normally does\" as the patient's headache got worse today, and the patient agrees with this.  No report of slurred speech, focal weakness, or facial droop.  Patient denies any visual changes, dizziness or lightheadedness.  She states she feels her speech is back to normal during my visit in the ED.    Attending Physical Exam:  Temp:  [97.7 °F (36.5 °C)] 97.7 °F (36.5 °C)  Heart Rate:  [52-57] 52  Resp:  [16] 16  BP: (189-231)/(73-93) 189/73    Constitutional: Awake, alert  Eyes: PERRLA, sclerae anicteric, no conjunctival injection  HENT: NCAT, mucous membranes dry.  Neck: Supple, no thyromegaly, no lymphadenopathy, trachea midline  Respiratory: Clear to auscultation bilaterally, nonlabored respirations   Cardiovascular: Regular rhythm but bradycardic with rate 54 on my exam, no S3/S4, no murmurs, rubs, or gallops, palpable pedal pulses bilaterally  Gastrointestinal: Positive bowel sounds, soft, nontender, nondistended  Musculoskeletal: +2 bilateral distal lower extremity edema, no clubbing or cyanosis to extremities  Psychiatric: Appropriate affect, cooperative  Neurologic: Oriented x 3, strength symmetric in all extremities, Cranial Nerves grossly intact to confrontation, speech clear  Skin: No rashes, " normal turgor.    Assessment and Plan:    See assessment and plan documented by APC above and updated/edited by me as appropriate.    Total time spent: 25 minutes  Time spent includes time reviewing chart, face-to-face time, counseling patient/family/caregiver, ordering medications/tests/procedures, communicating with other health care professionals, documenting clinical information in the electronic health record, and coordination of care.       Mook Santana III, DO  09/26/23

## 2023-09-26 NOTE — ED NOTES
Vesta Landry    Nursing Report ED to Floor:  Mental status: alert and orineted X4 gcs 15  Ambulatory status: one person assist  Oxygen Therapy:  room air  Cardiac Rhythm: sinus yaritza  Admitted from: ER  Safety Concerns:  none  Social Issues: none  ED Room #:  8    ED Nurse Phone Extension - [phone] or may call [phone].      HPI:   Chief Complaint   Patient presents with    Hypertension       Past Medical History:  Past Medical History:   Diagnosis Date    Arthritis     Cancer     squamous cell - tonsils    Cancer of tonsil     metastatic squamous cell - left tonsil    Deep vein thrombosis     bilateral- daughter states on 2/17/23 that she doesn't remember patient having this    Dementia     Dysphagia     GERD (gastroesophageal reflux disease)     Hyperlipidemia     Hypertension     Impaired mobility     PONV (postoperative nausea and vomiting)     SOB (shortness of breath)     following chemo infusion on day of infusion last week (week of 1/29-2/4/23) per pt's daughter    Vitamin D deficiency     Wears dentures     uppers - instructed not to use adhesive DOS        Past Surgical History:  Past Surgical History:   Procedure Laterality Date    APPENDECTOMY      CATARACT EXTRACTION      CHOLECYSTECTOMY      COLONOSCOPY      Approx 2009    ENDOSCOPY W/ PEG TUBE PLACEMENT N/A 2/20/2023    Procedure: ESOPHAGOGASTRODUODENOSCOPY WITH PERCUTANEOUS ENDOSCOPIC GASTROSTOMY TUBE INSERTION;  Surgeon: Brigido Guerra MD;  Location: [facility] ENDOSCOPY;  Service: Gastroenterology;  Laterality: N/A;    HYSTERECTOMY  1991    OOPHORECTOMY Bilateral 1991    PORTACATH PLACEMENT Right 02/08/2023    Procedure: INSERTION OF PORTACATH;  Surgeon: Brigido Guerra MD;  Location: [facility] OR;  Service: General;  Laterality: Right;    TUBAL ABDOMINAL LIGATION          Admitting Doctor:   Mook Santana III, DO    Consulting Provider(s):  Consults       No orders found for last 30 day(s).             Admitting Diagnosis:   The primary  encounter diagnosis was Hypertensive emergency. A diagnosis of NSTEMI (non-ST elevated myocardial infarction) was also pertinent to this visit.    Most Recent Vitals:   Vitals:    09/26/23 0200 09/26/23 0218 09/26/23 0225 09/26/23 0230   BP: (!) 192/73 (!) 192/73  (!) 189/73   BP Location:       Patient Position:       Pulse:  57 52 52   Resp:       Temp:       TempSrc:       SpO2:  99% 98% 97%   Weight:       Height:           Active LDAs/IV Access:   Lines, Drains & Airways       Active LDAs       Name Placement date Placement time Site Days    Peripheral IV 09/25/23 2246 Posterior;Right Hand 09/25/23 2246  Hand  less than 1    Gastrostomy/Enterostomy PEG-jejunostomy Umbilicus 02/23/23  2320  Umbilicus  214    Gastrostomy/Enterostomy PEG-jejunostomy RUQ 03/04/23  1457  RUQ  205    Single Lumen Implantable Port 02/08/23 Right Subclavian 02/08/23  0923  Subclavian  229                    Labs (abnormal labs have a star):   Labs Reviewed   COMPREHENSIVE METABOLIC PANEL - Abnormal; Notable for the following components:       Result Value    Glucose 105 (*)     Creatinine 1.17 (*)     Potassium 3.2 (*)     eGFR 47.3 (*)     All other components within normal limits    Narrative:     GFR Normal >60  Chronic Kidney Disease <60  Kidney Failure <15    The GFR formula is only valid for adults with stable renal function between ages 18 and 70.   SINGLE HSTROPONIN T - Abnormal; Notable for the following components:    HS Troponin T 255 (*)     All other components within normal limits    Narrative:     High Sensitive Troponin T Reference Range:  <10.0 ng/L- Negative Female for AMI  <15.0 ng/L- Negative Male for AMI  >=10 - Abnormal Female indicating possible myocardial injury.  >=15 - Abnormal Male indicating possible myocardial injury.   Clinicians would have to utilize clinical acumen, EKG, Troponin, and serial changes to determine if it is an Acute Myocardial Infarction or myocardial injury due to an underlying chronic  condition.        CBC WITH AUTO DIFFERENTIAL - Abnormal; Notable for the following components:    Lymphocyte % 18.1 (*)     All other components within normal limits   PROBNP (REFERENCE) - Abnormal; Notable for the following components:    proBNP 3,990.0 (*)     All other components within normal limits    Narrative:     Among patients with dyspnea, NT-proBNP is highly sensitive for the detection of acute congestive heart failure. In addition NT-proBNP of <300 pg/ml effectively rules out acute congestive heart failure with 99% negative predictive value.     HIGH SENSITIVITIY TROPONIN T 2HR - Abnormal; Notable for the following components:    HS Troponin T 270 (*)     Troponin T Delta 15 (*)     All other components within normal limits    Narrative:     High Sensitive Troponin T Reference Range:  <10.0 ng/L- Negative Female for AMI  <15.0 ng/L- Negative Male for AMI  >=10 - Abnormal Female indicating possible myocardial injury.  >=15 - Abnormal Male indicating possible myocardial injury.   Clinicians would have to utilize clinical acumen, EKG, Troponin, and serial changes to determine if it is an Acute Myocardial Infarction or myocardial injury due to an underlying chronic condition.        CBC AND DIFFERENTIAL    Narrative:     The following orders were created for panel order CBC & Differential.  Procedure                               Abnormality         Status                     ---------                               -----------         ------                     CBC Auto Differential[[id number]]        Abnormal            Final result                 Please view results for these tests on the individual orders.       Meds Given in ED:   Medications   losartan (COZAAR) tablet 25 mg (25 mg Oral Given 9/25/23 2232)   Potassium Replacement - Follow Nurse / BPA Driven Protocol (has no administration in time range)   hydrALAZINE (APRESOLINE) injection 10 mg (has no administration in time range)   hydrALAZINE  (APRESOLINE) tablet 100 mg (100 mg Oral Given 9/25/23 2232)   aspirin chewable tablet 324 mg (324 mg Oral Given 9/26/23 0301)   acetaminophen (TYLENOL) tablet 1,000 mg (1,000 mg Oral Given 9/26/23 0344)

## 2023-09-27 ENCOUNTER — TELEPHONE (OUTPATIENT)
Dept: INTERNAL MEDICINE | Facility: CLINIC | Age: 78
End: 2023-09-27

## 2023-09-27 ENCOUNTER — HOME HEALTH ADMISSION (OUTPATIENT)
Dept: HOME HEALTH SERVICES | Facility: HOME HEALTHCARE | Age: 78
End: 2023-09-27
Payer: MEDICARE

## 2023-09-27 ENCOUNTER — APPOINTMENT (OUTPATIENT)
Dept: GENERAL RADIOLOGY | Facility: HOSPITAL | Age: 78
DRG: 280 | End: 2023-09-27
Payer: MEDICARE

## 2023-09-27 ENCOUNTER — DOCUMENTATION (OUTPATIENT)
Dept: HOME HEALTH SERVICES | Facility: HOME HEALTHCARE | Age: 78
End: 2023-09-27
Payer: MEDICARE

## 2023-09-27 PROBLEM — I5A NONISCHEMIC NONTRAUMATIC MYOCARDIAL INJURY: Status: ACTIVE | Noted: 2023-09-27

## 2023-09-27 LAB
ANION GAP SERPL CALCULATED.3IONS-SCNC: 12 MMOL/L (ref 5–15)
BACTERIA UR QL AUTO: ABNORMAL /HPF
BH CV ECHO MEAS - AI P1/2T: 340 MSEC
BH CV ECHO MEAS - AO MAX PG: 6.3 MMHG
BH CV ECHO MEAS - AO MEAN PG: 3.5 MMHG
BH CV ECHO MEAS - AO ROOT DIAM: 3.6 CM
BH CV ECHO MEAS - AO V2 MAX: 125.5 CM/SEC
BH CV ECHO MEAS - AO V2 VTI: 29 CM
BH CV ECHO MEAS - AVA(I,D): 2.5 CM2
BH CV ECHO MEAS - EDV(CUBED): 85.2 ML
BH CV ECHO MEAS - EDV(MOD-SP2): 55.1 ML
BH CV ECHO MEAS - EDV(MOD-SP4): 81.8 ML
BH CV ECHO MEAS - EF(MOD-BP): 58.9 %
BH CV ECHO MEAS - EF(MOD-SP2): 60.6 %
BH CV ECHO MEAS - EF(MOD-SP4): 60.8 %
BH CV ECHO MEAS - ESV(CUBED): 24.4 ML
BH CV ECHO MEAS - ESV(MOD-SP2): 21.7 ML
BH CV ECHO MEAS - ESV(MOD-SP4): 32.1 ML
BH CV ECHO MEAS - FS: 34.1 %
BH CV ECHO MEAS - IVS/LVPW: 1 CM
BH CV ECHO MEAS - IVSD: 1.2 CM
BH CV ECHO MEAS - LA DIMENSION: 2.9 CM
BH CV ECHO MEAS - LAT PEAK E' VEL: 8.2 CM/SEC
BH CV ECHO MEAS - LV MASS(C)D: 191.3 GRAMS
BH CV ECHO MEAS - LV MAX PG: 2.9 MMHG
BH CV ECHO MEAS - LV MEAN PG: 2 MMHG
BH CV ECHO MEAS - LV V1 MAX: 85.4 CM/SEC
BH CV ECHO MEAS - LV V1 VTI: 21 CM
BH CV ECHO MEAS - LVIDD: 4.4 CM
BH CV ECHO MEAS - LVIDS: 2.9 CM
BH CV ECHO MEAS - LVOT AREA: 3.5 CM2
BH CV ECHO MEAS - LVOT DIAM: 2.1 CM
BH CV ECHO MEAS - LVPWD: 1.2 CM
BH CV ECHO MEAS - MED PEAK E' VEL: 5.4 CM/SEC
BH CV ECHO MEAS - MR MAX PG: 31.6 MMHG
BH CV ECHO MEAS - MR MAX VEL: 281 CM/SEC
BH CV ECHO MEAS - MV A MAX VEL: 93.3 CM/SEC
BH CV ECHO MEAS - MV DEC SLOPE: 315 CM/SEC2
BH CV ECHO MEAS - MV DEC TIME: 0.28 SEC
BH CV ECHO MEAS - MV E MAX VEL: 76 CM/SEC
BH CV ECHO MEAS - MV E/A: 0.81
BH CV ECHO MEAS - MV MAX PG: 4.8 MMHG
BH CV ECHO MEAS - MV MEAN PG: 2 MMHG
BH CV ECHO MEAS - MV P1/2T: 95.8 MSEC
BH CV ECHO MEAS - MV V2 VTI: 33.1 CM
BH CV ECHO MEAS - MVA(P1/2T): 2.3 CM2
BH CV ECHO MEAS - MVA(VTI): 2.2 CM2
BH CV ECHO MEAS - PA ACC TIME: 0.13 SEC
BH CV ECHO MEAS - PA V2 MAX: 104 CM/SEC
BH CV ECHO MEAS - PI END-D VEL: 122 CM/SEC
BH CV ECHO MEAS - RAP SYSTOLE: 3 MMHG
BH CV ECHO MEAS - RVSP: 37 MMHG
BH CV ECHO MEAS - SV(LVOT): 72.7 ML
BH CV ECHO MEAS - SV(MOD-SP2): 33.4 ML
BH CV ECHO MEAS - SV(MOD-SP4): 49.7 ML
BH CV ECHO MEAS - TAPSE (>1.6): 1.76 CM
BH CV ECHO MEAS - TR MAX PG: 34 MMHG
BH CV ECHO MEAS - TR MAX VEL: 249.8 CM/SEC
BH CV ECHO MEASUREMENTS AVERAGE E/E' RATIO: 11.18
BH CV XLRA - RV BASE: 2.7 CM
BH CV XLRA - RV LENGTH: 6 CM
BH CV XLRA - RV MID: 2.3 CM
BH CV XLRA - TDI S': 16.3 CM/SEC
BILIRUB UR QL STRIP: NEGATIVE
BUN SERPL-MCNC: 18 MG/DL (ref 8–23)
BUN/CREAT SERPL: 15.1 (ref 7–25)
CALCIUM SPEC-SCNC: 9.2 MG/DL (ref 8.6–10.5)
CHLORIDE SERPL-SCNC: 107 MMOL/L (ref 98–107)
CLARITY UR: ABNORMAL
CO2 SERPL-SCNC: 24 MMOL/L (ref 22–29)
COLOR UR: YELLOW
CREAT SERPL-MCNC: 1.19 MG/DL (ref 0.57–1)
EGFRCR SERPLBLD CKD-EPI 2021: 46.3 ML/MIN/1.73
GLUCOSE BLDC GLUCOMTR-MCNC: 103 MG/DL (ref 70–130)
GLUCOSE BLDC GLUCOMTR-MCNC: 152 MG/DL (ref 70–130)
GLUCOSE SERPL-MCNC: 105 MG/DL (ref 65–99)
GLUCOSE UR STRIP-MCNC: NEGATIVE MG/DL
HGB UR QL STRIP.AUTO: NEGATIVE
HYALINE CASTS UR QL AUTO: ABNORMAL /LPF
KETONES UR QL STRIP: NEGATIVE
LEFT ATRIUM VOLUME INDEX: 24.2 ML/M2
LEUKOCYTE ESTERASE UR QL STRIP.AUTO: ABNORMAL
MAGNESIUM SERPL-MCNC: 1.9 MG/DL (ref 1.6–2.4)
NITRITE UR QL STRIP: POSITIVE
PH UR STRIP.AUTO: 5.5 [PH] (ref 5–8)
POTASSIUM SERPL-SCNC: 4.3 MMOL/L (ref 3.5–5.2)
PROT UR QL STRIP: ABNORMAL
RBC # UR STRIP: ABNORMAL /HPF
REF LAB TEST METHOD: ABNORMAL
SODIUM SERPL-SCNC: 143 MMOL/L (ref 136–145)
SP GR UR STRIP: 1.01 (ref 1–1.03)
SQUAMOUS #/AREA URNS HPF: ABNORMAL /HPF
UROBILINOGEN UR QL STRIP: ABNORMAL
WBC # UR STRIP: ABNORMAL /HPF

## 2023-09-27 PROCEDURE — 87086 URINE CULTURE/COLONY COUNT: CPT | Performed by: STUDENT IN AN ORGANIZED HEALTH CARE EDUCATION/TRAINING PROGRAM

## 2023-09-27 PROCEDURE — 25010000002 CEFTRIAXONE PER 250 MG: Performed by: STUDENT IN AN ORGANIZED HEALTH CARE EDUCATION/TRAINING PROGRAM

## 2023-09-27 PROCEDURE — 80048 BASIC METABOLIC PNL TOTAL CA: CPT | Performed by: STUDENT IN AN ORGANIZED HEALTH CARE EDUCATION/TRAINING PROGRAM

## 2023-09-27 PROCEDURE — 87077 CULTURE AEROBIC IDENTIFY: CPT | Performed by: STUDENT IN AN ORGANIZED HEALTH CARE EDUCATION/TRAINING PROGRAM

## 2023-09-27 PROCEDURE — 74018 RADEX ABDOMEN 1 VIEW: CPT

## 2023-09-27 PROCEDURE — 99232 SBSQ HOSP IP/OBS MODERATE 35: CPT | Performed by: PHYSICIAN ASSISTANT

## 2023-09-27 PROCEDURE — 82948 REAGENT STRIP/BLOOD GLUCOSE: CPT

## 2023-09-27 PROCEDURE — G0378 HOSPITAL OBSERVATION PER HR: HCPCS

## 2023-09-27 PROCEDURE — 83735 ASSAY OF MAGNESIUM: CPT | Performed by: STUDENT IN AN ORGANIZED HEALTH CARE EDUCATION/TRAINING PROGRAM

## 2023-09-27 PROCEDURE — 87186 SC STD MICRODIL/AGAR DIL: CPT | Performed by: STUDENT IN AN ORGANIZED HEALTH CARE EDUCATION/TRAINING PROGRAM

## 2023-09-27 PROCEDURE — 81001 URINALYSIS AUTO W/SCOPE: CPT | Performed by: STUDENT IN AN ORGANIZED HEALTH CARE EDUCATION/TRAINING PROGRAM

## 2023-09-27 RX ORDER — ACETAMINOPHEN 325 MG/1
650 TABLET ORAL EVERY 6 HOURS PRN
Status: DISCONTINUED | OUTPATIENT
Start: 2023-09-27 | End: 2023-09-28 | Stop reason: HOSPADM

## 2023-09-27 RX ORDER — NEBIVOLOL 2.5 MG/1
10 TABLET ORAL
Status: DISCONTINUED | OUTPATIENT
Start: 2023-09-27 | End: 2023-09-27

## 2023-09-27 RX ORDER — HYDRALAZINE HYDROCHLORIDE 100 MG/1
100 TABLET, FILM COATED ORAL 3 TIMES DAILY
Qty: 90 TABLET | Refills: 2 | Status: SHIPPED | OUTPATIENT
Start: 2023-09-27

## 2023-09-27 RX ORDER — NEBIVOLOL 5 MG/1
5 TABLET ORAL
Qty: 30 TABLET | Refills: 2 | Status: SHIPPED | OUTPATIENT
Start: 2023-09-28 | End: 2023-10-06 | Stop reason: SDUPTHER

## 2023-09-27 RX ORDER — AMINO ACIDS/PROTEIN HYDROLYS 11G-40/45
30 LIQUID IN PACKET (ML) ORAL DAILY
Status: DISCONTINUED | OUTPATIENT
Start: 2023-09-27 | End: 2023-09-28 | Stop reason: HOSPADM

## 2023-09-27 RX ORDER — LOSARTAN POTASSIUM 50 MG/1
50 TABLET ORAL
Status: DISCONTINUED | OUTPATIENT
Start: 2023-09-27 | End: 2023-09-28 | Stop reason: HOSPADM

## 2023-09-27 RX ORDER — NEBIVOLOL 2.5 MG/1
5 TABLET ORAL
Status: DISCONTINUED | OUTPATIENT
Start: 2023-09-27 | End: 2023-09-28

## 2023-09-27 RX ORDER — LOSARTAN POTASSIUM 50 MG/1
50 TABLET ORAL
Qty: 30 TABLET | Refills: 2 | Status: SHIPPED | OUTPATIENT
Start: 2023-09-28

## 2023-09-27 RX ORDER — MULTIPLE VITAMINS W/ MINERALS TAB 9MG-400MCG
1 TAB ORAL DAILY
Status: DISCONTINUED | OUTPATIENT
Start: 2023-09-27 | End: 2023-09-28 | Stop reason: HOSPADM

## 2023-09-27 RX ADMIN — MIRTAZAPINE 30 MG: 15 TABLET, FILM COATED ORAL at 20:16

## 2023-09-27 RX ADMIN — ACETAMINOPHEN 650 MG: 325 TABLET ORAL at 17:22

## 2023-09-27 RX ADMIN — Medication 10 ML: at 20:17

## 2023-09-27 RX ADMIN — SODIUM CHLORIDE 1000 MG: 900 INJECTION INTRAVENOUS at 15:33

## 2023-09-27 RX ADMIN — LOSARTAN POTASSIUM 50 MG: 50 TABLET, FILM COATED ORAL at 08:43

## 2023-09-27 RX ADMIN — HYDRALAZINE HYDROCHLORIDE 100 MG: 25 TABLET, FILM COATED ORAL at 06:21

## 2023-09-27 RX ADMIN — PANTOPRAZOLE SODIUM 40 MG: 40 TABLET, DELAYED RELEASE ORAL at 06:21

## 2023-09-27 RX ADMIN — ALLOPURINOL 100 MG: 100 TABLET ORAL at 08:43

## 2023-09-27 RX ADMIN — FUROSEMIDE 20 MG: 20 TABLET ORAL at 08:43

## 2023-09-27 RX ADMIN — MULTIPLE VITAMINS W/ MINERALS TAB 1 TABLET: TAB at 15:33

## 2023-09-27 RX ADMIN — HYDRALAZINE HYDROCHLORIDE 100 MG: 25 TABLET, FILM COATED ORAL at 20:15

## 2023-09-27 RX ADMIN — NEBIVOLOL 5 MG: 2.5 TABLET ORAL at 08:43

## 2023-09-27 RX ADMIN — Medication 10 ML: at 08:44

## 2023-09-27 RX ADMIN — HYDRALAZINE HYDROCHLORIDE 100 MG: 25 TABLET, FILM COATED ORAL at 15:33

## 2023-09-27 RX ADMIN — ACETAMINOPHEN 650 MG: 325 TABLET ORAL at 06:53

## 2023-09-27 NOTE — PROGRESS NOTES
Met with patient and family. They are agreeable to [facility]. Verified PCP. Spoke with Domitila Guerrero RN, [facility]-Liaison.

## 2023-09-27 NOTE — TELEPHONE ENCOUNTER
Patient has [employer] Medicare, spoke with Yolanda again and she is going to talk to patient about continuity of care or the possibility of finding a new PCP.

## 2023-09-27 NOTE — PROGRESS NOTES
"  [facility]  PROGRESS NOTE    Date of Admission: 9/25/2023  Date of Service: 09/27/23    Primary Care Physician: Bossman Pedraza MD    Chief Complaint: f/u hypertension, elevated troponin   Problem List:   NSTEMI (non-ST elevated myocardial infarction)    Chronic diastolic heart failure    Squamous cell carcinoma of left tonsil    Bradycardia    Elevated troponin    HLD (hyperlipidemia)    Squamous cell carcinoma of head and neck    Hypertensive urgency    Hypokalemia    Elevated serum creatinine      Subjective      Patient denies cardiac complaints, no chest pain, no dyspnea. Daughters at bedside concerned about her worsening confusion while in hospital and asking about possible discharge this afternoon.     Objective   Vitals: /61 (BP Location: Right arm, Patient Position: Lying)   Pulse 71   Temp 98.7 °F (37.1 °C) (Axillary)   Resp 16   Ht 154.9 cm (60.98\")   Wt 61.1 kg (134 lb 12.8 oz)   SpO2 96%   BMI 25.48 kg/m²     Physical Exam:  GENERAL: Alert, cooperative, in no acute distress.   HEENT: Normocephalic, no jugular venous distention  HEART: Regular rhythm, normal rate, and no murmurs, gallops, or rubs.   LUNGS: No wheezing, rales or rhonchi.  NEUROLOGIC: No focal abnormalities involving strength or sensation are noted.   EXTREMITIES: No clubbing, cyanosis, or edema noted.     Results:  Results from last 7 days   Lab Units 09/26/23  0619 09/25/23  2244   WBC 10*3/mm3 4.90 6.75   HEMOGLOBIN g/dL 10.8* 12.0   HEMATOCRIT % 32.3* 37.2   PLATELETS 10*3/mm3 153 174     Results from last 7 days   Lab Units 09/27/23  0538 09/26/23  0619 09/25/23  2244   SODIUM mmol/L 143 144 141   POTASSIUM mmol/L 4.3 3.2* 3.2*   CHLORIDE mmol/L 107 107 104   CO2 mmol/L 24.0 24.0 24.0   BUN mg/dL 18 15 14   CREATININE mg/dL 1.19* 0.97 1.17*   GLUCOSE mg/dL 105* 95 105*      Lab Results   Component Value Date    CHOL 175 09/26/2023    TRIG 256 (H) 09/26/2023    HDL 27 (L) 09/26/2023 "     (H) 09/26/2023    AST 16 09/25/2023    ALT 13 09/25/2023     Results from last 7 days   Lab Units 09/26/23  0619   HEMOGLOBIN A1C % 4.90     Results from last 7 days   Lab Units 09/26/23  0619   CHOLESTEROL mg/dL 175   TRIGLYCERIDES mg/dL 256*   HDL CHOL mg/dL 27*   LDL CHOL mg/dL 104*                 Results from last 7 days   Lab Units 09/26/23  0619 09/26/23  0137 09/25/23  2244   HSTROP T ng/L 236* 270* 255*     Results from last 7 days   Lab Units 09/25/23  2244   PROBNP pg/mL 3,990.0*         Intake/Output Summary (Last 24 hours) at 9/27/2023 0840  Last data filed at 9/26/2023 1300  Gross per 24 hour   Intake 480 ml   Output --   Net 480 ml       I personally reviewed the patient's EKG/Telemetry data    Radiology Data:   Echo 9/26/23:    Interpretation Summary         Left ventricular systolic function is normal. Calculated left ventricular EF = 58.9%    Left ventricular wall thickness is consistent with mild concentric hypertrophy.    Left ventricular diastolic function was indeterminate.    There is mild calcification of the aortic valve.    Estimated right ventricular systolic pressure from tricuspid regurgitation is mildly elevated (35-45 mmHg).    Mild aortic valve regurgitation.    Current Medications:  allopurinol, 100 mg, Oral, Daily  furosemide, 20 mg, Oral, Daily  hydrALAZINE, 100 mg, Oral, Q8H  losartan, 50 mg, Oral, Q24H  mirtazapine, 30 mg, Oral, Nightly  nebivolol, 5 mg, Oral, Q24H  pantoprazole, 40 mg, Oral, Q AM  senna-docusate sodium, 2 tablet, Oral, BID  sodium chloride, 10 mL, Intravenous, Q12H           Assessment and Plan:     1. Elevated troponin in setting of hypertensive emergency  - HS troponins 255-270-236, with no significant delta  - EKG with SB, no acute STT changes  - denies any symptoms of angina or anginal equivalent, therefore unlikely Type I mechanism.   - echo 2/2023 with normal EF, mild LVH  - recommend low dose ECASA 81mg daily and statin   - repeat echo as above  with normal EF, mild AI, RVSP 37mmHg   - defer ischemic evaluation as she is asymptomatic, and no EKG changes concerning for type I MI      2. Hypertension with hypertensive emergency   - On Metoprolol 100mg BID at home only per patient's  PTA (D/C'd due to sinus yaritza on arrival)   - /93 on arrival  - Continue Hydralazine 100mg TID, Losartan increased to 50mg today and Bystolic 5mg added   - can be further titrated as OP, discussed with daughters at bedside  - goal BP <140/90 mmHg      3. Dyslipidemia   - consider statin if within goals of care      4. Tonsillar carcinoma, s/p chemo/radiation   - followed by Dr. Lora      5. DNR code      Disposition: Stable for discharge home today from cardiac perspective. Follow up with Dr. Pedraza as previously scheduled on 10/6 and continue to monitor home BPs and take log of BPs to her next appointment.       Electronically signed by Jenny Arvizu PA-C, 09/27/23, 8:42 AM EDT.

## 2023-09-27 NOTE — CASE MANAGEMENT/SOCIAL WORK
Continued Stay Note  [facility]     Patient Name: Vesta Landry  MRN: [id number]  Today's Date: 9/27/2023    Admit Date: 9/25/2023    Plan: Home [facility]   Discharge Plan       Row Name 09/27/23 1235       Plan    Plan Home [facility]    Patient/Family in Agreement with Plan yes    Plan Comments I spoke w/pt and family in room regarding discharge plan. Per daughters, they would like to have  services @ d/c for nursing and are interested in restarting her tube feeds. Pt did have hospice but does  not now and all equipment was discontinued thru hospice. Pt was tolerating non tube feeds, but in last few weeks has not, family would like to restart. [facility] home infusion was managing tube feeds and pump in beginning prior to hospice. I spoke w/Tonja @ [facility] home infusion, in order to restart, pt will need nutrition/dietician consult for  insurance to cover. [facility] had managed her prior to hospice. I spoke w/Yolanda liaison, pt has [employer] Medicare and her PCP is out of network now. Yolanda and ERIKA both spoke w/family and pt in room, updated on the above. Per family they have no interest in changing her PCP and will continue to follow w/co pays. Per Yolanda, they can fill out a continuity of care form thru [employer] to stay w/PCP w/ no copays. I updated Dr. Valentino and ordered dietician consult and HH in [facility]. [facility] has accepted pt for  services of nursing.    Final Discharge Disposition Code 06 - home with home health care                   Discharge Codes    No documentation.                 Expected Discharge Date and Time       Expected Discharge Date Expected Discharge Time    Sep 28, 2023               Mitchell Johnson RN

## 2023-09-27 NOTE — PROGRESS NOTES
[facility] Medicine Services  PROGRESS NOTE    Patient Name: Vesta Landry  : 1945  MRN: [id number]    Date of Admission: 2023  Primary Care Physician: Bossman Pedraza MD    Subjective   Subjective     CC:  Headache      HPI:  Evaluated patient this morning. Patient is much more lethargic than yesterday. Complains of lower abdominal pain. Denies dysuria, frequency. Discussed patient's care with patient's daughters present at bedside.      Objective   Objective     Vital Signs:   Temp:  [98.4 °F (36.9 °C)-98.7 °F (37.1 °C)] 98.4 °F (36.9 °C)  Heart Rate:  [69-94] 90  Resp:  [16-18] 16  BP: (159-184)/(61-74) 162/74     Physical Exam:  Constitutional: No acute distress, awake, alert, lethargic, sleeping  HENT: NCAT, mucous membranes moist  Respiratory: Breath sounds diminished at bases.  Cardiovascular: RRR, no murmurs, rubs, or gallops  Gastrointestinal: Positive bowel sounds, soft, nondistended. To palpation over lower abdomen.  Musculoskeletal: nonpitting lower extremity edema bilaterally.   Neurologic: Oriented x 3, no focal deficits.   Skin: No rashes      Results Reviewed:  LAB RESULTS:      Lab 23  0619 23  2244   WBC 4.90 6.75   HEMOGLOBIN 10.8* 12.0   HEMATOCRIT 32.3* 37.2   PLATELETS 153 174   NEUTROS ABS 3.36 4.59   IMMATURE GRANS (ABS) 0.01 0.02   LYMPHS ABS 0.78 1.22   MONOS ABS 0.60 0.75   EOS ABS 0.12 0.15   MCV 92.0 94.9           Lab 23  0538 23  0619 23  2244   SODIUM 143 144 141   POTASSIUM 4.3 3.2* 3.2*   CHLORIDE 107 107 104   CO2 24.0 24.0 24.0   ANION GAP 12.0 13.0 13.0   BUN 18 15 14   CREATININE 1.19* 0.97 1.17*   EGFR 46.3* 59.2* 47.3*   GLUCOSE 105* 95 105*   CALCIUM 9.2 9.0 9.0   MAGNESIUM 1.9 1.9  --    HEMOGLOBIN A1C  --  4.90  --            Lab 23  2244   TOTAL PROTEIN 6.8   ALBUMIN 3.8   GLOBULIN 3.0   ALT (SGPT) 13   AST (SGOT) 16   BILIRUBIN 0.5   ALK PHOS 96           Lab 23  0619 23  0137  09/25/23  2244   PROBNP  --   --  3,990.0*   HSTROP T 236* 270* 255*           Lab 09/26/23  0619   CHOLESTEROL 175   LDL CHOL 104*   HDL CHOL 27*   TRIGLYCERIDES 256*               Brief Urine Lab Results  (Last result in the past 365 days)        Color   Clarity   Blood   Leuk Est   Nitrite   Protein   CREAT   Urine HCG        09/27/23 1115 Yellow   Turbid   Negative   Large (3+)   Positive   30 mg/dL (1+)                   Microbiology Results Abnormal       None            Adult Transthoracic Echo Complete W/ Cont if Necessary Per Protocol    Result Date: 9/27/2023    Left ventricular systolic function is normal. Calculated left ventricular EF = 58.9%   Left ventricular wall thickness is consistent with mild concentric hypertrophy.   Left ventricular diastolic function was indeterminate.   There is mild calcification of the aortic valve.   Estimated right ventricular systolic pressure from tricuspid regurgitation is mildly elevated (35-45 mmHg).   Mild aortic valve regurgitation.     CT Head Without Contrast    Result Date: 9/26/2023  CT HEAD WO CONTRAST Date of Exam: 9/26/2023 4:24 AM EDT Indication: elevated BP, headache, dizziness. Comparison: 2/25/2023. Technique: Axial CT images were obtained of the head without contrast administration.  Automated exposure control and iterative construction methods were used. Findings: There is no evidence of hemorrhage. There is no mass effect or midline shift. Stable periventricular white matter changes are present like related to chronic microvascular ischemic change. There is no extracerebral collection. Ventricles are normal in size and configuration for patient's stated age.  Posterior fossa is within normal limits. Calvarium and skull base appear intact.   Visualized sinuses show no air fluid levels. Visualized orbits are unremarkable.     Impression: Impression: No acute intracranial process identified. Electronically Signed: Lisbeth Salinas MD  9/26/2023 4:31 AM EDT   Workstation ID: [id number]    XR Chest 1 View    Result Date: 9/26/2023  XR CHEST 1 VW Date of Exam: 9/26/2023 1:54 PM EDT Indication: rule out pleural effusions Comparison: 3/2/2023 Findings: There is a right subclavian Jvgchl-x-Ggjt catheter which appears unchanged. There are low lung volumes. There is slight blunting of the CP angles suggesting small amounts of pleural fluid. Previously seen left effusion has decreased in size. There is mild central pulmonary vascular congestion without definite findings of CHF/pulmonary edema. Heart size appears within normal limits. No pneumothorax is identified. Aortic vascular calcifications present.     Impression: Impression: 1. Slight blunting of the CP angles bilaterally suggesting small amounts of pleural fluid. 2. Question mild central pulmonary vascular congestion without definite findings of CHF. Electronically Signed: Panda Terrazas MD  9/26/2023 2:31 PM EDT  Workstation ID: [id number]     Results for orders placed during the hospital encounter of 09/25/23    Adult Transthoracic Echo Complete W/ Cont if Necessary Per Protocol    Interpretation Summary    Left ventricular systolic function is normal. Calculated left ventricular EF = 58.9%    Left ventricular wall thickness is consistent with mild concentric hypertrophy.    Left ventricular diastolic function was indeterminate.    There is mild calcification of the aortic valve.    Estimated right ventricular systolic pressure from tricuspid regurgitation is mildly elevated (35-45 mmHg).    Mild aortic valve regurgitation.      Current medications:  Scheduled Meds:allopurinol, 100 mg, Oral, Daily  cefTRIAXone, 1,000 mg, Intravenous, Q24H  furosemide, 20 mg, Oral, Daily  hydrALAZINE, 100 mg, Oral, Q8H  losartan, 50 mg, Oral, Q24H  mirtazapine, 30 mg, Oral, Nightly  multivitamin with minerals, 1 tablet, Oral, Daily  nebivolol, 5 mg, Oral, Q24H  pantoprazole, 40 mg, Oral, Q AM  ProSource No Carb, 30 mL, Per G Tube,  Daily  sodium chloride, 10 mL, Intravenous, Q12H      Continuous Infusions:   PRN Meds:.  acetaminophen    Calcium Replacement - Follow Nurse / BPA Driven Protocol    cyclobenzaprine    diazePAM    hydrALAZINE    HYDROcodone-acetaminophen    Magnesium Standard Dose Replacement - Follow Nurse / BPA Driven Protocol    Phosphorus Replacement - Follow Nurse / BPA Driven Protocol    Potassium Replacement - Follow Nurse / BPA Driven Protocol    sodium chloride    sodium chloride    Assessment & Plan   Assessment & Plan     Active Hospital Problems    Diagnosis  POA    **NSTEMI (non-ST elevated myocardial infarction) [I21.4]  Yes    Nonischemic nontraumatic myocardial injury [I5A]  Yes    Hypertensive urgency [I16.0]  Yes    Hypokalemia [E87.6]  Yes    Elevated serum creatinine [R79.89]  Yes    Squamous cell carcinoma of head and neck [C76.0]  Yes    Elevated troponin [R77.8]  Yes    HLD (hyperlipidemia) [E78.5]  Yes    Bradycardia [R00.1]  Yes    Squamous cell carcinoma of left tonsil [C09.9]  Yes    Chronic diastolic heart failure [I50.32]  Yes      Resolved Hospital Problems   No resolved problems to display.        Brief Hospital Course to date:  Vesta Landry is a 78 y.o. female with past medical history significant for essential hypertension, HFpEF, hyperlipidemia, squamous cell carcinoma of head and neck, squamous cell carcinoma of left tonsil status postchemotherapy and radiotherapy presents to the ED accompanied by daughter due to headache. Found to have elevated troponin. Cardiology consulted.     This patient's problems and plans were partially entered by my partner and updated as appropriate by me 09/27/23.    Hypertensive urgency  - BP on arrival 231/93  - CT head nonacute. Echo 9/27 with EF 58%, mild aortic valve calcification, mild AR.  - Was given oral losartan and hydralazine in the ED.  - Continue losartan, hydralazine. Cardiology adding on nebivolol. Cardiology following. CM following and  assisting with [facility] arrangement.     Acute UTI  -UA 9/27 with protein, large leukocyte esterase, positive nitrites, WBC too numerous to count.  -Initiating IV ceftriaxone. Urine culture pending.     Elevated troponin  - High sensitive troponin 255, 270 with delta 15  - proBNP 3990 on admission.  - EKG without acute ST-T changes.  - Cardiology evaluated, deferred ischemic evaluation due to patient being asymptomatic and without EKG changes.     Acute on chronic HFpEF  - Last echo 2/24/2023 left ventricular diastolic function consistent with age. Mild concentric hypertrophy, moderate to severe pulmonary hypertension, RVSP greater than 55 mmHg  - CXR with mild pulmonary congestion.   - s/p one dose IV lasix 20 mg 9/26.   - Continue oral lasix. Continue strict I&Os, daily weights.     Hypokalemia  - Replace per protocol.     GERD  - Continue PPI.     Squamous cell carcinoma of head and neck  - s/p radiation treatment  - Previously on hospice care however was discontinued since she is doing significantly better.  - Follows with Dr. Lora.    Expected Discharge Location and Transportation: TBD pending evaluation.   Expected Discharge   Expected Discharge Date: 9/28/2023; Expected Discharge Time:      DVT prophylaxis:  Mechanical DVT prophylaxis orders are present.          CODE STATUS:   Code Status and Medical Interventions:   Ordered at: 09/26/23 0350     Medical Intervention Limits:    NO intubation (DNI)     Level Of Support Discussed With:    Patient     Code Status (Patient has no pulse and is not breathing):    No CPR (Do Not Attempt to Resuscitate)     Medical Interventions (Patient has pulse or is breathing):    Limited Support       Theresa Valentino, DO  09/27/23

## 2023-09-27 NOTE — PLAN OF CARE
Problem: Adult Inpatient Plan of Care  Goal: Plan of Care Review  9/27/2023 0200 by Lynda Grimes, RN  Outcome: Ongoing, Progressing  Flowsheets (Taken 9/27/2023 0200)  Progress: no change  9/27/2023 0140 by Lynda Grimes RN  Outcome: Ongoing, Progressing  Flowsheets (Taken 9/27/2023 0140)  Plan of Care Reviewed With: patient     Problem: Adult Inpatient Plan of Care  Goal: Patient-Specific Goal (Individualized)  9/27/2023 0200 by Lynda Grimes, NICK  Outcome: Ongoing, Progressing  9/27/2023 0140 by Lynda Grimes, NICK  Outcome: Ongoing, Progressing   Goal Outcome Evaluation:  Plan of Care Reviewed With: patient        Progress: no change  Outcome Evaluation: Pt currntly resting in bed with dughter at bedside. Pt remaind hypertensivre with reent b/p of 171/57,  other VSS. Pt on r/a >92% O2. Pt is A&Ox's 4. Pt reports pain in wrist pain lvl of 3, no medication given at this time. Cardiology consult placed for pt.

## 2023-09-27 NOTE — TELEPHONE ENCOUNTER
Caller: CHAVEZ GONZALEZ     Relationship:     Best call back number: [phone]   What orders are you requesting (i.e. lab or imaging): HOME HEALTH    In what timeframe would the patient need to come in: asap    Where will you receive your lab/imaging services: IN HOME    Additional notes: WILL BEING FOLLOWING THE PATIENT'S CARE

## 2023-09-27 NOTE — PLAN OF CARE
Goal Outcome Evaluation:              Outcome Evaluation: VSS; O2 > 90% on room air.  Pt resting in bed; family at bedside; call light within reach.

## 2023-09-27 NOTE — PLAN OF CARE
Goal Outcome Evaluation:  Plan of Care Reviewed With: patient        Progress: no change  Outcome Evaluation: Pt currntly resting in bed with devendra at bedside. Pt remains hypertensive  medication given other VSS. Pt on r/a >92% O2. Pt is A&Ox's 4. Pt  currently reports no pain.

## 2023-09-27 NOTE — CONSULTS
Clinical Nutrition     Nutrition Support Assessment  Reason for Visit: Physician consult, EN      Patient Name: Vesta Landry  YOB: 1945  MRN: [id number]  Date of Encounter: 09/27/23 13:40 EDT  Admission date: 9/25/2023    Comments: Family request same EN formula as used prior at same rate delivered via pump Do not want to deliver at night as pt does not wish to sleep w head elevated and allow will take pt off pump to eat etc. Also want EN to assure pt gets needs. EN at prior rate over ~ 11.5 hr/da will deliver 61% est Kcal 40% est PRO needs. If add a dose of Prostat and a dose of liquid vit/min via PEG can assure vit min needs and 64% est Kcal and 75% est PRO needs.  Have placed order for: Isosource HN begin 20 ml/hr advance by 15 ml/hr ev 6 hr w tolerance to goal 65 ml/hr Water at 15 ml/hr Give one Prosource/da. When at goal deliver bt 6A and 8P and turn off 1 hr before and 1/2 hr after meals.     Nutrition Assessment   Admission Diagnosis:  NSTEMI (non-ST elevated myocardial infarction) [I21.4]      Problem List:    NSTEMI (non-ST elevated myocardial infarction)    Chronic diastolic heart failure    Squamous cell carcinoma of left tonsil    Bradycardia    Elevated troponin    HLD (hyperlipidemia)    Squamous cell carcinoma of head and neck    Hypertensive urgency    Hypokalemia    Elevated serum creatinine    Nonischemic nontraumatic myocardial injury        PMH:   She  has a past medical history of Arthritis, Cancer, Cancer of tonsil, CHF (congestive heart failure), Deep vein thrombosis, Dementia, Dysphagia, GERD (gastroesophageal reflux disease), Hyperlipidemia, Hypertension, Impaired mobility, PONV (postoperative nausea and vomiting), SOB (shortness of breath), Vitamin D deficiency, and Wears dentures.    PSH:  She  has a past surgical history that includes Colonoscopy; Appendectomy; Cataract extraction; Cholecystectomy; Tubal ligation; Hysterectomy (1991); Oophorectomy  "(Bilateral, 1991); Portacath placement (Right, 02/08/2023); and Esophagogastroduodenoscopy w/ PEG (N/A, 2/20/2023).      Applicable Nutrition Concerns:   Skin:  Oral:  GI:      Applicable Interval History:         Reported/Observed/Food/Nutrition Related History:     9/27  Family request same EN formula as used prior at same rate delivered via pump Do not want to deliver at night as pt does not wish to sleep w head elevated and allow will take pt off pump to eat etc. Also want EN to assure pt gets needs.       Labs    Labs Reviewed: Yes     Results from last 7 days   Lab Units 09/27/23  0538 09/26/23  0619 09/25/23  2244   GLUCOSE mg/dL 105* 95 105*   BUN mg/dL 18 15 14   CREATININE mg/dL 1.19* 0.97 1.17*   SODIUM mmol/L 143 144 141   CHLORIDE mmol/L 107 107 104   POTASSIUM mmol/L 4.3 3.2* 3.2*   MAGNESIUM mg/dL 1.9 1.9  --    ALT (SGPT) U/L  --   --  13       Results from last 7 days   Lab Units 09/26/23  0619 09/25/23  2244   ALBUMIN g/dL  --  3.8   CHOLESTEROL mg/dL 175  --    TRIGLYCERIDES mg/dL 256*  --            Lab Results   Lab Value Date/Time    HGBA1C 4.90 09/26/2023 0619    HGBA1C 5.30 01/20/2023 0643         Results from last 7 days   Lab Units 09/25/23  2244   PROBNP pg/mL 3,990.0*         Medications    Medications Reviewed: Yes  Pertinent: allopurinol, abx, lasix, remeron, protonix   Infusion:  PRN:       Intake/Ouptut 24 hrs (0701 - 0700)   I&O's Reviewed: Yes     Intake & Output (last day)         09/26 0701 09/27 0700 09/27 0701 09/28 0700    P.O. 480     Total Intake(mL/kg) 480 (7.9)     Net +480           Urine Unmeasured Occurrence 4 x     Stool Unmeasured Occurrence 1 x               Anthropometrics     Flowsheet Rows      Flowsheet Row First Filed Value   Admission Height 154.9 cm (61\") Documented at 09/25/2023 2104   Admission Weight 61.2 kg (135 lb) Documented at 09/25/2023 2104       Height: Height: 154.9 cm (60.98\")  Last Filed Weight: Weight: 61.1 kg (134 lb 12.8 oz) (09/27/23 " "0500)  Method: Weight Method: Standing scale  BMI: BMI (Calculated): 25.5  BMI classification: Overweight: 25.0-29.9kg/m2     UBW:  Per EMR  154 lbs on 1/25 146 lbs on 2/27 135 lbs on 9/7  Weight change: ?contribution of fluid possible loss of 20 lbs 13% body wt over 9 months    Nutrition Focused Physical Exam     Date: 9/27    Unable to perform exam due to: Defer pending indication    Needs Assessment   Date:9/27    Height used:Height: 154.9 cm (60.98\")  Weights used: 61/.1 Kg 134 lbs    Estimated Calorie needs: ~ 1500 Kcal/day  Method:  Kcals/KG 5 = 1528    Estimated Protein needs: ~ 73 g PRO/day  Method: g/Kg 1.2    Estimated Fluid needs: ~ ml/day   Per clinical status    Current Nutrition Prescription     PO: Diet: Cardiac Diets; Healthy Heart (2-3 Na+); Texture: Regular Texture (IDDSI 7); Fluid Consistency: Thin (IDDSI 0)  Oral Nutrition Supplement:   Intake: Insufficient data 38% x 2 meals recorded    EN: IsoSource HN  Goal Rate: 65 ml/hr   Water Flushes: 15 ml/hr  Modular: Prosource -no carb 1/day  Route: PEG  Tube: Large bore    At goal over: Other 11.5 hr/da (14 hr during the day turn off 1 hr before and 1/2 hr after meals)     Rx will supply w prosource  Goal Volume 748  mL/day     Flush Volume 172 mL/day     Energy 957 Kcal/day 64 % Est Need   Protein 55 g/day 75 % Est Need   Fiber 0 g/day     Water in   mL     Total Water 778 mL     Meet DRI No      Vit Min to be given  Note if use Prostat at home Kcal 997 =  66% est need  --------------------------------------------------------------------------  Product/Rate verified at bedside: No  Infusing Rate at time of visit: not started    Average Delivery from Charting: N/A  Volume  mL/day   % Goal Vol.   Flush Volume  mL/day     Energy  Kcal/day  % Est Need   Protein  g/day  % Est Need   Fiber  g/day     Water in  EN  mL     Total Water  mL     Meet DRI N/A              Nutrition Diagnosis   Date: 9/27 Updated:   Problem Inadequate oral intake   Etiology " Inconsistent appetite   Signs/Symptoms Per rpt of intake PTA    Status:       Goal:   General: Nutrition support treatment  PO: Increase intake  EN/PN: Initiate EN, Tolerate EN at goal    Nutrition Intervention      Follow treatment progress, Care plan reviewed, Advise alternate selection, Nutrition support order placed        Monitoring/Evaluation:   Per protocol, I&O, PO intake, Pertinent labs, EN delivery/tolerance, Weight, Symptoms, POC/GOC      Joanne Covington RD  Time Spent: 45 min

## 2023-09-28 ENCOUNTER — READMISSION MANAGEMENT (OUTPATIENT)
Dept: CALL CENTER | Facility: HOSPITAL | Age: 78
End: 2023-09-28
Payer: MEDICARE

## 2023-09-28 ENCOUNTER — APPOINTMENT (OUTPATIENT)
Dept: GENERAL RADIOLOGY | Facility: HOSPITAL | Age: 78
DRG: 280 | End: 2023-09-28
Payer: MEDICARE

## 2023-09-28 VITALS
TEMPERATURE: 97.8 F | HEART RATE: 88 BPM | WEIGHT: 131.4 LBS | DIASTOLIC BLOOD PRESSURE: 77 MMHG | BODY MASS INDEX: 24.81 KG/M2 | HEIGHT: 61 IN | SYSTOLIC BLOOD PRESSURE: 152 MMHG | RESPIRATION RATE: 16 BRPM | OXYGEN SATURATION: 97 %

## 2023-09-28 PROBLEM — E44.0 MODERATE MALNUTRITION: Status: ACTIVE | Noted: 2023-09-28

## 2023-09-28 PROBLEM — I21.4 NSTEMI (NON-ST ELEVATED MYOCARDIAL INFARCTION): Status: RESOLVED | Noted: 2023-02-23 | Resolved: 2023-09-28

## 2023-09-28 LAB
ALBUMIN SERPL-MCNC: 3.8 G/DL (ref 3.5–5.2)
ALBUMIN/GLOB SERPL: 1.7 G/DL
ALP SERPL-CCNC: 95 U/L (ref 39–117)
ALT SERPL W P-5'-P-CCNC: 15 U/L (ref 1–33)
ANION GAP SERPL CALCULATED.3IONS-SCNC: 12 MMOL/L (ref 5–15)
AST SERPL-CCNC: 20 U/L (ref 1–32)
BILIRUB SERPL-MCNC: 0.4 MG/DL (ref 0–1.2)
BUN SERPL-MCNC: 22 MG/DL (ref 8–23)
BUN/CREAT SERPL: 15.2 (ref 7–25)
CALCIUM SPEC-SCNC: 8.9 MG/DL (ref 8.6–10.5)
CHLORIDE SERPL-SCNC: 109 MMOL/L (ref 98–107)
CHOLEST SERPL-MCNC: 176 MG/DL (ref 0–200)
CO2 SERPL-SCNC: 25 MMOL/L (ref 22–29)
CREAT SERPL-MCNC: 1.45 MG/DL (ref 0.57–1)
EGFRCR SERPLBLD CKD-EPI 2021: 36.5 ML/MIN/1.73
GLOBULIN UR ELPH-MCNC: 2.3 GM/DL
GLUCOSE BLDC GLUCOMTR-MCNC: 132 MG/DL (ref 70–130)
GLUCOSE BLDC GLUCOMTR-MCNC: 154 MG/DL (ref 70–130)
GLUCOSE SERPL-MCNC: 102 MG/DL (ref 65–99)
MAGNESIUM SERPL-MCNC: 2 MG/DL (ref 1.6–2.4)
PHOSPHATE SERPL-MCNC: 3.5 MG/DL (ref 2.5–4.5)
POTASSIUM SERPL-SCNC: 4 MMOL/L (ref 3.5–5.2)
PREALB SERPL-MCNC: 17.6 MG/DL (ref 20–40)
PROT SERPL-MCNC: 6.1 G/DL (ref 6–8.5)
SODIUM SERPL-SCNC: 146 MMOL/L (ref 136–145)
TRIGL SERPL-MCNC: 232 MG/DL (ref 0–150)

## 2023-09-28 PROCEDURE — 82948 REAGENT STRIP/BLOOD GLUCOSE: CPT

## 2023-09-28 PROCEDURE — 25010000002 CEFTRIAXONE PER 250 MG: Performed by: STUDENT IN AN ORGANIZED HEALTH CARE EDUCATION/TRAINING PROGRAM

## 2023-09-28 PROCEDURE — 74018 RADEX ABDOMEN 1 VIEW: CPT

## 2023-09-28 PROCEDURE — 99232 SBSQ HOSP IP/OBS MODERATE 35: CPT | Performed by: INTERNAL MEDICINE

## 2023-09-28 PROCEDURE — 83735 ASSAY OF MAGNESIUM: CPT

## 2023-09-28 PROCEDURE — 82465 ASSAY BLD/SERUM CHOLESTEROL: CPT

## 2023-09-28 PROCEDURE — 80053 COMPREHEN METABOLIC PANEL: CPT

## 2023-09-28 PROCEDURE — 84100 ASSAY OF PHOSPHORUS: CPT

## 2023-09-28 PROCEDURE — 84134 ASSAY OF PREALBUMIN: CPT

## 2023-09-28 PROCEDURE — 84478 ASSAY OF TRIGLYCERIDES: CPT

## 2023-09-28 RX ORDER — CEFUROXIME AXETIL 250 MG/1
250 TABLET ORAL 2 TIMES DAILY
Qty: 6 TABLET | Refills: 0 | Status: SHIPPED | OUTPATIENT
Start: 2023-09-28

## 2023-09-28 RX ORDER — NEBIVOLOL 2.5 MG/1
10 TABLET ORAL
Status: DISCONTINUED | OUTPATIENT
Start: 2023-09-28 | End: 2023-09-28 | Stop reason: HOSPADM

## 2023-09-28 RX ADMIN — ALLOPURINOL 100 MG: 100 TABLET ORAL at 08:17

## 2023-09-28 RX ADMIN — Medication 10 ML: at 08:20

## 2023-09-28 RX ADMIN — DIAZEPAM 2 MG: 2 TABLET ORAL at 13:59

## 2023-09-28 RX ADMIN — HYDRALAZINE HYDROCHLORIDE 100 MG: 25 TABLET, FILM COATED ORAL at 13:39

## 2023-09-28 RX ADMIN — PANTOPRAZOLE SODIUM 40 MG: 40 TABLET, DELAYED RELEASE ORAL at 06:04

## 2023-09-28 RX ADMIN — MULTIPLE VITAMINS W/ MINERALS TAB 1 TABLET: TAB at 08:17

## 2023-09-28 RX ADMIN — SODIUM CHLORIDE 1000 MG: 900 INJECTION INTRAVENOUS at 13:39

## 2023-09-28 RX ADMIN — FUROSEMIDE 20 MG: 20 TABLET ORAL at 08:17

## 2023-09-28 RX ADMIN — LOSARTAN POTASSIUM 50 MG: 50 TABLET, FILM COATED ORAL at 08:17

## 2023-09-28 RX ADMIN — NEBIVOLOL 10 MG: 2.5 TABLET ORAL at 08:17

## 2023-09-28 RX ADMIN — HYDRALAZINE HYDROCHLORIDE 100 MG: 25 TABLET, FILM COATED ORAL at 06:04

## 2023-09-28 NOTE — PROGRESS NOTES
Malnutrition Severity Assessment    Patient Name:  Vesta Landry  YOB: 1945  MRN: [id number]  Admit Date:  9/25/2023    Patient meets criteria for : Moderate (non-severe) Malnutrition (Pt meets criteria for moderate chronic malnutrition based on wasting.)    Comments:      Malnutrition Severity Assessment  Malnutrition Type: Chronic Disease - Related Malnutrition  Malnutrition Type (last 8 hours)       Malnutrition Severity Assessment       Row Name 09/28/23 1454       Malnutrition Severity Assessment    Malnutrition Type Chronic Disease - Related Malnutrition      Row Name 09/28/23 1454       Insufficient Energy Intake     Insufficient Energy Intake Findings --  Period of intake <75% over > 1 mo prior to use of EN      Row Name 09/28/23 1454       Muscle Loss    Loss of Muscle Mass Findings Mild    [place] Moderate - slight depression    Clavicle Bone Region Moderate - some protrusion in females, visible in males    Acromion Bone Region --  mild    Scapular Bone Region Moderate - mild depression, bones may show slightly    Dorsal Hand Region None    Patellar Region --  mild    Anterior Thigh Region --  mild    Posterior Calf Region Moderate - some roundness, slight firmness      Row Name 09/28/23 1454       Fat Loss    Subcutaneous Fat Loss Findings Moderate    Orbital Region  Moderate -  somewhat hollowness, slightly dark circles    Upper Arm Region --  mild    Thoracic & Lumbar Region Moderate - ribs visible with mild depressions, iliac crest somewhat prominent      Row Name 09/28/23 1454       Criteria Met (Must meet criteria for severity in at least 2 of these categories: M Wasting, Fat Loss, Fluid, Secondary Signs, Wt. Status, Intake)    Patient meets criteria for  Moderate (non-severe) Malnutrition  Pt meets criteria for moderate chronic malnutrition based on wasting.                    Electronically signed by:  Joanne Covington RD  09/28/23 15:16 EDT

## 2023-09-28 NOTE — CASE MANAGEMENT/SOCIAL WORK
Case Management Discharge Note      Final Note: D/C nutrition rec still has another formula of Isosource. I spoke w/Lauren @ [facility] home infusion, will need it changed to Vivonex so can order. Per chart in Feb @ [facility], pt did not tolerate tube feed and was switched to Vivonex there. Pt has tolerated Vivonex and family does not want to switch to Isosource. I chat messaged Joanne Covington and updated on above, she will change rec. Pt will be discharging w/family in room. I notified Yolanda @ [facility] that pt was discharging today. No other d/c needs verbalized @ this time.         Selected Continued Care - Admitted Since 9/25/2023       Destination    No services have been selected for the patient.                Durable Medical Equipment    No services have been selected for the patient.                Dialysis/Infusion    No services have been selected for the patient.                Home Medical Care Coordination complete.      Service Provider Selected Services Address Phone Fax Patient Preferred    [facility] [address] [address] [phone] --              Therapy    No services have been selected for the patient.                Community Resources    No services have been selected for the patient.                Community & DME    No services have been selected for the patient.                         Final Discharge Disposition Code: 06 - home with home health care

## 2023-09-28 NOTE — CASE MANAGEMENT/SOCIAL WORK
Continued Stay Note  [facility]     Patient Name: Vesta Landry  MRN: [id number]  Today's Date: 9/28/2023    Admit Date: 9/25/2023    [facility]   Discharge Plan       Row Name 09/28/23 1449       Plan    [facility]    Patient/Family in Agreement with Plan other (see comments)    Plan Comments I spoke w/Lauren @ [facility] infusion, she will need updated dietician notes for discharge w/pt's home formula, then RN can bring pump and do teaching. I chat messaged Joanne Covington for the above @  1400pm.    Final Discharge Disposition Code 06 - home with home health care                   Discharge Codes    No documentation.                 Expected Discharge Date and Time       Expected Discharge Date Expected Discharge Time    Sep 28, 2023               Mitchell Johnson RN

## 2023-09-28 NOTE — CONSULTS
Clinical Nutrition     Nutrition Support Assessment  Reason for Visit: Follow-up protocol, Malnutrition Severity Assessment      Patient Name: Vesta Landry  YOB: 1945  MRN: [id number]  Date of Encounter: 09/28/23 14:57 EDT  Admission date: 9/25/2023    Comments: Pt meets criteria for moderate chronic malnutrition based on wasting. See MSA note.  Hospital EN order: Begin Isosource HN 20 ml/hr advance by 15 ml/hr ev 6 hr w tolerance to goal 65 ml/hr Water at 15 ml/hr When at goal deliver 6A -8P turning EN off 1 hr before and 1/2 hr after each meal. Give one Prosource/da.   For Home: Isosource HN 3 containers/da give at least 1/4 cup Water for each container. Using pump begin 20 ml/hr advance by 15 ml/hr ev 6 hr w tolerance to goal 65 ml/hr When at goal deliver 6A -8P (over 14 hrs  turning EN off 1 hr before and 1/2 hr after each meal. Give one dose Prosource or Prostat per day and suggest Vitamin Mineral supplement daily.     Nutrition Assessment   Admission Diagnosis:  NSTEMI (non-ST elevated myocardial infarction) [I21.4]      Problem List:    NSTEMI (non-ST elevated myocardial infarction)    Chronic diastolic heart failure    Squamous cell carcinoma of left tonsil    Bradycardia    Elevated troponin    HLD (hyperlipidemia)    Squamous cell carcinoma of head and neck    Hypertensive urgency    Hypokalemia    Elevated serum creatinine    Nonischemic nontraumatic myocardial injury        PMH:   She  has a past medical history of Arthritis, Cancer, Cancer of tonsil, CHF (congestive heart failure), Deep vein thrombosis, Dementia, Dysphagia, GERD (gastroesophageal reflux disease), Hyperlipidemia, Hypertension, Impaired mobility, PONV (postoperative nausea and vomiting), SOB (shortness of breath), Vitamin D deficiency, and Wears dentures.    PSH:  She  has a past surgical history that includes Colonoscopy; Appendectomy; Cataract extraction; Cholecystectomy; Tubal ligation;  Hysterectomy (1991); Oophorectomy (Bilateral, 1991); Portacath placement (Right, 02/08/2023); and Esophagogastroduodenoscopy w/ PEG (N/A, 2/20/2023).      Applicable Nutrition Concerns:   Skin:  Oral:  GI:      Applicable Interval History:         Reported/Observed/Food/Nutrition Related History:     9/28  For d/c today. EN not started. Pt able to participate in nutrition exam.    9/27  Family request same EN formula as used prior at same rate delivered via pump Do not want to deliver at night as pt does not wish to sleep w head elevated and allow will take pt off pump to eat etc. Also want EN to assure pt gets needs.       Labs    Labs Reviewed: Yes     Results from last 7 days   Lab Units 09/28/23  0619 09/27/23  0538 09/26/23  0619 09/25/23  2244   GLUCOSE mg/dL 102* 105* 95 105*   BUN mg/dL 22 18 15 14   CREATININE mg/dL 1.45* 1.19* 0.97 1.17*   SODIUM mmol/L 146* 143 144 141   CHLORIDE mmol/L 109* 107 107 104   POTASSIUM mmol/L 4.0 4.3 3.2* 3.2*   PHOSPHORUS mg/dL 3.5  --   --   --    MAGNESIUM mg/dL 2.0 1.9 1.9  --    ALT (SGPT) U/L 15  --   --  13         Results from last 7 days   Lab Units 09/28/23  0619 09/26/23  0619 09/25/23  2244   ALBUMIN g/dL 3.8  --  3.8   PREALBUMIN mg/dL 17.6*  --   --    CHOLESTEROL mg/dL 176 175  --    TRIGLYCERIDES mg/dL 232* 256*  --          Results from last 7 days   Lab Units 09/28/23  1128 09/28/23  0614 09/27/23  2329 09/27/23  1751   GLUCOSE mg/dL 154* 132* 103 152*     Lab Results   Lab Value Date/Time    HGBA1C 4.90 09/26/2023 0619    HGBA1C 5.30 01/20/2023 0643         Results from last 7 days   Lab Units 09/25/23  2244   PROBNP pg/mL 3,990.0*           Medications    Medications Reviewed: Yes  Pertinent: allopurinol, abx, lasix, remeron, multi vit min, protonix   Infusion:  PRN:       Intake/Ouptut 24 hrs (0701 - 0700)   I&O's Reviewed: Yes     Intake & Output (last day)         09/27 0701 09/28 0700 09/28 0701 09/29 0700    P.O. 240 240    IV Piggyback  100     "Total Intake(mL/kg) 240 (4) 340 (5.7)    Net +240 +340                Last stool recorded x 2 on 9/26    Anthropometrics     Flowsheet Rows      Flowsheet Row First Filed Value   Admission Height 154.9 cm (61\") Documented at 09/25/2023 2104   Admission Weight 61.2 kg (135 lb) Documented at 09/25/2023 2104       Height: Height: 154.9 cm (60.98\")  Last Filed Weight: Weight: 59.6 kg (131 lb 6.4 oz) (09/28/23 0500)  Method: Weight Method: Standing scale  BMI: BMI (Calculated): 24.8  BMI classification: Overweight: 25.0-29.9kg/m2     UBW:  Per EMR  154 lbs on 1/25 146 lbs on 2/27 135 lbs on 9/7  Weight change: ?contribution of fluid possible loss of 20 lbs 13% body wt over 9 months    Nutrition Focused Physical Exam     Date: 9/28  Pt meets criteria for moderate chronic malnutrition based on wasting. See MSA note    Needs Assessment   Date:9/27    Height used:Height: 154.9 cm (60.98\")  Weights used: 61/.1 Kg 134 lbs    Estimated Calorie needs: ~ 1500 Kcal/day  Method:  Kcals/KG 5 = 1528    Estimated Protein needs: ~ 73 g PRO/day  Method: g/Kg 1.2    Estimated Fluid needs: ~ ml/day   Per clinical status    Current Nutrition Prescription     PO: Diet: Cardiac Diets; Healthy Heart (2-3 Na+); Texture: Regular Texture (IDDSI 7); Fluid Consistency: Thin (IDDSI 0)  Oral Nutrition Supplement:   Intake: 2 Days: 31% x 4meals recorded    EN: IsoSource HN  Goal Rate: 65 ml/hr   Water Flushes: 15 ml/hr  Modular: Prosource -no carb 1/day  Route: PEG  Tube: Large bore    At goal over: Other 11.5 hr/da (14 hr during the day turn off 1 hr before and 1/2 hr after meals)     Rx will supply w prosource  Goal Volume 748  mL/day     Flush Volume 172 mL/day     Energy 957 Kcal/day 64 % Est Need   Protein 55 g/day 75 % Est Need   Fiber 0 g/day     Water in   mL     Total Water 778 mL     Meet DRI No      Vit Min to be given  Note if use Prostat at home Kcal 997 =  66% est " need  --------------------------------------------------------------------------  Product/Rate verified at bedside: No  Infusing Rate at time of visit: not started    Average Delivery from Charting: N/A  Volume  mL/day   % Goal Vol.   Flush Volume  mL/day     Energy  Kcal/day  % Est Need   Protein  g/day  % Est Need   Fiber  g/day     Water in  EN  mL     Total Water  mL     Meet DRI N/A              Nutrition Diagnosis   Date: 9/27 Updated: 9/28  Problem Inadequate oral intake   Etiology Inconsistent appetite   Signs/Symptoms Per rpt of intake PTA    Status: ordered EN not yet started     Date:  9/28 Updated:  Problem Malnutrition moderate chronic   Etiology Period of depressed intake   Signs/Symptoms Wasting   Status:      Goal:   General: Nutrition support treatment  PO: Increase intake  EN/PN: Initiate EN, Tolerate EN at goal    Nutrition Intervention      Follow treatment progress, Care plan reviewed, EN order for home prepared.    For Home: Isosource HN 3 containers/da give at least 1/4 cup Water for each container. Using pump begin 20 ml/hr advance by 15 ml/hr ev 6 hr w tolerance to goal 65 ml/hr When at goal deliver 6A -8P (over 14 hrs  turning EN off 1 hr before and 1/2 hr after each meal. Give one dose Prosource or Prostat per day and suggest Vitamin Mineral supplement daily.     Monitoring/Evaluation:   Per protocol, I&O, PO intake, Pertinent labs, EN delivery/tolerance, Weight, Symptoms, POC/GOC      Joanen Covington RD  Time Spent: 35 min

## 2023-09-28 NOTE — OUTREACH NOTE
Prep Survey      Flowsheet Row Responses   [facility] facility patient discharged from? [facility]   Is LACE score < 7 ? No   Eligibility [facility]   Date of Admission 09/25/23   Date of Discharge 09/28/23   Discharge Disposition Home or Self Care   Discharge diagnosis Hypertensive urgency   Does the patient have one of the following disease processes/diagnoses(primary or secondary)? Other   Does the patient have Home health ordered? Yes   What is the Home health agency?  [facility]    Is there a DME ordered? No   Prep survey completed? Yes            JUSTICE LOTT - Registered Nurse

## 2023-09-28 NOTE — PROGRESS NOTES
"  [facility]  PROGRESS NOTE    Date of Admission: 9/25/2023  Date of Service: 09/28/23    Primary Care Physician: Bossman Pedraza MD    Chief Complaint: f/u hypertension, elevated troponin   Problem List:   NSTEMI (non-ST elevated myocardial infarction)    Chronic diastolic heart failure    Squamous cell carcinoma of left tonsil    Bradycardia    Elevated troponin    HLD (hyperlipidemia)    Squamous cell carcinoma of head and neck    Hypertensive urgency    Hypokalemia    Elevated serum creatinine    Nonischemic nontraumatic myocardial injury      Subjective      Doing well, denies chest pain or shortness of breath.  Daughter at bedside    Objective   Vitals: /66 (BP Location: Left arm, Patient Position: Lying)   Pulse 94   Temp 98.5 °F (36.9 °C) (Oral)   Resp 16   Ht 154.9 cm (60.98\")   Wt 59.6 kg (131 lb 6.4 oz)   SpO2 96%   BMI 24.84 kg/m²     Physical Exam:  GENERAL: Alert, cooperative, in no acute distress.   HEENT: Normocephalic, no jugular venous distention  HEART: Regular rhythm, normal rate, and no murmurs, gallops, or rubs.   LUNGS: No wheezing, rales or rhonchi.  NEUROLOGIC: No focal abnormalities involving strength or sensation are noted.   EXTREMITIES: No clubbing, cyanosis, or edema noted.     Results:  Results from last 7 days   Lab Units 09/26/23  0619 09/25/23  2244   WBC 10*3/mm3 4.90 6.75   HEMOGLOBIN g/dL 10.8* 12.0   HEMATOCRIT % 32.3* 37.2   PLATELETS 10*3/mm3 153 174     Results from last 7 days   Lab Units 09/28/23  0619 09/27/23  0538 09/26/23  0619   SODIUM mmol/L 146* 143 144   POTASSIUM mmol/L 4.0 4.3 3.2*   CHLORIDE mmol/L 109* 107 107   CO2 mmol/L 25.0 24.0 24.0   BUN mg/dL 22 18 15   CREATININE mg/dL 1.45* 1.19* 0.97   GLUCOSE mg/dL 102* 105* 95      Lab Results   Component Value Date    CHOL 176 09/28/2023    TRIG 232 (H) 09/28/2023    HDL 27 (L) 09/26/2023     (H) 09/26/2023    AST 20 09/28/2023    ALT 15 09/28/2023 "     Results from last 7 days   Lab Units 09/26/23  0619   HEMOGLOBIN A1C % 4.90     Results from last 7 days   Lab Units 09/28/23  0619 09/26/23  0619   CHOLESTEROL mg/dL 176 175   TRIGLYCERIDES mg/dL 232* 256*   HDL CHOL mg/dL  --  27*   LDL CHOL mg/dL  --  104*                 Results from last 7 days   Lab Units 09/26/23  0619 09/26/23  0137 09/25/23  2244   HSTROP T ng/L 236* 270* 255*     Results from last 7 days   Lab Units 09/25/23  2244   PROBNP pg/mL 3,990.0*         Intake/Output Summary (Last 24 hours) at 9/28/2023 0836  Last data filed at 9/27/2023 0900  Gross per 24 hour   Intake 240 ml   Output --   Net 240 ml       I personally reviewed the patient's EKG/Telemetry data    Radiology Data:   Echo 9/26/23:    Interpretation Summary         Left ventricular systolic function is normal. Calculated left ventricular EF = 58.9%    Left ventricular wall thickness is consistent with mild concentric hypertrophy.    Left ventricular diastolic function was indeterminate.    There is mild calcification of the aortic valve.    Estimated right ventricular systolic pressure from tricuspid regurgitation is mildly elevated (35-45 mmHg).    Mild aortic valve regurgitation.    Current Medications:  allopurinol, 100 mg, Oral, Daily  cefTRIAXone, 1,000 mg, Intravenous, Q24H  furosemide, 20 mg, Oral, Daily  hydrALAZINE, 100 mg, Oral, Q8H  losartan, 50 mg, Oral, Q24H  mirtazapine, 30 mg, Oral, Nightly  multivitamin with minerals, 1 tablet, Oral, Daily  nebivolol, 10 mg, Oral, Q24H  pantoprazole, 40 mg, Oral, Q AM  ProSource No Carb, 30 mL, Per G Tube, Daily  sodium chloride, 10 mL, Intravenous, Q12H           Assessment and Plan:     1. Elevated troponin in setting of hypertensive emergency  - HS troponins 255-270-236, with no significant delta  - EKG with SB, no acute STT changes  - denies any symptoms of angina or anginal equivalent, therefore unlikely Type I mechanism.   - echo 2/2023 with normal EF, mild LVH  - recommend  low dose ECASA 81mg daily and statin   - repeat echo as above with normal EF, mild AI, RVSP 37mmHg   - defer ischemic evaluation as she is asymptomatic, and no EKG changes concerning for type I MI      2. Hypertension with hypertensive emergency   - On Metoprolol 100mg BID at home only per patient's  PTA (D/C'd due to sinus yaritza on arrival)   - /93 on arrival  - Continue Hydralazine 100mg TID, Losartan 50mg and increased dose of Bystolic to 10 mg today.    - goal BP <140/90 mmHg      3. Dyslipidemia   - consider statin if within goals of care      4. Tonsillar carcinoma, s/p chemo/radiation   - followed by Dr. Lora      5.  UTI with mildly elevated creatinine:  Monitor closely, encourage p.o. hydration    Stable for discharge home from cardiovascular standpoint, follow-up with PCP in 2-3 weeks.    Steven Elder MD, FACC, [facility]  Interventional Cardiology

## 2023-09-28 NOTE — DISCHARGE SUMMARY
[facility] Medicine Services  DISCHARGE SUMMARY    Patient Name: Vesta Landry  : 1945  MRN: [id number]    Date of Admission: 2023  9:55 PM  Date of Discharge:  2023   Primary Care Physician: Bossman Pedraza MD    Consults       Date and Time Order Name Status Description    2023  4:54 AM Inpatient Cardiology Consult Completed             Hospital Course     Presenting Problem: Headache, Hypertensive urgency    Active Hospital Problems    Diagnosis  POA    **NSTEMI (non-ST elevated myocardial infarction) [I21.4]  Yes    Moderate malnutrition [E44.0]  Yes    Nonischemic nontraumatic myocardial injury [I5A]  Yes    Hypertensive urgency [I16.0]  Yes    Hypokalemia [E87.6]  Yes    Elevated serum creatinine [R79.89]  Yes    Squamous cell carcinoma of head and neck [C76.0]  Yes    Elevated troponin [R77.8]  Yes    HLD (hyperlipidemia) [E78.5]  Yes    Bradycardia [R00.1]  Yes    Squamous cell carcinoma of left tonsil [C09.9]  Yes    Chronic diastolic heart failure [I50.32]  Yes      Resolved Hospital Problems   No resolved problems to display.          Hospital Course:  Vesta Landry is a 78 y.o. 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 Was found to have hypertensive urgency with BP 230s/90s. Cardiology evaluated, deferred ischemic evaluation due to resolution of symptoms and no concerning EKG changes. Cardiology assisted with blood pressure medication optimization. Patient's hospital course was complicated by acute UTI. She received 2 days of IV ceftriaxone, symptoms improved and she was discharged home on oral antibiotics with plan for close follow up with PCP.    Hypertensive Urgency  - BP on arrival 231/93  - CT head  nonacute. Echo 9/27 with EF 58%, mild aortic valve calcification, mild AR.  - Cardiology evaluated and assisted with medication optimization.   - Continue losartan, hydralazine, nebivolol on discharge. Follow up with PCP in 1 week.      Acute UTI  - UA 9/27 with protein, large leukocyte esterase, positive nitrites, WBC too numerous to count.  - Received IV ceftriaxone x2 days. Plan to complete 3 more days of oral Ceftin on discharge. Final urine culture pending. Follow up with PCP.     Elevated troponin  - High sensitive troponin 255, 270 with delta 15  - proBNP 3990 on admission.  - EKG without acute ST-T changes.  - Cardiology evaluated, deferred ischemic evaluation due to patient being asymptomatic and without concerning EKG changes.     Acute on chronic HFpEF  - Last echo 2/24/2023 left ventricular diastolic function consistent with age. Mild concentric hypertrophy, moderate to severe pulmonary hypertension, RVSP greater than 55 mmHg  - CXR with mild pulmonary congestion.   - s/p one dose IV lasix 20 mg 9/26.   - Continue oral lasix. Continue strict I&Os, daily weights.      Hypokalemia  - Replaced per protocol.     GERD  - Continue PPI.     Squamous cell carcinoma of head and neck  - s/p radiation treatment. Follows with Dr. Lora.  - Previously on hospice care however was discontinued since she is doing significantly better.  - Continue tube feeds by PEG tube at home. [facility] arranged on discharge.         Discharge Follow Up Recommendations for outpatient labs/diagnostics:  - Follow up with PCP in 1 week.   - Complete 3 more days of oral Ceftin for UTI.     Day of Discharge     HPI:   Evaluated patient this morning. Felt well, much more energetic, without abdominal pain today. Hopeful for discharge home today. Discussed patient's care with daughters.       Vital Signs:   Temp:  [97.8 °F (36.6 °C)-98.5 °F (36.9 °C)] 97.8 °F (36.6 °C)  Heart Rate:  [69-94] 88  Resp:  [14-18] 16  BP: (143-168)/(59-77)  152/77      Physical Exam:  Constitutional: No acute distress, awake, alert, lethargic, sleeping  HENT: NCAT, mucous membranes moist  Respiratory: Breath sounds diminished at bases.  Cardiovascular: RRR, no murmurs, rubs, or gallops  Gastrointestinal: Positive bowel sounds, soft, nondistended. To palpation over lower abdomen.  Musculoskeletal: nonpitting lower extremity edema bilaterally.   Neurologic: Oriented x 3, no focal deficits.   Skin: No rashes       Pertinent  and/or Most Recent Results     LAB RESULTS:      Lab 09/26/23  0619 09/25/23 2244   WBC 4.90 6.75   HEMOGLOBIN 10.8* 12.0   HEMATOCRIT 32.3* 37.2   PLATELETS 153 174   NEUTROS ABS 3.36 4.59   IMMATURE GRANS (ABS) 0.01 0.02   LYMPHS ABS 0.78 1.22   MONOS ABS 0.60 0.75   EOS ABS 0.12 0.15   MCV 92.0 94.9         Lab 09/28/23  0619 09/27/23  0538 09/26/23 0619 09/25/23  2244   SODIUM 146* 143 144 141   POTASSIUM 4.0 4.3 3.2* 3.2*   CHLORIDE 109* 107 107 104   CO2 25.0 24.0 24.0 24.0   ANION GAP 12.0 12.0 13.0 13.0   BUN 22 18 15 14   CREATININE 1.45* 1.19* 0.97 1.17*   EGFR 36.5* 46.3* 59.2* 47.3*   GLUCOSE 102* 105* 95 105*   CALCIUM 8.9 9.2 9.0 9.0   MAGNESIUM 2.0 1.9 1.9  --    PHOSPHORUS 3.5  --   --   --    HEMOGLOBIN A1C  --   --  4.90  --          Lab 09/28/23  0619 09/25/23  2244   TOTAL PROTEIN 6.1 6.8   ALBUMIN 3.8 3.8   GLOBULIN 2.3 3.0   ALT (SGPT) 15 13   AST (SGOT) 20 16   BILIRUBIN 0.4 0.5   ALK PHOS 95 96         Lab 09/26/23  0619 09/26/23  0137 09/25/23  2244   PROBNP  --   --  3,990.0*   HSTROP T 236* 270* 255*         Lab 09/28/23 0619 09/26/23 0619   CHOLESTEROL 176 175   LDL CHOL  --  104*   HDL CHOL  --  27*   TRIGLYCERIDES 232* 256*             Brief Urine Lab Results  (Last result in the past 365 days)        Color   Clarity   Blood   Leuk Est   Nitrite   Protein   CREAT   Urine HCG        09/27/23 1115 Yellow   Turbid   Negative   Large (3+)   Positive   30 mg/dL (1+)                 Microbiology Results (last 10 days)        Procedure Component Value - Date/Time    Urine Culture - Urine, Urine, Clean Catch [[id number]]  (Abnormal) Collected: 09/27/23 1115    Lab Status: Preliminary result Specimen: Urine, Clean Catch Updated: 09/28/23 1211     Urine Culture >100,000 CFU/mL Gram Negative Bacilli    Narrative:      Colonization of the urinary tract without infection is common. Treatment is discouraged unless the patient is symptomatic, pregnant, or undergoing an invasive urologic procedure.            Adult Transthoracic Echo Complete W/ Cont if Necessary Per Protocol    Result Date: 9/27/2023    Left ventricular systolic function is normal. Calculated left ventricular EF = 58.9%   Left ventricular wall thickness is consistent with mild concentric hypertrophy.   Left ventricular diastolic function was indeterminate.   There is mild calcification of the aortic valve.   Estimated right ventricular systolic pressure from tricuspid regurgitation is mildly elevated (35-45 mmHg).   Mild aortic valve regurgitation.     CT Head Without Contrast    Result Date: 9/26/2023  CT HEAD WO CONTRAST Date of Exam: 9/26/2023 4:24 AM EDT Indication: elevated BP, headache, dizziness. Comparison: 2/25/2023. Technique: Axial CT images were obtained of the head without contrast administration.  Automated exposure control and iterative construction methods were used. Findings: There is no evidence of hemorrhage. There is no mass effect or midline shift. Stable periventricular white matter changes are present like related to chronic microvascular ischemic change. There is no extracerebral collection. Ventricles are normal in size and configuration for patient's stated age.  Posterior fossa is within normal limits. Calvarium and skull base appear intact.   Visualized sinuses show no air fluid levels. Visualized orbits are unremarkable.     Impression: No acute intracranial process identified. Electronically Signed: Lisbeth Salinas MD  9/26/2023 4:31 AM EDT  Workstation  ID: [id number]    XR Chest 1 View    Result Date: 9/26/2023  XR CHEST 1 VW Date of Exam: 9/26/2023 1:54 PM EDT Indication: rule out pleural effusions Comparison: 3/2/2023 Findings: There is a right subclavian Tlhzni-n-Kwdh catheter which appears unchanged. There are low lung volumes. There is slight blunting of the CP angles suggesting small amounts of pleural fluid. Previously seen left effusion has decreased in size. There is mild central pulmonary vascular congestion without definite findings of CHF/pulmonary edema. Heart size appears within normal limits. No pneumothorax is identified. Aortic vascular calcifications present.     Impression: 1. Slight blunting of the CP angles bilaterally suggesting small amounts of pleural fluid. 2. Question mild central pulmonary vascular congestion without definite findings of CHF. Electronically Signed: Panda Terrazas MD  9/26/2023 2:31 PM EDT  Workstation ID: [id number]    XR Abdomen KUB    Result Date: 9/28/2023  XR ABDOMEN KUB Date of Exam: 9/28/2023 10:48 AM EDT Indication: PEG tube placement Comparison: None available. Findings: Single view reveals injection of contrast through the patient's PEG tube. Contrast is seen within the stomach and proximal duodenum. No bowel dilatation is identified.     Impression: PEG tube tip is in the stomach. Electronically Signed: Kiana Gloria MD  9/28/2023 12:55 PM EDT  Workstation ID: [id number]    XR Abdomen KUB    Result Date: 9/27/2023  XR ABDOMEN KUB Date of Exam: 9/27/2023 8:32 PM EDT Indication: PEG placement Comparison: 3/10/2023 Findings: There is a percutaneous gastrostomy tube overlying the left mid abdomen. Bowel gas pattern is nonspecific and nonobstructive. Convex left levocurvature of the lumbar spine.     Impression: Percutaneous gastrostomy tube overlies the left mid abdomen. Electronically Signed: Ronaldo Mas MD  9/27/2023 9:00 PM EDT  Workstation ID: [id number]     Results for orders placed during the hospital encounter of  01/19/23    Duplex Venous Lower Extremity - Bilateral CAR    Interpretation Summary    The bilateral lower extremity venous duplex scan is negative for DVT and SVT in the areas visualized.    The tibial level veins were not well visualized, but appeared negative for DVT in the images obtained.      Results for orders placed during the hospital encounter of 01/19/23    Duplex Venous Lower Extremity - Bilateral CAR    Interpretation Summary    The bilateral lower extremity venous duplex scan is negative for DVT and SVT in the areas visualized.    The tibial level veins were not well visualized, but appeared negative for DVT in the images obtained.      Results for orders placed during the hospital encounter of 09/25/23    Adult Transthoracic Echo Complete W/ Cont if Necessary Per Protocol    Interpretation Summary    Left ventricular systolic function is normal. Calculated left ventricular EF = 58.9%    Left ventricular wall thickness is consistent with mild concentric hypertrophy.    Left ventricular diastolic function was indeterminate.    There is mild calcification of the aortic valve.    Estimated right ventricular systolic pressure from tricuspid regurgitation is mildly elevated (35-45 mmHg).    Mild aortic valve regurgitation.      Plan for Follow-up of Pending Labs/Results: with PCP  Pending Labs       Order Current Status    Urine Culture - Urine, Urine, Clean Catch Preliminary result          Discharge Details        Discharge Medications        New Medications        Instructions Start Date   cefuroxime 250 MG tablet  Commonly known as: CEFTIN   250 mg, Oral, 2 Times Daily      losartan 50 MG tablet  Commonly known as: COZAAR   50 mg, Oral, Every 24 Hours Scheduled      nebivolol 5 MG tablet  Commonly known as: BYSTOLIC   5 mg, Oral, Every 24 Hours Scheduled             Changes to Medications        Instructions Start Date   hydrALAZINE 100 MG tablet  Commonly known as: APRESOLINE  What changed:    medication strength  when to take this   100 mg, Oral, 3 Times Daily             Continue These Medications        Instructions Start Date   allopurinol 100 MG tablet  Commonly known as: ZYLOPRIM   100 mg, Oral, Daily      cyclobenzaprine 5 MG tablet  Commonly known as: FLEXERIL   5 mg, Oral, As Needed      diazePAM 2 MG tablet  Commonly known as: VALIUM   2 mg, Oral, 2 Times Daily PRN      furosemide 20 MG tablet  Commonly known as: LASIX   20 mg, Oral, Daily      HYDROcodone-acetaminophen 7.5-325 MG per tablet  Commonly known as: NORCO   1 tablet, Oral, Every 4 Hours PRN      mirtazapine 30 MG tablet  Commonly known as: REMERON   30 mg, Oral, Nightly      nitroglycerin 0.4 MG SL tablet  Commonly known as: NITROSTAT   Place 1 tablet under the tongue Every 5 (Five) Minutes As Needed for Chest Pain. Take no more than 3 doses in 15 minutes.      omeprazole 20 MG capsule  Commonly known as: priLOSEC   20 mg, Oral, Daily      ondansetron 8 MG tablet  Commonly known as: Zofran   8 mg, Oral, Every 8 Hours PRN             Stop These Medications      isosorbide dinitrate 20 MG tablet  Commonly known as: ISORDIL     metoprolol tartrate 100 MG tablet  Commonly known as: LOPRESSOR     PHARMACY MEDS TO BED CONSULT              Allergies   Allergen Reactions    Trazodone Confusion         Discharge Disposition:  Home or Self Care    Diet:  Hospital:  Diet Order   Procedures    Diet: Cardiac Diets; Healthy Heart (2-3 Na+); Texture: Regular Texture (IDDSI 7); Fluid Consistency: Thin (IDDSI 0)            Activity:      Restrictions or Other Recommendations:  N/A       CODE STATUS:    Code Status and Medical Interventions:   Ordered at: 09/26/23 0350     Medical Intervention Limits:    NO intubation (DNI)     Level Of Support Discussed With:    Patient     Code Status (Patient has no pulse and is not breathing):    No CPR (Do Not Attempt to Resuscitate)     Medical Interventions (Patient has pulse or is breathing):    Limited  Support       Future Appointments   Date Time Provider Department Center   10/6/2023  3:00 PM Bossman Doe MD [facility]   10/11/2023  9:30 AM Jennifer Lora MD [facility]       Additional Instructions for the Follow-ups that You Need to Schedule       Ambulatory Referral to Home Health   As directed      Face to Face Visit Date: 9/27/2023   Follow-up provider for Plan of Care?: I treated the patient in an acute care facility and will not continue treatment after discharge.   Follow-up provider: BOSSMAN DOE [[id number]]   Reason/Clinical Findings: impaired endurance   Describe mobility limitations that make leaving home difficult: impaired endurance   Nursing/Therapeutic Services Requested: Skilled Nursing   Skilled nursing orders: Other (assist w/PEG tube feedings if needed)   Frequency: 1 Week 1        Discharge Follow-up with PCP   As directed       Currently Documented PCP:    Bossman Doe MD    PCP Phone Number:    [phone]     Follow Up Details: in 1 week for final urine culture results                      Theresa Valentino DO  09/28/23      Time Spent on Discharge:  I spent  >30  minutes on this discharge activity which included: face-to-face encounter with the patient, reviewing the data in the system, coordination of the care with the nursing staff as well as consultants, documentation, and entering orders.

## 2023-09-28 NOTE — CONSULTS
Clinical Nutrition     Nutrition Support Assessment  Reason for Visit: Follow-up protocol      Patient Name: Vesta Landry  YOB: 1945  MRN: [id number]  Date of Encounter: 09/28/23 15:59 EDT  Admission date: 9/25/2023    Comments: Now advised pt had not tolerated Isosource HN and was swtiched to Vivonex. No RD note visible in system re this. Do have notes that pt was on Vivonex prior to last visit at [facility].    For Home: Vivonex RTF 3 containers/da give 1/4 cup Water for each container. Using pump begin 20 ml/hr advance by 15 ml/hr ev 6 hr w tolerance to goal 65 ml/hr When at goal deliver 6A -8P (over 14 hrs  turning EN off 1 hr before and 1/2 hr after each meal. Give one dose Prosource or Prostat per day and suggest Vitamin Mineral supplement daily.    This will supply w/o Prostat: 748 Kcal 50% est need, 37 g PRO 51% est need 628 ml Water in TF, 801 total ml of Water. With Prostat 848 Kcal 57% est need, 52 g PRO 71% est need same Water.    Nutrition Assessment   Admission Diagnosis:  NSTEMI (non-ST elevated myocardial infarction) [I21.4]      Problem List:    Hypertensive urgency    Chronic diastolic heart failure    Squamous cell carcinoma of left tonsil    Bradycardia    Elevated troponin    HLD (hyperlipidemia)    Squamous cell carcinoma of head and neck    Hypokalemia    Elevated serum creatinine    Nonischemic nontraumatic myocardial injury    Moderate malnutrition        PMH:   She  has a past medical history of Arthritis, Cancer, Cancer of tonsil, CHF (congestive heart failure), Deep vein thrombosis, Dementia, Dysphagia, GERD (gastroesophageal reflux disease), Hyperlipidemia, Hypertension, Impaired mobility, PONV (postoperative nausea and vomiting), SOB (shortness of breath), Vitamin D deficiency, and Wears dentures.    PSH:  She  has a past surgical history that includes Colonoscopy; Appendectomy; Cataract extraction; Cholecystectomy; Tubal ligation; Hysterectomy (1991);  Oophorectomy (Bilateral, 1991); Portacath placement (Right, 02/08/2023); and Esophagogastroduodenoscopy w/ PEG (N/A, 2/20/2023).      Applicable Nutrition Concerns:   Skin:  Oral:  GI:      Applicable Interval History:         Reported/Observed/Food/Nutrition Related History:     9/28  For d/c today. EN not started. Pt able to participate in nutrition exam.    9/27  Family request same EN formula as used prior at same rate delivered via pump Do not want to deliver at night as pt does not wish to sleep w head elevated and allow will take pt off pump to eat etc. Also want EN to assure pt gets needs.       Labs    Labs Reviewed: Yes     Results from last 7 days   Lab Units 09/28/23  0619 09/27/23  0538 09/26/23  0619 09/25/23  2244   GLUCOSE mg/dL 102* 105* 95 105*   BUN mg/dL 22 18 15 14   CREATININE mg/dL 1.45* 1.19* 0.97 1.17*   SODIUM mmol/L 146* 143 144 141   CHLORIDE mmol/L 109* 107 107 104   POTASSIUM mmol/L 4.0 4.3 3.2* 3.2*   PHOSPHORUS mg/dL 3.5  --   --   --    MAGNESIUM mg/dL 2.0 1.9 1.9  --    ALT (SGPT) U/L 15  --   --  13         Results from last 7 days   Lab Units 09/28/23  0619 09/26/23  0619 09/25/23  2244   ALBUMIN g/dL 3.8  --  3.8   PREALBUMIN mg/dL 17.6*  --   --    CHOLESTEROL mg/dL 176 175  --    TRIGLYCERIDES mg/dL 232* 256*  --          Results from last 7 days   Lab Units 09/28/23  1128 09/28/23  0614 09/27/23  2329 09/27/23  1751   GLUCOSE mg/dL 154* 132* 103 152*       Lab Results   Lab Value Date/Time    HGBA1C 4.90 09/26/2023 0619    HGBA1C 5.30 01/20/2023 0643         Results from last 7 days   Lab Units 09/25/23  2244   PROBNP pg/mL 3,990.0*           Medications    Medications Reviewed: Yes  Pertinent: allopurinol, abx, lasix, remeron, multi vit min, protonix   Infusion:  PRN:       Intake/Ouptut 24 hrs (0701 - 0700)   I&O's Reviewed: Yes     Intake & Output (last day)         09/27 0701 09/28 0700 09/28 0701 09/29 0700    P.O. 240 240    IV Piggyback  100    Total Intake(mL/kg) 240  "(4 340 (5.7)    Net +240 +340                Last stool recorded x 2 on 9/26    Anthropometrics     Flowsheet Rows      Flowsheet Row First Filed Value   Admission Height 154.9 cm (61\") Documented at 09/25/2023 2104   Admission Weight 61.2 kg (135 lb) Documented at 09/25/2023 2104       Height: Height: 154.9 cm (60.98\")  Last Filed Weight: Weight: 59.6 kg (131 lb 6.4 oz) (09/28/23 0500)  Method: Weight Method: Standing scale  BMI: BMI (Calculated): 24.8  BMI classification: Overweight: 25.0-29.9kg/m2     UBW:  Per EMR  154 lbs on 1/25 146 lbs on 2/27 135 lbs on 9/7  Weight change: ?contribution of fluid possible loss of 20 lbs 13% body wt over 9 months    Nutrition Focused Physical Exam     Date: 9/28  Pt meets criteria for moderate chronic malnutrition based on wasting. See MSA note    Needs Assessment   Date:9/27    Height used:Height: 154.9 cm (60.98\")  Weights used: 61/.1 Kg 134 lbs    Estimated Calorie needs: ~ 1500 Kcal/day  Method:  Kcals/KG 5 = 1528    Estimated Protein needs: ~ 73 g PRO/day  Method: g/Kg 1.2    Estimated Fluid needs: ~ ml/day   Per clinical status    Current Nutrition Prescription     PO: Diet: Cardiac Diets; Healthy Heart (2-3 Na+); Texture: Regular Texture (IDDSI 7); Fluid Consistency: Thin (IDDSI 0)  Oral Nutrition Supplement:   Intake: 2 Days: 31% x 4meals recorded    EN: IsoSource HN  Goal Rate: 65 ml/hr   Water Flushes: 15 ml/hr  Modular: Prosource -no carb 1/day  Route: PEG  Tube: Large bore    At goal over: Other 11.5 hr/da (14 hr during the day turn off 1 hr before and 1/2 hr after meals)     Rx will supply w prosource  Goal Volume 748  mL/day     Flush Volume 172 mL/day     Energy 957 Kcal/day 64 % Est Need   Protein 55 g/day 75 % Est Need   Fiber 0 g/day     Water in   mL     Total Water 778 mL     Meet DRI No      Vit Min to be given  Note if use Prostat at home Kcal 997 =  66% est " need  --------------------------------------------------------------------------  Product/Rate verified at bedside: No  Infusing Rate at time of visit: not started    Average Delivery from Charting: N/A  Volume  mL/day   % Goal Vol.   Flush Volume  mL/day     Energy  Kcal/day  % Est Need   Protein  g/day  % Est Need   Fiber  g/day     Water in  EN  mL     Total Water  mL     Meet DRI N/A              Nutrition Diagnosis   Date: 9/27 Updated: 9/28  Problem Inadequate oral intake   Etiology Inconsistent appetite   Signs/Symptoms Per rpt of intake PTA    Status: ordered EN not yet started     Date:  9/28 Updated:  Problem Malnutrition moderate chronic   Etiology Period of depressed intake   Signs/Symptoms Wasting   Status:      Goal:   General: Nutrition support treatment  PO: Increase intake  EN/PN: Initiate EN, Tolerate EN at goal    Nutrition Intervention      Follow treatment progress, Care plan reviewed, EN order for home prepared.    Advised pt had not tolerated Isosource HN and was swtiched to Vivonex. No RD note visible in system re this. Do have notes that pt was on Vivonex prior to last visit at [facility].    For Home: Vivonex RTF 3 containers/da give 1/4 cup Water for each container. Using pump begin 20 ml/hr advance by 15 ml/hr ev 6 hr w tolerance to goal 65 ml/hr When at goal deliver 6A -8P (over 14 hrs  turning EN off 1 hr before and 1/2 hr after each meal. Give one dose Prosource or Prostat per day and suggest Vitamin Mineral supplement daily.    This will supply w/o Prostat: 748 Kcal 50% est need, 37 g PRO 51% est need 628 ml Water in TF, 801 total ml of Water. With Prostat 848 Kcal 57% est need, 52 g PRO 71% est need same Water.      Monitoring/Evaluation:   Per protocol, I&O, PO intake, Pertinent labs, EN delivery/tolerance, Weight, Symptoms, POC/GOC      Joanne Covington RD  Time Spent: 35 min

## 2023-09-29 ENCOUNTER — TELEPHONE (OUTPATIENT)
Dept: INTERNAL MEDICINE | Facility: CLINIC | Age: 78
End: 2023-09-29

## 2023-09-29 ENCOUNTER — TRANSITIONAL CARE MANAGEMENT TELEPHONE ENCOUNTER (OUTPATIENT)
Dept: CALL CENTER | Facility: HOSPITAL | Age: 78
End: 2023-09-29
Payer: MEDICARE

## 2023-09-29 LAB — BACTERIA SPEC AEROBE CULT: ABNORMAL

## 2023-09-29 NOTE — OUTREACH NOTE
Call Center TCM Note      Flowsheet Row Responses   [facility], [place] patient discharged from? [facility]   Does the patient have one of the following disease processes/diagnoses(primary or secondary)? Other   TCM attempt successful? Yes   Call start time 1504   Call end time 1508   Discharge diagnosis Hypertensive urgency   Is patient permission given to speak with other caregiver? Yes   List who call center can speak with Meliza Back   Person spoke with today (if not patient) and relationship Meliza Back- daughter   Meds reviewed with patient/caregiver? Yes   Is the patient having any side effects they believe may be caused by any medication additions or changes? No   Does the patient have all medications ordered at discharge? Yes   Is the patient taking all medications as directed (includes completed medication regime)? Yes   What is the Home health agency?  [facility]   Has home health visited the patient within 72 hours of discharge? No   Psychosocial issues? No   Did the patient receive a copy of their discharge instructions? Yes   Nursing interventions Reviewed instructions with patient   What is the patient's perception of their health status since discharge? Improving   Is the patient/caregiver able to teach back signs and symptoms related to disease process for when to call PCP? Yes   TCM call completed? Yes   Call end time 1508            Susanna Kuo LPN    9/29/2023, 15:11 EDT

## 2023-09-29 NOTE — TELEPHONE ENCOUNTER
PATIENT WAS RELEASED FROM HOSPITAL YESTERDAY WITH UTI.  THE HOSPITAL WAS SUPPOSE TO REACH OUT TO THEM TO SEE IF ANTIBIOTIC WAS THE CORRECT ONE TO FIGHT THE UTI BUT THEY TOLD HER TO REACH OUT TO DR DOE TO CHECK RESULTS.      PLEASE CALL [phone]

## 2023-09-29 NOTE — OUTREACH NOTE
Call Center TCM Note      Flowsheet Row Responses   [facility], [place], operated by [facility] patient discharged from? [place]   Does the patient have one of the following disease processes/diagnoses(primary or secondary)? Other   TCM attempt successful? No   Unsuccessful attempts Attempt 1            Susanna Kuo LPN    9/29/2023, 08:12 EDT

## 2023-09-30 ENCOUNTER — HOME CARE VISIT (OUTPATIENT)
Dept: HOME HEALTH SERVICES | Facility: HOME HEALTHCARE | Age: 78
End: 2023-09-30
Payer: MEDICARE

## 2023-09-30 VITALS
OXYGEN SATURATION: 98 % | SYSTOLIC BLOOD PRESSURE: 158 MMHG | TEMPERATURE: 97.9 F | HEART RATE: 78 BPM | RESPIRATION RATE: 16 BRPM | DIASTOLIC BLOOD PRESSURE: 82 MMHG

## 2023-09-30 PROCEDURE — G0299 HHS/HOSPICE OF RN EA 15 MIN: HCPCS

## 2023-09-30 NOTE — HOME HEALTH
"SOC Note:    Home Health ordered for: disciplines SN    Reason for Hosp/Primary Dx/Co-morbidities: 78-year-old female w/ PMH of HTN, HF, HLD, squamous cell carcinoma of head, neck, and left tonsil s/p chemo and radiation.  Presented to the ED for headache.  Found to be in hypertensive urgency and acute UTI. Discharged home on 9.28.23.   Recently restarted tube feeds via PEG tube; also eats via mouth.  PORT in place, but patient is refusing any further chemo or radiation.   Focus of Care: Education regarding HTN, enteral feeding education, PEG tube maintenance, medication education, pain management    Patient's goal(s):  \"I want to be able to go where I want to without dragging around my tube feeding pump.\"    Current Functional status/mobility/DME: Minimal assist.  Use of rollator.     HB status/Living Arrangements: Homebound.  Lives with  in one-story home.     Skin Integrity/wound status: CDI    Code Status: No CPR    Fall Risk/Safety concerns: high fall risk    Medication issues/Concerns:  N/A    Additional Problems/Concerns: N/A    SDOH Barriers (i.e. caregiver concerns, social isolation, transportation, food insecurity, environment, income etc.)/Need for MSW: N/A    Plan for next visit: Assess PEG tube site, TF's going well?, BP log, medication education, pain management"

## 2023-10-03 ENCOUNTER — HOME CARE VISIT (OUTPATIENT)
Dept: HOME HEALTH SERVICES | Facility: HOME HEALTHCARE | Age: 78
End: 2023-10-03
Payer: MEDICARE

## 2023-10-03 ENCOUNTER — TELEPHONE (OUTPATIENT)
Dept: INTERNAL MEDICINE | Facility: CLINIC | Age: 78
End: 2023-10-03
Payer: MEDICARE

## 2023-10-03 PROCEDURE — G0495 RN CARE TRAIN/EDU IN HH: HCPCS

## 2023-10-03 NOTE — TELEPHONE ENCOUNTER
Family believes new medications are causing her headaches. Appointment made for today.     Changed to tomorrow.

## 2023-10-03 NOTE — TELEPHONE ENCOUNTER
Caller: MATTI ROBBINS     Relationship: DAUGHTER     Best call back number: [phone]     What is your medical concern? SEVERE HEADACHE, BP /74    How long has this issue been going on? 2 DAYS    Is your provider already aware of this issue? NO    Have you been treated for this issue? NO    SHE ASKED IF SHE NEEDS TO BE SEEN TOMORROW INSTEAD OF FRIDAY.

## 2023-10-04 VITALS
HEART RATE: 63 BPM | TEMPERATURE: 97.5 F | DIASTOLIC BLOOD PRESSURE: 73 MMHG | SYSTOLIC BLOOD PRESSURE: 142 MMHG | RESPIRATION RATE: 16 BRPM | OXYGEN SATURATION: 98 %

## 2023-10-04 NOTE — HOME HEALTH
Routine Visit Note:    Skill/education provided: T/I PEG tube maintenance and HTN.    Patient/caregiver response: Verbalized understanding.     Plan for next visit: Assess knowledge/understanding of s/s HTN, pain control, and assess PEG tube.    Other pertinent info: N/A

## 2023-10-06 ENCOUNTER — OFFICE VISIT (OUTPATIENT)
Dept: INTERNAL MEDICINE | Facility: CLINIC | Age: 78
End: 2023-10-06
Payer: MEDICARE

## 2023-10-06 VITALS
OXYGEN SATURATION: 98 % | HEIGHT: 61 IN | DIASTOLIC BLOOD PRESSURE: 77 MMHG | HEART RATE: 72 BPM | TEMPERATURE: 98 F | WEIGHT: 136.8 LBS | SYSTOLIC BLOOD PRESSURE: 147 MMHG | BODY MASS INDEX: 25.83 KG/M2

## 2023-10-06 DIAGNOSIS — C09.9 SQUAMOUS CELL CARCINOMA OF LEFT TONSIL: Primary | ICD-10-CM

## 2023-10-06 DIAGNOSIS — I10 HTN (HYPERTENSION), BENIGN: ICD-10-CM

## 2023-10-06 DIAGNOSIS — R60.0 BILATERAL EDEMA OF LOWER EXTREMITY: ICD-10-CM

## 2023-10-06 PROCEDURE — 99214 OFFICE O/P EST MOD 30 MIN: CPT | Performed by: INTERNAL MEDICINE

## 2023-10-06 PROCEDURE — 1159F MED LIST DOCD IN RCRD: CPT | Performed by: INTERNAL MEDICINE

## 2023-10-06 PROCEDURE — 1160F RVW MEDS BY RX/DR IN RCRD: CPT | Performed by: INTERNAL MEDICINE

## 2023-10-06 PROCEDURE — 3077F SYST BP >= 140 MM HG: CPT | Performed by: INTERNAL MEDICINE

## 2023-10-06 PROCEDURE — 3078F DIAST BP <80 MM HG: CPT | Performed by: INTERNAL MEDICINE

## 2023-10-06 PROCEDURE — G0008 ADMIN INFLUENZA VIRUS VAC: HCPCS | Performed by: INTERNAL MEDICINE

## 2023-10-06 PROCEDURE — 90662 IIV NO PRSV INCREASED AG IM: CPT | Performed by: INTERNAL MEDICINE

## 2023-10-06 RX ORDER — FUROSEMIDE 20 MG/1
20 TABLET ORAL DAILY
Qty: 90 TABLET | Refills: 3 | Status: SHIPPED | OUTPATIENT
Start: 2023-10-06

## 2023-10-06 RX ORDER — MULTIPLE VITAMINS W/ MINERALS TAB 9MG-400MCG
1 TAB ORAL DAILY
COMMUNITY

## 2023-10-06 RX ORDER — NEBIVOLOL 5 MG/1
5 TABLET ORAL
Qty: 30 TABLET | Refills: 2 | Status: SHIPPED | OUTPATIENT
Start: 2023-10-06

## 2023-10-06 NOTE — PROGRESS NOTES
"Subjective  Vesta Landry is a 78 y.o. female    HPI coming in for follow-up patient with a history of squamous cell carcinoma has undergone treatment and appears to be currently stable.  In fact she is now off hospice.  Has a G-tube which is currently being used again.  Oral intake is fair to poor.  Denies any fever or chills denies any dysphagia or abdominal fascia    The following portions of the patient's history were reviewed and updated as appropriate: allergies, current medications, past family history, past medical history, past social history, past surgical history, and problem list.     Review of Systems   Constitutional:  Positive for fatigue. Negative for activity change, appetite change and fever.   HENT:  Negative for congestion, ear discharge, ear pain and trouble swallowing.    Eyes:  Negative for photophobia and visual disturbance.   Respiratory:  Negative for cough and shortness of breath.    Cardiovascular:  Negative for chest pain and palpitations.   Gastrointestinal:  Negative for abdominal distention, abdominal pain, constipation, diarrhea, nausea and vomiting.   Endocrine: Negative.    Genitourinary:  Negative for dysuria, hematuria and urgency.   Musculoskeletal:  Positive for arthralgias. Negative for back pain, joint swelling and myalgias.   Skin:  Negative for color change and rash.   Allergic/Immunologic: Negative.    Neurological:  Negative for dizziness, weakness, light-headedness and headaches.   Hematological:  Negative for adenopathy. Does not bruise/bleed easily.   Psychiatric/Behavioral:  Negative for agitation, confusion and dysphoric mood. The patient is not nervous/anxious.      Visit Vitals  /77   Pulse 72   Temp 98 øF (36.7 øC)   Ht 154.9 cm (60.98\")   Wt 62.1 kg (136 lb 12.8 oz)   SpO2 98%   BMI 25.86 kg/mý       Objective  Physical Exam  Constitutional:       General: She is not in acute distress.     Appearance: She is well-developed.   HENT:      Nose: Nose " normal.   Eyes:      General: No scleral icterus.     Conjunctiva/sclera: Conjunctivae normal.   Neck:      Thyroid: No thyromegaly.      Trachea: No tracheal deviation.   Cardiovascular:      Rate and Rhythm: Normal rate and regular rhythm.      Heart sounds: No murmur heard.    No friction rub.   Pulmonary:      Effort: No respiratory distress.      Breath sounds: No wheezing or rales.   Abdominal:      General: There is no distension.      Palpations: Abdomen is soft. There is no mass.      Tenderness: There is no abdominal tenderness. There is no guarding.   Musculoskeletal:         General: Deformity present. Normal range of motion.   Lymphadenopathy:      Cervical: No cervical adenopathy.   Skin:     General: Skin is warm and dry.      Findings: No erythema or rash.   Neurological:      Mental Status: She is alert and oriented to person, place, and time.      Cranial Nerves: No cranial nerve deficit.      Coordination: Coordination normal.      Deep Tendon Reflexes: Reflexes are normal and symmetric.   Psychiatric:         Behavior: Behavior normal.         Thought Content: Thought content normal.         Judgment: Judgment normal.       Diagnoses and all orders for this visit:    Squamous cell carcinoma of left tonsil currently stable no new symptoms encouraged to increase oral intake    HTN (hypertension), benign stable on Bystolic along with losartan follow BP readings at home    Bilateral edema of lower extremity discussed leg elevation compression stockings as needed    Other orders  -     multivitamin with minerals (MULTIVITAMIN ADULT PO); Take 1 tablet by mouth Daily.  -     Amino Acids-Protein Hydrolys (PRO-STAT PO); Administer  per G tube.  -     Fluzone High-Dose 65+yrs ([id number])

## 2023-10-09 ENCOUNTER — TELEPHONE (OUTPATIENT)
Dept: SURGERY | Facility: CLINIC | Age: 78
End: 2023-10-09
Payer: MEDICARE

## 2023-10-09 ENCOUNTER — OFFICE VISIT (OUTPATIENT)
Dept: SURGERY | Facility: CLINIC | Age: 78
End: 2023-10-09
Payer: MEDICARE

## 2023-10-09 DIAGNOSIS — R13.19 ESOPHAGEAL DYSPHAGIA: Primary | ICD-10-CM

## 2023-10-09 NOTE — TELEPHONE ENCOUNTER
Pt's PEG tube has fallen out per daughter.  Per Dr Guerra I notified the daughter to bring her to same day surgery for replacement of feeding tube.  The daughter is concerned with sedating her mother.  I told her to take her mother for evaluation to same day surgery.  She voiced understanding.

## 2023-10-10 ENCOUNTER — HOSPITAL ENCOUNTER (OUTPATIENT)
Dept: GENERAL RADIOLOGY | Facility: HOSPITAL | Age: 78
Discharge: HOME | End: 2023-10-10
Admitting: SURGERY
Payer: MEDICARE

## 2023-10-10 ENCOUNTER — HOME CARE VISIT (OUTPATIENT)
Dept: HOME HEALTH SERVICES | Facility: HOME HEALTHCARE | Age: 78
End: 2023-10-10
Payer: MEDICARE

## 2023-10-10 ENCOUNTER — TELEPHONE (OUTPATIENT)
Dept: SURGERY | Facility: CLINIC | Age: 78
End: 2023-10-10
Payer: MEDICARE

## 2023-10-10 DIAGNOSIS — R13.19 ESOPHAGEAL DYSPHAGIA: Primary | ICD-10-CM

## 2023-10-10 DIAGNOSIS — R13.19 ESOPHAGEAL DYSPHAGIA: ICD-10-CM

## 2023-10-10 PROCEDURE — 0 DIATRIZOATE MEGLUMINE & SODIUM PER 1 ML: Performed by: SURGERY

## 2023-10-10 PROCEDURE — 74018 RADEX ABDOMEN 1 VIEW: CPT

## 2023-10-10 RX ADMIN — DIATRIZOATE MEGLUMINE AND DIATRIZOATE SODIUM 25 ML: 660; 100 LIQUID ORAL; RECTAL at 14:41

## 2023-10-10 NOTE — PROGRESS NOTES
Patient: Vesta Landry    YOB: 1945    Date: 10/09/2023    Primary Care Provider: Bossman Pedraza MD        SUBJECTIVE:    History of present illness: I saw the patient in the outpatient area today as a consultation for evaluation and treatment of a history significant for removal of the G-tube inadvertently this morning.  Apparently I placed a PEG tube and the patient in February of this year, patient states when she woke up this morning the G-tube had fallen out.  She is currently using the G-tube because of difficulty swallowing, she was being treated for oropharyngeal carcinoma.  She did suffer a myocardial infarction during treatment and the treatment was stopped.    The following portions of the patient's history were reviewed and updated as appropriate: allergies, current medications, past family history, past medical history, past social history, past surgical history and problem list.      Review of Systems    Allergies:  Allergies   Allergen Reactions    Trazodone Confusion       Medications:    Current Outpatient Medications:     allopurinol (ZYLOPRIM) 100 MG tablet, Take 1 tablet by mouth Daily., Disp: 90 tablet, Rfl: 3    Amino Acids-Protein Hydrolys (PRO-STAT PO), Administer  per G tube., Disp: , Rfl:     cefuroxime (CEFTIN) 250 MG tablet, Take 1 tablet by mouth 2 (Two) Times a Day., Disp: 6 tablet, Rfl: 0    cyclobenzaprine (FLEXERIL) 5 MG tablet, Take 1 tablet by mouth As Needed for Muscle Spasms. Indications: Muscle Spasm, Disp: , Rfl:     diazePAM (VALIUM) 2 MG tablet, Take 1 tablet by mouth 2 (Two) Times a Day As Needed for Anxiety. Indications: Feeling Anxious, Disp: , Rfl:     furosemide (LASIX) 20 MG tablet, Take 1 tablet by mouth Daily. Indications: Cardiac Failure, Edema, High Blood Pressure Disorder, Disp: 90 tablet, Rfl: 3    hydrALAZINE (APRESOLINE) 100 MG tablet, Take 1 tablet by mouth 3 (Three) Times a Day., Disp: 90 tablet, Rfl: 2    HYDROcodone-acetaminophen  (NORCO) 7.5-325 MG per tablet, Take 1 tablet by mouth Every 4 (Four) Hours As Needed for Moderate Pain. Indications: Pain, Disp: , Rfl:     losartan (COZAAR) 50 MG tablet, Take 1 tablet by mouth Daily., Disp: 30 tablet, Rfl: 2    MAGIC MOUTHWASH 1/1/1 SUSP, Swish and spit 5 mL 3 (Three) Times a Day As Needed for Stomatitis., Disp: 180 mL, Rfl: 2    mirtazapine (REMERON) 30 MG tablet, Take 1 tablet by mouth Every Night., Disp: 90 tablet, Rfl: 1    multivitamin with minerals (MULTIVITAMIN ADULT PO), Take 1 tablet by mouth Daily., Disp: , Rfl:     nebivolol (BYSTOLIC) 5 MG tablet, Take 1 tablet by mouth Daily. Indications: High Blood Pressure Disorder, Disp: 30 tablet, Rfl: 2    nitroglycerin (NITROSTAT) 0.4 MG SL tablet, Place 1 tablet under the tongue Every 5 (Five) Minutes As Needed for Chest Pain. Take no more than 3 doses in 15 minutes., Disp: 50 tablet, Rfl: 0    Nutritional Supplements (Vivonex RTF) liquid, 65 mL by Per PEG Tube route Every 1 (One) Hour. 2537-2107, Disp: , Rfl:     omeprazole (priLOSEC) 20 MG capsule, Take 1 capsule by mouth Daily., Disp: 30 capsule, Rfl: 0    ondansetron (Zofran) 8 MG tablet, Take 1 tablet by mouth Every 8 (Eight) Hours As Needed for Nausea or Vomiting., Disp: 30 tablet, Rfl: 3    History:  Past Medical History:   Diagnosis Date    Arthritis     Cancer     squamous cell - tonsils    Cancer of tonsil     metastatic squamous cell - left tonsil    CHF (congestive heart failure)     Deep vein thrombosis     bilateral- daughter states on 2/17/23 that she doesn't remember patient having this    Dementia     Dysphagia     GERD (gastroesophageal reflux disease)     Hyperlipidemia     Hypertension     Impaired mobility     PONV (postoperative nausea and vomiting)     SOB (shortness of breath)     following chemo infusion on day of infusion last week (week of 1/29-2/4/23) per pt's daughter    Vitamin D deficiency     Wears dentures     uppers - instructed not to use adhesive DOS        Past Surgical History:   Procedure Laterality Date    APPENDECTOMY      CATARACT EXTRACTION      CHOLECYSTECTOMY      COLONOSCOPY      Approx 2009    ENDOSCOPY W/ PEG TUBE PLACEMENT N/A 2023    Procedure: ESOPHAGOGASTRODUODENOSCOPY WITH PERCUTANEOUS ENDOSCOPIC GASTROSTOMY TUBE INSERTION;  Surgeon: Brigido Guerra MD;  Location: [facility] ENDOSCOPY;  Service: Gastroenterology;  Laterality: N/A;    HYSTERECTOMY      OOPHORECTOMY Bilateral 1991    PORTACATH PLACEMENT Right 2023    Procedure: INSERTION OF PORTACATH;  Surgeon: Brigido Guerra MD;  Location: [facility] OR;  Service: General;  Laterality: Right;    TUBAL ABDOMINAL LIGATION         Family History   Problem Relation Age of Onset    Heart failure Mother     Hyperlipidemia Mother     Hypertension Mother     Cancer Father     Cancer Sister     Stroke Sister     Breast cancer Maternal Aunt     Breast cancer Paternal Aunt     Ovarian cancer Neg Hx        Social History     Tobacco Use    Smoking status: Former     Packs/day: 0.15     Years: 10.00     Additional pack years: 0.00     Total pack years: 1.50     Types: Cigarettes     Quit date: 1987     Years since quittin.7    Smokeless tobacco: Never   Vaping Use    Vaping Use: Never used   Substance Use Topics    Alcohol use: No    Drug use: No        OBJECTIVE:    Vital Signs:   There were no vitals filed for this visit.    Physical Exam:   General Appearance:    Alert, cooperative, in no acute distress   Head:    Normocephalic, without obvious abnormality, atraumatic   Eyes:            Lids and lashes normal, conjunctivae and sclerae normal, no   icterus, no pallor, corneas clear, PERRLA   Ears:    Ears appear intact with no abnormalities noted   Throat:   No oral lesions, no thrush, oral mucosa moist   Neck:   No adenopathy, supple, trachea midline, no thyromegaly, no   carotid bruit, no JVD   Lungs:     Clear to auscultation,respirations regular, even and                  unlabored     Heart:    Regular rhythm and normal rate, normal S1 and S2, no            murmur, no gallop, no rub, no click   Chest Wall:    No abnormalities observed   Abdomen:     Normal bowel sounds, no masses, no organomegaly, soft        non-tender, non-distended, no guarding, no rebound                tenderness, well established granulation tract in the left upper quadrant of the anterior abdominal wall from previous G-tube insertion with normal-appearing granulation tissue   Extremities:   Moves all extremities well, no edema, no cyanosis, no             redness   Pulses:   Pulses palpable and equal bilaterally   Skin:   No bleeding, bruising or rash   Lymph nodes:   No palpable adenopathy   Neurologic:   Cranial nerves 2 - 12 grossly intact, sensation intact, DTR       present and equal bilaterally     Results Review:   I reviewed the patient's new clinical results.  I reviewed the patient's new imaging results and agree with the interpretation.  I reviewed the patient's other test results and agree with the interpretation    Review of Systems was reviewed and confirmed as accurate as documented by the MA.    ASSESSMENT/PLAN:    1. Esophageal dysphagia        In short, I had a clear and detailed discussion with the patient and her daughter today at the bedside.  An 18 French PEG tube was replaced by myself through the old tract without difficulty after lubricating this.  The balloon was insufflated and then the bumper was snugly applied to the skin.  Patient was stable and subsequently I will allow her to go home today with follow-up as needed.    I discussed the patients findings and my recommendations with patient and family        Electronically signed by Brigido Guerra MD  10/10/23

## 2023-10-10 NOTE — TELEPHONE ENCOUNTER
Per Dr. Guerra, pt will have KUB with gastrografin for feeding tube placement confirmation.  Pt's daughter informed.  
Per Dr. Guerra, pt's daughter informed that per KUB xray with gastrografin, feeding tube in in good position.  
Pt's daughter is requesting an x-ray to make sure the feeding tube is in the right spot.  Can we order an x-ray?  
7

## 2023-10-16 ENCOUNTER — HOME CARE VISIT (OUTPATIENT)
Dept: HOME HEALTH SERVICES | Facility: HOME HEALTHCARE | Age: 78
End: 2023-10-16
Payer: MEDICARE

## 2023-10-16 PROCEDURE — G0299 HHS/HOSPICE OF RN EA 15 MIN: HCPCS

## 2023-10-17 VITALS
TEMPERATURE: 45.3 F | SYSTOLIC BLOOD PRESSURE: 146 MMHG | DIASTOLIC BLOOD PRESSURE: 64 MMHG | RESPIRATION RATE: 15 BRPM | HEART RATE: 65 BPM

## 2023-10-17 NOTE — HOME HEALTH
Routine Visit Note: LPN    Upon assessment, there was exccessive drainage out of the PEG tube insertion site. Patient stated that she has to change clothes twice daily due to the large amount of drainage soaking through her dressing and clothes. No s/s of infection noted. Wound pictures taken and uploaded to patient's chart per policy. I called Dr. Guerra's office and spoke to the nurse GREG. She set an appointment for the patient to come in on Thursday 10/19/23. Patient's daughter will be taking her to the doctor's appointment on Thursday.      Skill/education provided: Assessment of PEG tube. Performed dressing change at peg tube site. Instructed on s/s of infection. Assessed patient and caregiver/spouse's knowledge/understanding of tube maintenance.  Instructed on s/s of HTN. Pain management.     Patient/caregiver response: Patient tolerated dressing change well. Patient/caregiver verbalized understanding of instructions.     Plan for next visit: Assess PEG tube. Assess pain. Dressing change.                            Other pertinent info: N/A

## 2023-10-17 NOTE — PROGRESS NOTES
"Patient: Vesta Landry  YOB: 1945    Date: 10/19/2023    Primary Care Provider: Bossman Pedraza MD    Chief Complaint   Patient presents with    Follow-up       History: Patient is here for follow-up PEG tube placement which was performed in the office 10/09/2023.  Patient complains of \"leaking at the site.\"    She does have some leakage around the g tube, there is some vague left upper quadrant discomfort.    The following portions of the patient's history were reviewed and updated as appropriate: allergies, current medications, past family history, past medical history, past social history, past surgical history and problem list.    Review of Systems   Constitutional:  Negative for chills, fever and unexpected weight change.   HENT:  Negative for hearing loss, trouble swallowing and voice change.    Eyes:  Negative for visual disturbance.   Respiratory:  Negative for apnea, cough, chest tightness, shortness of breath and wheezing.    Cardiovascular:  Negative for chest pain, palpitations and leg swelling.   Gastrointestinal:  Negative for abdominal distention, abdominal pain, anal bleeding, blood in stool, constipation, diarrhea, nausea, rectal pain and vomiting.   Endocrine: Negative for cold intolerance and heat intolerance.   Genitourinary:  Negative for difficulty urinating, dysuria and flank pain.   Musculoskeletal:  Negative for back pain and gait problem.   Skin:  Negative for color change, rash and wound.   Neurological:  Negative for dizziness, syncope, speech difficulty, weakness, light-headedness, numbness and headaches.   Hematological:  Negative for adenopathy. Does not bruise/bleed easily.   Psychiatric/Behavioral:  Negative for confusion. The patient is not nervous/anxious.        Vital Signs  Vitals:    10/19/23 1505   BP: 142/78   Pulse: 58   Temp: 96.2 °F (35.7 °C)   SpO2: 99%   Weight: 64.2 kg (141 lb 9.6 oz)   Height: 154.9 cm (60.98\")       Allergies:  Allergies "   Allergen Reactions    Trazodone Confusion       Medications:    Current Outpatient Medications:     allopurinol (ZYLOPRIM) 100 MG tablet, Take 1 tablet by mouth Daily., Disp: 90 tablet, Rfl: 3    Amino Acids-Protein Hydrolys (PRO-STAT PO), Administer  per G tube., Disp: , Rfl:     cefuroxime (CEFTIN) 250 MG tablet, Take 1 tablet by mouth 2 (Two) Times a Day., Disp: 6 tablet, Rfl: 0    cyclobenzaprine (FLEXERIL) 5 MG tablet, Take 1 tablet by mouth As Needed for Muscle Spasms. Indications: Muscle Spasm, Disp: , Rfl:     diazePAM (VALIUM) 2 MG tablet, Take 1 tablet by mouth 2 (Two) Times a Day As Needed for Anxiety. Indications: Feeling Anxious, Disp: , Rfl:     furosemide (LASIX) 20 MG tablet, Take 1 tablet by mouth Daily. Indications: Cardiac Failure, Edema, High Blood Pressure Disorder, Disp: 90 tablet, Rfl: 3    hydrALAZINE (APRESOLINE) 100 MG tablet, Take 1 tablet by mouth 3 (Three) Times a Day., Disp: 90 tablet, Rfl: 2    HYDROcodone-acetaminophen (NORCO) 7.5-325 MG per tablet, Take 1 tablet by mouth Every 4 (Four) Hours As Needed for Moderate Pain. Indications: Pain, Disp: , Rfl:     losartan (COZAAR) 100 MG tablet, LOSARTAN POTASSIUM 100 MG TABS, Disp: , Rfl:     MAGIC MOUTHWASH 1/1/1 SUSP, Swish and spit 5 mL 3 (Three) Times a Day As Needed for Stomatitis., Disp: 180 mL, Rfl: 2    mirtazapine (REMERON) 30 MG tablet, Take 1 tablet by mouth Every Night., Disp: 90 tablet, Rfl: 1    multivitamin with minerals (MULTIVITAMIN ADULT PO), Take 1 tablet by mouth Daily., Disp: , Rfl:     nebivolol (BYSTOLIC) 5 MG tablet, Take 1 tablet by mouth Daily. Indications: High Blood Pressure Disorder, Disp: 30 tablet, Rfl: 2    Nutritional Supplements (Vivonex RTF) liquid, 65 mL by Per PEG Tube route Every 1 (One) Hour. 4239-3265, Disp: , Rfl:     omeprazole (priLOSEC) 20 MG capsule, Take 1 capsule by mouth Daily., Disp: 30 capsule, Rfl: 0    ondansetron (Zofran) 8 MG tablet, Take 1 tablet by mouth Every 8 (Eight) Hours As  Needed for Nausea or Vomiting., Disp: 30 tablet, Rfl: 3    nitroglycerin (NITROSTAT) 0.4 MG SL tablet, Place 1 tablet under the tongue Every 5 (Five) Minutes As Needed for Chest Pain. Take no more than 3 doses in 15 minutes., Disp: 50 tablet, Rfl: 0    Physical Exam:   General Appearance:    Alert, cooperative, in no acute distress   Head:    Normocephalic, without obvious abnormality, atraumatic   Lungs:     Clear to auscultation,respirations regular, even and                  unlabored    Heart:    Regular rhythm and normal rate, normal S1 and S2, no            murmur, no gallop, no rub, no click   Abdomen:     Normal bowel sounds, no masses, no organomegaly, soft        non-tender, non-distended, no guarding, no rebound                tenderness, gtube site looks good, there is some granulation tissue present, the bumper was snugged back down to the anterior abdominal wall   Extremities:   Moves all extremities well, no edema, no cyanosis, no             redness   Pulses:   Pulses palpable and equal bilaterally   Skin:   No bleeding, bruising or rash     Results Review:   I reviewed the patient's new clinical results.  I reviewed the patient's new imaging results and agree with the interpretation.  I reviewed the patient's other test results and agree with the interpretation     Review of Systems was reviewed and confirmed as accurate as documented by the MA.    ASSESSMENT/PLAN:    1. Left upper quadrant abdominal pain       Stable elderly female doing well, I don't think that the patient needs any further intervention and I have recommended that we not remove the PEG tube at this time and just see how the patient does.  She needs to see me back if she has any further issues.    I discussed the situation with her daughter.  Electronically signed by Brigido Guerra MD  10/20/23

## 2023-10-19 ENCOUNTER — OFFICE VISIT (OUTPATIENT)
Dept: SURGERY | Facility: CLINIC | Age: 78
End: 2023-10-19
Payer: MEDICARE

## 2023-10-19 VITALS
OXYGEN SATURATION: 99 % | SYSTOLIC BLOOD PRESSURE: 142 MMHG | DIASTOLIC BLOOD PRESSURE: 78 MMHG | HEIGHT: 61 IN | BODY MASS INDEX: 26.73 KG/M2 | WEIGHT: 141.6 LBS | TEMPERATURE: 96.2 F | HEART RATE: 58 BPM

## 2023-10-19 DIAGNOSIS — R10.12 LEFT UPPER QUADRANT ABDOMINAL PAIN: Primary | ICD-10-CM

## 2023-10-19 PROBLEM — Z86.16 PERSONAL HISTORY OF COVID-19: Status: ACTIVE | Noted: 2023-01-27

## 2023-10-19 PROBLEM — A04.72 ENTEROCOLITIS DUE TO CLOSTRIDIUM DIFFICILE, NOT SPECIFIED AS RECURRENT: Status: ACTIVE | Noted: 2023-01-27

## 2023-10-19 PROBLEM — I50.32 CHRONIC DIASTOLIC HEART FAILURE: Status: ACTIVE | Noted: 2023-01-27

## 2023-10-19 PROBLEM — N39.0 URINARY TRACT INFECTION, SITE NOT SPECIFIED: Status: ACTIVE | Noted: 2023-01-27

## 2023-10-19 PROCEDURE — 99212 OFFICE O/P EST SF 10 MIN: CPT | Performed by: SURGERY

## 2023-10-19 PROCEDURE — 3077F SYST BP >= 140 MM HG: CPT | Performed by: SURGERY

## 2023-10-19 PROCEDURE — 1159F MED LIST DOCD IN RCRD: CPT | Performed by: SURGERY

## 2023-10-19 PROCEDURE — 3078F DIAST BP <80 MM HG: CPT | Performed by: SURGERY

## 2023-10-19 PROCEDURE — 1160F RVW MEDS BY RX/DR IN RCRD: CPT | Performed by: SURGERY

## 2023-10-19 RX ORDER — LOSARTAN POTASSIUM 100 MG/1
TABLET ORAL
COMMUNITY

## 2023-10-19 NOTE — CASE COMMUNICATION
Patient missed a  HH Visit Type: SN visit from [facility] on  DATE:10/10/23.     Reason:  HH Missed Visit Reason:  Patient declined visit stating that she is waiting to to to the hospital ro x-ray related to feeding tube.         For your records only.   As per home health protocol, MD must be notified of missed/cancelled visits; therefore the prescribed frequency was not met.

## 2023-10-23 ENCOUNTER — HOME CARE VISIT (OUTPATIENT)
Dept: HOME HEALTH SERVICES | Facility: HOME HEALTHCARE | Age: 78
End: 2023-10-23
Payer: MEDICARE

## 2023-10-30 ENCOUNTER — HOME CARE VISIT (OUTPATIENT)
Dept: HOME HEALTH SERVICES | Facility: HOME HEALTHCARE | Age: 78
End: 2023-10-30
Payer: MEDICARE

## 2023-10-30 PROCEDURE — G0495 RN CARE TRAIN/EDU IN HH: HCPCS

## 2023-10-30 NOTE — Clinical Note
Good morning, Dr. Pedraza. Wanted to provide an updated on Mrs. Landry. I have requested one additional skilled nursing visit to assess oral intake. She is now taking in nutrients orally and per spouse reports, she is no longer required to use it unless she can not tolerate oral intake. The plan is to remove the feeding tube in 1-2 months as long as she continues to gain weight and tolerate oral intake. I will discharge her from home health services a her next nursing visit as long as she continues to do well.

## 2023-10-31 NOTE — HOME HEALTH
Routine Visit Note:    Skill/education provided: T/I HTN and nutrition.    Patient/caregiver response: Verbalized understanding.    Plan for next visit: Discharge    Other pertinent info: Spouse reports patient has been taking in nutrients orally. Instructed by provider she does not have to use feeding tube and will have it removed in 1-2 months as long as she continues to gain weight and tolerate oral intake.

## 2023-11-06 ENCOUNTER — HOME CARE VISIT (OUTPATIENT)
Dept: HOME HEALTH SERVICES | Facility: HOME HEALTHCARE | Age: 78
End: 2023-11-06
Payer: MEDICARE

## 2023-11-06 PROCEDURE — G0495 RN CARE TRAIN/EDU IN HH: HCPCS

## 2023-11-06 NOTE — Clinical Note
Patient discharged from home health skilled nursing services on 11/7/23 with all goals met. Patient and spouse report increased appetite. Good oral intake. Increase in weight to 140lbs and has maintained weight over the last two weeks.

## 2023-11-07 VITALS
OXYGEN SATURATION: 97 % | TEMPERATURE: 98.2 F | HEART RATE: 79 BPM | RESPIRATION RATE: 20 BRPM | SYSTOLIC BLOOD PRESSURE: 141 MMHG | DIASTOLIC BLOOD PRESSURE: 68 MMHG

## 2023-11-07 NOTE — HOME HEALTH
Discharge Decision:  Discharge     Plan for discharge: Patient discharged to home to self/family care with all goals met.    Barriers to discharge: None    Ongoing needs: None    Any referrals needed: No    Any declines in outcomes: discuss and document reason/Order received to discharge without goals met? No decline in outcomes observed.    Teaching needed prior to discharge: None    Assign DC notice responsibility: SN    Assign OASIS discharge responsibility:

## 2023-11-14 DIAGNOSIS — I10 ESSENTIAL HYPERTENSION: ICD-10-CM

## 2023-11-14 RX ORDER — OMEPRAZOLE 20 MG/1
20 CAPSULE, DELAYED RELEASE ORAL DAILY
Qty: 90 CAPSULE | Refills: 0 | Status: SHIPPED | OUTPATIENT
Start: 2023-11-14

## 2023-11-14 RX ORDER — HYDRALAZINE HYDROCHLORIDE 100 MG/1
100 TABLET, FILM COATED ORAL 3 TIMES DAILY
Qty: 90 TABLET | Refills: 2 | OUTPATIENT
Start: 2023-11-14

## 2023-11-14 RX ORDER — METOPROLOL TARTRATE 50 MG/1
TABLET, FILM COATED ORAL
Qty: 180 TABLET | Refills: 0 | OUTPATIENT
Start: 2023-11-14

## 2023-12-19 RX ORDER — HYDRALAZINE HYDROCHLORIDE 25 MG/1
25 TABLET, FILM COATED ORAL 2 TIMES DAILY
Qty: 180 TABLET | Refills: 3 | OUTPATIENT
Start: 2023-12-19

## 2023-12-20 ENCOUNTER — TELEPHONE (OUTPATIENT)
Dept: INTERNAL MEDICINE | Facility: CLINIC | Age: 78
End: 2023-12-20
Payer: MEDICARE

## 2023-12-20 NOTE — TELEPHONE ENCOUNTER
Caller: JOSÉ AMAYA    Relationship: Emergency Contact    Best call back number: [phone]     What test/procedure requested: HYDRALAZINE     When is it needed: ASAP    Where is the test/procedure going to be performed: [id number] - [place], [place] - [address] AT [facility] [address] - [phone]  [phone]     Additional information or concerns: NEEDS PRIOR AUTH, HAS BEEN WITHOUT FOR DAYS

## 2023-12-20 NOTE — TELEPHONE ENCOUNTER
Left vm for Britany that rx was prescribed by Maranda Feldman. Contact her, left her phone number. Call for any ?.

## 2023-12-20 NOTE — TELEPHONE ENCOUNTER
Asked for a refill. Without it stated that bp gets out of control. Is completely out. Asked if we could send it in. She is not familiar with this doctor Nolberto.

## 2023-12-21 RX ORDER — HYDRALAZINE HYDROCHLORIDE 100 MG/1
100 TABLET, FILM COATED ORAL 3 TIMES DAILY
Qty: 90 TABLET | Refills: 1 | Status: SHIPPED | OUTPATIENT
Start: 2023-12-21 | End: 2023-12-22

## 2023-12-22 RX ORDER — HYDRALAZINE HYDROCHLORIDE 100 MG/1
TABLET, FILM COATED ORAL
Qty: 270 TABLET | Refills: 0 | Status: SHIPPED | OUTPATIENT
Start: 2023-12-22

## 2024-01-05 NOTE — PROGRESS NOTES
"Patient: Vesta Landry  YOB: 1945    Date: 01/08/2024    Primary Care Provider: Bossman Pedraza MD    Chief Complaint   Patient presents with    Follow-up       History: I saw the patient in the office today for a follow up PEG tube removal.  She has had a PEG tube in place over the last year or so after treatment with radiation for head neck carcinoma.  She no longer uses the PEG tube.    The following portions of the patient's history were reviewed and updated as appropriate: allergies, current medications, past family history, past medical history, past social history, past surgical history and problem list.    Review of Systems   Constitutional:  Negative for chills, fever and unexpected weight change.   HENT:  Negative for hearing loss, trouble swallowing and voice change.    Eyes:  Negative for visual disturbance.   Respiratory:  Negative for apnea, cough, chest tightness, shortness of breath and wheezing.    Cardiovascular:  Negative for chest pain, palpitations and leg swelling.   Gastrointestinal:  Negative for abdominal distention, abdominal pain, anal bleeding, blood in stool, constipation, diarrhea, nausea, rectal pain and vomiting.   Endocrine: Negative for cold intolerance and heat intolerance.   Genitourinary:  Negative for difficulty urinating, dysuria and flank pain.   Musculoskeletal:  Negative for back pain and gait problem.   Skin:  Negative for color change, rash and wound.   Neurological:  Negative for dizziness, syncope, speech difficulty, weakness, light-headedness, numbness and headaches.   Hematological:  Negative for adenopathy. Does not bruise/bleed easily.   Psychiatric/Behavioral:  Negative for confusion. The patient is not nervous/anxious.        Vital Signs  Vitals:    01/08/24 1145   BP: 148/82   Pulse: 64   Temp: 98.2 °F (36.8 °C)   SpO2: 98%   Weight: 64 kg (141 lb)   Height: 154.9 cm (60.98\")       Allergies:  Allergies   Allergen Reactions    Trazodone " Confusion       Medications:    Current Outpatient Medications:     allopurinol (ZYLOPRIM) 100 MG tablet, Take 1 tablet by mouth Daily., Disp: 90 tablet, Rfl: 3    Amino Acids-Protein Hydrolys (PRO-STAT PO), Administer  per G tube., Disp: , Rfl:     cefuroxime (CEFTIN) 250 MG tablet, Take 1 tablet by mouth 2 (Two) Times a Day., Disp: 6 tablet, Rfl: 0    cyclobenzaprine (FLEXERIL) 5 MG tablet, Take 1 tablet by mouth As Needed for Muscle Spasms. Indications: Muscle Spasm, Disp: , Rfl:     diazePAM (VALIUM) 2 MG tablet, Take 1 tablet by mouth 2 (Two) Times a Day As Needed for Anxiety. Indications: Feeling Anxious, Disp: , Rfl:     furosemide (LASIX) 20 MG tablet, Take 1 tablet by mouth Daily. Indications: Cardiac Failure, Edema, High Blood Pressure Disorder, Disp: 90 tablet, Rfl: 3    hydrALAZINE (APRESOLINE) 100 MG tablet, TAKE 1 TABLET BY MOUTH THREE TIMES DAILY FOR HIGH BLOOD PRESSURE, Disp: 270 tablet, Rfl: 0    HYDROcodone-acetaminophen (NORCO) 7.5-325 MG per tablet, Take 1 tablet by mouth Every 4 (Four) Hours As Needed for Moderate Pain. Indications: Pain, Disp: , Rfl:     losartan (COZAAR) 100 MG tablet, LOSARTAN POTASSIUM 100 MG TABS, Disp: , Rfl:     MAGIC MOUTHWASH 1/1/1 SUSP, Swish and spit 5 mL 3 (Three) Times a Day As Needed for Stomatitis., Disp: 180 mL, Rfl: 2    mirtazapine (REMERON) 30 MG tablet, Take 1 tablet by mouth Every Night., Disp: 90 tablet, Rfl: 1    multivitamin with minerals (MULTIVITAMIN ADULT PO), Take 1 tablet by mouth Daily., Disp: , Rfl:     nebivolol (BYSTOLIC) 5 MG tablet, Take 1 tablet by mouth Daily. Indications: High Blood Pressure Disorder, Disp: 30 tablet, Rfl: 2    Nutritional Supplements (Vivonex RTF) liquid, 65 mL by Per PEG Tube route Every 1 (One) Hour. 5397-1996, Disp: , Rfl:     omeprazole (priLOSEC) 20 MG capsule, TAKE 1 CAPSULE BY MOUTH DAILY, Disp: 90 capsule, Rfl: 0    ondansetron (Zofran) 8 MG tablet, Take 1 tablet by mouth Every 8 (Eight) Hours As Needed for Nausea  or Vomiting., Disp: 30 tablet, Rfl: 3    nitroglycerin (NITROSTAT) 0.4 MG SL tablet, Place 1 tablet under the tongue Every 5 (Five) Minutes As Needed for Chest Pain. Take no more than 3 doses in 15 minutes., Disp: 50 tablet, Rfl: 0    Physical Exam:   General Appearance:    Alert, cooperative, in no acute distress   Head:    Normocephalic, without obvious abnormality, atraumatic   Lungs:     Clear to auscultation,respirations regular, even and                  unlabored    Heart:    Regular rhythm and normal rate, normal S1 and S2, no            murmur, no gallop, no rub, no click   Abdomen:     Normal bowel sounds, no masses, no organomegaly, soft        non-tender, non-distended, no guarding, no rebound                tenderness, PEG tube placed in the left upper quadrant   Extremities:   Moves all extremities well, no edema, no cyanosis, no             redness   Pulses:   Pulses palpable and equal bilaterally   Skin:   No bleeding, bruising or rash     Results Review:   I reviewed the patient's new clinical results.  I reviewed the patient's new imaging results and agree with the interpretation.  I reviewed the patient's other test results and agree with the interpretation     Review of Systems was reviewed and confirmed as accurate as documented by the MA.    ASSESSMENT/PLAN:    1. Left upper quadrant abdominal pain    2. Enlarged lymph nodes       I did have a detailed and extensive discussion with the patient in the office today.  I did remove the PEG tube by deflating the balloon, dressings were placed, wound care instructions were given, I do not think the patient is going to need any further surgical intervention.    Electronically signed by Brigido Guerra MD  01/14/24

## 2024-01-08 ENCOUNTER — OFFICE VISIT (OUTPATIENT)
Dept: SURGERY | Facility: CLINIC | Age: 79
End: 2024-01-08
Payer: MEDICARE

## 2024-01-08 VITALS
HEIGHT: 61 IN | OXYGEN SATURATION: 98 % | TEMPERATURE: 98.2 F | HEART RATE: 64 BPM | SYSTOLIC BLOOD PRESSURE: 148 MMHG | DIASTOLIC BLOOD PRESSURE: 82 MMHG | WEIGHT: 141 LBS | BODY MASS INDEX: 26.62 KG/M2

## 2024-01-08 DIAGNOSIS — R10.12 LEFT UPPER QUADRANT ABDOMINAL PAIN: Primary | ICD-10-CM

## 2024-01-08 DIAGNOSIS — R59.9 ENLARGED LYMPH NODES: ICD-10-CM

## 2024-01-08 PROCEDURE — 3077F SYST BP >= 140 MM HG: CPT | Performed by: SURGERY

## 2024-01-08 PROCEDURE — 1160F RVW MEDS BY RX/DR IN RCRD: CPT | Performed by: SURGERY

## 2024-01-08 PROCEDURE — 99213 OFFICE O/P EST LOW 20 MIN: CPT | Performed by: SURGERY

## 2024-01-08 PROCEDURE — 3079F DIAST BP 80-89 MM HG: CPT | Performed by: SURGERY

## 2024-01-08 PROCEDURE — 1159F MED LIST DOCD IN RCRD: CPT | Performed by: SURGERY

## 2024-01-26 ENCOUNTER — OFFICE VISIT (OUTPATIENT)
Dept: INTERNAL MEDICINE | Facility: CLINIC | Age: 79
End: 2024-01-26
Payer: MEDICARE

## 2024-01-26 VITALS
BODY MASS INDEX: 25.64 KG/M2 | DIASTOLIC BLOOD PRESSURE: 65 MMHG | OXYGEN SATURATION: 94 % | WEIGHT: 135.8 LBS | HEIGHT: 61 IN | SYSTOLIC BLOOD PRESSURE: 123 MMHG | HEART RATE: 63 BPM | TEMPERATURE: 98 F

## 2024-01-26 DIAGNOSIS — I10 HTN (HYPERTENSION), BENIGN: Primary | ICD-10-CM

## 2024-01-26 DIAGNOSIS — C09.9 SQUAMOUS CELL CARCINOMA OF LEFT TONSIL: ICD-10-CM

## 2024-01-26 DIAGNOSIS — R60.0 BILATERAL EDEMA OF LOWER EXTREMITY: ICD-10-CM

## 2024-01-26 PROBLEM — R19.7 DIARRHEA: Status: RESOLVED | Noted: 2023-01-19 | Resolved: 2024-01-26

## 2024-01-26 PROBLEM — E87.6 HYPOKALEMIA: Status: RESOLVED | Noted: 2023-09-26 | Resolved: 2024-01-26

## 2024-01-26 PROBLEM — R19.8 ABNORMAL BOWEL MOVEMENT: Status: RESOLVED | Noted: 2023-03-02 | Resolved: 2024-01-26

## 2024-01-26 PROBLEM — R13.10 DIFFICULTY SWALLOWING: Status: RESOLVED | Noted: 2023-01-19 | Resolved: 2024-01-26

## 2024-01-26 PROBLEM — R79.89 ELEVATED TROPONIN: Status: RESOLVED | Noted: 2023-01-19 | Resolved: 2024-01-26

## 2024-01-26 PROBLEM — R79.89 ELEVATED SERUM CREATININE: Status: RESOLVED | Noted: 2023-09-26 | Resolved: 2024-01-26

## 2024-01-26 PROBLEM — N39.0 URINARY TRACT INFECTION, SITE NOT SPECIFIED: Status: RESOLVED | Noted: 2023-01-27 | Resolved: 2024-01-26

## 2024-01-26 PROBLEM — I16.0 HYPERTENSIVE URGENCY: Status: RESOLVED | Noted: 2023-09-26 | Resolved: 2024-01-26

## 2024-01-26 PROBLEM — E44.0 MODERATE MALNUTRITION: Status: RESOLVED | Noted: 2023-09-28 | Resolved: 2024-01-26

## 2024-01-26 PROBLEM — R06.09 EXERTIONAL DYSPNEA: Status: RESOLVED | Noted: 2023-01-19 | Resolved: 2024-01-26

## 2024-01-26 PROCEDURE — 99214 OFFICE O/P EST MOD 30 MIN: CPT | Performed by: INTERNAL MEDICINE

## 2024-01-26 PROCEDURE — 1160F RVW MEDS BY RX/DR IN RCRD: CPT | Performed by: INTERNAL MEDICINE

## 2024-01-26 PROCEDURE — 3078F DIAST BP <80 MM HG: CPT | Performed by: INTERNAL MEDICINE

## 2024-01-26 PROCEDURE — 3074F SYST BP LT 130 MM HG: CPT | Performed by: INTERNAL MEDICINE

## 2024-01-26 PROCEDURE — 1159F MED LIST DOCD IN RCRD: CPT | Performed by: INTERNAL MEDICINE

## 2024-01-26 NOTE — PROGRESS NOTES
"Subjective  Vesta Landry is a 78 y.o. 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  Is now able to tolerate oral intake and her feeding tube is out has no new complaints    The following portions of the patient's history were reviewed and updated as appropriate: allergies, current medications, past family history, past medical history, past social history, past surgical history, and problem list.     Review of Systems   Constitutional:  Positive for fatigue. Negative for activity change, appetite change and fever.   HENT:  Negative for congestion, ear discharge, ear pain and trouble swallowing.    Eyes:  Negative for photophobia and visual disturbance.   Respiratory:  Negative for cough and shortness of breath.    Cardiovascular:  Negative for chest pain and palpitations.   Gastrointestinal:  Negative for abdominal distention, abdominal pain, constipation, diarrhea, nausea and vomiting.   Endocrine: Negative.    Genitourinary:  Negative for dysuria, hematuria and urgency.   Musculoskeletal:  Positive for arthralgias. Negative for back pain, joint swelling and myalgias.   Skin:  Negative for color change and rash.   Allergic/Immunologic: Negative.    Neurological:  Negative for dizziness, weakness, light-headedness and headaches.   Hematological:  Negative for adenopathy. Does not bruise/bleed easily.   Psychiatric/Behavioral:  Negative for agitation, confusion and dysphoric mood. The patient is not nervous/anxious.        Visit Vitals  /65   Pulse 63   Temp 98 °F (36.7 °C)   Ht 154.9 cm (60.98\")   Wt 61.6 kg (135 lb 12.8 oz)   SpO2 94%   BMI 25.67 kg/m²       Objective  Physical Exam  Constitutional:       General: She is not in acute distress.     Appearance: She is well-developed.   HENT: "      Nose: Nose normal.   Eyes:      General: No scleral icterus.     Conjunctiva/sclera: Conjunctivae normal.   Neck:      Thyroid: No thyromegaly.      Trachea: No tracheal deviation.   Cardiovascular:      Rate and Rhythm: Normal rate and regular rhythm.      Heart sounds: No murmur heard.     No friction rub.   Pulmonary:      Effort: No respiratory distress.      Breath sounds: No wheezing or rales.   Abdominal:      General: There is no distension.      Palpations: Abdomen is soft. There is no mass.      Tenderness: There is no abdominal tenderness. There is no guarding.   Musculoskeletal:         General: No deformity. Normal range of motion.   Lymphadenopathy:      Cervical: No cervical adenopathy.   Skin:     General: Skin is warm and dry.      Findings: No erythema or rash.   Neurological:      Mental Status: She is alert and oriented to person, place, and time.      Cranial Nerves: No cranial nerve deficit.      Coordination: Coordination normal.      Deep Tendon Reflexes: Reflexes are normal and symmetric.   Psychiatric:         Behavior: Behavior normal.         Thought Content: Thought content normal.         Judgment: Judgment normal.       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  Stable

## 2024-02-19 RX ORDER — FUROSEMIDE 40 MG/1
40 TABLET ORAL DAILY
Qty: 90 TABLET | Refills: 3 | OUTPATIENT
Start: 2024-02-19

## 2024-02-22 ENCOUNTER — HOSPITAL ENCOUNTER (EMERGENCY)
Facility: HOSPITAL | Age: 79
Discharge: HOME | End: 2024-02-22
Attending: EMERGENCY MEDICINE
Payer: MEDICARE

## 2024-02-22 ENCOUNTER — APPOINTMENT (OUTPATIENT)
Dept: CT IMAGING | Facility: HOSPITAL | Age: 79
End: 2024-02-22
Payer: MEDICARE

## 2024-02-22 VITALS
SYSTOLIC BLOOD PRESSURE: 149 MMHG | RESPIRATION RATE: 17 BRPM | HEART RATE: 64 BPM | TEMPERATURE: 98.1 F | BODY MASS INDEX: 25.91 KG/M2 | DIASTOLIC BLOOD PRESSURE: 67 MMHG | OXYGEN SATURATION: 97 % | WEIGHT: 132 LBS | HEIGHT: 60 IN

## 2024-02-22 DIAGNOSIS — N30.01 ACUTE CYSTITIS WITH HEMATURIA: Primary | ICD-10-CM

## 2024-02-22 DIAGNOSIS — R11.2 NAUSEA AND VOMITING, UNSPECIFIED VOMITING TYPE: ICD-10-CM

## 2024-02-22 DIAGNOSIS — Z85.819 HISTORY OF THROAT CANCER: ICD-10-CM

## 2024-02-22 LAB
ALBUMIN SERPL-MCNC: 3.8 G/DL (ref 3.5–5.2)
ALBUMIN/GLOB SERPL: 1.9 G/DL
ALP SERPL-CCNC: 87 U/L (ref 39–117)
ALT SERPL W P-5'-P-CCNC: 13 U/L (ref 1–33)
ANION GAP SERPL CALCULATED.3IONS-SCNC: 13 MMOL/L (ref 5–15)
AST SERPL-CCNC: 24 U/L (ref 1–32)
BACTERIA UR QL AUTO: ABNORMAL /HPF
BASOPHILS # BLD AUTO: 0.02 10*3/MM3 (ref 0–0.2)
BASOPHILS NFR BLD AUTO: 0.2 % (ref 0–1.5)
BILIRUB SERPL-MCNC: 0.3 MG/DL (ref 0–1.2)
BILIRUB UR QL STRIP: NEGATIVE
BUN SERPL-MCNC: 34 MG/DL (ref 8–23)
BUN/CREAT SERPL: 20.6 (ref 7–25)
CALCIUM SPEC-SCNC: 8.2 MG/DL (ref 8.6–10.5)
CHLORIDE SERPL-SCNC: 105 MMOL/L (ref 98–107)
CLARITY UR: ABNORMAL
CO2 SERPL-SCNC: 25 MMOL/L (ref 22–29)
COLOR UR: YELLOW
CREAT SERPL-MCNC: 1.65 MG/DL (ref 0.57–1)
D-LACTATE SERPL-SCNC: 1.1 MMOL/L (ref 0.5–2)
DEPRECATED RDW RBC AUTO: 55.8 FL (ref 37–54)
EGFRCR SERPLBLD CKD-EPI 2021: 31.3 ML/MIN/1.73
EOSINOPHIL # BLD AUTO: 0.03 10*3/MM3 (ref 0–0.4)
EOSINOPHIL NFR BLD AUTO: 0.2 % (ref 0.3–6.2)
ERYTHROCYTE [DISTWIDTH] IN BLOOD BY AUTOMATED COUNT: 15.5 % (ref 12.3–15.4)
GLOBULIN UR ELPH-MCNC: 2 GM/DL
GLUCOSE SERPL-MCNC: 107 MG/DL (ref 65–99)
GLUCOSE UR STRIP-MCNC: NEGATIVE MG/DL
HCT VFR BLD AUTO: 40.1 % (ref 34–46.6)
HGB BLD-MCNC: 13.1 G/DL (ref 12–15.9)
HGB UR QL STRIP.AUTO: NEGATIVE
HOLD SPECIMEN: NORMAL
HYALINE CASTS UR QL AUTO: ABNORMAL /LPF
IMM GRANULOCYTES # BLD AUTO: 0.06 10*3/MM3 (ref 0–0.05)
IMM GRANULOCYTES NFR BLD AUTO: 0.5 % (ref 0–0.5)
KETONES UR QL STRIP: NEGATIVE
LEUKOCYTE ESTERASE UR QL STRIP.AUTO: ABNORMAL
LIPASE SERPL-CCNC: 38 U/L (ref 13–60)
LYMPHOCYTES # BLD AUTO: 0.89 10*3/MM3 (ref 0.7–3.1)
LYMPHOCYTES NFR BLD AUTO: 7.2 % (ref 19.6–45.3)
MCH RBC QN AUTO: 32 PG (ref 26.6–33)
MCHC RBC AUTO-ENTMCNC: 32.7 G/DL (ref 31.5–35.7)
MCV RBC AUTO: 98 FL (ref 79–97)
MONOCYTES # BLD AUTO: 0.86 10*3/MM3 (ref 0.1–0.9)
MONOCYTES NFR BLD AUTO: 7 % (ref 5–12)
NEUTROPHILS NFR BLD AUTO: 10.49 10*3/MM3 (ref 1.7–7)
NEUTROPHILS NFR BLD AUTO: 84.9 % (ref 42.7–76)
NITRITE UR QL STRIP: NEGATIVE
NRBC BLD AUTO-RTO: 0 /100 WBC (ref 0–0.2)
PH UR STRIP.AUTO: <=5 [PH] (ref 5–8)
PLATELET # BLD AUTO: 227 10*3/MM3 (ref 140–450)
PMV BLD AUTO: 11.2 FL (ref 6–12)
POTASSIUM SERPL-SCNC: 3.3 MMOL/L (ref 3.5–5.2)
PROT SERPL-MCNC: 5.8 G/DL (ref 6–8.5)
PROT UR QL STRIP: NEGATIVE
RBC # BLD AUTO: 4.09 10*6/MM3 (ref 3.77–5.28)
RBC # UR STRIP: ABNORMAL /HPF
REF LAB TEST METHOD: ABNORMAL
SODIUM SERPL-SCNC: 143 MMOL/L (ref 136–145)
SP GR UR STRIP: 1.02 (ref 1–1.03)
SQUAMOUS #/AREA URNS HPF: ABNORMAL /HPF
UROBILINOGEN UR QL STRIP: ABNORMAL
WBC # UR STRIP: ABNORMAL /HPF
WBC NRBC COR # BLD AUTO: 12.35 10*3/MM3 (ref 3.4–10.8)
WHOLE BLOOD HOLD COAG: NORMAL
WHOLE BLOOD HOLD SPECIMEN: NORMAL
YEAST URNS QL MICRO: ABNORMAL /HPF

## 2024-02-22 PROCEDURE — 70491 CT SOFT TISSUE NECK W/DYE: CPT

## 2024-02-22 PROCEDURE — 87086 URINE CULTURE/COLONY COUNT: CPT | Performed by: EMERGENCY MEDICINE

## 2024-02-22 PROCEDURE — 71260 CT THORAX DX C+: CPT

## 2024-02-22 PROCEDURE — 85025 COMPLETE CBC W/AUTO DIFF WBC: CPT

## 2024-02-22 PROCEDURE — 83605 ASSAY OF LACTIC ACID: CPT

## 2024-02-22 PROCEDURE — 36415 COLL VENOUS BLD VENIPUNCTURE: CPT

## 2024-02-22 PROCEDURE — 25510000001 IOPAMIDOL 61 % SOLUTION: Performed by: EMERGENCY MEDICINE

## 2024-02-22 PROCEDURE — 80053 COMPREHEN METABOLIC PANEL: CPT

## 2024-02-22 PROCEDURE — 81001 URINALYSIS AUTO W/SCOPE: CPT

## 2024-02-22 PROCEDURE — 99285 EMERGENCY DEPT VISIT HI MDM: CPT

## 2024-02-22 PROCEDURE — 83690 ASSAY OF LIPASE: CPT

## 2024-02-22 RX ORDER — PROMETHAZINE HYDROCHLORIDE 12.5 MG/1
12.5 TABLET ORAL EVERY 8 HOURS PRN
Qty: 10 TABLET | Refills: 0 | Status: SHIPPED | OUTPATIENT
Start: 2024-02-22

## 2024-02-22 RX ORDER — SODIUM CHLORIDE 9 MG/ML
10 INJECTION, SOLUTION INTRAMUSCULAR; INTRAVENOUS; SUBCUTANEOUS AS NEEDED
Status: DISCONTINUED | OUTPATIENT
Start: 2024-02-22 | End: 2024-02-22 | Stop reason: HOSPADM

## 2024-02-22 RX ORDER — NEBIVOLOL 5 MG/1
5 TABLET ORAL DAILY
COMMUNITY
End: 2024-03-12

## 2024-02-22 RX ORDER — CEFUROXIME AXETIL 500 MG/1
500 TABLET ORAL 2 TIMES DAILY
Qty: 14 TABLET | Refills: 0 | Status: SHIPPED | OUTPATIENT
Start: 2024-02-22 | End: 2024-02-29

## 2024-02-22 RX ORDER — FLUCONAZOLE 150 MG/1
150 TABLET ORAL ONCE
Qty: 1 TABLET | Refills: 0 | Status: SHIPPED | OUTPATIENT
Start: 2024-02-22 | End: 2024-02-22

## 2024-02-22 RX ORDER — ONDANSETRON HYDROCHLORIDE 8 MG/1
TABLET, FILM COATED ORAL EVERY 8 HOURS PRN
COMMUNITY

## 2024-02-22 RX ADMIN — IOPAMIDOL 85 ML: 612 INJECTION, SOLUTION INTRAVENOUS at 16:00

## 2024-02-22 NOTE — ED PROVIDER NOTES
[facility]    EMERGENCY DEPARTMENT ENCOUNTER      Pt Name: Vesta Landry  MRN: [id number]  YOB: 1945  Date of evaluation: 2/22/2024  Provider: Miguel A Hoover MD    CHIEF COMPLAINT       Chief Complaint   Patient presents with    Nausea    Vomiting         HISTORY OF PRESENT ILLNESS   Vesta Landry is a 78 y.o. female who presents to the emergency department ***       Nursing notes were reviewed.    REVIEW OF SYSTEMS     ROS:  A chief complaint appropriate review of systems was completed and is negative except as noted in the HPI.      PAST MEDICAL HISTORY     Past Medical History:   Diagnosis Date    Arthritis     Cancer     squamous cell - tonsils    Cancer of tonsil     metastatic squamous cell - left tonsil    CHF (congestive heart failure)     Deep vein thrombosis     bilateral- daughter states on 2/17/23 that she doesn't remember patient having this    Dementia     Dysphagia     GERD (gastroesophageal reflux disease)     Hyperlipidemia     Hypertension     Impaired mobility     PONV (postoperative nausea and vomiting)     SOB (shortness of breath)     following chemo infusion on day of infusion last week (week of 1/29-2/4/23) per pt's daughter    Vitamin D deficiency     Wears dentures     uppers - instructed not to use adhesive DOS         SURGICAL HISTORY       Past Surgical History:   Procedure Laterality Date    APPENDECTOMY      CATARACT EXTRACTION      CHOLECYSTECTOMY      COLONOSCOPY      Approx 2009    ENDOSCOPY W/ PEG TUBE PLACEMENT N/A 2/20/2023    Procedure: ESOPHAGOGASTRODUODENOSCOPY WITH PERCUTANEOUS ENDOSCOPIC GASTROSTOMY TUBE INSERTION;  Surgeon: Brigido Guerra MD;  Location: [facility] ENDOSCOPY;  Service: Gastroenterology;  Laterality: N/A;    HYSTERECTOMY  1991    OOPHORECTOMY Bilateral 1991    PORTACATH PLACEMENT Right 02/08/2023    Procedure: INSERTION OF PORTACATH;  Surgeon: Brigido Guerra MD;  Location: [facility] OR;  Service: General;  Laterality: Right;    TUBAL  ABDOMINAL LIGATION           CURRENT MEDICATIONS       Current Facility-Administered Medications:     Sodium Chloride (PF) 0.9 % 10 mL, 10 mL, Intravenous, PRN, Emergency, Triage Protocol, MD    Current Outpatient Medications:     allopurinol (ZYLOPRIM) 100 MG tablet, Take 1 tablet by mouth Daily., Disp: 90 tablet, Rfl: 3    furosemide (LASIX) 20 MG tablet, Take 1 tablet by mouth Daily. Indications: Cardiac Failure, Edema, High Blood Pressure Disorder, Disp: 90 tablet, Rfl: 3    hydrALAZINE (APRESOLINE) 100 MG tablet, TAKE 1 TABLET BY MOUTH THREE TIMES DAILY FOR HIGH BLOOD PRESSURE, Disp: 270 tablet, Rfl: 0    losartan (COZAAR) 100 MG tablet, LOSARTAN POTASSIUM 100 MG TABS, Disp: , Rfl:     mirtazapine (REMERON) 30 MG tablet, Take 1 tablet by mouth Every Night., Disp: 90 tablet, Rfl: 1    multivitamin with minerals (MULTIVITAMIN ADULT PO), Take 1 tablet by mouth Daily., Disp: , Rfl:     nitroglycerin (NITROSTAT) 0.4 MG SL tablet, Place 1 tablet under the tongue Every 5 (Five) Minutes As Needed for Chest Pain. Take no more than 3 doses in 15 minutes., Disp: 50 tablet, Rfl: 0    omeprazole (priLOSEC) 20 MG capsule, TAKE 1 CAPSULE BY MOUTH DAILY, Disp: 90 capsule, Rfl: 0    ALLERGIES     Trazodone    FAMILY HISTORY       Family History   Problem Relation Age of Onset    Heart failure Mother     Hyperlipidemia Mother     Hypertension Mother     Cancer Father     Cancer Sister     Stroke Sister     Breast cancer Maternal Aunt     Breast cancer Paternal Aunt     Ovarian cancer Neg Hx           SOCIAL HISTORY       Social History     Socioeconomic History    Marital status:    Tobacco Use    Smoking status: Former     Packs/day: 0.15     Years: 10.00     Additional pack years: 0.00     Total pack years: 1.50     Types: Cigarettes     Quit date: 1987     Years since quittin.1    Smokeless tobacco: Never   Vaping Use    Vaping Use: Never used   Substance and Sexual Activity    Alcohol use: No    Drug use: No  "   Sexual activity: Defer         PHYSICAL EXAM    (up to 7 for level 4, 8 or more for level 5)     Vitals:    02/22/24 1358   BP: 137/65   BP Location: Right arm   Patient Position: Sitting   Pulse: 72   Resp: 17   Temp: 98.1 °F (36.7 °C)   TempSrc: Oral   SpO2: 96%   Weight: 59.9 kg (132 lb)   Height: 152.4 cm (60\")       ***      DIAGNOSTIC RESULTS     EKG: All EKGs are interpreted by the Emergency Department Physician who either signs or Co-signs this chart in the absence of a cardiologist.    No orders to display         RADIOLOGY:   [x] Radiologist's Report Reviewed:  No orders to display       I ordered and independently reviewed the above noted radiographic studies.        LABS:    I have reviewed and interpreted all of the currently available lab results from this visit (if applicable):  Results for orders placed or performed during the hospital encounter of 09/25/23   Urine Culture - Urine, Urine, Clean Catch    Specimen: Urine, Clean Catch   Result Value Ref Range    Urine Culture >100,000 CFU/mL Klebsiella oxytoca (A)        Susceptibility    Klebsiella oxytoca - PRISCA     Ampicillin  Resistant ug/ml     Ampicillin + Sulbactam  Intermediate ug/ml     Cefazolin  Susceptible ug/ml     Cefepime  Susceptible ug/ml     Ceftazidime  Susceptible ug/ml     Ceftriaxone  Susceptible ug/ml     Gentamicin  Susceptible ug/ml     Levofloxacin  Susceptible ug/ml     Nitrofurantoin  Susceptible ug/ml     Piperacillin + Tazobactam  Susceptible ug/ml     Trimethoprim + Sulfamethoxazole  Susceptible ug/ml   Comprehensive Metabolic Panel    Specimen: Blood   Result Value Ref Range    Glucose 105 (H) 65 - 99 mg/dL    BUN 14 8 - 23 mg/dL    Creatinine 1.17 (H) 0.57 - 1.00 mg/dL    Sodium 141 136 - 145 mmol/L    Potassium 3.2 (L) 3.5 - 5.2 mmol/L    Chloride 104 98 - 107 mmol/L    CO2 24.0 22.0 - 29.0 mmol/L    Calcium 9.0 8.6 - 10.5 mg/dL    Total Protein 6.8 6.0 - 8.5 g/dL    Albumin 3.8 3.5 - 5.2 g/dL    ALT (SGPT) 13 1 - 33 " U/L    AST (SGOT) 16 1 - 32 U/L    Alkaline Phosphatase 96 39 - 117 U/L    Total Bilirubin 0.5 0.0 - 1.2 mg/dL    Globulin 3.0 gm/dL    A/G Ratio 1.3 g/dL    BUN/Creatinine Ratio 12.0 7.0 - 25.0    Anion Gap 13.0 5.0 - 15.0 mmol/L    eGFR 47.3 (L) >60.0 mL/min/1.73   Single High Sensitivity Troponin T    Specimen: Blood   Result Value Ref Range    HS Troponin T 255 (C) <10 ng/L   CBC Auto Differential    Specimen: Blood   Result Value Ref Range    WBC 6.75 3.40 - 10.80 10*3/mm3    RBC 3.92 3.77 - 5.28 10*6/mm3    Hemoglobin 12.0 12.0 - 15.9 g/dL    Hematocrit 37.2 34.0 - 46.6 %    MCV 94.9 79.0 - 97.0 fL    MCH 30.6 26.6 - 33.0 pg    MCHC 32.3 31.5 - 35.7 g/dL    RDW 14.5 12.3 - 15.4 %    RDW-SD 49.9 37.0 - 54.0 fl    MPV 10.3 6.0 - 12.0 fL    Platelets 174 140 - 450 10*3/mm3    Neutrophil % 68.0 42.7 - 76.0 %    Lymphocyte % 18.1 (L) 19.6 - 45.3 %    Monocyte % 11.1 5.0 - 12.0 %    Eosinophil % 2.2 0.3 - 6.2 %    Basophil % 0.3 0.0 - 1.5 %    Immature Grans % 0.3 0.0 - 0.5 %    Neutrophils, Absolute 4.59 1.70 - 7.00 10*3/mm3    Lymphocytes, Absolute 1.22 0.70 - 3.10 10*3/mm3    Monocytes, Absolute 0.75 0.10 - 0.90 10*3/mm3    Eosinophils, Absolute 0.15 0.00 - 0.40 10*3/mm3    Basophils, Absolute 0.02 0.00 - 0.20 10*3/mm3    Immature Grans, Absolute 0.02 0.00 - 0.05 10*3/mm3    nRBC 0.0 0.0 - 0.2 /100 WBC   proBNP    Specimen: Blood   Result Value Ref Range    proBNP 3,990.0 (H) 0.0 - 1,800.0 pg/mL   High Sensitivity Troponin T 2Hr    Specimen: Blood   Result Value Ref Range    HS Troponin T 270 (C) <10 ng/L    Troponin T Delta 15 (C) >=-4 - <+4 ng/L   CBC Auto Differential    Specimen: Blood   Result Value Ref Range    WBC 4.90 3.40 - 10.80 10*3/mm3    RBC 3.51 (L) 3.77 - 5.28 10*6/mm3    Hemoglobin 10.8 (L) 12.0 - 15.9 g/dL    Hematocrit 32.3 (L) 34.0 - 46.6 %    MCV 92.0 79.0 - 97.0 fL    MCH 30.8 26.6 - 33.0 pg    MCHC 33.4 31.5 - 35.7 g/dL    RDW 14.2 12.3 - 15.4 %    RDW-SD 47.9 37.0 - 54.0 fl    MPV 10.7 6.0  - 12.0 fL    Platelets 153 140 - 450 10*3/mm3    Neutrophil % 68.7 42.7 - 76.0 %    Lymphocyte % 15.9 (L) 19.6 - 45.3 %    Monocyte % 12.2 (H) 5.0 - 12.0 %    Eosinophil % 2.4 0.3 - 6.2 %    Basophil % 0.6 0.0 - 1.5 %    Immature Grans % 0.2 0.0 - 0.5 %    Neutrophils, Absolute 3.36 1.70 - 7.00 10*3/mm3    Lymphocytes, Absolute 0.78 0.70 - 3.10 10*3/mm3    Monocytes, Absolute 0.60 0.10 - 0.90 10*3/mm3    Eosinophils, Absolute 0.12 0.00 - 0.40 10*3/mm3    Basophils, Absolute 0.03 0.00 - 0.20 10*3/mm3    Immature Grans, Absolute 0.01 0.00 - 0.05 10*3/mm3    nRBC 0.0 0.0 - 0.2 /100 WBC   Basic Metabolic Panel    Specimen: Blood   Result Value Ref Range    Glucose 95 65 - 99 mg/dL    BUN 15 8 - 23 mg/dL    Creatinine 0.97 0.57 - 1.00 mg/dL    Sodium 144 136 - 145 mmol/L    Potassium 3.2 (L) 3.5 - 5.2 mmol/L    Chloride 107 98 - 107 mmol/L    CO2 24.0 22.0 - 29.0 mmol/L    Calcium 9.0 8.6 - 10.5 mg/dL    BUN/Creatinine Ratio 15.5 7.0 - 25.0    Anion Gap 13.0 5.0 - 15.0 mmol/L    eGFR 59.2 (L) >60.0 mL/min/1.73   Hemoglobin A1c    Specimen: Blood   Result Value Ref Range    Hemoglobin A1C 4.90 4.80 - 5.60 %   Lipid Panel    Specimen: Blood   Result Value Ref Range    Total Cholesterol 175 0 - 200 mg/dL    Triglycerides 256 (H) 0 - 150 mg/dL    HDL Cholesterol 27 (L) 40 - 60 mg/dL    LDL Cholesterol  104 (H) 0 - 100 mg/dL    VLDL Cholesterol 44 (H) 5 - 40 mg/dL    LDL/HDL Ratio 3.59    High Sensitivity Troponin T    Specimen: Blood   Result Value Ref Range    HS Troponin T 236 (C) <10 ng/L   Magnesium    Specimen: Blood   Result Value Ref Range    Magnesium 1.9 1.6 - 2.4 mg/dL   Basic Metabolic Panel    Specimen: Blood   Result Value Ref Range    Glucose 105 (H) 65 - 99 mg/dL    BUN 18 8 - 23 mg/dL    Creatinine 1.19 (H) 0.57 - 1.00 mg/dL    Sodium 143 136 - 145 mmol/L    Potassium 4.3 3.5 - 5.2 mmol/L    Chloride 107 98 - 107 mmol/L    CO2 24.0 22.0 - 29.0 mmol/L    Calcium 9.2 8.6 - 10.5 mg/dL    BUN/Creatinine Ratio  15.1 7.0 - 25.0    Anion Gap 12.0 5.0 - 15.0 mmol/L    eGFR 46.3 (L) >60.0 mL/min/1.73   Magnesium    Specimen: Blood   Result Value Ref Range    Magnesium 1.9 1.6 - 2.4 mg/dL   Urinalysis With Culture If Indicated - Urine, Clean Catch    Specimen: Urine, Clean Catch   Result Value Ref Range    Color, UA Yellow Yellow, Straw    Appearance, UA Turbid (A) Clear    pH, UA 5.5 5.0 - 8.0    Specific Gravity, UA 1.012 1.001 - 1.030    Glucose, UA Negative Negative    Ketones, UA Negative Negative    Bilirubin, UA Negative Negative    Blood, UA Negative Negative    Protein, UA 30 mg/dL (1+) (A) Negative    Leuk Esterase, UA Large (3+) (A) Negative    Nitrite, UA Positive (A) Negative    Urobilinogen, UA 0.2 E.U./dL 0.2 - 1.0 E.U./dL   Urinalysis, Microscopic Only - Urine, Clean Catch    Specimen: Urine, Clean Catch   Result Value Ref Range    RBC, UA 3-6 (A) None Seen, 0-2 /HPF    WBC, UA Too Numerous to Count (A) None Seen, 0-2 /HPF    Bacteria, UA 4+ (A) None Seen, Trace /HPF    Squamous Epithelial Cells, UA 0-2 None Seen, 0-2 /HPF    Hyaline Casts, UA 0-6 0 - 6 /LPF    Methodology Automated Microscopy    Nutrition Panel    Specimen: Blood   Result Value Ref Range    Glucose 102 (H) 65 - 99 mg/dL    BUN 22 8 - 23 mg/dL    Creatinine 1.45 (H) 0.57 - 1.00 mg/dL    Sodium 146 (H) 136 - 145 mmol/L    Potassium 4.0 3.5 - 5.2 mmol/L    Chloride 109 (H) 98 - 107 mmol/L    CO2 25.0 22.0 - 29.0 mmol/L    Calcium 8.9 8.6 - 10.5 mg/dL    Alkaline Phosphatase 95 39 - 117 U/L    ALT (SGPT) 15 1 - 33 U/L    AST (SGOT) 20 1 - 32 U/L    Total Cholesterol 176 0 - 200 mg/dL    Triglycerides 232 (H) 0 - 150 mg/dL    Total Protein 6.1 6.0 - 8.5 g/dL    Albumin 3.8 3.5 - 5.2 g/dL    Phosphorus 3.5 2.5 - 4.5 mg/dL    Total Bilirubin 0.4 0.0 - 1.2 mg/dL    Magnesium 2.0 1.6 - 2.4 mg/dL    Anion Gap 12.0 5.0 - 15.0 mmol/L    Globulin 2.3 gm/dL    A/G Ratio 1.7 g/dL    BUN/Creatinine Ratio 15.2 7.0 - 25.0    eGFR 36.5 (L) >60.0 mL/min/1.73    Prealbumin    Specimen: Blood   Result Value Ref Range    Prealbumin 17.6 (L) 20.0 - 40.0 mg/dL   POC Glucose Once    Specimen: Blood   Result Value Ref Range    Glucose 152 (H) 70 - 130 mg/dL   POC Glucose Once    Specimen: Blood   Result Value Ref Range    Glucose 103 70 - 130 mg/dL   POC Glucose Once    Specimen: Blood   Result Value Ref Range    Glucose 132 (H) 70 - 130 mg/dL   POC Glucose Once    Specimen: Blood   Result Value Ref Range    Glucose 154 (H) 70 - 130 mg/dL   ECG 12 Lead Other; HTN   Result Value Ref Range    QT Interval 494 ms    QTC Interval 446 ms   Adult Transthoracic Echo Complete W/ Cont if Necessary Per Protocol   Result Value Ref Range    EF(MOD-bp) 58.9 %    LVIDd 4.4 cm    LVIDs 2.9 cm    IVSd 1.20 cm    LVPWd 1.20 cm    FS 34.1 %    IVS/LVPW 1.00 cm    ESV(cubed) 24.4 ml    EDV(cubed) 85.2 ml    LVOT area 3.5 cm2    LV mass(C)d 191.3 grams    LVOT diam 2.10 cm    EDV(MOD-sp2) 55.1 ml    EDV(MOD-sp4) 81.8 ml    ESV(MOD-sp2) 21.7 ml    ESV(MOD-sp4) 32.1 ml    SV(MOD-sp2) 33.4 ml    SV(MOD-sp4) 49.7 ml    EF(MOD-sp2) 60.6 %    EF(MOD-sp4) 60.8 %    MV E max juan 76.0 cm/sec    MV A max juan 93.3 cm/sec    MV dec time 0.28 sec    MV E/A 0.81     LA ESV Index (BP) 24.2 ml/m2    Med Peak E' Juan 5.4 cm/sec    Lat Peak E' Juan 8.2 cm/sec    Avg E/e' ratio 11.18     SV(LVOT) 72.7 ml    RV Base 2.7 cm    RV Mid 2.30 cm    RV Length 6.0 cm    TAPSE (>1.6) 1.76 cm    RV S' 16.3 cm/sec    LA dimension (2D)  2.9 cm    LV V1 max 85.4 cm/sec    LV V1 max PG 2.9 mmHg    LV V1 mean PG 2.00 mmHg    LV V1 VTI 21.0 cm    Ao pk juan 125.5 cm/sec    Ao max PG 6.3 mmHg    Ao mean PG 3.5 mmHg    Ao V2 VTI 29.0 cm    CATA(I,D) 2.5 cm2    AI P1/2t 340.0 msec    MV max PG 4.8 mmHg    MV mean PG 2.00 mmHg    MV V2 VTI 33.1 cm    MV P1/2t 95.8 msec    MVA(P1/2t) 2.30 cm2    MVA(VTI) 2.20 cm2    MV dec slope 315.0 cm/sec2    MR max juan 281.0 cm/sec    MR max PG 31.6 mmHg    TR max juan 249.8 cm/sec    TR max PG 34 mmHg     PA V2 max 104.0 cm/sec    PA acc time 0.13 sec    PI end-d alejandra 122.0 cm/sec    Ao root diam 3.6 cm    RVSP(TR) 37 mmHg    RAP systole 3 mmHg        If labs were ordered, I independently reviewed the results and considered them in treating the patient.      EMERGENCY DEPARTMENT COURSE and DIFFERENTIAL DIAGNOSIS/MDM:   Vitals:  AS OF 14:40 EST    BP - 137/65  HR - 72  TEMP - 98.1 °F (36.7 °C) (Oral)  O2 SATS - 96%        Discussion below represents my analysis of pertinent findings related to patient's condition, differential diagnosis, treatment plan and final disposition.      Differential diagnosis:  The differential diagnosis associated with the patient's presentation includes: ***      Independent interpretations (ECG/rhythm strip/X-ray/US/CT scan): ***      Additional sources:  Discussed/obtained information from independent historians:   [] Spouse:   [] Parent:   [] Friend:   [] EMS:   [] Other:  External (non-ED) record review:   [] Inpatient record:   [] Office record:   [] Outpatient record:   [] Prior Outpatient labs:   [] Prior Outpatient radiology:   [] Primary Care record:   [] Outside ED record:   [] Other:       Patient's care impacted by:   [] Diabetes   [] Hypertension   [] Coronary Artery Disease   [] Cancer   [] Other:     Care significantly affected by Social Determinants of Health (housing and economic circumstances, unemployment)    [] Yes     [] No   If yes, Patient's care significantly limited by  Social Determinants of Health including:    [] Inadequate housing    [] Low income    [] Alcoholism and drug addiction in family    [] Problems related to primary support group    [] Unemployment    [] Problems related to employment    [] Other Social Determinants of Health:       Consideration of admission/observation vs discharge: ***      I considered prescription management with: ***   [] Pain medication:   [] Antiviral:   [] Antibiotic:   [] Other:    Additional orders considered but not  ordered:  The following testing was considered but ultimately not selected after discussion with patient/family: ***    ED Course:           ***    I had a discussion with the patient/family regarding diagnosis, diagnostic results, treatment plan, and medications.  The patient/family indicated understanding of these instructions.  I spent adequate time at the bedside preceding discharge necessary to personally discuss the aftercare instructions, giving patient education, providing explanations of the results of our evaluations/findings, and my decision making to assure that the patient/family understand the plan of care.  Time was allotted to answer questions at that time and throughout the ED course.  Emphasis was placed on timely follow-up after discharge.  I also discussed the potential for the development of an acute emergent condition requiring further evaluation, admission, or even surgical intervention. I discussed that we found nothing during the visit today indicating the need for further workup, admission, or the presence of an unstable medical condition.  I encouraged the patient to return to the emergency department immediately for ANY concerns, worsening, new complaints, or if symptoms persist and unable to seek follow-up in a timely fashion.  The patient/family expressed understanding and agreement with this plan.  The patient will follow-up with their PCP in 1-2 days for reevaluation.           PROCEDURES:  Procedures    CRITICAL CARE TIME        FINAL IMPRESSION    No diagnosis found.      DISPOSITION/PLAN     ED Disposition       None              Comment: Please note this report has been produced using speech recognition software.      Miguel A Hoover MD  Attending Emergency Physician

## 2024-02-23 ENCOUNTER — TELEPHONE (OUTPATIENT)
Dept: INTERNAL MEDICINE | Facility: CLINIC | Age: 79
End: 2024-02-23
Payer: MEDICARE

## 2024-02-23 RX ORDER — OMEPRAZOLE 20 MG/1
20 CAPSULE, DELAYED RELEASE ORAL DAILY
Qty: 90 CAPSULE | Refills: 3 | Status: SHIPPED | OUTPATIENT
Start: 2024-02-23

## 2024-02-23 RX ORDER — LOSARTAN POTASSIUM 50 MG/1
50 TABLET ORAL
Qty: 30 TABLET | Refills: 2 | OUTPATIENT
Start: 2024-02-23

## 2024-02-24 LAB — BACTERIA SPEC AEROBE CULT: NORMAL

## 2024-02-29 ENCOUNTER — OFFICE VISIT (OUTPATIENT)
Dept: INTERNAL MEDICINE | Facility: CLINIC | Age: 79
End: 2024-02-29
Payer: MEDICARE

## 2024-02-29 VITALS
OXYGEN SATURATION: 97 % | DIASTOLIC BLOOD PRESSURE: 60 MMHG | TEMPERATURE: 97.1 F | WEIGHT: 124.3 LBS | SYSTOLIC BLOOD PRESSURE: 102 MMHG | HEIGHT: 60 IN | BODY MASS INDEX: 24.4 KG/M2 | HEART RATE: 88 BPM

## 2024-02-29 DIAGNOSIS — R13.10 ODYNOPHAGIA: Primary | ICD-10-CM

## 2024-02-29 DIAGNOSIS — C09.9 SQUAMOUS CELL CARCINOMA OF LEFT TONSIL: ICD-10-CM

## 2024-02-29 DIAGNOSIS — I10 HTN (HYPERTENSION), BENIGN: ICD-10-CM

## 2024-02-29 NOTE — PROGRESS NOTES
Subjective  Vesta Landry is a 78 y.o. female    HPI coming in accompanied by her daughter who is giving us some of the history since the patient is a poor historian.  The patient developed a left tonsil squamous cell carcinoma for which she underwent radiation and chemotherapy about a year ago at that time had significant difficulty swallowing and had required a PEG tube.  With treatment her symptoms improved however the patient was placed on palliative and hospice care.  She continued to show improvement hospice care was discontinued and her PEG tube was removed since the patient was tolerating oral intake well.  Over the last 2 weeks has had some difficulty swallowing with occasional regurgitation noted.  Has a poor appetite she denies any blood in her stools she does not tolerate liquids well either.  Has had about a 20 pound weight loss in the last 2 months she was recently seen in the emergency room for her above-mentioned symptoms at that time a CT of the neck and the chest showed resolution of her previously noted primary lesion and the adjacent metastatic adenopathy there was some nonspecific symmetrical thickening of the vocal cords and the adjacent epiglottic folds with mild luminal narrowing.  This was suggestive of laryngitis.  Chest imaging was unremarkable.  The patient has not had any significant change in her voice.  Lab work in the ER did show evidence of mild dehydration  At  The following portions of the patient's history were reviewed and updated as appropriate: allergies, current medications, past family history, past medical history, past social history, past surgical history, and problem list.     Review of Systems   Constitutional:  Positive for fatigue. Negative for activity change, appetite change and fever.   HENT:  Negative for congestion, ear discharge, ear pain and trouble swallowing.    Eyes:  Negative for photophobia and visual disturbance.   Respiratory:  Negative for cough  "and shortness of breath.    Cardiovascular:  Negative for chest pain and palpitations.   Gastrointestinal:  Positive for nausea. Negative for abdominal distention, abdominal pain, constipation, diarrhea and vomiting.   Endocrine: Negative.    Genitourinary:  Negative for dysuria, hematuria and urgency.   Musculoskeletal:  Positive for arthralgias. Negative for back pain, joint swelling and myalgias.   Skin:  Negative for color change and rash.   Allergic/Immunologic: Negative.    Neurological:  Negative for dizziness, weakness, light-headedness and headaches.   Hematological:  Negative for adenopathy. Does not bruise/bleed easily.   Psychiatric/Behavioral:  Negative for agitation, confusion and dysphoric mood. The patient is not nervous/anxious.        Visit Vitals  /60   Pulse 88   Temp 97.1 °F (36.2 °C) (Tympanic)   Ht 152.4 cm (60\")   Wt 56.4 kg (124 lb 4.8 oz)   SpO2 97%   BMI 24.28 kg/m²       Objective  Physical Exam  Constitutional:       General: She is not in acute distress.     Appearance: She is well-developed.   HENT:      Nose: Nose normal.   Eyes:      General: No scleral icterus.     Conjunctiva/sclera: Conjunctivae normal.   Neck:      Thyroid: No thyromegaly.      Trachea: No tracheal deviation.   Cardiovascular:      Rate and Rhythm: Normal rate and regular rhythm.      Heart sounds: No murmur heard.     No friction rub.   Pulmonary:      Effort: No respiratory distress.      Breath sounds: No wheezing or rales.   Abdominal:      General: There is no distension.      Palpations: Abdomen is soft. There is no mass.      Tenderness: There is no abdominal tenderness. There is no guarding.   Musculoskeletal:         General: No deformity. Normal range of motion.   Lymphadenopathy:      Cervical: No cervical adenopathy.   Skin:     General: Skin is warm and dry.      Findings: No erythema or rash.   Neurological:      Mental Status: She is alert and oriented to person, place, and time.      Cranial " Nerves: No cranial nerve deficit.      Coordination: Coordination normal.      Deep Tendon Reflexes: Reflexes are normal and symmetric.   Psychiatric:         Behavior: Behavior normal.         Thought Content: Thought content normal.         Judgment: Judgment normal.       Diagnoses and all orders for this visit:    Odynophagia discussed with gastroenterologist will order a video swallow study.  Referral made for them to do an endoscopic evaluation if needed  Has poor oral intake encouraged liquids.  Has evidence of dehydration Lasix and hydralazine have been discontinued  -     FL video swallow w speech; Future  -     Ambulatory Referral to Gastroenterology    Squamous cell carcinoma of left tonsil does not appear to have a recurrence at least on CT scan.  May need endoscopic evaluation    HTN (hypertension), benign with weight loss is hypotensive.  Hydralazine has been discontinued.    Other orders  -     nystatin (MYCOSTATIN) 100,000 unit/mL suspension; Swish and swallow 5 mL 4 (Four) Times a Day.        BMI is within normal parameters. No other follow-up for BMI required.

## 2024-03-04 RX ORDER — HYDRALAZINE HYDROCHLORIDE 100 MG/1
TABLET, FILM COATED ORAL
Qty: 270 TABLET | Refills: 1 | Status: SHIPPED | OUTPATIENT
Start: 2024-03-04

## 2024-03-12 RX ORDER — NEBIVOLOL 5 MG/1
5 TABLET ORAL DAILY
Qty: 90 TABLET | Refills: 1 | Status: SHIPPED | OUTPATIENT
Start: 2024-03-12

## 2024-03-19 ENCOUNTER — HOSPITAL ENCOUNTER (OUTPATIENT)
Dept: GENERAL RADIOLOGY | Facility: HOSPITAL | Age: 79
Discharge: HOME | End: 2024-03-19
Admitting: INTERNAL MEDICINE
Payer: MEDICARE

## 2024-03-19 DIAGNOSIS — R13.10 ODYNOPHAGIA: ICD-10-CM

## 2024-03-19 PROCEDURE — 92611 MOTION FLUOROSCOPY/SWALLOW: CPT

## 2024-03-19 PROCEDURE — 74230 X-RAY XM SWLNG FUNCJ C+: CPT

## 2024-03-19 NOTE — MBS/VFSS/FEES
Acute Care - Speech Language Pathology   Swallow Initial Evaluation  [facility]     Patient Name: Vesta Landry  : 1945  MRN: [id number]  Today's Date: 3/19/2024               Admit Date: 3/19/2024    Visit Dx:     ICD-10-CM ICD-9-CM   1. Odynophagia  R13.10 787.20     Patient Active Problem List   Diagnosis    Gastroesophageal reflux disease without esophagitis    Pure hypercholesterolemia    HTN (hypertension), benign    Vitamin D deficiency    Bilateral edema of lower extremity    Dysthymia    Chronic diastolic heart failure    Squamous cell carcinoma of left tonsil    Bradycardia    HLD (hyperlipidemia)    Anxiety associated with depression    Personal history of COVID-19    Anemia    Squamous cell carcinoma of head and neck    History of Clostridium difficile colitis    Nonischemic nontraumatic myocardial injury    Enterocolitis due to Clostridium difficile, not specified as recurrent    Chronic diastolic heart failure    Personal history of COVID-19     Past Medical History:   Diagnosis Date    Arthritis     Cancer     squamous cell - tonsils    Cancer of tonsil     metastatic squamous cell - left tonsil    CHF (congestive heart failure)     Deep vein thrombosis     bilateral- daughter states on 23 that she doesn't remember patient having this    Dementia     Dysphagia     GERD (gastroesophageal reflux disease)     Hyperlipidemia     Hypertension     Impaired mobility     PONV (postoperative nausea and vomiting)     SOB (shortness of breath)     following chemo infusion on day of infusion last week (week of -23) per pt's daughter    Vitamin D deficiency     Wears dentures     uppers - instructed not to use adhesive DOS     Past Surgical History:   Procedure Laterality Date    APPENDECTOMY      CATARACT EXTRACTION      CHOLECYSTECTOMY      COLONOSCOPY      Approx     ENDOSCOPY W/ PEG TUBE PLACEMENT N/A 2023    Procedure: ESOPHAGOGASTRODUODENOSCOPY WITH PERCUTANEOUS  ENDOSCOPIC GASTROSTOMY TUBE INSERTION;  Surgeon: Brigido Guerra MD;  Location: [facility] ENDOSCOPY;  Service: Gastroenterology;  Laterality: N/A;    HYSTERECTOMY  1991    OOPHORECTOMY Bilateral 1991    PORTACATH PLACEMENT Right 02/08/2023    Procedure: INSERTION OF PORTACATH;  Surgeon: Brigido Guerra MD;  Location: [facility] OR;  Service: General;  Laterality: Right;    TUBAL ABDOMINAL LIGATION         SLP Recommendation and Plan  SLP Swallowing Diagnosis: mild, pharyngeal dysphagia, esophageal dysphagia, suspected esophageal dysphagia (03/19/24 1026)  SLP Diet Recommendation: regular textures, thin liquids (03/19/24 1026)  Recommended Precautions and Strategies: upright posture during/after eating, general aspiration precautions, reflux precautions, other (see comments) (single sip swallows with thin liq) (03/19/24 1026)  SLP Rec. for Method of Medication Administration: meds whole, with thin liquids, with puree, as tolerated (03/19/24 1026)     Monitor for Signs of Aspiration: yes, notify SLP if any concerns, cough, throat clearing, gurgly voice, right lower lobe infiltrates, pneumonia (03/19/24 1026)        Anticipated Discharge Disposition (SLP): unknown (03/19/24 1026)     Therapy Frequency (Swallow): evaluation only (03/19/24 1026)     Oral Care Recommendations: Oral Care BID/PRN, Denture Care (03/19/24 1026)  Demonstrates Need for Referral to Another Service: dedicated esophageal assessment, gastroenterology (03/19/24 1026)  Swallowing Considerations per Physician Discretion: medical management of suspected esophageal dysphagia, as indicated (03/19/24 1026)                                         SWALLOW EVALUATION (Last 72 Hours)       SLP Adult Swallow Evaluation       Row Name 03/19/24 1026                   Rehab Evaluation    Document Type other (see comments)  MBSS  -TM        Subjective Information no complaints  -TM        Patient Observations alert;cooperative  -TM        Patient/Family/Caregiver  Comments/Observations daughter present  -TM        Patient Effort excellent  -TM           General Information    Patient Profile Reviewed yes  -TM        Pertinent History Of Current Problem GERD, cardiac, tonsil CA s/p chemo, radiation, & PEG  -TM        Current Method of Nutrition mechanical ground textures;pudding thick;honey-thick liquids;nectar/syrup-thick liquids;thin liquids  -TM        Precautions/Limitations, Vision WFL with corrective lenses  -TM        Precautions/Limitations, Hearing WFL  -TM        Prior Level of Function-Communication WFL  -TM        Prior Level of Function-Swallowing no diet consistency restrictions;esophageal concerns  -TM        Plans/Goals Discussed with patient and family  -TM        Barriers to Rehab none identified;previous functional deficit  -TM        Patient's Goals for Discharge patient did not state  -TM        Family Goals for Discharge other (see comments)  eat/drink without difficulty  -TM           Pain    Additional Documentation Pain Scale: Numbers Pre/Post-Treatment (Group)  -TM           Pain Scale: Numbers Pre/Post-Treatment    Pretreatment Pain Rating 0/10 - no pain  -TM        Posttreatment Pain Rating 0/10 - no pain  -TM           Oral Motor Structure and Function    Oral Lesions or Structural Abnormalities and/or variants none identified  -TM        Dentition Assessment lower dentures/partial in place;upper dentures/partial in place  -TM        Secretion Management WNL/WFL  -TM        Mucosal Quality moist, healthy  -TM        Volitional Cough WFL  -TM           Oral Musculature and Cranial Nerve Assessment    Oral Motor General Assessment WFL  -TM           Respiratory    Respiratory Status WFL;room air  -TM           MBS/VFSS    Utensils Used spoon;straw;cup  -TM        Consistencies Trialed regular textures;mechanical ground textures;pudding thick;honey-thick liquids;nectar/syrup-thick liquids;thin liquids  -TM           MBS/VFSS Interpretation    Oral  Prep Phase WFL  -TM        Oral Transit Phase WFL  -TM        Oral Residue WFL  -TM        VFSS Summary MBS completed with pt. seated upright in dedicated chair for exam with daughter present.  She has a hx of tonsil cancer s/p chemo, radiation, and PEG, and has been on a regular diet with thin liq at home.  Pt. was given regular and mechanical soft solids, pudding, honey-thick, nectar-thick, and thin liquids.  Oral phase was WFL with all consistencies/textures.  Mild pharyngeal phase dysphagia exhibited with trace, consistent, and superior to mid-laryngeal penetration with thin liq due to mildly decreased laryngeal elevation.  No aspiration with any consistency or volume with any consistency trialed.  She is at some risk, but has been clinically tolerating a regular diet with thin liquids since last year.  Notably, pt. also had some cervical osteophytes at level C4-6 that did not affect bolus transit, but may affect sensitivity with some regular, dense solid foods.  She would likely benefit from a GI consult due to pt.'s complaints of vomiting at times with po and weight loss, seeming dysmotility per RAD during exophageal screening, and also prior dx of GERD.  Recommend:  1. continue regular diet with thin liq as jessica, 2. meds whole as jessica, 3. single sip swallows, no consecutive swallows, 4. aspiration precautions, 5. reflux precautions, 6. GI consult.  D/W pt. and daughter post exam with verbalized understanding.  -TM           Initiation of Pharyngeal Swallow    Initiation of Pharyngeal Swallow WFL  -TM        Pharyngeal Phase impaired pharyngeal phase of swallowing  -TM        Anatomical abnormalities noted osteophyte/bone spur per radiology report  -TM        Penetration During the Swallow thin liquids;secondary to reduced laryngeal elevation  -TM        Depth of Penetration shallow  -TM        Rosenbek's Scale 3--->level 3  -TM        Pharyngeal Residue thin liquids;secondary to reduced laryngeal elevation  -TM         Attempted Compensatory Maneuvers chin tuck  -TM        Response to Attempted Compensatory Maneuvers did not prevent penetration  -TM           Esophageal Phase    Esophageal Phase other (see comments)  dx of GERD and ?dysmotility per RAD  -TM           SLP Communication to Radiology    Severity Level of Dysphagia mild dysphagia;pharyngeal dysfunction;suspected esophageal dysfunction  -TM        Consistencies Aspirated/Penetrated penetrated;thin liquids  -TM        Summary Statement mild pharyngeal phase dysphagia; third stage dysphagia  -TM           SLP Evaluation Clinical Impression    SLP Swallowing Diagnosis mild;pharyngeal dysphagia;esophageal dysphagia;suspected esophageal dysphagia  -TM        Functional Impact risk of aspiration/pneumonia  -TM           Recommendations    Therapy Frequency (Swallow) evaluation only  -TM        SLP Diet Recommendation regular textures;thin liquids  -TM        Recommended Precautions and Strategies upright posture during/after eating;general aspiration precautions;reflux precautions;other (see comments)  single sip swallows with thin liq  -TM        Oral Care Recommendations Oral Care BID/PRN;Denture Care  -TM        SLP Rec. for Method of Medication Administration meds whole;with thin liquids;with puree;as tolerated  -TM        Monitor for Signs of Aspiration yes;notify SLP if any concerns;cough;throat clearing;gurgly voice;right lower lobe infiltrates;pneumonia  -TM        Anticipated Discharge Disposition (SLP) unknown  -TM        Demonstrates Need for Referral to Another Service dedicated esophageal assessment;gastroenterology  -TM        Swallowing Considerations per Physician Discretion medical management of suspected esophageal dysphagia, as indicated  -TM                  User Key  (r) = Recorded By, (t) = Taken By, (c) = Cosigned By      Initials Name Effective Dates     Valerie Downing 06/16/21 -                     EDUCATION  The patient has been educated  in the following areas:   Dysphagia (Swallowing Impairment) Oral Care/Hydration.              Time Calculation:    Time Calculation- SLP       Row Name 03/19/24 1300             Time Calculation- SLP    SLP Start Time 1026  -TM      SLP Received On 03/19/24  -         Untimed Charges    SLP Eval/Re-eval  [facility] Fluoro Eval Swallow - 62963  -TM                User Key  (r) = Recorded By, (t) = Taken By, (c) = Cosigned By      Initials Name Provider Type     Valerie Downing Speech and Language Pathologist                    Therapy Charges for Today       Code Description Service Date Service Provider Modifiers Qty    [id number]  ST [facility] FLUORO EVAL SWALLOW 6 3/19/2024 Valerie Downing GN 1                 Valerie Downing  3/19/2024

## 2024-03-19 NOTE — MBS/VFSS/FEES
Outpatient  - Speech Language Pathology   Swallow  Modified Barium Swallow Study   [facility]     Patient Name: Vesta Landry  : 1945  MRN: [id number]  Today's Date: 3/19/2024               Admit Date: 3/19/2024    Visit Dx:     ICD-10-CM ICD-9-CM   1. Odynophagia  R13.10 787.20     Patient Active Problem List   Diagnosis    Gastroesophageal reflux disease without esophagitis    Pure hypercholesterolemia    HTN (hypertension), benign    Vitamin D deficiency    Bilateral edema of lower extremity    Dysthymia    Chronic diastolic heart failure    Squamous cell carcinoma of left tonsil    Bradycardia    HLD (hyperlipidemia)    Anxiety associated with depression    Personal history of COVID-19    Anemia    Squamous cell carcinoma of head and neck    History of Clostridium difficile colitis    Nonischemic nontraumatic myocardial injury    Enterocolitis due to Clostridium difficile, not specified as recurrent    Chronic diastolic heart failure    Personal history of COVID-19     Past Medical History:   Diagnosis Date    Arthritis     Cancer     squamous cell - tonsils    Cancer of tonsil     metastatic squamous cell - left tonsil    CHF (congestive heart failure)     Deep vein thrombosis     bilateral- daughter states on 23 that she doesn't remember patient having this    Dementia     Dysphagia     GERD (gastroesophageal reflux disease)     Hyperlipidemia     Hypertension     Impaired mobility     PONV (postoperative nausea and vomiting)     SOB (shortness of breath)     following chemo infusion on day of infusion last week (week of -23) per pt's daughter    Vitamin D deficiency     Wears dentures     uppers - instructed not to use adhesive DOS     Past Surgical History:   Procedure Laterality Date    APPENDECTOMY      CATARACT EXTRACTION      CHOLECYSTECTOMY      COLONOSCOPY      Approx     ENDOSCOPY W/ PEG TUBE PLACEMENT N/A 2023    Procedure: ESOPHAGOGASTRODUODENOSCOPY WITH  PERCUTANEOUS ENDOSCOPIC GASTROSTOMY TUBE INSERTION;  Surgeon: Brigido Guerra MD;  Location: [facility] ENDOSCOPY;  Service: Gastroenterology;  Laterality: N/A;    HYSTERECTOMY  1991    OOPHORECTOMY Bilateral 1991    PORTACATH PLACEMENT Right 02/08/2023    Procedure: INSERTION OF PORTACATH;  Surgeon: Brigido Guerra MD;  Location: [facility] OR;  Service: General;  Laterality: Right;    TUBAL ABDOMINAL LIGATION         SLP Recommendation and Plan  SLP Swallowing Diagnosis: mild, pharyngeal dysphagia, esophageal dysphagia, suspected esophageal dysphagia (03/19/24 1026)  SLP Diet Recommendation: regular textures, thin liquids (03/19/24 1026)  Recommended Precautions and Strategies: upright posture during/after eating, general aspiration precautions, reflux precautions, other (see comments) (single sip swallows with thin liq) (03/19/24 1026)  SLP Rec. for Method of Medication Administration: meds whole, with thin liquids, with puree, as tolerated (03/19/24 1026)     Monitor for Signs of Aspiration: yes, notify SLP if any concerns, cough, throat clearing, gurgly voice, right lower lobe infiltrates, pneumonia (03/19/24 1026)        Anticipated Discharge Disposition (SLP): unknown (03/19/24 1026)     Therapy Frequency (Swallow): evaluation only (03/19/24 1026)     Oral Care Recommendations: Oral Care BID/PRN, Denture Care (03/19/24 1026)  Demonstrates Need for Referral to Another Service: dedicated esophageal assessment, gastroenterology (03/19/24 1026)  Swallowing Considerations per Physician Discretion: medical management of suspected esophageal dysphagia, as indicated (03/19/24 1026)                                         SWALLOW EVALUATION (Last 72 Hours)       SLP Adult Swallow Evaluation       Row Name 03/19/24 1026                   Rehab Evaluation    Document Type other (see comments)  MBSS  -TM        Subjective Information no complaints  -TM        Patient Observations alert;cooperative  -TM         Patient/Family/Caregiver Comments/Observations daughter present  -TM        Patient Effort excellent  -TM           General Information    Patient Profile Reviewed yes  -TM        Pertinent History Of Current Problem GERD, cardiac, tonsil CA s/p chemo, radiation, & PEG  -TM        Current Method of Nutrition mechanical ground textures;pudding thick;honey-thick liquids;nectar/syrup-thick liquids;thin liquids  -TM        Precautions/Limitations, Vision WFL with corrective lenses  -TM        Precautions/Limitations, Hearing WFL  -TM        Prior Level of Function-Communication WFL  -TM        Prior Level of Function-Swallowing no diet consistency restrictions;esophageal concerns  -TM        Plans/Goals Discussed with patient and family  -TM        Barriers to Rehab none identified;previous functional deficit  -TM        Patient's Goals for Discharge patient did not state  -TM        Family Goals for Discharge other (see comments)  eat/drink without difficulty  -TM           Pain    Additional Documentation Pain Scale: Numbers Pre/Post-Treatment (Group)  -TM           Pain Scale: Numbers Pre/Post-Treatment    Pretreatment Pain Rating 0/10 - no pain  -TM        Posttreatment Pain Rating 0/10 - no pain  -TM           Oral Motor Structure and Function    Oral Lesions or Structural Abnormalities and/or variants none identified  -TM        Dentition Assessment lower dentures/partial in place;upper dentures/partial in place  -TM        Secretion Management WNL/WFL  -TM        Mucosal Quality moist, healthy  -TM        Volitional Cough WFL  -TM           Oral Musculature and Cranial Nerve Assessment    Oral Motor General Assessment WFL  -TM           Respiratory    Respiratory Status WFL;room air  -TM           MBS/VFSS    Utensils Used spoon;straw;cup  -TM        Consistencies Trialed regular textures;mechanical ground textures;pudding thick;honey-thick liquids;nectar/syrup-thick liquids;thin liquids  -TM           MBS/VFSS  Interpretation    Oral Prep Phase WFL  -TM        Oral Transit Phase WFL  -TM        Oral Residue WFL  -TM        VFSS Summary MBS completed with pt. seated upright in dedicated chair for exam with daughter present.  She has a hx of tonsil cancer s/p chemo, radiation, and PEG, and has been on a regular diet with thin liq at home.  Pt. was given regular and mechanical soft solids, pudding, honey-thick, nectar-thick, and thin liquids.  Oral phase was WFL with all consistencies/textures.  Mild pharyngeal phase dysphagia exhibited with trace, consistent, and superior to mid-laryngeal penetration with thin liq due to mildly decreased laryngeal elevation.  No aspiration with any consistency or volume with any consistency trialed.  She is at some risk, but has been clinically tolerating a regular diet with thin liquids since last year.  Notably, pt. also had some cervical osteophytes at level C4-6 that did not affect bolus transit, but may affect sensitivity with some regular, dense solid foods.  She would likely benefit from a GI consult due to pt.'s complaints of vomiting at times with po and weight loss, seeming dysmotility per RAD during exophageal screening, and also prior dx of GERD.  Recommend:  1. continue regular diet with thin liq as jessica, 2. meds whole as jessica, 3. single sip swallows, no consecutive swallows, 4. aspiration precautions, 5. reflux precautions, 6. GI consult.  D/W pt. and daughter post exam with verbalized understanding.  -TM           Initiation of Pharyngeal Swallow    Initiation of Pharyngeal Swallow WFL  -TM        Pharyngeal Phase impaired pharyngeal phase of swallowing  -TM        Anatomical abnormalities noted osteophyte/bone spur per radiology report  -TM        Penetration During the Swallow thin liquids;secondary to reduced laryngeal elevation  -TM        Depth of Penetration shallow  -TM        Rosenbek's Scale 3--->level 3  -TM        Pharyngeal Residue thin liquids;secondary to reduced  laryngeal elevation  -TM        Attempted Compensatory Maneuvers chin tuck  -TM        Response to Attempted Compensatory Maneuvers did not prevent penetration  -TM           Esophageal Phase    Esophageal Phase other (see comments)  dx of GERD and ?dysmotility per RAD  -TM           SLP Communication to Radiology    Severity Level of Dysphagia mild dysphagia;pharyngeal dysfunction;suspected esophageal dysfunction  -TM        Consistencies Aspirated/Penetrated penetrated;thin liquids  -TM        Summary Statement mild pharyngeal phase dysphagia; third stage dysphagia  -TM           SLP Evaluation Clinical Impression    SLP Swallowing Diagnosis mild;pharyngeal dysphagia;esophageal dysphagia;suspected esophageal dysphagia  -TM        Functional Impact risk of aspiration/pneumonia  -TM           Recommendations    Therapy Frequency (Swallow) evaluation only  -TM        SLP Diet Recommendation regular textures;thin liquids  -TM        Recommended Precautions and Strategies upright posture during/after eating;general aspiration precautions;reflux precautions;other (see comments)  single sip swallows with thin liq  -TM        Oral Care Recommendations Oral Care BID/PRN;Denture Care  -TM        SLP Rec. for Method of Medication Administration meds whole;with thin liquids;with puree;as tolerated  -TM        Monitor for Signs of Aspiration yes;notify SLP if any concerns;cough;throat clearing;gurgly voice;right lower lobe infiltrates;pneumonia  -TM        Anticipated Discharge Disposition (SLP) unknown  -TM        Demonstrates Need for Referral to Another Service dedicated esophageal assessment;gastroenterology  -TM        Swallowing Considerations per Physician Discretion medical management of suspected esophageal dysphagia, as indicated  -TM                  User Key  (r) = Recorded By, (t) = Taken By, (c) = Cosigned By      Initials Name Effective Dates     Valerie Downing 06/16/21 -                     EDUCATION  The  patient has been educated in the following areas:   Dysphagia (Swallowing Impairment) Oral Care/Hydration.              Time Calculation:    Time Calculation- SLP       Row Name 03/19/24 1300             Time Calculation- SLP    SLP Start Time 1026  -TM      SLP Received On 03/19/24  -         Untimed Charges    SLP Eval/Re-eval  [facility] Fluoro Eval Swallow - 32730  -TM                User Key  (r) = Recorded By, (t) = Taken By, (c) = Cosigned By      Initials Name Provider Type     Valerie Downing Speech and Language Pathologist                    Therapy Charges for Today       Code Description Service Date Service Provider Modifiers Qty    [id number]  ST [facility] FLUORO EVAL SWALLOW 6 3/19/2024 Valerie Downing GN 1                 Valerie Downing  3/19/2024

## 2024-03-28 RX ORDER — ONDANSETRON HYDROCHLORIDE 8 MG/1
8 TABLET, FILM COATED ORAL EVERY 8 HOURS PRN
Qty: 10 TABLET | Refills: 0 | Status: SHIPPED | OUTPATIENT
Start: 2024-03-28

## 2024-04-03 RX ORDER — MIRTAZAPINE 30 MG/1
30 TABLET, FILM COATED ORAL NIGHTLY
Qty: 90 TABLET | Refills: 1 | Status: SHIPPED | OUTPATIENT
Start: 2024-04-03

## 2024-05-21 ENCOUNTER — TELEPHONE (OUTPATIENT)
Dept: INTERNAL MEDICINE | Facility: CLINIC | Age: 79
End: 2024-05-21
Payer: MEDICARE

## 2024-05-21 ENCOUNTER — OFFICE VISIT (OUTPATIENT)
Dept: INTERNAL MEDICINE | Facility: CLINIC | Age: 79
End: 2024-05-21
Payer: MEDICARE

## 2024-05-21 VITALS
DIASTOLIC BLOOD PRESSURE: 68 MMHG | OXYGEN SATURATION: 97 % | RESPIRATION RATE: 20 BRPM | WEIGHT: 125.4 LBS | BODY MASS INDEX: 24.62 KG/M2 | SYSTOLIC BLOOD PRESSURE: 122 MMHG | HEIGHT: 60 IN | TEMPERATURE: 95.6 F | HEART RATE: 65 BPM

## 2024-05-21 DIAGNOSIS — R11.2 NAUSEA AND VOMITING, UNSPECIFIED VOMITING TYPE: ICD-10-CM

## 2024-05-21 DIAGNOSIS — N39.41 URGENCY INCONTINENCE: ICD-10-CM

## 2024-05-21 DIAGNOSIS — R03.0 ELEVATED BLOOD PRESSURE READING: Primary | ICD-10-CM

## 2024-05-21 PROCEDURE — 3078F DIAST BP <80 MM HG: CPT | Performed by: NURSE PRACTITIONER

## 2024-05-21 PROCEDURE — 3074F SYST BP LT 130 MM HG: CPT | Performed by: NURSE PRACTITIONER

## 2024-05-21 PROCEDURE — 99213 OFFICE O/P EST LOW 20 MIN: CPT | Performed by: NURSE PRACTITIONER

## 2024-05-21 PROCEDURE — 1160F RVW MEDS BY RX/DR IN RCRD: CPT | Performed by: NURSE PRACTITIONER

## 2024-05-21 PROCEDURE — 1126F AMNT PAIN NOTED NONE PRSNT: CPT | Performed by: NURSE PRACTITIONER

## 2024-05-21 PROCEDURE — 1159F MED LIST DOCD IN RCRD: CPT | Performed by: NURSE PRACTITIONER

## 2024-05-21 RX ORDER — ONDANSETRON HYDROCHLORIDE 8 MG/1
8 TABLET, FILM COATED ORAL EVERY 8 HOURS PRN
Qty: 10 TABLET | Refills: 1 | Status: SHIPPED | OUTPATIENT
Start: 2024-05-21

## 2024-05-21 NOTE — PROGRESS NOTES
Office Note     Name: Vesta Landry    : 1945     MRN: [id number]     Chief Complaint  Hypertension (Vomited a few times and had recently had her BP med's changed./Took her APRESOLINE this morning and last night to bring it down from the 190s over the 110s)    Subjective     History of Present Illness:  Vesta Landry is a 78 y.o. female who presents today for elevated blood pressure readings. Her daughter accompanies her to the clinic visit today.  The patient is taking nebivolol 5 mg daily as directed. However, her hydralazine was stopped due to a history of low blood pressure readings.     Her daughter reports her mother had a blood pressure reading of 195/98 yesterday. The daughter gave her Hydralazine and the blood pressure decreased but did not normalize. She had a headache later in the evening and developed nausea and vomited 5-6 times. She has an gastroenterology appointment next week for consult regarding her nausea. The patient denied chest pain yesterday or today. She also restarted furosemide 20 mg tablet daily for dependent edema.  She reports last BM yesterday. The patient reports a history of urinary leakage but has acute onset urinary urgency.     Current squamous cell carcinoma of the left tonsil, no active treatment at this time.        Review of Systems:   Review of Systems    Past Medical History:   Past Medical History:   Diagnosis Date    Arthritis     Cancer     squamous cell - tonsils    Cancer of tonsil     metastatic squamous cell - left tonsil    CHF (congestive heart failure)     Deep vein thrombosis     bilateral- daughter states on 23 that she doesn't remember patient having this    Dementia     Dysphagia     GERD (gastroesophageal reflux disease)     Hyperlipidemia     Hypertension     Impaired mobility     PONV (postoperative nausea and vomiting)     SOB (shortness of breath)     following chemo infusion on day of infusion last week (week of -23)  per pt's daughter    Vitamin D deficiency     Wears dentures     uppers - instructed not to use adhesive DOS       Past Surgical History:   Past Surgical History:   Procedure Laterality Date    APPENDECTOMY      CATARACT EXTRACTION      CHOLECYSTECTOMY      COLONOSCOPY      Approx 2009    ENDOSCOPY W/ PEG TUBE PLACEMENT N/A 2/20/2023    Procedure: ESOPHAGOGASTRODUODENOSCOPY WITH PERCUTANEOUS ENDOSCOPIC GASTROSTOMY TUBE INSERTION;  Surgeon: Brigido Guerra MD;  Location: [facility];  Service: Gastroenterology;  Laterality: N/A;    HYSTERECTOMY  1991    OOPHORECTOMY Bilateral 1991    PORTACATH PLACEMENT Right 02/08/2023    Procedure: INSERTION OF PORTACATH;  Surgeon: Brigido Guerra MD;  Location: [facility] OR;  Service: General;  Laterality: Right;    TUBAL ABDOMINAL LIGATION         Immunizations:   Immunization History   Administered Date(s) Administered    COVID-19 (PFIZER) Purple Cap Monovalent 02/23/2021, 03/16/2021, 12/06/2021    Fluad Quad 65+ 09/11/2020    Fluzone High Dose =>65 Years ([employer] ONLY) 12/01/2016, 09/21/2017, 09/13/2018, 12/03/2019, 12/06/2021    Fluzone High-Dose 65+yrs 10/13/2022, 10/06/2023    Hepatitis A 06/25/2019    Pneumococcal Conjugate 13-Valent (PCV13) 07/29/2016        Medications:     Current Outpatient Medications:     allopurinol (ZYLOPRIM) 100 MG tablet, Take 1 tablet by mouth Daily., Disp: 90 tablet, Rfl: 3    furosemide (LASIX) 20 MG tablet, Take 1 tablet by mouth Daily. Indications: Cardiac Failure, Edema, High Blood Pressure Disorder, Disp: 90 tablet, Rfl: 3    hydrALAZINE (APRESOLINE) 100 MG tablet, TAKE 1 TABLET BY MOUTH THREE TIMES DAILY FOR HIGH BLOOD PRESSURE, Disp: 270 tablet, Rfl: 1    mirtazapine (REMERON) 30 MG tablet, TAKE 1 TABLET BY MOUTH EVERY NIGHT, Disp: 90 tablet, Rfl: 1    multivitamin with minerals (MULTIVITAMIN ADULT PO), Take 1 tablet by mouth Daily., Disp: , Rfl:     nebivolol (BYSTOLIC) 5 MG tablet, TAKE 1 TABLET BY MOUTH DAILY FOR HIGH BLOOD  "PRESSURE, Disp: 90 tablet, Rfl: 1    nystatin (MYCOSTATIN) 100,000 unit/mL suspension, Swish and swallow 5 mL 4 (Four) Times a Day., Disp: 60 mL, Rfl: 1    omeprazole (priLOSEC) 20 MG capsule, Take 1 capsule by mouth Daily. Indications: Heartburn, Disp: 90 capsule, Rfl: 3    ondansetron (ZOFRAN) 8 MG tablet, Take 1 tablet by mouth Every 8 (Eight) Hours As Needed for Nausea or Vomiting., Disp: 10 tablet, Rfl: 1    promethazine (PHENERGAN) 12.5 MG tablet, Take 1 tablet by mouth Every 8 (Eight) Hours As Needed for Nausea or Vomiting., Disp: 10 tablet, Rfl: 0    nitroglycerin (NITROSTAT) 0.4 MG SL tablet, Place 1 tablet under the tongue Every 5 (Five) Minutes As Needed for Chest Pain. Take no more than 3 doses in 15 minutes., Disp: 50 tablet, Rfl: 0    Allergies:   Allergies   Allergen Reactions    Trazodone Confusion       Family History:   Family History   Problem Relation Age of Onset    Heart failure Mother     Hyperlipidemia Mother     Hypertension Mother     Cancer Father     Cancer Sister     Stroke Sister     Breast cancer Maternal Aunt     Breast cancer Paternal Aunt     Ovarian cancer Neg Hx        Social History:   Social History     Socioeconomic History    Marital status:    Tobacco Use    Smoking status: Former     Current packs/day: 0.00     Average packs/day: 0.2 packs/day for 10.0 years (1.5 ttl pk-yrs)     Types: Cigarettes     Start date: 1977     Quit date: 1987     Years since quittin.4     Passive exposure: Past    Smokeless tobacco: Never   Vaping Use    Vaping status: Never Used   Substance and Sexual Activity    Alcohol use: No    Drug use: No    Sexual activity: Defer         Objective     Vital Signs  /68   Pulse 65   Temp 95.6 °F (35.3 °C) (Temporal)   Resp 20   Ht 152.4 cm (60\")   Wt 56.9 kg (125 lb 6.4 oz)   SpO2 97%   BMI 24.49 kg/m²   Estimated body mass index is 24.49 kg/m² as calculated from the following:    Height as of this encounter: 152.4 cm " "(60\").    Weight as of this encounter: 56.9 kg (125 lb 6.4 oz).    BMI is within normal parameters. No other follow-up for BMI required.      Physical Exam  Vitals and nursing note reviewed.   Constitutional:       General: She is not in acute distress.     Appearance: Normal appearance. She is not ill-appearing or toxic-appearing.   HENT:      Head: Normocephalic and atraumatic.   Cardiovascular:      Rate and Rhythm: Normal rate and regular rhythm.      Heart sounds: Normal heart sounds.   Pulmonary:      Breath sounds: Normal breath sounds.   Musculoskeletal:         General: Normal range of motion.      Cervical back: Normal range of motion and neck supple. No rigidity.      Right lower leg: No edema.      Left lower leg: No edema.   Skin:     General: Skin is warm.   Neurological:      Mental Status: She is alert. Mental status is at baseline.   Psychiatric:         Mood and Affect: Mood normal.         Behavior: Behavior normal.          Assessment and Plan     Procedures      Lab Results (last 72 hours)       ** No results found for the last 72 hours. **               Diagnoses and all orders for this visit:    1. Elevated blood pressure reading (Primary)  Comments:  Continue hydralazine, take as directed. mointor blood pressure.    2. Nausea and vomiting, unspecified vomiting type  -     ondansetron (ZOFRAN) 8 MG tablet; Take 1 tablet by mouth Every 8 (Eight) Hours As Needed for Nausea or Vomiting.  Dispense: 10 tablet; Refill: 1  -     Cancel: POC Urinalysis Dipstick, Automated  -     POC Urinalysis Dipstick, Automated; Future    3. Urgency incontinence  -     Cancel: POC Urinalysis Dipstick, Automated  -     POC Urinalysis Dipstick, Automated; Future    Reviewed exam findings with the patient. She can continue to take Hydralazine as directed for elevated blood pressures. Monitor blood pressure closely due to past history of low blood pressure readings. The patient is unable to urinate during the clinic " visit. She bring a urine specimen for urinalysis. Keep your routine follow up appointment with Dr. Pedraza.       Follow Up  Return if symptoms worsen or fail to improve.    LADARIUS Carmona CARDONA [facility]  [address] [place] [id number] [phone]

## 2024-05-21 NOTE — TELEPHONE ENCOUNTER
Caller: MATTI ROBBINS     Relationship: [unfilled]     Best call back number: [phone]     What is your medical concern? PATIENT IS EXPERIENCING PAIN RIGHT EAR    How long has this issue been going on? ABOUT 2 DAYS    Is your provider already aware of this issue? NO, PATIENT FORGOT TO MENTION AT APPOINTMENT    Have you been treated for this issue? NO, PLEASE CALL TO FURTHER DISCUSS

## 2024-05-22 RX ORDER — ONDANSETRON HYDROCHLORIDE 8 MG/1
8 TABLET, FILM COATED ORAL EVERY 8 HOURS PRN
Qty: 10 TABLET | Refills: 0 | OUTPATIENT
Start: 2024-05-22

## 2024-05-28 ENCOUNTER — OFFICE VISIT (OUTPATIENT)
Dept: GASTROENTEROLOGY | Facility: CLINIC | Age: 79
End: 2024-05-28
Payer: MEDICARE

## 2024-05-28 VITALS
DIASTOLIC BLOOD PRESSURE: 80 MMHG | SYSTOLIC BLOOD PRESSURE: 128 MMHG | OXYGEN SATURATION: 99 % | BODY MASS INDEX: 24.3 KG/M2 | WEIGHT: 124.4 LBS | HEART RATE: 66 BPM

## 2024-05-28 DIAGNOSIS — R11.2 NAUSEA AND VOMITING, UNSPECIFIED VOMITING TYPE: ICD-10-CM

## 2024-05-28 DIAGNOSIS — R11.0 NAUSEA: ICD-10-CM

## 2024-05-28 DIAGNOSIS — R19.7 DIARRHEA, UNSPECIFIED TYPE: ICD-10-CM

## 2024-05-28 PROCEDURE — 1160F RVW MEDS BY RX/DR IN RCRD: CPT | Performed by: INTERNAL MEDICINE

## 2024-05-28 PROCEDURE — 1159F MED LIST DOCD IN RCRD: CPT | Performed by: INTERNAL MEDICINE

## 2024-05-28 PROCEDURE — 99213 OFFICE O/P EST LOW 20 MIN: CPT | Performed by: INTERNAL MEDICINE

## 2024-05-28 PROCEDURE — 3079F DIAST BP 80-89 MM HG: CPT | Performed by: INTERNAL MEDICINE

## 2024-05-28 PROCEDURE — 3074F SYST BP LT 130 MM HG: CPT | Performed by: INTERNAL MEDICINE

## 2024-05-28 RX ORDER — OMEPRAZOLE 20 MG/1
20 CAPSULE, DELAYED RELEASE ORAL
Qty: 180 CAPSULE | Refills: 0 | Status: SHIPPED | OUTPATIENT
Start: 2024-05-28 | End: 2024-08-26

## 2024-05-28 RX ORDER — ONDANSETRON 4 MG/1
4 TABLET, FILM COATED ORAL EVERY 8 HOURS PRN
Qty: 60 TABLET | Refills: 3 | Status: SHIPPED | OUTPATIENT
Start: 2024-05-28 | End: 2024-08-16

## 2024-05-28 NOTE — PROGRESS NOTES
New Patient Consult      Date: 2024   Patient Name: Vesta Landry  MRN: [id number]  : 1945     Referring Physician: Bossman Pedraza MD    Chief Complaint   Patient presents with    Difficulty Swallowing       History of Present Illness: Vesta Landry is a 79 y.o. female who is here today to establish care with Gastroenterology.    She reports that she will sometimes feel like she is having episodes of choking, but they are not very frequent.  She had a swallow study done which reported one episode of aspiration of thin consistency barium.    There was a CT scan of the head and neck done back in February which did not reveal any mass or physical obstruction.  It did reveal some findings suggestive of possible laryngitis and suggested ENT evaluation.    She presents today for evaluation, accompanied by her daughter.    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  They are not pursuing further oncologic workup.  She was actually on hospice for some time, and is now come off hospice.    Regarding goals of care however, they do want to focus exclusively on activities that will improve her quality of life.  They do not want to pursue further oncologic testing, scans etc.    Their complaints are some degree of odynophagia, dry mouth, and not eating enough.  The patient maintains that she is a picky eater, but is not really having trouble swallowing as long as she takes her time and drink sips of water.    Discussed findings of the swallow study with the patient and her daughter.    She did have a PEG tube in the past as she was undergoing treatment, and was subsequently removed as it was no longer needed.  She does not want another PEG tube placed.    While her weight is lower now than it was back in 2023, it seems to be holding steady.    Her complaints also include nausea, and  sometimes diarrhea.    Subjective      Past Medical History:   Diagnosis Date    Arthritis     Cancer     squamous cell - tonsils    Cancer of tonsil     metastatic squamous cell - left tonsil    CHF (congestive heart failure)     Deep vein thrombosis     bilateral- daughter states on 2/17/23 that she doesn't remember patient having this    Dementia     Dysphagia     GERD (gastroesophageal reflux disease)     Hyperlipidemia     Hypertension     Impaired mobility     PONV (postoperative nausea and vomiting)     SOB (shortness of breath)     following chemo infusion on day of infusion last week (week of 1/29-2/4/23) per pt's daughter    Vitamin D deficiency     Wears dentures     uppers - instructed not to use adhesive DOS       Past Surgical History:   Procedure Laterality Date    APPENDECTOMY      CATARACT EXTRACTION      CHOLECYSTECTOMY      COLONOSCOPY      Approx 2009    ENDOSCOPY W/ PEG TUBE PLACEMENT N/A 2/20/2023    Procedure: ESOPHAGOGASTRODUODENOSCOPY WITH PERCUTANEOUS ENDOSCOPIC GASTROSTOMY TUBE INSERTION;  Surgeon: Brigido Guerra MD;  Location: [facility] ENDOSCOPY;  Service: Gastroenterology;  Laterality: N/A;    HYSTERECTOMY  1991    OOPHORECTOMY Bilateral 1991    PORTACATH PLACEMENT Right 02/08/2023    Procedure: INSERTION OF PORTACATH;  Surgeon: Brigido Guerra MD;  Location: [facility] OR;  Service: General;  Laterality: Right;    TUBAL ABDOMINAL LIGATION         Family History   Problem Relation Age of Onset    Heart failure Mother     Hyperlipidemia Mother     Hypertension Mother     Cancer Father     Cancer Sister     Stroke Sister     Breast cancer Maternal Aunt     Breast cancer Paternal Aunt     Ovarian cancer Neg Hx        Social History     Socioeconomic History    Marital status:    Tobacco Use    Smoking status: Former     Current packs/day: 0.00     Average packs/day: 0.2 packs/day for 10.0 years (1.5 ttl pk-yrs)     Types: Cigarettes     Start date: 1/1/1977     Quit date: 1/1/1987      Years since quittin.4     Passive exposure: Past    Smokeless tobacco: Never   Vaping Use    Vaping status: Never Used   Substance and Sexual Activity    Alcohol use: No    Drug use: No    Sexual activity: Defer         Current Outpatient Medications:     allopurinol (ZYLOPRIM) 100 MG tablet, Take 1 tablet by mouth Daily., Disp: 90 tablet, Rfl: 3    furosemide (LASIX) 20 MG tablet, Take 1 tablet by mouth Daily. Indications: Cardiac Failure, Edema, High Blood Pressure Disorder, Disp: 90 tablet, Rfl: 3    hydrALAZINE (APRESOLINE) 100 MG tablet, TAKE 1 TABLET BY MOUTH THREE TIMES DAILY FOR HIGH BLOOD PRESSURE, Disp: 270 tablet, Rfl: 1    mirtazapine (REMERON) 30 MG tablet, TAKE 1 TABLET BY MOUTH EVERY NIGHT, Disp: 90 tablet, Rfl: 1    multivitamin with minerals (MULTIVITAMIN ADULT PO), Take 1 tablet by mouth Daily., Disp: , Rfl:     nebivolol (BYSTOLIC) 5 MG tablet, TAKE 1 TABLET BY MOUTH DAILY FOR HIGH BLOOD PRESSURE, Disp: 90 tablet, Rfl: 1    nystatin (MYCOSTATIN) 100,000 unit/mL suspension, Swish and swallow 5 mL 4 (Four) Times a Day., Disp: 60 mL, Rfl: 1    omeprazole (priLOSEC) 20 MG capsule, Take 1 capsule by mouth 2 (Two) Times a Day Before Meals for 90 days. Indications: Heartburn, Stomach Ulcer, Stomach Ulcer From Aspirin/Ibuprofen-Like Drugs, Disp: 180 capsule, Rfl: 0    ondansetron (ZOFRAN) 4 MG tablet, Take 1 tablet by mouth Every 8 (Eight) Hours As Needed for Nausea or Vomiting for up to 80 days., Disp: 60 tablet, Rfl: 3    predniSONE (DELTASONE) 20 MG tablet, Take 1 tablet by mouth Daily., Disp: 5 tablet, Rfl: 0    promethazine (PHENERGAN) 12.5 MG tablet, Take 1 tablet by mouth Every 8 (Eight) Hours As Needed for Nausea or Vomiting., Disp: 10 tablet, Rfl: 0    nitroglycerin (NITROSTAT) 0.4 MG SL tablet, Place 1 tablet under the tongue Every 5 (Five) Minutes As Needed for Chest Pain. Take no more than 3 doses in 15 minutes., Disp: 50 tablet, Rfl: 0    Allergies   Allergen Reactions    Trazodone  Confusion       Review of Systems   Constitutional:  Negative for unexpected weight loss.   HENT:  Negative for trouble swallowing.    Gastrointestinal:  Positive for abdominal pain, constipation, diarrhea, nausea, vomiting and indigestion. Negative for abdominal distention, anal bleeding, blood in stool, rectal pain and GERD.       The following portions of the patient's history were reviewed and updated as appropriate: allergies, current medications, past family history, past medical history, past social history, past surgical history and problem list.    Objective     Physical Exam:  Vitals:    05/28/24 1302   BP: 128/80   Pulse: 66   SpO2: 99%   Weight: 56.4 kg (124 lb 6.4 oz)       Physical Exam  Constitutional:       General: She is not in acute distress.  HENT:      Head: Normocephalic and atraumatic.      Mouth/Throat:      Mouth: Mucous membranes are moist.   Eyes:      General: No scleral icterus.     Conjunctiva/sclera: Conjunctivae normal.   Cardiovascular:      Rate and Rhythm: Normal rate.   Pulmonary:      Effort: Pulmonary effort is normal. No respiratory distress.   Musculoskeletal:         General: No deformity or signs of injury.   Skin:     Coloration: Skin is not jaundiced or pale.   Neurological:      General: No focal deficit present.      Mental Status: She is oriented to person, place, and time.   Psychiatric:         Mood and Affect: Mood normal.         Behavior: Behavior normal.         Results Review:   I have reviewed the patient's new clinical and imaging results and agree with the interpretation.     No visits with results within 90 Day(s) from this visit.   Latest known visit with results is:   Admission on 02/22/2024, Discharged on 02/22/2024   Component Date Value Ref Range Status    Glucose 02/22/2024 107 (H)  65 - 99 mg/dL Final    BUN 02/22/2024 34 (H)  8 - 23 mg/dL Final    Creatinine 02/22/2024 1.65 (H)  0.57 - 1.00 mg/dL Final    Sodium 02/22/2024 143  136 - 145 mmol/L Final     Potassium 02/22/2024 3.3 (L)  3.5 - 5.2 mmol/L Final    Slight hemolysis detected by analyzer. Result may be falsely elevated.    Chloride 02/22/2024 105  98 - 107 mmol/L Final    CO2 02/22/2024 25.0  22.0 - 29.0 mmol/L Final    Calcium 02/22/2024 8.2 (L)  8.6 - 10.5 mg/dL Final    Total Protein 02/22/2024 5.8 (L)  6.0 - 8.5 g/dL Final    Albumin 02/22/2024 3.8  3.5 - 5.2 g/dL Final    ALT (SGPT) 02/22/2024 13  1 - 33 U/L Final    AST (SGOT) 02/22/2024 24  1 - 32 U/L Final    Alkaline Phosphatase 02/22/2024 87  39 - 117 U/L Final    Total Bilirubin 02/22/2024 0.3  0.0 - 1.2 mg/dL Final    Globulin 02/22/2024 2.0  gm/dL Final    Calculated Result    A/G Ratio 02/22/2024 1.9  g/dL Final    BUN/Creatinine Ratio 02/22/2024 20.6  7.0 - 25.0 Final    Anion Gap 02/22/2024 13.0  5.0 - 15.0 mmol/L Final    eGFR 02/22/2024 31.3 (L)  >60.0 mL/min/1.73 Final    Lipase 02/22/2024 38  13 - 60 U/L Final    Color, UA 02/22/2024 Yellow  Yellow, Straw Final    Appearance, UA 02/22/2024 Cloudy (A)  Clear Final    pH, UA 02/22/2024 <=5.0  5.0 - 8.0 Final    Specific Gravity, UA 02/22/2024 1.019  1.001 - 1.030 Final    Glucose, UA 02/22/2024 Negative  Negative Final    Ketones, UA 02/22/2024 Negative  Negative Final    Bilirubin, UA 02/22/2024 Negative  Negative Final    Blood, UA 02/22/2024 Negative  Negative Final    Protein, UA 02/22/2024 Negative  Negative Final    Leuk Esterase, UA 02/22/2024 Moderate (2+) (A)  Negative Final    Nitrite, UA 02/22/2024 Negative  Negative Final    Urobilinogen, UA 02/22/2024 0.2 E.U./dL  0.2 - 1.0 E.U./dL Final    Lactate 02/22/2024 1.1  0.5 - 2.0 mmol/L Final    Falsely depressed results may occur on samples drawn from patients receiving N-Acetylcysteine (NAC) or Metamizole.    Extra Tube 02/22/2024 Hold for add-ons.   Final    Auto resulted.    Extra Tube 02/22/2024 hold for add-on   Final    Auto resulted    Extra Tube 02/22/2024 Hold for add-ons.   Final    Auto resulted.    Extra Tube  02/22/2024 Hold for add-ons.   Final    Auto resulted.    Extra Tube 02/22/2024 Hold for add-ons.   Final    Auto resulted    WBC 02/22/2024 12.35 (H)  3.40 - 10.80 10*3/mm3 Final    RBC 02/22/2024 4.09  3.77 - 5.28 10*6/mm3 Final    Hemoglobin 02/22/2024 13.1  12.0 - 15.9 g/dL Final    Hematocrit 02/22/2024 40.1  34.0 - 46.6 % Final    MCV 02/22/2024 98.0 (H)  79.0 - 97.0 fL Final    MCH 02/22/2024 32.0  26.6 - 33.0 pg Final    MCHC 02/22/2024 32.7  31.5 - 35.7 g/dL Final    RDW 02/22/2024 15.5 (H)  12.3 - 15.4 % Final    RDW-SD 02/22/2024 55.8 (H)  37.0 - 54.0 fl Final    MPV 02/22/2024 11.2  6.0 - 12.0 fL Final    Platelets 02/22/2024 227  140 - 450 10*3/mm3 Final    Neutrophil % 02/22/2024 84.9 (H)  42.7 - 76.0 % Final    Lymphocyte % 02/22/2024 7.2 (L)  19.6 - 45.3 % Final    Monocyte % 02/22/2024 7.0  5.0 - 12.0 % Final    Eosinophil % 02/22/2024 0.2 (L)  0.3 - 6.2 % Final    Basophil % 02/22/2024 0.2  0.0 - 1.5 % Final    Immature Grans % 02/22/2024 0.5  0.0 - 0.5 % Final    Neutrophils, Absolute 02/22/2024 10.49 (H)  1.70 - 7.00 10*3/mm3 Final    Lymphocytes, Absolute 02/22/2024 0.89  0.70 - 3.10 10*3/mm3 Final    Monocytes, Absolute 02/22/2024 0.86  0.10 - 0.90 10*3/mm3 Final    Eosinophils, Absolute 02/22/2024 0.03  0.00 - 0.40 10*3/mm3 Final    Basophils, Absolute 02/22/2024 0.02  0.00 - 0.20 10*3/mm3 Final    Immature Grans, Absolute 02/22/2024 0.06 (H)  0.00 - 0.05 10*3/mm3 Final    nRBC 02/22/2024 0.0  0.0 - 0.2 /100 WBC Final    RBC, UA 02/22/2024 0-2  None Seen, 0-2 /HPF Final    WBC, UA 02/22/2024 21-50 (A)  None Seen, 0-2 /HPF Final    Bacteria, UA 02/22/2024 2+ (A)  None Seen, Trace /HPF Final    Squamous Epithelial Cells, UA 02/22/2024 3-6 (A)  None Seen, 0-2 /HPF Final    Yeast, UA 02/22/2024 Moderate/2+ Budding Yeast  None Seen /HPF Final    Hyaline Casts, UA 02/22/2024 7-12  0 - 6 /LPF Final    Methodology 02/22/2024 Manual Light Microscopy   Final    Urine Culture 02/22/2024 50,000 CFU/mL  Normal Urogenital Nelia   Final      FL Video Swallow With Speech Single Contrast    Result Date: 3/19/2024  Deep penetration and 1 episode of aspiration of thin consistency barium.  Please see speech pathologist's report for further details and dietary recommendations.     FLUORO TIME: 167 s DOSE AREA PRODUCT: [id number]     Images were reviewed, interpreted, and dictated by Dr. Linus Nash MD Transcribed by Bere Palacios PA-C.  This report was signed and finalized on 3/19/2024 11:33 AM by Linus Nash MD.       Assessment / Plan      Assessment & Plan:  Diagnoses and all orders for this visit:    1. Nausea and vomiting, unspecified vomiting type  -     ondansetron (ZOFRAN) 4 MG tablet; Take 1 tablet by mouth Every 8 (Eight) Hours As Needed for Nausea or Vomiting for up to 80 days.  Dispense: 60 tablet; Refill: 3  -     Stool Culture (Reference Lab) - Stool, Per Rectum; Future  -     Clostridioides difficile Toxin, PCR - Stool, Per Rectum; Future    2. Diarrhea, unspecified type  -     ondansetron (ZOFRAN) 4 MG tablet; Take 1 tablet by mouth Every 8 (Eight) Hours As Needed for Nausea or Vomiting for up to 80 days.  Dispense: 60 tablet; Refill: 3  -     Stool Culture (Reference Lab) - Stool, Per Rectum; Future  -     Clostridioides difficile Toxin, PCR - Stool, Per Rectum; Future    3. Nausea  -     ondansetron (ZOFRAN) 4 MG tablet; Take 1 tablet by mouth Every 8 (Eight) Hours As Needed for Nausea or Vomiting for up to 80 days.  Dispense: 60 tablet; Refill: 3  -     Stool Culture (Reference Lab) - Stool, Per Rectum; Future  -     Clostridioides difficile Toxin, PCR - Stool, Per Rectum; Future    Other orders  -     omeprazole (priLOSEC) 20 MG capsule; Take 1 capsule by mouth 2 (Two) Times a Day Before Meals for 90 days. Indications: Heartburn, Stomach Ulcer, Stomach Ulcer From Aspirin/Ibuprofen-Like Drugs  Dispense: 180 capsule; Refill: 0        It is possible that nausea could be due to several  factors, including the possibility of peptic ulcer disease, given recent NSAID use.    Will increase her PPI to 20 mg of omeprazole twice daily for 90 days.    Will give her Zofran.    I did offer an EGD, but I feel it will be more beneficial if she has actual dysphagia which she does not at the moment.  In that event we could consider a savory dilation depending on the etiology of the dysphagia.    Otherwise, the patient would like to focus solely on activities and evaluations and treatments that improve her quality of life, and symptom management.  She is not interested in pursuing further oncologic workup or testing.  States that they were able to complete perhaps 40% of the treatment that was suggested.  They are not interested in pursuing further oncologic treatment.    If at any point in time her dysphagia worsens, especially if it is a pharyngeal component, our options include bringing her in for an EGD for a savory dilation, or sending her to ENT versus a CT scan of the neck for evaluation regarding etiology.    Follow Up:   No follow-ups on file.    Jason Allred MD  [facility]    5/28/2024  15:12 EDT    Part of this note may be an electronic transcription/translation of spoken language to printed text using the Dragon Dictation System.

## 2024-05-31 ENCOUNTER — PATIENT ROUNDING (BHMG ONLY) (OUTPATIENT)
Dept: GASTROENTEROLOGY | Facility: CLINIC | Age: 79
End: 2024-05-31
Payer: MEDICARE

## 2024-05-31 NOTE — PROGRESS NOTES
May 31, 2024    Hello, may I speak with Vesta Landry?    My name is Sharona     I am  with [facility]  [address]  [facility] [address] [phone].    Before we get started may I verify your date of birth? 1945    I am calling to officially welcome you to our practice and ask about your recent visit. Is this a good time to talk? no    Tell me about your visit with us. What things went well?  left message       We're always looking for ways to make our patients' experiences even better. Do you have recommendations on ways we may improve?  no    Overall were you satisfied with your first visit to our practice? yes       I appreciate you taking the time to speak with me today. Is there anything else I can do for you? no      Thank you, and have a great day.

## 2024-06-03 ENCOUNTER — PREP FOR SURGERY (OUTPATIENT)
Dept: OTHER | Facility: HOSPITAL | Age: 79
End: 2024-06-03
Payer: MEDICARE

## 2024-06-03 DIAGNOSIS — R11.2 NAUSEA AND VOMITING, UNSPECIFIED VOMITING TYPE: Primary | ICD-10-CM

## 2024-06-03 DIAGNOSIS — R13.10 ODYNOPHAGIA: ICD-10-CM

## 2024-06-03 DIAGNOSIS — C09.9 SQUAMOUS CELL CARCINOMA OF LEFT TONSIL: ICD-10-CM

## 2024-06-03 RX ORDER — PROMETHAZINE HYDROCHLORIDE 12.5 MG/1
12.5 TABLET ORAL EVERY 8 HOURS PRN
Qty: 60 TABLET | Refills: 0 | Status: ON HOLD | OUTPATIENT
Start: 2024-06-03 | End: 2024-07-03

## 2024-06-06 ENCOUNTER — TELEPHONE (OUTPATIENT)
Dept: GASTROENTEROLOGY | Facility: CLINIC | Age: 79
End: 2024-06-06
Payer: MEDICARE

## 2024-06-09 ENCOUNTER — APPOINTMENT (OUTPATIENT)
Dept: CT IMAGING | Facility: HOSPITAL | Age: 79
DRG: 392 | End: 2024-06-09
Payer: MEDICARE

## 2024-06-09 ENCOUNTER — HOSPITAL ENCOUNTER (INPATIENT)
Facility: HOSPITAL | Age: 79
LOS: 6 days | Discharge: HOSPICE | DRG: 392 | End: 2024-06-16
Attending: EMERGENCY MEDICINE | Admitting: INTERNAL MEDICINE
Payer: MEDICARE

## 2024-06-09 DIAGNOSIS — K29.90 GASTRITIS AND DUODENITIS: ICD-10-CM

## 2024-06-09 DIAGNOSIS — R63.4 WEIGHT LOSS, UNINTENTIONAL: ICD-10-CM

## 2024-06-09 DIAGNOSIS — Z86.59 HISTORY OF DEMENTIA: ICD-10-CM

## 2024-06-09 DIAGNOSIS — R11.14 BILIOUS VOMITING WITH NAUSEA: ICD-10-CM

## 2024-06-09 DIAGNOSIS — K29.80 DUODENITIS: Primary | ICD-10-CM

## 2024-06-09 DIAGNOSIS — K21.9 GASTROESOPHAGEAL REFLUX DISEASE WITHOUT ESOPHAGITIS: ICD-10-CM

## 2024-06-09 DIAGNOSIS — K29.00 ACUTE GASTRITIS WITHOUT HEMORRHAGE, UNSPECIFIED GASTRITIS TYPE: ICD-10-CM

## 2024-06-09 DIAGNOSIS — R11.2 INTRACTABLE NAUSEA AND VOMITING: ICD-10-CM

## 2024-06-09 LAB
ALBUMIN SERPL-MCNC: 3.8 G/DL (ref 3.5–5.2)
ALBUMIN/GLOB SERPL: 1.7 G/DL
ALP SERPL-CCNC: 104 U/L (ref 39–117)
ALT SERPL W P-5'-P-CCNC: 16 U/L (ref 1–33)
ANION GAP SERPL CALCULATED.3IONS-SCNC: 12 MMOL/L (ref 5–15)
AST SERPL-CCNC: 18 U/L (ref 1–32)
BASOPHILS # BLD AUTO: 0.03 10*3/MM3 (ref 0–0.2)
BASOPHILS NFR BLD AUTO: 0.4 % (ref 0–1.5)
BILIRUB SERPL-MCNC: 0.4 MG/DL (ref 0–1.2)
BILIRUB UR QL STRIP: NEGATIVE
BUN SERPL-MCNC: 23 MG/DL (ref 8–23)
BUN/CREAT SERPL: 19 (ref 7–25)
CALCIUM SPEC-SCNC: 8.6 MG/DL (ref 8.6–10.5)
CHLORIDE SERPL-SCNC: 110 MMOL/L (ref 98–107)
CLARITY UR: CLEAR
CO2 SERPL-SCNC: 25 MMOL/L (ref 22–29)
COLOR UR: YELLOW
CREAT SERPL-MCNC: 1.21 MG/DL (ref 0.57–1)
D-LACTATE SERPL-SCNC: 0.9 MMOL/L (ref 0.5–2)
DEPRECATED RDW RBC AUTO: 51.4 FL (ref 37–54)
EGFRCR SERPLBLD CKD-EPI 2021: 45.1 ML/MIN/1.73
EOSINOPHIL # BLD AUTO: 0.03 10*3/MM3 (ref 0–0.4)
EOSINOPHIL NFR BLD AUTO: 0.4 % (ref 0.3–6.2)
ERYTHROCYTE [DISTWIDTH] IN BLOOD BY AUTOMATED COUNT: 15 % (ref 12.3–15.4)
GLOBULIN UR ELPH-MCNC: 2.3 GM/DL
GLUCOSE SERPL-MCNC: 105 MG/DL (ref 65–99)
GLUCOSE UR STRIP-MCNC: NEGATIVE MG/DL
HCT VFR BLD AUTO: 40.7 % (ref 34–46.6)
HGB BLD-MCNC: 12.9 G/DL (ref 12–15.9)
HGB UR QL STRIP.AUTO: NEGATIVE
HOLD SPECIMEN: NORMAL
IMM GRANULOCYTES # BLD AUTO: 0.02 10*3/MM3 (ref 0–0.05)
IMM GRANULOCYTES NFR BLD AUTO: 0.3 % (ref 0–0.5)
KETONES UR QL STRIP: NEGATIVE
LEUKOCYTE ESTERASE UR QL STRIP.AUTO: NEGATIVE
LIPASE SERPL-CCNC: 31 U/L (ref 13–60)
LYMPHOCYTES # BLD AUTO: 0.75 10*3/MM3 (ref 0.7–3.1)
LYMPHOCYTES NFR BLD AUTO: 9.4 % (ref 19.6–45.3)
MCH RBC QN AUTO: 30.1 PG (ref 26.6–33)
MCHC RBC AUTO-ENTMCNC: 31.7 G/DL (ref 31.5–35.7)
MCV RBC AUTO: 95.1 FL (ref 79–97)
MONOCYTES # BLD AUTO: 0.59 10*3/MM3 (ref 0.1–0.9)
MONOCYTES NFR BLD AUTO: 7.4 % (ref 5–12)
NEUTROPHILS NFR BLD AUTO: 6.55 10*3/MM3 (ref 1.7–7)
NEUTROPHILS NFR BLD AUTO: 82.1 % (ref 42.7–76)
NITRITE UR QL STRIP: NEGATIVE
NRBC BLD AUTO-RTO: 0 /100 WBC (ref 0–0.2)
PH UR STRIP.AUTO: 5.5 [PH] (ref 5–8)
PLATELET # BLD AUTO: 181 10*3/MM3 (ref 140–450)
PMV BLD AUTO: 11.2 FL (ref 6–12)
POTASSIUM SERPL-SCNC: 3.2 MMOL/L (ref 3.5–5.2)
PROT SERPL-MCNC: 6.1 G/DL (ref 6–8.5)
PROT UR QL STRIP: NEGATIVE
RBC # BLD AUTO: 4.28 10*6/MM3 (ref 3.77–5.28)
SODIUM SERPL-SCNC: 147 MMOL/L (ref 136–145)
SP GR UR STRIP: 1.01 (ref 1–1.03)
UROBILINOGEN UR QL STRIP: NORMAL
WBC NRBC COR # BLD AUTO: 7.97 10*3/MM3 (ref 3.4–10.8)
WHOLE BLOOD HOLD COAG: NORMAL
WHOLE BLOOD HOLD SPECIMEN: NORMAL

## 2024-06-09 PROCEDURE — G0378 HOSPITAL OBSERVATION PER HR: HCPCS

## 2024-06-09 PROCEDURE — 25010000002 SODIUM CHLORIDE 0.9 % WITH KCL 20 MEQ 20-0.9 MEQ/L-% SOLUTION: Performed by: FAMILY MEDICINE

## 2024-06-09 PROCEDURE — 81003 URINALYSIS AUTO W/O SCOPE: CPT | Performed by: EMERGENCY MEDICINE

## 2024-06-09 PROCEDURE — 25010000002 LABETALOL 5 MG/ML SOLUTION: Performed by: FAMILY MEDICINE

## 2024-06-09 PROCEDURE — 85025 COMPLETE CBC W/AUTO DIFF WBC: CPT | Performed by: EMERGENCY MEDICINE

## 2024-06-09 PROCEDURE — 83690 ASSAY OF LIPASE: CPT | Performed by: EMERGENCY MEDICINE

## 2024-06-09 PROCEDURE — 0 DIATRIZOATE MEGLUMINE & SODIUM PER 1 ML: Performed by: EMERGENCY MEDICINE

## 2024-06-09 PROCEDURE — 80053 COMPREHEN METABOLIC PANEL: CPT | Performed by: EMERGENCY MEDICINE

## 2024-06-09 PROCEDURE — 25810000003 LACTATED RINGERS SOLUTION: Performed by: EMERGENCY MEDICINE

## 2024-06-09 PROCEDURE — 36415 COLL VENOUS BLD VENIPUNCTURE: CPT

## 2024-06-09 PROCEDURE — 83605 ASSAY OF LACTIC ACID: CPT | Performed by: EMERGENCY MEDICINE

## 2024-06-09 PROCEDURE — 25510000001 IOPAMIDOL 61 % SOLUTION: Performed by: EMERGENCY MEDICINE

## 2024-06-09 PROCEDURE — 25010000002 METOCLOPRAMIDE PER 10 MG: Performed by: FAMILY MEDICINE

## 2024-06-09 PROCEDURE — 99285 EMERGENCY DEPT VISIT HI MDM: CPT

## 2024-06-09 PROCEDURE — 99223 1ST HOSP IP/OBS HIGH 75: CPT | Performed by: FAMILY MEDICINE

## 2024-06-09 PROCEDURE — 74177 CT ABD & PELVIS W/CONTRAST: CPT

## 2024-06-09 RX ORDER — SODIUM CHLORIDE 9 MG/ML
40 INJECTION, SOLUTION INTRAVENOUS AS NEEDED
Status: DISCONTINUED | OUTPATIENT
Start: 2024-06-09 | End: 2024-06-16 | Stop reason: HOSPADM

## 2024-06-09 RX ORDER — HYDRALAZINE HYDROCHLORIDE 50 MG/1
100 TABLET, FILM COATED ORAL EVERY 8 HOURS SCHEDULED
Status: DISCONTINUED | OUTPATIENT
Start: 2024-06-09 | End: 2024-06-16

## 2024-06-09 RX ORDER — ACETAMINOPHEN 160 MG/5ML
650 SOLUTION ORAL EVERY 4 HOURS PRN
Status: DISCONTINUED | OUTPATIENT
Start: 2024-06-09 | End: 2024-06-16

## 2024-06-09 RX ORDER — LABETALOL HYDROCHLORIDE 5 MG/ML
10 INJECTION, SOLUTION INTRAVENOUS EVERY 4 HOURS PRN
Status: DISCONTINUED | OUTPATIENT
Start: 2024-06-09 | End: 2024-06-13 | Stop reason: SDUPTHER

## 2024-06-09 RX ORDER — PANTOPRAZOLE SODIUM 40 MG/10ML
40 INJECTION, POWDER, LYOPHILIZED, FOR SOLUTION INTRAVENOUS ONCE
Status: COMPLETED | OUTPATIENT
Start: 2024-06-09 | End: 2024-06-09

## 2024-06-09 RX ORDER — FAMOTIDINE 10 MG/ML
20 INJECTION, SOLUTION INTRAVENOUS ONCE
Status: COMPLETED | OUTPATIENT
Start: 2024-06-09 | End: 2024-06-09

## 2024-06-09 RX ORDER — NEBIVOLOL 5 MG/1
5 TABLET ORAL DAILY
Status: DISCONTINUED | OUTPATIENT
Start: 2024-06-10 | End: 2024-06-16

## 2024-06-09 RX ORDER — NITROGLYCERIN 0.4 MG/1
0.4 TABLET SUBLINGUAL
Status: DISCONTINUED | OUTPATIENT
Start: 2024-06-09 | End: 2024-06-16 | Stop reason: HOSPADM

## 2024-06-09 RX ORDER — MULTIPLE VITAMINS W/ MINERALS TAB 9MG-400MCG
1 TAB ORAL DAILY
Status: DISCONTINUED | OUTPATIENT
Start: 2024-06-09 | End: 2024-06-16

## 2024-06-09 RX ORDER — METOCLOPRAMIDE HYDROCHLORIDE 5 MG/ML
5 INJECTION INTRAMUSCULAR; INTRAVENOUS EVERY 6 HOURS
Qty: 8 ML | Refills: 0 | Status: COMPLETED | OUTPATIENT
Start: 2024-06-09 | End: 2024-06-10

## 2024-06-09 RX ORDER — PANTOPRAZOLE SODIUM 40 MG/10ML
40 INJECTION, POWDER, LYOPHILIZED, FOR SOLUTION INTRAVENOUS
Status: DISCONTINUED | OUTPATIENT
Start: 2024-06-10 | End: 2024-06-16 | Stop reason: HOSPADM

## 2024-06-09 RX ORDER — AMOXICILLIN 250 MG
2 CAPSULE ORAL 2 TIMES DAILY
Status: DISCONTINUED | OUTPATIENT
Start: 2024-06-09 | End: 2024-06-16

## 2024-06-09 RX ORDER — MIRTAZAPINE 15 MG/1
30 TABLET, FILM COATED ORAL NIGHTLY
Status: DISCONTINUED | OUTPATIENT
Start: 2024-06-09 | End: 2024-06-16

## 2024-06-09 RX ORDER — ACETAMINOPHEN 325 MG/1
650 TABLET ORAL EVERY 4 HOURS PRN
Status: DISCONTINUED | OUTPATIENT
Start: 2024-06-09 | End: 2024-06-16

## 2024-06-09 RX ORDER — POLYETHYLENE GLYCOL 3350 17 G/17G
17 POWDER, FOR SOLUTION ORAL DAILY PRN
Status: DISCONTINUED | OUTPATIENT
Start: 2024-06-09 | End: 2024-06-16

## 2024-06-09 RX ORDER — SODIUM CHLORIDE 0.9 % (FLUSH) 0.9 %
10 SYRINGE (ML) INJECTION AS NEEDED
Status: DISCONTINUED | OUTPATIENT
Start: 2024-06-09 | End: 2024-06-16 | Stop reason: HOSPADM

## 2024-06-09 RX ORDER — SODIUM CHLORIDE 0.9 % (FLUSH) 0.9 %
10 SYRINGE (ML) INJECTION EVERY 12 HOURS SCHEDULED
Status: DISCONTINUED | OUTPATIENT
Start: 2024-06-09 | End: 2024-06-16 | Stop reason: HOSPADM

## 2024-06-09 RX ORDER — BISACODYL 10 MG
10 SUPPOSITORY, RECTAL RECTAL DAILY PRN
Status: DISCONTINUED | OUTPATIENT
Start: 2024-06-09 | End: 2024-06-16

## 2024-06-09 RX ORDER — SODIUM CHLORIDE AND POTASSIUM CHLORIDE 150; 900 MG/100ML; MG/100ML
50 INJECTION, SOLUTION INTRAVENOUS CONTINUOUS
Status: DISCONTINUED | OUTPATIENT
Start: 2024-06-09 | End: 2024-06-14

## 2024-06-09 RX ORDER — ONDANSETRON 2 MG/ML
4 INJECTION INTRAMUSCULAR; INTRAVENOUS EVERY 6 HOURS PRN
Status: DISCONTINUED | OUTPATIENT
Start: 2024-06-09 | End: 2024-06-16 | Stop reason: HOSPADM

## 2024-06-09 RX ORDER — ONDANSETRON 4 MG/1
4 TABLET, ORALLY DISINTEGRATING ORAL EVERY 6 HOURS PRN
Status: DISCONTINUED | OUTPATIENT
Start: 2024-06-09 | End: 2024-06-16 | Stop reason: HOSPADM

## 2024-06-09 RX ORDER — BISACODYL 5 MG/1
5 TABLET, DELAYED RELEASE ORAL DAILY PRN
Status: DISCONTINUED | OUTPATIENT
Start: 2024-06-09 | End: 2024-06-16

## 2024-06-09 RX ORDER — SODIUM CHLORIDE 9 MG/ML
10 INJECTION, SOLUTION INTRAMUSCULAR; INTRAVENOUS; SUBCUTANEOUS AS NEEDED
Status: DISCONTINUED | OUTPATIENT
Start: 2024-06-09 | End: 2024-06-10

## 2024-06-09 RX ORDER — ACETAMINOPHEN 650 MG/1
650 SUPPOSITORY RECTAL EVERY 4 HOURS PRN
Status: DISCONTINUED | OUTPATIENT
Start: 2024-06-09 | End: 2024-06-16

## 2024-06-09 RX ADMIN — PANTOPRAZOLE SODIUM 40 MG: 40 INJECTION, POWDER, FOR SOLUTION INTRAVENOUS at 16:53

## 2024-06-09 RX ADMIN — SODIUM CHLORIDE, POTASSIUM CHLORIDE, SODIUM LACTATE AND CALCIUM CHLORIDE 1000 ML: 600; 310; 30; 20 INJECTION, SOLUTION INTRAVENOUS at 13:12

## 2024-06-09 RX ADMIN — Medication 1 TABLET: at 19:59

## 2024-06-09 RX ADMIN — POTASSIUM CHLORIDE AND SODIUM CHLORIDE 100 ML/HR: 900; 150 INJECTION, SOLUTION INTRAVENOUS at 22:02

## 2024-06-09 RX ADMIN — IOPAMIDOL 80 ML: 612 INJECTION, SOLUTION INTRAVENOUS at 15:20

## 2024-06-09 RX ADMIN — FAMOTIDINE 20 MG: 10 INJECTION, SOLUTION INTRAVENOUS at 16:51

## 2024-06-09 RX ADMIN — Medication 237 ML: at 13:37

## 2024-06-09 RX ADMIN — METOCLOPRAMIDE 5 MG: 5 INJECTION, SOLUTION INTRAMUSCULAR; INTRAVENOUS at 19:59

## 2024-06-09 RX ADMIN — LABETALOL HYDROCHLORIDE 10 MG: 5 INJECTION, SOLUTION INTRAVENOUS at 20:00

## 2024-06-09 RX ADMIN — MIRTAZAPINE 30 MG: 15 TABLET, FILM COATED ORAL at 20:00

## 2024-06-09 RX ADMIN — HYDRALAZINE HYDROCHLORIDE 100 MG: 50 TABLET ORAL at 18:43

## 2024-06-09 RX ADMIN — Medication 10 ML: at 20:00

## 2024-06-09 RX ADMIN — DIATRIZOATE MEGLUMINE AND DIATRIZOATE SODIUM 15 ML: 660; 100 LIQUID ORAL; RECTAL at 13:36

## 2024-06-09 NOTE — H&P
[facility] Medicine Services  HISTORY AND PHYSICAL    Patient Name: Vesta Landry  : 1945  MRN: [id number]  Primary Care Physician: Bossman Pedraza MD  Date of admission: 2024      Subjective   Subjective     Chief Complaint:  Nausea and Vomiting     HPI:  Vesta Landry is a 79 y.o. female with PMHx tonsil cancer (did not complete treatment), CHF, GERD, HTN, HLD. Pt with Hx PEG tube placement due to difficulty with dysphagia/oral intake during treatment for tonsil cancer. Per family, this was removed about 6 months ago and since then, patient has had issues with intermittent nausea/vomiting. She has seen Dr Serrato with GI in [place]. Her PPI was increased to 20mg BID of Omeprazole for 90 days. Plan was for outpatient EGD. Patient's symptoms became more frequent in nature so they decided to come to ED for evaluation. Endorses weight loss. She denies pain with swallowing or choking. CT in ED with concern for gastritis/duodenitis. Hospital medicine asked to admit.    Personal History     Past Medical History:   Diagnosis Date    Arthritis     Cancer     squamous cell - tonsils    Cancer of tonsil     metastatic squamous cell - left tonsil    CHF (congestive heart failure)     Deep vein thrombosis     bilateral- daughter states on 23 that she doesn't remember patient having this    Dementia     Dysphagia     GERD (gastroesophageal reflux disease)     Hyperlipidemia     Hypertension     Impaired mobility     PONV (postoperative nausea and vomiting)     SOB (shortness of breath)     following chemo infusion on day of infusion last week (week of -23) per pt's daughter    Vitamin D deficiency     Wears dentures     uppers - instructed not to use adhesive DOS         Oncology Problem List:  Squamous cell carcinoma of head and neck (2023; Status: Active)  Squamous cell carcinoma of left tonsil (2023; Status: Active)    Oncology/Hematology History    Squamous cell carcinoma of left tonsil   1/3/2023 Initial Diagnosis    Metastatic 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 (HCC)     1/18/2023 Cancer Staged    Staging form: Pharynx - HPV-Mediated Oropharynx, AJCC 8th Edition  - Clinical stage from 1/18/2023: Stage I (cT2, cN1, cM0, p16: Unknown, HPV: Unknown) - Signed by Jennifer Lora MD on 1/18/2023 1/27/2023 -  Chemotherapy    OP SUPPORTIVE HYDRATION + ANTIEMETICS     1/31/2023 -  Chemotherapy    OP HEAD & NECK Cetuximab + XRT     Squamous cell carcinoma of head and neck   1/30/2023 Initial Diagnosis    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)     2/9/2023 -  Chemotherapy    OP CENTRAL VENOUS ACCESS DEVICE ACCESS, CARE, AND MAINTENANCE (CVAD)       Past Surgical History:   Procedure Laterality Date    APPENDECTOMY      CATARACT EXTRACTION      CHOLECYSTECTOMY      COLONOSCOPY      Approx 2009    ENDOSCOPY W/ PEG TUBE PLACEMENT N/A 2/20/2023    Procedure: ESOPHAGOGASTRODUODENOSCOPY WITH PERCUTANEOUS ENDOSCOPIC GASTROSTOMY TUBE INSERTION;  Surgeon: Brigido Guerra MD;  Location: [facility] ENDOSCOPY;  Service: Gastroenterology;  Laterality: N/A;    HYSTERECTOMY  1991    OOPHORECTOMY Bilateral 1991    PORTACATH PLACEMENT Right 02/08/2023    Procedure: INSERTION OF PORTACATH;  Surgeon: Brigido Guerra MD;  Location: [facility] OR;  Service: General;  Laterality: Right;    TUBAL ABDOMINAL LIGATION         Family History: family history includes Breast cancer in her maternal aunt and paternal aunt; Cancer in her father and sister; Heart failure in her mother; Hyperlipidemia in her mother; Hypertension in her mother; Stroke in her sister.     Social History:  reports that she quit smoking about 37 years ago. Her smoking use included cigarettes. She started smoking about 47 years ago. She has a 1.5 pack-year smoking history. She has been exposed to tobacco smoke. She has never used smokeless tobacco. She reports that she does not drink alcohol and does not use  drugs.  Social History     Social History Narrative    Not on file       Medications:  Available home medication information reviewed.  allopurinol, furosemide, hydrALAZINE, mirtazapine, multivitamin with minerals, nebivolol, nitroglycerin, nystatin, omeprazole, ondansetron, and promethazine    Allergies   Allergen Reactions    Trazodone Confusion       Objective   Objective     Vital Signs:   Temp:  [98.2 °F (36.8 °C)] 98.2 °F (36.8 °C)  Heart Rate:  [70-78] 78  Resp:  [14] 14  BP: (171-188)/(83-93) 188/93       Physical Exam   Constitutional: Awake, alert, NAD, non-toxic, pleasant elderly female   Eyes: PERRLA, sclerae anicteric, no conjunctival injection  HENT: NCAT, mucous membranes moist  Neck: Supple, no thyromegaly, no lymphadenopathy, trachea midline  Respiratory: Clear to auscultation bilaterally, nonlabored respirations   Cardiovascular: RRR, no murmurs, rubs, or gallops, palpable pedal pulses bilaterally  Gastrointestinal: Positive bowel sounds, soft, non-distended, mild TTP epigastric region, no R/R/G  Musculoskeletal: No bilateral ankle edema, no clubbing or cyanosis to extremities  Psychiatric: Appropriate affect, cooperative  Neurologic: Oriented x 3, RING speech clear  Skin: No rashes     Result Review:  I have personally reviewed the results from the time of this admission to 6/9/2024 17:45 EDT and agree with these findings:  [x]  Laboratory list / accordion  []  Microbiology  [x]  Radiology  [x]  EKG/Telemetry   []  Cardiology/Vascular   []  Pathology  [x]  Old records  []  Other:      LAB RESULTS:      Lab 06/09/24  1310 06/09/24  1240   WBC  --  7.97   HEMOGLOBIN  --  12.9   HEMATOCRIT  --  40.7   PLATELETS  --  181   NEUTROS ABS  --  6.55   IMMATURE GRANS (ABS)  --  0.02   LYMPHS ABS  --  0.75   MONOS ABS  --  0.59   EOS ABS  --  0.03   MCV  --  95.1   LACTATE 0.9  --          Lab 06/09/24  1240   SODIUM 147*   POTASSIUM 3.2*   CHLORIDE 110*   CO2 25.0   ANION GAP 12.0   BUN 23   CREATININE 1.21*    EGFR 45.1*   GLUCOSE 105*   CALCIUM 8.6         Lab 06/09/24  1240   TOTAL PROTEIN 6.1   ALBUMIN 3.8   GLOBULIN 2.3   ALT (SGPT) 16   AST (SGOT) 18   BILIRUBIN 0.4   ALK PHOS 104   LIPASE 31                     UA          9/27/2023    11:15 2/22/2024    16:28 6/9/2024    13:26   Urinalysis   Squamous Epithelial Cells, UA 0-2  3-6     Specific Gravity, UA 1.012  1.019  1.014    Ketones, UA Negative  Negative  Negative    Blood, UA Negative  Negative  Negative    Leukocytes, UA Large (3+)  Moderate (2+)  Negative    Nitrite, UA Positive  Negative  Negative    RBC, UA 3-6  0-2     WBC, UA Too Numerous to Count  21-50     Bacteria, UA 4+  2+         Microbiology Results (last 10 days)       ** No results found for the last 240 hours. **            CT Abdomen Pelvis With Contrast    Result Date: 6/9/2024  CT ABDOMEN PELVIS W CONTRAST Date of Exam: 6/9/2024 3:02 PM EDT Indication: Vomiting green emesis, weight loss over the past month. Comparison: CT of the abdomen and pelvis performed on 3/2/2023. Technique: Axial CT images were obtained of the abdomen and pelvis following the uneventful intravenous administration of 80 cc of Isovue-300 contrast. Reconstructed coronal and sagittal images were also obtained. Automated exposure control and iterative  construction methods were used. Findings:  The patient's gastrostomy tube has been removed. There is suspicion of thickening of the wall the gastric antrum and pyloric region of the stomach. There also may be thickening of the wall of the descending limb of the duodenum. This raises question of gastritis/duodenitis. There is increased stool throughout the colon and rectum. The appendix is either small in size or surgically absent. The gallbladder is surgically absent. There is unchanged prominence of the biliary tree. The adrenal glands, pancreas, and kidneys are normal. The spleen is borderline enlarged. The uterus is surgically absent. The urinary bladder is slightly  distended. There are atherosclerotic vascular calcifications in the abdomen and pelvis. There are no enlarged lymph nodes or abnormal fluid collections. There are bandlike linear densities in the lower lobes felt to represent a combination of scarring and subsegmental atelectasis. There are no layering pleural effusions. There are no suspicious osteolytic or sclerotic  lesions within the bony structures. There are underlying degenerative changes.       Impression: Impression: 1. The patient's gastrostomy tube is been removed. 2. Suspicion of thickening of the wall the gastric antrum and pyloric region of the stomach as well as the descending limb of the duodenum. This raises question of gastritis/duodenitis. 3. Increased stool burden in the colon and rectum. 4. The appendix is either small in size or surgically absent. 5. Borderline splenomegaly. 6. Additional incidental findings as noted above. Electronically Signed: David Parkinson MD  6/9/2024 4:17 PM EDT  Workstation ID: [id number]     Results for orders placed during the hospital encounter of 09/25/23    Adult Transthoracic Echo Complete W/ Cont if Necessary Per Protocol    Interpretation Summary    Left ventricular systolic function is normal. Calculated left ventricular EF = 58.9%    Left ventricular wall thickness is consistent with mild concentric hypertrophy.    Left ventricular diastolic function was indeterminate.    There is mild calcification of the aortic valve.    Estimated right ventricular systolic pressure from tricuspid regurgitation is mildly elevated (35-45 mmHg).    Mild aortic valve regurgitation.      Assessment & Plan   Assessment & Plan       Intractable nausea and vomiting    Gastroesophageal reflux disease without esophagitis    HTN (hypertension), benign    Chronic diastolic heart failure    Squamous cell carcinoma of left tonsil    HLD (hyperlipidemia)    History of Clostridium difficile colitis    Elevated serum creatinine    Gastritis and  duodenitis    Intermittent Nausea/Vomiting  Weight Loss   Hx PEG tube s/p removal  -Per patient, symptoms started after PEG removal about 6 months ago. Pt had PEG tube for nutrition while undergoing Tx for tonsil cancer.   -Established care with PAULINA Rivera in [place], with plans for outpatient EGD but symptoms worsened so patient came to ED   -CT A/P with concern for gastritis/duodenitis  -Start IV BID PPI   -Reglan 5mg q6 hours x 24 hours   -Anti-emetics PRN  -IVF   -NPO after midnight for poss EGD     Mild Hypernatremia  Dehydration  Hypokalemia  Mild elevation of serum Cr  -IVF + potassium  -repeat labs in AM     Hx tonsil cancer  -pt did not complete chemo/radiation. Was on hospice but then graduated. Not interested in further oncologic work-up.     HTN  -BP elevated in ED but forgot to take her BP meds this AM. Has been able to keep them down at home per her report   -Resume     DVT prophylaxis:  Mechanical DVT prophylaxis orders are signed and held.            CODE STATUS:    There are no questions and answers to display.       Expected Discharge   Expected Discharge Date: 6/10/2024; Expected Discharge Time:      Lali Motta DO  06/09/24

## 2024-06-09 NOTE — Clinical Note
Level of Care: Telemetry [5]   Diagnosis: Intractable nausea and vomiting [784829]   Admitting Physician: DRU ROLLINS [[id number]]

## 2024-06-09 NOTE — ED PROVIDER NOTES
Subjective   History of Present Illness  Patient is an 79-year-old female presenting to the emergency department with 1 month history of nausea and vomiting with decreased intake and significant weight loss.  Patient does have a history of coronary disease, MI, and throat cancer.  Patient has seen gastroenterology and they have scheduled her for endoscopy on June 26.  The daughter who is with her reports that the vomit is now green.  Patient reports she was on anticoagulants in the past but had to stop secondary GI bleeding. Symptoms started after a PEG was removed.     History provided by:  Patient and relative      Review of Systems    Past Medical History:   Diagnosis Date    Arthritis     Cancer     squamous cell - tonsils    Cancer of tonsil     metastatic squamous cell - left tonsil    CHF (congestive heart failure)     Deep vein thrombosis     bilateral- daughter states on 2/17/23 that she doesn't remember patient having this    Dementia     Dysphagia     GERD (gastroesophageal reflux disease)     Hyperlipidemia     Hypertension     Impaired mobility     PONV (postoperative nausea and vomiting)     SOB (shortness of breath)     following chemo infusion on day of infusion last week (week of 1/29-2/4/23) per pt's daughter    Vitamin D deficiency     Wears dentures     uppers - instructed not to use adhesive DOS       Allergies   Allergen Reactions    Trazodone Confusion       Past Surgical History:   Procedure Laterality Date    APPENDECTOMY      CATARACT EXTRACTION      CHOLECYSTECTOMY      COLONOSCOPY      Approx 2009    ENDOSCOPY W/ PEG TUBE PLACEMENT N/A 2/20/2023    Procedure: ESOPHAGOGASTRODUODENOSCOPY WITH PERCUTANEOUS ENDOSCOPIC GASTROSTOMY TUBE INSERTION;  Surgeon: Brigido Guerra MD;  Location: [facility] ENDOSCOPY;  Service: Gastroenterology;  Laterality: N/A;    HYSTERECTOMY  1991    OOPHORECTOMY Bilateral 1991    PORTACATH PLACEMENT Right 02/08/2023    Procedure: INSERTION OF PORTACATH;  Surgeon:  Brigido Guerra MD;  Location: [facility] OR;  Service: General;  Laterality: Right;    TUBAL ABDOMINAL LIGATION         Family History   Problem Relation Age of Onset    Heart failure Mother     Hyperlipidemia Mother     Hypertension Mother     Cancer Father     Cancer Sister     Stroke Sister     Breast cancer Maternal Aunt     Breast cancer Paternal Aunt     Ovarian cancer Neg Hx        Social History     Socioeconomic History    Marital status:    Tobacco Use    Smoking status: Former     Current packs/day: 0.00     Average packs/day: 0.2 packs/day for 10.0 years (1.5 ttl pk-yrs)     Types: Cigarettes     Start date: 1977     Quit date: 1987     Years since quittin.4     Passive exposure: Past    Smokeless tobacco: Never   Vaping Use    Vaping status: Never Used   Substance and Sexual Activity    Alcohol use: No    Drug use: No    Sexual activity: Defer           Objective   Physical Exam  Vitals and nursing note reviewed.   Constitutional:       General: She is not in acute distress.     Appearance: Normal appearance. She is not toxic-appearing.   Cardiovascular:      Rate and Rhythm: Normal rate and regular rhythm.      Pulses: Normal pulses.   Pulmonary:      Effort: Pulmonary effort is normal. No respiratory distress.      Breath sounds: Normal breath sounds.   Abdominal:      Palpations: Abdomen is soft.      Tenderness: There is abdominal tenderness in the right upper quadrant, epigastric area and left upper quadrant. There is no guarding.   Neurological:      Mental Status: She is alert and oriented to person, place, and time.   Psychiatric:         Mood and Affect: Mood normal.         Behavior: Behavior normal.         Procedures           ED Course  ED Course as of 24 1703   Sun 2024   1318 Creatinine(!): 1.21  Stable when compared to prior values with history of mild chronic kidney disease. [RS]   1622 CT Abdomen Pelvis With Contrast  Personally reviewed the CT scan of  the abdomen pelvis.  On my interpretation there is some thickening of the distal stomach as well as the duodenum.  Significant stool burden.  See report for radiology for further details. [RS]      ED Course User Index  [RS] Ovidio Whitaker MD                                             Medical Decision Making  Problems Addressed:  Acute gastritis without hemorrhage, unspecified gastritis type: complicated acute illness or injury  Bilious vomiting with nausea: complicated acute illness or injury  Duodenitis: complicated acute illness or injury  History of dementia: complicated acute illness or injury  Weight loss, unintentional: complicated acute illness or injury    Amount and/or Complexity of Data Reviewed  Independent Historian: caregiver  External Data Reviewed: labs.  Labs: ordered. Decision-making details documented in ED Course.  Radiology: ordered. Decision-making details documented in ED Course.  Discussion of management or test interpretation with external provider(s): Hospitalist    Risk  Prescription drug management.  Decision regarding hospitalization.        Final diagnoses:   Duodenitis   Acute gastritis without hemorrhage, unspecified gastritis type   Bilious vomiting with nausea   Weight loss, unintentional   History of dementia       ED Disposition  ED Disposition       ED Disposition   Decision to Admit    Condition   --    Comment   Level of Care: Telemetry [5]   Diagnosis: Intractable nausea and vomiting [832440]   Admitting Physician: DRU ROLLINS [[id number]]   Bed Request Comments: NOT CDU                 No follow-up provider specified.       Medication List      No changes were made to your prescriptions during this visit.            Ovidio Whitaker MD  06/09/24 4045

## 2024-06-09 NOTE — ED NOTES
Vesta Landry    Nursing Report ED to Floor:  Mental status: A&OX4  Ambulatory status: ASSIST X1  Oxygen Therapy:  RA  Cardiac Rhythm: NSR  Admitted from: HOME  Safety Concerns:  FALL RISK   Social Issues: N/A  ED Room #:  11    ED Nurse Phone Extension - [phone] or may call [phone].      HPI:   Chief Complaint   Patient presents with    Vomiting       Past Medical History:  Past Medical History:   Diagnosis Date    Arthritis     Cancer     squamous cell - tonsils    Cancer of tonsil     metastatic squamous cell - left tonsil    CHF (congestive heart failure)     Deep vein thrombosis     bilateral- daughter states on 2/17/23 that she doesn't remember patient having this    Dementia     Dysphagia     GERD (gastroesophageal reflux disease)     Hyperlipidemia     Hypertension     Impaired mobility     PONV (postoperative nausea and vomiting)     SOB (shortness of breath)     following chemo infusion on day of infusion last week (week of 1/29-2/4/23) per pt's daughter    Vitamin D deficiency     Wears dentures     uppers - instructed not to use adhesive DOS        Past Surgical History:  Past Surgical History:   Procedure Laterality Date    APPENDECTOMY      CATARACT EXTRACTION      CHOLECYSTECTOMY      COLONOSCOPY      Approx 2009    ENDOSCOPY W/ PEG TUBE PLACEMENT N/A 2/20/2023    Procedure: ESOPHAGOGASTRODUODENOSCOPY WITH PERCUTANEOUS ENDOSCOPIC GASTROSTOMY TUBE INSERTION;  Surgeon: Brigido Guerra MD;  Location: [facility] ENDOSCOPY;  Service: Gastroenterology;  Laterality: N/A;    HYSTERECTOMY  1991    OOPHORECTOMY Bilateral 1991    PORTACATH PLACEMENT Right 02/08/2023    Procedure: INSERTION OF PORTACATH;  Surgeon: Brigido Guerra MD;  Location: [facility] OR;  Service: General;  Laterality: Right;    TUBAL ABDOMINAL LIGATION          Admitting Doctor:   Lali Motta DO    Consulting Provider(s):  Consults       No orders found from 5/11/2024 to 6/10/2024.             Admitting Diagnosis:   The primary encounter  "diagnosis was Duodenitis. Diagnoses of Acute gastritis without hemorrhage, unspecified gastritis type, Bilious vomiting with nausea, Weight loss, unintentional, and History of dementia were also pertinent to this visit.    Most Recent Vitals:   Vitals:    06/09/24 1228   BP: 171/83   BP Location: Right arm   Patient Position: Sitting   Pulse: 70   Resp: 14   Temp: 98.2 °F (36.8 °C)   TempSrc: Oral   SpO2: 96%   Weight: 56.2 kg (124 lb)   Height: 152.4 cm (60\")       Active LDAs/IV Access:   Lines, Drains & Airways       Active LDAs       Name Placement date Placement time Site Days    Peripheral IV 02/22/24 1530 Right Antecubital 02/22/24  1530  Antecubital  108    Peripheral IV 06/09/24 1303 Right Forearm 06/09/24  1303  Forearm  less than 1    Gastrostomy/Enterostomy PEG-jejunostomy RUQ 03/04/23  1457  RUQ  463    Single Lumen Implantable Port 02/08/23 Right Subclavian 02/08/23  0923  Subclavian  487                    Labs (abnormal labs have a star):   Labs Reviewed   COMPREHENSIVE METABOLIC PANEL - Abnormal; Notable for the following components:       Result Value    Glucose 105 (*)     Creatinine 1.21 (*)     Sodium 147 (*)     Potassium 3.2 (*)     Chloride 110 (*)     eGFR 45.1 (*)     All other components within normal limits    Narrative:     GFR Normal >60  Chronic Kidney Disease <60  Kidney Failure <15    The GFR formula is only valid for adults with stable renal function between ages 18 and 70.   CBC WITH AUTO DIFFERENTIAL - Abnormal; Notable for the following components:    Neutrophil % 82.1 (*)     Lymphocyte % 9.4 (*)     All other components within normal limits   LIPASE - Normal   URINALYSIS W/ MICROSCOPIC IF INDICATED (NO CULTURE) - Normal    Narrative:     Urine microscopic not indicated.   LACTIC ACID, PLASMA - Normal   RAINBOW DRAW    Narrative:     The following orders were created for panel order [place] Draw.  Procedure                               Abnormality         Status                 "     ---------                               -----------         ------                     Green Top (Gel)[[id number]]                                  Final result               Lavender Top[[id number]]                                     Final result               Gold Top - SST[[id number]]                                   Final result               Espinal Top[[id number]]                                         Final result               Light Blue Top[[id number]]                                   Final result                 Please view results for these tests on the individual orders.   CBC AND DIFFERENTIAL    Narrative:     The following orders were created for panel order CBC & Differential.  Procedure                               Abnormality         Status                     ---------                               -----------         ------                     CBC Auto Differential[[id number]]        Abnormal            Final result                 Please view results for these tests on the individual orders.   GREEN TOP   LAVENDER TOP   GOLD TOP - SST   GRAY TOP   LIGHT BLUE TOP       Meds Given in ED:   Medications   Sodium Chloride (PF) 0.9 % 10 mL (has no administration in time range)   famotidine (PEPCID) injection 20 mg (has no administration in time range)   pantoprazole (PROTONIX) injection 40 mg (has no administration in time range)   lactated ringers bolus 1,000 mL (0 mL Intravenous Stopped 6/9/24 1552)   diatrizoate meglumine-sodium (GASTROGRAFIN) 66-10 % oral solution 15 mL (15 mL Oral Given 6/9/24 1336)   water for use with iodinated contrast (BREEZA) 237 mL (237 mL Oral Given 6/9/24 1337)   iopamidol (ISOVUE-300) 61 % injection 100 mL (80 mL Intravenous Given 6/9/24 1520)           Last NIH score:                                                          Dysphagia screening results:        [place] Coma Scale:  No data recorded     CIWA:        Restraint Type:            Isolation Status:  No active  isolations

## 2024-06-10 LAB
ANION GAP SERPL CALCULATED.3IONS-SCNC: 9 MMOL/L (ref 5–15)
BUN SERPL-MCNC: 19 MG/DL (ref 8–23)
BUN/CREAT SERPL: 20.2 (ref 7–25)
CALCIUM SPEC-SCNC: 8.2 MG/DL (ref 8.6–10.5)
CHLORIDE SERPL-SCNC: 110 MMOL/L (ref 98–107)
CO2 SERPL-SCNC: 25 MMOL/L (ref 22–29)
CREAT SERPL-MCNC: 0.94 MG/DL (ref 0.57–1)
DEPRECATED RDW RBC AUTO: 50.6 FL (ref 37–54)
EGFRCR SERPLBLD CKD-EPI 2021: 61.1 ML/MIN/1.73
ERYTHROCYTE [DISTWIDTH] IN BLOOD BY AUTOMATED COUNT: 15 % (ref 12.3–15.4)
GLUCOSE SERPL-MCNC: 79 MG/DL (ref 65–99)
HCT VFR BLD AUTO: 34.7 % (ref 34–46.6)
HGB BLD-MCNC: 11.3 G/DL (ref 12–15.9)
MCH RBC QN AUTO: 30.3 PG (ref 26.6–33)
MCHC RBC AUTO-ENTMCNC: 32.6 G/DL (ref 31.5–35.7)
MCV RBC AUTO: 93 FL (ref 79–97)
PLATELET # BLD AUTO: 152 10*3/MM3 (ref 140–450)
PMV BLD AUTO: 11.6 FL (ref 6–12)
POTASSIUM SERPL-SCNC: 3.8 MMOL/L (ref 3.5–5.2)
RBC # BLD AUTO: 3.73 10*6/MM3 (ref 3.77–5.28)
SODIUM SERPL-SCNC: 144 MMOL/L (ref 136–145)
WBC NRBC COR # BLD AUTO: 6.14 10*3/MM3 (ref 3.4–10.8)

## 2024-06-10 PROCEDURE — 99223 1ST HOSP IP/OBS HIGH 75: CPT | Performed by: NURSE PRACTITIONER

## 2024-06-10 PROCEDURE — 25010000002 HYDRALAZINE PER 20 MG: Performed by: INTERNAL MEDICINE

## 2024-06-10 PROCEDURE — 25010000002 ONDANSETRON PER 1 MG: Performed by: FAMILY MEDICINE

## 2024-06-10 PROCEDURE — 25010000002 LABETALOL 5 MG/ML SOLUTION: Performed by: FAMILY MEDICINE

## 2024-06-10 PROCEDURE — 99232 SBSQ HOSP IP/OBS MODERATE 35: CPT | Performed by: INTERNAL MEDICINE

## 2024-06-10 PROCEDURE — 97530 THERAPEUTIC ACTIVITIES: CPT

## 2024-06-10 PROCEDURE — 25010000002 METOCLOPRAMIDE PER 10 MG: Performed by: FAMILY MEDICINE

## 2024-06-10 PROCEDURE — 85027 COMPLETE CBC AUTOMATED: CPT | Performed by: FAMILY MEDICINE

## 2024-06-10 PROCEDURE — 80048 BASIC METABOLIC PNL TOTAL CA: CPT | Performed by: FAMILY MEDICINE

## 2024-06-10 PROCEDURE — 97161 PT EVAL LOW COMPLEX 20 MIN: CPT

## 2024-06-10 RX ORDER — SUCRALFATE 1 G/1
1 TABLET ORAL
Status: DISCONTINUED | OUTPATIENT
Start: 2024-06-10 | End: 2024-06-16

## 2024-06-10 RX ORDER — HYDRALAZINE HYDROCHLORIDE 20 MG/ML
20 INJECTION INTRAMUSCULAR; INTRAVENOUS EVERY 6 HOURS PRN
Status: DISCONTINUED | OUTPATIENT
Start: 2024-06-10 | End: 2024-06-14

## 2024-06-10 RX ADMIN — LABETALOL HYDROCHLORIDE 10 MG: 5 INJECTION, SOLUTION INTRAVENOUS at 06:22

## 2024-06-10 RX ADMIN — MIRTAZAPINE 30 MG: 15 TABLET, FILM COATED ORAL at 21:34

## 2024-06-10 RX ADMIN — ACETAMINOPHEN 650 MG: 325 TABLET ORAL at 17:12

## 2024-06-10 RX ADMIN — METOCLOPRAMIDE 5 MG: 5 INJECTION, SOLUTION INTRAMUSCULAR; INTRAVENOUS at 13:47

## 2024-06-10 RX ADMIN — HYDRALAZINE HYDROCHLORIDE 100 MG: 50 TABLET ORAL at 21:31

## 2024-06-10 RX ADMIN — Medication 1 TABLET: at 08:45

## 2024-06-10 RX ADMIN — SUCRALFATE 1 G: 1 TABLET ORAL at 17:12

## 2024-06-10 RX ADMIN — METOCLOPRAMIDE 5 MG: 5 INJECTION, SOLUTION INTRAMUSCULAR; INTRAVENOUS at 06:22

## 2024-06-10 RX ADMIN — HYDRALAZINE HYDROCHLORIDE 100 MG: 50 TABLET ORAL at 13:47

## 2024-06-10 RX ADMIN — NEBIVOLOL 5 MG: 5 TABLET ORAL at 08:45

## 2024-06-10 RX ADMIN — HYDRALAZINE HYDROCHLORIDE 20 MG: 20 INJECTION INTRAMUSCULAR; INTRAVENOUS at 11:40

## 2024-06-10 RX ADMIN — PANTOPRAZOLE SODIUM 40 MG: 40 INJECTION, POWDER, FOR SOLUTION INTRAVENOUS at 17:12

## 2024-06-10 RX ADMIN — METOCLOPRAMIDE 5 MG: 5 INJECTION, SOLUTION INTRAMUSCULAR; INTRAVENOUS at 00:32

## 2024-06-10 RX ADMIN — Medication 10 ML: at 08:46

## 2024-06-10 RX ADMIN — SUCRALFATE 1 G: 1 TABLET ORAL at 21:31

## 2024-06-10 RX ADMIN — ACETAMINOPHEN 650 MG: 325 TABLET ORAL at 21:42

## 2024-06-10 RX ADMIN — ONDANSETRON 4 MG: 2 INJECTION INTRAMUSCULAR; INTRAVENOUS at 16:06

## 2024-06-10 RX ADMIN — PANTOPRAZOLE SODIUM 40 MG: 40 INJECTION, POWDER, FOR SOLUTION INTRAVENOUS at 08:45

## 2024-06-10 RX ADMIN — Medication 10 ML: at 21:34

## 2024-06-10 NOTE — THERAPY EVALUATION
Patient Name: Vesta Landry  : 1945    MRN: [id number]                              Today's Date: 6/10/2024       Admit Date: 2024    Visit Dx:     ICD-10-CM ICD-9-CM   1. Duodenitis  K29.80 535.60   2. Acute gastritis without hemorrhage, unspecified gastritis type  K29.00 535.00   3. Bilious vomiting with nausea  R11.14 787.04   4. Weight loss, unintentional  R63.4 783.21   5. History of dementia  Z86.59 V11.8     Patient Active Problem List   Diagnosis    Gastroesophageal reflux disease without esophagitis    Pure hypercholesterolemia    HTN (hypertension), benign    Vitamin D deficiency    Bilateral edema of lower extremity    Dysthymia    Chronic diastolic heart failure    Squamous cell carcinoma of left tonsil    Bradycardia    HLD (hyperlipidemia)    Anxiety associated with depression    Personal history of COVID-19    Anemia    Squamous cell carcinoma of head and neck    History of Clostridium difficile colitis    Elevated serum creatinine    Nonischemic nontraumatic myocardial injury    Enterocolitis due to Clostridium difficile, not specified as recurrent    Chronic diastolic heart failure    Personal history of COVID-19    Intractable nausea and vomiting    Gastritis and duodenitis     Past Medical History:   Diagnosis Date    Arthritis     Cancer     squamous cell - tonsils    Cancer of tonsil     metastatic squamous cell - left tonsil    CHF (congestive heart failure)     Deep vein thrombosis     bilateral- daughter states on 23 that she doesn't remember patient having this    Dementia     Dysphagia     GERD (gastroesophageal reflux disease)     Hyperlipidemia     Hypertension     Impaired mobility     PONV (postoperative nausea and vomiting)     SOB (shortness of breath)     following chemo infusion on day of infusion last week (week of -23) per pt's daughter    Vitamin D deficiency     Wears dentures     uppers - instructed not to use adhesive DOS     Past Surgical  History:   Procedure Laterality Date    APPENDECTOMY      CATARACT EXTRACTION      CHOLECYSTECTOMY      COLONOSCOPY      Approx 2009    ENDOSCOPY W/ PEG TUBE PLACEMENT N/A 2/20/2023    Procedure: ESOPHAGOGASTRODUODENOSCOPY WITH PERCUTANEOUS ENDOSCOPIC GASTROSTOMY TUBE INSERTION;  Surgeon: Brigido Guerra MD;  Location: [facility] ENDOSCOPY;  Service: Gastroenterology;  Laterality: N/A;    HYSTERECTOMY  1991    OOPHORECTOMY Bilateral 1991    PORTACATH PLACEMENT Right 02/08/2023    Procedure: INSERTION OF PORTACATH;  Surgeon: Brigido Guerra MD;  Location: [facility] OR;  Service: General;  Laterality: Right;    TUBAL ABDOMINAL LIGATION        General Information       Row Name 06/10/24 1052          Physical Therapy Time and Intention    Document Type evaluation  -LO     Mode of Treatment individual therapy;physical therapy  -LO       Row Name 06/10/24 1052          General Information    Patient Profile Reviewed yes  -LO     Prior Level of Function independent:;all household mobility;community mobility;transfer;gait;bed mobility  reports no AD use at baseline  -LO     Existing Precautions/Restrictions fall  -LO     Barriers to Rehab none identified  -LO       Row Name 06/10/24 1052          Living Environment    People in Home spouse  -LO       Row Name 06/10/24 1052          Home Main Entrance    Number of Stairs, Main Entrance one  -LO     Stair Railings, Main Entrance none  -LO       Row Name 06/10/24 1052          Stairs Within Home, Primary    Number of Stairs, Within Home, Primary none  -LO       Row Name 06/10/24 1052          Cognition    Orientation Status (Cognition) oriented x 3  -LO       Row Name 06/10/24 1052          Safety Issues, Functional Mobility    Safety Issues Affecting Function (Mobility) at risk behavior observed;awareness of need for assistance;impulsivity;insight into deficits/self-awareness;judgment;safety precaution awareness  -LO     Impairments Affecting Function (Mobility)  balance;endurance/activity tolerance;strength  -LO               User Key  (r) = Recorded By, (t) = Taken By, (c) = Cosigned By      Initials Name Provider Type    Roslyn Laureano, GER[place]IA Physical Therapist                   Mobility       Row Name 06/10/24 1053          Bed Mobility    Comment, (Bed Mobility) session initiated with patient in BR and ended with patient in chair  -       Row Name 06/10/24 1053          Transfers    Comment, (Transfers) recliner<>stand; mild unsteadiness noted initially in standing  -       Row Name 06/10/24 1053          Sit-Stand Transfer    Sit-Stand [place] (Transfers) contact guard  -LO     Assistive Device (Sit-Stand Transfers) other (see comments)  none used, may benefit from SPC  -       Row Name 06/10/24 1053          Gait/Stairs (Locomotion)    [place] Level (Gait) not tested  -     Comment, (Gait/Stairs) not tested this date due to BP reading and excessive diarrhea this date  -               User Key  (r) = Recorded By, (t) = Taken By, (c) = Cosigned By      Initials Name Provider Type    Roslyn Laureano, GERMANIA Physical Therapist                   Obj/Interventions       Row Name 06/10/24 1055          Range of Motion Comprehensive    General Range of Motion bilateral lower extremity ROM WNL  -       Row Name 06/10/24 1055          Strength Comprehensive (MMT)    General Manual Muscle Testing (MMT) Assessment lower extremity strength deficits identified  -     Comment, General Manual Muscle Testing (MMT) Assessment BLE grossly 4/5  -       Row Name 06/10/24 1055          Motor Skills    Motor Skills functional endurance  -LO     Functional Endurance fair, reduced from baseline  -       Row Name 06/10/24 1055          Balance    Balance Assessment sitting static balance;sitting dynamic balance;standing static balance;standing dynamic balance  -LO     Static Sitting Balance standby assist  -LO     Dynamic Sitting Balance contact guard  -LO     Position,  Sitting Balance unsupported;sitting in chair  -LO     Static Standing Balance contact guard  -LO     Dynamic Standing Balance contact guard  -LO     Position/Device Used, Standing Balance other (see comments)  none used, would benefit from SPC  -LO     Comment, Balance SPC CGA for safety in standing  -LO       Row Name 06/10/24 1055          Sensory Assessment (Somatosensory)    Sensory Assessment (Somatosensory) LE sensation intact  -LO               User Key  (r) = Recorded By, (t) = Taken By, (c) = Cosigned By      Initials Name Provider Type    Roslyn Laureano, PT Physical Therapist                   Goals/Plan       Row Name 06/10/24 1059          Transfer Goal 1 (PT)    Activity/Assistive Device (Transfer Goal 1, PT) sit-to-stand/stand-to-sit;bed-to-chair/chair-to-bed;car transfer  -LO     [place] Level/Cues Needed (Transfer Goal 1, PT) modified independence  -LO     Time Frame (Transfer Goal 1, PT) short term goal (STG);2 days  -LO       Row Name 06/10/24 1059          Gait Training Goal 1 (PT)    Activity/Assistive Device (Gait Training Goal 1, PT) gait (walking locomotion);decrease fall risk;diminish gait deviation;improve balance and speed;increase endurance/gait distance  -LO     [place] Level (Gait Training Goal 1, PT) modified independence  -LO     Distance (Gait Training Goal 1, PT) 50  -LO     Time Frame (Gait Training Goal 1, PT) long term goal (LTG);10 days  -       Row Name 06/10/24 1059          Stairs Goal 1 (PT)    Activity/Assistive Device (Stairs Goal 1, PT) ascending stairs;descending stairs  -LO     [place] Level/Cues Needed (Stairs Goal 1, PT) modified independence  -LO     Number of Stairs (Stairs Goal 1, PT) 1  -LO     Time Frame (Stairs Goal 1, PT) long term goal (LTG);10 days  -       Row Name 06/10/24 1059          Therapy Assessment/Plan (PT)    Planned Therapy Interventions (PT) balance training;bed mobility training;gait training;home exercise  program;strengthening;stair training;patient/family education;neuromuscular re-education;transfer training  -LO               User Key  (r) = Recorded By, (t) = Taken By, (c) = Cosigned By      Initials Name Provider Type    Roslyn Laureano, PT Physical Therapist                   Clinical Impression       Row Name 06/10/24 1056          Pain    Pretreatment Pain Rating 0/10 - no pain  -LO     Posttreatment Pain Rating 0/10 - no pain  -LO       Row Name 06/10/24 1056          Plan of Care Review    Plan of Care Reviewed With patient;family  -LO     Outcome Evaluation PT eval completed. Patient presenting with deficits in general BLE strength, standing balance, and functional endurance effecting functional mobility below baseline. Patient will benefit from skilled IP PT services to address impairments for return to [facility]. [facility]  -LO       Row Name 06/10/24 1056          Therapy Assessment/Plan (PT)    Rehab Potential (PT) good, to achieve stated therapy goals  -LO     Criteria for Skilled Interventions Met (PT) yes;meets criteria;skilled treatment is necessary  -LO     Therapy Frequency (PT) daily  -LO       Row Name 06/10/24 1056          Vital Signs    Pre Systolic BP Rehab 201  -LO     Pre Treatment Diastolic BP 93  -LO     Pretreatment Heart Rate (beats/min) 89  -LO     Pre SpO2 (%) 98  -LO     O2 Delivery Pre Treatment room air  -LO     O2 Delivery Intra Treatment room air  -LO     O2 Delivery Post Treatment room air  -LO     Pre Patient Position Sitting  -LO     Intra Patient Position Standing  -LO     Post Patient Position Sitting  -LO       Row Name 06/10/24 1056          Positioning and Restraints    Pre-Treatment Position standing in room  -LO     Post Treatment Position chair  -LO     In Chair notified nsg;reclined;call light within reach;encouraged to call for assist;with family/caregiver;estrella meier  RN says ok to leave off chair alarm  -LO               User Key   (r) = Recorded By, (t) = Taken By, (c) = Cosigned By      Initials Name Provider Type    Roslyn Laureano, GER[place]IA Physical Therapist                   Outcome Measures       Row Name 06/10/24 1100 06/10/24 0845       How much help from another person do you currently need...    Turning from your back to your side while in flat bed without using bedrails? 4  -LO 4  -MS    Moving from lying on back to sitting on the side of a flat bed without bedrails? 4  -LO 4  -MS    Moving to and from a bed to a chair (including a wheelchair)? 3  -LO 4  -MS    Standing up from a chair using your arms (e.g., wheelchair, bedside chair)? 3  -LO 4  -MS    Climbing 3-5 steps with a railing? 3  -LO 3  -MS    To walk in hospital room? 3  -LO 3  -MS    AM-PAC 6 Clicks Score (PT) 20  -LO 22  -MS    Highest Level of Mobility Goal 6 --> Walk 10 steps or more  -LO 7 --> Walk 25 feet or more  -MS      Row Name 06/10/24 1100          Functional Assessment    Outcome Measure Options AM-PAC 6 Clicks Basic Mobility (PT)  -LO               User Key  (r) = Recorded By, (t) = Taken By, (c) = Cosigned By      Initials Name Provider Type    Joan Vasquez RN Registered Nurse    Roslyn Laureano, PT Physical Therapist                                 Physical Therapy Education       Title: PT OT SLP Therapies (Done)       Topic: Physical Therapy (Done)       Point: Mobility training (Done)       Learning Progress Summary             Patient Acceptance, E, VU,NR by  at 6/10/2024 0953    Comment: PT POC                         Point: Home exercise program (Done)       Learning Progress Summary             Patient Acceptance, E, VU,NR by  at 6/10/2024 0953    Comment: PT POC                         Point: Body mechanics (Done)       Learning Progress Summary             Patient Acceptance, E, VU,NR by  at 6/10/2024 0953    Comment: PT POC                         Point: Precautions (Done)       Learning Progress Summary             Patient Acceptance,  RANDA BAEZ,NR by  at 6/10/2024 0953    Comment: PT POC                                         User Key       Initials Effective Dates Name Provider Type Discipline     06/16/21 -  Roslyn Pelletier, PT Physical Therapist PT                  PT Recommendation and Plan  Planned Therapy Interventions (PT): balance training, bed mobility training, gait training, home exercise program, strengthening, stair training, patient/family education, neuromuscular re-education, transfer training  Plan of Care Reviewed With: patient, family  Outcome Evaluation: PT eval completed. Patient presenting with deficits in general BLE strength, standing balance, and functional endurance effecting functional mobility below baseline. Patient will benefit from skilled IP PT services to address impairments for return to [facility]. Recommend home with assist and HHPT at [facility].     Time Calculation:   PT Evaluation Complexity  History, PT Evaluation Complexity: 1-2 personal factors and/or comorbidities  Examination of Body Systems (PT Eval Complexity): 1-2 elements  Clinical Presentation (PT Evaluation Complexity): evolving  Clinical Decision Making (PT Evaluation Complexity): low complexity  Overall Complexity (PT Evaluation Complexity): low complexity     PT Charges       Row Name 06/10/24 0953             Time Calculation    Start Time 0953  -LO      PT Received On 06/10/24  -LO      PT Goal Re-Cert Due Date 06/20/24  -         Timed Charges    55765 - PT Therapeutic Activity Minutes 18  -LO         Untimed Charges    PT Eval/Re-eval Minutes 40  -LO         Total Minutes    Timed Charges Total Minutes 18  -LO      Untimed Charges Total Minutes 40  -LO       Total Minutes 58  -LO                User Key  (r) = Recorded By, (t) = Taken By, (c) = Cosigned By      Initials Name Provider Type     Roslyn Pelletier, PT Physical Therapist                  Therapy Charges for Today       Code Description Service Date Service Provider Modifiers Qty    14396187490  HC PT THERAPEUTIC ACT EA 15 MIN 6/10/2024 Roslyn Pelletier, PT GP 1    99560279298 HC PT EVAL LOW COMPLEXITY 3 6/10/2024 Roslyn Pelletier, PT GP 1            PT G-Codes  Outcome Measure Options: AM-PAC 6 Clicks Basic Mobility (PT)  AM-PAC 6 Clicks Score (PT): 20  PT Discharge Summary  Anticipated Discharge Disposition (PT): home with assist, home with home health    Roslyn Pelletier, PT  6/10/2024

## 2024-06-10 NOTE — PROGRESS NOTES
[facility] Medicine Services  PROGRESS NOTE    Patient Name: Vesta Landry  : 1945  MRN: [id number]    Date of Admission: 2024  Primary Care Physician: Bossman Pedraza MD    Subjective   Subjective     CC: Follow-up N/V/D    HPI: Patient said that nausea and vomiting have resolved, however she is now having diarrhea      Objective   Objective     Vital Signs:   Temp:  [98.2 °F (36.8 °C)] 98.2 °F (36.8 °C)  Heart Rate:  [64-78] 67  Resp:  [14-16] 16  BP: (151-210)/() 184/84     Physical Exam:  Constitutional: Elderly female, in no acute distress, awake, alert  HENT: NCAT, mucous membranes moist  Respiratory: Clear to auscultation bilaterally, respiratory effort normal   Cardiovascular: RRR, no murmurs, rubs, or gallops  Gastrointestinal: Positive bowel sounds, soft, nontender, nondistended  Musculoskeletal: No bilateral ankle edema  Psychiatric: Appropriate affect, cooperative  Neurologic: Oriented x 3, nonfocal  Skin: No rashes      Results Reviewed:  LAB RESULTS:      Lab 06/10/24  0500 24  1310 24  1240   WBC 6.14  --  7.97   HEMOGLOBIN 11.3*  --  12.9   HEMATOCRIT 34.7  --  40.7   PLATELETS 152  --  181   NEUTROS ABS  --   --  6.55   IMMATURE GRANS (ABS)  --   --  0.02   LYMPHS ABS  --   --  0.75   MONOS ABS  --   --  0.59   EOS ABS  --   --  0.03   MCV 93.0  --  95.1   LACTATE  --  0.9  --          Lab 24  1240   SODIUM 147*   POTASSIUM 3.2*   CHLORIDE 110*   CO2 25.0   ANION GAP 12.0   BUN 23   CREATININE 1.21*   EGFR 45.1*   GLUCOSE 105*   CALCIUM 8.6         Lab 24  1240   TOTAL PROTEIN 6.1   ALBUMIN 3.8   GLOBULIN 2.3   ALT (SGPT) 16   AST (SGOT) 18   BILIRUBIN 0.4   ALK PHOS 104   LIPASE 31                     Brief Urine Lab Results  (Last result in the past 365 days)        Color   Clarity   Blood   Leuk Est   Nitrite   Protein   CREAT   Urine HCG        24 1326 Yellow   Clear   Negative   Negative   Negative   Negative                    Microbiology Results Abnormal       None            CT Abdomen Pelvis With Contrast    Result Date: 6/9/2024  CT ABDOMEN PELVIS W CONTRAST Date of Exam: 6/9/2024 3:02 PM EDT Indication: Vomiting green emesis, weight loss over the past month. Comparison: CT of the abdomen and pelvis performed on 3/2/2023. Technique: Axial CT images were obtained of the abdomen and pelvis following the uneventful intravenous administration of 80 cc of Isovue-300 contrast. Reconstructed coronal and sagittal images were also obtained. Automated exposure control and iterative  construction methods were used. Findings:  The patient's gastrostomy tube has been removed. There is suspicion of thickening of the wall the gastric antrum and pyloric region of the stomach. There also may be thickening of the wall of the descending limb of the duodenum. This raises question of gastritis/duodenitis. There is increased stool throughout the colon and rectum. The appendix is either small in size or surgically absent. The gallbladder is surgically absent. There is unchanged prominence of the biliary tree. The adrenal glands, pancreas, and kidneys are normal. The spleen is borderline enlarged. The uterus is surgically absent. The urinary bladder is slightly distended. There are atherosclerotic vascular calcifications in the abdomen and pelvis. There are no enlarged lymph nodes or abnormal fluid collections. There are bandlike linear densities in the lower lobes felt to represent a combination of scarring and subsegmental atelectasis. There are no layering pleural effusions. There are no suspicious osteolytic or sclerotic  lesions within the bony structures. There are underlying degenerative changes.       Impression: Impression: 1. The patient's gastrostomy tube is been removed. 2. Suspicion of thickening of the wall the gastric antrum and pyloric region of the stomach as well as the descending limb of the duodenum. This raises question of  gastritis/duodenitis. 3. Increased stool burden in the colon and rectum. 4. The appendix is either small in size or surgically absent. 5. Borderline splenomegaly. 6. Additional incidental findings as noted above. Electronically Signed: David Parkinson MD  6/9/2024 4:17 PM EDT  Workstation ID: [id number]     Results for orders placed during the hospital encounter of 09/25/23    Adult Transthoracic Echo Complete W/ Cont if Necessary Per Protocol    Interpretation Summary    Left ventricular systolic function is normal. Calculated left ventricular EF = 58.9%    Left ventricular wall thickness is consistent with mild concentric hypertrophy.    Left ventricular diastolic function was indeterminate.    There is mild calcification of the aortic valve.    Estimated right ventricular systolic pressure from tricuspid regurgitation is mildly elevated (35-45 mmHg).    Mild aortic valve regurgitation.      Current medications:  Scheduled Meds:hydrALAZINE, 100 mg, Oral, Q8H  metoclopramide, 5 mg, Intravenous, Q6H  mirtazapine, 30 mg, Oral, Nightly  multivitamin with minerals, 1 tablet, Oral, Daily  nebivolol, 5 mg, Oral, Daily  pantoprazole, 40 mg, Intravenous, BID AC  senna-docusate sodium, 2 tablet, Oral, BID  sodium chloride, 10 mL, Intravenous, Q12H      Continuous Infusions:sodium chloride 0.9 % with KCl 20 mEq, 100 mL/hr, Last Rate: 100 mL/hr (06/09/24 2202)      PRN Meds:.  acetaminophen **OR** acetaminophen **OR** acetaminophen    senna-docusate sodium **AND** polyethylene glycol **AND** bisacodyl **AND** bisacodyl    labetalol    nitroglycerin    ondansetron ODT **OR** ondansetron    Sodium Chloride (PF)    sodium chloride    sodium chloride    Assessment & Plan   Assessment & Plan     Active Hospital Problems    Diagnosis  POA    **Intractable nausea and vomiting [R11.2]  Yes    Gastritis and duodenitis [K29.90]  Unknown    Elevated serum creatinine [R79.89]  Yes    History of Clostridium difficile colitis [Z86.19]  Not  Applicable    HLD (hyperlipidemia) [E78.5]  Yes    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        Brief Hospital Course to date:  Vesta Landry is a 79 y.o. female with hx of tonsil cancer (did not complete treatment), CHF, GERD, HTN, HLD. Pt with Hx PEG tube placement due to difficulty with dysphagia/oral intake during treatment for tonsil cancer. Per family, this was removed about 6 months ago and since then, patient has had issues with intermittent nausea/vomiting     This patient's problems and plans were partially entered by my partner and updated as appropriate by me 06/10/24.     Intermittent Nausea/Vomiting and diarrhea  Weight Loss   Hx PEG tube s/p removal  -Per patient, symptoms started after PEG removal about 6 months ago. Pt had PEG tube for nutrition while undergoing Tx for tonsil cancer.   -Established care with Dr Serrato [facility] in [place], with plans for outpatient EGD but symptoms worsened so patient came to ED   -CT A/P with concern for gastritis/duodenitis  -Continue IV PPI twice daily, Reglan, antiemetics and IV fluids  -GI consulted for possible EGD.     Mild Hypernatremia  Dehydration  Hypokalemia  Mild elevation of serum Cr  -This is likely secondary to above   -continue to monitor and replete electrolytes per protocol  -Continue IV fluids     Hx tonsil cancer  -pt did not complete chemo/radiation. Was on hospice but then graduated.   -Not interested in further oncologic work-up.      Hypertension  -BP remains elevated, resume home meds   -As needed IV hydralazine has been added      All problems listed above are new to me today    Expected Discharge Location and Transportation: TBD  Expected Discharge   Expected Discharge Date: 6/10/2024; Expected  Discharge Time:      DVT prophylaxis:  Mechanical DVT prophylaxis orders are present.         AM-PAC 6 Clicks Score (PT): 18 (06/09/24 2030)    CODE STATUS:   Code Status and Medical Interventions:   Ordered at: 06/09/24 4249     Level Of Support Discussed With:    Patient     Code Status (Patient has no pulse and is not breathing):    CPR (Attempt to Resuscitate)     Medical Interventions (Patient has pulse or is breathing):    Full Support       Mia Schneider MD  06/10/24

## 2024-06-10 NOTE — CASE MANAGEMENT/SOCIAL WORK
Discharge Planning Assessment  [facility]     Patient Name: Vesta Landry  MRN: [id number]  Today's Date: 6/10/2024    Admit Date: 6/9/2024    Plan: Home   Discharge Needs Assessment       Row Name 06/10/24 1213       Living Environment    People in Home spouse    Name(s) of People in Home Karrie    Current Living Arrangements home    Potentially Unsafe Housing Conditions none    In the past 12 months has the electric, gas, oil, or water company threatened to shut off services in your home? No    Primary Care Provided by self    Provides Primary Care For no one    Family Caregiver if Needed spouse    Family Caregiver Names Karrie    Quality of Family Relationships supportive;involved;helpful    Able to Return to Prior Arrangements yes       Resource/Environmental Concerns    Resource/Environmental Concerns none    Transportation Concerns none       Transportation Needs    In the past 12 months, has lack of transportation kept you from medical appointments or from getting medications? no    In the past 12 months, has lack of transportation kept you from meetings, work, or from getting things needed for daily living? No       Food Insecurity    Within the past 12 months, you worried that your food would run out before you got the money to buy more. Never true    Within the past 12 months, the food you bought just didn't last and you didn't have money to get more. Never true       Transition Planning    Patient/Family Anticipates Transition to home with family    Patient/Family Anticipated Services at Transition none    Transportation Anticipated family or friend will provide       Discharge Needs Assessment    Readmission Within the Last 30 Days no previous admission in last 30 days    Equipment Currently Used at Home cane, straight;walker, rolling;shower chair;wheelchair    Concerns to be Addressed denies needs/concerns at this time;no discharge needs identified    Anticipated Changes Related to Illness none     Equipment Needed After Discharge none                   Discharge Plan       Row Name 06/10/24 1215       Plan    Plan Home    Patient/Family in Agreement with Plan yes    Plan Comments Spoke with patient at bedside for IDP. Lives with spouse in [place]. RW,WC, shower chair, cane. No HH/OPPT. PCP Bossman Pedraza. Insurance verified as [employer] Replacement with scripts filled at [facility]. Plan is home with family to transport. CM will cont to follow.    Final Discharge Disposition Code 01 - home or self-care                  Continued Care and Services - Admitted Since 6/9/2024    No active coordination exists for this encounter.       Expected Discharge Date and Time       Expected Discharge Date Expected Discharge Time    Brian 10, 2024            Demographic Summary       Row Name 06/10/24 1213       General Information    Admission Type observation    Arrived From emergency department    Referral Source admission list    Reason for Consult discharge planning    Preferred Language English                   Functional Status       Row Name 06/10/24 1213       Functional Status    Usual Activity Tolerance moderate    Current Activity Tolerance moderate       Physical Activity    On average, how many days per week do you engage in moderate to strenuous exercise (like a brisk walk)? 0 days    On average, how many minutes do you engage in exercise at this level? 0 min    Number of minutes of exercise per week 0       Functional Status, IADL    Medications independent    Meal Preparation independent    Housekeeping independent    Laundry independent    Shopping independent                   Psychosocial    No documentation.                  Abuse/Neglect    No documentation.                  Legal    No documentation.                  Substance Abuse    No documentation.                  Patient Forms    No documentation.                     Haylee Colmenares RN

## 2024-06-10 NOTE — PLAN OF CARE
Problem: Adult Inpatient Plan of Care  Goal: Plan of Care Review  Outcome: Ongoing, Progressing  Goal: Patient-Specific Goal (Individualized)  Outcome: Ongoing, Progressing  Goal: Absence of Hospital-Acquired Illness or Injury  Outcome: Ongoing, Progressing  Intervention: Identify and Manage Fall Risk  Recent Flowsheet Documentation  Taken 6/10/2024 0400 by Kalyan Resendiz RN  Safety Promotion/Fall Prevention:   safety round/check completed   activity supervised   assistive device/personal items within reach   clutter free environment maintained   fall prevention program maintained  Taken 6/10/2024 0200 by Kalyan Resendiz RN  Safety Promotion/Fall Prevention:   activity supervised   assistive device/personal items within reach   safety round/check completed   clutter free environment maintained   nonskid shoes/slippers when out of bed  Taken 6/10/2024 0000 by Kalyan Resendiz RN  Safety Promotion/Fall Prevention:   safety round/check completed   activity supervised   assistive device/personal items within reach   clutter free environment maintained   fall prevention program maintained  Taken 6/9/2024 2200 by Kalyan Resendiz RN  Safety Promotion/Fall Prevention:   activity supervised   assistive device/personal items within reach   safety round/check completed   clutter free environment maintained   nonskid shoes/slippers when out of bed  Taken 6/9/2024 2030 by Kalyan Resendiz RN  Safety Promotion/Fall Prevention:   safety round/check completed   activity supervised   assistive device/personal items within reach   clutter free environment maintained   fall prevention program maintained  Intervention: Prevent Skin Injury  Recent Flowsheet Documentation  Taken 6/10/2024 0400 by Kalyan Resendiz RN  Body Position: position changed independently  Skin Protection:   adhesive use limited   incontinence pads utilized   skin-to-device areas padded   tubing/devices free from skin contact  Taken  6/10/2024 0200 by Kalyan Resendiz RN  Body Position: position changed independently  Skin Protection:   adhesive use limited   incontinence pads utilized   skin-to-device areas padded   tubing/devices free from skin contact  Taken 6/10/2024 0000 by Kalyan Resendiz RN  Body Position: position changed independently  Skin Protection:   adhesive use limited   incontinence pads utilized   skin-to-device areas padded   tubing/devices free from skin contact  Taken 6/9/2024 2200 by Kalyan Resendiz RN  Body Position: position changed independently  Skin Protection:   adhesive use limited   incontinence pads utilized   skin-to-device areas padded   tubing/devices free from skin contact  Taken 6/9/2024 2030 by Kalyan Resendiz RN  Body Position: position changed independently  Skin Protection: adhesive use limited  Intervention: Prevent and Manage VTE (Venous Thromboembolism) Risk  Recent Flowsheet Documentation  Taken 6/10/2024 0400 by Kalyan Resendiz RN  Activity Management: activity minimized  Taken 6/10/2024 0200 by Kalyan Resendiz RN  Activity Management: activity minimized  Taken 6/10/2024 0000 by Kalyan Resendiz RN  Activity Management: activity minimized  Taken 6/9/2024 2200 by Kalyan Resendiz RN  Activity Management: activity minimized  Taken 6/9/2024 2030 by Kalyan Resendiz RN  Activity Management: activity encouraged  Range of Motion: active ROM (range of motion) encouraged  Intervention: Prevent Infection  Recent Flowsheet Documentation  Taken 6/10/2024 0400 by Kalyan Resendiz RN  Infection Prevention:   hand hygiene promoted   rest/sleep promoted  Taken 6/10/2024 0200 by Kalyan Resendiz RN  Infection Prevention:   environmental surveillance performed   rest/sleep promoted  Taken 6/10/2024 0000 by Kalyan Resendiz RN  Infection Prevention:   hand hygiene promoted   rest/sleep promoted  Taken 6/9/2024 2200 by Kalyan Resendiz RN  Infection Prevention:   environmental  surveillance performed   rest/sleep promoted  Taken 6/9/2024 2030 by Kalyan Resendiz RN  Infection Prevention:   cohorting utilized   hand hygiene promoted   rest/sleep promoted  Goal: Optimal Comfort and Wellbeing  Outcome: Ongoing, Progressing  Intervention: Provide Person-Centered Care  Recent Flowsheet Documentation  Taken 6/9/2024 2030 by Kalyan Resendiz RN  Trust Relationship/Rapport:   care explained   emotional support provided   questions answered   questions encouraged   thoughts/feelings acknowledged  Goal: Readiness for Transition of Care  Outcome: Ongoing, Progressing     Problem: Skin Injury Risk Increased  Goal: Skin Health and Integrity  Outcome: Ongoing, Progressing  Intervention: Optimize Skin Protection  Recent Flowsheet Documentation  Taken 6/10/2024 0400 by Kalyan Resendiz RN  Pressure Reduction Techniques:   frequent weight shift encouraged   weight shift assistance provided  Head of Bed (HOB) Positioning: [facility] elevated  Pressure Reduction Devices:   positioning supports utilized   pressure-redistributing mattress utilized  Skin Protection:   adhesive use limited   incontinence pads utilized   skin-to-device areas padded   tubing/devices free from skin contact  Taken 6/10/2024 0200 by Kalyan Resendiz RN  Pressure Reduction Techniques:   frequent weight shift encouraged   weight shift assistance provided  Head of Bed (HOB) Positioning: [facility] elevated  Pressure Reduction Devices:   positioning supports utilized   pressure-redistributing mattress utilized  Skin Protection:   adhesive use limited   incontinence pads utilized   skin-to-device areas padded   tubing/devices free from skin contact  Taken 6/10/2024 0000 by Kalyan Resendiz RN  Pressure Reduction Techniques:   frequent weight shift encouraged   weight shift assistance provided  Head of Bed (HOB) Positioning: [facility] elevated  Pressure Reduction Devices:   positioning supports utilized   pressure-redistributing mattress  utilized  Skin Protection:   adhesive use limited   incontinence pads utilized   skin-to-device areas padded   tubing/devices free from skin contact  Taken 6/9/2024 2200 by Kalyan Resendiz RN  Pressure Reduction Techniques:   frequent weight shift encouraged   weight shift assistance provided  Head of Bed (HOB) Positioning: HOB elevated  Pressure Reduction Devices:   positioning supports utilized   pressure-redistributing mattress utilized  Skin Protection:   adhesive use limited   incontinence pads utilized   skin-to-device areas padded   tubing/devices free from skin contact  Taken 6/9/2024 2030 by Kalyan Resendiz RN  Head of Bed (HOB) Positioning: HOB elevated  Skin Protection: adhesive use limited     Problem: Adjustment to Illness (Heart Failure)  Goal: Optimal Coping  Outcome: Ongoing, Progressing  Intervention: Support Psychosocial Response  Recent Flowsheet Documentation  Taken 6/9/2024 2030 by Kalyan Resendiz RN  Family/Support System Care:   support provided   self-care encouraged     Problem: Cardiac Output Decreased (Heart Failure)  Goal: Optimal Cardiac Output  Outcome: Ongoing, Progressing     Problem: Functional Ability Impaired (Heart Failure)  Goal: Optimal Functional Ability  Outcome: Ongoing, Progressing  Intervention: Optimize Functional Ability  Recent Flowsheet Documentation  Taken 6/10/2024 0400 by Kalyan Resendiz RN  Activity Management: activity minimized  Taken 6/10/2024 0200 by Kalyan Resendiz RN  Activity Management: activity minimized  Self-Care Promotion: independence encouraged  Taken 6/10/2024 0000 by Kalyan Resendiz RN  Activity Management: activity minimized  Taken 6/9/2024 2200 by Kalyan Resendiz RN  Activity Management: activity minimized  Taken 6/9/2024 2030 by Kalyan Resendiz RN  Activity Management: activity encouraged     Problem: Nausea and Vomiting  Goal: Fluid and Electrolyte Balance  Outcome: Ongoing, Progressing   Goal Outcome Evaluation:          Pt has no new nursing issues at this time. Pt is a&ox4([place]), NSR, RA, BP increased; PRN given, vital signs stable. Daughter @ bedside. Pt has no complaints at this time.

## 2024-06-10 NOTE — CONSULTS
[facility]  Inpatient Gastroenterology Consult    Inpatient Gastroenterology Consult  Consult performed by: Homer Craft APRN  Consult ordered by: Lali Motta DO  Reason for consult: nausea and vomiting, gastritis, duodenitis,      Referring Provider: No ref. provider found    PCP: Bossman Pedraza MD    Chief Complaint: Nausea and vomiting    History of present illness:    Vesta Landry is a 79 y.o. female who is admitted with nausea and vomiting.  GI consult received for intractable nausea, vomiting with imaging concerning for gastritis and duodenitis.    Patient notes that she has been struggling for the past 6 months with intractable nausea and vomiting and poor oral intake.  Does not endorse any odynophagia or true dysphagia-like symptoms. Reports that when she eats it will go down but if she starts to move it will come back up; emesis is typically bilious in nature per family report. Also endorses significant diarrhea during episodes. Daughter reports a #30 weight loss over the last few months. Had a PEG tube in place until approximately 6 months ago when it was removed. Recently established care with a GI doctor in [place] who increased PPI with plans  Past medical, surgical, social, and family histories are reviewed for accuracy.  No documented alleviating or exacerbating factors.  Does not endorse pain at time of exam.    Allergies:  Trazodone    Scheduled Meds:  hydrALAZINE, 100 mg, Oral, Q8H  mirtazapine, 30 mg, Oral, Nightly  multivitamin with minerals, 1 tablet, Oral, Daily  nebivolol, 5 mg, Oral, Daily  pantoprazole, 40 mg, Intravenous, BID AC  senna-docusate sodium, 2 tablet, Oral, BID  sodium chloride, 10 mL, Intravenous, Q12H  sucralfate, 1 g, Oral, 4x Daily AC & at Bedtime    Infusions:  sodium chloride 0.9 % with KCl 20 mEq, 50 mL/hr, Last Rate: 50 mL/hr (06/10/24 0846)    PRN Meds:    acetaminophen **OR** acetaminophen **OR** acetaminophen     senna-docusate sodium **AND** polyethylene glycol **AND** bisacodyl **AND** bisacodyl    hydrALAZINE    labetalol    nitroglycerin    ondansetron ODT **OR** ondansetron    sodium chloride    sodium chloride    Home Meds:  Medications Prior to Admission   Medication Sig Dispense Refill Last Dose    allopurinol (ZYLOPRIM) 100 MG tablet Take 1 tablet by mouth Daily. 90 tablet 3 6/8/2024 at 0900    furosemide (LASIX) 20 MG tablet Take 1 tablet by mouth Daily. Indications: Cardiac Failure, Edema, High Blood Pressure Disorder 90 tablet 3 6/8/2024 at 0900    hydrALAZINE (APRESOLINE) 100 MG tablet TAKE 1 TABLET BY MOUTH THREE TIMES DAILY FOR HIGH BLOOD PRESSURE 270 tablet 1 6/8/2024 at 0900    mirtazapine (REMERON) 30 MG tablet TAKE 1 TABLET BY MOUTH EVERY NIGHT 90 tablet 1 6/8/2024 at 0900    multivitamin with minerals (MULTIVITAMIN ADULT PO) Take 1 tablet by mouth Daily. OTC   6/8/2024 at 0900    nebivolol (BYSTOLIC) 5 MG tablet TAKE 1 TABLET BY MOUTH DAILY FOR HIGH BLOOD PRESSURE 90 tablet 1 6/8/2024 at 0900    nystatin (MYCOSTATIN) 100,000 unit/mL suspension Swish and swallow 5 mL 4 (Four) Times a Day. 60 mL 1 Past Week    omeprazole (priLOSEC) 20 MG capsule Take 1 capsule by mouth 2 (Two) Times a Day Before Meals for 90 days. Indications: Heartburn, Stomach Ulcer, Stomach Ulcer From Aspirin/Ibuprofen-Like Drugs 180 capsule 0 6/9/2024 at 0900    ondansetron (ZOFRAN) 4 MG tablet Take 1 tablet by mouth Every 8 (Eight) Hours As Needed for Nausea or Vomiting for up to 80 days. 60 tablet 3 Past Week    promethazine (PHENERGAN) 12.5 MG tablet Take 1 tablet by mouth Every 8 (Eight) Hours As Needed for Nausea or Vomiting for up to 30 days. 60 tablet 0 6/8/2024 at 0900    nitroglycerin (NITROSTAT) 0.4 MG SL tablet Place 1 tablet under the tongue Every 5 (Five) Minutes As Needed for Chest Pain. Take no more than 3 doses in 15 minutes. 50 tablet 0 Unknown       ROS: Review of Systems   Constitutional:  Positive for appetite  change, fatigue and unexpected weight change. Negative for activity change, chills, diaphoresis and fever.   HENT:  Negative for sore throat, trouble swallowing and voice change.    Eyes: Negative.    Respiratory:  Negative for apnea, cough, choking, chest tightness, shortness of breath, wheezing and stridor.    Cardiovascular:  Negative for chest pain, palpitations and leg swelling.   Gastrointestinal:  Positive for diarrhea, nausea and vomiting. Negative for abdominal distention, abdominal pain, anal bleeding, blood in stool, constipation and rectal pain.   Endocrine: Negative.    Genitourinary: Negative.    Musculoskeletal: Negative.    Skin: Negative.    Allergic/Immunologic: Negative.    Neurological: Negative.    Hematological: Negative.    Psychiatric/Behavioral: Negative.     All other systems reviewed and are negative.      PAST MED HX:  Past Medical History:   Diagnosis Date    Arthritis     Cancer     squamous cell - tonsils    Cancer of tonsil     metastatic squamous cell - left tonsil    CHF (congestive heart failure)     Deep vein thrombosis     bilateral- daughter states on 2/17/23 that she doesn't remember patient having this    Dementia     Dysphagia     GERD (gastroesophageal reflux disease)     Hyperlipidemia     Hypertension     Impaired mobility     PONV (postoperative nausea and vomiting)     SOB (shortness of breath)     following chemo infusion on day of infusion last week (week of 1/29-2/4/23) per pt's daughter    Vitamin D deficiency     Wears dentures     uppers - instructed not to use adhesive DOS       PAST SURG HX:  Past Surgical History:   Procedure Laterality Date    APPENDECTOMY      CATARACT EXTRACTION      CHOLECYSTECTOMY      COLONOSCOPY      Approx 2009    ENDOSCOPY W/ PEG TUBE PLACEMENT N/A 2/20/2023    Procedure: ESOPHAGOGASTRODUODENOSCOPY WITH PERCUTANEOUS ENDOSCOPIC GASTROSTOMY TUBE INSERTION;  Surgeon: Brigido Guerra MD;  Location: [facility] ENDOSCOPY;  Service:  "Gastroenterology;  Laterality: N/A;    HYSTERECTOMY      OOPHORECTOMY Bilateral     PORTACATH PLACEMENT Right 2023    Procedure: INSERTION OF PORTACATH;  Surgeon: Brigido Guerra MD;  Location: [facility];  Service: General;  Laterality: Right;    TUBAL ABDOMINAL LIGATION         FAM HX:  Family History   Problem Relation Age of Onset    Heart failure Mother     Hyperlipidemia Mother     Hypertension Mother     Cancer Father     Cancer Sister     Stroke Sister     Breast cancer Maternal Aunt     Breast cancer Paternal Aunt     Ovarian cancer Neg Hx        SOC HX:  Social History     Socioeconomic History    Marital status:    Tobacco Use    Smoking status: Former     Current packs/day: 0.00     Average packs/day: 0.2 packs/day for 10.0 years (1.5 ttl pk-yrs)     Types: Cigarettes     Start date: 1977     Quit date: 1987     Years since quittin.4     Passive exposure: Past    Smokeless tobacco: Never   Vaping Use    Vaping status: Never Used   Substance and Sexual Activity    Alcohol use: No    Drug use: No    Sexual activity: Defer       PHYSICAL EXAM  /75   Pulse 79   Temp 98 °F (36.7 °C) (Oral)   Resp 17   Ht 152.4 cm (60\")   Wt 56.2 kg (124 lb)   SpO2 95%   BMI 24.22 kg/m²   Wt Readings from Last 3 Encounters:   24 56.2 kg (124 lb)   24 56.4 kg (124 lb 6.4 oz)   24 56.7 kg (125 lb)   ,body mass index is 24.22 kg/m².  Physical Exam  Vitals and nursing note reviewed.   Constitutional:       General: She is not in acute distress.     Appearance: She is normal weight. She is ill-appearing. She is not toxic-appearing.   HENT:      Head: Normocephalic and atraumatic.   Eyes:      General: No scleral icterus.     Extraocular Movements: Extraocular movements intact.      Conjunctiva/sclera: Conjunctivae normal.      Pupils: Pupils are equal, round, and reactive to light.   Cardiovascular:      Rate and Rhythm: Normal rate and regular rhythm.      Pulses: " Normal pulses.      Heart sounds: Normal heart sounds.   Pulmonary:      Effort: Pulmonary effort is normal. No respiratory distress.      Breath sounds: Normal breath sounds.   Abdominal:      General: Abdomen is flat. Bowel sounds are normal. There is no distension.      Palpations: Abdomen is soft. There is no mass.      Tenderness: There is no abdominal tenderness. There is no guarding or rebound.      Hernia: No hernia is present.   Genitourinary:     Comments: defer  Musculoskeletal:         General: Normal range of motion.      Right lower leg: No edema.      Left lower leg: No edema.   Skin:     General: Skin is warm and dry.      Capillary Refill: Capillary refill takes less than 2 seconds.      Coloration: Skin is not jaundiced or pale.   Neurological:      General: No focal deficit present.      Mental Status: She is alert and oriented to person, place, and time.   Psychiatric:         Mood and Affect: Mood normal.         Behavior: Behavior normal.         Thought Content: Thought content normal.         Judgment: Judgment normal.         Results Review:   I reviewed the patient's new clinical results.  I reviewed the patient's new imaging results and agree with the interpretation.  I reviewed the patient's other test results and agree with the interpretation  I personally viewed and interpreted the patient's EKG/Telemetry data    Lab Results   Component Value Date    WBC 6.14 06/10/2024    HGB 11.3 (L) 06/10/2024    HGB 12.9 06/09/2024    HGB 13.1 02/22/2024    HCT 34.7 06/10/2024    MCV 93.0 06/10/2024     06/10/2024       Lab Results   Component Value Date    INR 1.14 (H) 02/26/2023    INR 1.26 (H) 01/20/2023       Lab Results   Component Value Date    GLUCOSE 79 06/10/2024    BUN 19 06/10/2024    CREATININE 0.94 06/10/2024    EGFRIFNONA 39 (L) 01/13/2022    EGFRIFAFRI 47 (L) 01/13/2022    BCR 20.2 06/10/2024     06/10/2024    K 3.8 06/10/2024    CO2 25.0 06/10/2024    CALCIUM 8.2 (L)  06/10/2024    PROTENTOTREF 7.3 09/07/2023    ALBUMIN 3.8 06/09/2024    ALKPHOS 104 06/09/2024    BILITOT 0.4 06/09/2024    ALT 16 06/09/2024    AST 18 06/09/2024       CT Abdomen Pelvis With Contrast    Result Date: 6/9/2024  CT ABDOMEN PELVIS W CONTRAST Date of Exam: 6/9/2024 3:02 PM EDT Indication: Vomiting green emesis, weight loss over the past month. Comparison: CT of the abdomen and pelvis performed on 3/2/2023. Technique: Axial CT images were obtained of the abdomen and pelvis following the uneventful intravenous administration of 80 cc of Isovue-300 contrast. Reconstructed coronal and sagittal images were also obtained. Automated exposure control and iterative  construction methods were used. Findings:  The patient's gastrostomy tube has been removed. There is suspicion of thickening of the wall the gastric antrum and pyloric region of the stomach. There also may be thickening of the wall of the descending limb of the duodenum. This raises question of gastritis/duodenitis. There is increased stool throughout the colon and rectum. The appendix is either small in size or surgically absent. The gallbladder is surgically absent. There is unchanged prominence of the biliary tree. The adrenal glands, pancreas, and kidneys are normal. The spleen is borderline enlarged. The uterus is surgically absent. The urinary bladder is slightly distended. There are atherosclerotic vascular calcifications in the abdomen and pelvis. There are no enlarged lymph nodes or abnormal fluid collections. There are bandlike linear densities in the lower lobes felt to represent a combination of scarring and subsegmental atelectasis. There are no layering pleural effusions. There are no suspicious osteolytic or sclerotic  lesions within the bony structures. There are underlying degenerative changes.       Impression: 1. The patient's gastrostomy tube is been removed. 2. Suspicion of thickening of the wall the gastric antrum and pyloric  region of the stomach as well as the descending limb of the duodenum. This raises question of gastritis/duodenitis. 3. Increased stool burden in the colon and rectum. 4. The appendix is either small in size or surgically absent. 5. Borderline splenomegaly. 6. Additional incidental findings as noted above. Electronically Signed: David Parkinson MD  6/9/2024 4:17 PM EDT  Workstation ID: [id number]     ASSESSMENTS/PLANS  1.  Intermittent nausea and vomiting with associated diarrhea  2.  Unintentional weight loss, related to above  3.  History of dysphagia requiring PEG tube placement, PEG removed 6 months ago  4.  Tonsillar cancer, incomplete treatment and was on hospice until graduation  5.  Abnormal CT scan, gastritis and duodenitis    Vesta Landry is a 79 y.o. female who presents to hospital with ongoing issues with intermittent nausea and vomiting with associated diarrhea.  CT imaging reviewed and shows evidence of gastritis and duodenitis of indeterminate etiology.  Noted the patient has plans for outpatient EGD later this month.  At present, will treat medically with high-dose PPI, Carafate, and gingerroot.  If worsening symptoms or prolonged symptoms, low threshold for inpatient EGD.    >>> IV pantoprazole 40 mg twice daily  >>> Carafate 1 g slurry p.o. 4 times daily and at bedtime  >>> Gingerroot 500 mg p.o. 3 times daily  >>> As needed antiemetics  >>> Trial clear liquid diet; may advance as tolerated  >>> As noted above, if persistent symptoms despite pharmacotherapy, will consider inpatient EGD. At present, will plan for patient to f/u with her primary GI for outpatient scope later this month.     I discussed the patient's findings and my recommendations with patient, family, and consulting provider    LADARIUS Li  06/10/24  13:54 EDT

## 2024-06-10 NOTE — PLAN OF CARE
Goal Outcome Evaluation:  Plan of Care Reviewed With: patient, family           Outcome Evaluation: PT eval completed. Patient presenting with deficits in general BLE strength, standing balance, and functional endurance effecting functional mobility below baseline. Patient will benefit from skilled IP PT services to address impairments for return to PLOF. Recommend home with assist and HHPT at dc.      Anticipated Discharge Disposition (PT): home with assist, home with home health

## 2024-06-11 LAB
ANION GAP SERPL CALCULATED.3IONS-SCNC: 12 MMOL/L (ref 5–15)
BUN SERPL-MCNC: 16 MG/DL (ref 8–23)
BUN/CREAT SERPL: 17.8 (ref 7–25)
CALCIUM SPEC-SCNC: 8.7 MG/DL (ref 8.6–10.5)
CHLORIDE SERPL-SCNC: 111 MMOL/L (ref 98–107)
CO2 SERPL-SCNC: 20 MMOL/L (ref 22–29)
CREAT SERPL-MCNC: 0.9 MG/DL (ref 0.57–1)
DEPRECATED RDW RBC AUTO: 51.8 FL (ref 37–54)
EGFRCR SERPLBLD CKD-EPI 2021: 64.4 ML/MIN/1.73
ERYTHROCYTE [DISTWIDTH] IN BLOOD BY AUTOMATED COUNT: 15.3 % (ref 12.3–15.4)
GLUCOSE SERPL-MCNC: 91 MG/DL (ref 65–99)
HCT VFR BLD AUTO: 43.3 % (ref 34–46.6)
HGB BLD-MCNC: 13.9 G/DL (ref 12–15.9)
MCH RBC QN AUTO: 29.8 PG (ref 26.6–33)
MCHC RBC AUTO-ENTMCNC: 32.1 G/DL (ref 31.5–35.7)
MCV RBC AUTO: 92.7 FL (ref 79–97)
PLATELET # BLD AUTO: 169 10*3/MM3 (ref 140–450)
PMV BLD AUTO: 11.2 FL (ref 6–12)
POTASSIUM SERPL-SCNC: 4.2 MMOL/L (ref 3.5–5.2)
RBC # BLD AUTO: 4.67 10*6/MM3 (ref 3.77–5.28)
SODIUM SERPL-SCNC: 143 MMOL/L (ref 136–145)
WBC NRBC COR # BLD AUTO: 8.24 10*3/MM3 (ref 3.4–10.8)

## 2024-06-11 PROCEDURE — 99232 SBSQ HOSP IP/OBS MODERATE 35: CPT | Performed by: NURSE PRACTITIONER

## 2024-06-11 PROCEDURE — 85027 COMPLETE CBC AUTOMATED: CPT | Performed by: INTERNAL MEDICINE

## 2024-06-11 PROCEDURE — 99232 SBSQ HOSP IP/OBS MODERATE 35: CPT | Performed by: INTERNAL MEDICINE

## 2024-06-11 PROCEDURE — 80048 BASIC METABOLIC PNL TOTAL CA: CPT | Performed by: INTERNAL MEDICINE

## 2024-06-11 PROCEDURE — 25010000002 SODIUM CHLORIDE 0.9 % WITH KCL 20 MEQ 20-0.9 MEQ/L-% SOLUTION: Performed by: INTERNAL MEDICINE

## 2024-06-11 RX ORDER — HYDROXYZINE HYDROCHLORIDE 25 MG/1
25 TABLET, FILM COATED ORAL 3 TIMES DAILY PRN
Status: DISCONTINUED | OUTPATIENT
Start: 2024-06-11 | End: 2024-06-16

## 2024-06-11 RX ORDER — ASCORBIC ACID 1000 MG
500 TABLET ORAL
Status: DISCONTINUED | OUTPATIENT
Start: 2024-06-11 | End: 2024-06-11

## 2024-06-11 RX ORDER — GINGER ROOT
1500 POWDER (GRAM) MISCELLANEOUS DAILY
Status: DISCONTINUED | OUTPATIENT
Start: 2024-06-11 | End: 2024-06-16 | Stop reason: HOSPADM

## 2024-06-11 RX ORDER — HYDROCHLOROTHIAZIDE 25 MG/1
12.5 TABLET ORAL DAILY
Status: DISCONTINUED | OUTPATIENT
Start: 2024-06-11 | End: 2024-06-16

## 2024-06-11 RX ADMIN — Medication 10 ML: at 11:29

## 2024-06-11 RX ADMIN — Medication 10 ML: at 21:51

## 2024-06-11 RX ADMIN — SUCRALFATE 1 G: 1 TABLET ORAL at 11:29

## 2024-06-11 RX ADMIN — Medication 1 TABLET: at 08:22

## 2024-06-11 RX ADMIN — PANTOPRAZOLE SODIUM 40 MG: 40 INJECTION, POWDER, FOR SOLUTION INTRAVENOUS at 08:33

## 2024-06-11 RX ADMIN — Medication 1500 MG: at 18:15

## 2024-06-11 RX ADMIN — SUCRALFATE 1 G: 1 TABLET ORAL at 08:22

## 2024-06-11 RX ADMIN — SUCRALFATE 1 G: 1 TABLET ORAL at 21:16

## 2024-06-11 RX ADMIN — POTASSIUM CHLORIDE AND SODIUM CHLORIDE 50 ML/HR: 900; 150 INJECTION, SOLUTION INTRAVENOUS at 03:22

## 2024-06-11 RX ADMIN — SUCRALFATE 1 G: 1 TABLET ORAL at 17:16

## 2024-06-11 RX ADMIN — NEBIVOLOL 5 MG: 5 TABLET ORAL at 08:22

## 2024-06-11 RX ADMIN — HYDRALAZINE HYDROCHLORIDE 100 MG: 50 TABLET ORAL at 14:10

## 2024-06-11 RX ADMIN — MIRTAZAPINE 30 MG: 15 TABLET, FILM COATED ORAL at 21:17

## 2024-06-11 RX ADMIN — PANTOPRAZOLE SODIUM 40 MG: 40 INJECTION, POWDER, FOR SOLUTION INTRAVENOUS at 17:17

## 2024-06-11 RX ADMIN — SENNOSIDES AND DOCUSATE SODIUM 2 TABLET: 8.6; 5 TABLET ORAL at 21:16

## 2024-06-11 RX ADMIN — HYDRALAZINE HYDROCHLORIDE 100 MG: 50 TABLET ORAL at 06:06

## 2024-06-11 RX ADMIN — HYDRALAZINE HYDROCHLORIDE 100 MG: 50 TABLET ORAL at 21:17

## 2024-06-11 RX ADMIN — HYDROCHLOROTHIAZIDE 12.5 MG: 25 TABLET ORAL at 09:42

## 2024-06-11 RX ADMIN — HYDROXYZINE HYDROCHLORIDE 25 MG: 25 TABLET ORAL at 17:16

## 2024-06-11 NOTE — PROGRESS NOTES
Clinical Nutrition     Patient Name: Vesta Landry  YOB: 1945  MRN: [id number]  Date of Encounter: 06/11/24 18:41 EDT  Admission date: 6/9/2024  Reason for Visit: Identified at risk by screening criteria    Assessment   Nutrition Assessment   Admission Diagnosis:  Intractable nausea and vomiting [R11.2]    Problem List:    Intractable nausea and vomiting    Gastroesophageal reflux disease without esophagitis    HTN (hypertension), benign    Chronic diastolic heart failure    Squamous cell carcinoma of left tonsil    HLD (hyperlipidemia)    History of Clostridium difficile colitis    Elevated serum creatinine    Gastritis and duodenitis      PMH:   She  has a past medical history of Arthritis, Cancer, Cancer of tonsil, CHF (congestive heart failure), Deep vein thrombosis, Dementia, Dysphagia, GERD (gastroesophageal reflux disease), Hyperlipidemia, Hypertension, Impaired mobility, PONV (postoperative nausea and vomiting), SOB (shortness of breath), Vitamin D deficiency, and Wears dentures.    PSH:  She  has a past surgical history that includes Colonoscopy; Appendectomy; Cataract extraction; Cholecystectomy; Tubal ligation; Hysterectomy (1991); Oophorectomy (Bilateral, 1991); Portacath placement (Right, 02/08/2023); and Esophagogastroduodenoscopy w/ PEG (N/A, 2/20/2023).    Applicable Nutrition History:   s/p PEG removal 6 months ago    Labs    Labs Reviewed: Yes    Results from last 7 days   Lab Units 06/11/24  0834 06/10/24  0500 06/09/24  1310 06/09/24  1240   GLUCOSE mg/dL 91 79  --  105*   BUN mg/dL 16 19  --  23   CREATININE mg/dL 0.90 0.94  --  1.21*   SODIUM mmol/L 143 144  --  147*   CHLORIDE mmol/L 111* 110*  --  110*   POTASSIUM mmol/L 4.2 3.8  --  3.2*   ALT (SGPT) U/L  --   --   --  16   LACTATE mmol/L  --   --  0.9  --        Results from last 7 days   Lab Units 06/09/24  1240   ALBUMIN g/dL 3.8           Lab Results   Lab Value Date/Time    HGBA1C 4.90 09/26/2023  "0619    HGBA1C 5.30 01/20/2023 0643               Medications    Medications Reviewed: yes  Remeron, MVI, protonix, gingeroot, bowel regimen, carafate  + [id number] @50ml/hr    Intake/Ouptut 24 hrs (0701 - 0700)   I&O's Reviewed: yes    Intake & Output (last day)         06/10 0701 06/11 0700 06/11 0701 06/12 0700    IV Piggyback      Total Intake(mL/kg)      Net                   Anthropometrics     Height: Height: 152.4 cm (60\")  Last Filed Weight: Weight: 56.2 kg (124 lb) (06/09/24 1228)  Method: Weight Method: Stated  BMI: BMI (Calculated): 24.2    UBW: 153lb per Oncology RD note 1-31-23    Weight change:  29lb wt loss over past 18 months, 19% wt loss     Weight       Weight (kg) Weight (lbs) Weight Method Visit Report   2/23/2023 66.8 kg  147 lb 4.3 oz  Bed scale     2/24/2023 66.8 kg  147 lb 4.3 oz  Bed scale      66.8 kg  147 lb 4.3 oz      2/25/2023 66.6 kg  146 lb 13.2 oz  Bed scale     2/26/2023 67.7 kg  149 lb 4 oz  Bed scale     2/27/2023 66.6 kg  146 lb 13.2 oz  Bed scale     3/1/2023 66.9 kg  147 lb 7.8 oz  Bed scale     3/2/2023 65.772 kg  145 lb  Stated      68.811 kg  151 lb 11.2 oz  Bed scale     3/3/2023 68.402 kg  150 lb 12.8 oz  Bed scale     3/4/2023 71.351 kg  157 lb 4.8 oz  Bed scale     3/7/2023 75.297 kg  166 lb  Bed scale     3/10/2023 64.864 kg  143 lb  Stated     9/7/2023 61.598 kg  135 lb 12.8 oz   --    9/25/2023 61.236 kg  135 lb  Stated     9/26/2023 62.596 kg  138 lb  Bed scale      62.6 kg  138 lb 0.1 oz      9/27/2023 61.145 kg  134 lb 12.8 oz  Standing scale     9/28/2023 59.603 kg  131 lb 6.4 oz  Standing scale     10/6/2023 62.052 kg  136 lb 12.8 oz   --    10/19/2023 64.229 kg  141 lb 9.6 oz   --    1/8/2024 63.957 kg  141 lb   --    1/26/2024 61.598 kg  135 lb 12.8 oz   --    2/22/2024 59.875 kg  132 lb  Stated     2/29/2024 56.382 kg  124 lb 4.8 oz   --    5/21/2024 56.881 kg  125 lb 6.4 oz   --     56.881 kg  125 lb 6.4 oz   --    5/22/2024 56.7 kg  125 lb      5/28/2024 " 56.427 kg  124 lb 6.4 oz   --    6/9/2024 56.246 kg  124 lb  Stated           Nutrition Focused Physical Exam     Date:     Unable to perform due to Pt unable to participate at time of visit     Pt has multiple visitors at present, defer NFPE    Subjective   Reported/Observed/Food/Nutrition Related History:     Pt resting in bed, reports poor appetite, has nausea if she eats anything, has had nausea, vomiting since PEG removed 6 months ago, is swallowing ok, has tried oral supplements at home, but dislikes most of them    Per EMR: noted that pt has difficulty tolerating TF, with multiple formula changes last year, did eventually tolerate Isosource HN via pump on last admission    Family reports pt did not like having feeding tube, and it was leaking a lot, so she had it removed, feels pt has had significant wt loss over past 4 months, had lost down to 120lb's, then regained 4lb's, unsure what current wt is    Current Nutrition Prescription     PO: Diet: Liquid; Clear Liquid; Fluid Consistency: Thin (IDDSI 0)  NPO Diet NPO Type: Strict NPO  Oral Nutrition Supplement:  Intake: Insufficient data    Assessment & Plan   Nutrition Diagnosis   Date: 6-11-24 Updated:   Problem Inadequate oral intake    Etiology Suspected gastritis, duodenitis   Signs/Symptoms Poor intake, chronic n/v       Goal:   Nutrition to support treatment and Tolerate PO      Nutrition Intervention      Follow treatment progress, Care plan reviewed, Advised available snacks, Interview for preferences, Encourage intake, Supplement provided    Add Ensure Clear 3x/day    RD to f/u for NFPE when appropriate    Monitoring/Evaluation:   Per protocol, I&O, PO intake, Supplement intake, Pertinent labs, Weight, Skin status, GI status, Symptoms, Swallow function    Carmenza Motley RD  Time Spent: 45min

## 2024-06-11 NOTE — PLAN OF CARE
Goal Outcome Evaluation:  Plan of Care Reviewed With: patient, family        Progress: no change  Outcome Evaluation: Vital signs wnl except blood pressure systolic 160-180's. Medication for blood pressure given as needed within parameters ordered. No complaints of pain or shortness of breath. Nausea intermittently. Family at bedside. Poor appetite. Bed alarm on. Call bell within reach.

## 2024-06-11 NOTE — PROGRESS NOTES
GI Daily Progress Note  Chief Complaint:  nausea, vomiting, diarrhea    Subjective: Vesta Landry is a 79 y.o. female who is admitted with nausea and vomiting.  GI consult received yesterday for intractable nausea, vomiting with imaging concerning for gastritis and duodenitis.  There is also documentation of diarrhea, history of dysphagia requiring PEG tube placement, history of tonsillar cancer with incomplete treatment and history of hospice care.  Review of documentation by Mr. Craft yesterday revealed patient has experienced nausea with vomiting and decreased oral intake for 6 months, denied dysphagia, odonyophagia, reported reproduction of food with movement after eating, 30 pound weight loss over the last few months, history of PEG until removal 5 months ago, recently establish care with GI provider in [place] who increased proton pump inhibitor.    Patient had office visit with Dr. Allred gastroenterology [facility]. Ondansetron prescribed, stool culture and C. diff tests ordered, omeprazole BID ordered. EGD discussed but did not proceed with EGD at the time and it was documented by Dr. Allred that if pateint develops dysphagia, EGD and possible dilation would be considered. Other options to consider are reevaluation by ENT vs repeat CT scan of neck which was completed 2/2024 and showed laryngitis with suggestion of ENT evaluation which patient has not completed.  Previously ordered stool test were not done. GI stool panel ordered during hospital admission not done.     I believe EGD is scheduled with [facility] on 7/31/2024 in [place].     2/20/2023 Placement of an externally removable PEG with 3 T-fasteners wassuccessfully completed by Dr. Guerra.  Due to PEG leaking and graduating from hospice with some oral intake, the tube was removed 1/8/2024 by Dr. Guerra.  Patient reports she does not feel as though she could have a feeding tube long-term because it may get in the  kimani.    Prior FEES 3/2024 revealed mild pharyngeal dysphagia, suspected esophageal dysphagia. Regular liquids with thin liquids recommended, consider GI evaluation.     I saw the patient with Dr. Martin.  Patient's 2 daughters and son at bedside during follow-up evaluation.  Family is concerned about patient's intake.  Patient reports she does not drink much liquid nor eat many solids (mentions eating small amount of chicken and French fries) as she does not like the taste of solids nor liquids.  Her family expresses concern about prior radiation treatment contributing to decreased taste or bad taste as well as cognitive impairment (family seems concerned about possible cognitive decline also contributing). Her son reports following radiation she had blisters on her tongue and intake decreased at the time.     She has been taking Remeron for 1 year I believe without improvement in intake, appetite.    She also has nausea possibly contributing to vomiting.  It seems as though nausea with vomiting became worse after removal of PEG.    She has experienced weight loss due to decreased intake.  She has decreased energy.    She was partially treated for tonsillar cancer at [facility] in [place], oncologist was  Dr. Lora.  It does not seem as though she had follow-up with Dr. Lora since February 2023. Prior evaluation by palliative care but not currently established with palliative care, it has been greater than 1 year since last appointment with palliative care.     Chemoradiation incomplete due to heart attack and possibly other coexisting conditions.  Family reports she is unable to tolerate treatment of cancer.    GI stool panel ordered but not completed    She had approximately 5 bowel movements yesterday and 1 bowel movement today.     She is receiving carafate and pantoprazole but she has not started ginger root as recommended/documented by Mr. Craft. Her family has not purchased ginger root.  "    She had small amount of jello and possibly some liquids 30 minutes prior to witnessed vomiting in emesis bag of green fluid.   Objective:    /83   Pulse 80   Temp 98.4 °F (36.9 °C) (Oral)   Resp 16   Ht 152.4 cm (60\")   Wt 56.2 kg (124 lb)   SpO2 97%   BMI 24.22 kg/m²     Physical Exam  Constitutional:       Appearance: She is ill-appearing.   HENT:      Head: Normocephalic and atraumatic. No contusion.      Right Ear: External ear normal.      Left Ear: External ear normal.   Eyes:      General: Lids are normal. No scleral icterus.        Right eye: No discharge.         Left eye: No discharge.      Extraocular Movements: Extraocular movements intact.   Neck:      Trachea: Trachea normal.      Comments: No visible mass  No visible adenopathy  Cardiovascular:      Rate and Rhythm: Normal rate.   Pulmonary:      Effort: No respiratory distress.      Comments: Symmetrical expansion    Abdominal:      Palpations: Abdomen is soft. There is no mass.      Comments: Approximate less than 1 cm area in left mid abdomen/prior PEG site that is pink in color without drainage   Musculoskeletal:      Comments: Symmetrical movement of upper extremities  Symmetrical movement of lower extremities  No visible deformities   Skin:     General: Skin is warm and dry.      Coloration: Skin is not jaundiced.      Comments: Bandages and contusions noted to upper extremities with patient and family report of new puppy contributing to wounds and contusions   Neurological:      General: No focal deficit present.      Mental Status: She is alert.   Psychiatric:         Mood and Affect: Affect is flat.         Behavior: Behavior normal.     Lab  Lab Results   Component Value Date    WBC 8.24 06/11/2024    HGB 13.9 06/11/2024    HGB 11.3 (L) 06/10/2024    HGB 12.9 06/09/2024    MCV 92.7 06/11/2024     06/11/2024    INR 1.14 (H) 02/26/2023    INR 1.26 (H) 01/20/2023       Lab Results   Component Value Date    GLUCOSE 91 " 06/11/2024    BUN 16 06/11/2024    CREATININE 0.90 06/11/2024    EGFRIFNONA 39 (L) 01/13/2022    EGFRIFAFRI 47 (L) 01/13/2022    BCR 17.8 06/11/2024     06/11/2024    K 4.2 06/11/2024    CO2 20.0 (L) 06/11/2024    CALCIUM 8.7 06/11/2024    PROTENTOTREF 7.3 09/07/2023    ALBUMIN 3.8 06/09/2024    ALKPHOS 104 06/09/2024    BILITOT 0.4 06/09/2024    ALT 16 06/09/2024    AST 18 06/09/2024       Assessment/plan:    1. Abnormal abdominal ct scan that revealed evidence of gastritis and duodenitis of indeterminate etiology  2. Nausea with vomiting  3. Decreased appetite  -Pantoprazole 40 mg twice daily (discussed possible differentials of gastritis, esophagitis, ulcer, erosion which could improve with BID PPI as well as Carafate)  -Carafate 1 g slurry 4 times daily and at bedtime  -Gingerroot 550 mg 3 times daily (for family to purchase) with intake  -Antiemetics as needed  -Try clear liquid diet and may advance as tolerated, at this time continue liquid only diet as she had vomiting today  - NPO after midnight for possible EGD with or without PEG (long conversation with Dr. Martin, myself, patient and children regarding goals of treatment/care, there seems to be a disconnect between children's goals and patient's goals as her children would like for her to feel the best she can feel for as long as possible and consider PEG, she would like to go home and possibly watch TV and attempt intake as she can tolerate but her family expressed concern about possible increased risk of death with that option, plan for reevaluation with palliative care in attempt to develop plan of care with patient and family, risks of EGD with PEG placement discussed including risks associated with anesthesia, bleeding, infection, injury, perforation as well as PEG does not typically change mortality)  - as of now, plan for outpatient EGD with Dr. Allred as previously scheduled   - it does not seem as though she wants to undergo evaluation by  oncology nor ENT provider (prior CT imaging with laryngitis, ENT evaluation recommended)  2. Pharyngeal dysphagia, possible esophageal dysphagia.   - consider EGD but await evaluation by palliative care. NPO after midnight for consideration of EGD with or without PEG  3. History of diarrhea  - collect GI stool panel as previously ordered if she has loose stool   - monitor bowel movements      Norma Martinez, LADARIUS  06/11/24  14:54 EDT    ADDENDUM SONAL Martinez APRN: I received a message from patient's nurse that patient wants to proceed with EGD with PEG placement tomorrow. I notified Dr. Martin and placed a case request for the procedure.

## 2024-06-11 NOTE — PROGRESS NOTES
[facility] Medicine Services  PROGRESS NOTE    Patient Name: Vesta Landry  : 1945  MRN: [id number]    Date of Admission: 2024  Primary Care Physician: Bossman Pedraza MD    Subjective   Subjective     CC: F/u N/V/D    HPI: Patient slept well, however this morning developed more nausea and vomiting      Objective   Objective     Vital Signs:   Temp:  [97.9 °F (36.6 °C)-98.1 °F (36.7 °C)] 97.9 °F (36.6 °C)  Heart Rate:  [65-79] 75  Resp:  [16-18] 18  BP: (134-198)/(66-95) 189/95     Physical Exam:  Constitutional: Chronically ill-appearing elderly female, in no acute distress, awake, alert  HENT: NCAT, mucous membranes moist  Respiratory: Clear to auscultation bilaterally, respiratory effort normal   Cardiovascular: RRR, no murmurs, rubs, or gallops  Gastrointestinal: Positive bowel sounds, soft, nontender, nondistended  Musculoskeletal: No bilateral ankle edema  Psychiatric: Appropriate affect, cooperative  Neurologic: Oriented x 3, nonfocal  Skin: No rashes      Results Reviewed:  LAB RESULTS:      Lab 06/10/24  0500 24  1310 24  1240   WBC 6.14  --  7.97   HEMOGLOBIN 11.3*  --  12.9   HEMATOCRIT 34.7  --  40.7   PLATELETS 152  --  181   NEUTROS ABS  --   --  6.55   IMMATURE GRANS (ABS)  --   --  0.02   LYMPHS ABS  --   --  0.75   MONOS ABS  --   --  0.59   EOS ABS  --   --  0.03   MCV 93.0  --  95.1   LACTATE  --  0.9  --          Lab 06/10/24  0500 24  1240   SODIUM 144 147*   POTASSIUM 3.8 3.2*   CHLORIDE 110* 110*   CO2 25.0 25.0   ANION GAP 9.0 12.0   BUN 19 23   CREATININE 0.94 1.21*   EGFR 61.1 45.1*   GLUCOSE 79 105*   CALCIUM 8.2* 8.6         Lab 24  1240   TOTAL PROTEIN 6.1   ALBUMIN 3.8   GLOBULIN 2.3   ALT (SGPT) 16   AST (SGOT) 18   BILIRUBIN 0.4   ALK PHOS 104   LIPASE 31                     Brief Urine Lab Results  (Last result in the past 365 days)        Color   Clarity   Blood   Leuk Est   Nitrite   Protein   CREAT   Urine HCG         06/09/24 1326 Yellow   Clear   Negative   Negative   Negative   Negative                   Microbiology Results Abnormal       None            CT Abdomen Pelvis With Contrast    Result Date: 6/9/2024  CT ABDOMEN PELVIS W CONTRAST Date of Exam: 6/9/2024 3:02 PM EDT Indication: Vomiting green emesis, weight loss over the past month. Comparison: CT of the abdomen and pelvis performed on 3/2/2023. Technique: Axial CT images were obtained of the abdomen and pelvis following the uneventful intravenous administration of 80 cc of Isovue-300 contrast. Reconstructed coronal and sagittal images were also obtained. Automated exposure control and iterative  construction methods were used. Findings:  The patient's gastrostomy tube has been removed. There is suspicion of thickening of the wall the gastric antrum and pyloric region of the stomach. There also may be thickening of the wall of the descending limb of the duodenum. This raises question of gastritis/duodenitis. There is increased stool throughout the colon and rectum. The appendix is either small in size or surgically absent. The gallbladder is surgically absent. There is unchanged prominence of the biliary tree. The adrenal glands, pancreas, and kidneys are normal. The spleen is borderline enlarged. The uterus is surgically absent. The urinary bladder is slightly distended. There are atherosclerotic vascular calcifications in the abdomen and pelvis. There are no enlarged lymph nodes or abnormal fluid collections. There are bandlike linear densities in the lower lobes felt to represent a combination of scarring and subsegmental atelectasis. There are no layering pleural effusions. There are no suspicious osteolytic or sclerotic  lesions within the bony structures. There are underlying degenerative changes.       Impression: Impression: 1. The patient's gastrostomy tube is been removed. 2. Suspicion of thickening of the wall the gastric antrum and pyloric region of  the stomach as well as the descending limb of the duodenum. This raises question of gastritis/duodenitis. 3. Increased stool burden in the colon and rectum. 4. The appendix is either small in size or surgically absent. 5. Borderline splenomegaly. 6. Additional incidental findings as noted above. Electronically Signed: David Parkinson MD  6/9/2024 4:17 PM EDT  Workstation ID: [id number]     Results for orders placed during the hospital encounter of 09/25/23    Adult Transthoracic Echo Complete W/ Cont if Necessary Per Protocol    Interpretation Summary    Left ventricular systolic function is normal. Calculated left ventricular EF = 58.9%    Left ventricular wall thickness is consistent with mild concentric hypertrophy.    Left ventricular diastolic function was indeterminate.    There is mild calcification of the aortic valve.    Estimated right ventricular systolic pressure from tricuspid regurgitation is mildly elevated (35-45 mmHg).    Mild aortic valve regurgitation.      Current medications:  Scheduled Meds:hydrALAZINE, 100 mg, Oral, Q8H  mirtazapine, 30 mg, Oral, Nightly  multivitamin with minerals, 1 tablet, Oral, Daily  nebivolol, 5 mg, Oral, Daily  pantoprazole, 40 mg, Intravenous, BID AC  senna-docusate sodium, 2 tablet, Oral, BID  sodium chloride, 10 mL, Intravenous, Q12H  sucralfate, 1 g, Oral, 4x Daily AC & at Bedtime      Continuous Infusions:sodium chloride 0.9 % with KCl 20 mEq, 50 mL/hr, Last Rate: 50 mL/hr (06/11/24 0322)      PRN Meds:.  acetaminophen **OR** acetaminophen **OR** acetaminophen    senna-docusate sodium **AND** polyethylene glycol **AND** bisacodyl **AND** bisacodyl    hydrALAZINE    labetalol    nitroglycerin    ondansetron ODT **OR** ondansetron    sodium chloride    sodium chloride    Assessment & Plan   Assessment & Plan     Active Hospital Problems    Diagnosis  POA    **Intractable nausea and vomiting [R11.2]  Yes    Gastritis and duodenitis [K29.90]  Unknown    Elevated serum  creatinine [R79.89]  Yes    History of Clostridium difficile colitis [Z86.19]  Not Applicable    HLD (hyperlipidemia) [E78.5]  Yes    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      Brief Hospital Course to date:  Vesta Landry is a 79 y.o. female with hx of tonsil cancer (did not complete treatment), CHF, GERD, HTN, HLD. Pt with Hx PEG tube placement due to difficulty with dysphagia/oral intake during treatment for tonsil cancer. Per family, this was removed about 6 months ago and since then, patient has had issues with intermittent nausea/vomiting      Intermittent Nausea/Vomiting and diarrhea  Weight Loss   Hx PEG tube s/p removal  -Per patient, symptoms started after PEG removal about 6 months ago. Pt had PEG tube for nutrition while undergoing Tx for tonsil cancer.   -Established care with Dr Serrato, GI in [place], with plans for outpatient EGD but symptoms worsened so patient came to ED   -CT A/P with concern for gastritis/duodenitis  -Continue IV PPI twice daily, Reglan, antiemetics and IV fluids  -GI consulted, recommends to continue supportive therapy, Carafate, PPI, bryan root, if symptoms persist, then they will plan for EGD, otherwise patient to follow-up with primary GI as outpatient    Mild Hypernatremia  Dehydration  Hypokalemia  Mild elevation of serum Cr  -This is likely secondary to above   -continue to monitor and replete electrolytes per protocol  -Continue IV fluids     Hx tonsil cancer  -pt did not complete chemo/radiation. Was on hospice but then graduated.   -Not interested in further oncologic work-up.      Hypertension  -BP remains elevated, continue p.o. hydralazine, will add HCTZ 12.5 mg daily  -prn IV hydralazine is in place    Expected Discharge  Location and Transportation: Home with assist  Expected Discharge   Expected Discharge Date: 6/10/2024; Expected Discharge Time:      VTE Prophylaxis:  Mechanical VTE prophylaxis orders are present.         AM-PAC 6 Clicks Score (PT): 20 (06/10/24 2000)    CODE STATUS:   Code Status and Medical Interventions:   Ordered at: 06/09/24 1000     Level Of Support Discussed With:    Patient     Code Status (Patient has no pulse and is not breathing):    CPR (Attempt to Resuscitate)     Medical Interventions (Patient has pulse or is breathing):    Full Support       Mia Schneider MD  06/11/24

## 2024-06-12 ENCOUNTER — ANESTHESIA EVENT (OUTPATIENT)
Dept: GASTROENTEROLOGY | Facility: HOSPITAL | Age: 79
End: 2024-06-12
Payer: MEDICARE

## 2024-06-12 PROCEDURE — 99232 SBSQ HOSP IP/OBS MODERATE 35: CPT | Performed by: INTERNAL MEDICINE

## 2024-06-12 PROCEDURE — 25010000002 PROCHLORPERAZINE 10 MG/2ML SOLUTION

## 2024-06-12 PROCEDURE — 25010000002 SODIUM CHLORIDE 0.9 % WITH KCL 20 MEQ 20-0.9 MEQ/L-% SOLUTION: Performed by: INTERNAL MEDICINE

## 2024-06-12 PROCEDURE — 25010000002 ONDANSETRON PER 1 MG: Performed by: FAMILY MEDICINE

## 2024-06-12 RX ORDER — FAMOTIDINE 20 MG/1
20 TABLET, FILM COATED ORAL ONCE
Status: CANCELLED | OUTPATIENT
Start: 2024-06-12 | End: 2024-06-12

## 2024-06-12 RX ORDER — MIDAZOLAM HYDROCHLORIDE 1 MG/ML
0.5 INJECTION INTRAMUSCULAR; INTRAVENOUS
Status: CANCELLED | OUTPATIENT
Start: 2024-06-12

## 2024-06-12 RX ORDER — SODIUM CHLORIDE 0.9 % (FLUSH) 0.9 %
10 SYRINGE (ML) INJECTION EVERY 12 HOURS SCHEDULED
Status: CANCELLED | OUTPATIENT
Start: 2024-06-12

## 2024-06-12 RX ORDER — LIDOCAINE HYDROCHLORIDE 10 MG/ML
0.5 INJECTION, SOLUTION EPIDURAL; INFILTRATION; INTRACAUDAL; PERINEURAL ONCE AS NEEDED
Status: CANCELLED | OUTPATIENT
Start: 2024-06-12

## 2024-06-12 RX ORDER — FAMOTIDINE 10 MG/ML
20 INJECTION, SOLUTION INTRAVENOUS ONCE
Status: CANCELLED | OUTPATIENT
Start: 2024-06-12 | End: 2024-06-12

## 2024-06-12 RX ORDER — PROCHLORPERAZINE EDISYLATE 5 MG/ML
5 INJECTION INTRAMUSCULAR; INTRAVENOUS EVERY 6 HOURS PRN
Status: DISCONTINUED | OUTPATIENT
Start: 2024-06-12 | End: 2024-06-16

## 2024-06-12 RX ORDER — SODIUM CHLORIDE, SODIUM LACTATE, POTASSIUM CHLORIDE, CALCIUM CHLORIDE 600; 310; 30; 20 MG/100ML; MG/100ML; MG/100ML; MG/100ML
9 INJECTION, SOLUTION INTRAVENOUS CONTINUOUS
Status: CANCELLED | OUTPATIENT
Start: 2024-06-12

## 2024-06-12 RX ORDER — SODIUM CHLORIDE 0.9 % (FLUSH) 0.9 %
10 SYRINGE (ML) INJECTION AS NEEDED
Status: CANCELLED | OUTPATIENT
Start: 2024-06-12

## 2024-06-12 RX ORDER — SODIUM CHLORIDE 9 MG/ML
40 INJECTION, SOLUTION INTRAVENOUS AS NEEDED
Status: CANCELLED | OUTPATIENT
Start: 2024-06-12

## 2024-06-12 RX ADMIN — MIRTAZAPINE 30 MG: 15 TABLET, FILM COATED ORAL at 21:24

## 2024-06-12 RX ADMIN — HYDRALAZINE HYDROCHLORIDE 100 MG: 50 TABLET ORAL at 05:39

## 2024-06-12 RX ADMIN — Medication 1500 MG: at 08:41

## 2024-06-12 RX ADMIN — Medication 1 TABLET: at 08:42

## 2024-06-12 RX ADMIN — HYDRALAZINE HYDROCHLORIDE 100 MG: 50 TABLET ORAL at 13:03

## 2024-06-12 RX ADMIN — PROCHLORPERAZINE EDISYLATE 5 MG: 5 INJECTION INTRAMUSCULAR; INTRAVENOUS at 13:34

## 2024-06-12 RX ADMIN — PANTOPRAZOLE SODIUM 40 MG: 40 INJECTION, POWDER, FOR SOLUTION INTRAVENOUS at 17:26

## 2024-06-12 RX ADMIN — SUCRALFATE 1 G: 1 TABLET ORAL at 17:27

## 2024-06-12 RX ADMIN — HYDROXYZINE HYDROCHLORIDE 25 MG: 25 TABLET ORAL at 17:25

## 2024-06-12 RX ADMIN — NEBIVOLOL 5 MG: 5 TABLET ORAL at 08:41

## 2024-06-12 RX ADMIN — ONDANSETRON 4 MG: 2 INJECTION INTRAMUSCULAR; INTRAVENOUS at 17:26

## 2024-06-12 RX ADMIN — SUCRALFATE 1 G: 1 TABLET ORAL at 10:53

## 2024-06-12 RX ADMIN — Medication 10 ML: at 08:42

## 2024-06-12 RX ADMIN — ONDANSETRON 4 MG: 2 INJECTION INTRAMUSCULAR; INTRAVENOUS at 10:53

## 2024-06-12 RX ADMIN — HYDROCHLOROTHIAZIDE 12.5 MG: 25 TABLET ORAL at 08:41

## 2024-06-12 RX ADMIN — SUCRALFATE 1 G: 1 TABLET ORAL at 08:41

## 2024-06-12 RX ADMIN — HYDRALAZINE HYDROCHLORIDE 100 MG: 50 TABLET ORAL at 21:23

## 2024-06-12 RX ADMIN — SENNOSIDES AND DOCUSATE SODIUM 2 TABLET: 8.6; 5 TABLET ORAL at 21:23

## 2024-06-12 RX ADMIN — POTASSIUM CHLORIDE AND SODIUM CHLORIDE 50 ML/HR: 900; 150 INJECTION, SOLUTION INTRAVENOUS at 02:29

## 2024-06-12 RX ADMIN — SUCRALFATE 1 G: 1 TABLET ORAL at 21:23

## 2024-06-12 RX ADMIN — PANTOPRAZOLE SODIUM 40 MG: 40 INJECTION, POWDER, FOR SOLUTION INTRAVENOUS at 08:43

## 2024-06-12 RX ADMIN — POTASSIUM CHLORIDE AND SODIUM CHLORIDE 50 ML/HR: 900; 150 INJECTION, SOLUTION INTRAVENOUS at 22:26

## 2024-06-12 NOTE — PROGRESS NOTES
[facility] Medicine Services  PROGRESS NOTE    Patient Name: Vesta Landry  : 1945  MRN: [id number]    Date of Admission: 2024  Primary Care Physician: Bossman Pedraza MD    Subjective   Subjective     CC: Follow-up N/V/D    HPI: Patient reportedly had a good night, though had an episode of vomiting this morning      Objective   Objective     Vital Signs:   Temp:  [98 °F (36.7 °C)-98.5 °F (36.9 °C)] 98.2 °F (36.8 °C)  Heart Rate:  [] 81  Resp:  [16-17] 16  BP: (133-202)/() 133/71  Flow (L/min):  [2-3] 3     Physical Exam:  Constitutional: Chronically ill-appearing elderly female, seated in chair  HENT: NCAT, mucous membranes moist  Respiratory: Clear to auscultation bilaterally, respiratory effort normal   Cardiovascular: RRR, no murmurs, rubs, or gallops  Gastrointestinal: Positive bowel sounds, soft, nontender, nondistended  Musculoskeletal: No bilateral ankle edema  Psychiatric: Appropriate affect, cooperative  Neurologic: Nonfocal   Skin: No rashes      Results Reviewed:  LAB RESULTS:      Lab 24  0834 06/10/24  0500 24  1310 24  1240   WBC 8.24 6.14  --  7.97   HEMOGLOBIN 13.9 11.3*  --  12.9   HEMATOCRIT 43.3 34.7  --  40.7   PLATELETS 169 152  --  181   NEUTROS ABS  --   --   --  6.55   IMMATURE GRANS (ABS)  --   --   --  0.02   LYMPHS ABS  --   --   --  0.75   MONOS ABS  --   --   --  0.59   EOS ABS  --   --   --  0.03   MCV 92.7 93.0  --  95.1   LACTATE  --   --  0.9  --          Lab 24  0834 06/10/24  0500 24  1240   SODIUM 143 144 147*   POTASSIUM 4.2 3.8 3.2*   CHLORIDE 111* 110* 110*   CO2 20.0* 25.0 25.0   ANION GAP 12.0 9.0 12.0   BUN 16 19 23   CREATININE 0.90 0.94 1.21*   EGFR 64.4 61.1 45.1*   GLUCOSE 91 79 105*   CALCIUM 8.7 8.2* 8.6         Lab 24  1240   TOTAL PROTEIN 6.1   ALBUMIN 3.8   GLOBULIN 2.3   ALT (SGPT) 16   AST (SGOT) 18   BILIRUBIN 0.4   ALK PHOS 104   LIPASE 31                     Brief  Urine Lab Results  (Last result in the past 365 days)        Color   Clarity   Blood   Leuk Est   Nitrite   Protein   CREAT   Urine HCG        06/09/24 1326 Yellow   Clear   Negative   Negative   Negative   Negative                   Microbiology Results Abnormal       None            No radiology results from the last 24 hrs    Results for orders placed during the hospital encounter of 09/25/23    Adult Transthoracic Echo Complete W/ Cont if Necessary Per Protocol    Interpretation Summary    Left ventricular systolic function is normal. Calculated left ventricular EF = 58.9%    Left ventricular wall thickness is consistent with mild concentric hypertrophy.    Left ventricular diastolic function was indeterminate.    There is mild calcification of the aortic valve.    Estimated right ventricular systolic pressure from tricuspid regurgitation is mildly elevated (35-45 mmHg).    Mild aortic valve regurgitation.      Current medications:  Scheduled Meds:Radha Root, 1,500 mg, Oral, Daily  hydrALAZINE, 100 mg, Oral, Q8H  hydroCHLOROthiazide Oral, 12.5 mg, Oral, Daily  mirtazapine, 30 mg, Oral, Nightly  multivitamin with minerals, 1 tablet, Oral, Daily  nebivolol, 5 mg, Oral, Daily  pantoprazole, 40 mg, Intravenous, BID AC  senna-docusate sodium, 2 tablet, Oral, BID  sodium chloride, 10 mL, Intravenous, Q12H  sucralfate, 1 g, Oral, 4x Daily AC & at Bedtime      Continuous Infusions:sodium chloride 0.9 % with KCl 20 mEq, 50 mL/hr, Last Rate: 50 mL/hr (06/12/24 0229)      PRN Meds:.  acetaminophen **OR** acetaminophen **OR** acetaminophen    senna-docusate sodium **AND** polyethylene glycol **AND** bisacodyl **AND** bisacodyl    hydrALAZINE    hydrOXYzine    labetalol    nitroglycerin    ondansetron ODT **OR** ondansetron    sodium chloride    sodium chloride    Assessment & Plan   Assessment & Plan     Active Hospital Problems    Diagnosis  POA    **Intractable nausea and vomiting [R11.2]  Yes    Gastritis and duodenitis  [K29.90]  Unknown    Elevated serum creatinine [R79.89]  Yes    History of Clostridium difficile colitis [Z86.19]  Not Applicable    HLD (hyperlipidemia) [E78.5]  Yes    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        Brief Hospital Course to date:  Vesta Landry is a 79 y.o. female with hx of tonsil cancer (did not complete treatment), CHF, GERD, HTN, HLD. Pt with Hx PEG tube placement due to difficulty with dysphagia/oral intake during treatment for tonsil cancer. Per family, this was removed about 6 months ago and since then, patient has had issues with intermittent nausea/vomiting      Intermittent Nausea/Vomiting and diarrhea  Weight Loss   Hx PEG tube s/p removal  -Per patient, symptoms started after PEG removal about 6 months ago. Pt had PEG tube for nutrition while undergoing Tx for tonsil cancer.   -Established care with Dr Serrato, GI in [place], with plans for outpatient EGD but symptoms worsened so patient came to ED   -CT A/P with concern for gastritis/duodenitis  -Continue IV PPI twice daily, Reglan, antiemetics and IV fluids  -Discussed with GI , recommends to continue supportive therapy, Carafate, PPI, bryan root, need to have have goals of care discussions, mostly in regard to need for replacement of PEG tube.  Ultimately if patient and family decides to proceed with PEG tube, then EGD will be done, currently awaiting discussions with palliative care to be done.     Mild Hypernatremia  Dehydration  Hypokalemia  Mild elevation of serum Cr  -This is likely secondary to above   -continue to monitor and replete electrolytes per protocol  -Continue IV fluids     Hx tonsil cancer  -pt did not complete chemo/radiation. Was on hospice but then graduated.   -Not  interested in further oncologic work-up.   -Palliative care consulted to readdress goals of care     Hypertension  -BP remains elevated, but is much improved continue p.o. hydralazine, added HCTZ 12.5 mg daily  -prn IV hydralazine is in place     Expected Discharge Location and Transportation: [facility]  Expected Discharge   Expected Discharge Date: 6/10/2024; Expected Discharge Time:      VTE Prophylaxis:  Mechanical VTE prophylaxis orders are present.       AM-PAC 6 Clicks Score (PT): 20 (06/11/24 2000)    CODE STATUS:   Code Status and Medical Interventions:   Ordered at: 06/09/24 7877     Level Of Support Discussed With:    Patient     Code Status (Patient has no pulse and is not breathing):    CPR (Attempt to Resuscitate)     Medical Interventions (Patient has pulse or is breathing):    Full Support       Mia Schneider MD  06/12/24

## 2024-06-12 NOTE — CONSULTS
Palliative Care Initial Consult   Attending Physician: Mia Schneider MD  Referring Provider: Dr. Jared Martin    Reason for Referral:  assistance with clarification of goals of care    Code Status:   Code Status and Medical Interventions:   Ordered at: 06/09/24 9925     Level Of Support Discussed With:    Patient     Code Status (Patient has no pulse and is not breathing):    CPR (Attempt to Resuscitate)     Medical Interventions (Patient has pulse or is breathing):    Full Support      Advanced Directives: Advance Directive Status: Patient has advance directive, copy requested   Family/Support:  Britany Davey (dtr), Karrie Landry (spouse/HCS), Meliza Back (dtr/alternate HCS)  Goals of Care: TBD.    HPI: Vesta Landry is a 79 y.o. female with PMH significant for tonsil cancer initial diagnosis 1/3/2023 s/p chemo and radiation, CHF, GERD, HTN, HLD, history of PEG during tonsil cancer treatment which she did not complete and elected hospice care but then graduated, removal of PEG 6 months ago and start of N/V. Patient presented to [facility] ED on 6/9 with nausea and vomiting, weight loss. Work up revealed dysphagia, GI consulted, plan for EGD. Palliative Care consulted for GOC in the context of complex medical decision making.   Patient does not use assistive devices at baseline, however significant weakness and weight loss, 10lbs in the last 4 months. Patient lives with spouse, who provides care for her at home. She has five adult children. Patient has had ongoing nausea/vomiting with intake. LBM yesterday.   Met with five adult children and patient at 1100-1139am.     ROS: +nausea. +vomiting. +weakness. +debility. +epigastric pain/discomfort. Denies shortness of breath.       Past Medical History:   Diagnosis Date    Arthritis     Cancer     squamous cell - tonsils    Cancer of tonsil     metastatic squamous cell - left tonsil    CHF (congestive heart failure)     Deep vein thrombosis     bilateral- daughter states on  23 that she doesn't remember patient having this    Dementia     Dysphagia     GERD (gastroesophageal reflux disease)     Hyperlipidemia     Hypertension     Impaired mobility     PONV (postoperative nausea and vomiting)     SOB (shortness of breath)     following chemo infusion on day of infusion last week (week of -23) per pt's daughter    Vitamin D deficiency     Wears dentures     uppers - instructed not to use adhesive DOS     Past Surgical History:   Procedure Laterality Date    APPENDECTOMY      CATARACT EXTRACTION      CHOLECYSTECTOMY      COLONOSCOPY      Approx     ENDOSCOPY W/ PEG TUBE PLACEMENT N/A 2023    Procedure: ESOPHAGOGASTRODUODENOSCOPY WITH PERCUTANEOUS ENDOSCOPIC GASTROSTOMY TUBE INSERTION;  Surgeon: Brigido Guerra MD;  Location: [facility] ENDOSCOPY;  Service: Gastroenterology;  Laterality: N/A;    HYSTERECTOMY  1991    OOPHORECTOMY Bilateral 1991    PORTACATH PLACEMENT Right 2023    Procedure: INSERTION OF PORTACATH;  Surgeon: Brigido Guerra MD;  Location: [facility] OR;  Service: General;  Laterality: Right;    TUBAL ABDOMINAL LIGATION       Social History     Socioeconomic History    Marital status:    Tobacco Use    Smoking status: Former     Current packs/day: 0.00     Average packs/day: 0.2 packs/day for 10.0 years (1.5 ttl pk-yrs)     Types: Cigarettes     Start date: 1977     Quit date: 1987     Years since quittin.4     Passive exposure: Past    Smokeless tobacco: Never   Vaping Use    Vaping status: Never Used   Substance and Sexual Activity    Alcohol use: No    Drug use: No    Sexual activity: Defer     Family History   Problem Relation Age of Onset    Heart failure Mother     Hyperlipidemia Mother     Hypertension Mother     Cancer Father     Cancer Sister     Stroke Sister     Breast cancer Maternal Aunt     Breast cancer Paternal Aunt     Ovarian cancer Neg Hx        Allergies   Allergen Reactions    Trazodone Confusion       Current  "medication reviewed for route, type, dose and frequency and are current per MAR at time of dictation.    Palliative Performance Scale Score:  50%    /70   Pulse 84   Temp 98.2 °F (36.8 °C) (Oral)   Resp 16   Ht 152.4 cm (60\")   Wt 56.2 kg (124 lb)   SpO2 97%   BMI 24.22 kg/m²   No intake or output data in the 24 hours ending 06/12/24 0846    Physical Exam:    General Appearance:    Patient sitting up in chair, awake, alert, chronically ill appearing, frail, cooperative, NAD   HEENT:    NC/AT, EOMI, anicteric, MMM, face relaxed   Neck:   supple, trachea midline, no JVD   Lungs:     CTA bilat, diminished in bases; respirations regular, even and unlabored; RR 16-18 on exam, on RA    Heart:    RRR, normal S1 and S2, no M/R/G, HR 73   Abdomen:     Normal bowel sounds, soft, nontender, distended   G/U:   Deferred   MSK/Extremities:   Wasting, no edema   Pulses:   Pulses palpable and equal bilaterally   Skin:   Warm, dry   Neurologic:   A/Ox3, cooperative, RING   Psych:   Calm, appropriate         Labs:   Results from last 7 days   Lab Units 06/11/24  0834   WBC 10*3/mm3 8.24   HEMOGLOBIN g/dL 13.9   HEMATOCRIT % 43.3   PLATELETS 10*3/mm3 169     Results from last 7 days   Lab Units 06/11/24  0834   SODIUM mmol/L 143   POTASSIUM mmol/L 4.2   CHLORIDE mmol/L 111*   CO2 mmol/L 20.0*   BUN mg/dL 16   CREATININE mg/dL 0.90   GLUCOSE mg/dL 91   CALCIUM mg/dL 8.7     Results from last 7 days   Lab Units 06/11/24  0834 06/10/24  0500 06/09/24  1240   SODIUM mmol/L 143   < > 147*   POTASSIUM mmol/L 4.2   < > 3.2*   CHLORIDE mmol/L 111*   < > 110*   CO2 mmol/L 20.0*   < > 25.0   BUN mg/dL 16   < > 23   CREATININE mg/dL 0.90   < > 1.21*   CALCIUM mg/dL 8.7   < > 8.6   BILIRUBIN mg/dL  --   --  0.4   ALK PHOS U/L  --   --  104   ALT (SGPT) U/L  --   --  16   AST (SGOT) U/L  --   --  18   GLUCOSE mg/dL 91   < > 105*    < > = values in this interval not displayed.     Imaging Results (Last 72 Hours)       Procedure " Component Value Units Date/Time    CT Abdomen Pelvis With Contrast [[id number]] Collected: 06/09/24 1611     Updated: 06/09/24 1620    Narrative:      CT ABDOMEN PELVIS W CONTRAST    Date of Exam: 6/9/2024 3:02 PM EDT    Indication: Vomiting green emesis, weight loss over the past month.    Comparison: CT of the abdomen and pelvis performed on 3/2/2023.     Technique: Axial CT images were obtained of the abdomen and pelvis following the uneventful intravenous administration of 80 cc of Isovue-300 contrast. Reconstructed coronal and sagittal images were also obtained. Automated exposure control and iterative   construction methods were used.      Findings:   The patient's gastrostomy tube has been removed. There is suspicion of thickening of the wall the gastric antrum and pyloric region of the stomach. There also may be thickening of the wall of the descending limb of the duodenum. This raises question of   gastritis/duodenitis. There is increased stool throughout the colon and rectum. The appendix is either small in size or surgically absent. The gallbladder is surgically absent. There is unchanged prominence of the biliary tree. The adrenal glands,   pancreas, and kidneys are normal. The spleen is borderline enlarged. The uterus is surgically absent. The urinary bladder is slightly distended. There are atherosclerotic vascular calcifications in the abdomen and pelvis. There are no enlarged lymph   nodes or abnormal fluid collections. There are bandlike linear densities in the lower lobes felt to represent a combination of scarring and subsegmental atelectasis. There are no layering pleural effusions. There are no suspicious osteolytic or sclerotic   lesions within the bony structures. There are underlying degenerative changes.          Impression:      Impression:    1. The patient's gastrostomy tube is been removed.  2. Suspicion of thickening of the wall the gastric antrum and pyloric region of the stomach as  well as the descending limb of the duodenum. This raises question of gastritis/duodenitis.  3. Increased stool burden in the colon and rectum.  4. The appendix is either small in size or surgically absent.  5. Borderline splenomegaly.  6. Additional incidental findings as noted above.        Electronically Signed: David Parkinson MD    6/9/2024 4:17 PM EDT    Workstation ID: [id number]                  Diagnostics: Reviewed    A:   Intractable nausea and vomiting    Gastroesophageal reflux disease without esophagitis    HTN (hypertension), benign    Chronic diastolic heart failure    Squamous cell carcinoma of left tonsil    HLD (hyperlipidemia)    History of Clostridium difficile colitis    Elevated serum creatinine    Gastritis and duodenitis     79 y.o. female with GERD, HTN, CHF, tonsil cancer.   S/S:   Nausea/vomiting -continue Zofran 4mg IV q 6 hours prn N/V  -started Compazine 5mg IV q 6 hours prn N/V  -continue Radha root PO daily  -continue Protonix 40mg BID  -may consider Phenergan prn if Compazine ineffective    2. Debility -PT/OT following    3. GOC -DNR/DNI -per discussion with patient  -ongoing full treatment  -reviewed options for ongoing full treatment with EGD/PEG versus comfort focused plan of care with symptom management  -long discussion regarding benefit versus burden of PEG in context of serious chronic disease     P: Introduced Palliative Care and services to family and patient. Visit x2 to discuss GOC. Initial visit with review of patient's symptoms and medication adjustments. Return visit to meet with five children 11:00-11:39am and patient. Long discussion regarding code status, nutrition in context of GOC, PEG versus comfort diet in serious chronic disease. Patient electing EGD/PEG and ongoing full treatment. Emotional support provided throughout discussion. All questions and concerns addressed.    Thank you for this consult and allowing us to participate in patient's plan of care. Palliative  Care Team will continue to follow patient. Please do not hesitate to contact us regarding further symptom management or goals of care needs.  Time: 60 minutes spent reviewing medical and medication records, assessing and examining patient, discussing with family, answering questions, providing some guidance about a plan and documentation of care, and coordinating care with other healthcare members, with > 50% time spent face to face.         Socorro Son, APRN  6/12/2024

## 2024-06-12 NOTE — PROGRESS NOTES
"GI Daily Progress Note  Subjective:    Chief Complaint: Follow-up nausea vomiting    Feeling fairly well today.  Had episode of vomiting earlier today.  Family at bedside.  Palliative team in room at time of encounter.    Objective:    /79   Pulse 73   Temp 98 °F (36.7 °C) (Oral)   Resp 16   Ht 152.4 cm (60\")   Wt 56.2 kg (124 lb)   SpO2 98%   BMI 24.22 kg/m²     Physical Exam   General: Patient awake, elderly appearing   Eyes: Normal lids and lashes, no scleral icterus   Cardiovascular: Regular rate, well-perfused extremities   Pulm: Equal expansion bilaterally, no increased WOB   Abdomen: Soft,nondistended;               Neuro: A&O, No obvious sign of focal deficit   Psychiatric: Normal mood and behavior; memory intact    Lab  Lab Results   Component Value Date    WBC 8.24 06/11/2024    HGB 13.9 06/11/2024    HGB 11.3 (L) 06/10/2024    HGB 12.9 06/09/2024    MCV 92.7 06/11/2024     06/11/2024    INR 1.14 (H) 02/26/2023    INR 1.26 (H) 01/20/2023       Lab Results   Component Value Date    GLUCOSE 91 06/11/2024    BUN 16 06/11/2024    CREATININE 0.90 06/11/2024    EGFRIFNONA 39 (L) 01/13/2022    EGFRIFAFRI 47 (L) 01/13/2022    BCR 17.8 06/11/2024     06/11/2024    K 4.2 06/11/2024    CO2 20.0 (L) 06/11/2024    CALCIUM 8.7 06/11/2024    PROTENTOTREF 7.3 09/07/2023    ALBUMIN 3.8 06/09/2024    ALKPHOS 104 06/09/2024    BILITOT 0.4 06/09/2024    ALT 16 06/09/2024    AST 18 06/09/2024       Assessment:  Nausea and vomiting  Gastric and duodenal thickening on CT imaging  Poor appetite  Pharyngeal dysphagia on previous swallow study  History of tonsillar cancer status post incomplete course of chemotherapy and radiation    Plan:  -Continues on PPI twice daily as well as Carafate  -Patient supplied bryan root  -Remains on Remeron  -Antiemetics  -Lengthy discussion with patient and family last evening.  Palliative team has met with patient and family today.  On my encounter today, patient reports " she would like to proceed with upper endoscopy and replacement of her PEG tube that was removed earlier this year.  I have discussed with patient and family risks and benefits of upper endoscopy with PEG placement as well as limitations of PEG.  Specifically discussed PEG tube may not lead to length and quantity of life or improve quality of life.  PEG tubes do not prevent aspiration pneumonia.  Potentially burdensome and even life-threatening complications of PEG include infection, hemorrhage, gastric ulceration, stoma (the opening where the tube is inserted) irritation, dislodging and/or clogging of the tube.  Patient and family are understanding of risks and benefits and would like to proceed.  -Resume diet today, then keep n.p.o. after midnight.  Will plan for EGD tomorrow with possible PEG placement.      Jared Martin MD  06/12/24  16:43 EDT

## 2024-06-12 NOTE — PLAN OF CARE
Problem: Adult Inpatient Plan of Care  Goal: Plan of Care Review  Outcome: Ongoing, Progressing  Flowsheets  Taken 6/12/2024 0426 by Jeanette Puente RN  Progress: no change  Outcome Evaluation: VSS. Patient able to maintain O2 saturations above 90% on room air throughout the shift. No complaints of pain. Intermittent nausea with 1 small episode of emesis. Family at the bedside overnight.  Taken 6/11/2024 1857 by Safia Domingo LPN  Plan of Care Reviewed With:   patient   family  Goal: Patient-Specific Goal (Individualized)  Outcome: Ongoing, Progressing  Goal: Absence of Hospital-Acquired Illness or Injury  Outcome: Ongoing, Progressing  Intervention: Prevent Skin Injury  Recent Flowsheet Documentation  Taken 6/11/2024 2000 by Jeanette Puente RN  Skin Protection:   adhesive use limited   tubing/devices free from skin contact  Intervention: Prevent and Manage VTE (Venous Thromboembolism) Risk  Recent Flowsheet Documentation  Taken 6/11/2024 2000 by Jeanette Puente RN  VTE Prevention/Management:   bilateral   sequential compression devices off  Goal: Optimal Comfort and Wellbeing  Outcome: Ongoing, Progressing  Intervention: Provide Person-Centered Care  Recent Flowsheet Documentation  Taken 6/11/2024 2000 by Jeanette Puente RN  Trust Relationship/Rapport:   care explained   choices provided   emotional support provided   empathic listening provided   questions answered   questions encouraged   reassurance provided   thoughts/feelings acknowledged  Goal: Readiness for Transition of Care  Outcome: Ongoing, Progressing     Problem: Skin Injury Risk Increased  Goal: Skin Health and Integrity  Outcome: Ongoing, Progressing  Intervention: Optimize Skin Protection  Recent Flowsheet Documentation  Taken 6/11/2024 2000 by Jeanette Puente RN  Pressure Reduction Techniques:   frequent weight shift encouraged   positioned off wounds   weight shift assistance provided  Pressure Reduction Devices:   chair  cushion utilized   positioning supports utilized   pressure-redistributing mattress utilized  Skin Protection:   adhesive use limited   tubing/devices free from skin contact     Problem: Adjustment to Illness (Heart Failure)  Goal: Optimal Coping  Outcome: Ongoing, Progressing  Intervention: Support Psychosocial Response  Recent Flowsheet Documentation  Taken 6/11/2024 2000 by Jeanette Puente RN  Supportive Measures: active listening utilized  Family/Support System Care: support provided     Problem: Cardiac Output Decreased (Heart Failure)  Goal: Optimal Cardiac Output  Outcome: Ongoing, Progressing  Intervention: Optimize Cardiac Output  Recent Flowsheet Documentation  Taken 6/11/2024 2000 by Jeanette Puente RN  Environmental Support:   calm environment promoted   rest periods encouraged     Problem: Functional Ability Impaired (Heart Failure)  Goal: Optimal Functional Ability  Outcome: Ongoing, Progressing     Problem: Nausea and Vomiting  Goal: Fluid and Electrolyte Balance  Outcome: Ongoing, Progressing  Intervention: Prevent and Manage Nausea and Vomiting  Recent Flowsheet Documentation  Taken 6/11/2024 2000 by Jeanette Puente RN  Environmental Support:   calm environment promoted   rest periods encouraged     Problem: Fall Injury Risk  Goal: Absence of Fall and Fall-Related Injury  Outcome: Ongoing, Progressing   Goal Outcome Evaluation:           Progress: no change  Outcome Evaluation: VSS. Patient able to maintain O2 saturations above 90% on room air throughout the shift. No complaints of pain. Intermittent nausea with 1 small episode of emesis. Family at the bedside overnight.

## 2024-06-12 NOTE — PLAN OF CARE
Goal Outcome Evaluation:  Plan of Care Reviewed With: patient, family (five adult children)        Progress: no change  Outcome Evaluation: Palliative consult for GOC/ACP and symptom management. ACP doc on file, spouse Karrie Landry is designated primary HCS, dtr Meliza Back secondary. Pt seen by CHUCK DURAND this morning, and again late morning to meet with pt and her five children; code status adjusted per discussion. Pt and family wish to continue with ongoing interventions to relieve N/V and improve pt's appetite; however, pt has steadfastly declined further treatment or diagnostics for her cancer. Compazine added for management of N/V. Pt and family have decided to proceed with EGD/PEG, delayed until tomorrow. Pt agreeable to try some yogurt and Boost; unit RN notified. Palliiative following for continued support in management of symptoms, ongoing GOC/POC discussion.    1300 Palliative IDT meeting:  LADARIUS LAWRENCE, RN, SW,   After hours, weekends and holidays, contact Palliative Provider by calling [phone]       Problem: Palliative Care  Goal: Enhanced Quality of Life  Outcome: Ongoing, Progressing  Intervention: Promote Advance Care Planning  Flowsheets (Taken 6/12/2024 1436)  Life Transition/Adjustment:   palliative care discussed   palliative care initiated  Intervention: Maximize Comfort  Flowsheets (Taken 6/12/2024 1436)  Pain Management Interventions: (nausea management plan reviewed with pt and caregivers/children)   pain management plan reviewed with patient/caregiver   other (see comments)  Nutrition Interventions: supplemental drinks provided  Intervention: Optimize Function  Flowsheets (Taken 6/12/2024 1436)  Fatigue Management:   frequent rest breaks encouraged   paced activity encouraged  Sleep/Rest Enhancement:   consistent schedule promoted   family presence promoted  Intervention: Optimize Psychosocial Wellbeing  Flowsheets (Taken 6/12/2024 1436)  Supportive Measures:   active  listening utilized   decision-making supported   positive reinforcement provided   verbalization of feelings encouraged   self-responsibility promoted  Spiritual Activities Assistance: (Spiritual Care consult) other (see comments)  Family/Support System Care:   caregiver stress acknowledged   family care conference arranged   involvement promoted   presence promoted   self-care encouraged   support provided

## 2024-06-12 NOTE — PLAN OF CARE
Goal Outcome Evaluation:  Plan of Care Reviewed With: patient        Progress: improving  Outcome Evaluation: VSS. RA. Alert and oriented x4. No complaints of pain. Pt complaints of nausea frequently during shift. PRN's given for nausea control. Pt to be NPO at midnight for EGD tomorrow. Family at bedside. Pt up to chair most of shift. Skin and fall precautions in place. Pt resting comfortably. no further complaints at this time.

## 2024-06-12 NOTE — CASE MANAGEMENT/SOCIAL WORK
Continued Stay Note   [facility]     Patient Name: Vesta Landry  MRN: [id number]  Today's Date: 6/12/2024    Admit Date: 6/9/2024    Plan: TBD   Discharge Plan       Row Name 06/12/24 1206       Plan    Plan TBD    Patient/Family in Agreement with Plan yes    Plan Comments Spoke with patient at bedside for therapy recs for HH. Patient states that she just wants to go home. Daughter states that they will have pallative meeting today. Informed patient that we could revisit HH at our next visit with patient agreeable. Cm will cont to follow.    Final Discharge Disposition Code 01 - home or self-care                   Discharge Codes    No documentation.                 Expected Discharge Date and Time       Expected Discharge Date Expected Discharge Time    Brian 10, 2024               Haylee Colmenares RN

## 2024-06-13 ENCOUNTER — ANESTHESIA (OUTPATIENT)
Dept: GASTROENTEROLOGY | Facility: HOSPITAL | Age: 79
End: 2024-06-13
Payer: MEDICARE

## 2024-06-13 PROBLEM — R11.2 NAUSEA AND VOMITING: Status: ACTIVE | Noted: 2024-06-03

## 2024-06-13 PROBLEM — R13.10 ODYNOPHAGIA: Status: ACTIVE | Noted: 2024-06-03

## 2024-06-13 LAB
ALBUMIN SERPL-MCNC: 3.1 G/DL (ref 3.5–5.2)
ALBUMIN/GLOB SERPL: 1.6 G/DL
ALP SERPL-CCNC: 88 U/L (ref 39–117)
ALT SERPL W P-5'-P-CCNC: 16 U/L (ref 1–33)
ANION GAP SERPL CALCULATED.3IONS-SCNC: 15 MMOL/L (ref 5–15)
AST SERPL-CCNC: 28 U/L (ref 1–32)
BILIRUB SERPL-MCNC: 0.2 MG/DL (ref 0–1.2)
BUN SERPL-MCNC: 29 MG/DL (ref 8–23)
BUN/CREAT SERPL: 23.6 (ref 7–25)
CALCIUM SPEC-SCNC: 8.4 MG/DL (ref 8.6–10.5)
CHLORIDE SERPL-SCNC: 111 MMOL/L (ref 98–107)
CHOLEST SERPL-MCNC: 134 MG/DL (ref 0–200)
CO2 SERPL-SCNC: 16 MMOL/L (ref 22–29)
CREAT SERPL-MCNC: 1.23 MG/DL (ref 0.57–1)
CRP SERPL-MCNC: <0.3 MG/DL (ref 0–0.5)
EGFRCR SERPLBLD CKD-EPI 2021: 44.2 ML/MIN/1.73
GLOBULIN UR ELPH-MCNC: 1.9 GM/DL
GLUCOSE SERPL-MCNC: 84 MG/DL (ref 65–99)
INR PPP: 1.53 (ref 0.89–1.12)
MAGNESIUM SERPL-MCNC: 2 MG/DL (ref 1.6–2.4)
PHOSPHATE SERPL-MCNC: 3.2 MG/DL (ref 2.5–4.5)
POTASSIUM SERPL-SCNC: 4.1 MMOL/L (ref 3.5–5.2)
PREALB SERPL-MCNC: 15.2 MG/DL (ref 20–40)
PROT SERPL-MCNC: 5 G/DL (ref 6–8.5)
PROTHROMBIN TIME: 18.5 SECONDS (ref 12.2–14.5)
SODIUM SERPL-SCNC: 142 MMOL/L (ref 136–145)
TRIGL SERPL-MCNC: 351 MG/DL (ref 0–150)

## 2024-06-13 PROCEDURE — 82465 ASSAY BLD/SERUM CHOLESTEROL: CPT | Performed by: INTERNAL MEDICINE

## 2024-06-13 PROCEDURE — 43249 ESOPH EGD DILATION <30 MM: CPT | Performed by: INTERNAL MEDICINE

## 2024-06-13 PROCEDURE — 25010000002 HYDRALAZINE PER 20 MG: Performed by: INTERNAL MEDICINE

## 2024-06-13 PROCEDURE — 84100 ASSAY OF PHOSPHORUS: CPT | Performed by: INTERNAL MEDICINE

## 2024-06-13 PROCEDURE — 25810000003 LACTATED RINGERS PER 1000 ML: Performed by: ANESTHESIOLOGY

## 2024-06-13 PROCEDURE — C1726 CATH, BAL DIL, NON-VASCULAR: HCPCS | Performed by: INTERNAL MEDICINE

## 2024-06-13 PROCEDURE — 85610 PROTHROMBIN TIME: CPT | Performed by: INTERNAL MEDICINE

## 2024-06-13 PROCEDURE — 25010000002 PROPOFOL 10 MG/ML EMULSION: Performed by: ANESTHESIOLOGY

## 2024-06-13 PROCEDURE — 0DB58ZX EXCISION OF ESOPHAGUS, VIA NATURAL OR ARTIFICIAL OPENING ENDOSCOPIC, DIAGNOSTIC: ICD-10-PCS | Performed by: INTERNAL MEDICINE

## 2024-06-13 PROCEDURE — 63710000001 ONDANSETRON ODT 4 MG TABLET DISPERSIBLE: Performed by: FAMILY MEDICINE

## 2024-06-13 PROCEDURE — 83735 ASSAY OF MAGNESIUM: CPT | Performed by: INTERNAL MEDICINE

## 2024-06-13 PROCEDURE — 0DB78ZX EXCISION OF STOMACH, PYLORUS, VIA NATURAL OR ARTIFICIAL OPENING ENDOSCOPIC, DIAGNOSTIC: ICD-10-PCS | Performed by: INTERNAL MEDICINE

## 2024-06-13 PROCEDURE — 0DH63UZ INSERTION OF FEEDING DEVICE INTO STOMACH, PERCUTANEOUS APPROACH: ICD-10-PCS | Performed by: INTERNAL MEDICINE

## 2024-06-13 PROCEDURE — 43239 EGD BIOPSY SINGLE/MULTIPLE: CPT | Performed by: INTERNAL MEDICINE

## 2024-06-13 PROCEDURE — 84478 ASSAY OF TRIGLYCERIDES: CPT | Performed by: INTERNAL MEDICINE

## 2024-06-13 PROCEDURE — 84134 ASSAY OF PREALBUMIN: CPT | Performed by: INTERNAL MEDICINE

## 2024-06-13 PROCEDURE — 25010000002 CEFAZOLIN PER 500 MG: Performed by: ANESTHESIOLOGY

## 2024-06-13 PROCEDURE — 0DB98ZX EXCISION OF DUODENUM, VIA NATURAL OR ARTIFICIAL OPENING ENDOSCOPIC, DIAGNOSTIC: ICD-10-PCS | Performed by: INTERNAL MEDICINE

## 2024-06-13 PROCEDURE — 25010000002 LABETALOL 5 MG/ML SOLUTION: Performed by: ANESTHESIOLOGY

## 2024-06-13 PROCEDURE — 99232 SBSQ HOSP IP/OBS MODERATE 35: CPT | Performed by: INTERNAL MEDICINE

## 2024-06-13 PROCEDURE — 25010000002 SODIUM CHLORIDE 0.9 % WITH KCL 20 MEQ 20-0.9 MEQ/L-% SOLUTION: Performed by: INTERNAL MEDICINE

## 2024-06-13 PROCEDURE — 80053 COMPREHEN METABOLIC PANEL: CPT | Performed by: INTERNAL MEDICINE

## 2024-06-13 PROCEDURE — 86140 C-REACTIVE PROTEIN: CPT | Performed by: INTERNAL MEDICINE

## 2024-06-13 PROCEDURE — 88305 TISSUE EXAM BY PATHOLOGIST: CPT | Performed by: INTERNAL MEDICINE

## 2024-06-13 PROCEDURE — 43246 EGD PLACE GASTROSTOMY TUBE: CPT | Performed by: INTERNAL MEDICINE

## 2024-06-13 PROCEDURE — 0D758ZZ DILATION OF ESOPHAGUS, VIA NATURAL OR ARTIFICIAL OPENING ENDOSCOPIC: ICD-10-PCS | Performed by: INTERNAL MEDICINE

## 2024-06-13 RX ORDER — CEFAZOLIN SODIUM 1 G/3ML
INJECTION, POWDER, FOR SOLUTION INTRAMUSCULAR; INTRAVENOUS AS NEEDED
Status: DISCONTINUED | OUTPATIENT
Start: 2024-06-13 | End: 2024-06-13 | Stop reason: SURG

## 2024-06-13 RX ORDER — ONDANSETRON 2 MG/ML
4 INJECTION INTRAMUSCULAR; INTRAVENOUS ONCE AS NEEDED
Status: DISCONTINUED | OUTPATIENT
Start: 2024-06-13 | End: 2024-06-13 | Stop reason: HOSPADM

## 2024-06-13 RX ORDER — SODIUM CHLORIDE, SODIUM LACTATE, POTASSIUM CHLORIDE, CALCIUM CHLORIDE 600; 310; 30; 20 MG/100ML; MG/100ML; MG/100ML; MG/100ML
INJECTION, SOLUTION INTRAVENOUS CONTINUOUS PRN
Status: DISCONTINUED | OUTPATIENT
Start: 2024-06-13 | End: 2024-06-13 | Stop reason: SURG

## 2024-06-13 RX ORDER — LIDOCAINE HYDROCHLORIDE 10 MG/ML
INJECTION, SOLUTION EPIDURAL; INFILTRATION; INTRACAUDAL; PERINEURAL AS NEEDED
Status: DISCONTINUED | OUTPATIENT
Start: 2024-06-13 | End: 2024-06-13 | Stop reason: SURG

## 2024-06-13 RX ORDER — PROPOFOL 10 MG/ML
VIAL (ML) INTRAVENOUS AS NEEDED
Status: DISCONTINUED | OUTPATIENT
Start: 2024-06-13 | End: 2024-06-13 | Stop reason: SURG

## 2024-06-13 RX ORDER — LABETALOL HYDROCHLORIDE 5 MG/ML
INJECTION, SOLUTION INTRAVENOUS AS NEEDED
Status: DISCONTINUED | OUTPATIENT
Start: 2024-06-13 | End: 2024-06-13 | Stop reason: SURG

## 2024-06-13 RX ORDER — IPRATROPIUM BROMIDE AND ALBUTEROL SULFATE 2.5; .5 MG/3ML; MG/3ML
3 SOLUTION RESPIRATORY (INHALATION) ONCE AS NEEDED
Status: DISCONTINUED | OUTPATIENT
Start: 2024-06-13 | End: 2024-06-13 | Stop reason: HOSPADM

## 2024-06-13 RX ADMIN — LABETALOL HYDROCHLORIDE 5 MG: 5 INJECTION, SOLUTION INTRAVENOUS at 12:37

## 2024-06-13 RX ADMIN — POTASSIUM CHLORIDE AND SODIUM CHLORIDE 50 ML/HR: 900; 150 INJECTION, SOLUTION INTRAVENOUS at 22:32

## 2024-06-13 RX ADMIN — ONDANSETRON 4 MG: 4 TABLET, ORALLY DISINTEGRATING ORAL at 09:12

## 2024-06-13 RX ADMIN — Medication 1 TABLET: at 09:12

## 2024-06-13 RX ADMIN — HYDROXYZINE HYDROCHLORIDE 25 MG: 25 TABLET ORAL at 14:48

## 2024-06-13 RX ADMIN — SUCRALFATE 1 G: 1 TABLET ORAL at 09:12

## 2024-06-13 RX ADMIN — HYDRALAZINE HYDROCHLORIDE 100 MG: 50 TABLET ORAL at 14:26

## 2024-06-13 RX ADMIN — NEBIVOLOL 5 MG: 5 TABLET ORAL at 09:12

## 2024-06-13 RX ADMIN — SODIUM CHLORIDE, POTASSIUM CHLORIDE, SODIUM LACTATE AND CALCIUM CHLORIDE: 600; 310; 30; 20 INJECTION, SOLUTION INTRAVENOUS at 12:25

## 2024-06-13 RX ADMIN — PROPOFOL 50 MG: 10 INJECTION, EMULSION INTRAVENOUS at 12:29

## 2024-06-13 RX ADMIN — LABETALOL HYDROCHLORIDE 5 MG: 5 INJECTION, SOLUTION INTRAVENOUS at 12:48

## 2024-06-13 RX ADMIN — SUCRALFATE 1 G: 1 TABLET ORAL at 17:35

## 2024-06-13 RX ADMIN — HYDROCHLOROTHIAZIDE 12.5 MG: 25 TABLET ORAL at 09:12

## 2024-06-13 RX ADMIN — PROPOFOL 100 MCG/KG/MIN: 10 INJECTION, EMULSION INTRAVENOUS at 12:30

## 2024-06-13 RX ADMIN — HYDRALAZINE HYDROCHLORIDE 20 MG: 20 INJECTION INTRAMUSCULAR; INTRAVENOUS at 15:58

## 2024-06-13 RX ADMIN — HYDRALAZINE HYDROCHLORIDE 100 MG: 50 TABLET ORAL at 05:24

## 2024-06-13 RX ADMIN — MIRTAZAPINE 30 MG: 15 TABLET, FILM COATED ORAL at 22:33

## 2024-06-13 RX ADMIN — PROPOFOL 30 MG: 10 INJECTION, EMULSION INTRAVENOUS at 12:34

## 2024-06-13 RX ADMIN — PANTOPRAZOLE SODIUM 40 MG: 40 INJECTION, POWDER, FOR SOLUTION INTRAVENOUS at 09:12

## 2024-06-13 RX ADMIN — Medication 1500 MG: at 09:11

## 2024-06-13 RX ADMIN — LIDOCAINE HYDROCHLORIDE 50 MG: 10 INJECTION, SOLUTION EPIDURAL; INFILTRATION; INTRACAUDAL; PERINEURAL at 12:29

## 2024-06-13 RX ADMIN — HYDRALAZINE HYDROCHLORIDE 100 MG: 50 TABLET ORAL at 22:33

## 2024-06-13 RX ADMIN — PANTOPRAZOLE SODIUM 40 MG: 40 INJECTION, POWDER, FOR SOLUTION INTRAVENOUS at 17:35

## 2024-06-13 RX ADMIN — CEFAZOLIN 2 G: 1 INJECTION, POWDER, FOR SOLUTION INTRAMUSCULAR; INTRAVENOUS at 12:46

## 2024-06-13 NOTE — PROGRESS NOTES
Malnutrition Severity Assessment    Patient Name:  Vesta Landry  YOB: 1945  MRN: [id number]  Admit Date:  6/9/2024    Patient meets criteria for : Moderate (non-severe) Malnutrition    Malnutrition Severity Assessment  Malnutrition Type: Chronic Disease - Related Malnutrition  Malnutrition Type (Last 8 Hours)       Malnutrition Severity Assessment       Row Name 06/13/24 1650       Malnutrition Severity Assessment    Malnutrition Type Chronic Disease - Related Malnutrition      Row Name 06/13/24 1650       Insufficient Energy Intake     Insufficient Energy Intake Findings Moderate    Insufficient Energy Intake  <75% of est. energy requirement for > or equal to 3 months      Row Name 06/13/24 1650       Unintentional Weight Loss     Unintentional Weight Loss Findings --  28lb wt loss over 18 months, 19% wt loss      Row Name 06/13/24 1650       Muscle Loss    Loss of Muscle Mass Findings Moderate    Jainism Region Moderate - slight depression    Clavicle Bone Region Moderate - some protrusion in females, visible in males    Patellar Region Moderate - patella more prominent, less muscle definition around patella    Anterior Thigh Region Moderate - mild depression on inner thigh    Posterior Calf Region Moderate - some roundness, slight firmness      Row Name 06/13/24 1650       Fat Loss    Subcutaneous Fat Loss Findings Moderate    Orbital Region  Moderate -  somewhat hollowness, slightly dark circles    Upper Arm Region Moderate - some fat tissue, not ample      Row Name 06/13/24 1650       Fluid Accumulation (Edema)    Fluid Acumulation Findings --  BLE edema      Row Name 06/13/24 1650       Criteria Met (Must meet criteria for severity in at least 2 of these categories: M Wasting, Fat Loss, Fluid, Secondary Signs, Wt. Status, Intake)    Patient meets criteria for  Moderate (non-severe) Malnutrition                    Electronically signed by:  Carmenza Motley RD  06/13/24 16:52 EDT

## 2024-06-13 NOTE — PLAN OF CARE
Goal Outcome Evaluation:  Plan of Care Reviewed With: patient   - VSS, RA, A&Ox4  - Fluids infusing  - No complaints of pain or nausea  - NPO since midnight     Progress: no change

## 2024-06-13 NOTE — PLAN OF CARE
Goal Outcome Evaluation:           Progress: no change  Outcome Evaluation: Pt is off the floor for EGD and PEG placement. palliative care will continue to follow.            Palliative Team Meeting 1300  MD, APRN, SW, RN   Please call Palliative after hours at [phone]

## 2024-06-13 NOTE — PLAN OF CARE
Goal Outcome Evaluation:  Plan of Care Reviewed With: patient        Progress: improving  Outcome Evaluation: Pt back to floor from endo. VSS. RA. Alert and oriented x4. No complaints of pain. Pt complaints of nausea. PRN's given for nausea control. PEG tube in place to gravity with minimal serosangineous output. Pt starting clear liquid diet at 6pm, and tube feeding tomorrow at 6am. pt hypertensive when returning to floor. Scheduled and PRN hydralazine given. Large BM. R chest port accessed in endo. Skin and fall precautions in place. Fluids infusing. pt resting comfortably. no further complaints at this time.

## 2024-06-13 NOTE — PROGRESS NOTES
Clinical Nutrition     Patient Name: Vesta Landry  YOB: 1945  MRN: [id number]  Date of Encounter: 06/13/24 16:17 EDT  Admission date: 6/9/2024  Reason for Visit: Follow-up protocol, Malnutrition Severity Assessment, Physician consult, EN    Assessment   Nutrition Assessment   Admission Diagnosis:  Intractable nausea and vomiting [R11.2]    Problem List:    Intractable nausea and vomiting    Gastroesophageal reflux disease without esophagitis    HTN (hypertension), benign    Chronic diastolic heart failure    Squamous cell carcinoma of left tonsil    HLD (hyperlipidemia)    History of Clostridium difficile colitis    Elevated serum creatinine    Gastritis and duodenitis      PMH:   She  has a past medical history of Arthritis, Cancer, Cancer of tonsil, CHF (congestive heart failure), Deep vein thrombosis, Dementia, Dysphagia, GERD (gastroesophageal reflux disease), Hyperlipidemia, Hypertension, Impaired mobility, PONV (postoperative nausea and vomiting), SOB (shortness of breath), Vitamin D deficiency, and Wears dentures.    PSH:  She  has a past surgical history that includes Colonoscopy; Appendectomy; Cataract extraction; Cholecystectomy; Tubal ligation; Hysterectomy (1991); Oophorectomy (Bilateral, 1991); Portacath placement (Right, 02/08/2023); and Esophagogastroduodenoscopy w/ PEG (N/A, 2/20/2023).    Applicable Nutrition History:   s/p PEG removal 6 months ago      6-13-24:  - LA Grade B reflux esophagitis with no bleeding. Biopsied. - Benign-appearing esophageal stenosis. Dilated. - Erythematous mucosa in the stomach. Biopsied. - Scar in the gastric body. - Normal examined duodenum. Biopsied. - An externally removable PEG placement was successfully completed.    Labs    Labs Reviewed: Yes    Results from last 7 days   Lab Units 06/11/24  0834 06/10/24  0500 06/09/24  1310 06/09/24  1240   GLUCOSE mg/dL 91 79  --  105*   BUN mg/dL 16 19  --  23   CREATININE mg/dL 0.90  "0.94  --  1.21*   SODIUM mmol/L 143 144  --  147*   CHLORIDE mmol/L 111* 110*  --  110*   POTASSIUM mmol/L 4.2 3.8  --  3.2*   ALT (SGPT) U/L  --   --   --  16   LACTATE mmol/L  --   --  0.9  --        Results from last 7 days   Lab Units 06/09/24  1240   ALBUMIN g/dL 3.8           Lab Results   Lab Value Date/Time    HGBA1C 4.90 09/26/2023 0619    HGBA1C 5.30 01/20/2023 0643               Medications    Medications Reviewed: yes  Remeron, MVI, protonix, gingeroot, bowel regimen, carafate  + [id number] @50ml/hr    Intake/Ouptut 24 hrs (0701 - 0700)   I&O's Reviewed: yes    Intake & Output (last day)         06/12 0701 06/13 0700 06/13 0701 06/14 0700    P.O. 360 0    I.V. (mL/kg)  700 (12.5)    Total Intake(mL/kg) 360 (6.4) 700 (12.5)    Urine (mL/kg/hr) 0 (0)     Stool 0     Total Output 0     Net +360 +700          Urine Unmeasured Occurrence 2 x     Stool Unmeasured Occurrence 0 x            Anthropometrics     Height: Height: 152.4 cm (60\")  Last Filed Weight: Weight: 56.2 kg (124 lb) (06/09/24 1228)  Method: Weight Method: Stated  BMI: BMI (Calculated): 24.2    UBW: 153lb per Oncology RD note 1-31-23    Weight change:  29lb wt loss over past 18 months, 19% wt loss     Weight       Weight (kg) Weight (lbs) Weight Method Visit Report   2/23/2023 66.8 kg  147 lb 4.3 oz  Bed scale     2/24/2023 66.8 kg  147 lb 4.3 oz  Bed scale      66.8 kg  147 lb 4.3 oz      2/25/2023 66.6 kg  146 lb 13.2 oz  Bed scale     2/26/2023 67.7 kg  149 lb 4 oz  Bed scale     2/27/2023 66.6 kg  146 lb 13.2 oz  Bed scale     3/1/2023 66.9 kg  147 lb 7.8 oz  Bed scale     3/2/2023 65.772 kg  145 lb  Stated      68.811 kg  151 lb 11.2 oz  Bed scale     3/3/2023 68.402 kg  150 lb 12.8 oz  Bed scale     3/4/2023 71.351 kg  157 lb 4.8 oz  Bed scale     3/7/2023 75.297 kg  166 lb  Bed scale     3/10/2023 64.864 kg  143 lb  Stated     9/7/2023 61.598 kg  135 lb 12.8 oz   --    9/25/2023 61.236 kg  135 lb  Stated     9/26/2023 62.596 kg  138 lb  " Bed scale      62.6 kg  138 lb 0.1 oz      9/27/2023 61.145 kg  134 lb 12.8 oz  Standing scale     9/28/2023 59.603 kg  131 lb 6.4 oz  Standing scale     10/6/2023 62.052 kg  136 lb 12.8 oz   --    10/19/2023 64.229 kg  141 lb 9.6 oz   --    1/8/2024 63.957 kg  141 lb   --    1/26/2024 61.598 kg  135 lb 12.8 oz   --    2/22/2024 59.875 kg  132 lb  Stated     2/29/2024 56.382 kg  124 lb 4.8 oz   --    5/21/2024 56.881 kg  125 lb 6.4 oz   --     56.881 kg  125 lb 6.4 oz   --    5/22/2024 56.7 kg  125 lb      5/28/2024 56.427 kg  124 lb 6.4 oz   --    6/9/2024 56.246 kg  124 lb  Stated           Nutrition Focused Physical Exam     Date: 6-13-24    Patient meets criteria for malnutrition diagnosis, see MSA note.   Subjective   Reported/Observed/Food/Nutrition Related History:     6-13: pt resting in bed, s/p EGD/PEG placement, no complaints at present, family at bedside    Family reports pt did not tolerate bolus feeds in the past, previously was using vivonex TF at home, she does not want to have TF hanging at night, prefers to run  TF during day only    Per RN: pt allowed clear liquids in 4 hours, ok per GI to start EN in 12 hours      6-11: Pt resting in bed, reports poor appetite, has nausea if she eats anything, has had nausea, vomiting since PEG removed 6 months ago, is swallowing ok, has tried oral supplements at home, but dislikes most of them    Per EMR: noted that pt has difficulty tolerating TF, with multiple formula changes last year, did eventually tolerate Isosource HN via pump on last admission    Family reports pt did not like having feeding tube, and it was leaking a lot, so she had it removed, feels pt has had significant wt loss over past 4 months, had lost down to 120lb's, then regained 4lb's, unsure what current wt is    Current Nutrition Prescription     PO: NPO Diet NPO Type: Strict NPO  Oral Nutrition Supplement: Ensure Clear #x/day  Intake: Insufficient data: 50% of 1 clear liquid  meal    Assessment & Plan   Nutrition Diagnosis   Date: 6-11-24 Updated: 6-13-24  Problem Malnutrition     Etiology Tonsillar CA   Signs/Symptoms 29lb wt loss over 18 months, 19% wt loss, po intake < 75%, moderate muscle wasting, fat loss       Goal:   Nutrition to support treatment, Establish PO, and Initiate EN      Nutrition Intervention      Follow treatment progress, Care plan reviewed, Advised available snacks, Interview for preferences, Encourage intake, Supplement provided    Pt meets criteria for chronic moderate malnutrition 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, evidenced by 29lb wt loss over 18 months, 19% wt loss, po intake < 75%, moderate muscle wasting and fat loss    As pt does not wish for any nocturnal feedings, will hold off and start EN at 6am tomorrow morning and run during the day 6am to 8pm (14 hour feed)    Discussed with pt and family that Vivonex is not generally indicated for TF unless an individual has severe food allergies, or has severe malabsorption, it is very low in fat and protein, so not ideal for cancer patients, recommend Peptamen AF which is a semi-elemental formula that is generally well tolerated and high in protein    Friday am start Peptamen AF at 15ml via PEG, advance 15ml Q 12 hours to goal rate @75ml/hr, free water @10ml/hr while on IVF's    Monitor electrolytes closely and replace prior to advancing TF as pt at risk for refeeding syndrome    TF at goal volume will provide 1050ml, 1260kcal, 87% kcal needs, 80g protein, 105% protein needs, 6g fiber, 991ml free water including flush    Rec maintain MVI as TF volume too low to meet RDI's    Advance diet per GI    Monitoring/Evaluation:   Per protocol, I&O, PO intake, Supplement intake, Pertinent labs, Weight, Skin status, GI status, Symptoms, Swallow function    Carmenza Motley, RD  Time Spent:90min

## 2024-06-13 NOTE — PROGRESS NOTES
[facility] Medicine Services  PROGRESS NOTE    Patient Name: Vesta Landry  : 1945  MRN: [id number]    Date of Admission: 2024  Primary Care Physician: Bossman Pedraza MD    Subjective   Subjective     CC: Follow-up nausea and vomiting    HPI: Patient states that she had a good night,    Objective   Objective     Vital Signs:   Temp:  [97.2 °F (36.2 °C)-98 °F (36.7 °C)] 97.6 °F (36.4 °C)  Heart Rate:  [71-84] 74  Resp:  [16-20] 20  BP: (141-178)/(69-82) 178/70  Flow (L/min):  [3] 3     Physical Exam:  Constitutional: Chronically ill-appearing elderly female, laying in bed  HENT: NCAT, mucous membranes moist  Respiratory: Clear to auscultation bilaterally, respiratory effort normal   Cardiovascular: RRR, no murmurs, rubs, or gallops  Gastrointestinal: Positive bowel sounds, soft, nontender, nondistended  Musculoskeletal: No bilateral ankle edema  Psychiatric: Appropriate affect, cooperative  Neurologic: Nonfocal    Results Reviewed:  LAB RESULTS:      Lab 24  0503 24  0834 06/10/24  0500 24  1310 24  1240   WBC  --  8.24 6.14  --  7.97   HEMOGLOBIN  --  13.9 11.3*  --  12.9   HEMATOCRIT  --  43.3 34.7  --  40.7   PLATELETS  --  169 152  --  181   NEUTROS ABS  --   --   --   --  6.55   IMMATURE GRANS (ABS)  --   --   --   --  0.02   LYMPHS ABS  --   --   --   --  0.75   MONOS ABS  --   --   --   --  0.59   EOS ABS  --   --   --   --  0.03   MCV  --  92.7 93.0  --  95.1   LACTATE  --   --   --  0.9  --    PROTIME 18.5*  --   --   --   --          Lab 24  0834 06/10/24  0500 24  1240   SODIUM 143 144 147*   POTASSIUM 4.2 3.8 3.2*   CHLORIDE 111* 110* 110*   CO2 20.0* 25.0 25.0   ANION GAP 12.0 9.0 12.0   BUN 16 19 23   CREATININE 0.90 0.94 1.21*   EGFR 64.4 61.1 45.1*   GLUCOSE 91 79 105*   CALCIUM 8.7 8.2* 8.6         Lab 24  1240   TOTAL PROTEIN 6.1   ALBUMIN 3.8   GLOBULIN 2.3   ALT (SGPT) 16   AST (SGOT) 18   BILIRUBIN 0.4   ALK  PHOS 104   LIPASE 31         Lab 06/13/24  0503   PROTIME 18.5*   INR 1.53*                 Brief Urine Lab Results  (Last result in the past 365 days)        Color   Clarity   Blood   Leuk Est   Nitrite   Protein   CREAT   Urine HCG        06/09/24 1326 Yellow   Clear   Negative   Negative   Negative   Negative                   Microbiology Results Abnormal       None            No radiology results from the last 24 hrs    Results for orders placed during the hospital encounter of 09/25/23    Adult Transthoracic Echo Complete W/ Cont if Necessary Per Protocol    Interpretation Summary    Left ventricular systolic function is normal. Calculated left ventricular EF = 58.9%    Left ventricular wall thickness is consistent with mild concentric hypertrophy.    Left ventricular diastolic function was indeterminate.    There is mild calcification of the aortic valve.    Estimated right ventricular systolic pressure from tricuspid regurgitation is mildly elevated (35-45 mmHg).    Mild aortic valve regurgitation.      Current medications:  Scheduled Meds:Radha Root, 1,500 mg, Oral, Daily  hydrALAZINE, 100 mg, Oral, Q8H  hydroCHLOROthiazide Oral, 12.5 mg, Oral, Daily  mirtazapine, 30 mg, Oral, Nightly  multivitamin with minerals, 1 tablet, Oral, Daily  nebivolol, 5 mg, Oral, Daily  pantoprazole, 40 mg, Intravenous, BID AC  senna-docusate sodium, 2 tablet, Oral, BID  sodium chloride, 10 mL, Intravenous, Q12H  sucralfate, 1 g, Oral, 4x Daily AC & at Bedtime      Continuous Infusions:sodium chloride 0.9 % with KCl 20 mEq, 50 mL/hr, Last Rate: 50 mL/hr (06/12/24 2226)      PRN Meds:.  acetaminophen **OR** acetaminophen **OR** acetaminophen    senna-docusate sodium **AND** polyethylene glycol **AND** bisacodyl **AND** bisacodyl    hydrALAZINE    hydrOXYzine    labetalol    nitroglycerin    ondansetron ODT **OR** ondansetron    prochlorperazine    sodium chloride    sodium chloride    Assessment & Plan   Assessment & Plan      Active Hospital Problems    Diagnosis  POA    **Intractable nausea and vomiting [R11.2]  Yes    Gastritis and duodenitis [K29.90]  Unknown    Elevated serum creatinine [R79.89]  Yes    History of Clostridium difficile colitis [Z86.19]  Not Applicable    HLD (hyperlipidemia) [E78.5]  Yes    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        Brief Hospital Course to date:  Vesta Landry is a 79 y.o. female with hx of tonsil cancer (did not complete treatment), CHF, GERD, HTN, HLD. Pt with Hx PEG tube placement due to difficulty with dysphagia/oral intake during treatment for tonsil cancer. Per family, this was removed about 6 months ago and since then, patient has had issues with intermittent nausea/vomiting      Intermittent Nausea/Vomiting and diarrhea  Weight Loss   Hx PEG tube s/p removal  -Per patient, symptoms started after PEG removal about 6 months ago. Pt had PEG tube for nutrition while undergoing Tx for tonsil cancer.   -Established care with Dr Serrato, GI in [place], with plans for outpatient EGD but symptoms worsened so patient came to ED   -CT A/P with concern for gastritis/duodenitis  -Continue IV PPI twice daily, Reglan, antiemetics and IV fluids  -Discussed with GI , continue supportive therapy, palliative following, GOC have been established patient i has decided on having PEG placed back, GI plans for EGD with possible PEG placement today 6/13/2024.     Mild Hypernatremia  Dehydration  Hypokalemia  Mild elevation of serum Cr  -This is likely secondary to above   -continue to monitor and replete electrolytes per protocol  -Continue IV fluids     Hx tonsil cancer  -pt did not complete chemo/radiation. Was on hospice but then graduated.   -Not interested in  further oncologic work-up.   -Palliative care consulted to readdress goals of care     Hypertension  -BP remains elevated, but is much improved continue p.o. hydralazine, added HCTZ 12.5 mg daily  -prn IV hydralazine is in place     Expected Discharge Location and Transportation: Home  Expected Discharge   Expected Discharge Date: 6/10/2024; Expected Discharge Time:      VTE Prophylaxis:  Mechanical VTE prophylaxis orders are present.         AM-PAC 6 Clicks Score (PT): 20 (06/12/24 2000)    CODE STATUS:   Code Status and Medical Interventions:   Ordered at: 06/12/24 1140     Medical Intervention Limits:    No intubation (DNI)     Level Of Support Discussed With:    Patient     Code Status (Patient has no pulse and is not breathing):    No CPR (Do Not Attempt to Resuscitate)     Medical Interventions (Patient has pulse or is breathing):    Limited Support       Mia Schneider MD  06/13/24

## 2024-06-13 NOTE — ANESTHESIA POSTPROCEDURE EVALUATION
Patient: Vesta Landry    Procedure Summary       Date: 06/13/24 Room / Location:  [facility]    Anesthesia Start: 1225 Anesthesia Stop: 1321    Procedure: ESOPHAGOGASTRODUODENOSCOPY WITH BIOPSIES, ESOPHAGEAL DILATION AND PERCUTANEOUS ENDOSCOPIC GASTROSTOMY TUBE INSERTION Diagnosis:       Gastritis and duodenitis      Intractable nausea and vomiting      Gastroesophageal reflux disease without esophagitis      (Gastritis and duodenitis [K29.90])      (Intractable nausea and vomiting [R11.2])      (Gastroesophageal reflux disease without esophagitis [K21.9])    Surgeons: Jared Martin MD Provider: Alok Pierce MD    Anesthesia Type: MAC ASA Status: 3            Anesthesia Type: MAC    Vitals  Vitals Value Taken Time   /77 06/13/24 1321   Temp 97.2 °F (36.2 °C) 06/13/24 1321   Pulse 71 06/13/24 1321   Resp 16 06/13/24 1321   SpO2 96 % 06/13/24 1321           Post Anesthesia Care and Evaluation    Patient location during evaluation: PACU  Patient participation: waiting for patient participation  Level of consciousness: lethargic  Pain management: adequate    Airway patency: patent  Anesthetic complications: No anesthetic complications  PONV Status: none  Cardiovascular status: hemodynamically stable and acceptable  Respiratory status: nonlabored ventilation, acceptable and nasal cannula  Hydration status: acceptable

## 2024-06-14 LAB
ANION GAP SERPL CALCULATED.3IONS-SCNC: 15 MMOL/L (ref 5–15)
ANION GAP SERPL CALCULATED.3IONS-SCNC: 16 MMOL/L (ref 5–15)
BUN SERPL-MCNC: 31 MG/DL (ref 8–23)
BUN SERPL-MCNC: 32 MG/DL (ref 8–23)
BUN/CREAT SERPL: 24.2 (ref 7–25)
BUN/CREAT SERPL: 27.7 (ref 7–25)
CALCIUM SPEC-SCNC: 8 MG/DL (ref 8.6–10.5)
CALCIUM SPEC-SCNC: 8.5 MG/DL (ref 8.6–10.5)
CHLORIDE SERPL-SCNC: 111 MMOL/L (ref 98–107)
CHLORIDE SERPL-SCNC: 115 MMOL/L (ref 98–107)
CO2 SERPL-SCNC: 14 MMOL/L (ref 22–29)
CO2 SERPL-SCNC: 15 MMOL/L (ref 22–29)
CREAT SERPL-MCNC: 1.12 MG/DL (ref 0.57–1)
CREAT SERPL-MCNC: 1.32 MG/DL (ref 0.57–1)
DEPRECATED RDW RBC AUTO: 56.3 FL (ref 37–54)
EGFRCR SERPLBLD CKD-EPI 2021: 40.6 ML/MIN/1.73
EGFRCR SERPLBLD CKD-EPI 2021: 49.5 ML/MIN/1.73
ERYTHROCYTE [DISTWIDTH] IN BLOOD BY AUTOMATED COUNT: 15.9 % (ref 12.3–15.4)
GLUCOSE BLDC GLUCOMTR-MCNC: 82 MG/DL (ref 70–130)
GLUCOSE BLDC GLUCOMTR-MCNC: 87 MG/DL (ref 70–130)
GLUCOSE SERPL-MCNC: 79 MG/DL (ref 65–99)
GLUCOSE SERPL-MCNC: 95 MG/DL (ref 65–99)
HCT VFR BLD AUTO: 41.3 % (ref 34–46.6)
HGB BLD-MCNC: 12.9 G/DL (ref 12–15.9)
MAGNESIUM SERPL-MCNC: 2.1 MG/DL (ref 1.6–2.4)
MCH RBC QN AUTO: 29.9 PG (ref 26.6–33)
MCHC RBC AUTO-ENTMCNC: 31.2 G/DL (ref 31.5–35.7)
MCV RBC AUTO: 95.8 FL (ref 79–97)
PHOSPHATE SERPL-MCNC: 3.7 MG/DL (ref 2.5–4.5)
PLATELET # BLD AUTO: 220 10*3/MM3 (ref 140–450)
PMV BLD AUTO: 11.5 FL (ref 6–12)
POTASSIUM SERPL-SCNC: 4.4 MMOL/L (ref 3.5–5.2)
POTASSIUM SERPL-SCNC: 4.6 MMOL/L (ref 3.5–5.2)
RBC # BLD AUTO: 4.31 10*6/MM3 (ref 3.77–5.28)
SODIUM SERPL-SCNC: 140 MMOL/L (ref 136–145)
SODIUM SERPL-SCNC: 146 MMOL/L (ref 136–145)
WBC NRBC COR # BLD AUTO: 10.88 10*3/MM3 (ref 3.4–10.8)

## 2024-06-14 PROCEDURE — 82948 REAGENT STRIP/BLOOD GLUCOSE: CPT

## 2024-06-14 PROCEDURE — 25010000002 PROCHLORPERAZINE 10 MG/2ML SOLUTION: Performed by: INTERNAL MEDICINE

## 2024-06-14 PROCEDURE — 83735 ASSAY OF MAGNESIUM: CPT | Performed by: INTERNAL MEDICINE

## 2024-06-14 PROCEDURE — 99232 SBSQ HOSP IP/OBS MODERATE 35: CPT | Performed by: INTERNAL MEDICINE

## 2024-06-14 PROCEDURE — 25010000002 METOCLOPRAMIDE PER 10 MG: Performed by: NURSE PRACTITIONER

## 2024-06-14 PROCEDURE — 80048 BASIC METABOLIC PNL TOTAL CA: CPT | Performed by: NURSE PRACTITIONER

## 2024-06-14 PROCEDURE — 97530 THERAPEUTIC ACTIVITIES: CPT

## 2024-06-14 PROCEDURE — 80048 BASIC METABOLIC PNL TOTAL CA: CPT | Performed by: INTERNAL MEDICINE

## 2024-06-14 PROCEDURE — 99232 SBSQ HOSP IP/OBS MODERATE 35: CPT | Performed by: NURSE PRACTITIONER

## 2024-06-14 PROCEDURE — 85027 COMPLETE CBC AUTOMATED: CPT | Performed by: INTERNAL MEDICINE

## 2024-06-14 PROCEDURE — 84100 ASSAY OF PHOSPHORUS: CPT | Performed by: INTERNAL MEDICINE

## 2024-06-14 RX ORDER — METOCLOPRAMIDE HYDROCHLORIDE 5 MG/ML
5 INJECTION INTRAMUSCULAR; INTRAVENOUS 4 TIMES DAILY
Status: DISCONTINUED | OUTPATIENT
Start: 2024-06-14 | End: 2024-06-16

## 2024-06-14 RX ORDER — SODIUM CHLORIDE 450 MG/100ML
75 INJECTION, SOLUTION INTRAVENOUS CONTINUOUS
Status: DISCONTINUED | OUTPATIENT
Start: 2024-06-14 | End: 2024-06-15

## 2024-06-14 RX ADMIN — HYDRALAZINE HYDROCHLORIDE 100 MG: 50 TABLET ORAL at 06:03

## 2024-06-14 RX ADMIN — SUCRALFATE 1 G: 1 TABLET ORAL at 20:35

## 2024-06-14 RX ADMIN — SODIUM CHLORIDE 75 ML/HR: 4.5 INJECTION, SOLUTION INTRAVENOUS at 16:31

## 2024-06-14 RX ADMIN — PROCHLORPERAZINE EDISYLATE 5 MG: 5 INJECTION INTRAMUSCULAR; INTRAVENOUS at 09:00

## 2024-06-14 RX ADMIN — SENNOSIDES AND DOCUSATE SODIUM 2 TABLET: 8.6; 5 TABLET ORAL at 20:35

## 2024-06-14 RX ADMIN — HYDRALAZINE HYDROCHLORIDE 100 MG: 50 TABLET ORAL at 20:34

## 2024-06-14 RX ADMIN — HYDRALAZINE HYDROCHLORIDE 100 MG: 50 TABLET ORAL at 14:17

## 2024-06-14 RX ADMIN — PANTOPRAZOLE SODIUM 40 MG: 40 INJECTION, POWDER, FOR SOLUTION INTRAVENOUS at 08:29

## 2024-06-14 RX ADMIN — METOCLOPRAMIDE 5 MG: 5 INJECTION, SOLUTION INTRAMUSCULAR; INTRAVENOUS at 20:35

## 2024-06-14 RX ADMIN — PROCHLORPERAZINE EDISYLATE 5 MG: 5 INJECTION INTRAMUSCULAR; INTRAVENOUS at 15:12

## 2024-06-14 RX ADMIN — METOCLOPRAMIDE 5 MG: 5 INJECTION, SOLUTION INTRAMUSCULAR; INTRAVENOUS at 16:31

## 2024-06-14 RX ADMIN — HYDRALAZINE HYDROCHLORIDE 100 MG: 50 TABLET ORAL at 21:54

## 2024-06-14 RX ADMIN — MIRTAZAPINE 30 MG: 15 TABLET, FILM COATED ORAL at 21:00

## 2024-06-14 RX ADMIN — PANTOPRAZOLE SODIUM 40 MG: 40 INJECTION, POWDER, FOR SOLUTION INTRAVENOUS at 16:31

## 2024-06-14 RX ADMIN — Medication 10 ML: at 08:30

## 2024-06-14 RX ADMIN — METOCLOPRAMIDE 5 MG: 5 INJECTION, SOLUTION INTRAMUSCULAR; INTRAVENOUS at 11:14

## 2024-06-14 NOTE — THERAPY TREATMENT NOTE
Patient Name: Vesta Landry  : 1945    MRN: [id number]                              Today's Date: 2024       Admit Date: 2024    Visit Dx:     ICD-10-CM ICD-9-CM   1. Duodenitis  K29.80 535.60   2. Acute gastritis without hemorrhage, unspecified gastritis type  K29.00 535.00   3. Bilious vomiting with nausea  R11.14 787.04   4. Weight loss, unintentional  R63.4 783.21   5. History of dementia  Z86.59 V11.8   6. Gastritis and duodenitis  K29.90 535.50   7. Intractable nausea and vomiting  R11.2 536.2   8. Gastroesophageal reflux disease without esophagitis  K21.9 530.81     Patient Active Problem List   Diagnosis    Gastroesophageal reflux disease without esophagitis    Pure hypercholesterolemia    HTN (hypertension), benign    Vitamin D deficiency    Bilateral edema of lower extremity    Dysthymia    Chronic diastolic heart failure    Squamous cell carcinoma of left tonsil    Bradycardia    HLD (hyperlipidemia)    Anxiety associated with depression    Personal history of COVID-19    Anemia    Squamous cell carcinoma of head and neck    History of Clostridium difficile colitis    Elevated serum creatinine    Nonischemic nontraumatic myocardial injury    Enterocolitis due to Clostridium difficile, not specified as recurrent    Chronic diastolic heart failure    Personal history of COVID-19    Intractable nausea and vomiting    Gastritis and duodenitis    Nausea and vomiting    Odynophagia     Past Medical History:   Diagnosis Date    Arthritis     Cancer     squamous cell - tonsils    Cancer of tonsil     metastatic squamous cell - left tonsil    CHF (congestive heart failure)     Deep vein thrombosis     bilateral- daughter states on 23 that she doesn't remember patient having this    Dementia     Dysphagia     GERD (gastroesophageal reflux disease)     Hyperlipidemia     Hypertension     Impaired mobility     PONV (postoperative nausea and vomiting)     SOB (shortness of breath)      following chemo infusion on day of infusion last week (week of 1/29-2/4/23) per pt's daughter    Vitamin D deficiency     Wears dentures     uppers - instructed not to use adhesive DOS     Past Surgical History:   Procedure Laterality Date    APPENDECTOMY      CATARACT EXTRACTION      CHOLECYSTECTOMY      COLONOSCOPY      Approx 2009    ENDOSCOPY W/ PEG TUBE PLACEMENT N/A 2/20/2023    Procedure: ESOPHAGOGASTRODUODENOSCOPY WITH PERCUTANEOUS ENDOSCOPIC GASTROSTOMY TUBE INSERTION;  Surgeon: Brigido Guerra MD;  Location: [facility] ENDOSCOPY;  Service: Gastroenterology;  Laterality: N/A;    ENDOSCOPY W/ PEG TUBE PLACEMENT N/A 6/13/2024    Procedure: ESOPHAGOGASTRODUODENOSCOPY WITH BIOPSIES, ESOPHAGEAL DILATION AND PERCUTANEOUS ENDOSCOPIC GASTROSTOMY TUBE INSERTION;  Surgeon: Jared Martin MD;  Location: [facility] ENDOSCOPY;  Service: Gastroenterology;  Laterality: N/A;  ABDOMINAL INCISION PERFORMED AT 1252 PER DR MARTIN    HYSTERECTOMY  1991    OOPHORECTOMY Bilateral 1991    PORTACATH PLACEMENT Right 02/08/2023    Procedure: INSERTION OF PORTACATH;  Surgeon: Brigido Guerra MD;  Location: [facility] OR;  Service: General;  Laterality: Right;    TUBAL ABDOMINAL LIGATION        General Information       Row Name 06/14/24 1502          Physical Therapy Time and Intention    Document Type therapy note (daily note)  -     Mode of Treatment physical therapy  -       Row Name 06/14/24 1502          General Information    Patient Profile Reviewed yes  -MC     Existing Precautions/Restrictions fall  -MC       Row Name 06/14/24 1502          Cognition    Orientation Status (Cognition) oriented x 3  -MC       Row Name 06/14/24 1502          Safety Issues, Functional Mobility    Safety Issues Affecting Function (Mobility) safety precautions follow-through/compliance;safety precaution awareness  -MC     Impairments Affecting Function (Mobility) balance;endurance/activity tolerance;shortness of breath;strength  -MC               User Key   (r) = Recorded By, (t) = Taken By, (c) = Cosigned By      Initials Name Provider Type    Sabrina Noel PT Physical Therapist                   Mobility       Row Name 06/14/24 1503          Bed Mobility    Bed Mobility rolling left;rolling right;scooting/bridging;supine-sit  -MC     Rolling Left [place] (Bed Mobility) minimum assist (75% patient effort)  -MC     Rolling Right [place] (Bed Mobility) minimum assist (75% patient effort)  -MC     Scooting/Bridging [place] (Bed Mobility) minimum assist (75% patient effort)  -MC     Supine-Sit [place] (Bed Mobility) minimum assist (75% patient effort)  -MC     Assistive Device (Bed Mobility) draw sheet;bed rails;head of bed elevated  -MC     Comment, (Bed Mobility) family is looking into beds that raise  -MC       Row Name 06/14/24 1503          Transfers    Comment, (Transfers) patient transfers on and off the commode  -MC       Row Name 06/14/24 1503          Bed-Chair Transfer    Bed-Chair [place] (Transfers) contact guard  -MC       Row Name 06/14/24 1503          Sit-Stand Transfer    Sit-Stand [place] (Transfers) contact guard  -MC     Comment, (Sit-Stand Transfer) patient needs min assist to transfer off the low commode seat.  -MC       Row Name 06/14/24 1503          Gait/Stairs (Locomotion)    [place] Level (Gait) minimum assist (75% patient effort)  -MC     Assistive Device (Gait) other (see comments)  HHA of 2  -MC     Distance in Feet (Gait) 200  -MC     Deviations/Abnormal Patterns (Gait) base of support, narrow;valentín decreased;stride length decreased  -MC     Comment, (Gait/Stairs) patient states she has a walker at home discussed with patient and family she should use that at home for  awhile as she is unsteady with gait  -MC               User Key  (r) = Recorded By, (t) = Taken By, (c) = Cosigned By      Initials Name Provider Type    Sabrina Noel PT Physical Therapist                    Obj/Interventions       Row Name 06/14/24 1506          Range of Motion Comprehensive    General Range of Motion no range of motion deficits identified  -       Row Name 06/14/24 1506          Balance    Balance Assessment sitting static balance;sitting dynamic balance;standing static balance;standing dynamic balance  -MC     Static Sitting Balance independent  -MC     Dynamic Sitting Balance independent  -MC     Position, Sitting Balance unsupported;sitting edge of bed  -MC     Static Standing Balance contact guard  -MC     Dynamic Standing Balance minimal assist  -MC               User Key  (r) = Recorded By, (t) = Taken By, (c) = Cosigned By      Initials Name Provider Type    Sabrina Noel, PT Physical Therapist                   Goals/Plan       Row Name 06/14/24 1509          Bed Mobility Goal 1 (PT)    Activity/Assistive Device (Bed Mobility Goal 1, PT) bed mobility activities, all  -MC     [place] Level/Cues Needed (Bed Mobility Goal 1, PT) independent  -MC     Time Frame (Bed Mobility Goal 1, PT) short term goal (STG);5 days  -[facility] Name 06/14/24 1509          Gait Training Goal 1 (PT)    Activity/Assistive Device (Gait Training Goal 1, PT) gait (walking locomotion);decrease fall risk;diminish gait deviation;improve balance and speed;increase endurance/gait distance  -MC     [place] Level (Gait Training Goal 1, PT) modified independence  -MC     Distance (Gait Training Goal 1, PT) 300  -MC     Time Frame (Gait Training Goal 1, PT) long term goal (LTG);10 days  -MC     Progress/Outcome (Gait Training Goal 1, PT) goal met;goal revised this date;goal ongoing  -[facility] Name 06/14/24 1505          Therapy Assessment/Plan (PT)    Planned Therapy Interventions (PT) balance training;bed mobility training;gait training;strengthening;stair training;transfer training  -MC               User Key  (r) = Recorded By, (t) = Taken By, (c) = Cosigned By      Initials Name Provider Type    MC  Sabrina Machado, PT Physical Therapist                   Clinical Impression       Row Name 06/14/24 1507          Pain    Pretreatment Pain Rating 0/10 - no pain  -MC     Posttreatment Pain Rating 0/10 - no pain  -MC       Row Name 06/14/24 1507          Plan of Care Review    Plan of Care Reviewed With patient;spouse;daughter  -MC     Progress improving  -MC     Outcome Evaluation patient needs min assist for all activity she was able to ambulate 200 ft with HHA of 2 discussed using rolling walker at home and patient and family able to verbalize back instructions. anticipate patient to be able to go home with family assist at D/C  -MC       Row Name 06/14/24 1507          Therapy Assessment/Plan (PT)    Patient/Family Therapy Goals Statement (PT) return to PLOF  -MC     Rehab Potential (PT) good, to achieve stated therapy goals  -MC     Criteria for Skilled Interventions Met (PT) yes;skilled treatment is necessary  -MC     Therapy Frequency (PT) daily  -MC       Row Name 06/14/24 1507          Vital Signs    Pre Patient Position Supine  -MC     Intra Patient Position Standing  -MC     Post Patient Position Sitting  -MC       Row Name 06/14/24 1507          Positioning and Restraints    Pre-Treatment Position in bed  -MC     Post Treatment Position chair  -MC     In Chair notified nsg;reclined;sitting;call light within reach;with family/caregiver;encouraged to call for assist  -MC               User Key  (r) = Recorded By, (t) = Taken By, (c) = Cosigned By      Initials Name Provider Type    Sabrina Noel, PT Physical Therapist                   Outcome Measures       Row Name 06/14/24 1510 06/14/24 0815       How much help from another person do you currently need...    Turning from your back to your side while in flat bed without using bedrails? 4  -MC 4  -CB    Moving from lying on back to sitting on the side of a flat bed without bedrails? 3  -MC 4  -CB    Moving to and from a bed to a chair  (including a wheelchair)? 3  -MC 3  -CB    Standing up from a chair using your arms (e.g., wheelchair, bedside chair)? 3  -MC 3  -CB    Climbing 3-5 steps with a railing? 3  -MC 3  -CB    To walk in hospital room? 3  -MC 3  -CB    AM-PAC 6 Clicks Score (PT) 19  -MC 20  -CB    Highest Level of Mobility Goal 6 --> Walk 10 steps or more  -MC 6 --> Walk 10 steps or more  -CB              User Key  (r) = Recorded By, (t) = Taken By, (c) = Cosigned By      Initials Name Provider Type    Sabrina Noel, PT Physical Therapist    Thais Moreno, RN Registered Nurse                                 Physical Therapy Education       Title: PT OT SLP Therapies (In Progress)       Topic: Physical Therapy (In Progress)       Point: Mobility training (In Progress)       Learning Progress Summary             Patient Acceptance, E, NR by MC at 6/14/2024 1445    Acceptance, E, VU,NR by  at 6/10/2024 0953    Comment: PT POC                         Point: Home exercise program (In Progress)       Learning Progress Summary             Patient Acceptance, E, NR by MC at 6/14/2024 1445    Acceptance, E, VU,NR by LO at 6/10/2024 0953    Comment: PT POC                         Point: Body mechanics (In Progress)       Learning Progress Summary             Patient Acceptance, E, NR by MC at 6/14/2024 1445    Acceptance, E, VU,NR by LO at 6/10/2024 0953    Comment: PT POC                         Point: Precautions (In Progress)       Learning Progress Summary             Patient Acceptance, E, NR by MC at 6/14/2024 1445    Acceptance, E, VU,NR by  at 6/10/2024 0953    Comment: PT POC                                         User Key       Initials Effective Dates Name Provider Type Discipline     02/03/23 -  Sabrina Machado, PT Physical Therapist PT    ERIC 06/16/21 -  Roslyn Pelletier, GERMANIA Physical Therapist PT                  PT Recommendation and Plan  Planned Therapy Interventions (PT): balance training, bed mobility training, gait  training, strengthening, stair training, transfer training  Plan of Care Reviewed With: patient, spouse, daughter  Progress: improving  Outcome Evaluation: patient needs min assist for all activity she was able to ambulate 200 ft with HHA of 2 discussed using rolling walker at home and patient and family able to verbalize back instructions. anticipate patient to be able to go home with family assist at D/C     Time Calculation:         PT Charges       Row Name 06/14/24 1511             Time Calculation    Start Time 1445  -MC      PT Received On 06/14/24  -MC      PT Goal Re-Cert Due Date 06/20/24  -MC         Time Calculation- PT    Total Timed Code Minutes- PT 23 minute(s)  -MC         Timed Charges    80596 - PT Therapeutic Activity Minutes 23  -MC         Total Minutes    Timed Charges Total Minutes 23  -MC       Total Minutes 23  -MC                User Key  (r) = Recorded By, (t) = Taken By, (c) = Cosigned By      Initials Name Provider Type    Sabrina Noel, PT Physical Therapist                  Therapy Charges for Today       Code Description Service Date Service Provider Modifiers Qty    09075161613 HC PT THERAPEUTIC ACT EA 15 MIN 6/14/2024 Sabrina Machado, PT GP 2            PT G-Codes  Outcome Measure Options: AM-PAC 6 Clicks Basic Mobility (PT)  AM-PAC 6 Clicks Score (PT): 19  PT Discharge Summary  Anticipated Discharge Disposition (PT): home with assist, home with home health    Sabrina Machado, PT  6/14/2024

## 2024-06-14 NOTE — PLAN OF CARE
Problem: Adjustment to Illness (Heart Failure)  Goal: Optimal Coping  Intervention: Support Psychosocial Response  Recent Flowsheet Documentation  Taken 6/14/2024 1549 by Ying Gonzales RN  Supportive Measures:   active listening utilized   verbalization of feelings encouraged   decision-making supported   goal-setting facilitated   problem-solving facilitated   positive reinforcement provided  Family/Support System Care:   support provided   self-care encouraged   involvement promoted   presence promoted     Problem: Palliative Care  Goal: Enhanced Quality of Life  Intervention: Optimize Psychosocial Wellbeing  Flowsheets (Taken 6/14/2024 1549)  Supportive Measures:   active listening utilized   verbalization of feelings encouraged   decision-making supported   goal-setting facilitated   problem-solving facilitated   positive reinforcement provided  Family/Support System Care:   support provided   self-care encouraged   involvement promoted   presence promoted   Goal Outcome Evaluation:  Plan of Care Reviewed With: daughter        Progress: no change  Outcome Evaluation: Pt had some n/v today and receiving compazine. Plans to start reglan.  Tube feeds at 15 mL per hour.  Oral diet advanced.  Dtr asking about the right time to transition to hospice. Pt had been a previous hospice pt and had a good routine.  Discussed with family about goals and when to transiton to hospice.   Pt will likely to go home with home health to assist with tube feedings. continue to monitor how pt tolerated tube feeds and diet.  Family has stated that pt has not follow up with oncology or ENT and doesn't want to know anything about her cancer status.           Palliative Team Meeting 1300  MD, APRN, RN   Please call Palliative after hours at [phone]

## 2024-06-14 NOTE — PLAN OF CARE
Goal Outcome Evaluation:  Plan of Care Reviewed With: patient, spouse, daughter        Progress: improving  Outcome Evaluation: patient needs min assist for all activity she was able to ambulate 200 ft with HHA of 2 discussed using rolling walker at home and patient and family able to verbalize back instructions. anticipate patient to be able to go home with family assist at D/C      Anticipated Discharge Disposition (PT): home with assist, home with home health

## 2024-06-14 NOTE — PROGRESS NOTES
"GI Daily Progress Note  Subjective:    Chief Complaint: Nausea and vomiting    Patient resting comfortably in chair today.  Reports she has not had much appetite.  She reports earlier episode of emesis.  No current nausea.  Tolerating current tube feeds at 15/h.    Objective:    /75 (BP Location: Left arm, Patient Position: Sitting)   Pulse 81   Temp 98 °F (36.7 °C) (Oral)   Resp 16   Ht 152.4 cm (60\")   Wt 56.2 kg (124 lb)   SpO2 97%   BMI 24.22 kg/m²     Physical Exam  Physical Exam:   General: Patient elderly appearing in no acute distress   Eyes: Normal lids and lashes, no scleral icterus   Cardiovascular: Regular rate, well-perfused extremities   Pulm: Equal expansion bilaterally, no increased WOB   Abdomen: Soft, nontender, nondistended; hysterostomy site clean dry and intact, G-tube appears to be in proper position   Extremities: No rash or edema   Psychiatric: Normal mood and behavior;    Lab  Lab Results   Component Value Date    WBC 10.88 (H) 06/14/2024    HGB 12.9 06/14/2024    HGB 13.9 06/11/2024    HGB 11.3 (L) 06/10/2024    MCV 95.8 06/14/2024     06/14/2024    INR 1.53 (H) 06/13/2024    INR 1.14 (H) 02/26/2023    INR 1.26 (H) 01/20/2023       Lab Results   Component Value Date    GLUCOSE 79 06/14/2024    BUN 32 (H) 06/14/2024    CREATININE 1.32 (H) 06/14/2024    EGFRIFNONA 39 (L) 01/13/2022    EGFRIFAFRI 47 (L) 01/13/2022    BCR 24.2 06/14/2024     (H) 06/14/2024    K 4.6 06/14/2024    CO2 15.0 (L) 06/14/2024    CALCIUM 8.5 (L) 06/14/2024    PROTENTOTREF 7.3 09/07/2023    ALBUMIN 3.1 (L) 06/13/2024    ALKPHOS 88 06/13/2024    BILITOT 0.2 06/13/2024    ALT 16 06/13/2024    AST 28 06/13/2024       Assessment:  Nausea and vomiting  Gastric and duodenal thickening on CT imaging  Poor appetite  Pharyngeal dysphagia on previous swallow study  History of tonsillar cancer status post incomplete course of chemotherapy and radiation  Esophagitis with benign intrinsic bowel esophageal " stenosis status post dilation  Status post PEG tube placement (h/o previous PEG tube)  THELMA  Hypertension    Plan:  -No findings to clearly explain etiology of patient's nausea and emesis on EGD yesterday.  Still awaiting biopsies however I do not anticipate the results will alter management much given findings  -May advance tube feeds as tolerated.  She may also eat what ever she likes by mouth.  If she develops fullness or nausea may stop tube feeds and attached to drainage bag to allow for gastric venting  -Monitor for refeeding  -Continue PPI  -Complete short course of Carafate (1 to 2 weeks)  -Continue mirtazapine  -Continue gingerroot supplement  -Avoid constipation, titrate bowel regimen up or down as needed  -Antiemetics.  Started on Reglan by primary team today, will monitor response.  -Hypertension control per primary team.  Unclear if this is playing a role or if this is secondary  -Had lengthy discussion regarding history of tonsillar cancer with patient and family particularly with attention to overall goals of care going forward.  She does not really want to know the status of her cancer she is not interested in treatment and therefore further imaging to assess status of her malignancy and for any metastasis not pursued.  -Continue to follow with palliative team for ongoing symptom management and goals of care as her clinical course evolves  -Recommend follow-up with her primary care physician following discharge as well as her primary gastroenterologist Dr. Allred in [facility].  I would not recommend removal of her PEG tube ever.  Her primary gastroenterologist may exchange her tube as needed and could consider exchanging for a low-profile tube in the future.    GI will sign off and see PRN during inpatient stay.  Please let us know if there are questions or concerns.    Jared Martin MD  06/14/24  17:22 EDT

## 2024-06-14 NOTE — CASE MANAGEMENT/SOCIAL WORK
Continued Stay Note   Patrick     Patient Name: Vesta Landry  MRN: [id number]  Today's Date: 6/14/2024    Admit Date: 6/9/2024    Plan: Home HH   Discharge Plan       Row Name 06/14/24 1328       Plan    Plan Home     Patient/Family in Agreement with Plan yes    Plan Comments Spoke with son, daughter and patient at bedside. Patient will go home with TF, they state that previously they used [facility] and [facility] once pt fraduated from hospice services. Referrals given. Anticipate home this weekend. CM will cont to follow.    Final Discharge Disposition Code 06 - home with home health care                   Discharge Codes    No documentation.                 Expected Discharge Date and Time       Expected Discharge Date Expected Discharge Time    Brian 15, 2024               Haylee Colmenares RN

## 2024-06-14 NOTE — PROGRESS NOTES
[facility] Medicine Services  PROGRESS NOTE    Patient Name: Vesta Landry  : 1945  MRN: [id number]    Date of Admission: 2024  Primary Care Physician: Bossman Pedraza MD    Subjective   Subjective     CC: Follow-up nausea and vomiting    HPI: (seen early this AM) Resting in bed, NAD. Tired and weak. Son and dtr in room. Patient very eager to go home. Tube feeds started this am at 0600 and currently on 15 ml/ hour. Tolerating, but patient did vomit this AM after taking two small blood pressure pills with water. Family states they are familiar with how to assist with PEG care/ tube feeds/ and medications for home. Dtr asking if patient can try more soft/ solid foods, she seems to do better with soft than with liquids. No abd pain. No soa. We discussed up-titration of tube feeds and monitoring for toleration, in addition to labs. All understand.     Objective   Objective     Vital Signs:   Temp:  [97.5 °F (36.4 °C)-98.2 °F (36.8 °C)] 97.7 °F (36.5 °C)  Heart Rate:  [72-89] 80  Resp:  [16-20] 16  BP: (143-181)/(67-84) 146/71     Physical Exam:  Constitutional: Chronically ill-appearing elderly female, laying in bed  HENT: NCAT, mucous membranes moist  Respiratory: Clear to auscultation bilaterally, respiratory effort normal   Cardiovascular: RRR, no murmurs, rubs, or gallops  Gastrointestinal: Positive bowel sounds, soft, nontender, nondistended; PEG   Musculoskeletal: No bilateral ankle edema  Psychiatric: Appropriate affect, cooperative  Neurologic: Nonfocal    Results Reviewed:  LAB RESULTS:      Lab 24  0953 24  1830 24  0503 24  0834 06/10/24  0500 24  1310 24  1240   WBC 10.88*  --   --  8.24 6.14  --  7.97   HEMOGLOBIN 12.9  --   --  13.9 11.3*  --  12.9   HEMATOCRIT 41.3  --   --  43.3 34.7  --  40.7   PLATELETS 220  --   --  169 152  --  181   NEUTROS ABS  --   --   --   --   --   --  6.55   IMMATURE GRANS (ABS)  --   --   --    --   --   --  0.02   LYMPHS ABS  --   --   --   --   --   --  0.75   MONOS ABS  --   --   --   --   --   --  0.59   EOS ABS  --   --   --   --   --   --  0.03   MCV 95.8  --   --  92.7 93.0  --  95.1   CRP  --  <0.30  --   --   --   --   --    LACTATE  --   --   --   --   --  0.9  --    PROTIME  --   --  18.5*  --   --   --   --          Lab 06/14/24  0953 06/13/24  1830 06/11/24  0834 06/10/24  0500 06/09/24  1240   SODIUM 146* 142 143 144 147*   POTASSIUM 4.6 4.1 4.2 3.8 3.2*   CHLORIDE 115* 111* 111* 110* 110*   CO2 15.0* 16.0* 20.0* 25.0 25.0   ANION GAP 16.0* 15.0 12.0 9.0 12.0   BUN 32* 29* 16 19 23   CREATININE 1.32* 1.23* 0.90 0.94 1.21*   EGFR 40.6* 44.2* 64.4 61.1 45.1*   GLUCOSE 79 84 91 79 105*   CALCIUM 8.5* 8.4* 8.7 8.2* 8.6   MAGNESIUM 2.1 2.0  --   --   --    PHOSPHORUS 3.7 3.2  --   --   --          Lab 06/13/24  1830 06/09/24  1240   TOTAL PROTEIN 5.0* 6.1   ALBUMIN 3.1* 3.8   GLOBULIN 1.9 2.3   ALT (SGPT) 16 16   AST (SGOT) 28 18   BILIRUBIN 0.2 0.4   ALK PHOS 88 104   LIPASE  --  31         Lab 06/13/24  0503   PROTIME 18.5*   INR 1.53*         Lab 06/13/24  1830   CHOLESTEROL 134   TRIGLYCERIDES 351*             Brief Urine Lab Results  (Last result in the past 365 days)        Color   Clarity   Blood   Leuk Est   Nitrite   Protein   CREAT   Urine HCG        06/09/24 1326 Yellow   Clear   Negative   Negative   Negative   Negative                   Microbiology Results Abnormal       None            No radiology results from the last 24 hrs    Results for orders placed during the hospital encounter of 09/25/23    Adult Transthoracic Echo Complete W/ Cont if Necessary Per Protocol    Interpretation Summary    Left ventricular systolic function is normal. Calculated left ventricular EF = 58.9%    Left ventricular wall thickness is consistent with mild concentric hypertrophy.    Left ventricular diastolic function was indeterminate.    There is mild calcification of the aortic valve.    Estimated  right ventricular systolic pressure from tricuspid regurgitation is mildly elevated (35-45 mmHg).    Mild aortic valve regurgitation.      Current medications:  Scheduled Meds:Radha Root, 1,500 mg, Oral, Daily  hydrALAZINE, 100 mg, Oral, Q8H  hydroCHLOROthiazide Oral, 12.5 mg, Oral, Daily  metoclopramide, 5 mg, Intravenous, 4x Daily  mirtazapine, 30 mg, Oral, Nightly  multivitamin with minerals, 1 tablet, Oral, Daily  nebivolol, 5 mg, Oral, Daily  pantoprazole, 40 mg, Intravenous, BID AC  senna-docusate sodium, 2 tablet, Oral, BID  sodium chloride, 10 mL, Intravenous, Q12H  sucralfate, 1 g, Oral, 4x Daily AC & at Bedtime      Continuous Infusions:sodium chloride, 75 mL/hr      PRN Meds:.  acetaminophen **OR** acetaminophen **OR** acetaminophen    senna-docusate sodium **AND** polyethylene glycol **AND** bisacodyl **AND** bisacodyl    hydrOXYzine    nitroglycerin    ondansetron ODT **OR** ondansetron    prochlorperazine    sodium chloride    sodium chloride    Assessment & Plan   Assessment & Plan     Active Hospital Problems    Diagnosis  POA    **Intractable nausea and vomiting [R11.2]  Yes    Gastritis and duodenitis [K29.90]  Unknown    Elevated serum creatinine [R79.89]  Yes    History of Clostridium difficile colitis [Z86.19]  Not Applicable    HLD (hyperlipidemia) [E78.5]  Yes    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        Brief Hospital Course to date:  Vesta Landry is a 79 y.o. female with hx of tonsil cancer (did not complete treatment), CHF, GERD, HTN, HLD. Pt with Hx PEG tube placement due to difficulty with dysphagia/oral intake during treatment for tonsil cancer. Per family, this was removed about 6 months ago and since then, patient has had issues with  intermittent nausea/vomiting      Intermittent Nausea/Vomiting and diarrhea  Weight Loss   Hx PEG tube s/p removal  -Per patient, symptoms started after PEG removal about 6 months ago. Pt had PEG tube for nutrition while undergoing Tx for tonsil cancer.   -Established care with Dr Serrato GI in [place], with plans for outpatient EGD but symptoms worsened so patient came to ED   -CT A/P with concern for gastritis/duodenitis  -Continue IV PPI twice daily, Reglan, antiemetics and IV fluids  - GI follows- s/p EGD with PEG placement 6/13/2024.  --tube feeds started and titration per nutrition recs   --home once tolerating TF near/ at goal rate and stable labs      Mild Hypernatremia  Dehydration  Hypokalemia  THELMA  -This is likely secondary to above   -continue to monitor and replete electrolytes per protocol  --changed IVF to 1/2 NS today due to increase in sodium again, likely patient needs more free water  --creatinine increase now to 1.3; cont IVF and free water and placed HCTZ on hold   --will recheck sodium/ creatinine at 1700     Hx tonsil cancer  -pt did not complete chemo/radiation. Was on hospice but then graduated.   -Not interested in further oncologic work-up.   -Palliative care consulted to readdress goals of care; desires DNR      Hypertension  -BP remains elevated, but is much improved continue p.o. hydralazine  -prn IV hydralazine is in place  --placed HCTZ back on hold with increase in creatinine and sodium today      Expected Discharge Location and Transportation: Home in 1-2 days   Expected Discharge   Expected Discharge Date: 6/15/2024; Expected Discharge Time:      VTE Prophylaxis:  Mechanical VTE prophylaxis orders are present.         AM-PAC 6 Clicks Score (PT): 19 (06/14/24 1510)    CODE STATUS:   Code Status and Medical Interventions:   Ordered at: 06/12/24 1140     Medical Intervention Limits:    No intubation (DNI)     Level Of Support Discussed With:    Patient     Code Status (Patient has no  pulse and is not breathing):    No CPR (Do Not Attempt to Resuscitate)     Medical Interventions (Patient has pulse or is breathing):    Limited Support       Irina Suazo, APRN  06/14/24

## 2024-06-14 NOTE — PROGRESS NOTES
Clinical Nutrition     Patient Name: Vesta Landry  YOB: 1945  MRN: [id number]  Date of Encounter: 06/14/24 17:37 EDT  Admission date: 6/9/2024  Reason for Visit: Follow-up protocol, EN    Assessment   Nutrition Assessment   Admission Diagnosis:  Intractable nausea and vomiting [R11.2]    Problem List:    Intractable nausea and vomiting    Gastroesophageal reflux disease without esophagitis    HTN (hypertension), benign    Chronic diastolic heart failure    Squamous cell carcinoma of left tonsil    HLD (hyperlipidemia)    History of Clostridium difficile colitis    Elevated serum creatinine    Gastritis and duodenitis      PMH:   She  has a past medical history of Arthritis, Cancer, Cancer of tonsil, CHF (congestive heart failure), Deep vein thrombosis, Dementia, Dysphagia, GERD (gastroesophageal reflux disease), Hyperlipidemia, Hypertension, Impaired mobility, PONV (postoperative nausea and vomiting), SOB (shortness of breath), Vitamin D deficiency, and Wears dentures.    PSH:  She  has a past surgical history that includes Colonoscopy; Appendectomy; Cataract extraction; Cholecystectomy; Tubal ligation; Hysterectomy (1991); Oophorectomy (Bilateral, 1991); Portacath placement (Right, 02/08/2023); Esophagogastroduodenoscopy w/ PEG (N/A, 2/20/2023); and Esophagogastroduodenoscopy w/ PEG (N/A, 6/13/2024).    Applicable Nutrition History:   s/p PEG removal 6 months ago      6-13-24: PEG replaced    - LA Grade B reflux esophagitis with no bleeding. Biopsied. - Benign-appearing esophageal stenosis. Dilated. - Erythematous mucosa in the stomach. Biopsied. - Scar in the gastric body. - Normal examined duodenum. Biopsied. - An externally removable PEG placement was successfully completed.    Labs    Labs Reviewed: Yes    Results from last 7 days   Lab Units 06/14/24  0953 06/13/24  1830 06/11/24  0834 06/10/24  0500 06/09/24  1310 06/09/24  1240   GLUCOSE mg/dL 79 84 91   < >  --  105*  "  BUN mg/dL 32* 29* 16   < >  --  23   CREATININE mg/dL 1.32* 1.23* 0.90   < >  --  1.21*   SODIUM mmol/L 146* 142 143   < >  --  147*   CHLORIDE mmol/L 115* 111* 111*   < >  --  110*   POTASSIUM mmol/L 4.6 4.1 4.2   < >  --  3.2*   PHOSPHORUS mg/dL 3.7 3.2  --   --   --   --    MAGNESIUM mg/dL 2.1 2.0  --   --   --   --    ALT (SGPT) U/L  --  16  --   --   --  16   LACTATE mmol/L  --   --   --   --  0.9  --     < > = values in this interval not displayed.       Results from last 7 days   Lab Units 06/13/24  1830 06/09/24  1240   ALBUMIN g/dL 3.1* 3.8   PREALBUMIN mg/dL 15.2*  --    CRP mg/dL <0.30  --    CHOLESTEROL mg/dL 134  --    TRIGLYCERIDES mg/dL 351*  --        Results from last 7 days   Lab Units 06/14/24  1216   GLUCOSE mg/dL 82     Lab Results   Lab Value Date/Time    HGBA1C 4.90 09/26/2023 0619    HGBA1C 5.30 01/20/2023 0643               Medications    Medications Reviewed: yes  Remeron, MVI, protonix, gingeroot, bowel regimen, carafate  +1/2NS@75ml    Intake/Ouptut 24 hrs (0701 - 0700)   I&O's Reviewed: yes    Intake & Output (last day)         06/13 0701  06/14 0700 06/14 0701  06/15 0700    P.O. 0 50    I.V. (mL/kg) 700 (12.5)     Total Intake(mL/kg) 700 (12.5) 50 (0.9)    Urine (mL/kg/hr) 0 (0)     Emesis/NG output 0 250    Stool 0     Total Output 0 250    Net +700 -200          Urine Unmeasured Occurrence 4 x     Stool Unmeasured Occurrence 1 x            Anthropometrics     Height: Height: 152.4 cm (60\")  Last Filed Weight: Weight: 56.2 kg (124 lb) (06/09/24 1228)  Method: Weight Method: Stated  BMI: BMI (Calculated): 24.2    UBW: 153lb per Oncology RD note 1-31-23    Weight change:  29lb wt loss over past 18 months, 19% wt loss     Weight       Weight (kg) Weight (lbs) Weight Method Visit Report   2/23/2023 66.8 kg  147 lb 4.3 oz  Bed scale     2/24/2023 66.8 kg  147 lb 4.3 oz  Bed scale      66.8 kg  147 lb 4.3 oz      2/25/2023 66.6 kg  146 lb 13.2 oz  Bed scale     2/26/2023 67.7 kg  149 lb 4 " oz  Bed scale     2/27/2023 66.6 kg  146 lb 13.2 oz  Bed scale     3/1/2023 66.9 kg  147 lb 7.8 oz  Bed scale     3/2/2023 65.772 kg  145 lb  Stated      68.811 kg  151 lb 11.2 oz  Bed scale     3/3/2023 68.402 kg  150 lb 12.8 oz  Bed scale     3/4/2023 71.351 kg  157 lb 4.8 oz  Bed scale     3/7/2023 75.297 kg  166 lb  Bed scale     3/10/2023 64.864 kg  143 lb  Stated     9/7/2023 61.598 kg  135 lb 12.8 oz   --    9/25/2023 61.236 kg  135 lb  Stated     9/26/2023 62.596 kg  138 lb  Bed scale      62.6 kg  138 lb 0.1 oz      9/27/2023 61.145 kg  134 lb 12.8 oz  Standing scale     9/28/2023 59.603 kg  131 lb 6.4 oz  Standing scale     10/6/2023 62.052 kg  136 lb 12.8 oz   --    10/19/2023 64.229 kg  141 lb 9.6 oz   --    1/8/2024 63.957 kg  141 lb   --    1/26/2024 61.598 kg  135 lb 12.8 oz   --    2/22/2024 59.875 kg  132 lb  Stated     2/29/2024 56.382 kg  124 lb 4.8 oz   --    5/21/2024 56.881 kg  125 lb 6.4 oz   --     56.881 kg  125 lb 6.4 oz   --    5/22/2024 56.7 kg  125 lb      5/28/2024 56.427 kg  124 lb 6.4 oz   --    6/9/2024 56.246 kg  124 lb  Stated           Nutrition Focused Physical Exam     Date: 6-13-24    Patient meets criteria for malnutrition diagnosis, see MSA note.       Subjective   Reported/Observed/Food/Nutrition Related History:     6-14:  Per RN: pt has vomited 2x today, one time s/p meds, one time after lunch, pt ate a chix finger and some baked potato for lunch, TF has been running @15ml, plan to advance to 30ml later today    Per GI: ok to to frain PEG to gravity if has nausea or fullness    pt sitting up in chair, no complaints at presents, repors she ate some lunch.reports she does not like ensure clear supplements, is wiilling to try some yogurt   6-13: pt resting in bed, s/p EGD/PEG placement, no complaints at present, family at bedside    Family reports pt did not tolerate bolus feeds in the past, previously was using vivonex TF at home, she does not want to have TF hanging at  night, prefers to run  TF during day only    Per RN: pt allowed clear liquids in 4 hours, ok per GI to start EN in 12 hours      6-11: Pt resting in bed, reports poor appetite, has nausea if she eats anything, has had nausea, vomiting since PEG removed 6 months ago, is swallowing ok, has tried oral supplements at home, but dislikes most of them    Per EMR: noted that pt has difficulty tolerating TF, with multiple formula changes last year, did eventually tolerate Isosource HN via pump on last admission    Family reports pt did not like having feeding tube, and it was leaking a lot, so she had it removed, feels pt has had significant wt loss over past 4 months, had lost down to 120lb's, then regained 4lb's, unsure what current wt is    Current Nutrition Prescription     PO: Diet: Regular/House; Texture: Regular (IDDSI 7); Fluid Consistency: Thin (IDDSI 0)  Oral Nutrition Supplement: Ensure Clear #x/day  Intake: Insufficient data:225% of 1 meal    EN: Peptamen AF  Goal Rate: 75  Water Flushes:10ml  Modular: None  Route: PEG  Tube: Unknown    At goal over: 14Hrs/day: FEED 6am to 8pm- daytime only    Rx will supply:   Goal Volume 1050  mL/day     Flush Volume 140 mL/day     Energy 1260 Kcal/day 87 % Est Need   Protein 80 g/day 105 % Est Need   Fiber 6 g/day     Water in   mL     Total Water 991 mL     Meet DRI No , but pt has MVI       --------------------------------------------------------------------------  Product/Rate verified at bedside: Yes  Infusing Rate at time of visit: 15ml    Average Delivery from Charting: Insufficient data   Volume  mL/day   % Goal Vol.   Flush Volume  mL/day     Energy  Kcal/day  % Est Need   Protein  g/day  % Est Need   Fiber  g/day     Water in  EN  mL     Total Water  mL     Meet DRI N/A             Assessment & Plan   Nutrition Diagnosis   Date: 6-11-24 Updated: 6-14-24  Problem Malnutrition     Etiology Tonsillar CA   Signs/Symptoms 29lb wt loss over 18 months, 19% wt loss, po  intake < 75%, moderate muscle wasting, fat loss       Goal:   Nutrition to support treatment, Establish PO, and Establish EN tolerance      Nutrition Intervention      Follow treatment progress, Care plan reviewed, Advised available snacks, Adjusted supplement, Encourage intake, Nutrition support order placed    Add yogurt 2x/day as pt does not like oral supplements    Gradually advance TF 15ml Q 12 hours to goal rate @75ml/hr, increase free water to 25ml/hr    Ok to advance TF to 45ml tomorrow morning  as long as pt tolerating TF @30ml  tonight    THELMA, hypernatremia- Na+ 146    Suggest maintain IVF's until hypernatremia corrected, this should help renal function as well    Monitor electrolytes closely and replace prior to advancing TF as pt at risk for refeeding syndrome    TF at goal volume will provide 1050ml, 1260kcal, 80g protein, 6g fiber, 1201ml free water    May need more fluid if oral intake remains poor      Monitoring/Evaluation:   Per protocol, I&O, PO intake, Supplement intake, Pertinent labs, Weight, Skin status, GI status, Symptoms, Swallow function    Carmenza Motley, NORA  Time Spent:30min

## 2024-06-14 NOTE — PLAN OF CARE
Goal Outcome Evaluation:  Plan of Care Reviewed With: patient   - VSS, RA, A&Ox4  - PEG tube in place. Tube feed initiated at 15 ml/hr.  - No complaints of pain or nausea.   - Fluids infusing.  - Pt resting comfortably.      Progress: no change

## 2024-06-14 NOTE — CONSULTS
"Patient and son welcomed  visit. Patient was sitting up in bed, and expressed that she felt better than she did earlier in the morning. She is not connected to a [employer], but does appreciate prayer and spiritual support. Her greatest wish is to \"go home soon.\"  "

## 2024-06-15 ENCOUNTER — APPOINTMENT (OUTPATIENT)
Dept: CT IMAGING | Facility: HOSPITAL | Age: 79
DRG: 392 | End: 2024-06-15
Payer: MEDICARE

## 2024-06-15 LAB
ANION GAP SERPL CALCULATED.3IONS-SCNC: 14 MMOL/L (ref 5–15)
BASOPHILS # BLD AUTO: 0.03 10*3/MM3 (ref 0–0.2)
BASOPHILS NFR BLD AUTO: 0.4 % (ref 0–1.5)
BUN SERPL-MCNC: 28 MG/DL (ref 8–23)
BUN/CREAT SERPL: 27.7 (ref 7–25)
CALCIUM SPEC-SCNC: 8.3 MG/DL (ref 8.6–10.5)
CHLORIDE SERPL-SCNC: 110 MMOL/L (ref 98–107)
CO2 SERPL-SCNC: 16 MMOL/L (ref 22–29)
CREAT SERPL-MCNC: 1.01 MG/DL (ref 0.57–1)
DEPRECATED RDW RBC AUTO: 50.8 FL (ref 37–54)
EGFRCR SERPLBLD CKD-EPI 2021: 56 ML/MIN/1.73
EOSINOPHIL # BLD AUTO: 0.08 10*3/MM3 (ref 0–0.4)
EOSINOPHIL NFR BLD AUTO: 0.9 % (ref 0.3–6.2)
ERYTHROCYTE [DISTWIDTH] IN BLOOD BY AUTOMATED COUNT: 15.4 % (ref 12.3–15.4)
GLUCOSE BLDC GLUCOMTR-MCNC: 88 MG/DL (ref 70–130)
GLUCOSE BLDC GLUCOMTR-MCNC: 88 MG/DL (ref 70–130)
GLUCOSE BLDC GLUCOMTR-MCNC: 94 MG/DL (ref 70–130)
GLUCOSE BLDC GLUCOMTR-MCNC: 95 MG/DL (ref 70–130)
GLUCOSE SERPL-MCNC: 90 MG/DL (ref 65–99)
HCT VFR BLD AUTO: 38.7 % (ref 34–46.6)
HGB BLD-MCNC: 12.8 G/DL (ref 12–15.9)
IMM GRANULOCYTES # BLD AUTO: 0.07 10*3/MM3 (ref 0–0.05)
IMM GRANULOCYTES NFR BLD AUTO: 0.8 % (ref 0–0.5)
LYMPHOCYTES # BLD AUTO: 1 10*3/MM3 (ref 0.7–3.1)
LYMPHOCYTES NFR BLD AUTO: 11.9 % (ref 19.6–45.3)
MAGNESIUM SERPL-MCNC: 1.9 MG/DL (ref 1.6–2.4)
MCH RBC QN AUTO: 30 PG (ref 26.6–33)
MCHC RBC AUTO-ENTMCNC: 33.1 G/DL (ref 31.5–35.7)
MCV RBC AUTO: 90.6 FL (ref 79–97)
MONOCYTES # BLD AUTO: 0.9 10*3/MM3 (ref 0.1–0.9)
MONOCYTES NFR BLD AUTO: 10.7 % (ref 5–12)
NEUTROPHILS NFR BLD AUTO: 6.35 10*3/MM3 (ref 1.7–7)
NEUTROPHILS NFR BLD AUTO: 75.3 % (ref 42.7–76)
NRBC BLD AUTO-RTO: 0 /100 WBC (ref 0–0.2)
PHOSPHATE SERPL-MCNC: 2.5 MG/DL (ref 2.5–4.5)
PLATELET # BLD AUTO: 211 10*3/MM3 (ref 140–450)
PMV BLD AUTO: 11.1 FL (ref 6–12)
POTASSIUM SERPL-SCNC: 4 MMOL/L (ref 3.5–5.2)
RBC # BLD AUTO: 4.27 10*6/MM3 (ref 3.77–5.28)
SODIUM SERPL-SCNC: 140 MMOL/L (ref 136–145)
WBC NRBC COR # BLD AUTO: 8.43 10*3/MM3 (ref 3.4–10.8)

## 2024-06-15 PROCEDURE — 25010000002 METOCLOPRAMIDE PER 10 MG: Performed by: NURSE PRACTITIONER

## 2024-06-15 PROCEDURE — 25010000002 ONDANSETRON PER 1 MG: Performed by: INTERNAL MEDICINE

## 2024-06-15 PROCEDURE — 80048 BASIC METABOLIC PNL TOTAL CA: CPT | Performed by: NURSE PRACTITIONER

## 2024-06-15 PROCEDURE — 25010000002 SODIUM CHLORIDE 0.9 % WITH KCL 20 MEQ 20-0.9 MEQ/L-% SOLUTION: Performed by: NURSE PRACTITIONER

## 2024-06-15 PROCEDURE — 84100 ASSAY OF PHOSPHORUS: CPT | Performed by: NURSE PRACTITIONER

## 2024-06-15 PROCEDURE — 83735 ASSAY OF MAGNESIUM: CPT | Performed by: NURSE PRACTITIONER

## 2024-06-15 PROCEDURE — 70450 CT HEAD/BRAIN W/O DYE: CPT

## 2024-06-15 PROCEDURE — 99232 SBSQ HOSP IP/OBS MODERATE 35: CPT | Performed by: NURSE PRACTITIONER

## 2024-06-15 PROCEDURE — 85025 COMPLETE CBC W/AUTO DIFF WBC: CPT | Performed by: NURSE PRACTITIONER

## 2024-06-15 PROCEDURE — 82948 REAGENT STRIP/BLOOD GLUCOSE: CPT

## 2024-06-15 RX ORDER — SODIUM CHLORIDE AND POTASSIUM CHLORIDE 150; 900 MG/100ML; MG/100ML
75 INJECTION, SOLUTION INTRAVENOUS CONTINUOUS
Status: DISCONTINUED | OUTPATIENT
Start: 2024-06-15 | End: 2024-06-16

## 2024-06-15 RX ADMIN — HYDRALAZINE HYDROCHLORIDE 100 MG: 50 TABLET ORAL at 20:48

## 2024-06-15 RX ADMIN — METOCLOPRAMIDE 5 MG: 5 INJECTION, SOLUTION INTRAMUSCULAR; INTRAVENOUS at 13:04

## 2024-06-15 RX ADMIN — Medication 1 TABLET: at 09:41

## 2024-06-15 RX ADMIN — SUCRALFATE 1 G: 1 TABLET ORAL at 09:41

## 2024-06-15 RX ADMIN — SUCRALFATE 1 G: 1 TABLET ORAL at 17:46

## 2024-06-15 RX ADMIN — Medication 1500 MG: at 09:42

## 2024-06-15 RX ADMIN — METOCLOPRAMIDE 5 MG: 5 INJECTION, SOLUTION INTRAMUSCULAR; INTRAVENOUS at 20:48

## 2024-06-15 RX ADMIN — NEBIVOLOL 5 MG: 5 TABLET ORAL at 09:42

## 2024-06-15 RX ADMIN — METOCLOPRAMIDE 5 MG: 5 INJECTION, SOLUTION INTRAMUSCULAR; INTRAVENOUS at 17:46

## 2024-06-15 RX ADMIN — PANTOPRAZOLE SODIUM 40 MG: 40 INJECTION, POWDER, FOR SOLUTION INTRAVENOUS at 17:46

## 2024-06-15 RX ADMIN — HYDRALAZINE HYDROCHLORIDE 100 MG: 50 TABLET ORAL at 05:58

## 2024-06-15 RX ADMIN — Medication 10 ML: at 09:42

## 2024-06-15 RX ADMIN — HYDRALAZINE HYDROCHLORIDE 100 MG: 50 TABLET ORAL at 13:04

## 2024-06-15 RX ADMIN — MIRTAZAPINE 30 MG: 15 TABLET, FILM COATED ORAL at 20:49

## 2024-06-15 RX ADMIN — METOCLOPRAMIDE 5 MG: 5 INJECTION, SOLUTION INTRAMUSCULAR; INTRAVENOUS at 09:41

## 2024-06-15 RX ADMIN — POTASSIUM CHLORIDE AND SODIUM CHLORIDE 75 ML/HR: 900; 150 INJECTION, SOLUTION INTRAVENOUS at 13:04

## 2024-06-15 RX ADMIN — SUCRALFATE 1 G: 1 TABLET ORAL at 13:04

## 2024-06-15 RX ADMIN — ACETAMINOPHEN 650 MG: 325 TABLET ORAL at 13:11

## 2024-06-15 RX ADMIN — SENNOSIDES AND DOCUSATE SODIUM 2 TABLET: 8.6; 5 TABLET ORAL at 20:49

## 2024-06-15 RX ADMIN — PANTOPRAZOLE SODIUM 40 MG: 40 INJECTION, POWDER, FOR SOLUTION INTRAVENOUS at 09:41

## 2024-06-15 RX ADMIN — Medication 10 ML: at 21:24

## 2024-06-15 RX ADMIN — SUCRALFATE 1 G: 1 TABLET ORAL at 20:49

## 2024-06-15 RX ADMIN — ONDANSETRON 4 MG: 2 INJECTION INTRAMUSCULAR; INTRAVENOUS at 11:06

## 2024-06-15 NOTE — SIGNIFICANT NOTE
RN (Rizwana) entered room because pump was beeping, PCT (Ashley) who was already in the room, said that when she got in there the patient was already on the toilet. The  said he turned the bed alarm off, got her up to the bathroom, and her feet slipped out and patient reports hitting her head on the wall. Head to toe assessment completed, no visible signs of injury, VSS, patient had no c/o pain. LADARIUS Ken, Briseida Zelaya (charge), and deondre (Elizabeth) notified.  verbalizes understanding that he cannot turn off our alarms, he must call the staff.

## 2024-06-15 NOTE — PLAN OF CARE
Goal Outcome Evaluation:  Plan of Care Reviewed With: patient   - VSS, RA, A&Ox4  - Pt had a fall during the shift. See significant event note.   - PEG tube in place. Tube feed stopped at 2000. PEG tube clamped.  - At 0600, tube feed resumed and increased to 45 ml/hr.   - Fluids infusing.   - Pt had one episode of emesis during the shift. Otherwise, no complaints of pain or nausea.      Progress: no change

## 2024-06-15 NOTE — PROGRESS NOTES
[facility] Medicine Services  PROGRESS NOTE    Patient Name: Vesta Landry  : 1945  MRN: [id number]    Date of Admission: 2024  Primary Care Physician: Bossman Pedraza MD    Subjective   Subjective     CC: Follow-up nausea and vomiting    HPI: Per RN, appears to be tolerating tube feeds, increased rate again this AM. Patient and  state that patient threw up her medications again when taken by mouth. Patient got up to go to restroom without help last night and fell, hitting head on wall. CT head negative. Patient denies any pain, no visible injury. Patient and  understand goal of tube feed, and watching electrolytes/ labs while initiating feeds. Patients blood pressures higher today, but without symptoms. Had a large loose BM overnight. No n/v currently. No pain.     Objective   Objective     Vital Signs:   Temp:  [97.6 °F (36.4 °C)-98.4 °F (36.9 °C)] 97.6 °F (36.4 °C)  Heart Rate:  [73-81] 79  Resp:  [16] 16  BP: (146-178)/(71-87) 174/87     Physical Exam:  Constitutional: Chronically ill-appearing elderly female, laying in bed/ frail   HENT: NCAT, mucous membranes dry   Respiratory: Clear to auscultation bilaterally, respiratory effort normal   Cardiovascular: RRR, no murmurs, rubs, or gallops; port a cath right chest   Gastrointestinal: Positive bowel sounds, soft, nontender, nondistended; PEG   Musculoskeletal: No bilateral ankle edema  Psychiatric: Appropriate affect, cooperative  Neurologic: Nonfocal    Results Reviewed:  LAB RESULTS:      Lab 24  0953 24  1830 24  0503 24  0834 06/10/24  0500 24  1310 24  1240   WBC 10.88*  --   --  8.24 6.14  --  7.97   HEMOGLOBIN 12.9  --   --  13.9 11.3*  --  12.9   HEMATOCRIT 41.3  --   --  43.3 34.7  --  40.7   PLATELETS 220  --   --  169 152  --  181   NEUTROS ABS  --   --   --   --   --   --  6.55   IMMATURE GRANS (ABS)  --   --   --   --   --   --  0.02   LYMPHS ABS  --    --   --   --   --   --  0.75   MONOS ABS  --   --   --   --   --   --  0.59   EOS ABS  --   --   --   --   --   --  0.03   MCV 95.8  --   --  92.7 93.0  --  95.1   CRP  --  <0.30  --   --   --   --   --    LACTATE  --   --   --   --   --  0.9  --    PROTIME  --   --  18.5*  --   --   --   --          Lab 06/14/24  1859 06/14/24  0953 06/13/24  1830 06/11/24  0834 06/10/24  0500   SODIUM 140 146* 142 143 144   POTASSIUM 4.4 4.6 4.1 4.2 3.8   CHLORIDE 111* 115* 111* 111* 110*   CO2 14.0* 15.0* 16.0* 20.0* 25.0   ANION GAP 15.0 16.0* 15.0 12.0 9.0   BUN 31* 32* 29* 16 19   CREATININE 1.12* 1.32* 1.23* 0.90 0.94   EGFR 49.5* 40.6* 44.2* 64.4 61.1   GLUCOSE 95 79 84 91 79   CALCIUM 8.0* 8.5* 8.4* 8.7 8.2*   MAGNESIUM  --  2.1 2.0  --   --    PHOSPHORUS  --  3.7 3.2  --   --          Lab 06/13/24  1830 06/09/24  1240   TOTAL PROTEIN 5.0* 6.1   ALBUMIN 3.1* 3.8   GLOBULIN 1.9 2.3   ALT (SGPT) 16 16   AST (SGOT) 28 18   BILIRUBIN 0.2 0.4   ALK PHOS 88 104   LIPASE  --  31         Lab 06/13/24  0503   PROTIME 18.5*   INR 1.53*         Lab 06/13/24  1830   CHOLESTEROL 134   TRIGLYCERIDES 351*             Brief Urine Lab Results  (Last result in the past 365 days)        Color   Clarity   Blood   Leuk Est   Nitrite   Protein   CREAT   Urine HCG        06/09/24 1326 Yellow   Clear   Negative   Negative   Negative   Negative                   Microbiology Results Abnormal       None            CT Head Without Contrast    Result Date: 6/15/2024  CT HEAD WO CONTRAST Date of Exam: 6/15/2024 2:06 AM EDT Indication: fell, hit head. Comparison: 9/26/2023. Technique: Axial CT images were obtained of the head without contrast administration.  Automated exposure control and iterative construction methods were used. Findings: There is no acute intracranial hemorrhage. Gray-white differentiation appears intact. There are moderate stable patchy areas of white matter hypoattenuation. Basal ganglia, brainstem and cerebellum appear  unremarkable. No mass effect, midline shift or abnormal extra-axial collections. Thinning of the orbital lenses would suggest prior cataract surgery. The paranasal sinuses and mastoid air cells appear well aerated. The calvarium is intact.     Impression: Impression: 1. No acute intracranial abnormality. 2. Moderate chronic small vessel ischemic change. Electronically Signed: Arpit Arreola MD  6/15/2024 3:26 AM EDT  Workstation ID: [id number]     Results for orders placed during the hospital encounter of 09/25/23    Adult Transthoracic Echo Complete W/ Cont if Necessary Per Protocol    Interpretation Summary    Left ventricular systolic function is normal. Calculated left ventricular EF = 58.9%    Left ventricular wall thickness is consistent with mild concentric hypertrophy.    Left ventricular diastolic function was indeterminate.    There is mild calcification of the aortic valve.    Estimated right ventricular systolic pressure from tricuspid regurgitation is mildly elevated (35-45 mmHg).    Mild aortic valve regurgitation.      Current medications:  Scheduled Meds:Radha Root, 1,500 mg, Oral, Daily  hydrALAZINE, 100 mg, Oral, Q8H  [Held by provider] hydroCHLOROthiazide Oral, 12.5 mg, Oral, Daily  metoclopramide, 5 mg, Intravenous, 4x Daily  mirtazapine, 30 mg, Oral, Nightly  multivitamin with minerals, 1 tablet, Oral, Daily  nebivolol, 5 mg, Oral, Daily  pantoprazole, 40 mg, Intravenous, BID AC  senna-docusate sodium, 2 tablet, Oral, BID  sodium chloride, 10 mL, Intravenous, Q12H  sucralfate, 1 g, Oral, 4x Daily AC & at Bedtime      Continuous Infusions:     PRN Meds:.  acetaminophen **OR** acetaminophen **OR** acetaminophen    senna-docusate sodium **AND** polyethylene glycol **AND** bisacodyl **AND** bisacodyl    hydrOXYzine    nitroglycerin    ondansetron ODT **OR** ondansetron    prochlorperazine    sodium chloride    sodium chloride    Assessment & Plan   Assessment & Plan     Active Hospital Problems     Diagnosis  POA    **Intractable nausea and vomiting [R11.2]  Yes    Gastritis and duodenitis [K29.90]  Unknown    Elevated serum creatinine [R79.89]  Yes    History of Clostridium difficile colitis [Z86.19]  Not Applicable    HLD (hyperlipidemia) [E78.5]  Yes    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        Brief Hospital Course to date:  Vesta Landry is a 79 y.o. female with hx of tonsil cancer (did not complete treatment), CHF, GERD, HTN, HLD. Pt with Hx PEG tube placement due to difficulty with dysphagia/oral intake during treatment for tonsil cancer. Per family, this was removed about 6 months ago and since then, patient has had issues with intermittent nausea/vomiting      Intermittent Nausea/Vomiting and diarrhea  Weight Loss   Hx PEG tube s/p removal  -Per patient, symptoms started after PEG removal about 6 months ago. Pt had PEG tube for nutrition while undergoing Tx for tonsil cancer.   -Established care with Dr Serrato, GI in [place], with plans for outpatient EGD but symptoms worsened so patient came to ED   -CT A/P with concern for gastritis/duodenitis  -Continue IV PPI twice daily, Reglan, antiemetics and IV fluids  - GI follows- s/p EGD with PEG placement 6/13/2024. No overt findings for n/v per GI   --tube feeds started and titration per nutrition recs   --home once tolerating TF near/ at goal rate and stable labs      Mild Hypernatremia  Dehydration  Hypokalemia  THELMA  -This is likely secondary to above   -continue to monitor and replete electrolytes per protocol  --changed IVF to 1/2 NS today due to increase in sodium again, likely patient needs more free water  --creatinine increase now to 1.3; cont IVF and free water and placed HCTZ on hold   --AM labs  stable, continue IVF and repeat labs in am again      Hx tonsil cancer  -pt did not complete chemo/radiation. Was on hospice but then graduated.   -Not interested in further oncologic work-up.   -Palliative care consulted to readdress goals of care; desires DNR      Hypertension  -BP remains elevated, but is much improved continue p.o. hydralazine  -prn IV hydralazine is in place  --placed HCTZ back on hold with increase in creatinine and sodium   --asymptomatic from elevated BP, will restart HCTZ if labs allow      Expected Discharge Location and Transportation: Home in 1 day if tolerating tube feeds and labs remain stable   Expected Discharge   Expected Discharge Date: 6/15/2024; Expected Discharge Time:      VTE Prophylaxis:  Mechanical VTE prophylaxis orders are present.         AM-PAC 6 Clicks Score (PT): 19 (06/14/24 2000)    CODE STATUS:   Code Status and Medical Interventions:   Ordered at: 06/12/24 1140     Medical Intervention Limits:    No intubation (DNI)     Level Of Support Discussed With:    Patient     Code Status (Patient has no pulse and is not breathing):    No CPR (Do Not Attempt to Resuscitate)     Medical Interventions (Patient has pulse or is breathing):    Limited Support       Irina Suazo, APRN  06/15/24

## 2024-06-16 VITALS
BODY MASS INDEX: 25.62 KG/M2 | RESPIRATION RATE: 18 BRPM | SYSTOLIC BLOOD PRESSURE: 155 MMHG | DIASTOLIC BLOOD PRESSURE: 71 MMHG | TEMPERATURE: 97.9 F | OXYGEN SATURATION: 97 % | HEART RATE: 74 BPM | HEIGHT: 60 IN | WEIGHT: 130.5 LBS

## 2024-06-16 PROBLEM — E44.0 MODERATE MALNUTRITION: Status: ACTIVE | Noted: 2024-06-16

## 2024-06-16 LAB
ANION GAP SERPL CALCULATED.3IONS-SCNC: 9 MMOL/L (ref 5–15)
BUN SERPL-MCNC: 24 MG/DL (ref 8–23)
BUN/CREAT SERPL: 26.7 (ref 7–25)
CALCIUM SPEC-SCNC: 7.6 MG/DL (ref 8.6–10.5)
CHLORIDE SERPL-SCNC: 109 MMOL/L (ref 98–107)
CO2 SERPL-SCNC: 18 MMOL/L (ref 22–29)
CREAT SERPL-MCNC: 0.9 MG/DL (ref 0.57–1)
EGFRCR SERPLBLD CKD-EPI 2021: 64.4 ML/MIN/1.73
GLUCOSE BLDC GLUCOMTR-MCNC: 102 MG/DL (ref 70–130)
GLUCOSE BLDC GLUCOMTR-MCNC: 106 MG/DL (ref 70–130)
GLUCOSE BLDC GLUCOMTR-MCNC: 111 MG/DL (ref 70–130)
GLUCOSE BLDC GLUCOMTR-MCNC: 91 MG/DL (ref 70–130)
GLUCOSE SERPL-MCNC: 94 MG/DL (ref 65–99)
MAGNESIUM SERPL-MCNC: 1.7 MG/DL (ref 1.6–2.4)
PHOSPHATE SERPL-MCNC: 1.6 MG/DL (ref 2.5–4.5)
POTASSIUM SERPL-SCNC: 4.3 MMOL/L (ref 3.5–5.2)
SODIUM SERPL-SCNC: 136 MMOL/L (ref 136–145)

## 2024-06-16 PROCEDURE — 25810000003 SODIUM CHLORIDE 0.9 % SOLUTION 250 ML FLEX CONT: Performed by: NURSE PRACTITIONER

## 2024-06-16 PROCEDURE — 25010000002 ONDANSETRON PER 1 MG

## 2024-06-16 PROCEDURE — 99239 HOSP IP/OBS DSCHRG MGMT >30: CPT | Performed by: NURSE PRACTITIONER

## 2024-06-16 PROCEDURE — 25010000002 HEPARIN LOCK FLUSH PER 10 UNITS: Performed by: NURSE PRACTITIONER

## 2024-06-16 PROCEDURE — 80048 BASIC METABOLIC PNL TOTAL CA: CPT | Performed by: NURSE PRACTITIONER

## 2024-06-16 PROCEDURE — 25010000002 SODIUM CHLORIDE 0.9 % WITH KCL 20 MEQ 20-0.9 MEQ/L-% SOLUTION: Performed by: NURSE PRACTITIONER

## 2024-06-16 PROCEDURE — 84100 ASSAY OF PHOSPHORUS: CPT | Performed by: NURSE PRACTITIONER

## 2024-06-16 PROCEDURE — 25010000002 METOCLOPRAMIDE PER 10 MG: Performed by: NURSE PRACTITIONER

## 2024-06-16 PROCEDURE — 83735 ASSAY OF MAGNESIUM: CPT | Performed by: NURSE PRACTITIONER

## 2024-06-16 PROCEDURE — 82948 REAGENT STRIP/BLOOD GLUCOSE: CPT

## 2024-06-16 RX ORDER — POLYETHYLENE GLYCOL 3350 17 G/17G
17 POWDER, FOR SOLUTION ORAL DAILY PRN
Status: DISCONTINUED | OUTPATIENT
Start: 2024-06-16 | End: 2024-06-16 | Stop reason: HOSPADM

## 2024-06-16 RX ORDER — AMOXICILLIN 250 MG
2 CAPSULE ORAL 2 TIMES DAILY
Status: DISCONTINUED | OUTPATIENT
Start: 2024-06-16 | End: 2024-06-16

## 2024-06-16 RX ORDER — AMOXICILLIN 250 MG
2 CAPSULE ORAL 2 TIMES DAILY PRN
Status: DISCONTINUED | OUTPATIENT
Start: 2024-06-16 | End: 2024-06-16 | Stop reason: HOSPADM

## 2024-06-16 RX ORDER — ACETAMINOPHEN 160 MG/5ML
650 SOLUTION ORAL EVERY 4 HOURS PRN
Status: DISCONTINUED | OUTPATIENT
Start: 2024-06-16 | End: 2024-06-16 | Stop reason: HOSPADM

## 2024-06-16 RX ORDER — GUAR GUM
1 PACKET (EA) ORAL 2 TIMES DAILY
Status: DISCONTINUED | OUTPATIENT
Start: 2024-06-16 | End: 2024-06-16 | Stop reason: HOSPADM

## 2024-06-16 RX ORDER — MULTIVIT AND MINERALS-FERROUS GLUCONATE 9 MG IRON/15 ML ORAL LIQUID 9 MG/15 ML
15 LIQUID (ML) ORAL DAILY
Status: DISCONTINUED | OUTPATIENT
Start: 2024-06-16 | End: 2024-06-16 | Stop reason: HOSPADM

## 2024-06-16 RX ORDER — HEPARIN SODIUM (PORCINE) LOCK FLUSH IV SOLN 100 UNIT/ML 100 UNIT/ML
500 SOLUTION INTRAVENOUS ONCE
Status: COMPLETED | OUTPATIENT
Start: 2024-06-16 | End: 2024-06-16

## 2024-06-16 RX ORDER — ACETAMINOPHEN 325 MG/1
650 TABLET ORAL EVERY 4 HOURS PRN
Status: DISCONTINUED | OUTPATIENT
Start: 2024-06-16 | End: 2024-06-16 | Stop reason: HOSPADM

## 2024-06-16 RX ORDER — SUCRALFATE 1 G/1
1 TABLET ORAL
Status: DISCONTINUED | OUTPATIENT
Start: 2024-06-16 | End: 2024-06-16 | Stop reason: HOSPADM

## 2024-06-16 RX ORDER — METOCLOPRAMIDE 5 MG/1
5 TABLET ORAL 3 TIMES DAILY PRN
Qty: 30 TABLET | Refills: 0 | Status: SHIPPED | OUTPATIENT
Start: 2024-06-16

## 2024-06-16 RX ORDER — ACETAMINOPHEN 650 MG/1
650 SUPPOSITORY RECTAL EVERY 4 HOURS PRN
Status: DISCONTINUED | OUTPATIENT
Start: 2024-06-16 | End: 2024-06-16 | Stop reason: HOSPADM

## 2024-06-16 RX ORDER — HYDROCHLOROTHIAZIDE 25 MG/1
12.5 TABLET ORAL DAILY
Status: DISCONTINUED | OUTPATIENT
Start: 2024-06-17 | End: 2024-06-16 | Stop reason: HOSPADM

## 2024-06-16 RX ORDER — BISACODYL 10 MG
10 SUPPOSITORY, RECTAL RECTAL DAILY PRN
Status: DISCONTINUED | OUTPATIENT
Start: 2024-06-16 | End: 2024-06-16 | Stop reason: HOSPADM

## 2024-06-16 RX ORDER — GINGER ROOT
1500 POWDER (GRAM) MISCELLANEOUS DAILY
Start: 2024-06-17

## 2024-06-16 RX ORDER — MIRTAZAPINE 15 MG/1
30 TABLET, FILM COATED ORAL NIGHTLY
Status: DISCONTINUED | OUTPATIENT
Start: 2024-06-16 | End: 2024-06-16 | Stop reason: HOSPADM

## 2024-06-16 RX ORDER — NEBIVOLOL 5 MG/1
5 TABLET ORAL DAILY
Status: DISCONTINUED | OUTPATIENT
Start: 2024-06-17 | End: 2024-06-16 | Stop reason: HOSPADM

## 2024-06-16 RX ORDER — ONDANSETRON 4 MG/1
4 TABLET, ORALLY DISINTEGRATING ORAL EVERY 6 HOURS PRN
Qty: 30 TABLET | Refills: 0 | Status: SHIPPED | OUTPATIENT
Start: 2024-06-16

## 2024-06-16 RX ORDER — HYDRALAZINE HYDROCHLORIDE 50 MG/1
100 TABLET, FILM COATED ORAL EVERY 8 HOURS SCHEDULED
Status: DISCONTINUED | OUTPATIENT
Start: 2024-06-16 | End: 2024-06-16 | Stop reason: HOSPADM

## 2024-06-16 RX ORDER — METOCLOPRAMIDE HYDROCHLORIDE 5 MG/ML
5 INJECTION INTRAMUSCULAR; INTRAVENOUS EVERY 6 HOURS PRN
Status: DISCONTINUED | OUTPATIENT
Start: 2024-06-16 | End: 2024-06-16 | Stop reason: HOSPADM

## 2024-06-16 RX ORDER — HYDROXYZINE HYDROCHLORIDE 25 MG/1
25 TABLET, FILM COATED ORAL 3 TIMES DAILY PRN
Qty: 30 TABLET | Refills: 0 | Status: SHIPPED | OUTPATIENT
Start: 2024-06-16

## 2024-06-16 RX ORDER — HYDROXYZINE HYDROCHLORIDE 25 MG/1
25 TABLET, FILM COATED ORAL 3 TIMES DAILY PRN
Status: DISCONTINUED | OUTPATIENT
Start: 2024-06-16 | End: 2024-06-16 | Stop reason: HOSPADM

## 2024-06-16 RX ORDER — BISACODYL 10 MG
10 SUPPOSITORY, RECTAL RECTAL DAILY PRN
Status: DISCONTINUED | OUTPATIENT
Start: 2024-06-16 | End: 2024-06-16

## 2024-06-16 RX ORDER — POLYETHYLENE GLYCOL 3350 17 G/17G
17 POWDER, FOR SOLUTION ORAL DAILY PRN
Status: DISCONTINUED | OUTPATIENT
Start: 2024-06-16 | End: 2024-06-16

## 2024-06-16 RX ADMIN — HYDRALAZINE HYDROCHLORIDE 100 MG: 50 TABLET ORAL at 15:19

## 2024-06-16 RX ADMIN — NEBIVOLOL 5 MG: 5 TABLET ORAL at 09:42

## 2024-06-16 RX ADMIN — ONDANSETRON 4 MG: 2 INJECTION INTRAMUSCULAR; INTRAVENOUS at 12:17

## 2024-06-16 RX ADMIN — SODIUM PHOSPHATE, MONOBASIC, MONOHYDRATE AND SODIUM PHOSPHATE, DIBASIC, ANHYDROUS 15 MMOL: 142; 276 INJECTION, SOLUTION INTRAVENOUS at 11:42

## 2024-06-16 RX ADMIN — SODIUM PHOSPHATE, MONOBASIC, MONOHYDRATE AND SODIUM PHOSPHATE, DIBASIC, ANHYDROUS 15 MMOL: 142; 276 INJECTION, SOLUTION INTRAVENOUS at 15:17

## 2024-06-16 RX ADMIN — METOCLOPRAMIDE 5 MG: 5 INJECTION, SOLUTION INTRAMUSCULAR; INTRAVENOUS at 09:41

## 2024-06-16 RX ADMIN — HEPARIN 500 UNITS: 100 SYRINGE at 18:22

## 2024-06-16 RX ADMIN — POTASSIUM CHLORIDE AND SODIUM CHLORIDE 75 ML/HR: 900; 150 INJECTION, SOLUTION INTRAVENOUS at 01:23

## 2024-06-16 RX ADMIN — SUCRALFATE 1 G: 1 TABLET ORAL at 09:42

## 2024-06-16 RX ADMIN — Medication 1500 MG: at 09:50

## 2024-06-16 RX ADMIN — HYDRALAZINE HYDROCHLORIDE 100 MG: 50 TABLET ORAL at 05:39

## 2024-06-16 RX ADMIN — PANTOPRAZOLE SODIUM 40 MG: 40 INJECTION, POWDER, FOR SOLUTION INTRAVENOUS at 09:41

## 2024-06-16 NOTE — PROGRESS NOTES
[facility] Medicine Services  PROGRESS NOTE    Patient Name: Vesta Landry  : 1945  MRN: [id number]    Date of Admission: 2024  Primary Care Physician: Bossman Pedraza MD    Subjective   Subjective     CC: Follow-up nausea and vomiting    HPI:   Resting in bed, ate approx 25% of her breakfast and is tolerating without n/v. Has been having loose stools. No abd pain. Dtr in room. I discussed her need to remain in house to have electrolytes replaced. Patient is very upset and wants to go home. States she will not be returning to any hospital after DC. We discussed option to return home with Hospice again if she desired. States she wants to talk to her  about this. Cont to think about her GOC.     Objective   Objective     Vital Signs:   Temp:  [98 °F (36.7 °C)-98.4 °F (36.9 °C)] 98.1 °F (36.7 °C)  Heart Rate:  [77-87] 83  Resp:  [16-18] 18  BP: (130-151)/(70-79) 145/79  Flow (L/min):  [0] 0     Physical Exam:  Constitutional: Chronically ill-appearing elderly female, laying in bed/ frail   HENT: NCAT, mucous membranes moist   Respiratory: Clear to auscultation bilaterally, respiratory effort normal   Cardiovascular: RRR, no murmurs, rubs, or gallops; port a cath right chest   Gastrointestinal: Positive bowel sounds, soft, nontender, nondistended; PEG   Musculoskeletal: No bilateral ankle edema  Psychiatric: Appropriate affect, cooperative  Neurologic: Nonfocal    Results Reviewed:  LAB RESULTS:      Lab 06/15/24  0851 24  0953 24  1830 24  0503 24  0834 06/10/24  0500   WBC 8.43 10.88*  --   --  8.24 6.14   HEMOGLOBIN 12.8 12.9  --   --  13.9 11.3*   HEMATOCRIT 38.7 41.3  --   --  43.3 34.7   PLATELETS 211 220  --   --  169 152   NEUTROS ABS 6.35  --   --   --   --   --    IMMATURE GRANS (ABS) 0.07*  --   --   --   --   --    LYMPHS ABS 1.00  --   --   --   --   --    MONOS ABS 0.90  --   --   --   --   --    EOS ABS 0.08  --   --   --   --    --    MCV 90.6 95.8  --   --  92.7 93.0   CRP  --   --  <0.30  --   --   --    PROTIME  --   --   --  18.5*  --   --          Lab 06/16/24  0545 06/15/24  0851 06/14/24  1859 06/14/24  0953 06/13/24  1830   SODIUM 136 140 140 146* 142   POTASSIUM 4.3 4.0 4.4 4.6 4.1   CHLORIDE 109* 110* 111* 115* 111*   CO2 18.0* 16.0* 14.0* 15.0* 16.0*   ANION GAP 9.0 14.0 15.0 16.0* 15.0   BUN 24* 28* 31* 32* 29*   CREATININE 0.90 1.01* 1.12* 1.32* 1.23*   EGFR 64.4 56.0* 49.5* 40.6* 44.2*   GLUCOSE 94 90 95 79 84   CALCIUM 7.6* 8.3* 8.0* 8.5* 8.4*   MAGNESIUM 1.7 1.9  --  2.1 2.0   PHOSPHORUS 1.6* 2.5  --  3.7 3.2         Lab 06/13/24  1830   TOTAL PROTEIN 5.0*   ALBUMIN 3.1*   GLOBULIN 1.9   ALT (SGPT) 16   AST (SGOT) 28   BILIRUBIN 0.2   ALK PHOS 88         Lab 06/13/24  0503   PROTIME 18.5*   INR 1.53*         Lab 06/13/24  1830   CHOLESTEROL 134   TRIGLYCERIDES 351*             Brief Urine Lab Results  (Last result in the past 365 days)        Color   Clarity   Blood   Leuk Est   Nitrite   Protein   CREAT   Urine HCG        06/09/24 1326 Yellow   Clear   Negative   Negative   Negative   Negative                   Microbiology Results Abnormal       None            CT Head Without Contrast    Result Date: 6/15/2024  CT HEAD WO CONTRAST Date of Exam: 6/15/2024 2:06 AM EDT Indication: fell, hit head. Comparison: 9/26/2023. Technique: Axial CT images were obtained of the head without contrast administration.  Automated exposure control and iterative construction methods were used. Findings: There is no acute intracranial hemorrhage. Gray-white differentiation appears intact. There are moderate stable patchy areas of white matter hypoattenuation. Basal ganglia, brainstem and cerebellum appear unremarkable. No mass effect, midline shift or abnormal extra-axial collections. Thinning of the orbital lenses would suggest prior cataract surgery. The paranasal sinuses and mastoid air cells appear well aerated. The calvarium is intact.      Impression: Impression: 1. No acute intracranial abnormality. 2. Moderate chronic small vessel ischemic change. Electronically Signed: Arpit Arreola MD  6/15/2024 3:26 AM EDT  Workstation ID: [id number]     Results for orders placed during the hospital encounter of 09/25/23    Adult Transthoracic Echo Complete W/ Cont if Necessary Per Protocol    Interpretation Summary    Left ventricular systolic function is normal. Calculated left ventricular EF = 58.9%    Left ventricular wall thickness is consistent with mild concentric hypertrophy.    Left ventricular diastolic function was indeterminate.    There is mild calcification of the aortic valve.    Estimated right ventricular systolic pressure from tricuspid regurgitation is mildly elevated (35-45 mmHg).    Mild aortic valve regurgitation.      Current medications:  Scheduled Meds:Radha Root, 1,500 mg, Oral, Daily  hydrALAZINE, 100 mg, Per PEG Tube, Q8H  [Held by provider] hydroCHLOROthiazide Oral, 12.5 mg, Per PEG Tube, Daily  metoclopramide, 5 mg, Intravenous, 4x Daily  mirtazapine, 30 mg, Per PEG Tube, Nightly  multivitamin and minerals, 15 mL, Per PEG Tube, Daily  [START ON 6/17/2024] nebivolol, 5 mg, Per PEG Tube, Daily  Nutrisource fiber, 1 packet, Per G Tube, BID  pantoprazole, 40 mg, Intravenous, BID AC  senna-docusate sodium, 2 tablet, Per PEG Tube, BID  sodium chloride, 10 mL, Intravenous, Q12H  sodium phosphate, 15 mmol, Intravenous, Q3H  sucralfate, 1 g, Per PEG Tube, 4x Daily AC & at Bedtime      Continuous Infusions:     PRN Meds:.  acetaminophen **OR** acetaminophen **OR** acetaminophen    senna-docusate sodium **AND** polyethylene glycol **AND** [DISCONTINUED] bisacodyl **AND** bisacodyl    Calcium Replacement - Follow Nurse / BPA Driven Protocol    hydrOXYzine    Magnesium Low Dose Replacement - Follow Nurse / BPA Driven Protocol    nitroglycerin    ondansetron ODT **OR** ondansetron    Phosphorus Replacement - Follow Nurse / BPA Driven  Protocol    Potassium Replacement - Follow Nurse / BPA Driven Protocol    prochlorperazine    sodium chloride    sodium chloride    Assessment & Plan   Assessment & Plan     Active Hospital Problems    Diagnosis  POA    **Intractable nausea and vomiting [R11.2]  Yes    Moderate malnutrition [E44.0]  Yes    Gastritis and duodenitis [K29.90]  Unknown    Elevated serum creatinine [R79.89]  Yes    History of Clostridium difficile colitis [Z86.19]  Not Applicable    HLD (hyperlipidemia) [E78.5]  Yes    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        Brief Hospital Course to date:  Vesta Landry is a 79 y.o. female with hx of tonsil cancer (did not complete treatment), CHF, GERD, HTN, HLD. Pt with Hx PEG tube placement due to difficulty with dysphagia/oral intake during treatment for tonsil cancer. Per family, this was removed about 6 months ago and since then, patient has had issues with intermittent nausea/vomiting      Intermittent Nausea/Vomiting and diarrhea  Weight Loss   Hx PEG tube s/p removal  -Per patient, symptoms started after PEG removal about 6 months ago. Pt had PEG tube for nutrition while undergoing Tx for tonsil cancer.   -Established care with PAULINA Rivera in [place], with plans for outpatient EGD but symptoms worsened so patient came to ED   -CT A/P with concern for gastritis/duodenitis  -Continue IV PPI twice daily, antiemetics and IV fluids  - GI follows- s/p EGD with PEG placement 6/13/2024. No overt findings for n/v per GI   --tube feeds started and titration per nutrition recs   --home once tolerating TF near/ at goal rate and stable labs   --continuing to monitor refeeding syndrome as we are moving closer to her TF goal.   --due to frequency of loose  stools, will place Reglan as PRN for now and monitor n/v; Dietary added fiber and placing bowel regimen as PRN as well   --repeat labs in am      Mild Hypernatremia  Hypokalemia  Hypophosphatemia   THELMA  -This is likely secondary to above   -continue to monitor and replete electrolytes per protocol  --changed IVF to 1/2 NS today due to increase in sodium again, likely patient needs more free water  --creatinine increase to 1.3; cont IVF and free water and placed HCTZ on hold   -creatinine improved, down to 0.9  --replacing electrolytes per protocol      Hx tonsil cancer  -pt did not complete chemo/radiation. Was on hospice but then graduated.   -Not interested in further oncologic work-up.   -Palliative care consulted to readdress goals of care; desires DNR      Hypertension  -BP remains elevated, but is much improved continue p.o. hydralazine  -prn IV hydralazine is in place  --placed HCTZ back on hold with increase in creatinine and sodium   --asymptomatic from elevated BP, will restart HCTZ if labs allow      Expected Discharge Location and Transportation: Home in 1-2 days if tolerating tube feeds and labs remain stable   Expected Discharge   Expected Discharge Date: 6/15/2024; Expected Discharge Time:      VTE Prophylaxis:  Mechanical VTE prophylaxis orders are present.         AM-PAC 6 Clicks Score (PT): 18 (06/15/24 2000)    CODE STATUS:   Code Status and Medical Interventions:   Ordered at: 06/12/24 1140     Medical Intervention Limits:    No intubation (DNI)     Level Of Support Discussed With:    Patient     Code Status (Patient has no pulse and is not breathing):    No CPR (Do Not Attempt to Resuscitate)     Medical Interventions (Patient has pulse or is breathing):    Limited Support       Irina Suazo, APRN  06/16/24

## 2024-06-16 NOTE — DISCHARGE SUMMARY
[facility] Medicine Services  DISCHARGE SUMMARY    Patient Name: Vesta Landry  : 1945  MRN: [id number]    Date of Admission: 2024 12:42 PM  Date of Discharge:  2024  Primary Care Physician: Bossman Pedraza MD    Consults       Date and Time Order Name Status Description    2024  4:43 PM Inpatient Palliative Care MD Consult Completed     2024  5:47 PM Inpatient Gastroenterology Consult Completed             Hospital Course       Active Hospital Problems    Diagnosis  POA    **Intractable nausea and vomiting [R11.2]  Yes    Moderate malnutrition [E44.0]  Yes    Gastritis and duodenitis [K29.90]  Unknown    Elevated serum creatinine [R79.89]  Yes    History of Clostridium difficile colitis [Z86.19]  Not Applicable    HLD (hyperlipidemia) [E78.5]  Yes    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          Hospital Course:  Vesta Landry is a 79 y.o. female   with hx of tonsil cancer (did not complete treatment), CHF, GERD, HTN, HLD. Pt with Hx PEG tube placement due to difficulty with dysphagia/oral intake during treatment for tonsil cancer. Per family, this was removed about 6 months ago and since then, patient has had issues with intermittent nausea/vomiting      Intermittent Nausea/Vomiting and diarrhea  Weight Loss   Hx PEG tube s/p removal  -Per patient, symptoms started after PEG removal about 6 months ago. Pt had PEG tube for nutrition while undergoing Tx for tonsil cancer.   -Established care with Dr Serrato, GI in [place], with plans for outpatient EGD but symptoms worsened so patient came to ED   -CT A/P with concern for gastritis/duodenitis  -Continue IV PPI twice daily, antiemetics and IV fluids  - GI follows-  s/p EGD with PEG placement 6/13/2024. No overt findings for n/v per GI   --tube feeds started and titration per nutrition recs   -during replacement of electrolytes, patient has now decided to stop aggressive measures and treatment, wants to go home with Hospice today and be comfortable. Does not desire any more labs  --per nutrition recs; continue to run tube feeds at 50 ml/ hr tonight and increase to 75 ml/hr over the next few days slowly (increase 15 ml/hr every 12 hours to goal).  Patient family states they have everything they need for tube feeding at home.   --Hospice consultation to establish care and set up for outpatient services.      Mild Hypernatremia  Hypokalemia  Hypophosphatemia   THELMA  -This is likely secondary to above   -continue to monitor and replete electrolytes per protocol  --changed IVF to 1/2 NS today due to increase in sodium again, likely patient needs more free water  --creatinine increase to 1.3; cont IVF and free water and placed HCTZ on hold   -creatinine improved, down to 0.9  --replacing electrolytes per protocol   --will continue to hold HCTZ at DC due to decreased intake overall      Hx tonsil cancer  -pt did not complete chemo/radiation. Was on hospice but then graduated.   -Not interested in further oncologic work-up.   -Palliative care consulted to readdress goals of care; desires DNR   --now hospice consultation      Hypertension  -BP remains elevated, but is much improved continue p.o. hydralazine  -prn IV hydralazine is in place  --placed HCTZ back on hold with increase in creatinine and sodium   -BP's stable over last 48 hours off HCTZ    Discharge Follow Up Recommendations for outpatient labs/diagnostics:   PCP as needed   Home with hospice today     Day of Discharge     HPI:   Patient feeling better. Has been able to tolerate small amt of regular diet overnight and this morning. No abd pain or n/v. Having loose stools. Has decided she does not want any more aggressive care,  and wants to go home with Hospice to be comfortable. Family in room understands patients desires. States they have everything they need at home, including tube feeds/ pump/ bed. No f/c, soa or cp     Review of Systems  All other systems negative    Vital Signs:   Temp:  [98 °F (36.7 °C)-98.4 °F (36.9 °C)] 98.1 °F (36.7 °C)  Heart Rate:  [77-87] 80  Resp:  [16-18] 18  BP: (130-155)/(70-79) 155/71  Flow (L/min):  [0] 0      Physical Exam:  Constitutional: Chronically ill-appearing elderly female, laying in bed/ frail   HENT: NCAT, mucous membranes moist   Respiratory: Clear to auscultation bilaterally, respiratory effort normal   Cardiovascular: RRR, no murmurs, rubs, or gallops; port a cath right chest   Gastrointestinal: Positive bowel sounds, soft, nontender, nondistended; PEG   Musculoskeletal: No bilateral ankle edema  Psychiatric: Appropriate affect, cooperative  Neurologic: Nonfocal    Pertinent  and/or Most Recent Results     LAB RESULTS:      Lab 06/15/24  0851 06/14/24  0953 06/13/24  1830 06/13/24  0503 06/11/24  0834 06/10/24  0500   WBC 8.43 10.88*  --   --  8.24 6.14   HEMOGLOBIN 12.8 12.9  --   --  13.9 11.3*   HEMATOCRIT 38.7 41.3  --   --  43.3 34.7   PLATELETS 211 220  --   --  169 152   NEUTROS ABS 6.35  --   --   --   --   --    IMMATURE GRANS (ABS) 0.07*  --   --   --   --   --    LYMPHS ABS 1.00  --   --   --   --   --    MONOS ABS 0.90  --   --   --   --   --    EOS ABS 0.08  --   --   --   --   --    MCV 90.6 95.8  --   --  92.7 93.0   CRP  --   --  <0.30  --   --   --    PROTIME  --   --   --  18.5*  --   --          Lab 06/16/24  0545 06/15/24  0851 06/14/24  1859 06/14/24  0953 06/13/24  1830   SODIUM 136 140 140 146* 142   POTASSIUM 4.3 4.0 4.4 4.6 4.1   CHLORIDE 109* 110* 111* 115* 111*   CO2 18.0* 16.0* 14.0* 15.0* 16.0*   ANION GAP 9.0 14.0 15.0 16.0* 15.0   BUN 24* 28* 31* 32* 29*   CREATININE 0.90 1.01* 1.12* 1.32* 1.23*   EGFR 64.4 56.0* 49.5* 40.6* 44.2*   GLUCOSE 94 90 95 79 84    CALCIUM 7.6* 8.3* 8.0* 8.5* 8.4*   MAGNESIUM 1.7 1.9  --  2.1 2.0   PHOSPHORUS 1.6* 2.5  --  3.7 3.2         Lab 06/13/24  1830   TOTAL PROTEIN 5.0*   ALBUMIN 3.1*   GLOBULIN 1.9   ALT (SGPT) 16   AST (SGOT) 28   BILIRUBIN 0.2   ALK PHOS 88         Lab 06/13/24  0503   PROTIME 18.5*   INR 1.53*         Lab 06/13/24  1830   CHOLESTEROL 134   TRIGLYCERIDES 351*             Brief Urine Lab Results  (Last result in the past 365 days)        Color   Clarity   Blood   Leuk Est   Nitrite   Protein   CREAT   Urine HCG        06/09/24 1326 Yellow   Clear   Negative   Negative   Negative   Negative                 Microbiology Results (last 10 days)       ** No results found for the last 240 hours. **            CT Head Without Contrast    Result Date: 6/15/2024  CT HEAD WO CONTRAST Date of Exam: 6/15/2024 2:06 AM EDT Indication: fell, hit head. Comparison: 9/26/2023. Technique: Axial CT images were obtained of the head without contrast administration.  Automated exposure control and iterative construction methods were used. Findings: There is no acute intracranial hemorrhage. Gray-white differentiation appears intact. There are moderate stable patchy areas of white matter hypoattenuation. Basal ganglia, brainstem and cerebellum appear unremarkable. No mass effect, midline shift or abnormal extra-axial collections. Thinning of the orbital lenses would suggest prior cataract surgery. The paranasal sinuses and mastoid air cells appear well aerated. The calvarium is intact.     Impression: 1. No acute intracranial abnormality. 2. Moderate chronic small vessel ischemic change. Electronically Signed: Arpit Arreola MD  6/15/2024 3:26 AM EDT  Workstation ID: [id number]    CT Abdomen Pelvis With Contrast    Result Date: 6/9/2024  CT ABDOMEN PELVIS W CONTRAST Date of Exam: 6/9/2024 3:02 PM EDT Indication: Vomiting green emesis, weight loss over the past month. Comparison: CT of the abdomen and pelvis performed on 3/2/2023. Technique:  Axial CT images were obtained of the abdomen and pelvis following the uneventful intravenous administration of 80 cc of Isovue-300 contrast. Reconstructed coronal and sagittal images were also obtained. Automated exposure control and iterative  construction methods were used. Findings:  The patient's gastrostomy tube has been removed. There is suspicion of thickening of the wall the gastric antrum and pyloric region of the stomach. There also may be thickening of the wall of the descending limb of the duodenum. This raises question of gastritis/duodenitis. There is increased stool throughout the colon and rectum. The appendix is either small in size or surgically absent. The gallbladder is surgically absent. There is unchanged prominence of the biliary tree. The adrenal glands, pancreas, and kidneys are normal. The spleen is borderline enlarged. The uterus is surgically absent. The urinary bladder is slightly distended. There are atherosclerotic vascular calcifications in the abdomen and pelvis. There are no enlarged lymph nodes or abnormal fluid collections. There are bandlike linear densities in the lower lobes felt to represent a combination of scarring and subsegmental atelectasis. There are no layering pleural effusions. There are no suspicious osteolytic or sclerotic  lesions within the bony structures. There are underlying degenerative changes.       Impression: 1. The patient's gastrostomy tube is been removed. 2. Suspicion of thickening of the wall the gastric antrum and pyloric region of the stomach as well as the descending limb of the duodenum. This raises question of gastritis/duodenitis. 3. Increased stool burden in the colon and rectum. 4. The appendix is either small in size or surgically absent. 5. Borderline splenomegaly. 6. Additional incidental findings as noted above. Electronically Signed: David Parkinson MD  6/9/2024 4:17 PM EDT  Workstation ID: [id number]     Results for orders placed during the  hospital encounter of 01/19/23    Duplex Venous Lower Extremity - Bilateral CAR    Interpretation Summary    The bilateral lower extremity venous duplex scan is negative for DVT and SVT in the areas visualized.    The tibial level veins were not well visualized, but appeared negative for DVT in the images obtained.      Results for orders placed during the hospital encounter of 01/19/23    Duplex Venous Lower Extremity - Bilateral CAR    Interpretation Summary    The bilateral lower extremity venous duplex scan is negative for DVT and SVT in the areas visualized.    The tibial level veins were not well visualized, but appeared negative for DVT in the images obtained.      Results for orders placed during the hospital encounter of 09/25/23    Adult Transthoracic Echo Complete W/ Cont if Necessary Per Protocol    Interpretation Summary    Left ventricular systolic function is normal. Calculated left ventricular EF = 58.9%    Left ventricular wall thickness is consistent with mild concentric hypertrophy.    Left ventricular diastolic function was indeterminate.    There is mild calcification of the aortic valve.    Estimated right ventricular systolic pressure from tricuspid regurgitation is mildly elevated (35-45 mmHg).    Mild aortic valve regurgitation.      Plan for Follow-up of Pending Labs/Results:   Pending Labs       Order Current Status    Tissue Pathology Exam In process          Discharge Details        Discharge Medications        New Medications        Instructions Start Date   Radha Root powder   1,500 mg, Oral, Daily   Start Date: June 17, 2024     hydrOXYzine 25 MG tablet  Commonly known as: ATARAX   25 mg, Oral, 3 Times Daily PRN      lansoprazole 3 mg/mL in sodium bicarbonate injection 8.4%   15 mg, Oral, Daily      metoclopramide 5 MG tablet  Commonly known as: Reglan   5 mg, Oral, 3 Times Daily PRN, Hold for diarrhea      ondansetron ODT 4 MG disintegrating tablet  Commonly known as: ZOFRAN-ODT   4  mg, Oral, Every 6 Hours PRN             Continue These Medications        Instructions Start Date   hydrALAZINE 100 MG tablet  Commonly known as: APRESOLINE   TAKE 1 TABLET BY MOUTH THREE TIMES DAILY FOR HIGH BLOOD PRESSURE      mirtazapine 30 MG tablet  Commonly known as: REMERON   30 mg, Oral, Nightly      multivitamin with minerals tablet tablet   1 tablet, Oral, Daily, OTC      nebivolol 5 MG tablet  Commonly known as: BYSTOLIC   5 mg, Oral, Daily, for high blood pressure      nitroglycerin 0.4 MG SL tablet  Commonly known as: NITROSTAT   Place 1 tablet under the tongue Every 5 (Five) Minutes As Needed for Chest Pain. Take no more than 3 doses in 15 minutes.      promethazine 12.5 MG tablet  Commonly known as: PHENERGAN   12.5 mg, Oral, Every 8 Hours PRN             Stop These Medications      allopurinol 100 MG tablet  Commonly known as: ZYLOPRIM     furosemide 20 MG tablet  Commonly known as: LASIX     nystatin 100,000 unit/mL suspension  Commonly known as: MYCOSTATIN     omeprazole 20 MG capsule  Commonly known as: priLOSEC     ondansetron 4 MG tablet  Commonly known as: ZOFRAN              Allergies   Allergen Reactions    Trazodone Confusion         Discharge Disposition:  Hospice/Home    Diet:  Hospital:  Diet Order   Procedures    Diet: Regular/House; Texture: Regular (IDDSI 7); Fluid Consistency: Thin (IDDSI 0)               CODE STATUS:    Code Status and Medical Interventions:   Ordered at: 06/12/24 1140     Medical Intervention Limits:    No intubation (DNI)     Level Of Support Discussed With:    Patient     Code Status (Patient has no pulse and is not breathing):    No CPR (Do Not Attempt to Resuscitate)     Medical Interventions (Patient has pulse or is breathing):    Limited Support       Future Appointments   Date Time Provider Department Center   7/31/2024  1:30 PM Jason Allred MD [facility]  06/16/24      Time Spent on Discharge:  I spent  60   minutes on this discharge activity which included: face-to-face encounter with the patient, reviewing the data in the system, coordination of the care with the nursing staff as well as consultants, documentation, and entering orders.

## 2024-06-16 NOTE — DISCHARGE INSTRUCTIONS
Infuse nocturnal EN of Peptamen AF @ 50 ml/hr X 14 hr (8p-6a) tonight, work up to goal of 75 ml/hr over next 2-3 days as tolerated.  Per RD and NP

## 2024-06-16 NOTE — PROGRESS NOTES
Clinical Nutrition     Patient Name: Vesta Landry  YOB: 1945  MRN: [id number]  Date of Encounter: 06/16/24 12:51 EDT  Admission date: 6/9/2024  Reason for Visit: Follow-up protocol, EN    Assessment   Nutrition Assessment   Admission Diagnosis:  Intractable nausea and vomiting [R11.2]    Problem List:    Intractable nausea and vomiting    Gastroesophageal reflux disease without esophagitis    HTN (hypertension), benign    Chronic diastolic heart failure    Squamous cell carcinoma of left tonsil    HLD (hyperlipidemia)    History of Clostridium difficile colitis    Elevated serum creatinine    Gastritis and duodenitis    Moderate malnutrition      PMH:   She  has a past medical history of Arthritis, Cancer, Cancer of tonsil, CHF (congestive heart failure), Deep vein thrombosis, Dementia, Dysphagia, GERD (gastroesophageal reflux disease), Hyperlipidemia, Hypertension, Impaired mobility, PONV (postoperative nausea and vomiting), SOB (shortness of breath), Vitamin D deficiency, and Wears dentures.    PSH:  She  has a past surgical history that includes Colonoscopy; Appendectomy; Cataract extraction; Cholecystectomy; Tubal ligation; Hysterectomy (1991); Oophorectomy (Bilateral, 1991); Portacath placement (Right, 02/08/2023); Esophagogastroduodenoscopy w/ PEG (N/A, 2/20/2023); and Esophagogastroduodenoscopy w/ PEG (N/A, 6/13/2024).    Applicable Nutrition History:   s/p PEG removal 6 months ago      6-13-24: PEG replaced    - LA Grade B reflux esophagitis with no bleeding. Biopsied. - Benign-appearing esophageal stenosis. Dilated. - Erythematous mucosa in the stomach. Biopsied. - Scar in the gastric body. - Normal examined duodenum. Biopsied. - An externally removable PEG placement was successfully completed.    Labs    Labs Reviewed: Yes    Results from last 7 days   Lab Units 06/16/24  0545 06/15/24  0851 06/14/24  1859 06/14/24  0953 06/13/24  1830 06/10/24  0500 06/09/24  1310  "  GLUCOSE mg/dL 94 90 95 79 84   < >  --    BUN mg/dL 24* 28* 31* 32* 29*   < >  --    CREATININE mg/dL 0.90 1.01* 1.12* 1.32* 1.23*   < >  --    SODIUM mmol/L 136 140 140 146* 142   < >  --    CHLORIDE mmol/L 109* 110* 111* 115* 111*   < >  --    POTASSIUM mmol/L 4.3 4.0 4.4 4.6 4.1   < >  --    PHOSPHORUS mg/dL 1.6* 2.5  --  3.7 3.2   < >  --    MAGNESIUM mg/dL 1.7 1.9  --  2.1 2.0   < >  --    ALT (SGPT) U/L  --   --   --   --  16  --   --    LACTATE mmol/L  --   --   --   --   --   --  0.9    < > = values in this interval not displayed.       Results from last 7 days   Lab Units 06/13/24  1830   ALBUMIN g/dL 3.1*   PREALBUMIN mg/dL 15.2*   CRP mg/dL <0.30   CHOLESTEROL mg/dL 134   TRIGLYCERIDES mg/dL 351*       Results from last 7 days   Lab Units 06/16/24  1142 06/16/24  0719 06/16/24  0617 06/16/24  0022 06/15/24  1746 06/15/24  1222   GLUCOSE mg/dL 111 102 91 106 94 95     Lab Results   Lab Value Date/Time    HGBA1C 4.90 09/26/2023 0619    HGBA1C 5.30 01/20/2023 0643               Medications    Medications Reviewed: yes  Remeron, MVI, protonix, gingeroot, bowel regimen, carafate  Fluids stopped    Intake/Ouptut 24 hrs (0701 - 0700)   I&O's Reviewed: yes    Intake & Output (last day)         06/15 0701  06/16 0700 06/16 0701  06/17 0700    P.O.  420    Other 120     NG/GT      Total Intake(mL/kg) 120 (2) 420 (7.1)    Emesis/NG output      Stool      Total Output      Net +120 +420          Urine Unmeasured Occurrence 1 x 1 x    Stool Unmeasured Occurrence 1 x 1 x           Anthropometrics     Height: Height: 152.4 cm (60\")  Last Filed Weight: Weight: 59.2 kg (130 lb 8 oz) (06/16/24 0356)  Method: Weight Method: Bed scale  BMI: BMI (Calculated): 25.5    UBW: 153lb per Oncology RD note 1-31-23    Weight change:  29lb wt loss over past 18 months, 19% wt loss     Weight       Weight (kg) Weight (lbs) Weight Method Visit Report   2/23/2023 66.8 kg  147 lb 4.3 oz  Bed scale     2/24/2023 66.8 kg  147 lb 4.3 oz  Bed " scale      66.8 kg  147 lb 4.3 oz      2/25/2023 66.6 kg  146 lb 13.2 oz  Bed scale     2/26/2023 67.7 kg  149 lb 4 oz  Bed scale     2/27/2023 66.6 kg  146 lb 13.2 oz  Bed scale     3/1/2023 66.9 kg  147 lb 7.8 oz  Bed scale     3/2/2023 65.772 kg  145 lb  Stated      68.811 kg  151 lb 11.2 oz  Bed scale     3/3/2023 68.402 kg  150 lb 12.8 oz  Bed scale     3/4/2023 71.351 kg  157 lb 4.8 oz  Bed scale     3/7/2023 75.297 kg  166 lb  Bed scale     3/10/2023 64.864 kg  143 lb  Stated     9/7/2023 61.598 kg  135 lb 12.8 oz   --    9/25/2023 61.236 kg  135 lb  Stated     9/26/2023 62.596 kg  138 lb  Bed scale      62.6 kg  138 lb 0.1 oz      9/27/2023 61.145 kg  134 lb 12.8 oz  Standing scale     9/28/2023 59.603 kg  131 lb 6.4 oz  Standing scale     10/6/2023 62.052 kg  136 lb 12.8 oz   --    10/19/2023 64.229 kg  141 lb 9.6 oz   --    1/8/2024 63.957 kg  141 lb   --    1/26/2024 61.598 kg  135 lb 12.8 oz   --    2/22/2024 59.875 kg  132 lb  Stated     2/29/2024 56.382 kg  124 lb 4.8 oz   --    5/21/2024 56.881 kg  125 lb 6.4 oz   --     56.881 kg  125 lb 6.4 oz   --    5/22/2024 56.7 kg  125 lb      5/28/2024 56.427 kg  124 lb 6.4 oz   --    6/9/2024 56.246 kg  124 lb  Stated           Nutrition Focused Physical Exam     Date: 6-13-24    Patient meets criteria for malnutrition diagnosis, see MSA note.       Subjective   Reported/Observed/Food/Nutrition Related History:     6/16) RD contacted by RN for concerns pt with diarrhea. Phos also critically low and replaced IV. EN got to goal last night and diarrhea did start shortly after. No further N/V and pt denies any increased abd. Discomfort/N/V since TF started, just diarrhea. Of note she has been receiving IVF with K past 2 days as well as got pericolace which is now d/c. Discussed these other possible factors and plan to continue current regimen as tolerated for now with supplemental fiber, encouraged oral fiber intake as well. Pt agreeable to this plan. Not eating  much right now dtr at beside encouraging, able to eat 25% breakfast but lunch tray which she ordered untouched in front of her. Tells me she just doesn't ever feel like eating and that is why she needed the PEG. RD discussed daytime feeding and inquired about switching to nocturnal to encourage PO in the day. Pt now agreeable to this stating only previously wanted daytime as she did not have a bed that would elevate her head, family has ordered appropriate bed. All questions/concerns addressed. Discussed next step to try another formula if unable to tolerate with changes.    6-14:  Per RN: pt has vomited 2x today, one time s/p meds, one time after lunch, pt ate a chix finger and some baked potato for lunch, TF has been running @15ml, plan to advance to 30ml later today    Per GI: ok to to frain PEG to gravity if has nausea or fullness    pt sitting up in chair, no complaints at presents, repors she ate some lunch.reports she does not like ensure clear supplements, is wiilling to try some yogurt   6-13: pt resting in bed, s/p EGD/PEG placement, no complaints at present, family at bedside    Family reports pt did not tolerate bolus feeds in the past, previously was using vivonex TF at home, she does not want to have TF hanging at night, prefers to run  TF during day only    Per RN: pt allowed clear liquids in 4 hours, ok per GI to start EN in 12 hours      6-11: Pt resting in bed, reports poor appetite, has nausea if she eats anything, has had nausea, vomiting since PEG removed 6 months ago, is swallowing ok, has tried oral supplements at home, but dislikes most of them    Per EMR: noted that pt has difficulty tolerating TF, with multiple formula changes last year, did eventually tolerate Isosource HN via pump on last admission    Family reports pt did not like having feeding tube, and it was leaking a lot, so she had it removed, feels pt has had significant wt loss over past 4 months, had lost down to 120lb's, then  regained 4lb's, unsure what current wt is    Current Nutrition Prescription     PO: Diet: Regular/House; Texture: Regular (IDDSI 7); Fluid Consistency: Thin (IDDSI 0)  Oral Nutrition Supplement: Ensure Clear #x/day  Intake: Insufficient data:225% of 1 meal    EN: Peptamen AF  Goal Rate: 75  Water Flushes:10ml  Modular: None  Route: PEG  Tube: Unknown    At goal over: 14Hrs/day: (Nocturnal 8p-6a)    Rx will supply:   Goal Volume 1050  mL/day     Flush Volume 140 mL/day     Energy 1260 Kcal/day 87 % Est Need   Protein 80 g/day 105 % Est Need   Fiber 6 g/day     Water in   mL     Total Water 991 mL     Meet DRI No , but pt has MVI       --------------------------------------------------------------------------  Product/Rate verified at bedside: Yes  Infusing Rate at time of visit: 15ml    Average Delivery from Charting: Insufficient data   Volume  mL/day   % Goal Vol.   Flush Volume  mL/day     Energy  Kcal/day  % Est Need   Protein  g/day  % Est Need   Fiber  g/day     Water in  EN  mL     Total Water  mL     Meet DRI N/A             Assessment & Plan   Nutrition Diagnosis   Date: 6-11-24 Updated: 6-14-24  Problem Malnutrition     Etiology Tonsillar CA   Signs/Symptoms 29lb wt loss over 18 months, 19% wt loss, po intake < 75%, moderate muscle wasting, fat loss       Goal:   Nutrition to support treatment, Increase intake, and Tolerate EN at goal, Adjust EN, Deliver estimated needs    Nutrition Intervention      Follow treatment progress, Care plan reviewed, Advised available snacks, Adjusted supplement, Encourage intake, Nutrition support order placed    Pt tolerating EN with no further N/V, however now with new diarrhea. Do suspect previous fluids and bowel regimen both factors as well as appears EN advanced quicker than ordered.   Will make some EN alterations to hopefully assist. Discussed with pt/dtr and RN, day time EN turned off.    Pt continues at risk refeeding syndrome, continue to monitor/replace lytes  per protocol.     Tonight initiate nocturnal EN regimen:  -To encourage PO in the day change to night feeds 8p-6a  -Initiate Peptamen AF @ 50 ml/hr and advance after 6 hr as tolerated to goal 75 ml/hr.  -lower water flushes 10 ml/hr- encourage PO fluids  -+ supplemental fiber BID, encouraged oral fiber intake    Rx will supply:   Goal Volume 1050  mL/day     Flush Volume 140 mL/day     Energy 1260 Kcal/day 87 % Est Need   Protein 80 g/day 105 % Est Need   Fiber 12 g/day     Water in   mL     Total Water 991 mL     Meet DRI No , but pt has MVI         Monitoring/Evaluation:   Per protocol, I&O, PO intake, Supplement intake, Pertinent labs, Weight, Skin status, GI status, Symptoms, Swallow function    Susan Amador RD  Time Spent:30min

## 2024-06-16 NOTE — PROGRESS NOTES
Continued Stay Note  [facility]     Patient Name: Vesta Landry  MRN: [id number]  Today's Date: 6/16/2024    Admit Date: 6/9/2024    Plan: Home with hospice   Discharge Plan       Row Name 06/16/24 1640       Plan    Plan Home with hospice    Plan Comments Hospice consult received. Discussed with Irina DURAND who relays that patient wants to go home with hospice. Hospice RN to bedside. Met with patient and 2 daughters. Patient is discharging home today and is interested in being admitted to hospice at home. Made them aware it would most likely be tomorrow before hospice could visit and they are all fine with this. Called referral into [facility] with [facility]. Faxed records to her. She states that someone with hospice will call the daughterBritany tomorrow. Updated nurse Monisha, Irina Suazo, and palliative team. For assist, please call ext [phone].                   Discharge Codes    No documentation.                 Expected Discharge Date and Time       Expected Discharge Date Expected Discharge Time    Jun 16, 2024               Emilia Molina RN

## 2024-06-16 NOTE — NURSING NOTE
Aox4 room air pt is discharging to home. Discharge teaching done with pt and daughters at bedside. No needs at this time

## 2024-06-16 NOTE — PROGRESS NOTES
Nutrition Services    Patient Name:  Vesta Landry  YOB: 1945  MRN: [id number]  Admit Date:  6/9/2024    RD informed by RN pt to d/c with hospice today, inquiring about EN.  RD recommends keeping pt in hospital until electrolytes stable.   If pt to d/c recommend checking labs outpatient.    If pt wishes to continue EN at home recommend:  Infuse nocturnal EN of Peptamen AF @ 50 ml/hr X 14 hr (8p-6a) tonight, work up to goal of 75 ml/hr over next 2-3 days as tolerated.   If unable to tolerate would recommend infusing at 50 ml/hr and or for 12 hr rather than 14 hr though this will meet less of est. needs. Could drink boost as well.   Water flushes 10 ml/hr, adjust water flush as needed for fluid needs based on oral intake.   Recommend trial Nutrisource fiber supplement BID if diarrhea does not improve.     Discussed above and other recommendations with pt and dtr at bedside earlier today, see note for full assessment. Discussed recommendations for advancing EN with RN as above.     Goal regimen:    EN: Peptamen AF (Nocturnal X 14 hr 8p-6a)  Goal Rate: 75 ml/hr  Water Flushes: 10 ml/hr  Modular: Nutrisource Fiber 2/day  Route: PEG  Tube: 20 PEG    At goal over: 22Hrs/day     Rx will supply:   Goal Volume 1050  mL/day       Flush Volume 140 mL/day       Energy 1260 Kcal/day 87 % Est Need   Protein 80 g/day 105 % Est Need   Fiber 12 g/day       Water in   mL       Total Water 991 mL       Meet DRI No , but pt has MVI            Electronically signed by:  Suasn Amador RD  06/16/24 16:07 EDT

## 2024-06-17 ENCOUNTER — TELEPHONE (OUTPATIENT)
Dept: INTERNAL MEDICINE | Facility: CLINIC | Age: 79
End: 2024-06-17
Payer: MEDICARE

## 2024-06-17 NOTE — TELEPHONE ENCOUNTER
Cindy with [facility] left vm that she has received referral on patient. Wants to know if dr gambino will follow care and pain and symptom management or transfer care to hospice. Call her at [phone]

## 2024-06-18 LAB
CYTO UR: NORMAL
LAB AP CASE REPORT: NORMAL
LAB AP CLINICAL INFORMATION: NORMAL
PATH REPORT.FINAL DX SPEC: NORMAL
PATH REPORT.GROSS SPEC: NORMAL

## 2024-06-19 ENCOUNTER — HOME HEALTH ADMISSION (OUTPATIENT)
Dept: HOME HEALTH SERVICES | Facility: HOME HEALTHCARE | Age: 79
End: 2024-06-19
Payer: MEDICARE

## 2024-06-20 ENCOUNTER — TELEPHONE (OUTPATIENT)
Dept: GASTROENTEROLOGY | Facility: CLINIC | Age: 79
End: 2024-06-20
Payer: MEDICARE

## 2024-06-20 NOTE — TELEPHONE ENCOUNTER
Caller: MATTI ROBBINS    Relationship to patient: Emergency Contact    Best call back number:  [phone]    Chief complaint: PT HAD PROCEDURE WHILE IN HOSPITAL    Type of visit: EGD    Requested date: NONE     If rescheduling, when is the original appointment: 06/27/204     Additional notes:PT HAD PROCEDURE DONE WHILE IN HOSPITAL ON  06/13/2024

## 2024-06-21 ENCOUNTER — TELEPHONE (OUTPATIENT)
Dept: INTERNAL MEDICINE | Facility: CLINIC | Age: 79
End: 2024-06-21
Payer: MEDICARE

## 2024-06-21 NOTE — TELEPHONE ENCOUNTER
I spoke with Cindy from [facility]. She said they were not going to admit the patient to hospice care yet due to the improved CT scan regarding her cancer. States that she has been admitted to palliative care. Cindy said that no further information is needed at this time, but that she wanted to provide this update.     [facility] / Cindy ph [phone] x [phone].

## 2024-06-24 NOTE — PROGRESS NOTES
Pathology from recent upper endoscopy biopsy as follows:    Biopsies from the duodenum show severe benign inflammation and ulceration.  Biopsies from the stomach showed moderate benign inflammation with no evidence of H. pylori.  Biopsies from the esophagus show benign reactive changes.  Continue proton pump inhibitor.  Follow-up with your primary gastroenterologist and your primary care provider.

## 2025-02-17 NOTE — PROGRESS NOTES
Lab Results   Component Value Date    CREATININE 2.27 (H) 02/17/2025    CREATININE 2.30 (H) 02/16/2025    CREATININE 2.11 (H) 02/15/2025     Here with abnormal outpatient lab work. 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

## (undated) DEVICE — SUT ETHLN 3-0 FS118IN 663H

## (undated) DEVICE — SYR LUERLOK 5CC

## (undated) DEVICE — MARKR SKIN W/RULR

## (undated) DEVICE — NDL HYPO ECLPS SFTY 25G 1 1/2IN

## (undated) DEVICE — TBG FEED PULL FLOW20 NO/DRUG 20F 4.47MM 150CM

## (undated) DEVICE — NDL HYPO ECLPS SFTY 18G 1 1/2IN

## (undated) DEVICE — DECANTER BAG 9": Brand: MEDLINE INDUSTRIES, INC.

## (undated) DEVICE — MEDI-VAC NON-CONDUCTIVE SUCTION TUBING: Brand: CARDINAL HEALTH

## (undated) DEVICE — SYR LUERLOK 50ML

## (undated) DEVICE — RICH MAJOR PROCEDURE: Brand: MEDLINE INDUSTRIES, INC.

## (undated) DEVICE — TB FEED MIC G STD 18F 7 TO 10ML

## (undated) DEVICE — STRIP,CLOSURE,WOUND,MEDI-STRIP,1/2X4: Brand: MEDLINE

## (undated) DEVICE — CLAVICLE STRAP: Brand: DEROYAL

## (undated) DEVICE — KT ORCA ORCAPOD DISP STRL

## (undated) DEVICE — SINGLE-USE BIOPSY FORCEPS: Brand: RADIAL JAW 4

## (undated) DEVICE — PERCUTANEOUS ENDOSCOPIC GASTROSTOMY KIT: Brand: ENDOVIVE SAFETY PEG KIT

## (undated) DEVICE — COVER,MAYO STAND,STERILE: Brand: MEDLINE

## (undated) DEVICE — ESOPHAGEAL/PYLORIC/COLONIC WIREGUIDED BALLOON DILATATION CATHETER: Brand: CRE WIREGUIDED

## (undated) DEVICE — GLV SURG SENSICARE W/ALOE PF LF 8.5 STRL

## (undated) DEVICE — Device

## (undated) DEVICE — SPNG GZ WOVN 4X4IN 12PLY 10/BX STRL

## (undated) DEVICE — TUBING, SUCTION, 1/4" X 10', STRAIGHT: Brand: MEDLINE

## (undated) DEVICE — TOWEL,OR,DSP,ST,BLUE,STD,4/PK,20PK/CS: Brand: MEDLINE

## (undated) DEVICE — SYR LUERLOK 20CC BX/50

## (undated) DEVICE — SUT SILK 3/0 SH 30IN K832H

## (undated) DEVICE — SUT VIC 3/0 SH 27IN J416H

## (undated) DEVICE — SYR LL TP 10ML STRL

## (undated) DEVICE — BNDG TB TG GRIP SZ L

## (undated) DEVICE — DRSNG SURESITE WNDW 4X4.5

## (undated) DEVICE — UNDYED BRAIDED (POLYGLACTIN 910), SYNTHETIC ABSORBABLE SUTURE: Brand: COATED VICRYL

## (undated) DEVICE — VLV SXN AIR/H2O ORCAPOD3 1P/U STRL

## (undated) DEVICE — INTENDED FOR TISSUE SEPARATION, AND OTHER PROCEDURES THAT REQUIRE A SHARP SURGICAL BLADE TO PUNCTURE OR CUT.: Brand: BARD-PARKER ® CARBON RIB-BACK BLADES

## (undated) DEVICE — THE BITE BLOCK MAXI, LATEX FREE STRAP IS USED TO PROTECT THE ENDOSCOPE INSERTION TUBE FROM BEING BITTEN BY THE PATIENT.

## (undated) DEVICE — NDL HYPO ECLPS SFTY 22G 1 1/2IN

## (undated) DEVICE — 3M™ MICROFOAM™ TAPE 1528-4: Brand: 3M™ MICROFOAM™

## (undated) DEVICE — COUNT NDL FOAM STRIP W/MAG 60CT

## (undated) DEVICE — SHEET,DRAPE,70X100,STERILE: Brand: MEDLINE

## (undated) DEVICE — PACK,SET UP,NO DRAPES: Brand: MEDLINE

## (undated) DEVICE — BG DRN ENTERNAL W/ENFIT/CONN PVC 1000ML 150CM/TBG

## (undated) DEVICE — DEV INFL CRE STERIFLATE 60CC DISP

## (undated) DEVICE — INTRODUCER KIT FOR GASTROSTOMY FEEDING TUBE: Brand: AVANOS

## (undated) DEVICE — TBG PENCL TELESCP MEGADYNE SMOKE EVAC 10FT